# Patient Record
Sex: FEMALE | Race: WHITE | NOT HISPANIC OR LATINO | Employment: OTHER | URBAN - NONMETROPOLITAN AREA
[De-identification: names, ages, dates, MRNs, and addresses within clinical notes are randomized per-mention and may not be internally consistent; named-entity substitution may affect disease eponyms.]

---

## 2017-01-07 ENCOUNTER — TRANSCRIBE ORDERS (OUTPATIENT)
Dept: ADMINISTRATIVE | Facility: HOSPITAL | Age: 52
End: 2017-01-07

## 2017-01-07 DIAGNOSIS — R06.02 SHORTNESS OF BREATH: Primary | ICD-10-CM

## 2017-01-17 ENCOUNTER — HOSPITAL ENCOUNTER (OUTPATIENT)
Dept: CT IMAGING | Facility: HOSPITAL | Age: 52
Discharge: HOME | End: 2017-01-17
Admitting: NURSE PRACTITIONER

## 2017-01-17 DIAGNOSIS — N28.1 RENAL CYST, RIGHT: ICD-10-CM

## 2017-01-17 PROCEDURE — 74178 CT ABD&PLV WO CNTR FLWD CNTR: CPT

## 2017-01-17 PROCEDURE — 0 IOPAMIDOL 61 % SOLUTION: Performed by: NURSE PRACTITIONER

## 2017-01-17 PROCEDURE — 74178 CT ABD&PLV WO CNTR FLWD CNTR: CPT | Performed by: RADIOLOGY

## 2017-01-17 RX ADMIN — IOPAMIDOL 100 ML: 612 INJECTION, SOLUTION INTRAVENOUS at 10:45

## 2017-01-23 ENCOUNTER — OFFICE VISIT (OUTPATIENT)
Dept: SURGERY | Facility: CLINIC | Age: 52
End: 2017-01-23

## 2017-01-23 VITALS
SYSTOLIC BLOOD PRESSURE: 126 MMHG | HEART RATE: 88 BPM | DIASTOLIC BLOOD PRESSURE: 72 MMHG | BODY MASS INDEX: 32.47 KG/M2 | WEIGHT: 190.2 LBS | HEIGHT: 64 IN

## 2017-01-23 DIAGNOSIS — K82.9 GALLBLADDER DISEASE: Primary | ICD-10-CM

## 2017-01-23 DIAGNOSIS — R94.8 ABNORMAL BILIARY HIDA SCAN: ICD-10-CM

## 2017-01-23 PROCEDURE — 99213 OFFICE O/P EST LOW 20 MIN: CPT | Performed by: SURGERY

## 2017-01-23 RX ORDER — RANITIDINE 150 MG/1
150 TABLET ORAL 2 TIMES DAILY
COMMUNITY
End: 2019-12-05

## 2017-01-23 RX ORDER — PRAZOSIN HYDROCHLORIDE 2 MG/1
2 CAPSULE ORAL NIGHTLY
COMMUNITY
End: 2019-12-05

## 2017-01-23 RX ORDER — QUETIAPINE FUMARATE 100 MG/1
100 TABLET, FILM COATED ORAL NIGHTLY
COMMUNITY
End: 2019-12-05

## 2017-01-23 NOTE — PROGRESS NOTES
Nina Jiang is a 51 y.o. female here today to review test results.    History of Present Illness  Ms. Jiang was seen in the office today for follow-upaftera gallbladder ultrasound and a HIDA scan.  The patient was initially seen on 12/22/16 with an MRI of the thoracic spine report that suggested cholelithiasis.  Upon our review the radiologist's at [facility] felt that additional imaging to confirm was indicated.  The patient presents today having had an ultrasound which demonstrated stones and a HIDA scan which demonstrated a 12% ejection fraction.  The patient continues to have the same symptoms as a month ago.  She does not have pain per se but her main symptoms are nausea and vomiting.  No Known Allergies    Current Outpatient Prescriptions   Medication Sig Dispense Refill   • albuterol (PROVENTIL) (2.5 MG/3ML) 0.083% nebulizer solution Take 2.5 mg by nebulization every 4 (four) hours as needed for wheezing.     • ALPRAZolam (XANAX) 1 MG tablet Take 1 mg by mouth 2 (two) times a day as needed for anxiety.     • aspirin 81 MG chewable tablet Chew 81 mg daily.     • budesonide-formoterol (SYMBICORT) 160-4.5 MCG/ACT inhaler Inhale 2 puffs 2 (Two) Times a Day. 1 inhaler 6   • cetirizine (ZyrTEC) 10 MG tablet Take 10 mg by mouth daily.     • clonazePAM (KlonoPIN) 0.5 MG tablet Take 0.5 mg by mouth 2 (Two) Times a Day As Needed for seizures.     • flunisolide (NASALIDE) 25 MCG/ACT (0.025%) solution nasal spray Inhale 2 sprays Every 12 (Twelve) Hours. 1 bottle 5   • FLUoxetine (PROzac) 40 MG capsule      • furosemide (LASIX) 20 MG tablet Take 20 mg by mouth 2 (two) times a day.     • gabapentin (NEURONTIN) 800 MG tablet      • HYDROcodone-acetaminophen (NORCO)  MG per tablet Take 1 tablet by mouth every 6 (six) hours as needed for moderate pain (4-6).     • Lancets 30G misc      • levalbuterol (XOPENEX) 1.25 MG/3ML nebulizer solution Take 1 ampule by nebulization every 4 (four) hours as  "needed for wheezing.     • Loratadine (CLARITIN) 10 MG capsule Take  by mouth.     • metFORMIN (GLUCOPHAGE) 500 MG tablet Take 500 mg by mouth 2 (two) times a day with meals.     • metoprolol tartrate (LOPRESSOR) 25 MG tablet Take 25 mg by mouth 2 (two) times a day.     • montelukast (SINGULAIR) 10 MG tablet Take 10 mg by mouth every night.     • prazosin (MINIPRESS) 2 MG capsule Take 2 mg by mouth Every Night.     • promethazine (PHENERGAN) 25 MG tablet Take 25 mg by mouth every 6 (six) hours as needed for nausea or vomiting.     • QUEtiapine (SEROquel) 100 MG tablet Take 100 mg by mouth Every Night.     • raNITIdine (ZANTAC) 150 MG tablet Take 150 mg by mouth 2 (Two) Times a Day.     • Tiotropium Bromide Monohydrate (SPIRIVA RESPIMAT) 2.5 MCG/ACT aerosol solution Inhale 2 inhalers Daily. 1 inhaler 6   • vitamin D (ERGOCALCIFEROL) 27285 UNITS capsule capsule Take 50,000 Units by mouth 1 (one) time per week.       No current facility-administered medications for this visit.      Past Medical History   Diagnosis Date   • Asthma, extrinsic    • Cholelithiasis    • COPD (chronic obstructive pulmonary disease)    • Diabetes mellitus    • Hypertension    • Sleep apnea, obstructive      Past Surgical History   Procedure Laterality Date   • Hysterectomy     • Kidney stone surgery     • Cardiac catheterization     • Tubal abdominal ligation           The following portions of the patient's history were reviewed and updated as appropriate: allergies, current medications, past family history, past medical history, past social history, past surgical history and problem list.    Review of systems:  Unchanged from prior except HPI    Objective   Visit Vitals   • /72 (BP Location: Left arm, Patient Position: Sitting)   • Pulse 88   • Ht 64\" (162.6 cm)   • Wt 190 lb 3.2 oz (86.3 kg)   • BMI 32.65 kg/m2      Physical Exam  General: This is a WD WN [default value] in no acute distress  HEENT exam: WNL. Sclera are anicteric. " EOMI  Neck: supple, FROM, without thyromegaly, cervical or supraclavicular adenopathy  Lungs: Respiratory effort normal. Auscultation: Clear, without wheezes, rhonchi, rales  Heart: Regular rate and rhythm, without murmur, gallop, rub. No pedal edema  Abdomen:Bowel sounds are present.  The abdomen is soft, nontender, nondistended.  No rebound or guarding.  Musculoskeletal: muscle strength/tone is normal. Gait and station: normal. No digital cyanosis  Psyc: alert, oriented x 3. Mood and affect are appropriate  skin: Warm with good turgor. Without rash or lesion  extremities: Examination of the extremities revealed no cyanosis, clubbing or edema.  Results/Data  Gallbladder and HIDA scans were reviewed and I agree with the assessment    Procedures     Assessment/Plan     Symptomatic cholelithiasis and abnormal HIDA scan    Proceed with laparoscopic cholecystectomy with cholangiogram             Discussion/Summary  The risks of the procedure including risks of anesthesia and conversion to open were discussed with the patient    Future Appointments  Date Time Provider Department Center   1/26/2017 10:40 AM HESHAM Simmons None   2/20/2017 12:00 PM PULMONARY LAB ROOM  [facility]   2/20/2017 1:30 PM Tena Cee MD [facility] None

## 2017-01-23 NOTE — MR AVS SNAPSHOT
Liz Jiang   1/23/2017 2:00 PM   Office Visit    Dept Phone:  [phone]   Encounter #:  [id number]    Provider:  Carolin Marie MD   Department:  [facility] GENERAL SURGERY                Your Full Care Plan              Your Updated Medication List          This list is accurate as of: 1/23/17  3:09 PM.  Always use your most recent med list.                albuterol (2.5 MG/3ML) 0.083% nebulizer solution   Commonly known as:  PROVENTIL       ALPRAZolam 1 MG tablet   Commonly known as:  XANAX       aspirin 81 MG chewable tablet       budesonide-formoterol 160-4.5 MCG/ACT inhaler   Commonly known as:  SYMBICORT   Inhale 2 puffs 2 (Two) Times a Day.       cetirizine 10 MG tablet   Commonly known as:  zyrTEC       CLARITIN 10 MG capsule   Generic drug:  Loratadine       clonazePAM 0.5 MG tablet   Commonly known as:  KlonoPIN       flunisolide 25 MCG/ACT (0.025%) solution nasal spray   Commonly known as:  NASALIDE   Inhale 2 sprays Every 12 (Twelve) Hours.       FLUoxetine 40 MG capsule   Commonly known as:  PROzac       furosemide 20 MG tablet   Commonly known as:  LASIX       gabapentin 800 MG tablet   Commonly known as:  NEURONTIN       HYDROcodone-acetaminophen  MG per tablet   Commonly known as:  NORCO       Lancets 30G misc       levalbuterol 1.25 MG/3ML nebulizer solution   Commonly known as:  XOPENEX       metFORMIN 500 MG tablet   Commonly known as:  GLUCOPHAGE       metoprolol tartrate 25 MG tablet   Commonly known as:  LOPRESSOR       montelukast 10 MG tablet   Commonly known as:  SINGULAIR       prazosin 2 MG capsule   Commonly known as:  MINIPRESS       promethazine 25 MG tablet   Commonly known as:  PHENERGAN       QUEtiapine 100 MG tablet   Commonly known as:  SEROquel       raNITIdine 150 MG tablet   Commonly known as:  ZANTAC       Tiotropium Bromide Monohydrate 2.5 MCG/ACT aerosol solution   Commonly known as:  SPIRIVA RESPIMAT   Inhale 2  "inhalers Daily.       vitamin D 26117 UNITS capsule capsule   Commonly known as:  ERGOCALCIFEROL               Instructions     None    Patient Instructions History      Upcoming Appointments     Visit Type Date Time Department    FOLLOW UP 1/23/2017  2:00 PM [facility] SRGCAL SPEC [facility]    FOLLOW UP 1/26/2017 10:40 AM [facility] UROLOGY SARAH    FOLLOW UP 2/20/2017  1:30 PM [facility] PULM CRTCRE RICHMD    FULL PFT W BRONCHODILATORS 2/20/2017 12:00 PM [facility] PULMONARY LAB      MyChart Signup     Our records indicate that you have declined [facility] MyChart signup. If you would like to sign up for MyChart, please email [facility] [email or web address] or call [phone] to obtain an activation code.             Other Info from Your Visit           Your Appointments     Jan 26, 2017 10:40 AM EST   Follow Up with HESHAM Simmons   [facility] UROLOGY (--)    [address]  [facility] [address] [phone]           Arrive 15 minutes prior to appointment.            Feb 20, 2017 12:00 PM EST   Full PFT W Bronchodilators with PULMONARY LAB ROOM   [facility] PULMONARY LAB ([place])    [address] [facility] [address] [phone]           No inhalers or smoking 4 hours prior to procedure.            Feb 20, 2017  1:30 PM EST   Follow Up with Tena Cee MD   [facility] PULMONARY CRITICAL CARE AND SLEEP (--)    [address]  [facility] [address] [phone]           Arrive 15 minutes prior to appointment.              Allergies     No Known Allergies      Reason for Visit     Follow-up Test results      Vital Signs     Blood Pressure Pulse Height Weight Body Mass Index Smoking Status    126/72 (BP Location: Left arm, Patient Position: Sitting) 88 64\" (162.6 cm) 190 lb 3.2 oz (86.3 kg) 32.65 kg/m2 Former Smoker        "

## 2017-01-23 NOTE — LETTER
January 23, 2017     [facility]ESHAM Arteaga  [address] [facility] [address]    Patient: Liz Jiang   YOB: 1965   Date of Visit: 1/23/2017       Dear HESHAM Mercado:    Liz Jiang was in my office today. Below are the relevant portions of my assessment and plan of care.        Symptomatic cholelithiasis and abnormal HIDA scan    Proceed with laparoscopic cholecystectomy with cholangiogram            If you have questions, please do not hesitate to call me. I look forward to following Liz along with you.         Sincerely,        Carolin Marie MD        CC: No Recipients

## 2017-01-23 NOTE — H&P
Nina Jiang is a 51 y.o. female here today to review test results.    History of Present Illness  Ms. Jiang was seen in the office today for follow-upaftera gallbladder ultrasound and a HIDA scan.  The patient was initially seen on 12/22/16 with an MRI of the thoracic spine report that suggested cholelithiasis.  Upon our review the radiologist's at [facility] felt that additional imaging to confirm was indicated.  The patient presents today having had an ultrasound which demonstrated stones and a HIDA scan which demonstrated a 12% ejection fraction.  The patient continues to have the same symptoms as a month ago.  She does not have pain per se but her main symptoms are nausea and vomiting.  No Known Allergies    Current Outpatient Prescriptions   Medication Sig Dispense Refill   • albuterol (PROVENTIL) (2.5 MG/3ML) 0.083% nebulizer solution Take 2.5 mg by nebulization every 4 (four) hours as needed for wheezing.     • ALPRAZolam (XANAX) 1 MG tablet Take 1 mg by mouth 2 (two) times a day as needed for anxiety.     • aspirin 81 MG chewable tablet Chew 81 mg daily.     • budesonide-formoterol (SYMBICORT) 160-4.5 MCG/ACT inhaler Inhale 2 puffs 2 (Two) Times a Day. 1 inhaler 6   • cetirizine (ZyrTEC) 10 MG tablet Take 10 mg by mouth daily.     • clonazePAM (KlonoPIN) 0.5 MG tablet Take 0.5 mg by mouth 2 (Two) Times a Day As Needed for seizures.     • flunisolide (NASALIDE) 25 MCG/ACT (0.025%) solution nasal spray Inhale 2 sprays Every 12 (Twelve) Hours. 1 bottle 5   • FLUoxetine (PROzac) 40 MG capsule      • furosemide (LASIX) 20 MG tablet Take 20 mg by mouth 2 (two) times a day.     • gabapentin (NEURONTIN) 800 MG tablet      • HYDROcodone-acetaminophen (NORCO)  MG per tablet Take 1 tablet by mouth every 6 (six) hours as needed for moderate pain (4-6).     • Lancets 30G misc      • levalbuterol (XOPENEX) 1.25 MG/3ML nebulizer solution Take 1 ampule by nebulization every 4 (four) hours as  "needed for wheezing.     • Loratadine (CLARITIN) 10 MG capsule Take  by mouth.     • metFORMIN (GLUCOPHAGE) 500 MG tablet Take 500 mg by mouth 2 (two) times a day with meals.     • metoprolol tartrate (LOPRESSOR) 25 MG tablet Take 25 mg by mouth 2 (two) times a day.     • montelukast (SINGULAIR) 10 MG tablet Take 10 mg by mouth every night.     • prazosin (MINIPRESS) 2 MG capsule Take 2 mg by mouth Every Night.     • promethazine (PHENERGAN) 25 MG tablet Take 25 mg by mouth every 6 (six) hours as needed for nausea or vomiting.     • QUEtiapine (SEROquel) 100 MG tablet Take 100 mg by mouth Every Night.     • raNITIdine (ZANTAC) 150 MG tablet Take 150 mg by mouth 2 (Two) Times a Day.     • Tiotropium Bromide Monohydrate (SPIRIVA RESPIMAT) 2.5 MCG/ACT aerosol solution Inhale 2 inhalers Daily. 1 inhaler 6   • vitamin D (ERGOCALCIFEROL) 52900 UNITS capsule capsule Take 50,000 Units by mouth 1 (one) time per week.       No current facility-administered medications for this visit.      Past Medical History   Diagnosis Date   • Asthma, extrinsic    • Cholelithiasis    • COPD (chronic obstructive pulmonary disease)    • Diabetes mellitus    • Hypertension    • Sleep apnea, obstructive      Past Surgical History   Procedure Laterality Date   • Hysterectomy     • Kidney stone surgery     • Cardiac catheterization     • Tubal abdominal ligation           The following portions of the patient's history were reviewed and updated as appropriate: allergies, current medications, past family history, past medical history, past social history, past surgical history and problem list.    Review of systems:  Unchanged from prior except HPI    Objective   Visit Vitals   • /72 (BP Location: Left arm, Patient Position: Sitting)   • Pulse 88   • Ht 64\" (162.6 cm)   • Wt 190 lb 3.2 oz (86.3 kg)   • BMI 32.65 kg/m2      Physical Exam  General: This is a WD WN [default value] in no acute distress  HEENT exam: WNL. Sclera are anicteric. " EOMI  Neck: supple, FROM, without thyromegaly, cervical or supraclavicular adenopathy  Lungs: Respiratory effort normal. Auscultation: Clear, without wheezes, rhonchi, rales  Heart: Regular rate and rhythm, without murmur, gallop, rub. No pedal edema  Abdomen:Bowel sounds are present.  The abdomen is soft, nontender, nondistended.  No rebound or guarding.  Musculoskeletal: muscle strength/tone is normal. Gait and station: normal. No digital cyanosis  Psyc: alert, oriented x 3. Mood and affect are appropriate  skin: Warm with good turgor. Without rash or lesion  extremities: Examination of the extremities revealed no cyanosis, clubbing or edema.  Results/Data  Gallbladder and HIDA scans were reviewed and I agree with the assessment    Procedures     Assessment/Plan     Symptomatic cholelithiasis and abnormal HIDA scan    Proceed with laparoscopic cholecystectomy with cholangiogram             Discussion/Summary  The risks of the procedure including risks of anesthesia and conversion to open were discussed with the patient    Future Appointments  Date Time Provider Department Center   1/26/2017 10:40 AM HESHAM Simmons None   2/20/2017 12:00 PM PULMONARY LAB ROOM  [facility]   2/20/2017 1:30 PM Tena Cee MD [facility] None     This document has been electronically signed by Carolin MARVIN MD on January 23, 2017 6:48 PM

## 2017-01-24 ENCOUNTER — TELEPHONE (OUTPATIENT)
Dept: SURGERY | Facility: CLINIC | Age: 52
End: 2017-01-24

## 2017-01-27 ENCOUNTER — APPOINTMENT (OUTPATIENT)
Dept: PREADMISSION TESTING | Facility: HOSPITAL | Age: 52
End: 2017-01-27

## 2017-01-27 ENCOUNTER — TELEPHONE (OUTPATIENT)
Dept: SURGERY | Facility: CLINIC | Age: 52
End: 2017-01-27

## 2017-01-27 DIAGNOSIS — K82.9 GALLBLADDER DISEASE: ICD-10-CM

## 2017-01-27 LAB
ALBUMIN SERPL-MCNC: 4.3 G/DL (ref 3.5–5)
ALBUMIN/GLOB SERPL: 1.5 G/DL (ref 1.5–2.5)
ALP SERPL-CCNC: 85 U/L (ref 46–116)
ALT SERPL W P-5'-P-CCNC: 31 U/L (ref 10–36)
ANION GAP SERPL CALCULATED.3IONS-SCNC: 6.7 MMOL/L (ref 3.6–11.2)
AST SERPL-CCNC: 27 U/L (ref 10–30)
BASOPHILS # BLD AUTO: 0.01 10*3/MM3 (ref 0–0.3)
BASOPHILS NFR BLD AUTO: 0.1 % (ref 0–2)
BILIRUB SERPL-MCNC: 0.2 MG/DL (ref 0.2–1.8)
BUN BLD-MCNC: 14 MG/DL (ref 7–21)
BUN/CREAT SERPL: 25 (ref 7–25)
CALCIUM SPEC-SCNC: 9 MG/DL (ref 7.7–10)
CHLORIDE SERPL-SCNC: 105 MMOL/L (ref 99–112)
CO2 SERPL-SCNC: 32.3 MMOL/L (ref 24.3–31.9)
CREAT BLD-MCNC: 0.56 MG/DL (ref 0.43–1.29)
DEPRECATED RDW RBC AUTO: 47.5 FL (ref 37–54)
EOSINOPHIL # BLD AUTO: 0.06 10*3/MM3 (ref 0–0.7)
EOSINOPHIL NFR BLD AUTO: 0.7 % (ref 0–5)
ERYTHROCYTE [DISTWIDTH] IN BLOOD BY AUTOMATED COUNT: 14.5 % (ref 11.5–14.5)
GFR SERPL CREATININE-BSD FRML MDRD: 114 ML/MIN/1.73
GLOBULIN UR ELPH-MCNC: 2.9 GM/DL
GLUCOSE BLD-MCNC: 90 MG/DL (ref 70–110)
HCT VFR BLD AUTO: 46.2 % (ref 37–47)
HGB BLD-MCNC: 14.3 G/DL (ref 12–16)
IMM GRANULOCYTES # BLD: 0.02 10*3/MM3 (ref 0–0.03)
IMM GRANULOCYTES NFR BLD: 0.2 % (ref 0–0.5)
LYMPHOCYTES # BLD AUTO: 1.87 10*3/MM3 (ref 1–3)
LYMPHOCYTES NFR BLD AUTO: 20.8 % (ref 21–51)
MCH RBC QN AUTO: 28.8 PG (ref 27–33)
MCHC RBC AUTO-ENTMCNC: 31 G/DL (ref 33–37)
MCV RBC AUTO: 93 FL (ref 80–94)
MONOCYTES # BLD AUTO: 0.67 10*3/MM3 (ref 0.1–0.9)
MONOCYTES NFR BLD AUTO: 7.4 % (ref 0–10)
NEUTROPHILS # BLD AUTO: 6.37 10*3/MM3 (ref 1.4–6.5)
NEUTROPHILS NFR BLD AUTO: 70.8 % (ref 30–70)
OSMOLALITY SERPL CALC.SUM OF ELEC: 286.8 MOSM/KG (ref 273–305)
PLATELET # BLD AUTO: 186 10*3/MM3 (ref 130–400)
PMV BLD AUTO: 10.9 FL (ref 6–10)
POTASSIUM BLD-SCNC: 4.2 MMOL/L (ref 3.5–5.3)
PROT SERPL-MCNC: 7.2 G/DL (ref 6–8)
RBC # BLD AUTO: 4.97 10*6/MM3 (ref 4.2–5.4)
SODIUM BLD-SCNC: 144 MMOL/L (ref 135–153)
WBC NRBC COR # BLD: 9 10*3/MM3 (ref 4.5–12.5)

## 2017-01-27 PROCEDURE — 85025 COMPLETE CBC W/AUTO DIFF WBC: CPT | Performed by: SURGERY

## 2017-01-27 PROCEDURE — 80053 COMPREHEN METABOLIC PANEL: CPT | Performed by: SURGERY

## 2017-01-27 PROCEDURE — 36415 COLL VENOUS BLD VENIPUNCTURE: CPT

## 2017-01-27 RX ORDER — HYDROCODONE BITARTRATE AND ACETAMINOPHEN 7.5; 325 MG/1; MG/1
1 TABLET ORAL 2 TIMES DAILY
COMMUNITY
End: 2018-09-04 | Stop reason: ALTCHOICE

## 2017-01-27 NOTE — TELEPHONE ENCOUNTER
Spoke with patients daughter to confirm her surgery arrival time on 1/30/17 @ 8:45 and needs to be NPO

## 2017-01-27 NOTE — DISCHARGE INSTRUCTIONS
Arrival time 0730    1/30/17     TAKE the following medications the morning of surgery:  All heart or blood pressure medications    HOLD all diabetic medications the morning of surgery as order by physician.General Instructions:  • Do NOT eat or drink after midnight  which includes water, mints, or gum.  • You may brush your teeth.  • Do NOT smoke, chew tobacco, or drink alcohol within 24 hours prior to surgery.  • Bring medications in original bottles, any inhalers and if applicable your C-PAP/BI-PAP machine  • Bring any papers given to you in the doctor’s office  • Wear clean, comfortable clothes and socks  • Do NOT wear contact lenses or make-up or dark nail polish.  Bring a case for your glasses if applicable.  • Bring crutches or walker if applicable  • Leave all other valuables and jewelry at home  • If you were given a blood bank armband, continue to wear it until discharged.    Preventing a Surgical Site Infection:  • Shower on the morning of surgery using a fresh bar of anti-bacterial soap (such as Dial) and clean washcloth.  Dry with a clean towel and dress in clean clothing.  • For 2 to 3 days before surgery, avoid shaving with a razor near where you will have surgery because the razor can irritate skin and make it easier to develop an infection.  Ask your surgeon if you will be receiving antibiotics prior to surgery.  • Make sure you, your family, and all healthcare providers clean their hands with soap and water or an alcohol-based hand  before caring for you or your wound.  • If at all possible, quit smoking as many days before surgery as you can.    Day of Surgery:  Upon arrival, a pre-op nurse and anesthesiologist will review your health history, obtain vital signs, and answer questions you may have.  The only belongings needed at this time will be your home medications and if applicable you C-PAP/BI-PAP machine.  If you are staying overnight, your family can leave the rest of your  belongings in the car and bring them to your room later.  A pre-op nurse will start an IV and you may receive medication in preparation for surgery.  Due to patient privacy and limited space, only one member of your family will be able to accompany you in the pre-op area.  While you are in surgery your family should notify the waiting room  if they leave the waiting room area and provide a contact number.  Please be aware that surgery does come with discomfort.  We want to make every effort to control your discomfort so please discuss any uncontrolled symptoms with your nurse.  Your doctor will most likely have prescribed pain medications.  If you are going home after surgery you will receive individualized written care instructions before being discharged.  A responsible adult must drive you to and from the hospital on the day of surgery and stay with you for 24 hours.  If you are staying overnight following surgery, you will be transported to your hospital room following the recovery period.

## 2017-01-27 NOTE — MR AVS SNAPSHOT
Liz Jiang   1/27/2017 11:30 AM   Appointment    Provider:  PAT 3 COR   Department:  [facility] SARAH PREADMISSION TESTING PAT   Dept Phone:  [phone]                Your Full Care Plan           To Do List     2/3/2017 9:20 AM     Appointment with HESHAM Simmons at [facility] UROLOGY ([phone])   Arrive 15 minutes prior to appointment.   [address]  [place] [address]       2/20/2017 12:00 PM     Appointment with PULMONARY LAB ROOM at [facility] PULMONARY LAB ([phone])   No inhalers or smoking 4 hours prior to procedure.   [address]  [facility] [address]       2/20/2017 1:30 PM     Appointment with Tena Cee MD at [facility] PULMONARY CRITICAL CARE AND SLEEP ([phone])   Arrive 15 minutes prior to appointment.   [address]  [address]  [facility] [address]            Your Updated Medication List          This list is accurate as of: 1/27/17 11:48 AM.  Always use your most recent med list.                albuterol (2.5 MG/3ML) 0.083% nebulizer solution   Commonly known as:  PROVENTIL       aspirin 81 MG chewable tablet       budesonide-formoterol 160-4.5 MCG/ACT inhaler   Commonly known as:  SYMBICORT   Inhale 2 puffs 2 (Two) Times a Day.       cetirizine 10 MG tablet   Commonly known as:  zyrTEC       CLARITIN 10 MG capsule   Generic drug:  Loratadine       clonazePAM 0.5 MG tablet   Commonly known as:  KlonoPIN       flunisolide 25 MCG/ACT (0.025%) solution nasal spray   Commonly known as:  NASALIDE   Inhale 2 sprays Every 12 (Twelve) Hours.       FLUoxetine 40 MG capsule   Commonly known as:  PROzac       furosemide 20 MG tablet   Commonly known as:  LASIX       gabapentin 800 MG tablet   Commonly known as:  NEURONTIN       HYDROcodone-acetaminophen 7.5-325 MG per tablet   Commonly known as:  NORCO       Lancets 30G misc       levalbuterol 1.25 MG/3ML nebulizer  solution   Commonly known as:  XOPENEX       metFORMIN 500 MG tablet   Commonly known as:  GLUCOPHAGE       metoprolol tartrate 25 MG tablet   Commonly known as:  LOPRESSOR       montelukast 10 MG tablet   Commonly known as:  SINGULAIR       prazosin 2 MG capsule   Commonly known as:  MINIPRESS       promethazine 25 MG tablet   Commonly known as:  PHENERGAN       QUEtiapine 100 MG tablet   Commonly known as:  SEROquel       raNITIdine 150 MG tablet   Commonly known as:  ZANTAC       Tiotropium Bromide Monohydrate 2.5 MCG/ACT aerosol solution   Commonly known as:  SPIRIVA RESPIMAT   Inhale 2 inhalers Daily.       vitamin D 83749 UNITS capsule capsule   Commonly known as:  ERGOCALCIFEROL               [employer] Signup     Our records indicate that you have declined [employer] signup. If you would like to sign up for [employer], please email [email or web address] or call [phone] to obtain an activation code.             Other Info from Your Visit           Allergies     No Known Allergies      Vital Signs     Smoking Status                   Former Smoker             Discharge Instructions       Arrival time 0730    1/30/17     TAKE the following medications the morning of surgery:  All heart or blood pressure medications    HOLD all diabetic medications the morning of surgery as order by physician.General Instructions:  • Do NOT eat or drink after midnight  which includes water, mints, or gum.  • You may brush your teeth.  • Do NOT smoke, chew tobacco, or drink alcohol within 24 hours prior to surgery.  • Bring medications in original bottles, any inhalers and if applicable your C-PAP/BI-PAP machine  • Bring any papers given to you in the doctor’s office  • Wear clean, comfortable clothes and socks  • Do NOT wear contact lenses or make-up or dark nail polish.  Bring a case for your glasses if applicable.  • Bring crutches or walker if applicable  • Leave all other valuables and jewelry at home  • If  you were given a blood bank armband, continue to wear it until discharged.    Preventing a Surgical Site Infection:  • Shower on the morning of surgery using a fresh bar of anti-bacterial soap (such as Dial) and clean washcloth.  Dry with a clean towel and dress in clean clothing.  • For 2 to 3 days before surgery, avoid shaving with a razor near where you will have surgery because the razor can irritate skin and make it easier to develop an infection.  Ask your surgeon if you will be receiving antibiotics prior to surgery.  • Make sure you, your family, and all healthcare providers clean their hands with soap and water or an alcohol-based hand  before caring for you or your wound.  • If at all possible, quit smoking as many days before surgery as you can.    Day of Surgery:  Upon arrival, a pre-op nurse and anesthesiologist will review your health history, obtain vital signs, and answer questions you may have.  The only belongings needed at this time will be your home medications and if applicable you C-PAP/BI-PAP machine.  If you are staying overnight, your family can leave the rest of your belongings in the car and bring them to your room later.  A pre-op nurse will start an IV and you may receive medication in preparation for surgery.  Due to patient privacy and limited space, only one member of your family will be able to accompany you in the pre-op area.  While you are in surgery your family should notify the waiting room  if they leave the waiting room area and provide a contact number.  Please be aware that surgery does come with discomfort.  We want to make every effort to control your discomfort so please discuss any uncontrolled symptoms with your nurse.  Your doctor will most likely have prescribed pain medications.  If you are going home after surgery you will receive individualized written care instructions before being discharged.  A responsible adult must drive you to and from  the hospital on the day of surgery and stay with you for 24 hours.  If you are staying overnight following surgery, you will be transported to your hospital room following the recovery period.       SYMPTOMS OF A STROKE    Call 911 or have someone take you to the Emergency Department if you have any of the following:    · Sudden numbness or weakness of your face, arm or leg especially on one side of the body  · Sudden confusion, diffiiculty speaking or trouble understanding   · Changes in your vision or loss of sight in one eye  · Sudden severe headache with no known cause  · sudden dizziness, trouble walking, loss of balance or coordination    It is important to seek emergency care right away if you have further stroke symptoms. If you get emergency help quickly, the powerful clot-dissolving medicines can reduce the disabilities caused by a stroke.     For more information:    American Stroke Association  1-912-6-STROKE  www.strokeassociation.org           IF YOU SMOKE OR USE TOBACCO PLEASE READ THE FOLLOWING:    Why is smoking bad for me?  Smoking increases the risk of heart disease, lung disease, vascular disease, stroke, and cancer.     If you smoke, STOP!    If you would like more information on quitting smoking, please visit the [employer] website: [email or web address]   This link will provide additional resources including the QUIT line and the Beat the Pack support groups.     For more information:    American Cancer Society  ([phone] 788-8965    American Heart Association  5-158-596-9544

## 2017-01-30 ENCOUNTER — ANESTHESIA (OUTPATIENT)
Dept: PERIOP | Facility: HOSPITAL | Age: 52
End: 2017-01-30

## 2017-01-30 ENCOUNTER — ANESTHESIA EVENT (OUTPATIENT)
Dept: PERIOP | Facility: HOSPITAL | Age: 52
End: 2017-01-30

## 2017-01-30 ENCOUNTER — APPOINTMENT (OUTPATIENT)
Dept: GENERAL RADIOLOGY | Facility: HOSPITAL | Age: 52
End: 2017-01-30

## 2017-01-30 ENCOUNTER — HOSPITAL ENCOUNTER (OUTPATIENT)
Facility: HOSPITAL | Age: 52
Setting detail: HOSPITAL OUTPATIENT SURGERY
Discharge: HOME | End: 2017-01-30
Attending: SURGERY | Admitting: SURGERY

## 2017-01-30 VITALS
DIASTOLIC BLOOD PRESSURE: 72 MMHG | OXYGEN SATURATION: 93 % | WEIGHT: 186 LBS | RESPIRATION RATE: 18 BRPM | TEMPERATURE: 97.6 F | HEART RATE: 88 BPM | BODY MASS INDEX: 31.76 KG/M2 | HEIGHT: 64 IN | SYSTOLIC BLOOD PRESSURE: 118 MMHG

## 2017-01-30 DIAGNOSIS — K82.9 GALLBLADDER DISEASE: ICD-10-CM

## 2017-01-30 LAB — GLUCOSE BLDC GLUCOMTR-MCNC: 92 MG/DL (ref 70–130)

## 2017-01-30 PROCEDURE — C1726 CATH, BAL DIL, NON-VASCULAR: HCPCS | Performed by: SURGERY

## 2017-01-30 PROCEDURE — 25010000002 MIDAZOLAM PER 1 MG: Performed by: NURSE ANESTHETIST, CERTIFIED REGISTERED

## 2017-01-30 PROCEDURE — 25010000002 KETOROLAC TROMETHAMINE PER 15 MG: Performed by: NURSE ANESTHETIST, CERTIFIED REGISTERED

## 2017-01-30 PROCEDURE — 47563 LAPARO CHOLECYSTECTOMY/GRAPH: CPT | Performed by: SURGERY

## 2017-01-30 PROCEDURE — 25010000002 DEXAMETHASONE PER 1 MG: Performed by: NURSE ANESTHETIST, CERTIFIED REGISTERED

## 2017-01-30 PROCEDURE — 25010000002 NEOSTIGMINE 10 MG/10ML SOLUTION: Performed by: NURSE ANESTHETIST, CERTIFIED REGISTERED

## 2017-01-30 PROCEDURE — 76000 FLUOROSCOPY <1 HR PHYS/QHP: CPT

## 2017-01-30 PROCEDURE — 25010000002 FENTANYL CITRATE (PF) 100 MCG/2ML SOLUTION: Performed by: NURSE ANESTHETIST, CERTIFIED REGISTERED

## 2017-01-30 PROCEDURE — 25010000002 ONDANSETRON PER 1 MG: Performed by: NURSE ANESTHETIST, CERTIFIED REGISTERED

## 2017-01-30 PROCEDURE — 0 IOPAMIDOL 61 % SOLUTION: Performed by: SURGERY

## 2017-01-30 PROCEDURE — 82962 GLUCOSE BLOOD TEST: CPT

## 2017-01-30 PROCEDURE — 25010000002 PROPOFOL 10 MG/ML EMULSION: Performed by: NURSE ANESTHETIST, CERTIFIED REGISTERED

## 2017-01-30 PROCEDURE — 74300 X-RAY BILE DUCTS/PANCREAS: CPT | Performed by: RADIOLOGY

## 2017-01-30 PROCEDURE — 25010000003 CEFAZOLIN PER 500 MG: Performed by: SURGERY

## 2017-01-30 PROCEDURE — 25010000002 HYDROMORPHONE PER 4 MG: Performed by: NURSE ANESTHETIST, CERTIFIED REGISTERED

## 2017-01-30 RX ORDER — GLYCOPYRROLATE 0.2 MG/ML
INJECTION INTRAMUSCULAR; INTRAVENOUS AS NEEDED
Status: DISCONTINUED | OUTPATIENT
Start: 2017-01-30 | End: 2017-01-30 | Stop reason: SURG

## 2017-01-30 RX ORDER — SODIUM CHLORIDE 9 MG/ML
INJECTION, SOLUTION INTRAVENOUS AS NEEDED
Status: DISCONTINUED | OUTPATIENT
Start: 2017-01-30 | End: 2017-01-30 | Stop reason: HOSPADM

## 2017-01-30 RX ORDER — KETOROLAC TROMETHAMINE 30 MG/ML
INJECTION, SOLUTION INTRAMUSCULAR; INTRAVENOUS AS NEEDED
Status: DISCONTINUED | OUTPATIENT
Start: 2017-01-30 | End: 2017-01-30 | Stop reason: SURG

## 2017-01-30 RX ORDER — LIDOCAINE HYDROCHLORIDE 20 MG/ML
INJECTION, SOLUTION INFILTRATION; PERINEURAL AS NEEDED
Status: DISCONTINUED | OUTPATIENT
Start: 2017-01-30 | End: 2017-01-30 | Stop reason: SURG

## 2017-01-30 RX ORDER — SODIUM CHLORIDE 0.9 % (FLUSH) 0.9 %
1-10 SYRINGE (ML) INJECTION AS NEEDED
Status: DISCONTINUED | OUTPATIENT
Start: 2017-01-30 | End: 2017-01-30 | Stop reason: HOSPADM

## 2017-01-30 RX ORDER — NEOSTIGMINE METHYLSULFATE 1 MG/ML
INJECTION, SOLUTION INTRAVENOUS AS NEEDED
Status: DISCONTINUED | OUTPATIENT
Start: 2017-01-30 | End: 2017-01-30 | Stop reason: SURG

## 2017-01-30 RX ORDER — MIDAZOLAM HYDROCHLORIDE 1 MG/ML
INJECTION INTRAMUSCULAR; INTRAVENOUS AS NEEDED
Status: DISCONTINUED | OUTPATIENT
Start: 2017-01-30 | End: 2017-01-30 | Stop reason: SURG

## 2017-01-30 RX ORDER — PANTOPRAZOLE SODIUM 40 MG/10ML
40 INJECTION, POWDER, LYOPHILIZED, FOR SOLUTION INTRAVENOUS
Status: DISCONTINUED | OUTPATIENT
Start: 2017-01-30 | End: 2017-01-30 | Stop reason: HOSPADM

## 2017-01-30 RX ORDER — FENTANYL CITRATE 50 UG/ML
INJECTION, SOLUTION INTRAMUSCULAR; INTRAVENOUS AS NEEDED
Status: DISCONTINUED | OUTPATIENT
Start: 2017-01-30 | End: 2017-01-30 | Stop reason: SURG

## 2017-01-30 RX ORDER — IPRATROPIUM BROMIDE AND ALBUTEROL SULFATE 2.5; .5 MG/3ML; MG/3ML
3 SOLUTION RESPIRATORY (INHALATION) ONCE AS NEEDED
Status: DISCONTINUED | OUTPATIENT
Start: 2017-01-30 | End: 2017-01-30 | Stop reason: HOSPADM

## 2017-01-30 RX ORDER — ONDANSETRON 2 MG/ML
INJECTION INTRAMUSCULAR; INTRAVENOUS AS NEEDED
Status: DISCONTINUED | OUTPATIENT
Start: 2017-01-30 | End: 2017-01-30 | Stop reason: SURG

## 2017-01-30 RX ORDER — ONDANSETRON 2 MG/ML
4 INJECTION INTRAMUSCULAR; INTRAVENOUS EVERY 4 HOURS PRN
Status: DISCONTINUED | OUTPATIENT
Start: 2017-01-30 | End: 2017-01-30 | Stop reason: HOSPADM

## 2017-01-30 RX ORDER — FAMOTIDINE 10 MG/ML
INJECTION, SOLUTION INTRAVENOUS AS NEEDED
Status: DISCONTINUED | OUTPATIENT
Start: 2017-01-30 | End: 2017-01-30 | Stop reason: SURG

## 2017-01-30 RX ORDER — OXYCODONE HYDROCHLORIDE AND ACETAMINOPHEN 5; 325 MG/1; MG/1
1 TABLET ORAL EVERY 6 HOURS PRN
Qty: 28 TABLET | Refills: 0 | Status: SHIPPED | OUTPATIENT
Start: 2017-01-30 | End: 2019-11-19

## 2017-01-30 RX ORDER — DEXAMETHASONE SODIUM PHOSPHATE 4 MG/ML
INJECTION, SOLUTION INTRA-ARTICULAR; INTRALESIONAL; INTRAMUSCULAR; INTRAVENOUS; SOFT TISSUE AS NEEDED
Status: DISCONTINUED | OUTPATIENT
Start: 2017-01-30 | End: 2017-01-30 | Stop reason: SURG

## 2017-01-30 RX ORDER — ROCURONIUM BROMIDE 10 MG/ML
INJECTION, SOLUTION INTRAVENOUS AS NEEDED
Status: DISCONTINUED | OUTPATIENT
Start: 2017-01-30 | End: 2017-01-30 | Stop reason: SURG

## 2017-01-30 RX ORDER — PROPOFOL 10 MG/ML
VIAL (ML) INTRAVENOUS AS NEEDED
Status: DISCONTINUED | OUTPATIENT
Start: 2017-01-30 | End: 2017-01-30 | Stop reason: SURG

## 2017-01-30 RX ORDER — OXYCODONE HYDROCHLORIDE AND ACETAMINOPHEN 5; 325 MG/1; MG/1
1 TABLET ORAL EVERY 4 HOURS PRN
Status: DISCONTINUED | OUTPATIENT
Start: 2017-01-30 | End: 2017-01-30 | Stop reason: HOSPADM

## 2017-01-30 RX ORDER — MEPERIDINE HYDROCHLORIDE 25 MG/ML
12.5 INJECTION INTRAMUSCULAR; INTRAVENOUS; SUBCUTANEOUS
Status: DISCONTINUED | OUTPATIENT
Start: 2017-01-30 | End: 2017-01-30 | Stop reason: HOSPADM

## 2017-01-30 RX ORDER — SODIUM CHLORIDE, SODIUM LACTATE, POTASSIUM CHLORIDE, CALCIUM CHLORIDE 600; 310; 30; 20 MG/100ML; MG/100ML; MG/100ML; MG/100ML
125 INJECTION, SOLUTION INTRAVENOUS CONTINUOUS
Status: DISCONTINUED | OUTPATIENT
Start: 2017-01-30 | End: 2017-01-30 | Stop reason: HOSPADM

## 2017-01-30 RX ORDER — FENTANYL CITRATE 50 UG/ML
50 INJECTION, SOLUTION INTRAMUSCULAR; INTRAVENOUS
Status: DISCONTINUED | OUTPATIENT
Start: 2017-01-30 | End: 2017-01-30 | Stop reason: HOSPADM

## 2017-01-30 RX ORDER — ONDANSETRON 2 MG/ML
4 INJECTION INTRAMUSCULAR; INTRAVENOUS ONCE AS NEEDED
Status: DISCONTINUED | OUTPATIENT
Start: 2017-01-30 | End: 2017-01-30 | Stop reason: HOSPADM

## 2017-01-30 RX ORDER — MAGNESIUM HYDROXIDE 1200 MG/15ML
LIQUID ORAL AS NEEDED
Status: DISCONTINUED | OUTPATIENT
Start: 2017-01-30 | End: 2017-01-30 | Stop reason: HOSPADM

## 2017-01-30 RX ORDER — HYDROMORPHONE HCL 110MG/55ML
PATIENT CONTROLLED ANALGESIA SYRINGE INTRAVENOUS AS NEEDED
Status: DISCONTINUED | OUTPATIENT
Start: 2017-01-30 | End: 2017-01-30 | Stop reason: SURG

## 2017-01-30 RX ORDER — OXYCODONE HYDROCHLORIDE AND ACETAMINOPHEN 5; 325 MG/1; MG/1
1 TABLET ORAL ONCE AS NEEDED
Status: DISCONTINUED | OUTPATIENT
Start: 2017-01-30 | End: 2017-01-30 | Stop reason: HOSPADM

## 2017-01-30 RX ADMIN — PROPOFOL 200 MG: 10 INJECTION, EMULSION INTRAVENOUS at 09:46

## 2017-01-30 RX ADMIN — FENTANYL CITRATE 50 MCG: 50 INJECTION, SOLUTION INTRAMUSCULAR; INTRAVENOUS at 11:13

## 2017-01-30 RX ADMIN — ROCURONIUM BROMIDE 30 MG: 10 INJECTION INTRAVENOUS at 09:46

## 2017-01-30 RX ADMIN — FENTANYL CITRATE 50 MCG: 50 INJECTION, SOLUTION INTRAMUSCULAR; INTRAVENOUS at 11:18

## 2017-01-30 RX ADMIN — HYDROMORPHONE HYDROCHLORIDE 1 MG: 2 INJECTION, SOLUTION INTRAMUSCULAR; INTRAVENOUS; SUBCUTANEOUS at 10:35

## 2017-01-30 RX ADMIN — KETOROLAC TROMETHAMINE 30 MG: 30 INJECTION, SOLUTION INTRAMUSCULAR; INTRAVENOUS at 10:21

## 2017-01-30 RX ADMIN — MIDAZOLAM HYDROCHLORIDE 2 MG: 1 INJECTION, SOLUTION INTRAMUSCULAR; INTRAVENOUS at 09:41

## 2017-01-30 RX ADMIN — FENTANYL CITRATE 100 MCG: 50 INJECTION INTRAMUSCULAR; INTRAVENOUS at 09:46

## 2017-01-30 RX ADMIN — FAMOTIDINE 20 MG: 10 INJECTION, SOLUTION INTRAVENOUS at 10:04

## 2017-01-30 RX ADMIN — SODIUM CHLORIDE, POTASSIUM CHLORIDE, SODIUM LACTATE AND CALCIUM CHLORIDE 125 ML/HR: 600; 310; 30; 20 INJECTION, SOLUTION INTRAVENOUS at 09:15

## 2017-01-30 RX ADMIN — LIDOCAINE HYDROCHLORIDE 100 MG: 20 INJECTION, SOLUTION INFILTRATION; PERINEURAL at 09:46

## 2017-01-30 RX ADMIN — NEOSTIGMINE METHYLSULFATE 3 MG: 1 INJECTION, SOLUTION INTRAVENOUS at 10:30

## 2017-01-30 RX ADMIN — ONDANSETRON 4 MG: 2 INJECTION, SOLUTION INTRAMUSCULAR; INTRAVENOUS at 10:04

## 2017-01-30 RX ADMIN — CEFAZOLIN SODIUM 2 G: 2 SOLUTION INTRAVENOUS at 09:41

## 2017-01-30 RX ADMIN — DEXAMETHASONE SODIUM PHOSPHATE 8 MG: 4 INJECTION, SOLUTION INTRAMUSCULAR; INTRAVENOUS at 10:04

## 2017-01-30 RX ADMIN — GLYCOPYRROLATE 0.6 MG: 0.2 INJECTION INTRAMUSCULAR; INTRAVENOUS at 10:30

## 2017-01-30 NOTE — ANESTHESIA PROCEDURE NOTES
Airway  Urgency: elective    Date/Time: 1/30/2017 9:47 AM  Airway not difficult    General Information and Staff    Patient location during procedure: OR  CRNA: SARAH RASCON    Indications and Patient Condition  Indications for airway management: airway protection    Preoxygenated: yes  MILS maintained throughout  Mask difficulty assessment: 1 - vent by mask    Final Airway Details  Final airway type: endotracheal airway      Successful airway: ETT  Cuffed: yes   Successful intubation technique: direct laryngoscopy  Facilitating devices/methods: intubating stylet and cricoid pressure  Endotracheal tube insertion site: oral  Blade: Solorio  Blade size: #2  ETT size: 7.5 mm  Cormack-Lehane Classification: grade IIa - partial view of glottis  Placement verified by: chest auscultation and capnometry   Measured from: lips  ETT to lips (cm): 22  Number of attempts at approach: 1

## 2017-01-30 NOTE — ANESTHESIA POSTPROCEDURE EVALUATION
Patient: Liz Jiang    Procedure Summary     Date Anesthesia Start Anesthesia Stop Room / Location    01/30/17 0942 1041  [facility] OR 03 / [facility] OR       Procedure Diagnosis Surgeon Provider    CHOLECYSTECTOMY LAPAROSCOPIC INTRAOPERATIVE CHOLANGIOGRAM (N/A Abdomen) Gallbladder disease  (Gallbladder disease [K82.9]) MD Flash Guevara,           Anesthesia Type: general  Last vitals  BP      Temp      Pulse     Resp      SpO2        Post Anesthesia Care and Evaluation    Patient location during evaluation: PHASE II  Patient participation: complete - patient participated  Level of consciousness: awake and alert  Pain score: 1  Pain management: adequate  Airway patency: patent  Anesthetic complications: No anesthetic complications  PONV Status: controlled  Cardiovascular status: acceptable  Respiratory status: acceptable  Hydration status: acceptable

## 2017-01-30 NOTE — ANESTHESIA PREPROCEDURE EVALUATION
Anesthesia Evaluation     Patient summary reviewed and Nursing notes reviewed    No history of anesthetic complications   Airway   Mallampati: III  TM distance: >3 FB  Neck ROM: limited  possible difficult intubation  Dental - normal exam   (+) poor dentation    Pulmonary - normal exam   (+) hx of smoking, COPD, asthma, sleep apnea,   Cardiovascular - normal exam  Exercise tolerance: good (4-7 METS)  (+) hypertension,     NYHA Classification: II    Neuro/Psych  (+) psychiatric history,    GI/Hepatic/Renal/Endo    (+)  GERD, diabetes mellitus,     Musculoskeletal     Abdominal  - normal exam    Bowel sounds: normal.   Substance History - negative use     OB/GYN negative ob/gyn ROS         Other   (+) arthritis                          Anesthesia Plan    ASA 3     general     intravenous induction   Anesthetic plan and risks discussed with patient.    Plan discussed with CRNA.

## 2017-02-02 LAB
LAB AP CASE REPORT: NORMAL
Lab: NORMAL
PATH REPORT.FINAL DX SPEC: NORMAL

## 2017-02-13 ENCOUNTER — APPOINTMENT (OUTPATIENT)
Dept: GENERAL RADIOLOGY | Facility: HOSPITAL | Age: 52
End: 2017-02-13

## 2017-02-13 ENCOUNTER — HOSPITAL ENCOUNTER (EMERGENCY)
Facility: HOSPITAL | Age: 52
Discharge: HOME | End: 2017-02-13
Attending: EMERGENCY MEDICINE | Admitting: EMERGENCY MEDICINE

## 2017-02-13 VITALS
RESPIRATION RATE: 20 BRPM | DIASTOLIC BLOOD PRESSURE: 75 MMHG | TEMPERATURE: 98.5 F | SYSTOLIC BLOOD PRESSURE: 122 MMHG | OXYGEN SATURATION: 99 % | HEART RATE: 92 BPM | HEIGHT: 64 IN | WEIGHT: 168 LBS | BODY MASS INDEX: 28.68 KG/M2

## 2017-02-13 DIAGNOSIS — J44.1 COPD WITH ACUTE EXACERBATION (HCC): Primary | ICD-10-CM

## 2017-02-13 DIAGNOSIS — J20.8 ACUTE BACTERIAL BRONCHITIS: ICD-10-CM

## 2017-02-13 DIAGNOSIS — B96.89 ACUTE BACTERIAL BRONCHITIS: ICD-10-CM

## 2017-02-13 LAB
ALBUMIN SERPL-MCNC: 4.1 G/DL (ref 3.5–5)
ALBUMIN/GLOB SERPL: 1.3 G/DL (ref 1.5–2.5)
ALP SERPL-CCNC: 83 U/L (ref 46–116)
ALT SERPL W P-5'-P-CCNC: 23 U/L (ref 10–36)
ANION GAP SERPL CALCULATED.3IONS-SCNC: 4.6 MMOL/L (ref 3.6–11.2)
AST SERPL-CCNC: 22 U/L (ref 10–30)
BASOPHILS # BLD AUTO: 0.02 10*3/MM3 (ref 0–0.3)
BASOPHILS NFR BLD AUTO: 0.1 % (ref 0–2)
BILIRUB SERPL-MCNC: 0.6 MG/DL (ref 0.2–1.8)
BUN BLD-MCNC: 10 MG/DL (ref 7–21)
BUN/CREAT SERPL: 20.4 (ref 7–25)
CALCIUM SPEC-SCNC: 9.1 MG/DL (ref 7.7–10)
CHLORIDE SERPL-SCNC: 102 MMOL/L (ref 99–112)
CO2 SERPL-SCNC: 30.4 MMOL/L (ref 24.3–31.9)
CREAT BLD-MCNC: 0.49 MG/DL (ref 0.43–1.29)
DEPRECATED RDW RBC AUTO: 45.9 FL (ref 37–54)
EOSINOPHIL # BLD AUTO: 0.03 10*3/MM3 (ref 0–0.7)
EOSINOPHIL NFR BLD AUTO: 0.2 % (ref 0–5)
ERYTHROCYTE [DISTWIDTH] IN BLOOD BY AUTOMATED COUNT: 14.1 % (ref 11.5–14.5)
GFR SERPL CREATININE-BSD FRML MDRD: 133 ML/MIN/1.73
GLOBULIN UR ELPH-MCNC: 3.1 GM/DL
GLUCOSE BLD-MCNC: 101 MG/DL (ref 70–110)
HCT VFR BLD AUTO: 43.1 % (ref 37–47)
HGB BLD-MCNC: 13.6 G/DL (ref 12–16)
IMM GRANULOCYTES # BLD: 0.03 10*3/MM3 (ref 0–0.03)
IMM GRANULOCYTES NFR BLD: 0.2 % (ref 0–0.5)
LYMPHOCYTES # BLD AUTO: 1.51 10*3/MM3 (ref 1–3)
LYMPHOCYTES NFR BLD AUTO: 11 % (ref 21–51)
MCH RBC QN AUTO: 28.9 PG (ref 27–33)
MCHC RBC AUTO-ENTMCNC: 31.6 G/DL (ref 33–37)
MCV RBC AUTO: 91.7 FL (ref 80–94)
MONOCYTES # BLD AUTO: 0.76 10*3/MM3 (ref 0.1–0.9)
MONOCYTES NFR BLD AUTO: 5.5 % (ref 0–10)
NEUTROPHILS # BLD AUTO: 11.35 10*3/MM3 (ref 1.4–6.5)
NEUTROPHILS NFR BLD AUTO: 83 % (ref 30–70)
OSMOLALITY SERPL CALC.SUM OF ELEC: 273 MOSM/KG (ref 273–305)
PLATELET # BLD AUTO: 199 10*3/MM3 (ref 130–400)
PMV BLD AUTO: 10.7 FL (ref 6–10)
POTASSIUM BLD-SCNC: 3.8 MMOL/L (ref 3.5–5.3)
PROT SERPL-MCNC: 7.2 G/DL (ref 6–8)
RBC # BLD AUTO: 4.7 10*6/MM3 (ref 4.2–5.4)
SODIUM BLD-SCNC: 137 MMOL/L (ref 135–153)
TROPONIN I SERPL-MCNC: <0.006 NG/ML
WBC NRBC COR # BLD: 13.7 10*3/MM3 (ref 4.5–12.5)

## 2017-02-13 PROCEDURE — 96376 TX/PRO/DX INJ SAME DRUG ADON: CPT

## 2017-02-13 PROCEDURE — 85025 COMPLETE CBC W/AUTO DIFF WBC: CPT | Performed by: EMERGENCY MEDICINE

## 2017-02-13 PROCEDURE — 93010 ELECTROCARDIOGRAM REPORT: CPT | Performed by: INTERNAL MEDICINE

## 2017-02-13 PROCEDURE — 71010 XR CHEST 1 VW: CPT | Performed by: RADIOLOGY

## 2017-02-13 PROCEDURE — 36415 COLL VENOUS BLD VENIPUNCTURE: CPT

## 2017-02-13 PROCEDURE — 71010 HC CHEST PA OR AP: CPT

## 2017-02-13 PROCEDURE — 80053 COMPREHEN METABOLIC PANEL: CPT | Performed by: EMERGENCY MEDICINE

## 2017-02-13 PROCEDURE — 94640 AIRWAY INHALATION TREATMENT: CPT

## 2017-02-13 PROCEDURE — 93005 ELECTROCARDIOGRAM TRACING: CPT | Performed by: EMERGENCY MEDICINE

## 2017-02-13 PROCEDURE — 25010000002 METHYLPREDNISOLONE PER 125 MG: Performed by: EMERGENCY MEDICINE

## 2017-02-13 PROCEDURE — 96374 THER/PROPH/DIAG INJ IV PUSH: CPT

## 2017-02-13 PROCEDURE — 25010000002 METHYLPREDNISOLONE PER 40 MG: Performed by: EMERGENCY MEDICINE

## 2017-02-13 PROCEDURE — 94799 UNLISTED PULMONARY SVC/PX: CPT

## 2017-02-13 PROCEDURE — 99283 EMERGENCY DEPT VISIT LOW MDM: CPT

## 2017-02-13 PROCEDURE — 84484 ASSAY OF TROPONIN QUANT: CPT | Performed by: EMERGENCY MEDICINE

## 2017-02-13 RX ORDER — PREDNISONE 10 MG/1
10 TABLET ORAL DAILY
Qty: 8 TABLET | Refills: 0 | Status: SHIPPED | OUTPATIENT
Start: 2017-02-13 | End: 2018-07-02

## 2017-02-13 RX ORDER — METHYLPREDNISOLONE SODIUM SUCCINATE 40 MG/ML
80 INJECTION, POWDER, LYOPHILIZED, FOR SOLUTION INTRAMUSCULAR; INTRAVENOUS ONCE
Status: COMPLETED | OUTPATIENT
Start: 2017-02-13 | End: 2017-02-13

## 2017-02-13 RX ORDER — LEVOFLOXACIN 750 MG/1
750 TABLET ORAL ONCE
Status: COMPLETED | OUTPATIENT
Start: 2017-02-13 | End: 2017-02-13

## 2017-02-13 RX ORDER — IPRATROPIUM BROMIDE AND ALBUTEROL SULFATE 2.5; .5 MG/3ML; MG/3ML
3 SOLUTION RESPIRATORY (INHALATION) ONCE
Status: COMPLETED | OUTPATIENT
Start: 2017-02-13 | End: 2017-02-13

## 2017-02-13 RX ORDER — LEVOFLOXACIN 750 MG/1
750 TABLET ORAL DAILY
Qty: 8 TABLET | Refills: 0 | Status: SHIPPED | OUTPATIENT
Start: 2017-02-13 | End: 2019-12-05

## 2017-02-13 RX ORDER — METHYLPREDNISOLONE SODIUM SUCCINATE 125 MG/2ML
125 INJECTION, POWDER, LYOPHILIZED, FOR SOLUTION INTRAMUSCULAR; INTRAVENOUS ONCE
Status: COMPLETED | OUTPATIENT
Start: 2017-02-13 | End: 2017-02-13

## 2017-02-13 RX ADMIN — LEVOFLOXACIN 750 MG: 750 TABLET, FILM COATED ORAL at 19:29

## 2017-02-13 RX ADMIN — METHYLPREDNISOLONE SODIUM SUCCINATE 80 MG: 40 INJECTION, POWDER, FOR SOLUTION INTRAMUSCULAR; INTRAVENOUS at 19:29

## 2017-02-13 RX ADMIN — METHYLPREDNISOLONE SODIUM SUCCINATE 125 MG: 125 INJECTION, POWDER, FOR SOLUTION INTRAMUSCULAR; INTRAVENOUS at 17:58

## 2017-02-13 RX ADMIN — SODIUM CHLORIDE 1000 ML: 900 INJECTION, SOLUTION INTRAVENOUS at 17:57

## 2017-02-13 RX ADMIN — IPRATROPIUM BROMIDE AND ALBUTEROL SULFATE 3 ML: .5; 3 SOLUTION RESPIRATORY (INHALATION) at 17:54

## 2017-02-14 NOTE — ED PROVIDER NOTES
Subjective   Patient is a 51 y.o. female presenting with shortness of breath.   History provided by:  Patient  Shortness of Breath   Severity:  Moderate  Onset quality:  Gradual  Timing:  Constant  Progression:  Worsening  Chronicity:  Recurrent  Context: activity and smoke exposure    Relieved by:  Nothing  Worsened by:  Exertion, movement and smoke exposure  Ineffective treatments:  Inhaler and oxygen  Associated symptoms: cough, headaches, sputum production and wheezing    Associated symptoms: no abdominal pain, no chest pain and no fever        Review of Systems   Constitutional: Negative.  Negative for fever.   Respiratory: Positive for cough, sputum production, shortness of breath and wheezing.    Cardiovascular: Negative.  Negative for chest pain.   Gastrointestinal: Negative.  Negative for abdominal pain.   Endocrine: Negative.    Genitourinary: Negative.  Negative for dysuria.   Skin: Negative.    Neurological: Positive for headaches.   Psychiatric/Behavioral: Negative.    All other systems reviewed and are negative.      Past Medical History   Diagnosis Date   • Acid reflux disease    • Arthritis    • Asthma, extrinsic    • Cholelithiasis    • COPD (chronic obstructive pulmonary disease)    • Diabetes mellitus    • Hypertension    • Obstructive sleep apnea treated with BiPAP    • On home oxygen therapy      2L    • Sleep apnea, obstructive        No Known Allergies    Past Surgical History   Procedure Laterality Date   • Hysterectomy     • Kidney stone surgery     • Cardiac catheterization     • Tubal abdominal ligation     • Cholecystectomy with intraoperative cholangiogram N/A 1/30/2017     Procedure: CHOLECYSTECTOMY LAPAROSCOPIC INTRAOPERATIVE CHOLANGIOGRAM;  Surgeon: Carolin Marie MD;  Location: [facility];  Service:        Family History   Problem Relation Age of Onset   • Diabetes Mother    • Hypertension Mother    • Arrhythmia Mother    • Pneumonia Father    • Coronary artery disease Other    •  Arrhythmia Sister    • Heart attack Brother    • Other Brother      CABG       Social History     Social History   • Marital status:      Spouse name: N/A   • Number of children: N/A   • Years of education: N/A     Social History Main Topics   • Smoking status: Former Smoker     Packs/day: 1.50     Years: 30.00     Types: Electronic Cigarette   • Smokeless tobacco: Never Used   • Alcohol use No   • Drug use: No   • Sexual activity: Defer     Other Topics Concern   • None     Social History Narrative   • None           Objective   Physical Exam   Constitutional: She is oriented to person, place, and time. She appears well-developed and well-nourished. No distress.   HENT:   Head: Normocephalic and atraumatic.   Right Ear: External ear normal.   Left Ear: External ear normal.   Nose: Nose normal.   Eyes: Conjunctivae and EOM are normal. Pupils are equal, round, and reactive to light.   Neck: Normal range of motion. Neck supple. No JVD present. No tracheal deviation present.   Cardiovascular: Normal rate, regular rhythm and normal heart sounds.    No murmur heard.  Pulmonary/Chest: She is in respiratory distress. She has wheezes. She exhibits tenderness.   Abdominal: Soft. Bowel sounds are normal. There is no tenderness.   Musculoskeletal: Normal range of motion. She exhibits no edema or deformity.   Neurological: She is alert and oriented to person, place, and time. No cranial nerve deficit.   Skin: Skin is warm and dry. No rash noted. She is not diaphoretic. No erythema. No pallor.   Psychiatric: She has a normal mood and affect. Her behavior is normal. Thought content normal.   Nursing note and vitals reviewed.      Procedures         ED Course  ED Course                  MDM    Final diagnoses:   COPD with acute exacerbation   Acute bacterial bronchitis            Mark Odell MD  02/13/17 1925

## 2017-02-20 ENCOUNTER — OFFICE VISIT (OUTPATIENT)
Dept: PULMONOLOGY | Facility: CLINIC | Age: 52
End: 2017-02-20

## 2017-02-20 VITALS
HEIGHT: 64 IN | HEART RATE: 92 BPM | OXYGEN SATURATION: 92 % | DIASTOLIC BLOOD PRESSURE: 58 MMHG | WEIGHT: 168 LBS | SYSTOLIC BLOOD PRESSURE: 122 MMHG | BODY MASS INDEX: 28.68 KG/M2 | RESPIRATION RATE: 18 BRPM

## 2017-02-20 DIAGNOSIS — R06.02 SHORTNESS OF BREATH: Primary | ICD-10-CM

## 2017-02-20 DIAGNOSIS — J44.9 CHRONIC OBSTRUCTIVE PULMONARY DISEASE, UNSPECIFIED COPD TYPE (HCC): ICD-10-CM

## 2017-02-20 DIAGNOSIS — G47.33 OBSTRUCTIVE SLEEP APNEA: ICD-10-CM

## 2017-02-20 DIAGNOSIS — J30.89 OTHER ALLERGIC RHINITIS: ICD-10-CM

## 2017-02-20 DIAGNOSIS — B37.0 ORAL CANDIDIASIS: ICD-10-CM

## 2017-02-20 DIAGNOSIS — J44.1 ACUTE EXACERBATION OF CHRONIC OBSTRUCTIVE PULMONARY DISEASE (COPD) (HCC): ICD-10-CM

## 2017-02-20 PROCEDURE — 99214 OFFICE O/P EST MOD 30 MIN: CPT | Performed by: INTERNAL MEDICINE

## 2017-02-20 PROCEDURE — 96372 THER/PROPH/DIAG INJ SC/IM: CPT | Performed by: INTERNAL MEDICINE

## 2017-02-20 RX ORDER — ROFLUMILAST 500 UG/1
500 TABLET ORAL DAILY
Qty: 30 TABLET | Refills: 5 | Status: SHIPPED | OUTPATIENT
Start: 2017-02-20 | End: 2017-03-22

## 2017-02-20 RX ORDER — ALBUTEROL SULFATE 90 UG/1
2 AEROSOL, METERED RESPIRATORY (INHALATION) EVERY 6 HOURS PRN
Qty: 1 INHALER | Refills: 5 | Status: SHIPPED | OUTPATIENT
Start: 2017-02-20 | End: 2017-05-22 | Stop reason: SDUPTHER

## 2017-02-20 RX ORDER — METHYLPREDNISOLONE ACETATE 80 MG/ML
80 INJECTION, SUSPENSION INTRA-ARTICULAR; INTRALESIONAL; INTRAMUSCULAR; SOFT TISSUE ONCE
Status: COMPLETED | OUTPATIENT
Start: 2017-02-20 | End: 2017-02-20

## 2017-02-20 RX ORDER — FLUNISOLIDE 0.25 MG/ML
2 SOLUTION NASAL EVERY 12 HOURS
Qty: 1 BOTTLE | Refills: 5 | Status: SHIPPED | OUTPATIENT
Start: 2017-02-20 | End: 2017-05-22 | Stop reason: SDUPTHER

## 2017-02-20 RX ADMIN — METHYLPREDNISOLONE ACETATE 80 MG: 80 INJECTION, SUSPENSION INTRA-ARTICULAR; INTRALESIONAL; INTRAMUSCULAR; SOFT TISSUE at 14:43

## 2017-02-20 NOTE — PROGRESS NOTES
"Chief Complaint   Patient presents with   • Follow-up         Subjective   Liz Jiang is a 51 y.o. female.     History of Present Illness     The patient is here for follow-up of chronic obstructive pulmonary disease, obstructive sleep apnea, and allergic rhinitis.    She continues to use BiPAP nightly without any difficulty.    She reports an increased shortness of breath recently.  She had a cholecystectomy several weeks ago and wasn't out of the bed.  She states she became more short of breath and began wheezing.  She ended up going to the emergency room due to shortness of breath and wheezing but she was given steroids and nebulizer treatments and antibiotics.  She reports feeling some better but not completely back to normal.    She is still using a vapor cigarettes however she quit smoking about 2 years ago.    The following portions of the patient's history were reviewed and updated as appropriate: allergies, current medications, past family history, past medical history, past social history and past surgical history.    Review of Systems   HENT: Positive for sinus pressure, sneezing and sore throat. Negative for trouble swallowing and voice change.    Respiratory: Positive for cough, chest tightness, shortness of breath and wheezing.    Cardiovascular: Negative for leg swelling.       Objective   Visit Vitals   • /58   • Pulse 92   • Resp 18   • Ht 64\" (162.6 cm)   • Wt 168 lb (76.2 kg)   • SpO2 92%   • BMI 28.84 kg/m2     Physical Exam   Constitutional: She is oriented to person, place, and time. She appears well-developed.   HENT:   Head: Normocephalic and atraumatic.   White coating on tongue.   Eyes: EOM are normal. Pupils are equal, round, and reactive to light.   Neck: Normal range of motion. Neck supple.   Cardiovascular: Normal rate and regular rhythm.    Pulmonary/Chest: Effort normal. She has wheezes.   Musculoskeletal:   Gait normal.   Neurological: She is alert and oriented to person, " place, and time.   Skin: Skin is dry.   Psychiatric: She has a normal mood and affect. Her behavior is normal.   Vitals reviewed.          Assessment/Plan   Liz was seen today for follow-up.    Diagnoses and all orders for this visit:    Shortness of breath  -     Pulmonary Function Test; Future    Chronic obstructive pulmonary disease, unspecified COPD type  -     Pulmonary Function Test; Future    Obstructive sleep apnea    Other allergic rhinitis    Oral candidiasis    Acute exacerbation of chronic obstructive pulmonary disease (COPD)  -     methylPREDNISolone acetate (DEPO-medrol) injection 80 mg; Inject 1 mL into the shoulder, thigh, or buttocks 1 (One) Time.    Other orders  -     roflumilast (DALIRESP) 500 MCG tablet tablet; Take 1 tablet by mouth Daily for 30 days.  -     Fluticasone Furoate-Vilanterol (BREO ELLIPTA) 200-25 MCG/INH aerosol powder ; Inhale 1 puff Daily for 30 days. Rinse mouth with water after use.  -     Tiotropium Bromide Monohydrate (SPIRIVA RESPIMAT) 2.5 MCG/ACT aerosol solution; Inhale 2 inhalers Daily.  -     flunisolide (NASALIDE) 25 MCG/ACT (0.025%) solution nasal spray; Inhale 2 sprays Every 12 (Twelve) Hours.  -     loratadine-pseudoephedrine (CLARITIN-D 12-hour) 5-120 MG per 12 hr tablet; Take 1 tablet by mouth 2 (Two) Times a Day.  -     albuterol (PROVENTIL HFA;VENTOLIN HFA) 108 (90 BASE) MCG/ACT inhaler; Inhale 2 puffs Every 6 (Six) Hours As Needed for wheezing or shortness of air.  -     nystatin (MYCOSTATIN) 641681 UNIT/ML suspension; Take 5 mL by mouth 4 (Four) Times a Day.           Return in about 5 months (around 7/20/2017) for Recheck, PFTs, For Rosa.    DISCUSSION (if any):  Reviewed PFTs which show severe chronic obstructive pulmonary disease.  Since she has severe disease and has had a recent exacerbation I again have discussed her trying Daliresp.  She had some diarrhea previously when she was on Daliresp that she is not sure that diarrhea was due to to using  Oh.  She agrees to try the medication again.    I'll discontinue Symbicort at this time since the insurance is not covering medication.  I will start Breo.  Possible side effects were discussed with the patient.    Nystatin prescribed for oral candidiasis.    For the symptoms of acute exacerbation of chronic bronchitis, I have prescribed intramuscular steroids.    Patient will be prescribed antibiotics, if the patient develops any fever.    Side effects have been discussed in detail.    Patient was asked to report to the emergency room if symptoms do not improve.    Continue BiPAP with setting of 16/8 with 2 L/m.    Errors in dictation may reflect use of voice recognition software and not all errors in transcription may have been detected prior to signing    This document was electronically signed by Tena Cee MD February 20, 2017  2:34 PM

## 2017-03-27 ENCOUNTER — HOSPITAL ENCOUNTER (EMERGENCY)
Facility: HOSPITAL | Age: 52
Discharge: HOME | End: 2017-03-28
Attending: EMERGENCY MEDICINE | Admitting: EMERGENCY MEDICINE

## 2017-03-27 ENCOUNTER — APPOINTMENT (OUTPATIENT)
Dept: GENERAL RADIOLOGY | Facility: HOSPITAL | Age: 52
End: 2017-03-27

## 2017-03-27 DIAGNOSIS — J44.1 COPD WITH EXACERBATION (HCC): Primary | ICD-10-CM

## 2017-03-27 LAB
A-A DO2: 75.3 MMHG (ref 0–300)
ALBUMIN SERPL-MCNC: 4.5 G/DL (ref 3.5–5)
ALBUMIN/GLOB SERPL: 1.3 G/DL (ref 1.5–2.5)
ALP SERPL-CCNC: 114 U/L (ref 35–104)
ALT SERPL W P-5'-P-CCNC: 29 U/L (ref 10–36)
AMYLASE SERPL-CCNC: 49 U/L (ref 28–100)
ANION GAP SERPL CALCULATED.3IONS-SCNC: 3.9 MMOL/L (ref 3.6–11.2)
ARTERIAL PATENCY WRIST A: POSITIVE
AST SERPL-CCNC: 21 U/L (ref 10–30)
ATMOSPHERIC PRESS: 727 MMHG
BASE EXCESS BLDA CALC-SCNC: 5 MMOL/L
BASOPHILS # BLD AUTO: 0.03 10*3/MM3 (ref 0–0.3)
BASOPHILS NFR BLD AUTO: 0.3 % (ref 0–2)
BDY SITE: ABNORMAL
BILIRUB SERPL-MCNC: 0.3 MG/DL (ref 0.2–1.8)
BNP SERPL-MCNC: 12 PG/ML (ref 0–100)
BODY TEMPERATURE: 98.6 C
BUN BLD-MCNC: 16 MG/DL (ref 7–21)
BUN/CREAT SERPL: 29.1 (ref 7–25)
CALCIUM SPEC-SCNC: 9.6 MG/DL (ref 7.7–10)
CHLORIDE SERPL-SCNC: 101 MMOL/L (ref 99–112)
CO2 SERPL-SCNC: 34.1 MMOL/L (ref 24.3–31.9)
COHGB MFR BLD: 7.2 % (ref 0–5)
CREAT BLD-MCNC: 0.55 MG/DL (ref 0.43–1.29)
CRP SERPL-MCNC: 5.3 MG/DL (ref 0–0.99)
D-LACTATE SERPL-SCNC: 1.5 MMOL/L (ref 0.5–2)
DEPRECATED RDW RBC AUTO: 46.7 FL (ref 37–54)
EOSINOPHIL # BLD AUTO: 0.1 10*3/MM3 (ref 0–0.7)
EOSINOPHIL NFR BLD AUTO: 0.9 % (ref 0–5)
ERYTHROCYTE [DISTWIDTH] IN BLOOD BY AUTOMATED COUNT: 14.3 % (ref 11.5–14.5)
ERYTHROCYTE [SEDIMENTATION RATE] IN BLOOD: 48 MM/HR (ref 0–30)
GFR SERPL CREATININE-BSD FRML MDRD: 116 ML/MIN/1.73
GLOBULIN UR ELPH-MCNC: 3.5 GM/DL
GLUCOSE BLD-MCNC: 97 MG/DL (ref 70–110)
HCO3 BLDA-SCNC: 30.9 MMOL/L (ref 22–26)
HCT VFR BLD AUTO: 47.4 % (ref 37–47)
HCT VFR BLD CALC: 46 % (ref 37–47)
HGB BLD-MCNC: 15.2 G/DL (ref 12–16)
HGB BLDA-MCNC: 15.5 G/DL (ref 12–16)
HOROWITZ INDEX BLD+IHG-RTO: 28 %
IMM GRANULOCYTES # BLD: 0.05 10*3/MM3 (ref 0–0.03)
IMM GRANULOCYTES NFR BLD: 0.5 % (ref 0–0.5)
INR PPP: 0.9 (ref 0.8–1.1)
LIPASE SERPL-CCNC: 37 U/L (ref 13–60)
LYMPHOCYTES # BLD AUTO: 2.6 10*3/MM3 (ref 1–3)
LYMPHOCYTES NFR BLD AUTO: 24.6 % (ref 21–51)
MAGNESIUM SERPL-MCNC: 2.1 MG/DL (ref 1.7–2.6)
MCH RBC QN AUTO: 29.5 PG (ref 27–33)
MCHC RBC AUTO-ENTMCNC: 32.1 G/DL (ref 33–37)
MCV RBC AUTO: 92 FL (ref 80–94)
METHGB BLD QL: 0.1 % (ref 0–3)
MODALITY: ABNORMAL
MONOCYTES # BLD AUTO: 0.9 10*3/MM3 (ref 0.1–0.9)
MONOCYTES NFR BLD AUTO: 8.5 % (ref 0–10)
NEUTROPHILS # BLD AUTO: 6.89 10*3/MM3 (ref 1.4–6.5)
NEUTROPHILS NFR BLD AUTO: 65.2 % (ref 30–70)
OSMOLALITY SERPL CALC.SUM OF ELEC: 278.6 MOSM/KG (ref 273–305)
OXYHGB MFR BLDV: 83.8 % (ref 85–100)
PCO2 BLDA: 49.7 MM HG (ref 35–45)
PHOSPHATE SERPL-MCNC: 3 MG/DL (ref 2.7–4.5)
PLATELET # BLD AUTO: 283 10*3/MM3 (ref 130–400)
PMV BLD AUTO: 10.5 FL (ref 6–10)
PO2 BLDA: 56.5 MM HG (ref 80–100)
POTASSIUM BLD-SCNC: 3.3 MMOL/L (ref 3.5–5.3)
PROT SERPL-MCNC: 8 G/DL (ref 6–8)
PROTHROMBIN TIME: 9.9 SECONDS (ref 9.8–11.9)
RBC # BLD AUTO: 5.15 10*6/MM3 (ref 4.2–5.4)
SAO2 % BLDCOA: 90.4 % (ref 90–100)
SODIUM BLD-SCNC: 139 MMOL/L (ref 135–153)
TROPONIN I SERPL-MCNC: <0.006 NG/ML
WBC NRBC COR # BLD: 10.57 10*3/MM3 (ref 4.5–12.5)

## 2017-03-27 PROCEDURE — 93005 ELECTROCARDIOGRAM TRACING: CPT | Performed by: EMERGENCY MEDICINE

## 2017-03-27 PROCEDURE — 83735 ASSAY OF MAGNESIUM: CPT | Performed by: EMERGENCY MEDICINE

## 2017-03-27 PROCEDURE — 84100 ASSAY OF PHOSPHORUS: CPT | Performed by: EMERGENCY MEDICINE

## 2017-03-27 PROCEDURE — 36415 COLL VENOUS BLD VENIPUNCTURE: CPT

## 2017-03-27 PROCEDURE — 80053 COMPREHEN METABOLIC PANEL: CPT | Performed by: EMERGENCY MEDICINE

## 2017-03-27 PROCEDURE — 36600 WITHDRAWAL OF ARTERIAL BLOOD: CPT | Performed by: EMERGENCY MEDICINE

## 2017-03-27 PROCEDURE — 94799 UNLISTED PULMONARY SVC/PX: CPT

## 2017-03-27 PROCEDURE — 86140 C-REACTIVE PROTEIN: CPT | Performed by: EMERGENCY MEDICINE

## 2017-03-27 PROCEDURE — 83880 ASSAY OF NATRIURETIC PEPTIDE: CPT | Performed by: EMERGENCY MEDICINE

## 2017-03-27 PROCEDURE — 99284 EMERGENCY DEPT VISIT MOD MDM: CPT

## 2017-03-27 PROCEDURE — 71010 XR CHEST 1 VW: CPT | Performed by: RADIOLOGY

## 2017-03-27 PROCEDURE — 82150 ASSAY OF AMYLASE: CPT | Performed by: EMERGENCY MEDICINE

## 2017-03-27 PROCEDURE — 85610 PROTHROMBIN TIME: CPT | Performed by: EMERGENCY MEDICINE

## 2017-03-27 PROCEDURE — 83050 HGB METHEMOGLOBIN QUAN: CPT | Performed by: EMERGENCY MEDICINE

## 2017-03-27 PROCEDURE — 82805 BLOOD GASES W/O2 SATURATION: CPT | Performed by: EMERGENCY MEDICINE

## 2017-03-27 PROCEDURE — 94640 AIRWAY INHALATION TREATMENT: CPT

## 2017-03-27 PROCEDURE — 96374 THER/PROPH/DIAG INJ IV PUSH: CPT

## 2017-03-27 PROCEDURE — 84484 ASSAY OF TROPONIN QUANT: CPT | Performed by: EMERGENCY MEDICINE

## 2017-03-27 PROCEDURE — 82375 ASSAY CARBOXYHB QUANT: CPT | Performed by: EMERGENCY MEDICINE

## 2017-03-27 PROCEDURE — 87040 BLOOD CULTURE FOR BACTERIA: CPT | Performed by: EMERGENCY MEDICINE

## 2017-03-27 PROCEDURE — 25010000002 METHYLPREDNISOLONE PER 125 MG: Performed by: EMERGENCY MEDICINE

## 2017-03-27 PROCEDURE — 83605 ASSAY OF LACTIC ACID: CPT | Performed by: EMERGENCY MEDICINE

## 2017-03-27 PROCEDURE — 83690 ASSAY OF LIPASE: CPT | Performed by: EMERGENCY MEDICINE

## 2017-03-27 PROCEDURE — 93010 ELECTROCARDIOGRAM REPORT: CPT | Performed by: INTERNAL MEDICINE

## 2017-03-27 PROCEDURE — 71010 HC CHEST PA OR AP: CPT

## 2017-03-27 PROCEDURE — 85025 COMPLETE CBC W/AUTO DIFF WBC: CPT | Performed by: EMERGENCY MEDICINE

## 2017-03-27 PROCEDURE — 85652 RBC SED RATE AUTOMATED: CPT | Performed by: EMERGENCY MEDICINE

## 2017-03-27 RX ORDER — IPRATROPIUM BROMIDE AND ALBUTEROL SULFATE 2.5; .5 MG/3ML; MG/3ML
3 SOLUTION RESPIRATORY (INHALATION) ONCE
Status: COMPLETED | OUTPATIENT
Start: 2017-03-27 | End: 2017-03-27

## 2017-03-27 RX ORDER — METHYLPREDNISOLONE SODIUM SUCCINATE 125 MG/2ML
125 INJECTION, POWDER, LYOPHILIZED, FOR SOLUTION INTRAMUSCULAR; INTRAVENOUS ONCE
Status: COMPLETED | OUTPATIENT
Start: 2017-03-27 | End: 2017-03-27

## 2017-03-27 RX ADMIN — IPRATROPIUM BROMIDE AND ALBUTEROL SULFATE 3 ML: .5; 3 SOLUTION RESPIRATORY (INHALATION) at 22:54

## 2017-03-27 RX ADMIN — METHYLPREDNISOLONE SODIUM SUCCINATE 125 MG: 125 INJECTION, POWDER, FOR SOLUTION INTRAMUSCULAR; INTRAVENOUS at 22:53

## 2017-03-28 VITALS
HEIGHT: 64 IN | OXYGEN SATURATION: 94 % | SYSTOLIC BLOOD PRESSURE: 123 MMHG | DIASTOLIC BLOOD PRESSURE: 78 MMHG | BODY MASS INDEX: 30.39 KG/M2 | HEART RATE: 104 BPM | RESPIRATION RATE: 20 BRPM | TEMPERATURE: 98 F | WEIGHT: 178 LBS

## 2017-03-28 LAB — TROPONIN I SERPL-MCNC: <0.006 NG/ML

## 2017-03-28 PROCEDURE — 94799 UNLISTED PULMONARY SVC/PX: CPT

## 2017-03-28 PROCEDURE — 84484 ASSAY OF TROPONIN QUANT: CPT | Performed by: EMERGENCY MEDICINE

## 2017-03-28 RX ORDER — DOXYCYCLINE 100 MG/1
100 CAPSULE ORAL 2 TIMES DAILY
Qty: 20 CAPSULE | Refills: 0 | OUTPATIENT
Start: 2017-03-28 | End: 2018-04-10

## 2017-03-28 RX ORDER — HYDROCODONE BITARTRATE AND ACETAMINOPHEN 5; 325 MG/1; MG/1
1 TABLET ORAL ONCE
Status: COMPLETED | OUTPATIENT
Start: 2017-03-28 | End: 2017-03-28

## 2017-03-28 RX ORDER — PREDNISONE 20 MG/1
20 TABLET ORAL
Qty: 15 TABLET | Refills: 0 | Status: SHIPPED | OUTPATIENT
Start: 2017-03-28 | End: 2018-07-02

## 2017-03-28 RX ORDER — IPRATROPIUM BROMIDE AND ALBUTEROL SULFATE 2.5; .5 MG/3ML; MG/3ML
3 SOLUTION RESPIRATORY (INHALATION) ONCE
Status: COMPLETED | OUTPATIENT
Start: 2017-03-28 | End: 2017-03-28

## 2017-03-28 RX ORDER — DOXYCYCLINE 100 MG/1
100 CAPSULE ORAL ONCE
Status: COMPLETED | OUTPATIENT
Start: 2017-03-28 | End: 2017-03-28

## 2017-03-28 RX ADMIN — IPRATROPIUM BROMIDE AND ALBUTEROL SULFATE 3 ML: .5; 3 SOLUTION RESPIRATORY (INHALATION) at 01:40

## 2017-03-28 RX ADMIN — DOXYCYCLINE 100 MG: 100 CAPSULE ORAL at 02:00

## 2017-03-28 RX ADMIN — HYDROCODONE BITARTRATE AND ACETAMINOPHEN 1 TABLET: 5; 325 TABLET ORAL at 02:01

## 2017-03-28 RX ADMIN — IPRATROPIUM BROMIDE AND ALBUTEROL SULFATE 3 ML: .5; 3 SOLUTION RESPIRATORY (INHALATION) at 00:50

## 2017-03-28 NOTE — ED PROVIDER NOTES
Subjective   Patient is a 51 y.o. female presenting with shortness of breath.   Shortness of Breath   Severity:  Mild  Onset quality:  Gradual  Timing:  Constant  Progression:  Worsening  Chronicity:  Chronic  Context: activity    Relieved by:  Nothing  Worsened by:  Activity, coughing, deep breathing, exertion and movement  Associated symptoms: wheezing    Associated symptoms: no abdominal pain, no chest pain, no cough, no diaphoresis, no ear pain, no fever, no headaches, no neck pain, no rash, no sore throat and no vomiting    Wheezing:     Severity:  Moderate    Onset quality:  Gradual    Timing:  Constant    Progression:  Worsening    Chronicity:  Chronic      Review of Systems   Constitutional: Negative for activity change, appetite change, chills, diaphoresis, fatigue and fever.   HENT: Negative for congestion, ear pain and sore throat.    Eyes: Negative for redness.   Respiratory: Positive for shortness of breath and wheezing. Negative for cough and chest tightness.    Cardiovascular: Negative for chest pain, palpitations and leg swelling.   Gastrointestinal: Negative for abdominal pain, diarrhea, nausea and vomiting.   Genitourinary: Negative for dysuria and urgency.   Musculoskeletal: Negative for arthralgias, back pain, myalgias and neck pain.   Skin: Negative for pallor, rash and wound.   Neurological: Negative for dizziness, speech difficulty, weakness and headaches.   Psychiatric/Behavioral: Negative for agitation, behavioral problems, confusion and decreased concentration.       Past Medical History:   Diagnosis Date   • Acid reflux disease    • Arthritis    • Asthma, extrinsic    • Cholelithiasis    • COPD (chronic obstructive pulmonary disease)    • Diabetes mellitus    • Hypertension    • Obstructive sleep apnea treated with BiPAP    • On home oxygen therapy     2L    • Sleep apnea, obstructive        No Known Allergies    Past Surgical History:   Procedure Laterality Date   • CARDIAC  CATHETERIZATION     • CHOLECYSTECTOMY WITH INTRAOPERATIVE CHOLANGIOGRAM N/A 1/30/2017    Procedure: CHOLECYSTECTOMY LAPAROSCOPIC INTRAOPERATIVE CHOLANGIOGRAM;  Surgeon: Carolin Marie MD;  Location: [facility];  Service:    • HYSTERECTOMY     • KIDNEY STONE SURGERY     • TUBAL ABDOMINAL LIGATION         Family History   Problem Relation Age of Onset   • Diabetes Mother    • Hypertension Mother    • Arrhythmia Mother    • Pneumonia Father    • Coronary artery disease Other    • Arrhythmia Sister    • Heart attack Brother    • Other Brother      CABG       Social History     Social History   • Marital status:      Spouse name: N/A   • Number of children: N/A   • Years of education: N/A     Social History Main Topics   • Smoking status: Former Smoker     Packs/day: 1.50     Years: 30.00     Types: Electronic Cigarette   • Smokeless tobacco: Never Used   • Alcohol use No   • Drug use: No   • Sexual activity: Defer     Other Topics Concern   • None     Social History Narrative           Objective   Physical Exam   Constitutional: She is oriented to person, place, and time. She appears well-developed and well-nourished.  Non-toxic appearance. No distress.   HENT:   Head: Normocephalic and atraumatic.   Right Ear: External ear normal.   Left Ear: External ear normal.   Nose: Nose normal.   Mouth/Throat: Oropharynx is clear and moist and mucous membranes are normal. No oropharyngeal exudate. No tonsillar exudate.   Eyes: Conjunctivae, EOM and lids are normal. Pupils are equal, round, and reactive to light.   Neck: Normal range of motion and full passive range of motion without pain. Neck supple. No thyromegaly present.   Cardiovascular: Regular rhythm, S1 normal, S2 normal, normal heart sounds, intact distal pulses and normal pulses.  Tachycardia present.    Pulmonary/Chest: Effort normal. No tachypnea. No respiratory distress. She has decreased breath sounds. She has wheezes. She has no rales. She exhibits no  tenderness.   Abdominal: Soft. Normal appearance and bowel sounds are normal. She exhibits no distension. There is no tenderness. There is no rebound and no guarding.   Musculoskeletal: Normal range of motion. She exhibits no edema, tenderness or deformity.   Lymphadenopathy:     She has no cervical adenopathy.   Neurological: She is alert and oriented to person, place, and time. She has normal strength. No cranial nerve deficit or sensory deficit. GCS eye subscore is 4. GCS verbal subscore is 5. GCS motor subscore is 6.   Skin: Skin is warm, dry and intact. No rash noted. She is not diaphoretic. No erythema. No pallor.   Psychiatric: She has a normal mood and affect. Her speech is normal and behavior is normal. Judgment and thought content normal. Cognition and memory are normal.   Nursing note and vitals reviewed.      Procedures         ED Course  ED Course   Value Comment By Time   XR Chest 1 View No Acute Abnormalities. Ilan Araujo MD 03/28 0627   ECG 12 Lead Sinus Tachycardia.  No Acute Ischemia. Ilan Araujo MD 03/28 0627    Patient in the ED with Increased SOB.  Denies any Chest Pain.  Significant improvement with treatment in ED, and discharged home to follow up with his PCP.  Offered admission for further evaluation and treatment.  Patient declined at this time.  She refused to be admitted at this time. Ilan Araujo MD 03/28 3555                  MDM  Number of Diagnoses or Management Options  COPD with exacerbation: new and requires workup     Amount and/or Complexity of Data Reviewed  Clinical lab tests: ordered and reviewed  Tests in the radiology section of CPT®: ordered and reviewed  Tests in the medicine section of CPT®: ordered and reviewed  Decide to obtain previous medical records or to obtain history from someone other than the patient: yes  Independent visualization of images, tracings, or specimens: yes    Risk of Complications, Morbidity, and/or  Mortality  Presenting problems: moderate  Diagnostic procedures: moderate  Management options: moderate    Patient Progress  Patient progress: stable      Final diagnoses:   COPD with exacerbation            Ilan Araujo MD  03/28/17 0632

## 2017-03-28 NOTE — DISCHARGE INSTRUCTIONS

## 2017-04-02 LAB
BACTERIA SPEC AEROBE CULT: NORMAL
BACTERIA SPEC AEROBE CULT: NORMAL

## 2017-05-19 ENCOUNTER — TELEPHONE (OUTPATIENT)
Dept: PULMONOLOGY | Facility: CLINIC | Age: 52
End: 2017-05-19

## 2017-05-22 ENCOUNTER — OFFICE VISIT (OUTPATIENT)
Dept: PULMONOLOGY | Facility: CLINIC | Age: 52
End: 2017-05-22

## 2017-05-22 VITALS
WEIGHT: 190.8 LBS | BODY MASS INDEX: 32.58 KG/M2 | SYSTOLIC BLOOD PRESSURE: 124 MMHG | HEIGHT: 64 IN | RESPIRATION RATE: 20 BRPM | OXYGEN SATURATION: 92 % | DIASTOLIC BLOOD PRESSURE: 86 MMHG | HEART RATE: 119 BPM

## 2017-05-22 DIAGNOSIS — R06.02 SHORTNESS OF BREATH: Primary | ICD-10-CM

## 2017-05-22 DIAGNOSIS — J30.89 OTHER ALLERGIC RHINITIS: ICD-10-CM

## 2017-05-22 DIAGNOSIS — J44.9 CHRONIC OBSTRUCTIVE PULMONARY DISEASE, UNSPECIFIED COPD TYPE (HCC): ICD-10-CM

## 2017-05-22 DIAGNOSIS — G47.33 OBSTRUCTIVE SLEEP APNEA: ICD-10-CM

## 2017-05-22 PROCEDURE — 99214 OFFICE O/P EST MOD 30 MIN: CPT | Performed by: NURSE PRACTITIONER

## 2017-05-22 RX ORDER — MONTELUKAST SODIUM 10 MG/1
10 TABLET ORAL NIGHTLY
Qty: 30 TABLET | Refills: 3 | Status: SHIPPED | OUTPATIENT
Start: 2017-05-22 | End: 2017-09-18 | Stop reason: SDUPTHER

## 2017-05-22 RX ORDER — BUPROPION HYDROCHLORIDE 150 MG/1
TABLET ORAL
COMMUNITY
Start: 2017-04-19 | End: 2017-05-22 | Stop reason: SDUPTHER

## 2017-05-22 RX ORDER — CETIRIZINE HYDROCHLORIDE 10 MG/1
10 TABLET ORAL DAILY
Qty: 30 TABLET | Refills: 3 | Status: SHIPPED | OUTPATIENT
Start: 2017-05-22 | End: 2017-09-18 | Stop reason: ALTCHOICE

## 2017-05-22 RX ORDER — IPRATROPIUM/ALBUTEROL SULFATE 20-100 MCG
MIST INHALER (GRAM) INHALATION
COMMUNITY
Start: 2017-05-03 | End: 2017-09-18 | Stop reason: SDUPTHER

## 2017-05-22 RX ORDER — BUPROPION HYDROCHLORIDE 150 MG/1
150 TABLET ORAL EVERY MORNING
Qty: 30 TABLET | Refills: 2 | Status: SHIPPED | OUTPATIENT
Start: 2017-05-22 | End: 2017-09-18 | Stop reason: SDUPTHER

## 2017-05-22 RX ORDER — ALBUTEROL SULFATE 90 UG/1
2 AEROSOL, METERED RESPIRATORY (INHALATION) EVERY 6 HOURS PRN
Qty: 1 INHALER | Refills: 5 | Status: SHIPPED | OUTPATIENT
Start: 2017-05-22 | End: 2019-03-29 | Stop reason: SDUPTHER

## 2017-05-22 RX ORDER — FLUNISOLIDE 0.25 MG/ML
2 SOLUTION NASAL EVERY 12 HOURS
Qty: 1 BOTTLE | Refills: 5 | Status: SHIPPED | OUTPATIENT
Start: 2017-05-22 | End: 2017-09-18 | Stop reason: SDUPTHER

## 2017-06-25 ENCOUNTER — HOSPITAL ENCOUNTER (EMERGENCY)
Facility: HOSPITAL | Age: 52
Discharge: HOME | End: 2017-06-25
Admitting: EMERGENCY MEDICINE

## 2017-06-25 VITALS
DIASTOLIC BLOOD PRESSURE: 85 MMHG | HEIGHT: 64 IN | TEMPERATURE: 98 F | SYSTOLIC BLOOD PRESSURE: 135 MMHG | BODY MASS INDEX: 31.41 KG/M2 | HEART RATE: 97 BPM | OXYGEN SATURATION: 96 % | WEIGHT: 184 LBS | RESPIRATION RATE: 18 BRPM

## 2017-06-25 DIAGNOSIS — L03.311 CELLULITIS, ABDOMINAL WALL: Primary | ICD-10-CM

## 2017-06-25 PROCEDURE — 99283 EMERGENCY DEPT VISIT LOW MDM: CPT

## 2017-06-25 RX ORDER — DOXYCYCLINE 100 MG/1
100 CAPSULE ORAL 2 TIMES DAILY
Qty: 14 CAPSULE | Refills: 0 | Status: SHIPPED | OUTPATIENT
Start: 2017-06-25 | End: 2019-12-04

## 2017-06-25 RX ORDER — DOXYCYCLINE 100 MG/1
100 CAPSULE ORAL ONCE
Status: COMPLETED | OUTPATIENT
Start: 2017-06-25 | End: 2017-06-25

## 2017-06-25 RX ORDER — HYDROCODONE BITARTRATE AND ACETAMINOPHEN 5; 325 MG/1; MG/1
1 TABLET ORAL ONCE
Status: COMPLETED | OUTPATIENT
Start: 2017-06-25 | End: 2017-06-25

## 2017-06-25 RX ADMIN — DOXYCYCLINE 100 MG: 100 CAPSULE ORAL at 23:09

## 2017-06-25 RX ADMIN — HYDROCODONE BITARTRATE AND ACETAMINOPHEN 1 TABLET: 5; 325 TABLET ORAL at 23:09

## 2017-06-26 NOTE — ED PROVIDER NOTES
Subjective   Patient is a 51 y.o. female presenting with abscess.   History provided by:  Patient   used: No    Abscess   Location:  Torso  Torso abscess location:  Abd LUQ  Size:  Small to moderate  Abscess quality: induration, painful, redness and warmth    Red streaking: no    Duration:  2 days  Progression:  Worsening  Pain details:     Quality:  Hot    Severity:  Moderate    Progression:  Worsening  Chronicity:  New  Context: diabetes and insect bite/sting    Context comment:  Possible spider or tick bite. Patient brushed off insect  Relieved by:  Nothing  Worsened by:  Nothing  Ineffective treatments:  None tried  Associated symptoms: no anorexia, no fatigue, no fever, no headaches and no vomiting    Risk factors: family hx of MRSA    Risk factors: no hx of MRSA        Review of Systems   Constitutional: Negative.  Negative for fatigue and fever.   HENT: Negative.    Respiratory: Negative.    Cardiovascular: Negative.  Negative for chest pain.   Gastrointestinal: Negative.  Negative for abdominal pain, anorexia and vomiting.   Endocrine: Negative.    Genitourinary: Negative.  Negative for dysuria.   Skin:        Erythematous area    Neurological: Negative.  Negative for headaches.   Psychiatric/Behavioral: Negative.    All other systems reviewed and are negative.      Past Medical History:   Diagnosis Date   • Acid reflux disease    • Arthritis    • Asthma, extrinsic    • Cholelithiasis    • COPD (chronic obstructive pulmonary disease)    • Diabetes mellitus    • Hypertension    • Obstructive sleep apnea treated with BiPAP    • On home oxygen therapy     2L    • Sleep apnea, obstructive        No Known Allergies    Past Surgical History:   Procedure Laterality Date   • CARDIAC CATHETERIZATION     • CHOLECYSTECTOMY WITH INTRAOPERATIVE CHOLANGIOGRAM N/A 1/30/2017    Procedure: CHOLECYSTECTOMY LAPAROSCOPIC INTRAOPERATIVE CHOLANGIOGRAM;  Surgeon: Carolin Marie MD;  Location: [facility] OR;   Service:    • HYSTERECTOMY     • KIDNEY STONE SURGERY     • TUBAL ABDOMINAL LIGATION         Family History   Problem Relation Age of Onset   • Diabetes Mother    • Hypertension Mother    • Arrhythmia Mother    • Pneumonia Father    • Coronary artery disease Other    • Arrhythmia Sister    • Heart attack Brother    • Other Brother      CABG       Social History     Social History   • Marital status:      Spouse name: N/A   • Number of children: N/A   • Years of education: N/A     Social History Main Topics   • Smoking status: Former Smoker     Packs/day: 1.50     Years: 30.00     Types: Electronic Cigarette   • Smokeless tobacco: Never Used   • Alcohol use No   • Drug use: No   • Sexual activity: Defer     Other Topics Concern   • Not on file     Social History Narrative           Objective   Physical Exam   Constitutional: She is oriented to person, place, and time. She appears well-developed and well-nourished.   HENT:   Head: Normocephalic and atraumatic.   Nose: Nose normal.   Mouth/Throat: Oropharynx is clear and moist.   Eyes: EOM are normal. Pupils are equal, round, and reactive to light.   Neck: Normal range of motion. Neck supple.   Cardiovascular: Normal rate, regular rhythm, normal heart sounds and intact distal pulses.    Pulmonary/Chest: Effort normal and breath sounds normal.   Abdominal: Soft. Bowel sounds are normal.   Musculoskeletal: Normal range of motion.   Neurological: She is alert and oriented to person, place, and time.   Skin: Skin is warm and dry. There is erythema.   Tenderness and erythematous area to left upper quadrant abdomen. No fluctuant abscess. Induration noted. No streaking.    Psychiatric: She has a normal mood and affect. Her behavior is normal. Judgment and thought content normal.   Nursing note and vitals reviewed.      Procedures         ED Course  ED Course                  MDM  Number of Diagnoses or Management Options  Cellulitis, abdominal wall: new and does not  require workup  Risk of Complications, Morbidity, and/or Mortality  Presenting problems: moderate  Diagnostic procedures: moderate  Management options: moderate    Patient Progress  Patient progress: improved      Final diagnoses:   Cellulitis, abdominal wall            Carolinabi Kathy Sage, APRN  06/25/17 1816

## 2017-09-18 ENCOUNTER — OFFICE VISIT (OUTPATIENT)
Dept: PULMONOLOGY | Facility: CLINIC | Age: 52
End: 2017-09-18

## 2017-09-18 VITALS
DIASTOLIC BLOOD PRESSURE: 62 MMHG | WEIGHT: 198 LBS | HEART RATE: 81 BPM | RESPIRATION RATE: 16 BRPM | OXYGEN SATURATION: 82 % | SYSTOLIC BLOOD PRESSURE: 110 MMHG | BODY MASS INDEX: 33.8 KG/M2 | HEIGHT: 64 IN

## 2017-09-18 DIAGNOSIS — G47.33 OBSTRUCTIVE SLEEP APNEA: ICD-10-CM

## 2017-09-18 DIAGNOSIS — R06.02 SHORTNESS OF BREATH: ICD-10-CM

## 2017-09-18 DIAGNOSIS — R06.02 SHORTNESS OF BREATH: Primary | ICD-10-CM

## 2017-09-18 DIAGNOSIS — R09.02 HYPOXIA: ICD-10-CM

## 2017-09-18 DIAGNOSIS — J44.9 CHRONIC OBSTRUCTIVE PULMONARY DISEASE, UNSPECIFIED COPD TYPE (HCC): ICD-10-CM

## 2017-09-18 DIAGNOSIS — J30.89 OTHER ALLERGIC RHINITIS: ICD-10-CM

## 2017-09-18 PROCEDURE — 94726 PLETHYSMOGRAPHY LUNG VOLUMES: CPT | Performed by: INTERNAL MEDICINE

## 2017-09-18 PROCEDURE — 94060 EVALUATION OF WHEEZING: CPT | Performed by: INTERNAL MEDICINE

## 2017-09-18 PROCEDURE — 99214 OFFICE O/P EST MOD 30 MIN: CPT | Performed by: INTERNAL MEDICINE

## 2017-09-18 PROCEDURE — 94729 DIFFUSING CAPACITY: CPT | Performed by: INTERNAL MEDICINE

## 2017-09-18 RX ORDER — CETIRIZINE HYDROCHLORIDE 10 MG/1
10 TABLET ORAL DAILY
COMMUNITY
End: 2019-12-04

## 2017-09-18 RX ORDER — MONTELUKAST SODIUM 10 MG/1
10 TABLET ORAL NIGHTLY
Qty: 30 TABLET | Refills: 5 | Status: SHIPPED | OUTPATIENT
Start: 2017-09-18 | End: 2019-03-29 | Stop reason: SDUPTHER

## 2017-09-18 RX ORDER — BUPROPION HYDROCHLORIDE 150 MG/1
150 TABLET ORAL EVERY MORNING
Qty: 30 TABLET | Refills: 2 | Status: SHIPPED | OUTPATIENT
Start: 2017-09-18 | End: 2019-12-04

## 2017-09-18 RX ORDER — FLUNISOLIDE 0.25 MG/ML
2 SOLUTION NASAL EVERY 12 HOURS
Qty: 1 BOTTLE | Refills: 5 | Status: SHIPPED | OUTPATIENT
Start: 2017-09-18 | End: 2018-07-02 | Stop reason: SDUPTHER

## 2017-09-18 RX ORDER — IPRATROPIUM/ALBUTEROL SULFATE 20-100 MCG
1 MIST INHALER (GRAM) INHALATION EVERY 6 HOURS PRN
Qty: 4 G | Refills: 5 | Status: SHIPPED | OUTPATIENT
Start: 2017-09-18 | End: 2018-05-07 | Stop reason: SDUPTHER

## 2017-09-18 NOTE — PROGRESS NOTES
"Chief Complaint   Patient presents with   • Follow-up   • Shortness of Breath         Subjective   Liz Jiang is a 51 y.o. female.     History of Present Illness   Patient comes today for follow up of shortness of breath, COPD, chronic respiratory failure, obstructive sleep apnea, allergic rhinitis and hypoxia.     Patient has severe COPD.    Symptoms have been stable since the last clinic visit. No emergency room visits or hospitalizations related to exacerbation/worsening of respiratory symptoms.    Patient is using medications, as prescribed. Exercise tolerance has also remained stable.     She is using her BiPAP at the current setting of 16/8 with 2 L/m bled in to good effect.  She reports definite improvement in her symptoms, especially early in the morning.    The following portions of the patient's history were reviewed and updated as appropriate: allergies, current medications, past family history, past medical history, past social history and past surgical history.    Review of Systems   Constitutional: Positive for fatigue. Negative for chills and fever.   HENT: Positive for sinus pressure, sneezing and sore throat.    Respiratory: Positive for shortness of breath. Negative for cough, chest tightness and wheezing.        Objective   Visit Vitals   • /62   • Pulse 81   • Resp 16   • Ht 64\" (162.6 cm)   • Wt 198 lb (89.8 kg)   • SpO2 (!) 82%   • BMI 33.99 kg/m2   SpO2 @ 90% 2 lpm    Physical Exam   Constitutional: She is oriented to person, place, and time. She appears well-developed.   HENT:   Sinuses were non tender on palpation.  Nostril showed mild erythema.  Oropharynx was cobblestoned.   Eyes: EOM are normal. Pupils are equal, round, and reactive to light.   Neck: Normal range of motion. Neck supple.   Cardiovascular: Normal rate and regular rhythm.    Pulmonary/Chest: Effort normal. No respiratory distress. She has no wheezes. She has no rales.   Hyperresonance   Musculoskeletal:   Gait " normal.   Neurological: She is alert and oriented to person, place, and time.   Skin: Skin is dry.   Psychiatric: She has a normal mood and affect. Her behavior is normal.   Vitals reviewed.          Assessment/Plan   Liz was seen today for follow-up and shortness of breath.    Diagnoses and all orders for this visit:    Shortness of breath    Chronic obstructive pulmonary disease, unspecified COPD type  -     Oxygen Therapy    Obstructive sleep apnea    Other allergic rhinitis    Hypoxia  -     Oxygen Therapy    Other orders  -     Tiotropium Bromide Monohydrate (SPIRIVA RESPIMAT) 2.5 MCG/ACT aerosol solution; Inhale 2 inhalers Daily.  -     montelukast (SINGULAIR) 10 MG tablet; Take 1 tablet by mouth Every Night.  -     mometasone-formoterol (DULERA 200) 200-5 MCG/ACT inhaler; Inhale 2 puffs 2 (Two) Times a Day. Rinse mouth with water after use.  -     loratadine-pseudoephedrine (CLARITIN-D 24 HOUR)  MG per 24 hr tablet; Take 1 tablet by mouth Daily.  -     flunisolide (NASALIDE) 25 MCG/ACT (0.025%) solution nasal spray; Inhale 2 sprays Every 12 (Twelve) Hours.  -     COMBIVENT RESPIMAT  MCG/ACT inhaler; Inhale 1 puff Every 6 (Six) Hours As Needed for Wheezing or Shortness of Air.  -     buPROPion XL (WELLBUTRIN XL) 150 MG 24 hr tablet; Take 1 tablet by mouth Every Morning.           Return in about 6 months (around 3/18/2018) for Recheck.    DISCUSSION (if any):  I have discussed her PFT findings with her in great detail.    Patient was told to call this office if any exacerbation of underlying COPD occurs, as at that time we will consider adding Daliresp to prevent any further exacerbations.  The patient has tried Daliresp in the past but it caused side effects but due to the severity of underlying COPD, she will definitely be tried on it once again and this time I would consider half a tablet every day or every other day.    I would advise her to continue BiPAP at the current setting of 16/8,  with 2 L/m blood in.    No changes to her other medications have been made.    I have refilled prescription for Wellbutrin and requested her to discuss additional refills with her primary care provider.    The patient was advised to continue oxygen, since there has been a good response since the patient is using it as prescribed.  In order to aid mobility and due to the fact that she has having difficulty carrying tanks around, portable oxygen concentrator will be requested.      Dictated utilizing Dragon dictation.    This document was electronically signed by Tena Cee MD September 20, 2017  7:03 PM

## 2018-04-05 ENCOUNTER — TELEPHONE (OUTPATIENT)
Dept: PULMONOLOGY | Facility: CLINIC | Age: 53
End: 2018-04-05

## 2018-04-05 NOTE — TELEPHONE ENCOUNTER
Pt was called to remind her of her apt for 04/05/2018. Pt was rescheduled letter was mailed with apt date and time per Dr Cee. Pt was rescheduled for 07/02/2018@11am, we will check with Dr Cee to make sure this date will be ok.

## 2018-04-09 ENCOUNTER — LAB (OUTPATIENT)
Dept: LAB | Facility: HOSPITAL | Age: 53
End: 2018-04-09

## 2018-04-09 ENCOUNTER — TRANSCRIBE ORDERS (OUTPATIENT)
Dept: LAB | Facility: HOSPITAL | Age: 53
End: 2018-04-09

## 2018-04-09 ENCOUNTER — TRANSCRIBE ORDERS (OUTPATIENT)
Dept: GENERAL RADIOLOGY | Facility: HOSPITAL | Age: 53
End: 2018-04-09

## 2018-04-09 ENCOUNTER — HOSPITAL ENCOUNTER (OUTPATIENT)
Dept: GENERAL RADIOLOGY | Facility: HOSPITAL | Age: 53
Discharge: HOME | End: 2018-04-09
Admitting: NURSE PRACTITIONER

## 2018-04-09 DIAGNOSIS — E11.9 TYPE 2 DIABETES MELLITUS WITHOUT COMPLICATION, WITHOUT LONG-TERM CURRENT USE OF INSULIN (HCC): ICD-10-CM

## 2018-04-09 DIAGNOSIS — J40 BRONCHITIS: Primary | ICD-10-CM

## 2018-04-09 DIAGNOSIS — Z13.89 SCREENING FOR OBESITY: ICD-10-CM

## 2018-04-09 DIAGNOSIS — R11.2 NAUSEA AND VOMITING, INTRACTABILITY OF VOMITING NOT SPECIFIED, UNSPECIFIED VOMITING TYPE: ICD-10-CM

## 2018-04-09 DIAGNOSIS — E03.9 HYPOTHYROIDISM, UNSPECIFIED TYPE: ICD-10-CM

## 2018-04-09 DIAGNOSIS — R53.81 MALAISE: ICD-10-CM

## 2018-04-09 DIAGNOSIS — E11.9 TYPE 2 DIABETES MELLITUS WITHOUT COMPLICATION, WITHOUT LONG-TERM CURRENT USE OF INSULIN (HCC): Primary | ICD-10-CM

## 2018-04-09 DIAGNOSIS — E78.5 HYPERLIPIDEMIA, UNSPECIFIED HYPERLIPIDEMIA TYPE: ICD-10-CM

## 2018-04-09 DIAGNOSIS — J40 BRONCHITIS: ICD-10-CM

## 2018-04-09 LAB
ALBUMIN SERPL-MCNC: 4.1 G/DL (ref 3.5–5)
ALBUMIN/GLOB SERPL: 1.3 G/DL (ref 1.5–2.5)
ALP SERPL-CCNC: 81 U/L (ref 35–104)
ALT SERPL W P-5'-P-CCNC: 28 U/L (ref 10–36)
ANION GAP SERPL CALCULATED.3IONS-SCNC: 4.2 MMOL/L (ref 3.6–11.2)
AST SERPL-CCNC: 19 U/L (ref 10–30)
BILIRUB SERPL-MCNC: 0.2 MG/DL (ref 0.2–1.8)
BUN BLD-MCNC: 11 MG/DL (ref 7–21)
BUN/CREAT SERPL: 23.9 (ref 7–25)
CALCIUM SPEC-SCNC: 9.9 MG/DL (ref 7.7–10)
CHLORIDE SERPL-SCNC: 105 MMOL/L (ref 99–112)
CHOLEST SERPL-MCNC: 221 MG/DL (ref 0–200)
CO2 SERPL-SCNC: 31.8 MMOL/L (ref 24.3–31.9)
CREAT BLD-MCNC: 0.46 MG/DL (ref 0.43–1.29)
DEPRECATED RDW RBC AUTO: 47.3 FL (ref 37–54)
ERYTHROCYTE [DISTWIDTH] IN BLOOD BY AUTOMATED COUNT: 14.4 % (ref 11.5–14.5)
GFR SERPL CREATININE-BSD FRML MDRD: 142 ML/MIN/1.73
GLOBULIN UR ELPH-MCNC: 3.2 GM/DL
GLUCOSE BLD-MCNC: 89 MG/DL (ref 70–110)
HAV IGM SERPL QL IA: NORMAL
HBA1C MFR BLD: 6 % (ref 4.5–5.7)
HBV CORE IGM SERPL QL IA: NORMAL
HBV SURFACE AG SERPL QL IA: NORMAL
HCT VFR BLD AUTO: 48.9 % (ref 37–47)
HCV AB SER DONR QL: NORMAL
HDLC SERPL-MCNC: 39 MG/DL (ref 60–100)
HGB BLD-MCNC: 15.1 G/DL (ref 12–16)
LDLC SERPL CALC-MCNC: 143 MG/DL (ref 0–100)
LDLC/HDLC SERPL: 3.66 {RATIO}
MCH RBC QN AUTO: 28.5 PG (ref 27–33)
MCHC RBC AUTO-ENTMCNC: 30.9 G/DL (ref 33–37)
MCV RBC AUTO: 92.3 FL (ref 80–94)
OSMOLALITY SERPL CALC.SUM OF ELEC: 280.1 MOSM/KG (ref 273–305)
PLATELET # BLD AUTO: 228 10*3/MM3 (ref 130–400)
PMV BLD AUTO: 11.8 FL (ref 6–10)
POTASSIUM BLD-SCNC: 4.6 MMOL/L (ref 3.5–5.3)
PROT SERPL-MCNC: 7.3 G/DL (ref 6–8)
RBC # BLD AUTO: 5.3 10*6/MM3 (ref 4.2–5.4)
SODIUM BLD-SCNC: 141 MMOL/L (ref 135–153)
TRIGL SERPL-MCNC: 196 MG/DL (ref 0–150)
TSH SERPL DL<=0.05 MIU/L-ACNC: 3.22 MIU/ML (ref 0.55–4.78)
VLDLC SERPL-MCNC: 39.2 MG/DL
WBC NRBC COR # BLD: 9.07 10*3/MM3 (ref 4.5–12.5)

## 2018-04-09 PROCEDURE — 71046 X-RAY EXAM CHEST 2 VIEWS: CPT

## 2018-04-09 PROCEDURE — 36415 COLL VENOUS BLD VENIPUNCTURE: CPT

## 2018-04-09 PROCEDURE — 74019 RADEX ABDOMEN 2 VIEWS: CPT | Performed by: RADIOLOGY

## 2018-04-09 PROCEDURE — 74019 RADEX ABDOMEN 2 VIEWS: CPT

## 2018-04-09 PROCEDURE — 80061 LIPID PANEL: CPT

## 2018-04-09 PROCEDURE — 85027 COMPLETE CBC AUTOMATED: CPT

## 2018-04-09 PROCEDURE — 84443 ASSAY THYROID STIM HORMONE: CPT

## 2018-04-09 PROCEDURE — 80053 COMPREHEN METABOLIC PANEL: CPT

## 2018-04-09 PROCEDURE — 83036 HEMOGLOBIN GLYCOSYLATED A1C: CPT

## 2018-04-09 PROCEDURE — 71046 X-RAY EXAM CHEST 2 VIEWS: CPT | Performed by: RADIOLOGY

## 2018-04-09 PROCEDURE — 80074 ACUTE HEPATITIS PANEL: CPT

## 2018-05-08 RX ORDER — IPRATROPIUM/ALBUTEROL SULFATE 20-100 MCG
MIST INHALER (GRAM) INHALATION
Qty: 4 G | Refills: 3 | Status: SHIPPED | OUTPATIENT
Start: 2018-05-08 | End: 2018-07-02 | Stop reason: SDUPTHER

## 2018-07-02 ENCOUNTER — OFFICE VISIT (OUTPATIENT)
Dept: PULMONOLOGY | Facility: CLINIC | Age: 53
End: 2018-07-02

## 2018-07-02 VITALS
DIASTOLIC BLOOD PRESSURE: 78 MMHG | BODY MASS INDEX: 32.95 KG/M2 | SYSTOLIC BLOOD PRESSURE: 130 MMHG | HEART RATE: 111 BPM | OXYGEN SATURATION: 88 % | HEIGHT: 64 IN | RESPIRATION RATE: 18 BRPM | WEIGHT: 193 LBS

## 2018-07-02 DIAGNOSIS — F17.200 SMOKING: ICD-10-CM

## 2018-07-02 DIAGNOSIS — J44.9 CHRONIC OBSTRUCTIVE PULMONARY DISEASE, UNSPECIFIED COPD TYPE (HCC): ICD-10-CM

## 2018-07-02 DIAGNOSIS — J30.89 OTHER ALLERGIC RHINITIS: ICD-10-CM

## 2018-07-02 DIAGNOSIS — R06.02 SHORTNESS OF BREATH: Primary | ICD-10-CM

## 2018-07-02 DIAGNOSIS — R09.02 HYPOXIA: ICD-10-CM

## 2018-07-02 DIAGNOSIS — G47.33 OBSTRUCTIVE SLEEP APNEA: ICD-10-CM

## 2018-07-02 DIAGNOSIS — J44.1 ACUTE EXACERBATION OF CHRONIC OBSTRUCTIVE PULMONARY DISEASE (COPD) (HCC): ICD-10-CM

## 2018-07-02 PROCEDURE — 99214 OFFICE O/P EST MOD 30 MIN: CPT | Performed by: INTERNAL MEDICINE

## 2018-07-02 PROCEDURE — 96372 THER/PROPH/DIAG INJ SC/IM: CPT | Performed by: INTERNAL MEDICINE

## 2018-07-02 RX ORDER — BUDESONIDE AND FORMOTEROL FUMARATE DIHYDRATE 160; 4.5 UG/1; UG/1
2 AEROSOL RESPIRATORY (INHALATION)
Qty: 1 INHALER | Refills: 5 | Status: SHIPPED | OUTPATIENT
Start: 2018-07-02 | End: 2018-10-31 | Stop reason: SDUPTHER

## 2018-07-02 RX ORDER — METHYLPREDNISOLONE ACETATE 80 MG/ML
80 INJECTION, SUSPENSION INTRA-ARTICULAR; INTRALESIONAL; INTRAMUSCULAR; SOFT TISSUE ONCE
Status: COMPLETED | OUTPATIENT
Start: 2018-07-02 | End: 2018-07-02

## 2018-07-02 RX ORDER — FLUNISOLIDE 0.25 MG/ML
2 SOLUTION NASAL EVERY 12 HOURS
Qty: 1 BOTTLE | Refills: 5 | Status: SHIPPED | OUTPATIENT
Start: 2018-07-02 | End: 2018-10-31 | Stop reason: SDUPTHER

## 2018-07-02 RX ADMIN — METHYLPREDNISOLONE ACETATE 80 MG: 80 INJECTION, SUSPENSION INTRA-ARTICULAR; INTRALESIONAL; INTRAMUSCULAR; SOFT TISSUE at 13:18

## 2018-07-02 NOTE — PROGRESS NOTES
"Chief Complaint   Patient presents with   • Follow-up   • Sleeping Problem   • Shortness of Breath         Subjective   Liz Jiang is a 52 y.o. female.     History of Present Illness   Patient comes in today complaining of shortness of breath, cough and phlegm production which has been worse for the past few days. Patient is not complaining of subjective chills.     Patient is using nebulizer more than usual with some relief.  She actually ran out of her long-term medications a few days ago and has not been taking any of her maintenance medications.    She is currently smoking vapor cigarettes but has not smoked regular cigarettes in a long time.    She is using her BiPAP at a pressure of 16/8, with a full facemask, without any significant difficulty.  She reports that she is well rested in the morning and does not feel tired or fatigued.    She continues to have occasional runny nose and dribbling in the back of her throat.    The following portions of the patient's history were reviewed and updated as appropriate: allergies, current medications, past family history, past medical history, past social history and past surgical history.    Review of Systems   Constitutional: Positive for fatigue. Negative for chills and fever.   HENT: Positive for rhinorrhea and sinus pressure. Negative for sneezing.    Respiratory: Positive for cough, chest tightness, shortness of breath and wheezing.    Psychiatric/Behavioral: Positive for sleep disturbance.       Objective   Visit Vitals  /78   Pulse 111   Resp 18   Ht 162.6 cm (64.02\")   Wt 87.5 kg (193 lb)   SpO2 (!) 88% Comment: RESTING ON ROOM AIR   BMI 33.11 kg/m²   SpO2 98 % on 2 lpm     Physical Exam   Constitutional: She is oriented to person, place, and time. She appears well-developed.   HENT:   Sinuses were non tender on palpation.  Nostril showed mild erythema.   Eyes: EOM are normal. Pupils are equal, round, and reactive to light.   Neck: Normal range of " motion. Neck supple.   Cardiovascular: Normal rate and regular rhythm.    Pulmonary/Chest: She is in respiratory distress. She has wheezes. She has no rales.   Musculoskeletal:   Gait normal.   Neurological: She is alert and oriented to person, place, and time.   Skin: Skin is dry.   Psychiatric: She has a normal mood and affect. Her behavior is normal.   Vitals reviewed.      Assessment/Plan   Liz was seen today for follow-up, sleeping problem and shortness of breath.    Diagnoses and all orders for this visit:    Shortness of breath    Chronic obstructive pulmonary disease, unspecified COPD type    Obstructive sleep apnea    Other allergic rhinitis    Hypoxia    Smoking    Acute exacerbation of chronic obstructive pulmonary disease (COPD)  -     methylPREDNISolone acetate (DEPO-medrol) injection 80 mg; Inject 1 mL into the shoulder, thigh, or buttocks 1 (One) Time.    Other orders  -     budesonide-formoterol (SYMBICORT) 160-4.5 MCG/ACT inhaler; Inhale 2 puffs 2 (Two) Times a Day. Rinse mouth with water after use.  -     flunisolide (NASALIDE) 25 MCG/ACT (0.025%) solution nasal spray; Inhale 2 sprays Every 12 (Twelve) Hours.  -     tiotropium bromide monohydrate (SPIRIVA RESPIMAT) 2.5 MCG/ACT aerosol solution inhaler; Inhale 2 puffs Daily.  -     ipratropium-albuterol (COMBIVENT RESPIMAT)  MCG/ACT inhaler; Inhale 1 puff Every 6 (Six) Hours As Needed for Wheezing or Shortness of Air.           Return in about 4 months (around 11/2/2018) for Recheck, Overbook.    DISCUSSION (if any):  For the symptoms of acute exacerbation of COPD, I have prescribed intramuscular steroids.    Side effects have been discussed in detail    Patient was asked to report to the emergency room if symptoms do not improve    I have provided her with refills on Spiriva, Symbicort and Combivent.    She was advised to continue Nasalide.    Her last PFTs confirmed very severe COPD with FEV1 of 26%.  This was performed in September  2017.    The patient was advised to continue oxygen, since there has been a good response since the patient is using it as prescribed.    She was advised to continue BiPAP at the current setting.    Dictated utilizing Dragon dictation.    This document was electronically signed by Tena Cee MD July 2, 2018  12:04 PM

## 2018-09-04 ENCOUNTER — OFFICE VISIT (OUTPATIENT)
Dept: CARDIOLOGY | Facility: CLINIC | Age: 53
End: 2018-09-04

## 2018-09-04 VITALS
HEIGHT: 64 IN | HEART RATE: 118 BPM | BODY MASS INDEX: 32.27 KG/M2 | RESPIRATION RATE: 16 BRPM | DIASTOLIC BLOOD PRESSURE: 78 MMHG | SYSTOLIC BLOOD PRESSURE: 126 MMHG | OXYGEN SATURATION: 90 % | WEIGHT: 189 LBS

## 2018-09-04 DIAGNOSIS — Z72.0 TOBACCO ABUSE: ICD-10-CM

## 2018-09-04 DIAGNOSIS — R07.2 PRECORDIAL PAIN: Primary | ICD-10-CM

## 2018-09-04 DIAGNOSIS — R00.0 TACHYCARDIA: ICD-10-CM

## 2018-09-04 DIAGNOSIS — E78.5 DYSLIPIDEMIA: ICD-10-CM

## 2018-09-04 PROCEDURE — 99204 OFFICE O/P NEW MOD 45 MIN: CPT | Performed by: INTERNAL MEDICINE

## 2018-09-04 PROCEDURE — 93000 ELECTROCARDIOGRAM COMPLETE: CPT | Performed by: INTERNAL MEDICINE

## 2018-09-04 RX ORDER — SUMATRIPTAN 100 MG/1
100 TABLET, FILM COATED ORAL
COMMUNITY
End: 2019-12-05

## 2018-09-04 RX ORDER — HYDROCODONE BITARTRATE AND ACETAMINOPHEN 10; 325 MG/1; MG/1
1 TABLET ORAL EVERY 6 HOURS PRN
COMMUNITY
End: 2019-11-19

## 2018-09-04 RX ORDER — TOPIRAMATE 25 MG/1
25 TABLET ORAL 2 TIMES DAILY
COMMUNITY
End: 2019-12-05

## 2018-09-04 RX ORDER — ATORVASTATIN CALCIUM 80 MG/1
80 TABLET, FILM COATED ORAL DAILY
Qty: 30 TABLET | Refills: 11 | Status: SHIPPED | OUTPATIENT
Start: 2018-09-04 | End: 2018-10-31 | Stop reason: ALTCHOICE

## 2018-09-04 RX ORDER — TRAZODONE HYDROCHLORIDE 50 MG/1
50 TABLET ORAL NIGHTLY
COMMUNITY
End: 2019-12-16

## 2018-09-04 NOTE — PROGRESS NOTES
Mayte Davis APRN  Liz Jiang  : 1965  DATE:2018    Patient Active Problem List   Diagnosis   • Chest pain   • COPD (chronic obstructive pulmonary disease) (CMS/Formerly [facility])   • Tobacco abuse   • GERD (gastroesophageal reflux disease)   • Anxiety   • Arthritis   • Nausea and vomiting   • Generalized abdominal pain   • Right upper quadrant pain   • Gallbladder disease   • Abnormal biliary HIDA scan   • Dyslipidemia   • Tachycardia       Dear Mayte Davis APRN:    Subjective     Liz Jiang is a 52 y.o. female with the above medical problems who is being seen for consultation today at the request of Mayte Davis APRN.  To the patient she was recently admitted to the [facility] with a stroke.  She stated that since that time she has been having intermittent episodes of chest pain.  He describes the is midsternal, sharp, lasting 10-15 minutes and resolving spontaneously.  She states is associated with shortness of breath and diaphoresis.  She denies any exacerbating or ameliorating factors.  She stated that this for over 10 on the pain scale.  She states the pain occurs twice per day.  She says been present for approximately 2-3 weeks.  She also has a history of COPD and is currently wearing a nasal cannula.  She requires constant oxygen for respiratory failure.  She states she underwent a cardiac catheterization by Dr. Cobian which did not show significant coronary artery disease.  This was done 10 years ago.      Past Medical History:   Diagnosis Date   • Acid reflux disease    • Arthritis    • Asthma, extrinsic    • Cholelithiasis    • COPD (chronic obstructive pulmonary disease) (CMS/Formerly [facility])    • Diabetes mellitus (CMS/Formerly [facility])    • Hypertension    • Obstructive sleep apnea treated with BiPAP    • On home oxygen therapy     2L    • Sleep apnea, obstructive        Past Surgical History:   Procedure Laterality Date   • CARDIAC CATHETERIZATION     • CHOLECYSTECTOMY WITH INTRAOPERATIVE CHOLANGIOGRAM N/A  1/30/2017    Procedure: CHOLECYSTECTOMY LAPAROSCOPIC INTRAOPERATIVE CHOLANGIOGRAM;  Surgeon: Carolin Marie MD;  Location: [facility];  Service:    • HYSTERECTOMY     • KIDNEY STONE SURGERY     • TUBAL ABDOMINAL LIGATION         Family History   Problem Relation Age of Onset   • Diabetes Mother    • Hypertension Mother    • Arrhythmia Mother    • Pneumonia Father    • Coronary artery disease Other    • Arrhythmia Sister    • Heart attack Brother    • Other Brother         CABG       Social History     Social History   • Marital status:      Spouse name: N/A   • Number of children: N/A   • Years of education: N/A     Occupational History   • Not on file.     Social History Main Topics   • Smoking status: Former Smoker     Packs/day: 1.50     Years: 30.00     Types: Electronic Cigarette     Quit date: 2015   • Smokeless tobacco: Never Used   • Alcohol use No   • Drug use: No   • Sexual activity: Defer     Other Topics Concern   • Not on file     Social History Narrative   • No narrative on file         Current Outpatient Prescriptions:   •  albuterol (PROVENTIL HFA;VENTOLIN HFA) 108 (90 BASE) MCG/ACT inhaler, Inhale 2 puffs Every 6 (Six) Hours As Needed for Wheezing or Shortness of Air., Disp: 1 inhaler, Rfl: 5  •  albuterol (PROVENTIL) (2.5 MG/3ML) 0.083% nebulizer solution, Take 2.5 mg by nebulization every 4 (four) hours as needed for wheezing., Disp: , Rfl:   •  apixaban (ELIQUIS) 5 MG tablet tablet, Take 5 mg by mouth 2 (Two) Times a Day., Disp: , Rfl:   •  aspirin 81 MG chewable tablet, Chew 81 mg daily., Disp: , Rfl:   •  budesonide-formoterol (SYMBICORT) 160-4.5 MCG/ACT inhaler, Inhale 2 puffs 2 (Two) Times a Day. Rinse mouth with water after use., Disp: 1 inhaler, Rfl: 5  •  buPROPion XL (WELLBUTRIN XL) 150 MG 24 hr tablet, Take 1 tablet by mouth Every Morning., Disp: 30 tablet, Rfl: 2  •  diclofenac (VOLTAREN) 50 MG EC tablet, Take 1 tablet by mouth 2 (Two) Times a Day As Needed (FOR PAIN)., Disp:  15 tablet, Rfl: 0  •  flunisolide (NASALIDE) 25 MCG/ACT (0.025%) solution nasal spray, Inhale 2 sprays Every 12 (Twelve) Hours., Disp: 1 bottle, Rfl: 5  •  FLUoxetine (PROzac) 40 MG capsule, Take 20 mg by mouth 3 (Three) Times a Day., Disp: , Rfl:   •  furosemide (LASIX) 20 MG tablet, Take 20 mg by mouth 2 (Two) Times a Day. PRN, Disp: , Rfl:   •  gabapentin (NEURONTIN) 800 MG tablet, Take 800 mg by mouth 3 (Three) Times a Day., Disp: , Rfl:   •  HYDROcodone-acetaminophen (NORCO)  MG per tablet, Take 1 tablet by mouth Every 6 (Six) Hours As Needed for Moderate Pain ., Disp: , Rfl:   •  ipratropium-albuterol (COMBIVENT RESPIMAT)  MCG/ACT inhaler, Inhale 1 puff Every 6 (Six) Hours As Needed for Wheezing or Shortness of Air., Disp: 4 g, Rfl: 6  •  Lancets 30G misc, , Disp: , Rfl:   •  levalbuterol (XOPENEX) 1.25 MG/3ML nebulizer solution, Take 1 ampule by nebulization every 4 (four) hours as needed for wheezing., Disp: , Rfl:   •  loratadine-pseudoephedrine (CLARITIN-D 24 HOUR)  MG per 24 hr tablet, Take 1 tablet by mouth Daily., Disp: 30 tablet, Rfl: 5  •  metoprolol tartrate (LOPRESSOR) 25 MG tablet, Take 25 mg by mouth 2 (two) times a day., Disp: , Rfl:   •  montelukast (SINGULAIR) 10 MG tablet, Take 1 tablet by mouth Every Night., Disp: 30 tablet, Rfl: 5  •  oxyCODONE-acetaminophen (PERCOCET) 5-325 MG per tablet, Take 1 tablet by mouth Every 6 (Six) Hours As Needed for moderate pain., Disp: 28 tablet, Rfl: 0  •  promethazine (PHENERGAN) 25 MG tablet, Take 25 mg by mouth every 6 (six) hours as needed for nausea or vomiting., Disp: , Rfl:   •  QUEtiapine (SEROquel) 100 MG tablet, Take 100 mg by mouth Every Night., Disp: , Rfl:   •  raNITIdine (ZANTAC) 150 MG tablet, Take 150 mg by mouth 2 (Two) Times a Day., Disp: , Rfl:   •  SUMAtriptan (IMITREX) 100 MG tablet, Take 100 mg by mouth Every 2 (Two) Hours As Needed for Migraine., Disp: , Rfl:   •  tapentadol (NUCYNTA) 50 MG tablet, Take 100 mg by mouth  Every 12 (Twelve) Hours As Needed., Disp: , Rfl:   •  topiramate (TOPAMAX) 25 MG tablet, Take 25 mg by mouth 2 (Two) Times a Day., Disp: , Rfl:   •  traZODone (DESYREL) 50 MG tablet, Take 50 mg by mouth Every Night., Disp: , Rfl:   •  atorvastatin (LIPITOR) 80 MG tablet, Take 1 tablet by mouth Daily., Disp: 30 tablet, Rfl: 11  •  cetirizine (zyrTEC) 10 MG tablet, Take 10 mg by mouth Daily., Disp: , Rfl:   •  doxycycline (MONODOX) 100 MG capsule, Take 1 capsule by mouth 2 (Two) Times a Day., Disp: 14 capsule, Rfl: 0  •  levoFLOXacin (LEVAQUIN) 750 MG tablet, Take 1 tablet by mouth Daily., Disp: 8 tablet, Rfl: 0  •  metFORMIN (GLUCOPHAGE) 500 MG tablet, Take 500 mg by mouth 2 (two) times a day with meals., Disp: , Rfl:   •  nystatin (MYCOSTATIN) 214891 UNIT/ML suspension, Take 5 mL by mouth 4 (Four) Times a Day., Disp: 280 mL, Rfl: 2  •  prazosin (MINIPRESS) 2 MG capsule, Take 2 mg by mouth Every Night., Disp: , Rfl:   •  Umeclidinium Bromide 62.5 MCG/INH aerosol powder , Inhale 1 puff Daily., Disp: 30 each, Rfl: 8  •  vitamin D (ERGOCALCIFEROL) 50133 UNITS capsule capsule, Take 50,000 Units by mouth 1 (one) time per week., Disp: , Rfl:     The following portions of the patient's history were reviewed and updated as appropriate: allergies, current medications, past family history, past medical history, past social history, past surgical history and problem list.    Review of Systems   Constitution: Positive for malaise/fatigue. Negative for chills, diaphoresis and fever.   HENT: Positive for congestion and hoarse voice. Negative for ear pain and sore throat.         Sinus prob   Eyes: Positive for visual disturbance. Negative for blurred vision, pain and redness.   Cardiovascular: Positive for chest pain, orthopnea and paroxysmal nocturnal dyspnea. Negative for leg swelling, palpitations and syncope.   Respiratory: Positive for shortness of breath and wheezing. Negative for cough and hemoptysis.    Endocrine: Negative  "for cold intolerance and heat intolerance.   Hematologic/Lymphatic: Bruises/bleeds easily.   Skin: Negative for rash.   Musculoskeletal: Positive for arthritis, back pain, joint pain, joint swelling and stiffness. Negative for myalgias.   Gastrointestinal: Positive for abdominal pain, heartburn and nausea. Negative for constipation, diarrhea, hematemesis, hematochezia, melena and vomiting.   Genitourinary: Negative for dysuria and hematuria.   Neurological: Positive for headaches, numbness and paresthesias. Negative for dizziness and focal weakness.        Hx recent stroke   Psychiatric/Behavioral: Negative for depression. The patient is nervous/anxious.        Objective   Blood pressure 126/78, pulse 118, resp. rate 16, height 162.6 cm (64.02\"), weight 85.7 kg (189 lb), SpO2 90 %.    Physical Exam   Constitutional: She is oriented to person, place, and time. She appears well-developed and well-nourished.   Obese WF sitting on chair in mild respiratory distress.   HENT:   Mouth/Throat: Oropharynx is clear and moist.   Eyes: Pupils are equal, round, and reactive to light. EOM are normal.   Neck: Neck supple. No JVD present. No tracheal deviation present. No thyromegaly present.   Cardiovascular: Normal rate, regular rhythm, S1 normal and S2 normal.  Exam reveals no gallop and no friction rub.    No murmur heard.  Pulmonary/Chest: She is in respiratory distress. She has rales.   Distant breath sounds.   Abdominal: Soft. Bowel sounds are normal. She exhibits no mass. There is no tenderness.   Musculoskeletal: Normal range of motion. She exhibits no edema.   Lymphadenopathy:     She has no cervical adenopathy.   Neurological: She is alert and oriented to person, place, and time.   Skin: Skin is warm and dry. No rash noted.   Psychiatric: She has a normal mood and affect.         ECG 12 Lead  Date/Time: 9/4/2018 2:16 PM  Performed by: DEBBY TRIVEDI  Authorized by: DEBBY TRIVEDI "   Comparison: compared with previous ECG from 3/27/2017  Similar to previous ECG  Rhythm: sinus tachycardia  Rate: tachycardic  BPM: 117  Conduction: incomplete RBBB  T depression: V1 and V2  QRS axis: normal  Other: no other findings  Other findings comments: [facility]  Clinical impression: abnormal ECG            Assessment/Plan      1.  Chest pain: Patient with chest pain concerning for coronary artery disease.  The patient does have multiple risk factors for this condition.  At this point we will evaluate further with stress test and echocardiogram.  According to the patient she previously underwent Lexiscan stress test that she completed without significant complications.  She is unable to use a treadmill due to oxygen requirement and shortness of breath.  We'll therefore order a nuclear stresses.    2.  Tachycardia: Patient is currently tachycardia of unclear etiology.  She is already on metoprolol for rate control.  This is likely related to her respiratory failure.  EKG shows sinus tachycardia with nonspecific ST-T changes but no evidence of tachyarrhythmias.    3.  Tobacco abuse: Patient with a history of tobacco abuse states she is still smoking 3-4 years ago.  She continues to use chronic cigarettes.    4.  Dyslipidemia: Patient with a history of dyslipidemia with recent lipid panel showing elevated LDL.  At this point we'll start atorvastatin 80 mg by mouth daily and monitor for improvement.       Diagnosis Plan   1. Precordial pain  ECG 12 Lead    Stress Test With Myocardial Perfusion (1 Day)    Adult Transthoracic Echo Complete W/ Cont if Necessary Per Protocol   2. Tachycardia     3. Tobacco abuse     4. Dyslipidemia          Return in about 4 weeks (around 10/2/2018).    I appreciate the opportunity to participate in this patient's cardiovascular care.    Best Regards    Christ Page

## 2018-09-05 ENCOUNTER — HOSPITAL ENCOUNTER (EMERGENCY)
Facility: HOSPITAL | Age: 53
Discharge: LEFT BEFORE DISCHARGE | End: 2018-09-05
Attending: EMERGENCY MEDICINE | Admitting: EMERGENCY MEDICINE

## 2018-09-05 VITALS
HEART RATE: 120 BPM | WEIGHT: 178 LBS | HEIGHT: 64 IN | DIASTOLIC BLOOD PRESSURE: 81 MMHG | TEMPERATURE: 97.6 F | SYSTOLIC BLOOD PRESSURE: 128 MMHG | BODY MASS INDEX: 30.39 KG/M2 | OXYGEN SATURATION: 93 % | RESPIRATION RATE: 26 BRPM

## 2018-09-05 DIAGNOSIS — K59.00 CONSTIPATION, UNSPECIFIED CONSTIPATION TYPE: Primary | ICD-10-CM

## 2018-09-05 LAB
ALBUMIN SERPL-MCNC: 4.8 G/DL (ref 3.5–5)
ALBUMIN/GLOB SERPL: 1.5 G/DL (ref 1.5–2.5)
ALP SERPL-CCNC: 102 U/L (ref 35–104)
ALT SERPL W P-5'-P-CCNC: 39 U/L (ref 10–36)
ANION GAP SERPL CALCULATED.3IONS-SCNC: 6.9 MMOL/L (ref 3.6–11.2)
AST SERPL-CCNC: 24 U/L (ref 10–30)
BASOPHILS # BLD AUTO: 0.03 10*3/MM3 (ref 0–0.3)
BASOPHILS NFR BLD AUTO: 0.2 % (ref 0–2)
BILIRUB SERPL-MCNC: 0.4 MG/DL (ref 0.2–1.8)
BUN BLD-MCNC: 12 MG/DL (ref 7–21)
BUN/CREAT SERPL: 22.6 (ref 7–25)
CALCIUM SPEC-SCNC: 9.9 MG/DL (ref 7.7–10)
CHLORIDE SERPL-SCNC: 100 MMOL/L (ref 99–112)
CO2 SERPL-SCNC: 31.1 MMOL/L (ref 24.3–31.9)
CREAT BLD-MCNC: 0.53 MG/DL (ref 0.43–1.29)
D-LACTATE SERPL-SCNC: 1.4 MMOL/L (ref 0.5–2)
DEPRECATED RDW RBC AUTO: 48.1 FL (ref 37–54)
EOSINOPHIL # BLD AUTO: 0.09 10*3/MM3 (ref 0–0.7)
EOSINOPHIL NFR BLD AUTO: 0.6 % (ref 0–5)
ERYTHROCYTE [DISTWIDTH] IN BLOOD BY AUTOMATED COUNT: 14.8 % (ref 11.5–14.5)
GFR SERPL CREATININE-BSD FRML MDRD: 121 ML/MIN/1.73
GLOBULIN UR ELPH-MCNC: 3.2 GM/DL
GLUCOSE BLD-MCNC: 133 MG/DL (ref 70–110)
HCT VFR BLD AUTO: 52.6 % (ref 37–47)
HGB BLD-MCNC: 16.9 G/DL (ref 12–16)
HOLD SPECIMEN: NORMAL
HOLD SPECIMEN: NORMAL
IMM GRANULOCYTES # BLD: 0.03 10*3/MM3 (ref 0–0.03)
IMM GRANULOCYTES NFR BLD: 0.2 % (ref 0–0.5)
LYMPHOCYTES # BLD AUTO: 2.35 10*3/MM3 (ref 1–3)
LYMPHOCYTES NFR BLD AUTO: 15.4 % (ref 21–51)
MCH RBC QN AUTO: 29.2 PG (ref 27–33)
MCHC RBC AUTO-ENTMCNC: 32.1 G/DL (ref 33–37)
MCV RBC AUTO: 91 FL (ref 80–94)
MONOCYTES # BLD AUTO: 0.97 10*3/MM3 (ref 0.1–0.9)
MONOCYTES NFR BLD AUTO: 6.3 % (ref 0–10)
NEUTROPHILS # BLD AUTO: 11.82 10*3/MM3 (ref 1.4–6.5)
NEUTROPHILS NFR BLD AUTO: 77.3 % (ref 30–70)
OSMOLALITY SERPL CALC.SUM OF ELEC: 277.4 MOSM/KG (ref 273–305)
PLATELET # BLD AUTO: 263 10*3/MM3 (ref 130–400)
PMV BLD AUTO: 11 FL (ref 6–10)
POTASSIUM BLD-SCNC: 4.6 MMOL/L (ref 3.5–5.3)
PROT SERPL-MCNC: 8 G/DL (ref 6–8)
RBC # BLD AUTO: 5.78 10*6/MM3 (ref 4.2–5.4)
SODIUM BLD-SCNC: 138 MMOL/L (ref 135–153)
WBC NRBC COR # BLD: 15.29 10*3/MM3 (ref 4.5–12.5)
WHOLE BLOOD HOLD SPECIMEN: NORMAL
WHOLE BLOOD HOLD SPECIMEN: NORMAL

## 2018-09-05 PROCEDURE — 36415 COLL VENOUS BLD VENIPUNCTURE: CPT

## 2018-09-05 PROCEDURE — 85025 COMPLETE CBC W/AUTO DIFF WBC: CPT | Performed by: EMERGENCY MEDICINE

## 2018-09-05 PROCEDURE — 99282 EMERGENCY DEPT VISIT SF MDM: CPT

## 2018-09-05 PROCEDURE — 83605 ASSAY OF LACTIC ACID: CPT | Performed by: EMERGENCY MEDICINE

## 2018-09-05 PROCEDURE — 80053 COMPREHEN METABOLIC PANEL: CPT | Performed by: EMERGENCY MEDICINE

## 2018-09-05 PROCEDURE — 87040 BLOOD CULTURE FOR BACTERIA: CPT | Performed by: EMERGENCY MEDICINE

## 2018-09-05 RX ORDER — SODIUM CHLORIDE 0.9 % (FLUSH) 0.9 %
10 SYRINGE (ML) INJECTION AS NEEDED
Status: DISCONTINUED | OUTPATIENT
Start: 2018-09-05 | End: 2018-09-05 | Stop reason: HOSPADM

## 2018-09-05 NOTE — ED PROVIDER NOTES
Subjective     History provided by:  Patient   used: No    Abdominal Pain   Pain location:  Generalized  Pain quality: cramping    Pain radiates to:  Does not radiate  Pain severity:  Moderate  Onset quality:  Sudden  Duration:  2 days  Timing:  Intermittent  Progression:  Worsening  Chronicity:  New  Context: not alcohol use, not awakening from sleep, not eating and not medication withdrawal    Relieved by:  Nothing  Worsened by:  Nothing  Ineffective treatments:  None tried  Associated symptoms: constipation    Associated symptoms: no belching, no chest pain, no chills, no nausea and no vomiting    Risk factors: no alcohol abuse, has not had multiple surgeries and no NSAID use        Review of Systems   Constitutional: Negative for chills.   Cardiovascular: Negative for chest pain.   Gastrointestinal: Positive for abdominal distention, abdominal pain and constipation. Negative for nausea and vomiting.   All other systems reviewed and are negative.      Past Medical History:   Diagnosis Date   • Acid reflux disease    • Arthritis    • Asthma, extrinsic    • Cholelithiasis    • COPD (chronic obstructive pulmonary disease) (CMS/HCC)    • Diabetes mellitus (CMS/HCC)    • Hypertension    • Obstructive sleep apnea treated with BiPAP    • On home oxygen therapy     2L    • Sleep apnea, obstructive        No Known Allergies    Past Surgical History:   Procedure Laterality Date   • CARDIAC CATHETERIZATION     • CHOLECYSTECTOMY WITH INTRAOPERATIVE CHOLANGIOGRAM N/A 1/30/2017    Procedure: CHOLECYSTECTOMY LAPAROSCOPIC INTRAOPERATIVE CHOLANGIOGRAM;  Surgeon: Carolin Marie MD;  Location: [facility];  Service:    • HYSTERECTOMY     • KIDNEY STONE SURGERY     • TUBAL ABDOMINAL LIGATION         Family History   Problem Relation Age of Onset   • Diabetes Mother    • Hypertension Mother    • Arrhythmia Mother    • Pneumonia Father    • Coronary artery disease Other    • Arrhythmia Sister    • Heart attack  Brother    • Other Brother         CABG       Social History     Social History   • Marital status:      Social History Main Topics   • Smoking status: Former Smoker     Packs/day: 1.50     Years: 30.00     Types: Electronic Cigarette     Quit date: 2015   • Smokeless tobacco: Never Used   • Alcohol use No   • Drug use: No   • Sexual activity: Defer     Other Topics Concern   • Not on file           Objective   Physical Exam   Constitutional: She is oriented to person, place, and time. She appears well-developed and well-nourished.   HENT:   Head: Normocephalic and atraumatic.   Right Ear: External ear normal.   Left Ear: External ear normal.   Nose: Nose normal.   Mouth/Throat: Oropharynx is clear and moist.   Eyes: Pupils are equal, round, and reactive to light. Conjunctivae and EOM are normal.   Neck: Normal range of motion. Neck supple.   Cardiovascular: Normal rate, regular rhythm and normal heart sounds.    Pulmonary/Chest: Effort normal and breath sounds normal.   Abdominal: Soft. Normal appearance and bowel sounds are normal. There is generalized tenderness.   Musculoskeletal: Normal range of motion.   Neurological: She is alert and oriented to person, place, and time.   Skin: Skin is warm and dry.   Psychiatric: She has a normal mood and affect. Her behavior is normal. Judgment and thought content normal.   Nursing note and vitals reviewed.      Procedures           ED Course  ED Course as of Sep 05 2145   Wed Sep 05, 2018   1858 Patient refused to have CT scan.  Patient does have elevated white count and advised in order to treat her constipation L after left any bowel obstruction.  Patient refused to have this at the time she did leave against medical advise.  [BH]      ED Course User Index  [BH] Bear Webster PA-C                  [facility]      Final diagnoses:   Constipation, unspecified constipation type            Bear Webster PA-C  09/05/18 2145

## 2018-09-05 NOTE — ED NOTES
"Upon going to pt for assessment and IV placement, this nurse explained to pt that provider had ordered a CT scan of abdomen with contrast. Asked pt if she was in pain and she states \"hell yes I am in pain and if I was not I would not be here.\" Pt states \"I just want to be able to go to the bathroom\". Explained to the pt the reason for the CT and ruling out bowel obstruction and pt continues to state multiple times \"I just want to be able to go to the bathroom.\" Pt gathers her things and walked out of the ER. Margaret Martinez, RN  09/05/18 1298    "

## 2018-09-10 LAB — BACTERIA SPEC AEROBE CULT: NORMAL

## 2018-10-29 ENCOUNTER — TELEPHONE (OUTPATIENT)
Dept: CARDIAC SURGERY | Facility: CLINIC | Age: 53
End: 2018-10-29

## 2018-10-29 NOTE — TELEPHONE ENCOUNTER
Dr. Vazquez's office called and CX the referral they had sent us on this pt. They stated that they had found a provider that comes to [place] and it is easier on the pt for transportation.

## 2018-10-31 ENCOUNTER — OFFICE VISIT (OUTPATIENT)
Dept: PULMONOLOGY | Facility: CLINIC | Age: 53
End: 2018-10-31

## 2018-10-31 VITALS
SYSTOLIC BLOOD PRESSURE: 110 MMHG | HEIGHT: 64 IN | WEIGHT: 190 LBS | RESPIRATION RATE: 19 BRPM | HEART RATE: 109 BPM | BODY MASS INDEX: 32.44 KG/M2 | OXYGEN SATURATION: 88 % | DIASTOLIC BLOOD PRESSURE: 70 MMHG

## 2018-10-31 DIAGNOSIS — J30.89 OTHER ALLERGIC RHINITIS: ICD-10-CM

## 2018-10-31 DIAGNOSIS — J44.9 CHRONIC OBSTRUCTIVE PULMONARY DISEASE, UNSPECIFIED COPD TYPE (HCC): ICD-10-CM

## 2018-10-31 DIAGNOSIS — R06.02 SHORTNESS OF BREATH: Primary | ICD-10-CM

## 2018-10-31 DIAGNOSIS — R09.02 HYPOXIA: ICD-10-CM

## 2018-10-31 DIAGNOSIS — G47.33 OBSTRUCTIVE SLEEP APNEA: ICD-10-CM

## 2018-10-31 DIAGNOSIS — Z23 NEED FOR INFLUENZA VACCINATION: ICD-10-CM

## 2018-10-31 PROCEDURE — 90674 CCIIV4 VAC NO PRSV 0.5 ML IM: CPT | Performed by: INTERNAL MEDICINE

## 2018-10-31 PROCEDURE — G0008 ADMIN INFLUENZA VIRUS VAC: HCPCS | Performed by: INTERNAL MEDICINE

## 2018-10-31 PROCEDURE — 99214 OFFICE O/P EST MOD 30 MIN: CPT | Performed by: INTERNAL MEDICINE

## 2018-10-31 RX ORDER — BUDESONIDE AND FORMOTEROL FUMARATE DIHYDRATE 160; 4.5 UG/1; UG/1
2 AEROSOL RESPIRATORY (INHALATION)
Qty: 1 INHALER | Refills: 8 | Status: SHIPPED | OUTPATIENT
Start: 2018-10-31 | End: 2019-03-29 | Stop reason: SDUPTHER

## 2018-10-31 RX ORDER — FLUNISOLIDE 0.25 MG/ML
2 SOLUTION NASAL EVERY 12 HOURS
Qty: 1 BOTTLE | Refills: 8 | Status: SHIPPED | OUTPATIENT
Start: 2018-10-31 | End: 2019-03-29 | Stop reason: SDUPTHER

## 2018-10-31 RX ORDER — ATORVASTATIN CALCIUM 10 MG/1
TABLET, FILM COATED ORAL
Refills: 1 | COMMUNITY
Start: 2018-10-25 | End: 2019-12-04

## 2018-10-31 RX ORDER — ONDANSETRON 4 MG/1
TABLET, ORALLY DISINTEGRATING ORAL
Refills: 0 | COMMUNITY
Start: 2018-09-04 | End: 2019-12-16

## 2018-10-31 RX ORDER — DEXTROMETHORPHAN HYDROBROMIDE AND PROMETHAZINE HYDROCHLORIDE 15; 6.25 MG/5ML; MG/5ML
SYRUP ORAL
Refills: 0 | COMMUNITY
Start: 2018-09-10 | End: 2019-12-05

## 2019-03-29 ENCOUNTER — OFFICE VISIT (OUTPATIENT)
Dept: PULMONOLOGY | Facility: CLINIC | Age: 54
End: 2019-03-29

## 2019-03-29 VITALS
WEIGHT: 192 LBS | RESPIRATION RATE: 18 BRPM | OXYGEN SATURATION: 85 % | HEIGHT: 64 IN | HEART RATE: 105 BPM | BODY MASS INDEX: 32.78 KG/M2

## 2019-03-29 DIAGNOSIS — J96.12 CHRONIC RESPIRATORY FAILURE WITH HYPOXIA AND HYPERCAPNIA (HCC): ICD-10-CM

## 2019-03-29 DIAGNOSIS — Z87.891 PERSONAL HISTORY OF TOBACCO USE, PRESENTING HAZARDS TO HEALTH: ICD-10-CM

## 2019-03-29 DIAGNOSIS — F17.200 SMOKING: ICD-10-CM

## 2019-03-29 DIAGNOSIS — R09.02 HYPOXIA: ICD-10-CM

## 2019-03-29 DIAGNOSIS — G47.33 OBSTRUCTIVE SLEEP APNEA: ICD-10-CM

## 2019-03-29 DIAGNOSIS — J44.9 CHRONIC OBSTRUCTIVE PULMONARY DISEASE, UNSPECIFIED COPD TYPE (HCC): ICD-10-CM

## 2019-03-29 DIAGNOSIS — J96.11 CHRONIC RESPIRATORY FAILURE WITH HYPOXIA AND HYPERCAPNIA (HCC): ICD-10-CM

## 2019-03-29 DIAGNOSIS — R06.02 SHORTNESS OF BREATH: Primary | ICD-10-CM

## 2019-03-29 PROCEDURE — 99214 OFFICE O/P EST MOD 30 MIN: CPT | Performed by: INTERNAL MEDICINE

## 2019-03-29 RX ORDER — SULFAMETHOXAZOLE AND TRIMETHOPRIM 800; 160 MG/1; MG/1
TABLET ORAL
Refills: 0 | COMMUNITY
Start: 2019-01-07 | End: 2019-12-05

## 2019-03-29 RX ORDER — TIZANIDINE 4 MG/1
4 TABLET ORAL EVERY 6 HOURS PRN
Refills: 0 | COMMUNITY
Start: 2019-02-28

## 2019-03-29 RX ORDER — ALBUTEROL SULFATE 90 UG/1
2 AEROSOL, METERED RESPIRATORY (INHALATION) EVERY 6 HOURS PRN
Qty: 1 INHALER | Refills: 8 | Status: SHIPPED | OUTPATIENT
Start: 2019-03-29 | End: 2020-02-25 | Stop reason: SDUPTHER

## 2019-03-29 RX ORDER — ASPIRIN 81 MG
81 TABLET, DELAYED RELEASE (ENTERIC COATED) ORAL DAILY
Refills: 3 | COMMUNITY
Start: 2019-02-28

## 2019-03-29 RX ORDER — CLOPIDOGREL BISULFATE 75 MG/1
TABLET ORAL
Refills: 3 | COMMUNITY
Start: 2019-02-28

## 2019-03-29 RX ORDER — TAPENTADOL HYDROCHLORIDE 100 MG/1
150 TABLET, FILM COATED, EXTENDED RELEASE ORAL
COMMUNITY
Start: 2019-03-28 | End: 2019-12-04

## 2019-03-29 RX ORDER — MONTELUKAST SODIUM 10 MG/1
10 TABLET ORAL NIGHTLY
Qty: 30 TABLET | Refills: 8 | Status: SHIPPED | OUTPATIENT
Start: 2019-03-29 | End: 2019-12-05

## 2019-03-29 RX ORDER — BUDESONIDE AND FORMOTEROL FUMARATE DIHYDRATE 160; 4.5 UG/1; UG/1
2 AEROSOL RESPIRATORY (INHALATION)
Qty: 1 INHALER | Refills: 8 | Status: SHIPPED | OUTPATIENT
Start: 2019-03-29 | End: 2019-10-11 | Stop reason: SDUPTHER

## 2019-03-29 RX ORDER — FLUNISOLIDE 0.25 MG/ML
2 SOLUTION NASAL EVERY 12 HOURS
Qty: 1 BOTTLE | Refills: 8 | Status: SHIPPED | OUTPATIENT
Start: 2019-03-29 | End: 2019-10-11 | Stop reason: SDUPTHER

## 2019-03-29 RX ORDER — LEVALBUTEROL INHALATION SOLUTION 1.25 MG/3ML
1 SOLUTION RESPIRATORY (INHALATION) EVERY 4 HOURS PRN
Qty: 120 AMPULE | Refills: 8 | Status: SHIPPED | OUTPATIENT
Start: 2019-03-29 | End: 2019-10-11 | Stop reason: SDUPTHER

## 2019-03-29 RX ORDER — ROFLUMILAST 250 UG/1
1 TABLET ORAL DAILY
Qty: 30 TABLET | Refills: 8 | COMMUNITY
Start: 2019-03-29 | End: 2019-10-11 | Stop reason: SDUPTHER

## 2019-04-25 DIAGNOSIS — J44.9 CHRONIC OBSTRUCTIVE PULMONARY DISEASE, UNSPECIFIED COPD TYPE (HCC): ICD-10-CM

## 2019-04-25 DIAGNOSIS — J43.2 CENTRILOBULAR EMPHYSEMA (HCC): Primary | ICD-10-CM

## 2019-04-29 ENCOUNTER — HOSPITAL ENCOUNTER (OUTPATIENT)
Dept: GENERAL RADIOLOGY | Facility: HOSPITAL | Age: 54
Discharge: HOME | End: 2019-04-29
Admitting: NURSE PRACTITIONER

## 2019-04-29 ENCOUNTER — LAB (OUTPATIENT)
Dept: LAB | Facility: HOSPITAL | Age: 54
End: 2019-04-29

## 2019-04-29 ENCOUNTER — TRANSCRIBE ORDERS (OUTPATIENT)
Dept: LAB | Facility: HOSPITAL | Age: 54
End: 2019-04-29

## 2019-04-29 DIAGNOSIS — E78.5 HYPERLIPIDEMIA, UNSPECIFIED HYPERLIPIDEMIA TYPE: ICD-10-CM

## 2019-04-29 DIAGNOSIS — J44.9 CHRONIC OBSTRUCTIVE PULMONARY DISEASE, UNSPECIFIED COPD TYPE (HCC): ICD-10-CM

## 2019-04-29 DIAGNOSIS — R53.83 FATIGUE, UNSPECIFIED TYPE: ICD-10-CM

## 2019-04-29 DIAGNOSIS — I10 HYPERTENSION, UNSPECIFIED TYPE: Primary | ICD-10-CM

## 2019-04-29 DIAGNOSIS — J44.9 CHRONIC OBSTRUCTIVE PULMONARY DISEASE, UNSPECIFIED COPD TYPE (HCC): Primary | ICD-10-CM

## 2019-04-29 DIAGNOSIS — I25.10 HEART FAILURE, DIASTOLIC, DUE TO CAD, UNSPECIFIED FAILURE CHRONICITY (HCC): ICD-10-CM

## 2019-04-29 DIAGNOSIS — I50.30 HEART FAILURE, DIASTOLIC, DUE TO CAD, UNSPECIFIED FAILURE CHRONICITY (HCC): ICD-10-CM

## 2019-04-29 DIAGNOSIS — I10 HYPERTENSION, UNSPECIFIED TYPE: ICD-10-CM

## 2019-04-29 DIAGNOSIS — E11.8 TYPE 2 DIABETES MELLITUS WITH COMPLICATION, UNSPECIFIED WHETHER LONG TERM INSULIN USE: ICD-10-CM

## 2019-04-29 LAB
ALBUMIN SERPL-MCNC: 3.6 G/DL (ref 3.5–5.2)
ALBUMIN/GLOB SERPL: 1 G/DL
ALP SERPL-CCNC: 81 U/L (ref 39–117)
ALT SERPL W P-5'-P-CCNC: 20 U/L (ref 1–33)
ANION GAP SERPL CALCULATED.3IONS-SCNC: 13.9 MMOL/L
AST SERPL-CCNC: 17 U/L (ref 1–32)
BASOPHILS # BLD AUTO: 0.03 10*3/MM3 (ref 0–0.2)
BASOPHILS NFR BLD AUTO: 0.3 % (ref 0–1.5)
BILIRUB SERPL-MCNC: 0.2 MG/DL (ref 0.2–1.2)
BUN BLD-MCNC: 10 MG/DL (ref 6–20)
BUN/CREAT SERPL: 19.2 (ref 7–25)
CALCIUM SPEC-SCNC: 9.4 MG/DL (ref 8.6–10.5)
CHLORIDE SERPL-SCNC: 100 MMOL/L (ref 98–107)
CHOLEST SERPL-MCNC: 169 MG/DL (ref 0–200)
CO2 SERPL-SCNC: 28.1 MMOL/L (ref 22–29)
CREAT BLD-MCNC: 0.52 MG/DL (ref 0.57–1)
DEPRECATED RDW RBC AUTO: 52.9 FL (ref 37–54)
EOSINOPHIL # BLD AUTO: 0.07 10*3/MM3 (ref 0–0.4)
EOSINOPHIL NFR BLD AUTO: 0.8 % (ref 0.3–6.2)
ERYTHROCYTE [DISTWIDTH] IN BLOOD BY AUTOMATED COUNT: 15.4 % (ref 12.3–15.4)
GFR SERPL CREATININE-BSD FRML MDRD: 123 ML/MIN/1.73
GLOBULIN UR ELPH-MCNC: 3.6 GM/DL
GLUCOSE BLD-MCNC: 94 MG/DL (ref 65–99)
HBA1C MFR BLD: 5.9 % (ref 4.8–5.6)
HCT VFR BLD AUTO: 46.7 % (ref 34–46.6)
HDLC SERPL-MCNC: 33 MG/DL (ref 40–60)
HGB BLD-MCNC: 14.1 G/DL (ref 12–15.9)
IMM GRANULOCYTES # BLD AUTO: 0.05 10*3/MM3 (ref 0–0.05)
IMM GRANULOCYTES NFR BLD AUTO: 0.6 % (ref 0–0.5)
LDLC SERPL CALC-MCNC: 108 MG/DL (ref 0–100)
LDLC/HDLC SERPL: 3.27 {RATIO}
LYMPHOCYTES # BLD AUTO: 1.97 10*3/MM3 (ref 0.7–3.1)
LYMPHOCYTES NFR BLD AUTO: 22.5 % (ref 19.6–45.3)
MCH RBC QN AUTO: 28.1 PG (ref 26.6–33)
MCHC RBC AUTO-ENTMCNC: 30.2 G/DL (ref 31.5–35.7)
MCV RBC AUTO: 93.2 FL (ref 79–97)
MONOCYTES # BLD AUTO: 0.56 10*3/MM3 (ref 0.1–0.9)
MONOCYTES NFR BLD AUTO: 6.4 % (ref 5–12)
NEUTROPHILS # BLD AUTO: 6.06 10*3/MM3 (ref 1.7–7)
NEUTROPHILS NFR BLD AUTO: 69.4 % (ref 42.7–76)
NRBC BLD AUTO-RTO: 0 /100 WBC (ref 0–0.2)
PLATELET # BLD AUTO: 235 10*3/MM3 (ref 140–450)
PMV BLD AUTO: 11.6 FL (ref 6–12)
POTASSIUM BLD-SCNC: 4.3 MMOL/L (ref 3.5–5.2)
PROT SERPL-MCNC: 7.2 G/DL (ref 6–8.5)
RBC # BLD AUTO: 5.01 10*6/MM3 (ref 3.77–5.28)
SODIUM BLD-SCNC: 142 MMOL/L (ref 136–145)
TRIGL SERPL-MCNC: 140 MG/DL (ref 0–150)
TSH SERPL DL<=0.05 MIU/L-ACNC: 1.9 MIU/ML (ref 0.27–4.2)
VIT B12 BLD-MCNC: 308 PG/ML (ref 211–946)
VLDLC SERPL-MCNC: 28 MG/DL (ref 5–40)
WBC NRBC COR # BLD: 8.74 10*3/MM3 (ref 3.4–10.8)

## 2019-04-29 PROCEDURE — 82607 VITAMIN B-12: CPT

## 2019-04-29 PROCEDURE — 85025 COMPLETE CBC W/AUTO DIFF WBC: CPT

## 2019-04-29 PROCEDURE — 80061 LIPID PANEL: CPT

## 2019-04-29 PROCEDURE — 80053 COMPREHEN METABOLIC PANEL: CPT

## 2019-04-29 PROCEDURE — 83036 HEMOGLOBIN GLYCOSYLATED A1C: CPT

## 2019-04-29 PROCEDURE — 36415 COLL VENOUS BLD VENIPUNCTURE: CPT

## 2019-04-29 PROCEDURE — 71046 X-RAY EXAM CHEST 2 VIEWS: CPT | Performed by: RADIOLOGY

## 2019-04-29 PROCEDURE — 84443 ASSAY THYROID STIM HORMONE: CPT

## 2019-04-29 PROCEDURE — 71046 X-RAY EXAM CHEST 2 VIEWS: CPT

## 2019-10-11 ENCOUNTER — OFFICE VISIT (OUTPATIENT)
Dept: PULMONOLOGY | Facility: CLINIC | Age: 54
End: 2019-10-11

## 2019-10-11 ENCOUNTER — TELEPHONE (OUTPATIENT)
Dept: PULMONOLOGY | Facility: CLINIC | Age: 54
End: 2019-10-11

## 2019-10-11 VITALS
BODY MASS INDEX: 31.41 KG/M2 | WEIGHT: 184 LBS | OXYGEN SATURATION: 92 % | HEART RATE: 98 BPM | DIASTOLIC BLOOD PRESSURE: 74 MMHG | RESPIRATION RATE: 18 BRPM | HEIGHT: 64 IN | SYSTOLIC BLOOD PRESSURE: 132 MMHG

## 2019-10-11 DIAGNOSIS — J30.89 OTHER ALLERGIC RHINITIS: ICD-10-CM

## 2019-10-11 DIAGNOSIS — B37.0 ORAL CANDIDIASIS: ICD-10-CM

## 2019-10-11 DIAGNOSIS — J43.2 CENTRILOBULAR EMPHYSEMA (HCC): ICD-10-CM

## 2019-10-11 DIAGNOSIS — J96.11 CHRONIC RESPIRATORY FAILURE WITH HYPOXIA AND HYPERCAPNIA (HCC): ICD-10-CM

## 2019-10-11 DIAGNOSIS — J96.12 CHRONIC RESPIRATORY FAILURE WITH HYPOXIA AND HYPERCAPNIA (HCC): ICD-10-CM

## 2019-10-11 DIAGNOSIS — J44.1 ACUTE EXACERBATION OF CHRONIC OBSTRUCTIVE PULMONARY DISEASE (COPD) (HCC): Primary | ICD-10-CM

## 2019-10-11 DIAGNOSIS — Z87.891 PERSONAL HISTORY OF TOBACCO USE, PRESENTING HAZARDS TO HEALTH: ICD-10-CM

## 2019-10-11 DIAGNOSIS — R09.02 HYPOXIA: ICD-10-CM

## 2019-10-11 PROCEDURE — 96372 THER/PROPH/DIAG INJ SC/IM: CPT | Performed by: INTERNAL MEDICINE

## 2019-10-11 PROCEDURE — 99214 OFFICE O/P EST MOD 30 MIN: CPT | Performed by: INTERNAL MEDICINE

## 2019-10-11 PROCEDURE — 95012 NITRIC OXIDE EXP GAS DETER: CPT | Performed by: INTERNAL MEDICINE

## 2019-10-11 RX ORDER — ROFLUMILAST 250 UG/1
1 TABLET ORAL EVERY OTHER DAY
Qty: 30 TABLET | Refills: 8 | COMMUNITY
Start: 2019-10-11 | End: 2019-12-05

## 2019-10-11 RX ORDER — METHYLPREDNISOLONE ACETATE 80 MG/ML
80 INJECTION, SUSPENSION INTRA-ARTICULAR; INTRALESIONAL; INTRAMUSCULAR; SOFT TISSUE ONCE
Status: COMPLETED | OUTPATIENT
Start: 2019-10-11 | End: 2019-10-11

## 2019-10-11 RX ORDER — LEVALBUTEROL INHALATION SOLUTION 1.25 MG/3ML
1 SOLUTION RESPIRATORY (INHALATION) EVERY 4 HOURS PRN
Qty: 120 AMPULE | Refills: 8 | Status: SHIPPED | OUTPATIENT
Start: 2019-10-11 | End: 2019-12-04

## 2019-10-11 RX ORDER — ZOLPIDEM TARTRATE 5 MG/1
TABLET ORAL
Refills: 0 | COMMUNITY
Start: 2019-09-19 | End: 2019-12-16

## 2019-10-11 RX ORDER — FLUNISOLIDE 0.25 MG/ML
2 SOLUTION NASAL EVERY 12 HOURS
Qty: 1 BOTTLE | Refills: 8 | Status: SHIPPED | OUTPATIENT
Start: 2019-10-11 | End: 2019-12-05

## 2019-10-11 RX ORDER — BUDESONIDE AND FORMOTEROL FUMARATE DIHYDRATE 160; 4.5 UG/1; UG/1
2 AEROSOL RESPIRATORY (INHALATION)
Qty: 1 INHALER | Refills: 8 | Status: SHIPPED | OUTPATIENT
Start: 2019-10-11 | End: 2020-03-05

## 2019-10-11 RX ORDER — FAMOTIDINE 20 MG/1
TABLET, FILM COATED ORAL
Refills: 5 | COMMUNITY
Start: 2019-09-19 | End: 2019-12-05

## 2019-10-11 RX ADMIN — METHYLPREDNISOLONE ACETATE 80 MG: 80 INJECTION, SUSPENSION INTRA-ARTICULAR; INTRALESIONAL; INTRAMUSCULAR; SOFT TISSUE at 11:13

## 2019-10-11 NOTE — TELEPHONE ENCOUNTER
10/11/19 1:33 PM Lab orders were printed and mailed to the patient with a note on the bottom per Cee that stated Not to be done before the 25th.

## 2019-10-11 NOTE — PROGRESS NOTES
"Chief Complaint   Patient presents with   • Follow-up   • Shortness of Breath         Subjective   Liz Jiang is a 53 y.o. female.     History of Present Illness   Patient comes today for follow up of shortness of breath and COPD.     Patient says that her symptoms have been stable since the last clinic visit. she reports 3 exacerbations since last visit, latest was 10 days ago.     Patient is using medications, as prescribed. Exercise tolerance has also remained stable.     Continues to smoke vapor cigarettes.    Patient doesn't report any issues with the device. The patient describes no significant issues with her mask either. Patient says that the compliance with the use of the equipment is good.     Patient says that her symptoms of fatigue & daytime sleepiness have been helped greatly with the use of PAP, as prescribed.     She has tried Daliresp daily and even half dose causes significant diarrhea and she can't keep taking it for that reason.     Patient is also complaining of hoarseness as well as sore throat and change in taste. Patient also notes burning sensation on her tongue for the past few days.      The following portions of the patient's history were reviewed and updated as appropriate: allergies, current medications, past family history, past medical history, past social history and past surgical history.    Review of Systems   HENT: Positive for sore throat. Negative for sinus pressure and sneezing.    Respiratory: Positive for cough, shortness of breath and wheezing.        Objective   Visit Vitals  /74   Pulse 98   Resp 18   Ht 162.6 cm (64.02\")   Wt 83.5 kg (184 lb)   SpO2 92%   BMI 31.57 kg/m²   86% on RA at rest.   92% on 2LPM at rest.     Physical Exam   Constitutional: She is oriented to person, place, and time. She appears well-developed and well-nourished.   HENT:   Sinuses were non tender on palpation.  Nostril showed mild erythema.  Oropharynx was moist.   Eyes: EOM are " normal.   Neck: Normal range of motion. No JVD present. No thyromegaly present.   Cardiovascular: Normal rate.   Pulmonary/Chest: She is in respiratory distress. She has wheezes.   Musculoskeletal: Normal range of motion.   Gait was normal.   Neurological: She is alert and oriented to person, place, and time.   Skin: Skin is warm and dry.   Psychiatric: She has a normal mood and affect. Her behavior is normal.   Vitals reviewed.      Assessment/Plan   Liz was seen today for follow-up and shortness of breath.    Diagnoses and all orders for this visit:    Acute exacerbation of chronic obstructive pulmonary disease (COPD) (CMS/HCC)  -     methylPREDNISolone acetate (DEPO-medrol) injection 80 mg  -     Nitric Oxide    Chronic respiratory failure with hypoxia and hypercapnia (CMS/HCC)    Hypoxia    Centrilobular emphysema (CMS/HCC)  -     Pulmonary Function Test; Future  -     Nitric Oxide    Other allergic rhinitis  -     IgE; Future  -     Allergens, Zone 8; Future    Personal history of tobacco use, presenting hazards to health    Oral candidiasis    Other orders  -     budesonide-formoterol (SYMBICORT) 160-4.5 MCG/ACT inhaler; Inhale 2 puffs 2 (Two) Times a Day. Rinse mouth with water after use.  -     flunisolide (NASALIDE) 25 MCG/ACT (0.025%) solution nasal spray; Inhale 2 sprays Every 12 (Twelve) Hours.  -     nystatin (MYCOSTATIN) 187491 UNIT/ML suspension; Take 5 mL by mouth 4 (Four) Times a Day.  -     Umeclidinium Bromide 62.5 MCG/INH aerosol powder ; Inhale 1 puff Daily.  -     Roflumilast (DALIRESP) 250 MCG tablet; Take 1 tablet by mouth Every Other Day.  -     levalbuterol (XOPENEX) 1.25 MG/3ML nebulizer solution; Take 1 ampule by nebulization Every 4 (Four) Hours As Needed for Wheezing or Shortness of Air. She gets palpitations with Albuterol           Return in about 3 months (around 1/11/2020) for Recheck, PFT on day of F/Up, To be seen in [facility].    DISCUSSION (if any):  Laboratory data  was reviewed with her.   Lab Results   Component Value Date    AFPTM 115 02/11/2014     Lab Results   Component Value Date    PHENOTYPE MM 02/11/2014     Last PFTs showed very severe COPD. FEV1 was 26%.    FeNO level was 16 today.    The patient belongs to the risk group for which lung cancer screening has been recommended. We will try to make arrangements for the same and this will be indicated after she turns 54. This will need to be ordered on follow up.    For symptoms of oral candidiasis, she will be prescribed nystatin swish and swallow.    Patient was instructed to make sure to rinse mouth with water after using steroid-based inhaler.    For the symptoms of acute exacerbation of COPD, she was prescribed intramuscular steroids.    Patient may be prescribed augmentin, if the patient develops any fever or if she develops purulent sputum.    Side effects have been discussed in detail.    Patient was asked to report to the emergency room if symptoms do not improve.    All other medications will remain the same.    Will try Daliresp 250 mcg 1 tablet QOD since she has had diarrhea even with 1/2 of Daliresp 250 mcg dose daily.     Continue treatment with BiPAP at a pressure of 16/8, with a full-face mask.    Patient seems to be compliant with PAP device, based on the available data and her account of improved symptoms.     The patient was advised to continue oxygen 24/7,  since she has noticed improvement in some symptoms since using it as prescribed.    Patient was advised to continue her nasal spray, especially given improvement in symptoms overall.    The patient says that she is up-to-date with influenza and pneumonia vaccinations.       Dictated utilizing Dragon dictation.    This document was electronically signed by Tena Cee MD on 10/11/19 at 10:55 AM

## 2019-11-01 ENCOUNTER — TRANSCRIBE ORDERS (OUTPATIENT)
Dept: ADMINISTRATIVE | Facility: HOSPITAL | Age: 54
End: 2019-11-01

## 2019-11-01 DIAGNOSIS — D37.5 NEOPLASM OF UNCERTAIN BEHAVIOR OF RECTUM: Primary | ICD-10-CM

## 2019-11-06 ENCOUNTER — HOSPITAL ENCOUNTER (OUTPATIENT)
Dept: MRI IMAGING | Facility: HOSPITAL | Age: 54
Discharge: HOME | End: 2019-11-06
Admitting: INTERNAL MEDICINE

## 2019-11-06 DIAGNOSIS — D37.5 NEOPLASM OF UNCERTAIN BEHAVIOR OF RECTUM: ICD-10-CM

## 2019-11-06 PROCEDURE — 0 GADOBENATE DIMEGLUMINE 529 MG/ML SOLUTION

## 2019-11-06 PROCEDURE — A9577 INJ MULTIHANCE: HCPCS | Performed by: INTERNAL MEDICINE

## 2019-11-06 PROCEDURE — 74183 MRI ABD W/O CNTR FLWD CNTR: CPT | Performed by: RADIOLOGY

## 2019-11-06 PROCEDURE — A9577 INJ MULTIHANCE: HCPCS

## 2019-11-06 PROCEDURE — 74183 MRI ABD W/O CNTR FLWD CNTR: CPT

## 2019-11-06 PROCEDURE — 0 GADOBENATE DIMEGLUMINE 529 MG/ML SOLUTION: Performed by: INTERNAL MEDICINE

## 2019-11-06 RX ADMIN — GADOBENATE DIMEGLUMINE 16 ML: 529 INJECTION, SOLUTION INTRAVENOUS at 10:30

## 2019-11-19 ENCOUNTER — CONSULT (OUTPATIENT)
Dept: ONCOLOGY | Facility: CLINIC | Age: 54
End: 2019-11-19

## 2019-11-19 VITALS
SYSTOLIC BLOOD PRESSURE: 127 MMHG | DIASTOLIC BLOOD PRESSURE: 82 MMHG | HEART RATE: 112 BPM | WEIGHT: 183 LBS | OXYGEN SATURATION: 93 % | TEMPERATURE: 97.7 F | BODY MASS INDEX: 31.24 KG/M2 | RESPIRATION RATE: 16 BRPM | HEIGHT: 64 IN

## 2019-11-19 DIAGNOSIS — C21.0 ANAL CANCER (HCC): Primary | ICD-10-CM

## 2019-11-19 DIAGNOSIS — Z11.4 ENCOUNTER FOR SCREENING FOR HUMAN IMMUNODEFICIENCY VIRUS (HIV): ICD-10-CM

## 2019-11-19 DIAGNOSIS — R53.83 FATIGUE, UNSPECIFIED TYPE: ICD-10-CM

## 2019-11-19 LAB
ALBUMIN SERPL-MCNC: 4.18 G/DL (ref 3.5–5.2)
ALBUMIN/GLOB SERPL: 1.1 G/DL
ALP SERPL-CCNC: 84 U/L (ref 39–117)
ALT SERPL W P-5'-P-CCNC: 21 U/L (ref 1–33)
ANION GAP SERPL CALCULATED.3IONS-SCNC: 10.4 MMOL/L (ref 5–15)
AST SERPL-CCNC: 16 U/L (ref 1–32)
BASOPHILS # BLD AUTO: 0.04 10*3/MM3 (ref 0–0.2)
BASOPHILS NFR BLD AUTO: 0.3 % (ref 0–1.5)
BILIRUB SERPL-MCNC: 0.3 MG/DL (ref 0.2–1.2)
BUN BLD-MCNC: 9 MG/DL (ref 6–20)
BUN/CREAT SERPL: 17.6 (ref 7–25)
CALCIUM SPEC-SCNC: 9.6 MG/DL (ref 8.6–10.5)
CHLORIDE SERPL-SCNC: 99 MMOL/L (ref 98–107)
CO2 SERPL-SCNC: 28.6 MMOL/L (ref 22–29)
CREAT BLD-MCNC: 0.51 MG/DL (ref 0.57–1)
DEPRECATED RDW RBC AUTO: 50.6 FL (ref 37–54)
EOSINOPHIL # BLD AUTO: 0.02 10*3/MM3 (ref 0–0.4)
EOSINOPHIL NFR BLD AUTO: 0.1 % (ref 0.3–6.2)
ERYTHROCYTE [DISTWIDTH] IN BLOOD BY AUTOMATED COUNT: 14.7 % (ref 12.3–15.4)
FERRITIN SERPL-MCNC: 197.4 NG/ML (ref 13–150)
GFR SERPL CREATININE-BSD FRML MDRD: 126 ML/MIN/1.73
GLOBULIN UR ELPH-MCNC: 3.7 GM/DL
GLUCOSE BLD-MCNC: 106 MG/DL (ref 65–99)
HCT VFR BLD AUTO: 49.3 % (ref 34–46.6)
HGB BLD-MCNC: 15.5 G/DL (ref 12–15.9)
HIV1+2 AB SER QL: NORMAL
IMM GRANULOCYTES # BLD AUTO: 0.07 10*3/MM3 (ref 0–0.05)
IMM GRANULOCYTES NFR BLD AUTO: 0.5 % (ref 0–0.5)
IRON 24H UR-MRATE: 43 MCG/DL (ref 37–145)
IRON SATN MFR SERPL: 10 % (ref 20–50)
LYMPHOCYTES # BLD AUTO: 2.01 10*3/MM3 (ref 0.7–3.1)
LYMPHOCYTES NFR BLD AUTO: 14.8 % (ref 19.6–45.3)
MCH RBC QN AUTO: 29.2 PG (ref 26.6–33)
MCHC RBC AUTO-ENTMCNC: 31.4 G/DL (ref 31.5–35.7)
MCV RBC AUTO: 93 FL (ref 79–97)
MONOCYTES # BLD AUTO: 0.63 10*3/MM3 (ref 0.1–0.9)
MONOCYTES NFR BLD AUTO: 4.6 % (ref 5–12)
NEUTROPHILS # BLD AUTO: 10.78 10*3/MM3 (ref 1.7–7)
NEUTROPHILS NFR BLD AUTO: 79.7 % (ref 42.7–76)
NRBC BLD AUTO-RTO: 0 /100 WBC (ref 0–0.2)
PLATELET # BLD AUTO: 248 10*3/MM3 (ref 140–450)
PMV BLD AUTO: 11.1 FL (ref 6–12)
POTASSIUM BLD-SCNC: 3.9 MMOL/L (ref 3.5–5.2)
PROT SERPL-MCNC: 7.9 G/DL (ref 6–8.5)
RBC # BLD AUTO: 5.3 10*6/MM3 (ref 3.77–5.28)
SODIUM BLD-SCNC: 138 MMOL/L (ref 136–145)
TIBC SERPL-MCNC: 437 MCG/DL (ref 298–536)
TRANSFERRIN SERPL-MCNC: 293 MG/DL (ref 200–360)
WBC NRBC COR # BLD: 13.55 10*3/MM3 (ref 3.4–10.8)

## 2019-11-19 PROCEDURE — G0009 ADMIN PNEUMOCOCCAL VACCINE: HCPCS | Performed by: INTERNAL MEDICINE

## 2019-11-19 PROCEDURE — 84466 ASSAY OF TRANSFERRIN: CPT | Performed by: INTERNAL MEDICINE

## 2019-11-19 PROCEDURE — 85025 COMPLETE CBC W/AUTO DIFF WBC: CPT | Performed by: INTERNAL MEDICINE

## 2019-11-19 PROCEDURE — 80053 COMPREHEN METABOLIC PANEL: CPT | Performed by: INTERNAL MEDICINE

## 2019-11-19 PROCEDURE — 82746 ASSAY OF FOLIC ACID SERUM: CPT | Performed by: INTERNAL MEDICINE

## 2019-11-19 PROCEDURE — G0432 EIA HIV-1/HIV-2 SCREEN: HCPCS | Performed by: INTERNAL MEDICINE

## 2019-11-19 PROCEDURE — 90670 PCV13 VACCINE IM: CPT | Performed by: INTERNAL MEDICINE

## 2019-11-19 PROCEDURE — 99205 OFFICE O/P NEW HI 60 MIN: CPT | Performed by: INTERNAL MEDICINE

## 2019-11-19 PROCEDURE — 36415 COLL VENOUS BLD VENIPUNCTURE: CPT | Performed by: INTERNAL MEDICINE

## 2019-11-19 PROCEDURE — 83540 ASSAY OF IRON: CPT | Performed by: INTERNAL MEDICINE

## 2019-11-19 PROCEDURE — 82607 VITAMIN B-12: CPT | Performed by: INTERNAL MEDICINE

## 2019-11-19 PROCEDURE — 82728 ASSAY OF FERRITIN: CPT | Performed by: INTERNAL MEDICINE

## 2019-11-19 RX ORDER — AMOXICILLIN 250 MG
2 CAPSULE ORAL 2 TIMES DAILY
Qty: 120 TABLET | Refills: 5 | Status: SHIPPED | OUTPATIENT
Start: 2019-11-19 | End: 2020-02-10 | Stop reason: SDUPTHER

## 2019-11-19 RX ORDER — OXYCODONE HYDROCHLORIDE 5 MG/1
TABLET ORAL
Qty: 150 TABLET | Refills: 0 | Status: SHIPPED | OUTPATIENT
Start: 2019-11-19 | End: 2019-12-04 | Stop reason: SDUPTHER

## 2019-11-19 NOTE — PROGRESS NOTES
NAME: Liz Jiang    : 1965    DATE OF CONSULTATION:  2019    REASON FOR REFERRAL: Anal Cancer    REFERRING PHYSICIAN:  Flash Wu MD    CHIEF COMPLAINT:  Anal Cancer      HISTORY OF PRESENT ILLNESS:   Liz Jiang is a very pleasant 53 y.o. female who is being seen today at the request of Flash Wu MD for evaluation and treatment of anal cancer. She initially presented to her PCP for constipation, rectal pain and a palpable mass x 6-7 months. After failing conservative treatment, she was recommended by a friend to see Dr. Wu. She had colonoscopy on on 19 during which a large anal mass was visualized. 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 Two benign polyps were also removed. She was referred to our clinic for discussion of further treatment options.     She has several complaints today. She reports gradually worsening anal/rectal pain, and says she feels the urge to defecate constantly. For pain, she is using Norco 10 every 6 hours.She has had nausea for several months that she has been taking Phenergan for. She reports fatigue and a weight loss of 16-18 pounds (which she has subsequently regained), insomnia, a history of headaches that are much more mild after previous CVA, baseline shortness of breath that she wears bipap and uses home O2 for. She also reports that she has decreased ROM to RUE following past CVA. She denies obvious blood in stool, chest pain, abdominal pain, or any other complaint.     PAST MEDICAL HISTORY:  Past Medical History:   Diagnosis Date   • Acid reflux disease    • Arthritis    • Asthma, extrinsic    • Cholelithiasis    • Chronic headaches    • COPD (chronic obstructive pulmonary disease) (CMS/HCC)    • CVA (cerebral vascular accident) (CMS/HCC)     w/ right sided deficit   • Diabetes mellitus (CMS/HCC)     only has high blood sugar when on steriods   •  Hypertension    • Obstructive sleep apnea treated with BiPAP    • On home oxygen therapy     2L    • Sleep apnea, obstructive        PAST SURGICAL HISTORY:  Past Surgical History:   Procedure Laterality Date   • CARDIAC CATHETERIZATION     • CAROTID ENDARTERECTOMY Left 2018   • CHOLECYSTECTOMY WITH INTRAOPERATIVE CHOLANGIOGRAM N/A 2017    Procedure: CHOLECYSTECTOMY LAPAROSCOPIC INTRAOPERATIVE CHOLANGIOGRAM;  Surgeon: Carolin Marie MD;  Location: [facility];  Service:    • HYSTERECTOMY      for heavy bleeding/ complete   • KIDNEY STONE SURGERY     • TUBAL ABDOMINAL LIGATION          FAMILY HISTORY:  Family History   Problem Relation Age of Onset   • Diabetes Mother    • Hypertension Mother    • Arrhythmia Mother    • Pneumonia Father    • Coronary artery disease Other    • Arrhythmia Sister    • Heart attack Brother    • Lymphoma Brother         hodgkin's    • Other Brother         CABG       SOCIAL HISTORY:  Social History     Socioeconomic History   • Marital status:      Spouse name: Not on file   • Number of children: Not on file   • Years of education: Not on file   • Highest education level: Not on file   Tobacco Use   • Smoking status: Former Smoker     Packs/day: 1.50     Years: 30.00     Pack years: 45.00     Types: Electronic Cigarette     Last attempt to quit:      Years since quittin.8   • Smokeless tobacco: Never Used   Substance and Sexual Activity   • Alcohol use: No   • Drug use: No   • Sexual activity: Defer   Social History Narrative    Just retired     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          REVIEW OF SYSTEMS:   A comprehensive 14 point review of systems was performed.  Significant findings as mentioned above.  All other systems reviewed  and are negative.      MEDICATIONS:  The current medication list was reviewed in the EMR    Current Outpatient Medications:   •  albuterol sulfate  (90 Base) MCG/ACT inhaler, Inhale 2 puffs Every 6 (Six) Hours As Needed for Wheezing or Shortness of Air., Disp: 1 inhaler, Rfl: 8  •  apixaban (ELIQUIS) 5 MG tablet tablet, Take 5 mg by mouth 2 (Two) Times a Day., Disp: , Rfl:   •  aspirin 81 MG chewable tablet, Chew 81 mg daily., Disp: , Rfl:   •  ASPIRIN LOW DOSE 81 MG EC tablet, Take 81 mg by mouth Daily., Disp: , Rfl: 3  •  atorvastatin (LIPITOR) 10 MG tablet, TAKE ONE TABLET BY MOUTH EVERY NIGHT AT BEDTIME TO LOWER CHOLESTEROL, Disp: , Rfl: 1  •  budesonide-formoterol (SYMBICORT) 160-4.5 MCG/ACT inhaler, Inhale 2 puffs 2 (Two) Times a Day. Rinse mouth with water after use., Disp: 1 inhaler, Rfl: 8  •  buPROPion XL (WELLBUTRIN XL) 150 MG 24 hr tablet, Take 1 tablet by mouth Every Morning., Disp: 30 tablet, Rfl: 2  •  cetirizine (zyrTEC) 10 MG tablet, Take 10 mg by mouth Daily., Disp: , Rfl:   •  clopidogrel (PLAVIX) 75 MG tablet, TAKE ONE TABLET BY MOUTH EVERY DAY FOR BLOOD THINNER, Disp: , Rfl: 3  •  diclofenac (VOLTAREN) 50 MG EC tablet, Take 1 tablet by mouth 2 (Two) Times a Day As Needed (FOR PAIN)., Disp: 15 tablet, Rfl: 0  •  doxycycline (MONODOX) 100 MG capsule, Take 1 capsule by mouth 2 (Two) Times a Day., Disp: 14 capsule, Rfl: 0  •  famotidine (PEPCID) 20 MG tablet, , Disp: , Rfl: 5  •  flunisolide (NASALIDE) 25 MCG/ACT (0.025%) solution nasal spray, Inhale 2 sprays Every 12 (Twelve) Hours., Disp: 1 bottle, Rfl: 8  •  FLUoxetine (PROzac) 40 MG capsule, Take 20 mg by mouth 3 (Three) Times a Day., Disp: , Rfl:   •  furosemide (LASIX) 20 MG tablet, Take 20 mg by mouth 2 (Two) Times a Day. PRN, Disp: , Rfl:   •  gabapentin (NEURONTIN) 800 MG tablet, Take 800 mg by mouth 3 (Three) Times a Day., Disp: , Rfl:   •  HYDROcodone-acetaminophen (NORCO)  MG per tablet, Take 1 tablet by mouth Every 6 (Six)  Hours As Needed for Moderate Pain ., Disp: , Rfl:   •  Lancets 30G misc, , Disp: , Rfl:   •  levalbuterol (XOPENEX) 1.25 MG/3ML nebulizer solution, Take 1 ampule by nebulization Every 4 (Four) Hours As Needed for Wheezing or Shortness of Air. She gets palpitations with Albuterol, Disp: 120 ampule, Rfl: 8  •  levoFLOXacin (LEVAQUIN) 750 MG tablet, Take 1 tablet by mouth Daily., Disp: 8 tablet, Rfl: 0  •  loratadine-pseudoephedrine (CLARITIN-D 24 HOUR)  MG per 24 hr tablet, Take 1 tablet by mouth Daily., Disp: 30 tablet, Rfl: 5  •  metFORMIN (GLUCOPHAGE) 500 MG tablet, Take 500 mg by mouth 2 (two) times a day with meals., Disp: , Rfl:   •  metoprolol tartrate (LOPRESSOR) 25 MG tablet, Take 25 mg by mouth 2 (two) times a day., Disp: , Rfl:   •  montelukast (SINGULAIR) 10 MG tablet, Take 1 tablet by mouth Every Night., Disp: 30 tablet, Rfl: 8  •  mupirocin (BACTROBAN) 2 % ointment, APPLY TO AFFECTED AREA 2 3 TIMES A DAY AS DIRECTED, Disp: , Rfl: 0  •  NUCYNTA  MG 12 hr tablet, 150 mg., Disp: , Rfl:   •  nystatin (MYCOSTATIN) 456857 UNIT/ML suspension, Take 5 mL by mouth 4 (Four) Times a Day., Disp: 280 mL, Rfl: 2  •  ondansetron ODT (ZOFRAN-ODT) 4 MG disintegrating tablet, , Disp: , Rfl: 0  •  oxyCODONE-acetaminophen (PERCOCET) 5-325 MG per tablet, Take 1 tablet by mouth Every 6 (Six) Hours As Needed for moderate pain., Disp: 28 tablet, Rfl: 0  •  prazosin (MINIPRESS) 2 MG capsule, Take 2 mg by mouth Every Night., Disp: , Rfl:   •  promethazine (PHENERGAN) 25 MG tablet, Take 25 mg by mouth every 6 (six) hours as needed for nausea or vomiting., Disp: , Rfl:   •  promethazine-dextromethorphan (PROMETHAZINE-DM) 6.25-15 MG/5ML syrup, TAKE ONE TEASPOONSFUL BY MOUTH EVERY 4 TO 6 HOURS AS NEEDED FOR COUGH, Disp: , Rfl: 0  •  QUEtiapine (SEROquel) 100 MG tablet, Take 100 mg by mouth Every Night., Disp: , Rfl:   •  raNITIdine (ZANTAC) 150 MG tablet, Take 150 mg by mouth 2 (Two) Times a Day., Disp: , Rfl:   •   Roflumilast (DALIRESP) 250 MCG tablet, Take 1 tablet by mouth Every Other Day., Disp: 30 tablet, Rfl: 8  •  sulfamethoxazole-trimethoprim (BACTRIM DS,SEPTRA DS) 800-160 MG per tablet, TAKE ONE TABLET BY MOUTH TWO TIMES A DAY FOR 14 DAYS FOR INFECTION  STEP 1, Disp: , Rfl: 0  •  SUMAtriptan (IMITREX) 100 MG tablet, Take 100 mg by mouth Every 2 (Two) Hours As Needed for Migraine., Disp: , Rfl:   •  tapentadol (NUCYNTA) 50 MG tablet, Take 100 mg by mouth Every 12 (Twelve) Hours As Needed., Disp: , Rfl:   •  tiZANidine (ZANAFLEX) 4 MG tablet, , Disp: , Rfl: 0  •  topiramate (TOPAMAX) 25 MG tablet, Take 25 mg by mouth 2 (Two) Times a Day., Disp: , Rfl:   •  traZODone (DESYREL) 50 MG tablet, Take 50 mg by mouth Every Night., Disp: , Rfl:   •  Umeclidinium Bromide 62.5 MCG/INH aerosol powder , Inhale 1 puff Daily., Disp: 30 each, Rfl: 8  •  vitamin D (ERGOCALCIFEROL) 75918 UNITS capsule capsule, Take 50,000 Units by mouth 1 (one) time per week., Disp: , Rfl:   •  zolpidem (AMBIEN) 5 MG tablet, , Disp: , Rfl: 0    ALLERGIES:    Allergies   Allergen Reactions   • Daliresp [Roflumilast] Diarrhea     Significant diarrhea.        PHYSICAL EXAM:  Vitals:    11/19/19 1423   BP: 127/82   Pulse: 112   Resp: 16   Temp: 97.7 °F (36.5 °C)   SpO2: 93%     Pain Score    11/19/19 1423   PainSc:   4          General:  Awake, alert and oriented, appears uncomfortable, like she has trouble finding a comfortable position in her chair.  HEENT:  Pupils are equal, round and reactive to light and accommodation, Extra-ocular movements full, Oropharyx clear, mucous membranes moist  Neck:  No JVD, thyromegaly or lymphadenopathy  CV:  Regular rate and rhythm, no murmurs, rubs or gallops  Resp:  Lungs are clear to auscultation bilaterally  Abd:  Soft, non-tender, non-distended, bowel sounds present, no organomegaly or masses  Rectal:  Firm anal mass palpable anteriorly  Ext:  No clubbing, cyanosis or edema  Lymph:  No cervical, supraclavicular,  axillary, inguinal or femoral adenopathy  Neuro:  MS as above, CN II-XII intact, grossly non-focal exam      PATHOLOGY:  11-01-19          ENDOSCOPY:  C-scope (Jazmin) 11-01-19          IMAGING:  MRI Abdomen w/wo contrast 11-06-19   FINDINGS:   Today's study shows no definitive colonic mass.   Particularly I do not see any contrast-enhancing abnormalities on the presented images.  No adenopathy in the imaged area.  Sigmoid colon wall does appear to be slightly thickened but no adjacent fluid.  No walled off fluid collections.     IMPRESSION:  No contrast-enhancing abnormalities identified.       RECENT LABS:  Lab Results   Component Value Date    WBC 8.74 04/29/2019    HGB 14.1 04/29/2019    HCT 46.7 (H) 04/29/2019    MCV 93.2 04/29/2019    RDW 15.4 04/29/2019     04/29/2019    NEUTRORELPCT 69.4 04/29/2019    LYMPHORELPCT 22.5 04/29/2019    MONORELPCT 6.4 04/29/2019    EOSRELPCT 0.8 04/29/2019    BASORELPCT 0.3 04/29/2019    NEUTROABS 6.06 04/29/2019    LYMPHSABS 1.97 04/29/2019       Lab Results   Component Value Date     04/29/2019    K 4.3 04/29/2019    CO2 28.1 04/29/2019     04/29/2019    BUN 10 04/29/2019    CREATININE 0.52 (L) 04/29/2019    EGFRIFNONA 123 04/29/2019    GLUCOSE 94 04/29/2019    CALCIUM 9.4 04/29/2019    ALKPHOS 81 04/29/2019    AST 17 04/29/2019    ALT 20 04/29/2019    BILITOT 0.2 04/29/2019    ALBUMIN 3.60 04/29/2019    PROTEINTOT 7.2 04/29/2019    MG 2.1 03/27/2017    PHOS 3.0 03/27/2017       Lab Results   Component Value Date    [id number] 04/29/2019           ASSESSMENT & PLAN:  Liz Jiang is a very pleasant 53 y.o. female with newly diagnosed anal cancer.    1.  Anal cancer:  -  Staging is currently unclear.  As best I can tell likely T3N0.  - Will get bloodwork today including CBCD, CMP, HIV and nutritional studies.  -  Will get notes from recent Gyn visit at [facility].  -  She has an appt to meet Dr. Martinez on 11-26-19  -  MRI Pelvis was unremarkable.  -   Will get CT Chest, Abdomen and pelvis for staging purposes.  -  Will refer to Dr. Marie for PAC placement and to Dr. Campos for planning for concurrent chemoradiation.    2.  Pain:  She has chronic back pain managed by a pain management physician.  I suggested to her that I take over her pain management during her cancer treatment and that we transition back to her pain management physician after her cancer treatment is completed and she says her pain doctor said this is probably what would happen.  Will d/c Nucynta and Hydrocodone for the time being.  Suggested Oxycodone 5 mg 1-3 tabs q4h prn pain.  She will keep a log for me and I will add long acting narcotic if needed.    3. Constipation:  Recommended she take Senna/Colace ii BID with Mirlax as needed.    4.  Prophylaxis:  Has had Pneumovax and 2019 flu vaccine.  Will give Prevnar 13 vaccine today.    5.  ACO / SAÚL/Other  Quality measures  -  Liz Jiang received 2019 flu vaccine.  -  Liz Jiang reports a pain score of 4.  Given her pain assessment as noted, treatment options were discussed and the following options were decided upon as a follow-up plan to address the patient's pain: prescription for opiod analgesics.  -  Current outpatient and discharge medications have been reconciled for the patient.    Reviewed by: Deanne Jones MD    This note was scribed for Deanne Jones MD by Gricel Howell RN.    I, Deanne Jones MD, personally performed the services described in this documentation as scribed by the above named individual in my presence, and it is both accurate and complete.  11/19/2019        I spent 60 minutes with Liz Jiang today.  In the office today, more than 50% of this time was spent face-to-face with her  in counseling / coordination of care, reviewing her medical history and counseling on the current treatment plan.  All questions were answered to her satisfaction      Electronically Signed by: Deanne Jones  MD      CC:   MD Ryan Khan Edna, APRN Muhammed Iqbal, MD John Dvorak, MD

## 2019-11-19 NOTE — PATIENT INSTRUCTIONS
Date Time Pain Level  0-10  0 = No pain  10 = Worst pain imaginable Medication Taken  and Dose How many tablets?

## 2019-11-20 ENCOUNTER — DOCUMENTATION (OUTPATIENT)
Dept: ONCOLOGY | Facility: HOSPITAL | Age: 54
End: 2019-11-20

## 2019-11-20 LAB
FOLATE SERPL-MCNC: 9.32 NG/ML (ref 4.78–24.2)
VIT B12 BLD-MCNC: 473 PG/ML (ref 211–946)

## 2019-11-20 NOTE — PROGRESS NOTES
SS received in basket requesting:    Is there any way we can arrange for this patient to get a donut cushion? She has anal cancer and can't even sit down straight due to the direct pressure on her rectum    SS attempted to contact patient at [phone] without success.  SS left voicemail with return phone number.    Insurance will not pay for donut cushion.  SS suggested using extra pillow or using swimming pool float to sit on for comfort.  SS will follow.

## 2019-11-27 ENCOUNTER — HOSPITAL ENCOUNTER (OUTPATIENT)
Dept: CT IMAGING | Facility: HOSPITAL | Age: 54
Discharge: HOME | End: 2019-11-27
Admitting: INTERNAL MEDICINE

## 2019-11-27 ENCOUNTER — HOSPITAL ENCOUNTER (OUTPATIENT)
Dept: CT IMAGING | Facility: HOSPITAL | Age: 54
Discharge: HOME | End: 2019-11-27

## 2019-11-27 DIAGNOSIS — R53.83 FATIGUE, UNSPECIFIED TYPE: ICD-10-CM

## 2019-11-27 DIAGNOSIS — C21.0 ANAL CANCER (HCC): ICD-10-CM

## 2019-11-27 PROCEDURE — 74177 CT ABD & PELVIS W/CONTRAST: CPT | Performed by: RADIOLOGY

## 2019-11-27 PROCEDURE — 71260 CT THORAX DX C+: CPT | Performed by: RADIOLOGY

## 2019-11-27 PROCEDURE — 74177 CT ABD & PELVIS W/CONTRAST: CPT

## 2019-11-27 PROCEDURE — 71260 CT THORAX DX C+: CPT

## 2019-11-27 PROCEDURE — 0 IOVERSOL 68 % SOLUTION: Performed by: INTERNAL MEDICINE

## 2019-11-27 RX ADMIN — IOVERSOL 90 ML: 678 INJECTION INTRA-ARTERIAL; INTRAVENOUS at 14:12

## 2019-12-04 ENCOUNTER — OFFICE VISIT (OUTPATIENT)
Dept: ONCOLOGY | Facility: CLINIC | Age: 54
End: 2019-12-04

## 2019-12-04 ENCOUNTER — DOCUMENTATION (OUTPATIENT)
Dept: ONCOLOGY | Facility: HOSPITAL | Age: 54
End: 2019-12-04

## 2019-12-04 VITALS
TEMPERATURE: 97.1 F | HEART RATE: 100 BPM | WEIGHT: 183.4 LBS | OXYGEN SATURATION: 94 % | DIASTOLIC BLOOD PRESSURE: 84 MMHG | RESPIRATION RATE: 18 BRPM | BODY MASS INDEX: 31.48 KG/M2 | SYSTOLIC BLOOD PRESSURE: 127 MMHG

## 2019-12-04 DIAGNOSIS — F41.9 ANXIETY: ICD-10-CM

## 2019-12-04 DIAGNOSIS — C21.0 ANAL CANCER (HCC): Primary | ICD-10-CM

## 2019-12-04 DIAGNOSIS — G89.3 NEOPLASM RELATED PAIN: ICD-10-CM

## 2019-12-04 PROCEDURE — 99214 OFFICE O/P EST MOD 30 MIN: CPT | Performed by: INTERNAL MEDICINE

## 2019-12-04 RX ORDER — CLONAZEPAM 0.5 MG/1
TABLET ORAL
COMMUNITY
Start: 2019-12-03 | End: 2019-12-04 | Stop reason: SDUPTHER

## 2019-12-04 RX ORDER — LIDOCAINE AND PRILOCAINE 25; 25 MG/G; MG/G
CREAM TOPICAL
Refills: 5 | COMMUNITY
Start: 2019-11-01 | End: 2019-12-30 | Stop reason: SDUPTHER

## 2019-12-04 RX ORDER — HYDROCODONE BITARTRATE AND ACETAMINOPHEN 10; 325 MG/1; MG/1
1 TABLET ORAL EVERY 6 HOURS PRN
COMMUNITY
End: 2019-12-04

## 2019-12-04 RX ORDER — PREDNISONE 1 MG/1
5 TABLET ORAL DAILY
COMMUNITY

## 2019-12-04 RX ORDER — ESCITALOPRAM OXALATE 10 MG/1
10 TABLET ORAL DAILY
Qty: 30 TABLET | Refills: 11 | Status: SHIPPED | OUTPATIENT
Start: 2019-12-04 | End: 2020-01-24 | Stop reason: SDUPTHER

## 2019-12-04 RX ORDER — OXYCODONE HYDROCHLORIDE 5 MG/1
TABLET ORAL
Qty: 200 TABLET | Refills: 0 | Status: SHIPPED | OUTPATIENT
Start: 2019-12-04 | End: 2019-12-05

## 2019-12-04 RX ORDER — CLONAZEPAM 0.5 MG/1
0.5 TABLET ORAL NIGHTLY PRN
Qty: 30 TABLET | Refills: 0 | Status: SHIPPED | OUTPATIENT
Start: 2019-12-04 | End: 2019-12-05

## 2019-12-04 RX ORDER — MORPHINE SULFATE 15 MG/1
15 TABLET, FILM COATED, EXTENDED RELEASE ORAL EVERY 12 HOURS
Qty: 60 TABLET | Refills: 0 | Status: SHIPPED | OUTPATIENT
Start: 2019-12-04 | End: 2020-01-13 | Stop reason: SDUPTHER

## 2019-12-04 NOTE — PROGRESS NOTES
[facility].  SS will follow.

## 2019-12-04 NOTE — PROGRESS NOTES
NAME: Liz Jiang    : 1965    DATE:  2019    DIAGNOSIS: Anal Cancer    CHIEF COMPLAINT:  Follow up of Anal Cancer    HISTORY OF PRESENT ILLNESS:   Liz Jiang is a very pleasant 53 y.o. female who is being seen today at the request of No ref. provider found for evaluation and treatment of anal cancer. She initially presented to her PCP for constipation, rectal pain and a palpable mass x 6-7 months. After failing conservative treatment, she was recommended by a friend to see Dr. Wu. She had colonoscopy on on 19 during which a large anal mass was visualized. 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 Two benign polyps were also removed. She was referred to our clinic for discussion of further treatment options.     She has several complaints today. She reports gradually worsening anal/rectal pain, and says she feels the urge to defecate constantly. For pain, she is using Norco 10 every 6 hours.She has had nausea for several months that she has been taking Phenergan for. She reports fatigue and a weight loss of 16-18 pounds (which she has subsequently regained), insomnia, a history of headaches that are much more mild after previous CVA, baseline shortness of breath that she wears bipap and uses home O2 for. She also reports that she has decreased ROM to RUE following past CVA. She denies obvious blood in stool, chest pain, abdominal pain, or any other complaint.     INTERVAL HISTORY:  Ms. Jiang is here today for follow up of anal cancer. She reports her pain is better w/ the use of Oxycodone. She is taking 1-2 every 4-5 hours for a total of about 10 per day. She has been very anxious about taking new medicines but says she is feeling much better since starting this.  She is now able to sit. She saw Dr. Martinez who was comfortable with her plan for concurrent chemoradiation.  She sees Dr. Campos tomorrow for radiation  consult and Dr. Marie on  for PAC consult.  She complains today of anxiety and says the Wellbutrin she has been taking for many years is no longer working.      PAST MEDICAL HISTORY:  Past Medical History:   Diagnosis Date   • Acid reflux disease    • Arthritis    • Asthma, extrinsic    • Cholelithiasis    • Chronic headaches    • COPD (chronic obstructive pulmonary disease) (CMS/[facility])    • CVA (cerebral vascular accident) (CMS/[facility])     w/ right sided deficit   • Diabetes mellitus (CMS/[facility])     only has high blood sugar when on steriods   • Hypertension    • Obstructive sleep apnea treated with BiPAP    • On home oxygen therapy     2L    • Sleep apnea, obstructive        PAST SURGICAL HISTORY:  Past Surgical History:   Procedure Laterality Date   • CARDIAC CATHETERIZATION     • CAROTID ENDARTERECTOMY Left 2018   • CHOLECYSTECTOMY WITH INTRAOPERATIVE CHOLANGIOGRAM N/A 2017    Procedure: CHOLECYSTECTOMY LAPAROSCOPIC INTRAOPERATIVE CHOLANGIOGRAM;  Surgeon: Carolin Marie MD;  Location: [facility];  Service:    • HYSTERECTOMY      for heavy bleeding/ complete   • KIDNEY STONE SURGERY     • TUBAL ABDOMINAL LIGATION          FAMILY HISTORY:  Family History   Problem Relation Age of Onset   • Diabetes Mother    • Hypertension Mother    • Arrhythmia Mother    • Pneumonia Father    • Coronary artery disease Other    • Arrhythmia Sister    • Heart attack Brother    • Lymphoma Brother         hodgkin's    • Other Brother         CABG       SOCIAL HISTORY:  Social History     Socioeconomic History   • Marital status:      Spouse name: Not on file   • Number of children: Not on file   • Years of education: Not on file   • Highest education level: Not on file   Tobacco Use   • Smoking status: Former Smoker     Packs/day: 1.50     Years: 30.00     Pack years: 45.00     Types: Electronic Cigarette     Last attempt to quit:      Years since quittin.9   • Smokeless tobacco: Never Used   Substance and Sexual  Activity   • Alcohol use: No   • Drug use: No   • Sexual activity: Defer   Social History Narrative    Just retired 2015    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          REVIEW OF SYSTEMS:   A comprehensive 14 point review of systems was performed.  Significant findings as mentioned above.  All other systems reviewed and are negative.      MEDICATIONS:  The current medication list was reviewed in the EMR    Current Outpatient Medications:   •  albuterol sulfate  (90 Base) MCG/ACT inhaler, Inhale 2 puffs Every 6 (Six) Hours As Needed for Wheezing or Shortness of Air., Disp: 1 inhaler, Rfl: 8  •  apixaban (ELIQUIS) 5 MG tablet tablet, Take 5 mg by mouth 2 (Two) Times a Day., Disp: , Rfl:   •  aspirin 81 MG chewable tablet, Chew 81 mg daily., Disp: , Rfl:   •  ASPIRIN LOW DOSE 81 MG EC tablet, Take 81 mg by mouth Daily., Disp: , Rfl: 3  •  atorvastatin (LIPITOR) 10 MG tablet, TAKE ONE TABLET BY MOUTH EVERY NIGHT AT BEDTIME TO LOWER CHOLESTEROL, Disp: , Rfl: 1  •  budesonide-formoterol (SYMBICORT) 160-4.5 MCG/ACT inhaler, Inhale 2 puffs 2 (Two) Times a Day. Rinse mouth with water after use., Disp: 1 inhaler, Rfl: 8  •  buPROPion XL (WELLBUTRIN XL) 150 MG 24 hr tablet, Take 1 tablet by mouth Every Morning., Disp: 30 tablet, Rfl: 2  •  cetirizine (zyrTEC) 10 MG tablet, Take 10 mg by mouth Daily., Disp: , Rfl:   •  clonazePAM (KlonoPIN) 0.5 MG tablet, , Disp: , Rfl:   •  clopidogrel (PLAVIX) 75 MG tablet, TAKE ONE TABLET BY MOUTH EVERY DAY FOR BLOOD THINNER, Disp: , Rfl: 3  •  diclofenac (VOLTAREN) 50 MG EC tablet, Take 1 tablet by mouth 2 (Two) Times a Day As Needed (FOR PAIN)., Disp: 15 tablet, Rfl: 0  •  doxycycline (MONODOX) 100 MG capsule, Take 1 capsule by mouth 2 (Two) Times a  Day., Disp: 14 capsule, Rfl: 0  •  famotidine (PEPCID) 20 MG tablet, , Disp: , Rfl: 5  •  flunisolide (NASALIDE) 25 MCG/ACT (0.025%) solution nasal spray, Inhale 2 sprays Every 12 (Twelve) Hours., Disp: 1 bottle, Rfl: 8  •  FLUoxetine (PROzac) 40 MG capsule, Take 20 mg by mouth 3 (Three) Times a Day., Disp: , Rfl:   •  furosemide (LASIX) 20 MG tablet, Take 20 mg by mouth 2 (Two) Times a Day. PRN, Disp: , Rfl:   •  gabapentin (NEURONTIN) 800 MG tablet, Take 800 mg by mouth 3 (Three) Times a Day., Disp: , Rfl:   •  Lancets 30G misc, , Disp: , Rfl:   •  levalbuterol (XOPENEX) 1.25 MG/3ML nebulizer solution, Take 1 ampule by nebulization Every 4 (Four) Hours As Needed for Wheezing or Shortness of Air. She gets palpitations with Albuterol, Disp: 120 ampule, Rfl: 8  •  levoFLOXacin (LEVAQUIN) 750 MG tablet, Take 1 tablet by mouth Daily., Disp: 8 tablet, Rfl: 0  •  loratadine-pseudoephedrine (CLARITIN-D 24 HOUR)  MG per 24 hr tablet, Take 1 tablet by mouth Daily., Disp: 30 tablet, Rfl: 5  •  metFORMIN (GLUCOPHAGE) 500 MG tablet, Take 500 mg by mouth 2 (two) times a day with meals., Disp: , Rfl:   •  montelukast (SINGULAIR) 10 MG tablet, Take 1 tablet by mouth Every Night., Disp: 30 tablet, Rfl: 8  •  mupirocin (BACTROBAN) 2 % ointment, APPLY TO AFFECTED AREA 2 3 TIMES A DAY AS DIRECTED, Disp: , Rfl: 0  •  nystatin (MYCOSTATIN) 313647 UNIT/ML suspension, Take 5 mL by mouth 4 (Four) Times a Day., Disp: 280 mL, Rfl: 2  •  ondansetron ODT (ZOFRAN-ODT) 4 MG disintegrating tablet, , Disp: , Rfl: 0  •  oxyCODONE (ROXICODONE) 5 MG immediate release tablet, Take 1-2 tabs every 4-6 hours as needed for pain, Disp: 150 tablet, Rfl: 0  •  prazosin (MINIPRESS) 2 MG capsule, Take 2 mg by mouth Every Night., Disp: , Rfl:   •  promethazine (PHENERGAN) 25 MG tablet, Take 25 mg by mouth every 6 (six) hours as needed for nausea or vomiting., Disp: , Rfl:   •  promethazine-dextromethorphan (PROMETHAZINE-DM) 6.25-15 MG/5ML syrup, TAKE ONE  TEASPOONSFUL BY MOUTH EVERY 4 TO 6 HOURS AS NEEDED FOR COUGH, Disp: , Rfl: 0  •  QUEtiapine (SEROquel) 100 MG tablet, Take 100 mg by mouth Every Night., Disp: , Rfl:   •  raNITIdine (ZANTAC) 150 MG tablet, Take 150 mg by mouth 2 (Two) Times a Day., Disp: , Rfl:   •  Roflumilast (DALIRESP) 250 MCG tablet, Take 1 tablet by mouth Every Other Day., Disp: 30 tablet, Rfl: 8  •  sennosides-docusate (SENNA-S) 8.6-50 MG per tablet, Take 2 tablets by mouth 2 (Two) Times a Day., Disp: 120 tablet, Rfl: 5  •  sulfamethoxazole-trimethoprim (BACTRIM DS,SEPTRA DS) 800-160 MG per tablet, TAKE ONE TABLET BY MOUTH TWO TIMES A DAY FOR 14 DAYS FOR INFECTION  STEP 1, Disp: , Rfl: 0  •  SUMAtriptan (IMITREX) 100 MG tablet, Take 100 mg by mouth Every 2 (Two) Hours As Needed for Migraine., Disp: , Rfl:   •  tiZANidine (ZANAFLEX) 4 MG tablet, , Disp: , Rfl: 0  •  topiramate (TOPAMAX) 25 MG tablet, Take 25 mg by mouth 2 (Two) Times a Day., Disp: , Rfl:   •  traZODone (DESYREL) 50 MG tablet, Take 50 mg by mouth Every Night., Disp: , Rfl:   •  Umeclidinium Bromide 62.5 MCG/INH aerosol powder , Inhale 1 puff Daily., Disp: 30 each, Rfl: 8  •  vitamin D (ERGOCALCIFEROL) 07694 UNITS capsule capsule, Take 50,000 Units by mouth 1 (one) time per week., Disp: , Rfl:   •  zolpidem (AMBIEN) 5 MG tablet, , Disp: , Rfl: 0    ALLERGIES:    Allergies   Allergen Reactions   • Daliresp [Roflumilast] Diarrhea     Significant diarrhea.        PHYSICAL EXAM:  Vitals:    12/04/19 1622   BP: 127/84   Pulse: 100   Resp: 18   Temp: 97.1 °F (36.2 °C)   SpO2: 94%     Pain Score    12/04/19 1622   PainSc: 0-No pain          General:  Awake, alert and oriented, appears much more comfortable  HEENT:  Pupils are equal, round and reactive to light and accommodation, Extra-ocular movements full, Oropharyx clear, mucous membranes moist  Neck:  No JVD, thyromegaly or lymphadenopathy  CV:  Regular rate and rhythm, no murmurs, rubs or gallops  Resp:  Lungs are clear to  auscultation bilaterally  Abd:  Soft, non-tender, non-distended, bowel sounds present, no organomegaly or masses  Rectal: Deferred today.  Last exam as follows:   Firm anal mass palpable anteriorly  Ext:  No clubbing, cyanosis or edema  Lymph:  No cervical, supraclavicular, axillary, inguinal or femoral adenopathy  Neuro:  MS as above, CN II-XII intact, grossly non-focal exam      PATHOLOGY:  11-01-19          ENDOSCOPY:  C-scope (Jazmin) 11-01-19          IMAGING:  MRI Abdomen w/wo contrast 11-06-19   FINDINGS:   Today's study shows no definitive colonic mass.   Particularly I do not see any contrast-enhancing abnormalities on the presented images.  No adenopathy in the imaged area.  Sigmoid colon wall does appear to be slightly thickened but no adjacent fluid.  No walled off fluid collections.     IMPRESSION:  No contrast-enhancing abnormalities identified.     CTCAP 11-27-19  FINDINGS: Today's study shows evidence of COPD.     There are no parenchymal nodules or masses.     No pericardial or pleural effusions.     No mediastinal or hilar lymph node enlargement.     IMPRESSION:  No evidence of metastatic disease to the chest.    FINDINGS:   The lung bases are clear. There are no pleural effusions.     The liver is homogeneous. There is no evidence of focal hepatic mass     The spleen is homogeneous     There is no peripancreatic stranding or pancreatic head mass.     There is no adrenal enlargement.     There is a nonobstructing right intrarenal stone..      Otherwise I do not see any free fluid or walled off fluid collections.     There is diffuse colonic wall thickening.      IMPRESSION:  1. Diffuse colonic wall thickening.  2. Nonobstructing right intrarenal stone.  3. No evidence of metastatic disease to the abdomen or pelvis.      RECENT LABS:  Lab Results   Component Value Date    WBC 13.55 (H) 11/19/2019    HGB 15.5 11/19/2019    HCT 49.3 (H) 11/19/2019    MCV 93.0 11/19/2019    RDW 14.7 11/19/2019    PLT  248 11/19/2019    NEUTRORELPCT 79.7 (H) 11/19/2019    LYMPHORELPCT 14.8 (L) 11/19/2019    MONORELPCT 4.6 (L) 11/19/2019    EOSRELPCT 0.1 (L) 11/19/2019    BASORELPCT 0.3 11/19/2019    NEUTROABS 10.78 (H) 11/19/2019    LYMPHSABS 2.01 11/19/2019       Lab Results   Component Value Date     11/19/2019    K 3.9 11/19/2019    CO2 28.6 11/19/2019    CL 99 11/19/2019    BUN 9 11/19/2019    CREATININE 0.51 (L) 11/19/2019    EGFRIFNONA 126 11/19/2019    GLUCOSE 106 (H) 11/19/2019    CALCIUM 9.6 11/19/2019    ALKPHOS 84 11/19/2019    AST 16 11/19/2019    ALT 21 11/19/2019    BILITOT 0.3 11/19/2019    ALBUMIN 4.18 11/19/2019    PROTEINTOT 7.9 11/19/2019    MG 2.1 03/27/2017    PHOS 3.0 03/27/2017       Lab Results   Component Value Date    FERRITIN 197.40 (H) 11/19/2019    IRON 43 11/19/2019    TIBC 437 11/19/2019    LABIRON 10 (L) 11/19/2019    ASPEUISH83 473 11/19/2019    FOLATE 9.32 11/19/2019           ASSESSMENT & PLAN:  Liz Jiang is a very pleasant 53 y.o. female with newly diagnosed anal cancer.    1.  Anal cancer:  -  Clinically has Stage IIIB disease ([id number]) with invasion of the rectovaginal septum.  -  She reports recent gynecologic examination without cause for concern.  Will get [facility].  -  CT CAP and MRI Pelvis unremarkable.  No evidence of metastatic disease. No notable adenopathy.  -  Recommended concurrent definitive chemoradiation with Mitomycin/5-FU.  -  She saw Dr. Martinez who was in agreement with this plan.  -  She meets Dr. Campos tomorrow for radiation planning.  - She will see Dr. Marie on 12-9 for consultation for PAC.      2. Neoplasm related pain:  -  She has a h/o chronic back pain previously managed by pain management physician.  - I suggested to her that I take over her pain management during her cancer treatment and that we transition back to her pain management physician after her cancer treatment is completed and she says her pain doctor said this is probably what  would happen.    -  Will add Morphine ER 15 mg q12h and continue Oxyocodone 5 mg 1-3 tabs q 4h prn breakthrough pain. She will continue to keep a log.    3. Constipation:  Continue Senna/Colace ii BID with Mirlax as needed.    4.  Anxiety:  Poorly controlled.  Will wean off Wellbutrin and start Lexapro 10 mg daily.  Will give Klonopin 0.5 mg QHS to help with sleep.    5.  Prophylaxis:  Has had Pneumovax, Prevnar 13 and 2019 flu vaccine.       6.  ACO / SAÚL/Other  Quality measures  -  Liz Jiang received 2019 flu vaccine.  -  Liz Jiang reports a pain score of 0.  Given her pain assessment as noted, treatment options were discussed and the following options were decided upon as a follow-up plan to address the patient's pain: prescription for opiod analgesics.  -  Current outpatient and discharge medications have been reconciled for the patient.    Reviewed by: Deanne Jones MD     This note was scribed for Deanne Jones MD by Gricel Howell RN.    I, Deanne Jones MD, personally performed the services described in this documentation as scribed by the above named individual in my presence, and it is both accurate and complete.  12/04/2019         I spent 30 minutes with Liz Jiang today.  In the office today, more than 50% of this time was spent face-to-face with her  in counseling / coordination of care, reviewing her medical history and counseling on the current treatment plan.  All questions were answered to her satisfaction      Electronically Signed by: Deanne Jones MD      CC:   Mayte Davis APRN Muhammed Iqbal, MD John Dvorak, MD Marta Hayne, MD Michael Simons, MD Barbara Michna, MD

## 2019-12-05 ENCOUNTER — HOSPITAL ENCOUNTER (OUTPATIENT)
Dept: RADIATION ONCOLOGY | Facility: HOSPITAL | Age: 54
Discharge: HOME | End: 2019-12-05

## 2019-12-05 ENCOUNTER — HOSPITAL ENCOUNTER (OUTPATIENT)
Dept: RADIATION ONCOLOGY | Facility: HOSPITAL | Age: 54
Setting detail: RADIATION/ONCOLOGY SERIES
Discharge: HOME | End: 2019-12-05

## 2019-12-05 ENCOUNTER — OFFICE VISIT (OUTPATIENT)
Dept: RADIATION ONCOLOGY | Facility: HOSPITAL | Age: 54
End: 2019-12-05

## 2019-12-05 VITALS
WEIGHT: 182.6 LBS | HEART RATE: 111 BPM | DIASTOLIC BLOOD PRESSURE: 74 MMHG | TEMPERATURE: 97.4 F | BODY MASS INDEX: 31.18 KG/M2 | RESPIRATION RATE: 19 BRPM | HEIGHT: 64 IN | OXYGEN SATURATION: 91 % | SYSTOLIC BLOOD PRESSURE: 118 MMHG

## 2019-12-05 DIAGNOSIS — C21.0 ANAL CANCER (HCC): Primary | ICD-10-CM

## 2019-12-05 PROCEDURE — G0463 HOSPITAL OUTPT CLINIC VISIT: HCPCS

## 2019-12-05 PROCEDURE — 77334 RADIATION TREATMENT AID(S): CPT | Performed by: RADIOLOGY

## 2019-12-05 RX ORDER — TAPENTADOL HYDROCHLORIDE 150 MG/1
TABLET, FILM COATED, EXTENDED RELEASE ORAL
Refills: 0 | COMMUNITY
Start: 2019-11-14 | End: 2019-12-05

## 2019-12-06 ENCOUNTER — DOCUMENTATION (OUTPATIENT)
Dept: ONCOLOGY | Facility: HOSPITAL | Age: 54
End: 2019-12-06

## 2019-12-06 NOTE — PROGRESS NOTES
SS received call from Margarita Joshua, [facility] who request for SS to refax application for [facility].  SS faxed application to agency and placed copy to be scanned into patient's chart.  SS will follow.

## 2019-12-09 ENCOUNTER — OFFICE VISIT (OUTPATIENT)
Dept: SURGERY | Facility: CLINIC | Age: 54
End: 2019-12-09

## 2019-12-09 VITALS
HEIGHT: 64 IN | DIASTOLIC BLOOD PRESSURE: 96 MMHG | HEART RATE: 114 BPM | SYSTOLIC BLOOD PRESSURE: 138 MMHG | BODY MASS INDEX: 31.82 KG/M2 | WEIGHT: 186.4 LBS

## 2019-12-09 DIAGNOSIS — C21.0 ANAL CANCER (HCC): Primary | ICD-10-CM

## 2019-12-09 PROCEDURE — 99213 OFFICE O/P EST LOW 20 MIN: CPT | Performed by: SURGERY

## 2019-12-09 RX ORDER — CEFAZOLIN SODIUM 2 G/50ML
2 SOLUTION INTRAVENOUS ONCE
Status: CANCELLED | OUTPATIENT
Start: 2019-12-17 | End: 2019-12-09

## 2019-12-09 NOTE — H&P (VIEW-ONLY)
Nina Jiang is a 53 y.o. female is here today for follow-up for possible port placement    History of Present Illness  Ms. Jiang was seen in the office today to discuss port placement for chemotherapy for her new diagnosis of anal cancer.  The patient has been seen by both medical oncology and radiation oncology for concomitant treatment.  The patient states that she is on Plavix but has not taken it in 2 weeks.  Patient also states that she developed an upper respiratory infection last Thursday.  She was feeling better yesterday but was still sick this past Saturday  Allergies   Allergen Reactions   • Daliresp [Roflumilast] Diarrhea     Significant diarrhea.          Current Outpatient Medications   Medication Sig Dispense Refill   • albuterol sulfate  (90 Base) MCG/ACT inhaler Inhale 2 puffs Every 6 (Six) Hours As Needed for Wheezing or Shortness of Air. 1 inhaler 8   • ASPIRIN LOW DOSE 81 MG EC tablet Take 81 mg by mouth Daily.  3   • budesonide-formoterol (SYMBICORT) 160-4.5 MCG/ACT inhaler Inhale 2 puffs 2 (Two) Times a Day. Rinse mouth with water after use. 1 inhaler 8   • escitalopram (LEXAPRO) 10 MG tablet Take 1 tablet by mouth Daily. 30 tablet 11   • furosemide (LASIX) 20 MG tablet Take 20 mg by mouth 2 (Two) Times a Day. PRN     • gabapentin (NEURONTIN) 800 MG tablet Take 800 mg by mouth 3 (Three) Times a Day.     • lidocaine-prilocaine (EMLA) 2.5-2.5 % cream   5   • loratadine-pseudoephedrine (CLARITIN-D 24 HOUR)  MG per 24 hr tablet Take 1 tablet by mouth Daily. 30 tablet 5   • metFORMIN (GLUCOPHAGE) 500 MG tablet Take 500 mg by mouth 2 (two) times a day with meals.     • Morphine (MS CONTIN) 15 MG 12 hr tablet Take 1 tablet by mouth Every 12 (Twelve) Hours. 60 tablet 0   • mupirocin (BACTROBAN) 2 % ointment APPLY TO AFFECTED AREA 2 3 TIMES A DAY AS DIRECTED  0   • ondansetron ODT (ZOFRAN-ODT) 4 MG disintegrating tablet   0   • predniSONE (DELTASONE) 5 MG tablet Take 5  mg by mouth Daily. Take one tab by mouth ever other day alternating with 1/2 tab     • sennosides-docusate (SENNA-S) 8.6-50 MG per tablet Take 2 tablets by mouth 2 (Two) Times a Day. 120 tablet 5   • tiZANidine (ZANAFLEX) 4 MG tablet   0   • traZODone (DESYREL) 50 MG tablet Take 50 mg by mouth Every Night.     • Umeclidinium Bromide 62.5 MCG/INH aerosol powder  Inhale 1 puff Daily. 30 each 8   • vitamin D (ERGOCALCIFEROL) 16361 UNITS capsule capsule Take 50,000 Units by mouth 1 (one) time per week.     • zolpidem (AMBIEN) 5 MG tablet   0   • clopidogrel (PLAVIX) 75 MG tablet TAKE ONE TABLET BY MOUTH EVERY DAY FOR BLOOD THINNER  3     No current facility-administered medications for this visit.      Past Medical History:   Diagnosis Date   • Acid reflux disease    • Anal cancer (CMS/[facility]) 12/5/2019   • Arthritis    • Asthma, extrinsic    • Cholelithiasis    • Chronic headaches    • COPD (chronic obstructive pulmonary disease) (CMS/[facility])    • CVA (cerebral vascular accident) (CMS/[facility])     w/ right sided deficit   • Diabetes mellitus (CMS/[facility])     only has high blood sugar when on steriods   • Hypertension    • Obstructive sleep apnea treated with BiPAP    • On home oxygen therapy     2L    • Sleep apnea, obstructive      Past Surgical History:   Procedure Laterality Date   • CARDIAC CATHETERIZATION     • CAROTID ENDARTERECTOMY Left 2018   • CHOLECYSTECTOMY WITH INTRAOPERATIVE CHOLANGIOGRAM N/A 1/30/2017    Procedure: CHOLECYSTECTOMY LAPAROSCOPIC INTRAOPERATIVE CHOLANGIOGRAM;  Surgeon: Carolin Marie MD;  Location: [facility];  Service:    • HYSTERECTOMY      for heavy bleeding/ complete   • KIDNEY STONE SURGERY     • TUBAL ABDOMINAL LIGATION         The following portions of the patient's history were reviewed and updated as appropriate: allergies, current medications, past family history, past medical history, past social history, past surgical history and problem list.    Review of Systems  General:  "negative  Integumentary: lump  Eyes: negative  ENT: negative  Respiratory: shortness of breath and wheezing  Gastrointestinal: nausea/vomiting, diarrhea, constipation and change in stool  Cardiovascular: negative  Neurological: negative  Psychiatric: anxiety and insomnia  Hematologic/Lymphatic: negative  Genitourinary: negative  Musculoskeletal: negative  Endocrine: negative  Breasts: negative    Objective   Ht 162.6 cm (64\")   Wt 84.6 kg (186 lb 6.4 oz)   BMI 32.00 kg/m²     Physical Exam  General:  This is a WD WN female in no acute distress  HEENT exam:  WNL. Sclera are anicteric.  EOMI  Neck:  supple, FROM, without thyromegaly, cervical or supraclavicular adenopathy  Lungs:  Respiratory effort normal. Auscultation: Clear, without wheezes, rhonchi, rales  Heart:  Regular rate and rhythm, without murmur, gallop, rub.  No pedal edema  Abdomen: Nontender, nondistended  Musculoskeletal:  muscle strength/tone is normal.  Gait and station: normal. No digital cyanosis  Psyc:  alert, oriented x 3.  Mood and affect are appropriate  skin:  Warm with good turgor.  Without rash or lesion  extremities:  Examination of the extremities revealed no cyanosis, clubbing or edema.  Results/Data  Records from medical oncology were reviewed    Procedures     Assessment/Plan   Anal cancer, clinical T3N0    Proceed with port placement for chemotherapy       Discussion/Summary: The risks of the surgical procedure were discussed.  Options of alternative treatments including no treatment (if applicable) were discussed.  Patient voiced understanding of the above issues and wishes to proceed    Patient's Body mass index is 32 kg/m². BMI is above normal parameters. Recommendations include: educational material.         Future Appointments   Date Time Provider Department Center   12/9/2019  1:10 PM Carolin Marie MD [facility] None   12/27/2019 10:40 AM COR BR CARE M[facility] 2 BH COR MA BC COR   12/30/2019  2:00 PM SARAH NURSE LAB [facility] ON[facility] " COR COR   12/30/2019  2:30 PM Sallie Mckeon APRN [facility] COR COR   1/8/2020 10:30 AM [facility] PULM [facility] - PFT RM [facility] PCC [facility] None   1/8/2020 10:45 AM Tena Cee MD [facility] None   1/21/2020 11:00 AM SARAH NURSE LAB [facility] COR COR   1/21/2020 11:30 AM Deanne Jones MD [facility] COR COR         Please note that portions of this note were completed with a voice recognition program.  This document has been electronically signed by Carolin MARVIN MD on December 9, 2019 1:46 PM

## 2019-12-09 NOTE — PROGRESS NOTES
Nina Jiang is a 53 y.o. female is here today for follow-up for possible port placement    History of Present Illness  Ms. Jiang was seen in the office today to discuss port placement for chemotherapy for her new diagnosis of anal cancer.  The patient has been seen by both medical oncology and radiation oncology for concomitant treatment.  The patient states that she is on Plavix but has not taken it in 2 weeks.  Patient also states that she developed an upper respiratory infection last Thursday.  She was feeling better yesterday but was still sick this past Saturday  Allergies   Allergen Reactions   • Daliresp [Roflumilast] Diarrhea     Significant diarrhea.          Current Outpatient Medications   Medication Sig Dispense Refill   • albuterol sulfate  (90 Base) MCG/ACT inhaler Inhale 2 puffs Every 6 (Six) Hours As Needed for Wheezing or Shortness of Air. 1 inhaler 8   • ASPIRIN LOW DOSE 81 MG EC tablet Take 81 mg by mouth Daily.  3   • budesonide-formoterol (SYMBICORT) 160-4.5 MCG/ACT inhaler Inhale 2 puffs 2 (Two) Times a Day. Rinse mouth with water after use. 1 inhaler 8   • escitalopram (LEXAPRO) 10 MG tablet Take 1 tablet by mouth Daily. 30 tablet 11   • furosemide (LASIX) 20 MG tablet Take 20 mg by mouth 2 (Two) Times a Day. PRN     • gabapentin (NEURONTIN) 800 MG tablet Take 800 mg by mouth 3 (Three) Times a Day.     • lidocaine-prilocaine (EMLA) 2.5-2.5 % cream   5   • loratadine-pseudoephedrine (CLARITIN-D 24 HOUR)  MG per 24 hr tablet Take 1 tablet by mouth Daily. 30 tablet 5   • metFORMIN (GLUCOPHAGE) 500 MG tablet Take 500 mg by mouth 2 (two) times a day with meals.     • Morphine (MS CONTIN) 15 MG 12 hr tablet Take 1 tablet by mouth Every 12 (Twelve) Hours. 60 tablet 0   • mupirocin (BACTROBAN) 2 % ointment APPLY TO AFFECTED AREA 2 3 TIMES A DAY AS DIRECTED  0   • ondansetron ODT (ZOFRAN-ODT) 4 MG disintegrating tablet   0   • predniSONE (DELTASONE) 5 MG tablet Take 5  mg by mouth Daily. Take one tab by mouth ever other day alternating with 1/2 tab     • sennosides-docusate (SENNA-S) 8.6-50 MG per tablet Take 2 tablets by mouth 2 (Two) Times a Day. 120 tablet 5   • tiZANidine (ZANAFLEX) 4 MG tablet   0   • traZODone (DESYREL) 50 MG tablet Take 50 mg by mouth Every Night.     • Umeclidinium Bromide 62.5 MCG/INH aerosol powder  Inhale 1 puff Daily. 30 each 8   • vitamin D (ERGOCALCIFEROL) 96684 UNITS capsule capsule Take 50,000 Units by mouth 1 (one) time per week.     • zolpidem (AMBIEN) 5 MG tablet   0   • clopidogrel (PLAVIX) 75 MG tablet TAKE ONE TABLET BY MOUTH EVERY DAY FOR BLOOD THINNER  3     No current facility-administered medications for this visit.      Past Medical History:   Diagnosis Date   • Acid reflux disease    • Anal cancer (CMS/[facility]) 12/5/2019   • Arthritis    • Asthma, extrinsic    • Cholelithiasis    • Chronic headaches    • COPD (chronic obstructive pulmonary disease) (CMS/[facility])    • CVA (cerebral vascular accident) (CMS/[facility])     w/ right sided deficit   • Diabetes mellitus (CMS/[facility])     only has high blood sugar when on steriods   • Hypertension    • Obstructive sleep apnea treated with BiPAP    • On home oxygen therapy     2L    • Sleep apnea, obstructive      Past Surgical History:   Procedure Laterality Date   • CARDIAC CATHETERIZATION     • CAROTID ENDARTERECTOMY Left 2018   • CHOLECYSTECTOMY WITH INTRAOPERATIVE CHOLANGIOGRAM N/A 1/30/2017    Procedure: CHOLECYSTECTOMY LAPAROSCOPIC INTRAOPERATIVE CHOLANGIOGRAM;  Surgeon: Carolin Marie MD;  Location: [facility];  Service:    • HYSTERECTOMY      for heavy bleeding/ complete   • KIDNEY STONE SURGERY     • TUBAL ABDOMINAL LIGATION         The following portions of the patient's history were reviewed and updated as appropriate: allergies, current medications, past family history, past medical history, past social history, past surgical history and problem list.    Review of Systems  General:  "negative  Integumentary: lump  Eyes: negative  ENT: negative  Respiratory: shortness of breath and wheezing  Gastrointestinal: nausea/vomiting, diarrhea, constipation and change in stool  Cardiovascular: negative  Neurological: negative  Psychiatric: anxiety and insomnia  Hematologic/Lymphatic: negative  Genitourinary: negative  Musculoskeletal: negative  Endocrine: negative  Breasts: negative    Objective   Ht 162.6 cm (64\")   Wt 84.6 kg (186 lb 6.4 oz)   BMI 32.00 kg/m²    Physical Exam  General:  This is a WD WN female in no acute distress  HEENT exam:  WNL. Sclera are anicteric.  EOMI  Neck:  supple, FROM, without thyromegaly, cervical or supraclavicular adenopathy  Lungs:  Respiratory effort normal. Auscultation: Clear, without wheezes, rhonchi, rales  Heart:  Regular rate and rhythm, without murmur, gallop, rub.  No pedal edema  Abdomen: Nontender, nondistended  Musculoskeletal:  muscle strength/tone is normal.  Gait and station: normal. No digital cyanosis  Psyc:  alert, oriented x 3.  Mood and affect are appropriate  skin:  Warm with good turgor.  Without rash or lesion  extremities:  Examination of the extremities revealed no cyanosis, clubbing or edema.  Results/Data  Records from medical oncology were reviewed    Procedures     Assessment/Plan   Anal cancer, clinical T3N0    Proceed with port placement for chemotherapy       Discussion/Summary: The risks of the surgical procedure were discussed.  Options of alternative treatments including no treatment (if applicable) were discussed.  Patient voiced understanding of the above issues and wishes to proceed    Patient's Body mass index is 32 kg/m². BMI is above normal parameters. Recommendations include: educational material.         Future Appointments   Date Time Provider Department Center   12/9/2019  1:10 PM Carolin Marie MD [facility] None   12/27/2019 10:40 AM COR BR CARE [facility] 2 BH COR MA BC COR   12/30/2019  2:00 PM SARAH NURSE LAB [facility] ON[facility] " COR COR   12/30/2019  2:30 PM Sallie Mckeon APRN [facility] COR   1/8/2020 10:30 AM [facility] PULM [facility] - PFT RM [facility] None   1/8/2020 10:45 AM Tena Cee MD [facility] None   1/21/2020 11:00 AM SARAH NURSE LAB [facility] COR   1/21/2020 11:30 AM Deanne Jones MD [facility] COR         Please note that portions of this note were completed with a voice recognition program.

## 2019-12-09 NOTE — H&P
Nina Jiang is a 53 y.o. female is here today for follow-up for possible port placement    History of Present Illness  Ms. Jiang was seen in the office today to discuss port placement for chemotherapy for her new diagnosis of anal cancer.  The patient has been seen by both medical oncology and radiation oncology for concomitant treatment.  The patient states that she is on Plavix but has not taken it in 2 weeks.  Patient also states that she developed an upper respiratory infection last Thursday.  She was feeling better yesterday but was still sick this past Saturday  Allergies   Allergen Reactions   • Daliresp [Roflumilast] Diarrhea     Significant diarrhea.          Current Outpatient Medications   Medication Sig Dispense Refill   • albuterol sulfate  (90 Base) MCG/ACT inhaler Inhale 2 puffs Every 6 (Six) Hours As Needed for Wheezing or Shortness of Air. 1 inhaler 8   • ASPIRIN LOW DOSE 81 MG EC tablet Take 81 mg by mouth Daily.  3   • budesonide-formoterol (SYMBICORT) 160-4.5 MCG/ACT inhaler Inhale 2 puffs 2 (Two) Times a Day. Rinse mouth with water after use. 1 inhaler 8   • escitalopram (LEXAPRO) 10 MG tablet Take 1 tablet by mouth Daily. 30 tablet 11   • furosemide (LASIX) 20 MG tablet Take 20 mg by mouth 2 (Two) Times a Day. PRN     • gabapentin (NEURONTIN) 800 MG tablet Take 800 mg by mouth 3 (Three) Times a Day.     • lidocaine-prilocaine (EMLA) 2.5-2.5 % cream   5   • loratadine-pseudoephedrine (CLARITIN-D 24 HOUR)  MG per 24 hr tablet Take 1 tablet by mouth Daily. 30 tablet 5   • metFORMIN (GLUCOPHAGE) 500 MG tablet Take 500 mg by mouth 2 (two) times a day with meals.     • Morphine (MS CONTIN) 15 MG 12 hr tablet Take 1 tablet by mouth Every 12 (Twelve) Hours. 60 tablet 0   • mupirocin (BACTROBAN) 2 % ointment APPLY TO AFFECTED AREA 2 3 TIMES A DAY AS DIRECTED  0   • ondansetron ODT (ZOFRAN-ODT) 4 MG disintegrating tablet   0   • predniSONE (DELTASONE) 5 MG tablet Take 5  mg by mouth Daily. Take one tab by mouth ever other day alternating with 1/2 tab     • sennosides-docusate (SENNA-S) 8.6-50 MG per tablet Take 2 tablets by mouth 2 (Two) Times a Day. 120 tablet 5   • tiZANidine (ZANAFLEX) 4 MG tablet   0   • traZODone (DESYREL) 50 MG tablet Take 50 mg by mouth Every Night.     • Umeclidinium Bromide 62.5 MCG/INH aerosol powder  Inhale 1 puff Daily. 30 each 8   • vitamin D (ERGOCALCIFEROL) 40064 UNITS capsule capsule Take 50,000 Units by mouth 1 (one) time per week.     • zolpidem (AMBIEN) 5 MG tablet   0   • clopidogrel (PLAVIX) 75 MG tablet TAKE ONE TABLET BY MOUTH EVERY DAY FOR BLOOD THINNER  3     No current facility-administered medications for this visit.      Past Medical History:   Diagnosis Date   • Acid reflux disease    • Anal cancer (CMS/[facility]) 12/5/2019   • Arthritis    • Asthma, extrinsic    • Cholelithiasis    • Chronic headaches    • COPD (chronic obstructive pulmonary disease) (CMS/[facility])    • CVA (cerebral vascular accident) (CMS/[facility])     w/ right sided deficit   • Diabetes mellitus (CMS/[facility])     only has high blood sugar when on steriods   • Hypertension    • Obstructive sleep apnea treated with BiPAP    • On home oxygen therapy     2L    • Sleep apnea, obstructive      Past Surgical History:   Procedure Laterality Date   • CARDIAC CATHETERIZATION     • CAROTID ENDARTERECTOMY Left 2018   • CHOLECYSTECTOMY WITH INTRAOPERATIVE CHOLANGIOGRAM N/A 1/30/2017    Procedure: CHOLECYSTECTOMY LAPAROSCOPIC INTRAOPERATIVE CHOLANGIOGRAM;  Surgeon: Carolin Marie MD;  Location: [facility];  Service:    • HYSTERECTOMY      for heavy bleeding/ complete   • KIDNEY STONE SURGERY     • TUBAL ABDOMINAL LIGATION         The following portions of the patient's history were reviewed and updated as appropriate: allergies, current medications, past family history, past medical history, past social history, past surgical history and problem list.    Review of Systems  General:  "negative  Integumentary: lump  Eyes: negative  ENT: negative  Respiratory: shortness of breath and wheezing  Gastrointestinal: nausea/vomiting, diarrhea, constipation and change in stool  Cardiovascular: negative  Neurological: negative  Psychiatric: anxiety and insomnia  Hematologic/Lymphatic: negative  Genitourinary: negative  Musculoskeletal: negative  Endocrine: negative  Breasts: negative    Objective   Ht 162.6 cm (64\")   Wt 84.6 kg (186 lb 6.4 oz)   BMI 32.00 kg/m²     Physical Exam  General:  This is a WD WN female in no acute distress  HEENT exam:  WNL. Sclera are anicteric.  EOMI  Neck:  supple, FROM, without thyromegaly, cervical or supraclavicular adenopathy  Lungs:  Respiratory effort normal. Auscultation: Clear, without wheezes, rhonchi, rales  Heart:  Regular rate and rhythm, without murmur, gallop, rub.  No pedal edema  Abdomen: Nontender, nondistended  Musculoskeletal:  muscle strength/tone is normal.  Gait and station: normal. No digital cyanosis  Psyc:  alert, oriented x 3.  Mood and affect are appropriate  skin:  Warm with good turgor.  Without rash or lesion  extremities:  Examination of the extremities revealed no cyanosis, clubbing or edema.  Results/Data  Records from medical oncology were reviewed    Procedures     Assessment/Plan   Anal cancer, clinical T3N0    Proceed with port placement for chemotherapy       Discussion/Summary: The risks of the surgical procedure were discussed.  Options of alternative treatments including no treatment (if applicable) were discussed.  Patient voiced understanding of the above issues and wishes to proceed    Patient's Body mass index is 32 kg/m². BMI is above normal parameters. Recommendations include: educational material.         Future Appointments   Date Time Provider Department Center   12/9/2019  1:10 PM Carolin Marie MD [facility] None   12/27/2019 10:40 AM COR BR CARE M[facility]M 2 BH COR MA BC COR   12/30/2019  2:00 PM SARAH NURSE LAB [facility] " COR COR   12/30/2019  2:30 PM Sallie Mckeon APRN [facility] COR   1/8/2020 10:30 AM [facility] PULM [facility] - PFT RM [facility] None   1/8/2020 10:45 AM Tena Cee MD [facility] None   1/21/2020 11:00 AM SARAH NURSE LAB [facility] COR   1/21/2020 11:30 AM Deanne Jones MD [facility] COR         Please note that portions of this note were completed with a voice recognition program.  This document has been electronically signed by Carolin MARVIN MD on December 9, 2019 1:46 PM

## 2019-12-10 ENCOUNTER — TELEPHONE (OUTPATIENT)
Dept: SURGERY | Facility: CLINIC | Age: 54
End: 2019-12-10

## 2019-12-13 DIAGNOSIS — C21.0 ANAL CANCER (HCC): ICD-10-CM

## 2019-12-13 RX ORDER — MITOMYCIN 20 MG/40ML
10 INJECTION, POWDER, LYOPHILIZED, FOR SOLUTION INTRAVENOUS ONCE
Status: CANCELLED | OUTPATIENT
Start: 2019-12-30

## 2019-12-13 RX ORDER — SODIUM CHLORIDE 9 MG/ML
250 INJECTION, SOLUTION INTRAVENOUS ONCE
Status: CANCELLED | OUTPATIENT
Start: 2020-01-27

## 2019-12-13 RX ORDER — SODIUM CHLORIDE 9 MG/ML
250 INJECTION, SOLUTION INTRAVENOUS ONCE
Status: CANCELLED | OUTPATIENT
Start: 2019-12-30

## 2019-12-13 RX ORDER — MITOMYCIN 20 MG/40ML
10 INJECTION, POWDER, LYOPHILIZED, FOR SOLUTION INTRAVENOUS ONCE
Status: CANCELLED | OUTPATIENT
Start: 2020-01-27

## 2019-12-16 ENCOUNTER — APPOINTMENT (OUTPATIENT)
Dept: PREADMISSION TESTING | Facility: HOSPITAL | Age: 54
End: 2019-12-16

## 2019-12-16 DIAGNOSIS — C21.0 ANAL CANCER (HCC): ICD-10-CM

## 2019-12-16 LAB
ANION GAP SERPL CALCULATED.3IONS-SCNC: 13.5 MMOL/L (ref 5–15)
BUN BLD-MCNC: 12 MG/DL (ref 6–20)
BUN/CREAT SERPL: 22.2 (ref 7–25)
CALCIUM SPEC-SCNC: 9.5 MG/DL (ref 8.6–10.5)
CHLORIDE SERPL-SCNC: 97 MMOL/L (ref 98–107)
CO2 SERPL-SCNC: 28.5 MMOL/L (ref 22–29)
CREAT BLD-MCNC: 0.54 MG/DL (ref 0.57–1)
DEPRECATED RDW RBC AUTO: 49.1 FL (ref 37–54)
ERYTHROCYTE [DISTWIDTH] IN BLOOD BY AUTOMATED COUNT: 14.1 % (ref 12.3–15.4)
GFR SERPL CREATININE-BSD FRML MDRD: 118 ML/MIN/1.73
GLUCOSE BLD-MCNC: 109 MG/DL (ref 65–99)
HCT VFR BLD AUTO: 48.9 % (ref 34–46.6)
HGB BLD-MCNC: 15.3 G/DL (ref 12–15.9)
MCH RBC QN AUTO: 29.7 PG (ref 26.6–33)
MCHC RBC AUTO-ENTMCNC: 31.3 G/DL (ref 31.5–35.7)
MCV RBC AUTO: 95 FL (ref 79–97)
PLATELET # BLD AUTO: 245 10*3/MM3 (ref 140–450)
PMV BLD AUTO: 11.2 FL (ref 6–12)
POTASSIUM BLD-SCNC: 3.8 MMOL/L (ref 3.5–5.2)
RBC # BLD AUTO: 5.15 10*6/MM3 (ref 3.77–5.28)
SODIUM BLD-SCNC: 139 MMOL/L (ref 136–145)
WBC NRBC COR # BLD: 16.16 10*3/MM3 (ref 3.4–10.8)

## 2019-12-16 PROCEDURE — 36415 COLL VENOUS BLD VENIPUNCTURE: CPT

## 2019-12-16 PROCEDURE — 85027 COMPLETE CBC AUTOMATED: CPT | Performed by: SURGERY

## 2019-12-16 PROCEDURE — 80048 BASIC METABOLIC PNL TOTAL CA: CPT | Performed by: SURGERY

## 2019-12-16 RX ORDER — PROMETHAZINE HYDROCHLORIDE 25 MG/1
25 TABLET ORAL EVERY 6 HOURS PRN
COMMUNITY

## 2019-12-16 NOTE — DISCHARGE INSTRUCTIONS
TAKE the following medications the morning of surgery:    All heart or blood pressure medications    Please discontinue all blood thinners and anticoagulants (except aspirin) prior to surgery as per your surgeon and cardiologist instructions.  Aspirin may be continued up to the day prior to surgery.    HOLD all diabetic medications the morning of surgery as order by physician.    Please follow instructions on use of prep cloths provided by nurse. Return instruction sheet to pre-op nurse on day of surgery.    Arrival time for surgery on 12/17/2019 will be given to you by Dr. Marie's office.    General Instructions:  • Do NOT eat or drink after midnight 12/16/2019 which includes water, mints, or gum.  • You may brush your teeth. Dental appliances that are removable must be taken out day of surgery.  • Do NOT smoke, chew tobacco, or drink alcohol within 24 hours prior to surgery.  • Bring medications in original bottles, any inhalers and if applicable your C-PAP/BI-PAP machine  • Bring any papers given to you in the doctor’s office  • Wear clean, comfortable clothes and socks  • Do NOT wear contact lenses or make-up or dark nail polish.  Bring a case for your glasses if applicable.  • Bring crutches or walker if applicable  • Leave all other valuables and jewelry at home  • If you were given a blood bank armband, continue to wear it until discharged.    Preventing a Surgical Site Infection:  • Shower the night before surgery (unless instructed otherwise) using a fresh bar of anti-bacterial soap (such as Dial) and clean washcloth.  Dry with a clean towel and dress in clean clothing.  • For 2 to 3 days before surgery, avoid shaving with a razor near where you will have surgery because the razor can irritate skin and make it easier to develop an infection.  Ask your surgeon if you will be receiving antibiotics prior to surgery.  • Make sure you, your family, and all healthcare providers clean their hands with soap and  water or an alcohol-based hand  before caring for you or your wound.  • If at all possible, quit smoking as many days before surgery as you can.    Day of Surgery:  Upon arrival, a pre-op nurse and anesthesiologist will review your health history, obtain vital signs, and answer questions you may have.  The only belongings needed at this time will be your home medications and if applicable you C-PAP/BI-PAP machine.  If you are staying overnight, your family can leave the rest of your belongings in the car and bring them to your room later.  A pre-op nurse will start an IV and you may receive medication in preparation for surgery.  Due to patient privacy and limited space, only one member of your family will be able to accompany you in the pre-op area.  While you are in surgery your family should notify the waiting room  if they leave the waiting room area and provide a contact number.  Please be aware that surgery does come with discomfort.  We want to make every effort to control your discomfort so please discuss any uncontrolled symptoms with your nurse.  Your doctor will most likely have prescribed pain medications.  If you are going home after surgery you will receive individualized written care instructions before being discharged.  A responsible adult must drive you to and from the hospital on the day of surgery and stay with you for 24 hours.  If you are staying overnight following surgery, you will be transported to your hospital room following the recovery period.

## 2019-12-17 ENCOUNTER — APPOINTMENT (OUTPATIENT)
Dept: GENERAL RADIOLOGY | Facility: HOSPITAL | Age: 54
End: 2019-12-17

## 2019-12-17 ENCOUNTER — ANESTHESIA (OUTPATIENT)
Dept: PERIOP | Facility: HOSPITAL | Age: 54
End: 2019-12-17

## 2019-12-17 ENCOUNTER — HOSPITAL ENCOUNTER (OUTPATIENT)
Facility: HOSPITAL | Age: 54
Setting detail: HOSPITAL OUTPATIENT SURGERY
Discharge: HOME | End: 2019-12-17
Attending: SURGERY | Admitting: SURGERY

## 2019-12-17 ENCOUNTER — ANESTHESIA EVENT (OUTPATIENT)
Dept: PERIOP | Facility: HOSPITAL | Age: 54
End: 2019-12-17

## 2019-12-17 VITALS
OXYGEN SATURATION: 94 % | DIASTOLIC BLOOD PRESSURE: 74 MMHG | WEIGHT: 185.38 LBS | TEMPERATURE: 97.9 F | RESPIRATION RATE: 20 BRPM | SYSTOLIC BLOOD PRESSURE: 113 MMHG | HEART RATE: 94 BPM | HEIGHT: 64 IN | BODY MASS INDEX: 31.65 KG/M2

## 2019-12-17 DIAGNOSIS — C21.0 ANAL CANCER (HCC): ICD-10-CM

## 2019-12-17 LAB — GLUCOSE BLDC GLUCOMTR-MCNC: 119 MG/DL (ref 70–130)

## 2019-12-17 PROCEDURE — 25010000002 MIDAZOLAM PER 1 MG: Performed by: NURSE ANESTHETIST, CERTIFIED REGISTERED

## 2019-12-17 PROCEDURE — 25010000002 KETOROLAC TROMETHAMINE PER 15 MG: Performed by: NURSE ANESTHETIST, CERTIFIED REGISTERED

## 2019-12-17 PROCEDURE — 76000 FLUOROSCOPY <1 HR PHYS/QHP: CPT | Performed by: RADIOLOGY

## 2019-12-17 PROCEDURE — 77001 FLUOROGUIDE FOR VEIN DEVICE: CPT | Performed by: SURGERY

## 2019-12-17 PROCEDURE — 71045 X-RAY EXAM CHEST 1 VIEW: CPT | Performed by: RADIOLOGY

## 2019-12-17 PROCEDURE — 82962 GLUCOSE BLOOD TEST: CPT

## 2019-12-17 PROCEDURE — 76000 FLUOROSCOPY <1 HR PHYS/QHP: CPT

## 2019-12-17 PROCEDURE — C1788 PORT, INDWELLING, IMP: HCPCS | Performed by: SURGERY

## 2019-12-17 PROCEDURE — 71045 X-RAY EXAM CHEST 1 VIEW: CPT

## 2019-12-17 PROCEDURE — 25010000002 DEXAMETHASONE PER 1 MG: Performed by: NURSE ANESTHETIST, CERTIFIED REGISTERED

## 2019-12-17 PROCEDURE — 25010000002 PROPOFOL 10 MG/ML EMULSION: Performed by: NURSE ANESTHETIST, CERTIFIED REGISTERED

## 2019-12-17 PROCEDURE — 36561 INSERT TUNNELED CV CATH: CPT | Performed by: SURGERY

## 2019-12-17 PROCEDURE — 25010000002 ONDANSETRON PER 1 MG: Performed by: NURSE ANESTHETIST, CERTIFIED REGISTERED

## 2019-12-17 PROCEDURE — 25010000002 HEPARIN (PORCINE) PER 1000 UNITS: Performed by: SURGERY

## 2019-12-17 PROCEDURE — 94799 UNLISTED PULMONARY SVC/PX: CPT

## 2019-12-17 PROCEDURE — 25010000003 LIDOCAINE 1 % SOLUTION: Performed by: SURGERY

## 2019-12-17 PROCEDURE — 94640 AIRWAY INHALATION TREATMENT: CPT

## 2019-12-17 PROCEDURE — 25010000003 CEFAZOLIN SODIUM-DEXTROSE 2-3 GM-%(50ML) RECONSTITUTED SOLUTION: Performed by: SURGERY

## 2019-12-17 PROCEDURE — 25010000002 FENTANYL CITRATE (PF) 100 MCG/2ML SOLUTION: Performed by: NURSE ANESTHETIST, CERTIFIED REGISTERED

## 2019-12-17 PROCEDURE — 25010000002 HYDROMORPHONE 1 MG/ML SOLUTION: Performed by: ANESTHESIOLOGY

## 2019-12-17 DEVICE — VACCESS CT POWER-INJECTABLE IMPLANTABLE PORT (WITH SUTURE PLUGS) (8F)
Type: IMPLANTABLE DEVICE | Status: FUNCTIONAL
Brand: VACCESS

## 2019-12-17 RX ORDER — FENTANYL CITRATE 50 UG/ML
50 INJECTION, SOLUTION INTRAMUSCULAR; INTRAVENOUS
Status: DISCONTINUED | OUTPATIENT
Start: 2019-12-17 | End: 2019-12-17 | Stop reason: HOSPADM

## 2019-12-17 RX ORDER — MEPERIDINE HYDROCHLORIDE 25 MG/ML
12.5 INJECTION INTRAMUSCULAR; INTRAVENOUS; SUBCUTANEOUS
Status: DISCONTINUED | OUTPATIENT
Start: 2019-12-17 | End: 2019-12-17 | Stop reason: HOSPADM

## 2019-12-17 RX ORDER — HYDROCODONE BITARTRATE AND ACETAMINOPHEN 7.5; 325 MG/1; MG/1
1 TABLET ORAL 4 TIMES DAILY PRN
Qty: 12 TABLET | Refills: 0 | Status: SHIPPED | OUTPATIENT
Start: 2019-12-17 | End: 2020-01-21

## 2019-12-17 RX ORDER — FAMOTIDINE 10 MG/ML
INJECTION, SOLUTION INTRAVENOUS AS NEEDED
Status: DISCONTINUED | OUTPATIENT
Start: 2019-12-17 | End: 2019-12-17 | Stop reason: SURG

## 2019-12-17 RX ORDER — SODIUM CHLORIDE 9 MG/ML
INJECTION, SOLUTION INTRAVENOUS AS NEEDED
Status: DISCONTINUED | OUTPATIENT
Start: 2019-12-17 | End: 2019-12-17 | Stop reason: HOSPADM

## 2019-12-17 RX ORDER — SODIUM CHLORIDE 0.9 % (FLUSH) 0.9 %
10 SYRINGE (ML) INJECTION AS NEEDED
Status: DISCONTINUED | OUTPATIENT
Start: 2019-12-17 | End: 2019-12-17 | Stop reason: HOSPADM

## 2019-12-17 RX ORDER — DEXAMETHASONE SODIUM PHOSPHATE 4 MG/ML
INJECTION, SOLUTION INTRA-ARTICULAR; INTRALESIONAL; INTRAMUSCULAR; INTRAVENOUS; SOFT TISSUE AS NEEDED
Status: DISCONTINUED | OUTPATIENT
Start: 2019-12-17 | End: 2019-12-17 | Stop reason: SURG

## 2019-12-17 RX ORDER — MAGNESIUM HYDROXIDE 1200 MG/15ML
LIQUID ORAL AS NEEDED
Status: DISCONTINUED | OUTPATIENT
Start: 2019-12-17 | End: 2019-12-17 | Stop reason: HOSPADM

## 2019-12-17 RX ORDER — ONDANSETRON 2 MG/ML
4 INJECTION INTRAMUSCULAR; INTRAVENOUS AS NEEDED
Status: DISCONTINUED | OUTPATIENT
Start: 2019-12-17 | End: 2019-12-17 | Stop reason: HOSPADM

## 2019-12-17 RX ORDER — IPRATROPIUM BROMIDE AND ALBUTEROL SULFATE 2.5; .5 MG/3ML; MG/3ML
3 SOLUTION RESPIRATORY (INHALATION) ONCE AS NEEDED
Status: DISCONTINUED | OUTPATIENT
Start: 2019-12-17 | End: 2019-12-17 | Stop reason: HOSPADM

## 2019-12-17 RX ORDER — SODIUM CHLORIDE, SODIUM LACTATE, POTASSIUM CHLORIDE, CALCIUM CHLORIDE 600; 310; 30; 20 MG/100ML; MG/100ML; MG/100ML; MG/100ML
125 INJECTION, SOLUTION INTRAVENOUS CONTINUOUS
Status: DISCONTINUED | OUTPATIENT
Start: 2019-12-17 | End: 2019-12-17 | Stop reason: HOSPADM

## 2019-12-17 RX ORDER — KETOROLAC TROMETHAMINE 30 MG/ML
INJECTION, SOLUTION INTRAMUSCULAR; INTRAVENOUS AS NEEDED
Status: DISCONTINUED | OUTPATIENT
Start: 2019-12-17 | End: 2019-12-17 | Stop reason: SURG

## 2019-12-17 RX ORDER — FENTANYL CITRATE 50 UG/ML
INJECTION, SOLUTION INTRAMUSCULAR; INTRAVENOUS AS NEEDED
Status: DISCONTINUED | OUTPATIENT
Start: 2019-12-17 | End: 2019-12-17 | Stop reason: SURG

## 2019-12-17 RX ORDER — ONDANSETRON 2 MG/ML
INJECTION INTRAMUSCULAR; INTRAVENOUS AS NEEDED
Status: DISCONTINUED | OUTPATIENT
Start: 2019-12-17 | End: 2019-12-17 | Stop reason: SURG

## 2019-12-17 RX ORDER — BACTERIOSTATIC SODIUM CHLORIDE 0.9 %
VIAL (ML) INJECTION AS NEEDED
Status: DISCONTINUED | OUTPATIENT
Start: 2019-12-17 | End: 2019-12-17 | Stop reason: HOSPADM

## 2019-12-17 RX ORDER — LIDOCAINE HYDROCHLORIDE 10 MG/ML
INJECTION, SOLUTION INFILTRATION; PERINEURAL AS NEEDED
Status: DISCONTINUED | OUTPATIENT
Start: 2019-12-17 | End: 2019-12-17 | Stop reason: HOSPADM

## 2019-12-17 RX ORDER — HEPARIN SODIUM 5000 [USP'U]/ML
INJECTION, SOLUTION INTRAVENOUS; SUBCUTANEOUS AS NEEDED
Status: DISCONTINUED | OUTPATIENT
Start: 2019-12-17 | End: 2019-12-17 | Stop reason: HOSPADM

## 2019-12-17 RX ORDER — IPRATROPIUM BROMIDE AND ALBUTEROL SULFATE 2.5; .5 MG/3ML; MG/3ML
3 SOLUTION RESPIRATORY (INHALATION) ONCE
Status: COMPLETED | OUTPATIENT
Start: 2019-12-17 | End: 2019-12-17

## 2019-12-17 RX ORDER — OXYCODONE HYDROCHLORIDE AND ACETAMINOPHEN 5; 325 MG/1; MG/1
1 TABLET ORAL ONCE AS NEEDED
Status: DISCONTINUED | OUTPATIENT
Start: 2019-12-17 | End: 2019-12-17 | Stop reason: HOSPADM

## 2019-12-17 RX ORDER — SODIUM CHLORIDE 0.9 % (FLUSH) 0.9 %
10 SYRINGE (ML) INJECTION EVERY 12 HOURS SCHEDULED
Status: DISCONTINUED | OUTPATIENT
Start: 2019-12-17 | End: 2019-12-17 | Stop reason: HOSPADM

## 2019-12-17 RX ORDER — CEFAZOLIN SODIUM 2 G/50ML
2 SOLUTION INTRAVENOUS ONCE
Status: COMPLETED | OUTPATIENT
Start: 2019-12-17 | End: 2019-12-17

## 2019-12-17 RX ORDER — PROPOFOL 10 MG/ML
VIAL (ML) INTRAVENOUS AS NEEDED
Status: DISCONTINUED | OUTPATIENT
Start: 2019-12-17 | End: 2019-12-17 | Stop reason: SURG

## 2019-12-17 RX ORDER — MIDAZOLAM HYDROCHLORIDE 1 MG/ML
INJECTION INTRAMUSCULAR; INTRAVENOUS AS NEEDED
Status: DISCONTINUED | OUTPATIENT
Start: 2019-12-17 | End: 2019-12-17 | Stop reason: SURG

## 2019-12-17 RX ADMIN — MIDAZOLAM HYDROCHLORIDE 2 MG: 1 INJECTION, SOLUTION INTRAMUSCULAR; INTRAVENOUS at 09:00

## 2019-12-17 RX ADMIN — CEFAZOLIN SODIUM 2 G: 2 SOLUTION INTRAVENOUS at 09:00

## 2019-12-17 RX ADMIN — IPRATROPIUM BROMIDE AND ALBUTEROL SULFATE 3 ML: .5; 3 SOLUTION RESPIRATORY (INHALATION) at 08:20

## 2019-12-17 RX ADMIN — FENTANYL CITRATE 100 MCG: 50 INJECTION INTRAMUSCULAR; INTRAVENOUS at 09:00

## 2019-12-17 RX ADMIN — PROPOFOL 75 MG: 10 INJECTION, EMULSION INTRAVENOUS at 09:29

## 2019-12-17 RX ADMIN — PROPOFOL 80 MG: 10 INJECTION, EMULSION INTRAVENOUS at 09:05

## 2019-12-17 RX ADMIN — HYDROMORPHONE HYDROCHLORIDE 1 MG: 1 INJECTION, SOLUTION INTRAMUSCULAR; INTRAVENOUS; SUBCUTANEOUS at 08:39

## 2019-12-17 RX ADMIN — DEXAMETHASONE SODIUM PHOSPHATE 4 MG: 4 INJECTION, SOLUTION INTRAMUSCULAR; INTRAVENOUS at 09:08

## 2019-12-17 RX ADMIN — FAMOTIDINE 20 MG: 10 INJECTION INTRAVENOUS at 09:00

## 2019-12-17 RX ADMIN — SODIUM CHLORIDE, POTASSIUM CHLORIDE, SODIUM LACTATE AND CALCIUM CHLORIDE 125 ML/HR: 600; 310; 30; 20 INJECTION, SOLUTION INTRAVENOUS at 08:39

## 2019-12-17 RX ADMIN — PROPOFOL 120 MCG/KG/MIN: 10 INJECTION, EMULSION INTRAVENOUS at 09:05

## 2019-12-17 RX ADMIN — ONDANSETRON 4 MG: 2 INJECTION INTRAMUSCULAR; INTRAVENOUS at 09:00

## 2019-12-17 RX ADMIN — KETOROLAC TROMETHAMINE 30 MG: 30 INJECTION, SOLUTION INTRAMUSCULAR; INTRAVENOUS at 09:08

## 2019-12-17 NOTE — ANESTHESIA POSTPROCEDURE EVALUATION
Patient: Liz Jiang    Procedure Summary     Date:  12/17/19 Room / Location:  [facility] OR  /  [facility] OR    Anesthesia Start:  0900 Anesthesia Stop:  0940    Procedure:  INSERTION VENOUS ACCESS DEVICE (Left ) Diagnosis:       Anal cancer (CMS/[facility])      (Anal cancer (CMS/[facility]) [C21.0])    Surgeon:  Carolin Marie MD Provider:  Yung Vásquez MD    Anesthesia Type:  general ASA Status:  3          Anesthesia Type: general    Vitals  Vitals Value Taken Time   /85 12/17/2019 10:12 AM   Temp 98.2 °F (36.8 °C) 12/17/2019  9:42 AM   Pulse 99 12/17/2019 10:12 AM   Resp 19 12/17/2019 10:12 AM   SpO2 96 % 12/17/2019 10:12 AM           Post Anesthesia Care and Evaluation    Patient location during evaluation: PHASE II  Patient participation: complete - patient participated  Level of consciousness: awake and alert  Pain score: 1  Pain management: adequate  Airway patency: patent  Anesthetic complications: No anesthetic complications  PONV Status: controlled  Cardiovascular status: acceptable  Respiratory status: acceptable  Hydration status: acceptable

## 2019-12-17 NOTE — OP NOTE
Ydurcx-a-Vufs insertion    Surgeon:  Carolin Marie M.D., F.A.C.S.    Assistant;  none    Indications: This patient presents for placement of an Uffhcl-o-Yqmn for chemotherapy    Pre-operative Diagnosis: anal cancer    Post-operative Diagnosis:  Same    Anesthesia: general    Procedure Details   After obtaining informed consent and receiving preoperative antibiotic patient was taken to the operating room and placed in the supine position.  After administration of anesthesia the chest and neck were prepped and draped in sterile fashion.  The left subclavian vein was cannulated with use of the Seldinger technique.  The guidewire was passed and position was confirmed under fluoroscopy.  Following this, a transverse incision was made inferior to the site of guidewire entry and carried down to the pectoralis fashion.  A pocket large enough to admit the port without tension was created.  The guidewire was brought through the incision.    The port and catheter were attached and flushed with heparinized saline, 50 units per mL.  The catheter was cut to appropriate length.  Under fluoroscopic guidance, the dilator sheath was passed over the guidewire.  The guidewire and dilator were removed and the catheter was passed over the peel-away sheath which was then removed.  There was no resistance to irrigation or aspiration of blood.  Fluoroscopy confirmed the catheter to be in good position without kinking.  The port was then sutured in with 3-0 Prolene sutures.  After ensuring hemostasis, the skin was closed in a 2 layer closure of 3-0 Vicryl sutures to Kevin's fascia.  The skin was closed with a 4-0 Vicryl subcuticular stitch.  Dressing was placed.     Patient tolerated the procedure well, was taken to the recovery room in stable condition where chest x-ray pending.    Instrument, sponge, and needle counts were correct at closure and at the conclusion of the case.     Findings:  Flouroscopy demonstrated good position of the  port    Estimated Blood Loss: 10 mL or less    Blood administered:  none           Drains: None           Specimens: None        Grafts and Implants: yes         Complications: none           Condition: Stable

## 2019-12-17 NOTE — ANESTHESIA PREPROCEDURE EVALUATION
Anesthesia Evaluation     Patient summary reviewed and Nursing notes reviewed   no history of anesthetic complications:  NPO Solid Status: > 8 hours  NPO Liquid Status: > 8 hours           Airway   Mallampati: III  TM distance: >3 FB  Neck ROM: limited  possible difficult intubation  Dental - normal exam   (+) poor dentation    Pulmonary    (+) a smoker Former, COPD, asthma,home oxygen (on 2L/min at night), sleep apnea on CPAP, decreased breath sounds,   Cardiovascular - normal exam  Exercise tolerance: good (4-7 METS)    NYHA Classification: II  PT is on anticoagulation therapy    (+) dysrhythmias,   (-) hypertension, past MI, angina, CHF, cardiac stents      Neuro/Psych  (+) TIA, CVA, headaches, psychiatric history Anxiety and Depression,     GI/Hepatic/Renal/Endo    (+)  GERD,  renal disease stones,   (-) liver disease, diabetes, no thyroid disorder    Musculoskeletal     (+) back pain, chronic pain,   Abdominal  - normal exam    Bowel sounds: normal.   Substance History - negative use     OB/GYN negative ob/gyn ROS         Other   arthritis,    history of cancer                    Anesthesia Plan    ASA 3     general     intravenous induction     Anesthetic plan, all risks, benefits, and alternatives have been provided, discussed and informed consent has been obtained with: patient.  Use of blood products discussed with patient  Consented to blood products.

## 2019-12-18 DIAGNOSIS — G89.3 NEOPLASM RELATED PAIN: ICD-10-CM

## 2019-12-18 DIAGNOSIS — C21.0 ANAL CANCER (HCC): Primary | ICD-10-CM

## 2019-12-18 RX ORDER — OXYCODONE HYDROCHLORIDE 10 MG/1
TABLET ORAL
Qty: 150 TABLET | Refills: 0 | Status: SHIPPED | OUTPATIENT
Start: 2019-12-18 | End: 2020-01-15 | Stop reason: SDUPTHER

## 2019-12-23 PROCEDURE — 77300 RADIATION THERAPY DOSE PLAN: CPT | Performed by: RADIOLOGY

## 2019-12-23 PROCEDURE — 77338 DESIGN MLC DEVICE FOR IMRT: CPT | Performed by: RADIOLOGY

## 2019-12-23 PROCEDURE — 77301 RADIOTHERAPY DOSE PLAN IMRT: CPT | Performed by: RADIOLOGY

## 2019-12-27 ENCOUNTER — HOSPITAL ENCOUNTER (OUTPATIENT)
Dept: MAMMOGRAPHY | Facility: HOSPITAL | Age: 54
Discharge: HOME | End: 2019-12-27
Admitting: OBSTETRICS & GYNECOLOGY

## 2019-12-27 DIAGNOSIS — Z12.31 VISIT FOR SCREENING MAMMOGRAM: ICD-10-CM

## 2019-12-27 PROCEDURE — 77063 BREAST TOMOSYNTHESIS BI: CPT | Performed by: RADIOLOGY

## 2019-12-27 PROCEDURE — 77067 SCR MAMMO BI INCL CAD: CPT | Performed by: RADIOLOGY

## 2019-12-27 PROCEDURE — 77063 BREAST TOMOSYNTHESIS BI: CPT

## 2019-12-27 PROCEDURE — 77067 SCR MAMMO BI INCL CAD: CPT

## 2019-12-30 ENCOUNTER — HOSPITAL ENCOUNTER (OUTPATIENT)
Dept: RADIATION ONCOLOGY | Facility: HOSPITAL | Age: 54
Discharge: HOME | End: 2019-12-30

## 2019-12-30 ENCOUNTER — INFUSION (OUTPATIENT)
Dept: ONCOLOGY | Facility: HOSPITAL | Age: 54
End: 2019-12-30

## 2019-12-30 ENCOUNTER — LAB (OUTPATIENT)
Dept: ONCOLOGY | Facility: CLINIC | Age: 54
End: 2019-12-30

## 2019-12-30 VITALS
HEART RATE: 100 BPM | OXYGEN SATURATION: 92 % | BODY MASS INDEX: 32.34 KG/M2 | SYSTOLIC BLOOD PRESSURE: 106 MMHG | DIASTOLIC BLOOD PRESSURE: 68 MMHG | TEMPERATURE: 96.7 F | RESPIRATION RATE: 22 BRPM | WEIGHT: 188.4 LBS

## 2019-12-30 DIAGNOSIS — C21.0 ANAL CANCER (HCC): ICD-10-CM

## 2019-12-30 DIAGNOSIS — C21.0 ANAL CANCER (HCC): Primary | ICD-10-CM

## 2019-12-30 LAB
ALBUMIN SERPL-MCNC: 3.84 G/DL (ref 3.5–5.2)
ALBUMIN/GLOB SERPL: 1.2 G/DL
ALP SERPL-CCNC: 69 U/L (ref 39–117)
ALT SERPL W P-5'-P-CCNC: 16 U/L (ref 1–33)
ANION GAP SERPL CALCULATED.3IONS-SCNC: 12.7 MMOL/L (ref 5–15)
AST SERPL-CCNC: 14 U/L (ref 1–32)
BASOPHILS # BLD MANUAL: 0.12 10*3/MM3 (ref 0–0.2)
BASOPHILS NFR BLD AUTO: 1 % (ref 0–1.5)
BILIRUB SERPL-MCNC: 0.2 MG/DL (ref 0.2–1.2)
BUN BLD-MCNC: 11 MG/DL (ref 6–20)
BUN/CREAT SERPL: 21.6 (ref 7–25)
CALCIUM SPEC-SCNC: 8.6 MG/DL (ref 8.6–10.5)
CHLORIDE SERPL-SCNC: 98 MMOL/L (ref 98–107)
CO2 SERPL-SCNC: 29.3 MMOL/L (ref 22–29)
CREAT BLD-MCNC: 0.51 MG/DL (ref 0.57–1)
DEPRECATED RDW RBC AUTO: 49 FL (ref 37–54)
EOSINOPHIL # BLD MANUAL: 0.12 10*3/MM3 (ref 0–0.4)
EOSINOPHIL NFR BLD MANUAL: 1 % (ref 0.3–6.2)
ERYTHROCYTE [DISTWIDTH] IN BLOOD BY AUTOMATED COUNT: 14 % (ref 12.3–15.4)
GFR SERPL CREATININE-BSD FRML MDRD: 125 ML/MIN/1.73
GLOBULIN UR ELPH-MCNC: 3.2 GM/DL
GLUCOSE BLD-MCNC: 129 MG/DL (ref 65–99)
HCT VFR BLD AUTO: 45 % (ref 34–46.6)
HGB BLD-MCNC: 14 G/DL (ref 12–15.9)
LARGE PLATELETS: ABNORMAL
LYMPHOCYTES # BLD MANUAL: 4.06 10*3/MM3 (ref 0.7–3.1)
LYMPHOCYTES NFR BLD MANUAL: 33 % (ref 19.6–45.3)
LYMPHOCYTES NFR BLD MANUAL: 6 % (ref 5–12)
MACROCYTES BLD QL SMEAR: ABNORMAL
MCH RBC QN AUTO: 29.4 PG (ref 26.6–33)
MCHC RBC AUTO-ENTMCNC: 31.1 G/DL (ref 31.5–35.7)
MCV RBC AUTO: 94.5 FL (ref 79–97)
MONOCYTES # BLD AUTO: 0.74 10*3/MM3 (ref 0.1–0.9)
NEUTROPHILS # BLD AUTO: 7.26 10*3/MM3 (ref 1.7–7)
NEUTROPHILS NFR BLD MANUAL: 56 % (ref 42.7–76)
NEUTS BAND NFR BLD MANUAL: 3 % (ref 0–5)
PLATELET # BLD AUTO: 220 10*3/MM3 (ref 140–450)
PMV BLD AUTO: 10.8 FL (ref 6–12)
POTASSIUM BLD-SCNC: 3.4 MMOL/L (ref 3.5–5.2)
PROT SERPL-MCNC: 7 G/DL (ref 6–8.5)
RBC # BLD AUTO: 4.76 10*6/MM3 (ref 3.77–5.28)
SCAN SLIDE: NORMAL
SMALL PLATELETS BLD QL SMEAR: ADEQUATE
SODIUM BLD-SCNC: 140 MMOL/L (ref 136–145)
WBC NRBC COR # BLD: 12.31 10*3/MM3 (ref 3.4–10.8)

## 2019-12-30 PROCEDURE — 85025 COMPLETE CBC W/AUTO DIFF WBC: CPT | Performed by: INTERNAL MEDICINE

## 2019-12-30 PROCEDURE — 85007 BL SMEAR W/DIFF WBC COUNT: CPT | Performed by: INTERNAL MEDICINE

## 2019-12-30 PROCEDURE — 96375 TX/PRO/DX INJ NEW DRUG ADDON: CPT

## 2019-12-30 PROCEDURE — 77386: CPT | Performed by: RADIOLOGY

## 2019-12-30 PROCEDURE — 96416 CHEMO PROLONG INFUSE W/PUMP: CPT

## 2019-12-30 PROCEDURE — 25010000002 DEXAMETHASONE SODIUM PHOSPHATE 20 MG/5ML SOLUTION 5 ML VIAL: Performed by: INTERNAL MEDICINE

## 2019-12-30 PROCEDURE — 80053 COMPREHEN METABOLIC PANEL: CPT | Performed by: INTERNAL MEDICINE

## 2019-12-30 PROCEDURE — 96409 CHEMO IV PUSH SNGL DRUG: CPT

## 2019-12-30 PROCEDURE — 25010000002 FLUOROURACIL PER 500 MG: Performed by: INTERNAL MEDICINE

## 2019-12-30 PROCEDURE — 25010000002 MITOMYCIN PER 5 MG: Performed by: INTERNAL MEDICINE

## 2019-12-30 RX ORDER — PROCHLORPERAZINE MALEATE 10 MG
10 TABLET ORAL EVERY 6 HOURS PRN
Qty: 60 TABLET | Refills: 3 | Status: SHIPPED | OUTPATIENT
Start: 2019-12-30 | End: 2019-12-30 | Stop reason: SDUPTHER

## 2019-12-30 RX ORDER — LIDOCAINE AND PRILOCAINE 25; 25 MG/G; MG/G
CREAM TOPICAL
Qty: 30 G | Refills: 5 | Status: SHIPPED | OUTPATIENT
Start: 2019-12-30

## 2019-12-30 RX ORDER — ONDANSETRON HYDROCHLORIDE 8 MG/1
8 TABLET, FILM COATED ORAL EVERY 8 HOURS PRN
Qty: 30 TABLET | Refills: 5 | Status: SHIPPED | OUTPATIENT
Start: 2019-12-30 | End: 2019-12-30 | Stop reason: SDUPTHER

## 2019-12-30 RX ORDER — ONDANSETRON HYDROCHLORIDE 8 MG/1
8 TABLET, FILM COATED ORAL EVERY 8 HOURS PRN
Qty: 30 TABLET | Refills: 5 | Status: SHIPPED | OUTPATIENT
Start: 2019-12-30 | End: 2020-01-07 | Stop reason: ALTCHOICE

## 2019-12-30 RX ORDER — SODIUM CHLORIDE 9 MG/ML
250 INJECTION, SOLUTION INTRAVENOUS ONCE
Status: COMPLETED | OUTPATIENT
Start: 2019-12-30 | End: 2019-12-30

## 2019-12-30 RX ORDER — PROCHLORPERAZINE MALEATE 10 MG
10 TABLET ORAL EVERY 6 HOURS PRN
Qty: 60 TABLET | Refills: 3 | Status: SHIPPED | OUTPATIENT
Start: 2019-12-30

## 2019-12-30 RX ORDER — MITOMYCIN 20 MG/40ML
10 INJECTION, POWDER, LYOPHILIZED, FOR SOLUTION INTRAVENOUS ONCE
Status: COMPLETED | OUTPATIENT
Start: 2019-12-30 | End: 2019-12-30

## 2019-12-30 RX ADMIN — SODIUM CHLORIDE 250 ML: 9 INJECTION, SOLUTION INTRAVENOUS at 10:18

## 2019-12-30 RX ADMIN — DEXAMETHASONE SODIUM PHOSPHATE 12 MG: 4 INJECTION, SOLUTION INTRAMUSCULAR; INTRAVENOUS at 10:27

## 2019-12-30 RX ADMIN — MITOMYCIN 19 MG: 20 INJECTION, POWDER, LYOPHILIZED, FOR SOLUTION INTRAVENOUS at 10:56

## 2019-12-30 RX ADMIN — FLUOROURACIL 7600 MG: 50 INJECTION, SOLUTION INTRAVENOUS at 11:15

## 2019-12-31 ENCOUNTER — HOSPITAL ENCOUNTER (OUTPATIENT)
Dept: RADIATION ONCOLOGY | Facility: HOSPITAL | Age: 54
Discharge: HOME | End: 2019-12-31

## 2019-12-31 ENCOUNTER — TELEPHONE (OUTPATIENT)
Dept: ONCOLOGY | Facility: HOSPITAL | Age: 54
End: 2019-12-31

## 2019-12-31 VITALS — BODY MASS INDEX: 32.13 KG/M2 | WEIGHT: 187.2 LBS

## 2019-12-31 PROCEDURE — 77386: CPT | Performed by: RADIOLOGY

## 2019-12-31 NOTE — TELEPHONE ENCOUNTER
Telephoned pt to check on her after C1 Mitomycin and 5-FU treatment yesterday.  Pt denies any complaints.  Pt aware to call the office regarding any questions or concerns.

## 2020-01-02 ENCOUNTER — HOSPITAL ENCOUNTER (OUTPATIENT)
Dept: RADIATION ONCOLOGY | Facility: HOSPITAL | Age: 55
Discharge: HOME | End: 2020-01-02

## 2020-01-02 ENCOUNTER — HOSPITAL ENCOUNTER (OUTPATIENT)
Dept: RADIATION ONCOLOGY | Facility: HOSPITAL | Age: 55
Setting detail: RADIATION/ONCOLOGY SERIES
Discharge: HOME | End: 2020-01-02

## 2020-01-02 PROCEDURE — 77386: CPT | Performed by: RADIOLOGY

## 2020-01-02 PROCEDURE — 77336 RADIATION PHYSICS CONSULT: CPT | Performed by: RADIOLOGY

## 2020-01-03 ENCOUNTER — INFUSION (OUTPATIENT)
Dept: ONCOLOGY | Facility: HOSPITAL | Age: 55
End: 2020-01-03

## 2020-01-03 ENCOUNTER — HOSPITAL ENCOUNTER (OUTPATIENT)
Dept: RADIATION ONCOLOGY | Facility: HOSPITAL | Age: 55
Discharge: HOME | End: 2020-01-03

## 2020-01-03 VITALS
RESPIRATION RATE: 16 BRPM | WEIGHT: 183 LBS | DIASTOLIC BLOOD PRESSURE: 78 MMHG | HEART RATE: 105 BPM | OXYGEN SATURATION: 93 % | BODY MASS INDEX: 31.41 KG/M2 | SYSTOLIC BLOOD PRESSURE: 116 MMHG | TEMPERATURE: 97.5 F

## 2020-01-03 DIAGNOSIS — Z95.828 PORT-A-CATH IN PLACE: Primary | ICD-10-CM

## 2020-01-03 PROCEDURE — 77386: CPT | Performed by: RADIOLOGY

## 2020-01-03 RX ORDER — SODIUM CHLORIDE 0.9 % (FLUSH) 0.9 %
10 SYRINGE (ML) INJECTION AS NEEDED
Status: CANCELLED | OUTPATIENT
Start: 2020-01-16

## 2020-01-03 RX ORDER — SODIUM CHLORIDE 0.9 % (FLUSH) 0.9 %
10 SYRINGE (ML) INJECTION AS NEEDED
Status: DISCONTINUED | OUTPATIENT
Start: 2020-01-03 | End: 2020-01-03 | Stop reason: HOSPADM

## 2020-01-03 RX ADMIN — SODIUM CHLORIDE, PRESERVATIVE FREE 10 ML: 5 INJECTION INTRAVENOUS at 11:40

## 2020-01-03 RX ADMIN — SODIUM CHLORIDE, PRESERVATIVE FREE 500 UNITS: 5 INJECTION INTRAVENOUS at 11:40

## 2020-01-06 ENCOUNTER — LAB (OUTPATIENT)
Dept: LAB | Facility: HOSPITAL | Age: 55
End: 2020-01-06

## 2020-01-06 ENCOUNTER — HOSPITAL ENCOUNTER (OUTPATIENT)
Dept: RADIATION ONCOLOGY | Facility: HOSPITAL | Age: 55
Discharge: HOME | End: 2020-01-06

## 2020-01-06 DIAGNOSIS — J30.89 OTHER ALLERGIC RHINITIS: ICD-10-CM

## 2020-01-06 PROCEDURE — 86003 ALLG SPEC IGE CRUDE XTRC EA: CPT

## 2020-01-06 PROCEDURE — 36415 COLL VENOUS BLD VENIPUNCTURE: CPT

## 2020-01-06 PROCEDURE — 82785 ASSAY OF IGE: CPT

## 2020-01-06 PROCEDURE — 77386: CPT | Performed by: RADIOLOGY

## 2020-01-07 ENCOUNTER — LAB (OUTPATIENT)
Dept: ONCOLOGY | Facility: CLINIC | Age: 55
End: 2020-01-07

## 2020-01-07 ENCOUNTER — OFFICE VISIT (OUTPATIENT)
Dept: ONCOLOGY | Facility: CLINIC | Age: 55
End: 2020-01-07

## 2020-01-07 ENCOUNTER — HOSPITAL ENCOUNTER (OUTPATIENT)
Dept: RADIATION ONCOLOGY | Facility: HOSPITAL | Age: 55
Discharge: HOME | End: 2020-01-07

## 2020-01-07 VITALS
OXYGEN SATURATION: 92 % | SYSTOLIC BLOOD PRESSURE: 124 MMHG | TEMPERATURE: 98 F | RESPIRATION RATE: 16 BRPM | DIASTOLIC BLOOD PRESSURE: 73 MMHG | HEART RATE: 96 BPM

## 2020-01-07 VITALS — BODY MASS INDEX: 31.26 KG/M2 | WEIGHT: 182.1 LBS

## 2020-01-07 DIAGNOSIS — E87.6 HYPOKALEMIA: ICD-10-CM

## 2020-01-07 DIAGNOSIS — F41.9 ANXIETY: ICD-10-CM

## 2020-01-07 DIAGNOSIS — K59.00 CONSTIPATION, UNSPECIFIED CONSTIPATION TYPE: ICD-10-CM

## 2020-01-07 DIAGNOSIS — K12.30 MUCOSITIS: ICD-10-CM

## 2020-01-07 DIAGNOSIS — C21.0 ANAL CANCER (HCC): ICD-10-CM

## 2020-01-07 DIAGNOSIS — C21.0 ANAL CANCER (HCC): Primary | ICD-10-CM

## 2020-01-07 DIAGNOSIS — R11.0 NAUSEA: ICD-10-CM

## 2020-01-07 DIAGNOSIS — G89.3 NEOPLASM RELATED PAIN: ICD-10-CM

## 2020-01-07 LAB
ALBUMIN SERPL-MCNC: 3.52 G/DL (ref 3.5–5.2)
ALBUMIN/GLOB SERPL: 1.1 G/DL
ALP SERPL-CCNC: 60 U/L (ref 39–117)
ALT SERPL W P-5'-P-CCNC: 24 U/L (ref 1–33)
ANION GAP SERPL CALCULATED.3IONS-SCNC: 12.5 MMOL/L (ref 5–15)
AST SERPL-CCNC: 18 U/L (ref 1–32)
BASOPHILS # BLD AUTO: 0.02 10*3/MM3 (ref 0–0.2)
BASOPHILS NFR BLD AUTO: 0.3 % (ref 0–1.5)
BILIRUB SERPL-MCNC: 0.4 MG/DL (ref 0.2–1.2)
BUN BLD-MCNC: 12 MG/DL (ref 6–20)
BUN/CREAT SERPL: 21.4 (ref 7–25)
CALCIUM SPEC-SCNC: 8.9 MG/DL (ref 8.6–10.5)
CHLORIDE SERPL-SCNC: 95 MMOL/L (ref 98–107)
CO2 SERPL-SCNC: 31.5 MMOL/L (ref 22–29)
CREAT BLD-MCNC: 0.56 MG/DL (ref 0.57–1)
DEPRECATED RDW RBC AUTO: 44.9 FL (ref 37–54)
EOSINOPHIL # BLD AUTO: 0.07 10*3/MM3 (ref 0–0.4)
EOSINOPHIL NFR BLD AUTO: 1.1 % (ref 0.3–6.2)
ERYTHROCYTE [DISTWIDTH] IN BLOOD BY AUTOMATED COUNT: 13.2 % (ref 12.3–15.4)
GFR SERPL CREATININE-BSD FRML MDRD: 112 ML/MIN/1.73
GLOBULIN UR ELPH-MCNC: 3.3 GM/DL
GLUCOSE BLD-MCNC: 139 MG/DL (ref 65–99)
HCT VFR BLD AUTO: 43.7 % (ref 34–46.6)
HGB BLD-MCNC: 14 G/DL (ref 12–15.9)
IMM GRANULOCYTES # BLD AUTO: 0.09 10*3/MM3 (ref 0–0.05)
IMM GRANULOCYTES NFR BLD AUTO: 1.4 % (ref 0–0.5)
LYMPHOCYTES # BLD AUTO: 0.97 10*3/MM3 (ref 0.7–3.1)
LYMPHOCYTES NFR BLD AUTO: 14.7 % (ref 19.6–45.3)
MCH RBC QN AUTO: 29.9 PG (ref 26.6–33)
MCHC RBC AUTO-ENTMCNC: 32 G/DL (ref 31.5–35.7)
MCV RBC AUTO: 93.2 FL (ref 79–97)
MONOCYTES # BLD AUTO: 0.27 10*3/MM3 (ref 0.1–0.9)
MONOCYTES NFR BLD AUTO: 4.1 % (ref 5–12)
NEUTROPHILS # BLD AUTO: 5.18 10*3/MM3 (ref 1.7–7)
NEUTROPHILS NFR BLD AUTO: 78.4 % (ref 42.7–76)
NRBC BLD AUTO-RTO: 0 /100 WBC (ref 0–0.2)
PLATELET # BLD AUTO: 165 10*3/MM3 (ref 140–450)
PMV BLD AUTO: 10.6 FL (ref 6–12)
POTASSIUM BLD-SCNC: 3.1 MMOL/L (ref 3.5–5.2)
PROT SERPL-MCNC: 6.8 G/DL (ref 6–8.5)
RBC # BLD AUTO: 4.69 10*6/MM3 (ref 3.77–5.28)
SODIUM BLD-SCNC: 139 MMOL/L (ref 136–145)
WBC NRBC COR # BLD: 6.6 10*3/MM3 (ref 3.4–10.8)

## 2020-01-07 PROCEDURE — 77386: CPT | Performed by: RADIOLOGY

## 2020-01-07 PROCEDURE — 99214 OFFICE O/P EST MOD 30 MIN: CPT | Performed by: NURSE PRACTITIONER

## 2020-01-07 PROCEDURE — 85025 COMPLETE CBC W/AUTO DIFF WBC: CPT | Performed by: INTERNAL MEDICINE

## 2020-01-07 PROCEDURE — 80053 COMPREHEN METABOLIC PANEL: CPT | Performed by: INTERNAL MEDICINE

## 2020-01-07 RX ORDER — POLYETHYLENE GLYCOL 3350 17 G/17G
17 POWDER, FOR SOLUTION ORAL 2 TIMES DAILY
Qty: 850 G | Refills: 4 | Status: SHIPPED | OUTPATIENT
Start: 2020-01-07

## 2020-01-07 RX ORDER — GLY/CARB H.POLYMR A/POT HYDROX
5 SOLUTION, ORAL MUCOUS MEMBRANE
Qty: 240 ML | Refills: 2 | Status: SHIPPED | OUTPATIENT
Start: 2020-01-07

## 2020-01-07 RX ORDER — POTASSIUM CHLORIDE 20 MEQ/1
20 TABLET, EXTENDED RELEASE ORAL 2 TIMES DAILY
Qty: 10 TABLET | Refills: 0 | Status: SHIPPED | OUTPATIENT
Start: 2020-01-07 | End: 2020-01-13

## 2020-01-07 RX ORDER — POLYETHYLENE GLYCOL 3350 17 G/17G
17 POWDER, FOR SOLUTION ORAL DAILY
Qty: 238 G | Refills: 3 | Status: SHIPPED | OUTPATIENT
Start: 2020-01-07 | End: 2020-01-07 | Stop reason: SDUPTHER

## 2020-01-08 ENCOUNTER — OFFICE VISIT (OUTPATIENT)
Dept: PULMONOLOGY | Facility: CLINIC | Age: 55
End: 2020-01-08

## 2020-01-08 ENCOUNTER — DOCUMENTATION (OUTPATIENT)
Dept: NUTRITION | Facility: HOSPITAL | Age: 55
End: 2020-01-08

## 2020-01-08 ENCOUNTER — HOSPITAL ENCOUNTER (OUTPATIENT)
Dept: RADIATION ONCOLOGY | Facility: HOSPITAL | Age: 55
Discharge: HOME | End: 2020-01-08

## 2020-01-08 VITALS
DIASTOLIC BLOOD PRESSURE: 60 MMHG | RESPIRATION RATE: 18 BRPM | BODY MASS INDEX: 31.41 KG/M2 | HEART RATE: 92 BPM | WEIGHT: 183 LBS | OXYGEN SATURATION: 89 % | SYSTOLIC BLOOD PRESSURE: 120 MMHG

## 2020-01-08 DIAGNOSIS — B37.0 ORAL CANDIDIASIS: ICD-10-CM

## 2020-01-08 DIAGNOSIS — J43.2 CENTRILOBULAR EMPHYSEMA (HCC): ICD-10-CM

## 2020-01-08 DIAGNOSIS — J44.9 CHRONIC OBSTRUCTIVE PULMONARY DISEASE, UNSPECIFIED COPD TYPE (HCC): ICD-10-CM

## 2020-01-08 DIAGNOSIS — J96.12 CHRONIC RESPIRATORY FAILURE WITH HYPOXIA AND HYPERCAPNIA (HCC): ICD-10-CM

## 2020-01-08 DIAGNOSIS — R06.02 SHORTNESS OF BREATH: Primary | ICD-10-CM

## 2020-01-08 DIAGNOSIS — G47.33 OSA (OBSTRUCTIVE SLEEP APNEA): Primary | ICD-10-CM

## 2020-01-08 DIAGNOSIS — R09.02 HYPOXIA: ICD-10-CM

## 2020-01-08 DIAGNOSIS — J96.11 CHRONIC RESPIRATORY FAILURE WITH HYPOXIA AND HYPERCAPNIA (HCC): ICD-10-CM

## 2020-01-08 PROCEDURE — 99214 OFFICE O/P EST MOD 30 MIN: CPT | Performed by: INTERNAL MEDICINE

## 2020-01-08 PROCEDURE — 77386: CPT | Performed by: RADIOLOGY

## 2020-01-08 NOTE — PROGRESS NOTES
NAME: Liz Jiang    : 1965    DATE:  2020    DIAGNOSIS: Anal Cancer    TREATMENT:      CHIEF COMPLAINT:  Follow up of Anal Cancer/toxicity check     HISTORY OF PRESENT ILLNESS:   Liz Jiang is a very pleasant 54 y.o. female who is being seen today at the request of No ref. provider found for evaluation and treatment of anal cancer. She initially presented to her PCP for constipation, rectal pain and a palpable mass x 6-7 months. After failing conservative treatment, she was recommended by a friend to see Dr. Wu. She had colonoscopy on on 19 during which a large anal mass was visualized. 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 Two benign polyps were also removed. She was referred to our clinic for discussion of further treatment options.     She has several complaints today. She reports gradually worsening anal/rectal pain, and says she feels the urge to defecate constantly. For pain, she is using Norco 10 every 6 hours.She has had nausea for several months that she has been taking Phenergan for. She reports fatigue and a weight loss of 16-18 pounds (which she has subsequently regained), insomnia, a history of headaches that are much more mild after previous CVA, baseline shortness of breath that she wears bipap and uses home O2 for. She also reports that she has decreased ROM to RUE following past CVA. She denies obvious blood in stool, chest pain, abdominal pain, or any other complaint.     INTERVAL HISTORY:  Ms. Jiang is here today for follow up of anal cancer and toxicity check. Since her last visit, she was started on concurrent definitive chemoradiation with Mitomycin/5-FU. She is irritable today and since starting treatment, she has had difficulty with mouth soreness and oral ulcerations. She also complains of nausea (without vomiting) which has been poorly controlled with alternating zofran and compazine  scheduled. She continues to have rectal pain which is better controlled with adding MSContin 15 mg BID and continues to take oxycodone 10 mg prn (typically 2 tabs every 4 hours). She has been struggling with constipation despite taking senna colace ii BID. She has not tried taking Miralax. She reports she is still able to drink fluids but it has been difficult given nausea and mucositis. She is taking Lexapro and Klonopin HS for anxiety which has been helpful. She denies any other complaints today.     PAST MEDICAL HISTORY:  Past Medical History:   Diagnosis Date   • Acid reflux disease    • Anal cancer (CMS/[facility]) 12/5/2019   • Arthritis    • Asthma, extrinsic    • Cholelithiasis    • Chronic headaches    • COPD (chronic obstructive pulmonary disease) (CMS/[facility])    • CVA (cerebral vascular accident) (CMS/[facility])     w/ right sided deficit   • CVA (cerebral vascular accident) (CMS/[facility])    • Diabetes mellitus (CMS/[facility])     only has high blood sugar when on steriods   • Obstructive sleep apnea treated with BiPAP    • On home oxygen therapy     2L    • Sleep apnea, obstructive        PAST SURGICAL HISTORY:  Past Surgical History:   Procedure Laterality Date   • ABDOMINAL SURGERY     • CARDIAC CATHETERIZATION     • CAROTID ENDARTERECTOMY Left 2018   • CHOLECYSTECTOMY WITH INTRAOPERATIVE CHOLANGIOGRAM N/A 1/30/2017    Procedure: CHOLECYSTECTOMY LAPAROSCOPIC INTRAOPERATIVE CHOLANGIOGRAM;  Surgeon: Carolin Marie MD;  Location: [facility];  Service:    • COLONOSCOPY     • HYSTERECTOMY      for heavy bleeding/ complete   • KIDNEY STONE SURGERY     • TUBAL ABDOMINAL LIGATION     • VENOUS ACCESS DEVICE (PORT) INSERTION Left 12/17/2019    Procedure: INSERTION VENOUS ACCESS DEVICE;  Surgeon: Carolin Marie MD;  Location: [facility];  Service: General        FAMILY HISTORY:  Family History   Problem Relation Age of Onset   • Diabetes Mother    • Hypertension Mother    • Arrhythmia Mother    • Pneumonia Father    • Coronary artery  disease Other    • Arrhythmia Sister    • Heart attack Brother    • Lymphoma Brother         hodgkin's    • Other Brother         CABG   • Breast cancer Neg Hx        SOCIAL HISTORY:  Social History     Socioeconomic History   • Marital status:      Spouse name: Not on file   • Number of children: Not on file   • Years of education: Not on file   • Highest education level: Not on file   Tobacco Use   • Smoking status: Former Smoker     Packs/day: 1.50     Years: 30.00     Pack years: 45.00     Types: Electronic Cigarette     Last attempt to quit: 2015     Years since quittin.0   • Smokeless tobacco: Never Used   Substance and Sexual Activity   • Alcohol use: No   • Drug use: No   • Sexual activity: Defer   Social History Narrative    Just retired     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          REVIEW OF SYSTEMS:   A comprehensive 14 point review of systems was performed.  Significant findings as mentioned above.  All other systems reviewed and are negative.      MEDICATIONS:  The current medication list was reviewed in the EMR    Current Outpatient Medications:   •  albuterol sulfate  (90 Base) MCG/ACT inhaler, Inhale 2 puffs Every 6 (Six) Hours As Needed for Wheezing or Shortness of Air., Disp: 1 inhaler, Rfl: 8  •  ASPIRIN LOW DOSE 81 MG EC tablet, Take 81 mg by mouth Daily., Disp: , Rfl: 3  •  budesonide-formoterol (SYMBICORT) 160-4.5 MCG/ACT inhaler, Inhale 2 puffs 2 (Two) Times a Day. Rinse mouth with water after use., Disp: 1 inhaler, Rfl: 8  •  clopidogrel (PLAVIX) 75 MG tablet, TAKE ONE TABLET BY MOUTH EVERY DAY FOR BLOOD THINNER, Disp: , Rfl: 3  •  escitalopram (LEXAPRO) 10 MG tablet, Take 1 tablet by mouth Daily., Disp: 30 tablet, Rfl: 11  •  furosemide  (LASIX) 20 MG tablet, Take 20 mg by mouth 2 (Two) Times a Day. PRN, Disp: , Rfl:   •  gabapentin (NEURONTIN) 800 MG tablet, Take 800 mg by mouth 3 (Three) Times a Day., Disp: , Rfl:   •  HYDROcodone-acetaminophen (NORCO) 7.5-325 MG per tablet, Take 1 tablet by mouth 4 (Four) Times a Day As Needed for Moderate Pain., Disp: 12 tablet, Rfl: 0  •  lidocaine-prilocaine (EMLA) 2.5-2.5 % cream, Apply to port site 30 mins prior to chemotherapy appointment, Disp: 30 g, Rfl: 5  •  loratadine-pseudoephedrine (CLARITIN-D 24 HOUR)  MG per 24 hr tablet, Take 1 tablet by mouth Daily., Disp: 30 tablet, Rfl: 5  •  Morphine (MS CONTIN) 15 MG 12 hr tablet, Take 1 tablet by mouth Every 12 (Twelve) Hours., Disp: 60 tablet, Rfl: 0  •  oxyCODONE (ROXICODONE) 10 MG tablet, Take 1-2 tabs every 4-6 hours as needed for pain, Disp: 150 tablet, Rfl: 0  •  predniSONE (DELTASONE) 5 MG tablet, Take 5 mg by mouth Daily. Take one tab by mouth ever other day alternating with 1/2 tab, Disp: , Rfl:   •  prochlorperazine (COMPAZINE) 10 MG tablet, Take 1 tablet by mouth Every 6 (Six) Hours As Needed for Nausea or Vomiting., Disp: 60 tablet, Rfl: 3  •  promethazine (PHENERGAN) 25 MG tablet, Take 25 mg by mouth Every 6 (Six) Hours As Needed for Nausea or Vomiting., Disp: , Rfl:   •  sennosides-docusate (SENNA-S) 8.6-50 MG per tablet, Take 2 tablets by mouth 2 (Two) Times a Day., Disp: 120 tablet, Rfl: 5  •  tiZANidine (ZANAFLEX) 4 MG tablet, 4 mg Every 6 (Six) Hours As Needed., Disp: , Rfl: 0  •  Umeclidinium Bromide 62.5 MCG/INH aerosol powder , Inhale 1 puff Daily., Disp: 30 each, Rfl: 8  •  diphenhydrAMINE in aluminum-magnesium hydroxide-simethicone suspension-nystatin-Lidocaine Viscous HCl solution, Swish and swallow 5 ml 4 times a day as needed, Disp: 240 mL, Rfl: 5  •  granisetron (SANCUSO) 3.1 MG/24HR, Place 1 patch on the skin as directed by provider 1 (One) Time Per Week., Disp: 4 patch, Rfl: 2  •  Oral Wound Care Products (MUGARD) liquid,  Apply 5 mL to the mouth or throat 5 (Five) Times a Day As Needed (for oral pain)., Disp: 240 mL, Rfl: 2  •  Oral Wound Care Products (MUGARD) liquid, APPLY 5 MLS TO THE MOUTH OR THROAT 5 TIMES A DAY AS NEEDED FOR ORAL PAIN, Disp: 240 mL, Rfl: 2  •  polyethylene glycol (GAVILAX) powder, Take 17 g by mouth 2 (Two) Times a Day., Disp: 850 g, Rfl: 4  •  polyethylene glycol (MIRALAX) powder, MIX 17 GRAMS OF POWDER IN 8 OUNCES OF WATER OR JUICE AND TAKE BY MOUTH TWICE A DAY., Disp: 1020 g, Rfl: 4  •  potassium chloride (K-DUR,KLOR-CON) 20 MEQ CR tablet, Take 1 tablet by mouth 2 (Two) Times a Day for 5 days., Disp: 10 tablet, Rfl: 0    ALLERGIES:    No Known Allergies    PHYSICAL EXAM:  Vitals:    01/07/20 0912   BP: 124/73   Pulse: 96   Resp: 16   Temp: 98 °F (36.7 °C)   SpO2: 92%     Pain Score    01/07/20 0912   PainSc:   4     General:  Awake, alert and oriented, in no acute distress. Agitated at times.   HEENT:  Pupils are equal, round and reactive to light and accommodation, Extra-ocular movements full, Oropharyx +mucositis with ulcerations, mmm  Neck:  No JVD, thyromegaly or lymphadenopathy  CV:  Regular rate and rhythm, no murmurs, rubs or gallops  Resp:  Lungs are clear to auscultation bilaterally  Abd:  Soft, non-tender, non-distended, bowel sounds present, no organomegaly or masses  Rectal: Deferred today.  Last exam as follows:   Firm anal mass palpable anteriorly  Ext:  No clubbing, cyanosis or edema  Lymph:  No cervical, supraclavicular, axillary,adenopathy  Neuro:  grossly non-focal exam    PATHOLOGY:  11-01-19          ENDOSCOPY:  C-scope (Jazmin) 11-01-19          IMAGING:  MRI Abdomen w/wo contrast 11-06-19   FINDINGS:   Today's study shows no definitive colonic mass.   Particularly I do not see any contrast-enhancing abnormalities on the presented images.  No adenopathy in the imaged area.  Sigmoid colon wall does appear to be slightly thickened but no adjacent fluid.  No walled off fluid collections.      IMPRESSION:  No contrast-enhancing abnormalities identified.     CTCAP 11-27-19  FINDINGS: Today's study shows evidence of COPD.     There are no parenchymal nodules or masses.     No pericardial or pleural effusions.     No mediastinal or hilar lymph node enlargement.     IMPRESSION:  No evidence of metastatic disease to the chest.    FINDINGS:   The lung bases are clear. There are no pleural effusions.     The liver is homogeneous. There is no evidence of focal hepatic mass     The spleen is homogeneous     There is no peripancreatic stranding or pancreatic head mass.     There is no adrenal enlargement.     There is a nonobstructing right intrarenal stone..      Otherwise I do not see any free fluid or walled off fluid collections.     There is diffuse colonic wall thickening.      IMPRESSION:  1. Diffuse colonic wall thickening.  2. Nonobstructing right intrarenal stone.  3. No evidence of metastatic disease to the abdomen or pelvis.      RECENT LABS:  Lab Results   Component Value Date    WBC 6.60 01/07/2020    HGB 14.0 01/07/2020    HCT 43.7 01/07/2020    MCV 93.2 01/07/2020    RDW 13.2 01/07/2020     01/07/2020    NEUTRORELPCT 78.4 (H) 01/07/2020    LYMPHORELPCT 14.7 (L) 01/07/2020    MONORELPCT 4.1 (L) 01/07/2020    EOSRELPCT 1.1 01/07/2020    BASORELPCT 0.3 01/07/2020    NEUTROABS 5.18 01/07/2020    LYMPHSABS 0.97 01/07/2020       Lab Results   Component Value Date     01/07/2020    K 3.1 (L) 01/07/2020    CO2 31.5 (H) 01/07/2020    CL 95 (L) 01/07/2020    BUN 12 01/07/2020    CREATININE 0.56 (L) 01/07/2020    EGFRIFNONA 112 01/07/2020    GLUCOSE 139 (H) 01/07/2020    CALCIUM 8.9 01/07/2020    ALKPHOS 60 01/07/2020    AST 18 01/07/2020    ALT 24 01/07/2020    BILITOT 0.4 01/07/2020    ALBUMIN 3.52 01/07/2020    PROTEINTOT 6.8 01/07/2020    MG 2.1 03/27/2017    PHOS 3.0 03/27/2017       Lab Results   Component Value Date    FERRITIN 197.40 (H) 11/19/2019    IRON 43 11/19/2019    TIBC 437  11/19/2019    LABIRON 10 (L) 11/19/2019    CROJLDWR91 473 11/19/2019    FOLATE 9.32 11/19/2019       ASSESSMENT & PLAN:  Liz Jiang is a very pleasant 54 y.o. female with newly diagnosed anal cancer.    1.  Anal cancer:  -  Clinically has Stage IIIB disease ([id number]) with invasion of the rectovaginal septum.  -  She reports recent gynecologic examination without cause for concern.  Will get notes from [facility].  -  CT CAP and MRI Pelvis unremarkable.  No evidence of metastatic disease. No notable adenopathy.  -  Recommended concurrent definitive chemoradiation with Mitomycin/5-FU.  -  She saw Dr. Martinez who was in agreement with this plan.  -  She is seeing Dr. Campos for radiation.   -She underwent powerport placement and was started on treatment.    -With treatment, she has had some expected SE (see management below).   -Will continue to closely monitor as she continues with treatment and follow up as scheduled for another symptom/toxicity check.     2. Neoplasm related pain:  -  She has a h/o chronic back pain previously managed by pain management physician.  - We have decided to take over her pain management during her cancer treatment and will transition back to her pain management physician after her cancer treatment is completed.    -  Added Morphine ER 15 mg q12h and continued Oxyocodone 10 mg prn (typically takes 2 tabs every 4 hours ).  -She says her pain is controlled with current regimen. Will monitor.     3. Constipation: She has been taking Senna/Colace ii BID which has not been helpful. She has not tried taking Miralax as needed, will provide Rx for Miralax today.     4.  Anxiety: Improved. Continue Lexapro 10 mg daily and Klonopin 0.5 mg QHS to help with sleep.     5. Mucositis: Rx for magic mouth wash called to her pharmacy.     6. Hypokalemia: Rx for oral potassium replacement provided.     7. Nausea without vomiting: Poorly controlled with alternating and taking zofran and phenergan  scheduled ( compazine has never worked for her). Therefore, will discontinue zofran and start her on trial of sancuso patch. She can continue with phenergan prn. Will monitor.     8.  Prophylaxis:  Has had Pneumovax, Prevnar 13 and 2019 flu vaccine.       9.  ACO / SAÚL/Other  Quality measures  -  Liz Jiang received 2019 flu vaccine.  -  Liz Jiang reports a pain score of 4.  Given her pain assessment as noted, treatment options were discussed and the following options were decided upon as a follow-up plan to address the patient's pain: continuation of current treatment plan for pain and prescription for opiod analgesics.  -  Current outpatient and discharge medications have been reconciled for the patient.    Reviewed by: HESHAM Rizo    I spent 30 minutes with Liz Jiang today.  In the office today, more than 50% of this time was spent face-to-face with her  in counseling / coordination of care, reviewing her medical history and counseling on the current treatment plan.  All questions were answered to her satisfaction      Electronically Signed by: HESHAM Rizo      CC:   Mayte Davis APRN Muhammed Iqbal, MD John Dvorak, MD Marta Hayne, MD Michael Simons, MD Barbara Michna, MD

## 2020-01-08 NOTE — PROGRESS NOTES
Oncology Nutrition Screening    Patient Name:  Liz Jiang  YOB: 1965  MRN: [id number]  Date:  01/08/20  Physician:  Dr. Ramona Campos    Type of Cancer Treatment:   OP ANAL MitoMYcin / Fluorouracil CIV + XRT - chemotherapy in [place] / patient is traveling daily back and forth from [place] to [place] for radiation    Patient Active Problem List   Diagnosis   • Chest pain   • COPD (chronic obstructive pulmonary disease) (CMS/HCC)   • Tobacco abuse   • GERD (gastroesophageal reflux disease)   • Anxiety   • Arthritis   • Nausea and vomiting   • Generalized abdominal pain   • Right upper quadrant pain   • Gallbladder disease   • Abnormal biliary HIDA scan   • Dyslipidemia   • Tachycardia   • Anal cancer (CMS/HCC)   • Port-A-Cath in place     N0 M0 squamous cell carcinoma of anus Stage IIIB (invades rectovaginal septum    Current Outpatient Medications   Medication Sig Dispense Refill   • albuterol sulfate  (90 Base) MCG/ACT inhaler Inhale 2 puffs Every 6 (Six) Hours As Needed for Wheezing or Shortness of Air. 1 inhaler 8   • ASPIRIN LOW DOSE 81 MG EC tablet Take 81 mg by mouth Daily.  3   • budesonide-formoterol (SYMBICORT) 160-4.5 MCG/ACT inhaler Inhale 2 puffs 2 (Two) Times a Day. Rinse mouth with water after use. 1 inhaler 8   • clopidogrel (PLAVIX) 75 MG tablet TAKE ONE TABLET BY MOUTH EVERY DAY FOR BLOOD THINNER  3   • diphenhydrAMINE in aluminum-magnesium hydroxide-simethicone suspension-nystatin-Lidocaine Viscous HCl solution Swish and swallow 5 ml 4 times a day as needed 240 mL 5   • escitalopram (LEXAPRO) 10 MG tablet Take 1 tablet by mouth Daily. 30 tablet 11   • furosemide (LASIX) 20 MG tablet Take 20 mg by mouth 2 (Two) Times a Day. PRN     • gabapentin (NEURONTIN) 800 MG tablet Take 800 mg by mouth 3 (Three) Times a Day.     • granisetron (SANCUSO) 3.1 MG/24HR Place 1 patch on the skin as directed by provider 1 (One) Time Per Week. 4 patch 2   • HYDROcodone-acetaminophen  (NORCO) 7.5-325 MG per tablet Take 1 tablet by mouth 4 (Four) Times a Day As Needed for Moderate Pain. 12 tablet 0   • lidocaine-prilocaine (EMLA) 2.5-2.5 % cream Apply to port site 30 mins prior to chemotherapy appointment 30 g 5   • loratadine-pseudoephedrine (CLARITIN-D 24 HOUR)  MG per 24 hr tablet Take 1 tablet by mouth Daily. 30 tablet 5   • Morphine (MS CONTIN) 15 MG 12 hr tablet Take 1 tablet by mouth Every 12 (Twelve) Hours. 60 tablet 0   • Oral Wound Care Products (MUGARD) liquid Apply 5 mL to the mouth or throat 5 (Five) Times a Day As Needed (for oral pain). 240 mL 2   • Oral Wound Care Products (MUGARD) liquid APPLY 5 MLS TO THE MOUTH OR THROAT 5 TIMES A DAY AS NEEDED FOR ORAL PAIN 240 mL 2   • oxyCODONE (ROXICODONE) 10 MG tablet Take 1-2 tabs every 4-6 hours as needed for pain 150 tablet 0   • polyethylene glycol (GAVILAX) powder Take 17 g by mouth 2 (Two) Times a Day. 850 g 4   • polyethylene glycol (MIRALAX) powder MIX 17 GRAMS OF POWDER IN 8 OUNCES OF WATER OR JUICE AND TAKE BY MOUTH TWICE A DAY. 1020 g 4   • potassium chloride (K-DUR,KLOR-CON) 20 MEQ CR tablet Take 1 tablet by mouth 2 (Two) Times a Day for 5 days. 10 tablet 0   • predniSONE (DELTASONE) 5 MG tablet Take 5 mg by mouth Daily. Take one tab by mouth ever other day alternating with 1/2 tab     • prochlorperazine (COMPAZINE) 10 MG tablet Take 1 tablet by mouth Every 6 (Six) Hours As Needed for Nausea or Vomiting. 60 tablet 3   • promethazine (PHENERGAN) 25 MG tablet Take 25 mg by mouth Every 6 (Six) Hours As Needed for Nausea or Vomiting.     • sennosides-docusate (SENNA-S) 8.6-50 MG per tablet Take 2 tablets by mouth 2 (Two) Times a Day. 120 tablet 5   • tiZANidine (ZANAFLEX) 4 MG tablet 4 mg Every 6 (Six) Hours As Needed.  0   • Umeclidinium Bromide 62.5 MCG/INH aerosol powder  Inhale 1 puff Daily. 30 each 8     No current facility-administered medications for this visit.        Glycemic Risk:   With steroids    Weight:   Height:  64 inches Weight: 183 lbs.  Usual Body Weight: 180's  BMI: 31.4  Obese  Weight loss of 16-18 lbs / 9% weight loss  / has regained     Oral Food Intake:  Soft, moist foods but limited intake related to mouth sores    Hydration Status:   How many 8 ounce glass of water of fluid do you drink per day?  Inadequate related to mouth sores and difficulty drinking from a cup / unable to use a straw    Enteral Feeding:   NA    Nutrition Symptoms:   Fatigue  Constipation related to diagnosis and pain medications  Hyperglycemia related to steroids,   Mouth sores / ulcerations  Nausea poorly controlled with zofran and phenergan - changed to Sancuso with Phenergan as needed 1/8/20    Activity:   Able to do little activity and spend most of the day in bed or chair / on oxygen   reports that she quit smoking about 5 years ago. Her smoking use included electronic cigarette. She has a 45.00 pack-year smoking history. She has never used smokeless tobacco. She reports that she does not drink alcohol or use drugs.    Evaluation of Nutritional Risk:   Patient is identified to be a potential for nutritional risk related to diagnosis, chemoradiation and nutritional impact symptoms affecting oral intake and hydration status.    Patient seen during Status Checks 1/7/20; she was tearful related to the mouth pain and difficulty with eating / hydrating.  As evidenced by recall of nutritional intake, patient's oral intake is inadequate related to the mouth sores.  She states that she went to see the nurse practitioner 1/6/20 in [place] who ordered MMW for her / she states that she shared with the APRN that she had been on MMW and it was ineffective for management of the pain and discomfort that she was experiencing.  She states that she was given a prescription for an antibiotic a couple of weeks prior by her PCP that was effective in healing the mouth sores inside of her mouth, but a large open sore is very prominent on her lower lip that  interferes with eating and drinking. Patient was cautioned about putting her hands and fingers in her mouth, as she was constantly touching it during our consultation.  Discussed the option of MuGard with the patient and also Dr. Campos, who ordered the prescription through the patient's local pharmacy.    Provided patient with suggestions for foods and beverages that she may be able to consume with the presence of the mouth sores.  Patient also complains of constipation issues; tips for management were discussed.    Will continue to follow patient closely through treatment, providing nutritional support.        Electronically signed by:  Ama Thomas RD  12:14 PM

## 2020-01-08 NOTE — PROGRESS NOTES
Chief Complaint   Patient presents with   • Follow-up   • Shortness of Breath       Subjective   Liz Jiang is a 54 y.o. female.     History of Present Illness   Patient comes today for follow up of shortness of breath, chronic aspiratory failure, hypoxia and COPD.     Patient says that her symptoms have been stable since the last clinic visit. she reports 1 recent exacerbation.     Patient is using medications, as prescribed. Exercise tolerance has also remained stable.     The patient is using her BiPAP but is having some issues with regards to supplies.  She feels that when she uses the BiPAP on a regular basis, she does feel better in the morning.    She does feel less tired and fatigue in the morning although continues to have significant shortness of breath with minimal exertion.    Patient is also complaining of hoarseness as well as sore throat and change in taste. Patient also notes burning sensation on the tongue for the past few days.    She is using oxygen 24/7.      The following portions of the patient's history were reviewed and updated as appropriate: allergies, current medications, past family history, past medical history, past social history and past surgical history.    Review of Systems   Constitutional: Negative for chills and fever.   HENT: Positive for sore throat. Negative for rhinorrhea and sinus pressure.    Respiratory: Positive for cough, chest tightness, shortness of breath and wheezing.    Psychiatric/Behavioral: Positive for sleep disturbance.       Objective   Visit Vitals  /60   Pulse 92   Resp 18   Wt 83 kg (183 lb)   SpO2 (!) 89% Comment: Resting on 2 lpm.   BMI 31.41 kg/m²       Physical Exam   Constitutional: She is oriented to person, place, and time. She appears well-developed and well-nourished.   HENT:   Tongue showed erythema as well as white layer.  Oropharynx was erythematous with scattered white layer.  Buccal mucosa also showed erythema.   Eyes: EOM are  normal.   Neck: Normal range of motion. No JVD present. No thyromegaly present.   Cardiovascular: Normal rate.   Port-A-Cath in place.   Pulmonary/Chest: She is in respiratory distress (mild). She has wheezes (end expiratory).   Musculoskeletal: Normal range of motion.   Gait was normal.   Neurological: She is alert and oriented to person, place, and time.   Skin: Skin is warm and dry.   Psychiatric: She has a normal mood and affect. Her behavior is normal.   Vitals reviewed.        Assessment/Plan   Liz was seen today for follow-up and shortness of breath.    Diagnoses and all orders for this visit:    Shortness of breath    Chronic obstructive pulmonary disease, unspecified COPD type (CMS/HCC)    Chronic respiratory failure with hypoxia and hypercapnia (CMS/HCC)    Hypoxia    Centrilobular emphysema (CMS/HCC)    Oral candidiasis           Return in about 6 months (around 7/8/2020) for Recheck.    DISCUSSION (if any):  Last chest x-ray, from a few weeks ago, was reviewed personally.  No acute pulmonary disease is noted.    I once again reviewed the last PFTs which confirmed very severe COPD.  These PFTs were performed in September 2017.    The patient is on maximal therapy for underlying COPD.    She unfortunately has not been able to tolerate Daliresp.    She was advised to continue oxygen 24/7.    She will need to continue BiPAP at a current setting of 16/8.    She will need new supplies and order was sent to the [employer].    For symptoms of oral candidiasis, the patient will be prescribed nystatin swish and swallow.    Patient was instructed to make sure to rinse mouth with water after using steroid-based inhaler    Patient will also be given a prescription for Diflucan    It appears that the patient's candidiasis is secondary to the fact that she is on chemotherapy for squamous cell carcinoma of the abdomen.        Dictated utilizing Dragon dictation.    This document was electronically signed by Tena  CARYL Cee MD on 01/08/20 at 6:00 PM

## 2020-01-09 ENCOUNTER — HOSPITAL ENCOUNTER (OUTPATIENT)
Dept: RADIATION ONCOLOGY | Facility: HOSPITAL | Age: 55
Discharge: HOME | End: 2020-01-09

## 2020-01-09 LAB — TOTAL IGE SMQN RAST: 65 IU/ML (ref 6–495)

## 2020-01-09 PROCEDURE — 77386: CPT | Performed by: RADIOLOGY

## 2020-01-09 PROCEDURE — 77336 RADIATION PHYSICS CONSULT: CPT | Performed by: RADIOLOGY

## 2020-01-10 ENCOUNTER — HOSPITAL ENCOUNTER (OUTPATIENT)
Dept: RADIATION ONCOLOGY | Facility: HOSPITAL | Age: 55
Discharge: HOME | End: 2020-01-10

## 2020-01-10 PROCEDURE — 77386: CPT | Performed by: RADIOLOGY

## 2020-01-13 ENCOUNTER — HOSPITAL ENCOUNTER (OUTPATIENT)
Dept: RADIATION ONCOLOGY | Facility: HOSPITAL | Age: 55
Discharge: HOME | End: 2020-01-13

## 2020-01-13 DIAGNOSIS — G89.3 NEOPLASM RELATED PAIN: Primary | ICD-10-CM

## 2020-01-13 DIAGNOSIS — C21.0 ANAL CANCER (HCC): ICD-10-CM

## 2020-01-13 PROCEDURE — 77386: CPT | Performed by: RADIOLOGY

## 2020-01-13 RX ORDER — MORPHINE SULFATE 15 MG/1
15 TABLET, FILM COATED, EXTENDED RELEASE ORAL EVERY 12 HOURS
Qty: 60 TABLET | Refills: 0 | Status: SHIPPED | OUTPATIENT
Start: 2020-01-13 | End: 2020-03-23 | Stop reason: DRUGHIGH

## 2020-01-14 ENCOUNTER — HOSPITAL ENCOUNTER (OUTPATIENT)
Dept: RADIATION ONCOLOGY | Facility: HOSPITAL | Age: 55
Discharge: HOME | End: 2020-01-14

## 2020-01-14 VITALS — BODY MASS INDEX: 31.62 KG/M2 | WEIGHT: 184.2 LBS

## 2020-01-14 PROCEDURE — 77386: CPT | Performed by: RADIOLOGY

## 2020-01-15 ENCOUNTER — TELEPHONE (OUTPATIENT)
Dept: ONCOLOGY | Facility: CLINIC | Age: 55
End: 2020-01-15

## 2020-01-15 ENCOUNTER — HOSPITAL ENCOUNTER (OUTPATIENT)
Dept: RADIATION ONCOLOGY | Facility: HOSPITAL | Age: 55
Discharge: HOME | End: 2020-01-15

## 2020-01-15 DIAGNOSIS — G89.3 NEOPLASM RELATED PAIN: ICD-10-CM

## 2020-01-15 DIAGNOSIS — C21.0 ANAL CANCER (HCC): ICD-10-CM

## 2020-01-15 PROCEDURE — 77386: CPT | Performed by: RADIOLOGY

## 2020-01-15 RX ORDER — OXYCODONE HYDROCHLORIDE 10 MG/1
TABLET ORAL
Qty: 150 TABLET | Refills: 0 | Status: SHIPPED | OUTPATIENT
Start: 2020-01-15

## 2020-01-15 NOTE — TELEPHONE ENCOUNTER
I had the opportunity to review the med list and/or validate the medication prescribed   Medication: Morphine    Pt requests a refill and a call back : [phone]

## 2020-01-16 ENCOUNTER — DOCUMENTATION (OUTPATIENT)
Dept: NUTRITION | Facility: HOSPITAL | Age: 55
End: 2020-01-16

## 2020-01-16 ENCOUNTER — INFUSION (OUTPATIENT)
Dept: ONCOLOGY | Facility: HOSPITAL | Age: 55
End: 2020-01-16

## 2020-01-16 ENCOUNTER — TELEPHONE (OUTPATIENT)
Dept: ONCOLOGY | Facility: HOSPITAL | Age: 55
End: 2020-01-16

## 2020-01-16 ENCOUNTER — HOSPITAL ENCOUNTER (OUTPATIENT)
Dept: RADIATION ONCOLOGY | Facility: HOSPITAL | Age: 55
Discharge: HOME | End: 2020-01-16

## 2020-01-16 ENCOUNTER — TELEPHONE (OUTPATIENT)
Dept: ONCOLOGY | Facility: CLINIC | Age: 55
End: 2020-01-16

## 2020-01-16 VITALS
WEIGHT: 184 LBS | HEART RATE: 89 BPM | OXYGEN SATURATION: 93 % | SYSTOLIC BLOOD PRESSURE: 105 MMHG | BODY MASS INDEX: 31.58 KG/M2 | DIASTOLIC BLOOD PRESSURE: 71 MMHG | RESPIRATION RATE: 22 BRPM | TEMPERATURE: 98 F

## 2020-01-16 DIAGNOSIS — E86.0 DEHYDRATION: Primary | ICD-10-CM

## 2020-01-16 DIAGNOSIS — Z95.828 PORT-A-CATH IN PLACE: ICD-10-CM

## 2020-01-16 LAB
A ALTERNATA IGE QN: <0.1 KU/L
A FUMIGATUS IGE QN: <0.1 KU/L
AMER ROACH IGE QN: <0.1 KU/L
BAHIA GRASS IGE QN: <0.1 KU/L
BERMUDA GRASS IGE QN: <0.1 KU/L
BOXELDER IGE QN: <0.1 KU/L
C HERBARUM IGE QN: <0.1 KU/L
CAT DANDER IGG QN: <0.1 KU/L
CMN PIGWEED IGE QN: <0.1 KU/L
COMMON RAGWEED IGE QN: <0.1 KU/L
CONV CLASS DESCRIPTION: ABNORMAL
D FARINAE IGE QN: <0.1 KU/L
D PTERONYSS IGE QN: 0.11 KU/L
DOG DANDER IGE QN: <0.1 KU/L
ENGL PLANTAIN IGE QN: <0.1 KU/L
HAZELNUT POLN IGE QN: <0.1 KU/L
JOHNSON GRASS IGE QN: <0.1 KU/L
KENT BLUE GRASS IGE QN: <0.1 KU/L
M RACEMOSUS IGE QN: <0.1 KU/L
MT JUNIPER IGE QN: <0.1 KU/L
MUGWORT IGE QN: <0.1 KU/L
NETTLE IGE QN: <0.1 KU/L
P NOTATUM IGE QN: <0.1 KU/L
S BOTRYOSUM IGE QN: <0.1 KU/L
SHEEP SORREL IGE QN: <0.1 KU/L
SWEET GUM IGE QN: <0.1 KU/L
T011-IGE MAPLE LEAF SYCAMORE: <0.1 KU/L
WHITE ELM IGE QN: <0.1 KU/L
WHITE HICKORY IGE QN: <0.1 KU/L
WHITE MULBERRY IGE QN: <0.1 KU/L
WHITE OAK IGE QN: <0.1 KU/L

## 2020-01-16 PROCEDURE — 96360 HYDRATION IV INFUSION INIT: CPT

## 2020-01-16 PROCEDURE — 77386: CPT | Performed by: RADIOLOGY

## 2020-01-16 RX ORDER — SODIUM CHLORIDE 0.9 % (FLUSH) 0.9 %
10 SYRINGE (ML) INJECTION AS NEEDED
Status: DISCONTINUED | OUTPATIENT
Start: 2020-01-16 | End: 2020-01-16 | Stop reason: HOSPADM

## 2020-01-16 RX ORDER — SODIUM CHLORIDE 0.9 % (FLUSH) 0.9 %
10 SYRINGE (ML) INJECTION AS NEEDED
Status: CANCELLED | OUTPATIENT
Start: 2020-01-16

## 2020-01-16 RX ORDER — HEPARIN SODIUM (PORCINE) LOCK FLUSH IV SOLN 100 UNIT/ML 100 UNIT/ML
500 SOLUTION INTRAVENOUS AS NEEDED
Status: CANCELLED | OUTPATIENT
Start: 2020-01-16

## 2020-01-16 RX ADMIN — SODIUM CHLORIDE, PRESERVATIVE FREE 10 ML: 5 INJECTION INTRAVENOUS at 11:32

## 2020-01-16 RX ADMIN — Medication 500 UNITS: at 11:32

## 2020-01-16 RX ADMIN — SODIUM CHLORIDE 1000 ML: 9 INJECTION, SOLUTION INTRAVENOUS at 10:27

## 2020-01-16 NOTE — PROGRESS NOTES
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  Mouth sores are improved, allowing for increased oral intake.  MuGard ordered last week is no longer available from the pharmaceutical company; she has prescription for MMW prescribed per Dr. Snider last week and she states that this has been fairly effective in management.  Reviewed oral intake which indicates that it is improved from our consultation last week; advised to avoid spicy, highly seasoned and acidic foods.  Main hydration is soft drinks, which she says she will not give up, but is trying to drink more water.  Emphasized the importance of protein, identifying food choices high in protein content to combat the fatigue she is feeling, as well as emphasis on proper hydration.  Will follow.

## 2020-01-16 NOTE — TELEPHONE ENCOUNTER
Telephoned pt to check on her, pt had a chemotherapy appointment at 9:00 this am.  Pt stated she just got here at the hospital, she was just running late this am.

## 2020-01-17 ENCOUNTER — HOSPITAL ENCOUNTER (OUTPATIENT)
Dept: RADIATION ONCOLOGY | Facility: HOSPITAL | Age: 55
Discharge: HOME | End: 2020-01-17

## 2020-01-17 PROCEDURE — 77386: CPT | Performed by: RADIOLOGY

## 2020-01-17 PROCEDURE — 77336 RADIATION PHYSICS CONSULT: CPT | Performed by: RADIOLOGY

## 2020-01-20 ENCOUNTER — HOSPITAL ENCOUNTER (OUTPATIENT)
Dept: RADIATION ONCOLOGY | Facility: HOSPITAL | Age: 55
Discharge: HOME | End: 2020-01-20

## 2020-01-20 PROCEDURE — 77386: CPT | Performed by: RADIOLOGY

## 2020-01-21 ENCOUNTER — HOSPITAL ENCOUNTER (OUTPATIENT)
Dept: RADIATION ONCOLOGY | Facility: HOSPITAL | Age: 55
Discharge: HOME | End: 2020-01-21

## 2020-01-21 VITALS — WEIGHT: 184.4 LBS | BODY MASS INDEX: 31.65 KG/M2

## 2020-01-21 PROCEDURE — 77386: CPT | Performed by: RADIOLOGY

## 2020-01-22 ENCOUNTER — HOSPITAL ENCOUNTER (OUTPATIENT)
Dept: RADIATION ONCOLOGY | Facility: HOSPITAL | Age: 55
Discharge: HOME | End: 2020-01-22

## 2020-01-22 PROCEDURE — 77386: CPT | Performed by: RADIOLOGY

## 2020-01-23 ENCOUNTER — HOSPITAL ENCOUNTER (OUTPATIENT)
Dept: RADIATION ONCOLOGY | Facility: HOSPITAL | Age: 55
Discharge: HOME | End: 2020-01-23

## 2020-01-23 PROCEDURE — 77336 RADIATION PHYSICS CONSULT: CPT | Performed by: RADIOLOGY

## 2020-01-23 PROCEDURE — 77386: CPT | Performed by: RADIOLOGY

## 2020-01-24 ENCOUNTER — INFUSION (OUTPATIENT)
Dept: ONCOLOGY | Facility: HOSPITAL | Age: 55
End: 2020-01-24

## 2020-01-24 ENCOUNTER — HOSPITAL ENCOUNTER (OUTPATIENT)
Dept: RADIATION ONCOLOGY | Facility: HOSPITAL | Age: 55
Discharge: HOME | End: 2020-01-24

## 2020-01-24 ENCOUNTER — LAB (OUTPATIENT)
Dept: ONCOLOGY | Facility: CLINIC | Age: 55
End: 2020-01-24

## 2020-01-24 ENCOUNTER — OFFICE VISIT (OUTPATIENT)
Dept: ONCOLOGY | Facility: CLINIC | Age: 55
End: 2020-01-24

## 2020-01-24 VITALS
HEART RATE: 116 BPM | DIASTOLIC BLOOD PRESSURE: 76 MMHG | OXYGEN SATURATION: 98 % | WEIGHT: 180.2 LBS | TEMPERATURE: 98 F | RESPIRATION RATE: 18 BRPM | SYSTOLIC BLOOD PRESSURE: 126 MMHG | BODY MASS INDEX: 30.93 KG/M2

## 2020-01-24 VITALS
OXYGEN SATURATION: 98 % | SYSTOLIC BLOOD PRESSURE: 126 MMHG | BODY MASS INDEX: 30.93 KG/M2 | WEIGHT: 180.2 LBS | DIASTOLIC BLOOD PRESSURE: 76 MMHG | TEMPERATURE: 98 F | HEART RATE: 116 BPM | RESPIRATION RATE: 18 BRPM

## 2020-01-24 DIAGNOSIS — F41.9 ANXIETY: ICD-10-CM

## 2020-01-24 DIAGNOSIS — C21.0 ANAL CANCER (HCC): Primary | ICD-10-CM

## 2020-01-24 DIAGNOSIS — E86.0 DEHYDRATION: ICD-10-CM

## 2020-01-24 DIAGNOSIS — G89.3 NEOPLASM RELATED PAIN: ICD-10-CM

## 2020-01-24 DIAGNOSIS — R11.0 NAUSEA: ICD-10-CM

## 2020-01-24 DIAGNOSIS — E86.0 DEHYDRATION: Primary | ICD-10-CM

## 2020-01-24 DIAGNOSIS — Z95.828 PORT-A-CATH IN PLACE: ICD-10-CM

## 2020-01-24 DIAGNOSIS — K12.30 MUCOSITIS: ICD-10-CM

## 2020-01-24 LAB
ALBUMIN SERPL-MCNC: 3.55 G/DL (ref 3.5–5.2)
ALBUMIN/GLOB SERPL: 1.1 G/DL
ALP SERPL-CCNC: 68 U/L (ref 39–117)
ALT SERPL W P-5'-P-CCNC: 25 U/L (ref 1–33)
ANION GAP SERPL CALCULATED.3IONS-SCNC: 12.2 MMOL/L (ref 5–15)
AST SERPL-CCNC: 22 U/L (ref 1–32)
BASOPHILS # BLD AUTO: 0.03 10*3/MM3 (ref 0–0.2)
BASOPHILS NFR BLD AUTO: 0.4 % (ref 0–1.5)
BILIRUB SERPL-MCNC: 0.2 MG/DL (ref 0.2–1.2)
BUN BLD-MCNC: 10 MG/DL (ref 6–20)
BUN/CREAT SERPL: 20.4 (ref 7–25)
CALCIUM SPEC-SCNC: 8.7 MG/DL (ref 8.6–10.5)
CHLORIDE SERPL-SCNC: 99 MMOL/L (ref 98–107)
CO2 SERPL-SCNC: 27.8 MMOL/L (ref 22–29)
CREAT BLD-MCNC: 0.49 MG/DL (ref 0.57–1)
DEPRECATED RDW RBC AUTO: 55.1 FL (ref 37–54)
EOSINOPHIL # BLD AUTO: 0.04 10*3/MM3 (ref 0–0.4)
EOSINOPHIL NFR BLD AUTO: 0.6 % (ref 0.3–6.2)
ERYTHROCYTE [DISTWIDTH] IN BLOOD BY AUTOMATED COUNT: 15.9 % (ref 12.3–15.4)
GFR SERPL CREATININE-BSD FRML MDRD: 131 ML/MIN/1.73
GLOBULIN UR ELPH-MCNC: 3.3 GM/DL
GLUCOSE BLD-MCNC: 139 MG/DL (ref 65–99)
HCT VFR BLD AUTO: 44.9 % (ref 34–46.6)
HGB BLD-MCNC: 13.6 G/DL (ref 12–15.9)
IMM GRANULOCYTES # BLD AUTO: 0.09 10*3/MM3 (ref 0–0.05)
IMM GRANULOCYTES NFR BLD AUTO: 1.3 % (ref 0–0.5)
LYMPHOCYTES # BLD AUTO: 0.35 10*3/MM3 (ref 0.7–3.1)
LYMPHOCYTES NFR BLD AUTO: 5.2 % (ref 19.6–45.3)
MCH RBC QN AUTO: 30 PG (ref 26.6–33)
MCHC RBC AUTO-ENTMCNC: 30.3 G/DL (ref 31.5–35.7)
MCV RBC AUTO: 99.1 FL (ref 79–97)
MONOCYTES # BLD AUTO: 0.45 10*3/MM3 (ref 0.1–0.9)
MONOCYTES NFR BLD AUTO: 6.7 % (ref 5–12)
NEUTROPHILS # BLD AUTO: 5.75 10*3/MM3 (ref 1.7–7)
NEUTROPHILS NFR BLD AUTO: 85.8 % (ref 42.7–76)
NRBC BLD AUTO-RTO: 0 /100 WBC (ref 0–0.2)
PLATELET # BLD AUTO: 168 10*3/MM3 (ref 140–450)
PMV BLD AUTO: 10.4 FL (ref 6–12)
POTASSIUM BLD-SCNC: 3.5 MMOL/L (ref 3.5–5.2)
PROT SERPL-MCNC: 6.8 G/DL (ref 6–8.5)
RBC # BLD AUTO: 4.53 10*6/MM3 (ref 3.77–5.28)
SODIUM BLD-SCNC: 139 MMOL/L (ref 136–145)
WBC NRBC COR # BLD: 6.71 10*3/MM3 (ref 3.4–10.8)

## 2020-01-24 PROCEDURE — 80053 COMPREHEN METABOLIC PANEL: CPT | Performed by: INTERNAL MEDICINE

## 2020-01-24 PROCEDURE — 36415 COLL VENOUS BLD VENIPUNCTURE: CPT

## 2020-01-24 PROCEDURE — 96360 HYDRATION IV INFUSION INIT: CPT

## 2020-01-24 PROCEDURE — 85025 COMPLETE CBC W/AUTO DIFF WBC: CPT | Performed by: INTERNAL MEDICINE

## 2020-01-24 PROCEDURE — 77386: CPT | Performed by: RADIOLOGY

## 2020-01-24 PROCEDURE — 99214 OFFICE O/P EST MOD 30 MIN: CPT | Performed by: INTERNAL MEDICINE

## 2020-01-24 PROCEDURE — 25010000003 HEPARIN LOCK FLUCH PER 10 UNITS: Performed by: INTERNAL MEDICINE

## 2020-01-24 RX ORDER — MORPHINE SULFATE 60 MG/1
60 TABLET, FILM COATED, EXTENDED RELEASE ORAL EVERY 12 HOURS
Qty: 60 TABLET | Refills: 0 | Status: SHIPPED | OUTPATIENT
Start: 2020-01-24 | End: 2020-02-21 | Stop reason: SDUPTHER

## 2020-01-24 RX ORDER — SODIUM CHLORIDE 0.9 % (FLUSH) 0.9 %
10 SYRINGE (ML) INJECTION AS NEEDED
Status: CANCELLED | OUTPATIENT
Start: 2020-01-27

## 2020-01-24 RX ORDER — CLONAZEPAM 0.5 MG/1
TABLET ORAL
COMMUNITY
Start: 2019-12-31 | End: 2020-01-24 | Stop reason: SDUPTHER

## 2020-01-24 RX ORDER — HEPARIN SODIUM (PORCINE) LOCK FLUSH IV SOLN 100 UNIT/ML 100 UNIT/ML
500 SOLUTION INTRAVENOUS AS NEEDED
Status: CANCELLED | OUTPATIENT
Start: 2020-01-27

## 2020-01-24 RX ORDER — HEPARIN SODIUM (PORCINE) LOCK FLUSH IV SOLN 100 UNIT/ML 100 UNIT/ML
500 SOLUTION INTRAVENOUS AS NEEDED
Status: DISCONTINUED | OUTPATIENT
Start: 2020-01-24 | End: 2020-01-24 | Stop reason: HOSPADM

## 2020-01-24 RX ORDER — SODIUM CHLORIDE 0.9 % (FLUSH) 0.9 %
10 SYRINGE (ML) INJECTION AS NEEDED
Status: DISCONTINUED | OUTPATIENT
Start: 2020-01-24 | End: 2020-01-24 | Stop reason: HOSPADM

## 2020-01-24 RX ORDER — CLONAZEPAM 0.5 MG/1
0.5 TABLET ORAL 2 TIMES DAILY PRN
Qty: 60 TABLET | Refills: 0 | Status: SHIPPED | OUTPATIENT
Start: 2020-01-24 | End: 2020-03-23 | Stop reason: SDUPTHER

## 2020-01-24 RX ORDER — ESCITALOPRAM OXALATE 20 MG/1
20 TABLET ORAL DAILY
Qty: 30 TABLET | Refills: 11 | Status: SHIPPED | OUTPATIENT
Start: 2020-01-24 | End: 2020-12-11 | Stop reason: SDUPTHER

## 2020-01-24 RX ORDER — OXYCODONE HYDROCHLORIDE 20 MG/1
20 TABLET ORAL EVERY 4 HOURS PRN
Qty: 200 TABLET | Refills: 0 | Status: SHIPPED | OUTPATIENT
Start: 2020-01-24 | End: 2020-02-21 | Stop reason: SDUPTHER

## 2020-01-24 RX ADMIN — Medication 500 UNITS: at 11:55

## 2020-01-24 RX ADMIN — SODIUM CHLORIDE, PRESERVATIVE FREE 10 ML: 5 INJECTION INTRAVENOUS at 11:55

## 2020-01-24 RX ADMIN — SODIUM CHLORIDE 1000 ML: 9 INJECTION, SOLUTION INTRAVENOUS at 10:51

## 2020-01-24 NOTE — PROGRESS NOTES
NAME: Liz Jiang    : 1965    DATE:  2020    DIAGNOSIS: Anal Cancer    TREATMENT:      CHIEF COMPLAINT:  Follow up of Anal Cancer/toxicity check     HISTORY OF PRESENT ILLNESS:   Liz Jiang is a very pleasant 54 y.o. female who is being seen today at the request of No ref. provider found for evaluation and treatment of anal cancer. She initially presented to her PCP for constipation, rectal pain and a palpable mass x 6-7 months. After failing conservative treatment, she was recommended by a friend to see Dr. Wu. She had colonoscopy on on 19 during which a large anal mass was visualized. 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 Two benign polyps were also removed. She was referred to our clinic for discussion of further treatment options.     She has several complaints today. She reports gradually worsening anal/rectal pain, and says she feels the urge to defecate constantly. For pain, she is using Norco 10 every 6 hours.She has had nausea for several months that she has been taking Phenergan for. She reports fatigue and a weight loss of 16-18 pounds (which she has subsequently regained), insomnia, a history of headaches that are much more mild after previous CVA, baseline shortness of breath that she wears bipap and uses home O2 for. She also reports that she has decreased ROM to RUE following past CVA. She denies obvious blood in stool, chest pain, abdominal pain, or any other complaint.     INTERVAL HISTORY:  Ms. Jiang is here today for follow up of anal cancer.  She is feeling poorly today.  Seh has been nauseated and has had vomiting.  She doesn't feel like eating / drinking and has been dehydrated.  She says she has passed out on occasion and has orthostatic lightheadedness.  She has a lot of pain and has had to use a lot of prn oxycodone.  She says we were probably right that she needs a higher dose of  long acting medication.  She says her insurance won't pay for the number of Oxycodone she is needing.  She is tearful and anxious today.      PAST MEDICAL HISTORY:  Past Medical History:   Diagnosis Date   • Acid reflux disease    • Anal cancer (CMS/[facility]) 12/5/2019   • Arthritis    • Asthma, extrinsic    • Cholelithiasis    • Chronic headaches    • COPD (chronic obstructive pulmonary disease) (CMS/[facility])    • CVA (cerebral vascular accident) (CMS/[facility])     w/ right sided deficit   • CVA (cerebral vascular accident) (CMS/[facility])    • Diabetes mellitus (CMS/[facility])     only has high blood sugar when on steriods   • Obstructive sleep apnea treated with BiPAP    • On home oxygen therapy     2L    • Sleep apnea, obstructive        PAST SURGICAL HISTORY:  Past Surgical History:   Procedure Laterality Date   • ABDOMINAL SURGERY     • CARDIAC CATHETERIZATION     • CAROTID ENDARTERECTOMY Left 2018   • CHOLECYSTECTOMY WITH INTRAOPERATIVE CHOLANGIOGRAM N/A 1/30/2017    Procedure: CHOLECYSTECTOMY LAPAROSCOPIC INTRAOPERATIVE CHOLANGIOGRAM;  Surgeon: Carolin Marie MD;  Location: [facility];  Service:    • COLONOSCOPY     • HYSTERECTOMY      for heavy bleeding/ complete   • KIDNEY STONE SURGERY     • TUBAL ABDOMINAL LIGATION     • VENOUS ACCESS DEVICE (PORT) INSERTION Left 12/17/2019    Procedure: INSERTION VENOUS ACCESS DEVICE;  Surgeon: Carolin Marie MD;  Location: [facility];  Service: General        FAMILY HISTORY:  Family History   Problem Relation Age of Onset   • Diabetes Mother    • Hypertension Mother    • Arrhythmia Mother    • Pneumonia Father    • Coronary artery disease Other    • Arrhythmia Sister    • Heart attack Brother    • Lymphoma Brother         hodgkin's    • Other Brother         CABG   • Breast cancer Neg Hx        SOCIAL HISTORY:  Social History     Socioeconomic History   • Marital status:      Spouse name: Not on file   • Number of children: Not on file   • Years of education: Not on file   •  Highest education level: Not on file   Tobacco Use   • Smoking status: Former Smoker     Packs/day: 1.50     Years: 30.00     Pack years: 45.00     Types: Electronic Cigarette     Last attempt to quit: 2015     Years since quittin.0   • Smokeless tobacco: Never Used   Substance and Sexual Activity   • Alcohol use: No   • Drug use: No   • Sexual activity: Defer   Social History Narrative    Just retired 2015    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          REVIEW OF SYSTEMS:   A comprehensive 14 point review of systems was performed.  Significant findings as mentioned above.  All other systems reviewed and are negative.      MEDICATIONS:  The current medication list was reviewed in the EMR    Current Outpatient Medications:   •  albuterol sulfate  (90 Base) MCG/ACT inhaler, Inhale 2 puffs Every 6 (Six) Hours As Needed for Wheezing or Shortness of Air., Disp: 1 inhaler, Rfl: 8  •  ASPIRIN LOW DOSE 81 MG EC tablet, Take 81 mg by mouth Daily., Disp: , Rfl: 3  •  budesonide-formoterol (SYMBICORT) 160-4.5 MCG/ACT inhaler, Inhale 2 puffs 2 (Two) Times a Day. Rinse mouth with water after use., Disp: 1 inhaler, Rfl: 8  •  clopidogrel (PLAVIX) 75 MG tablet, TAKE ONE TABLET BY MOUTH EVERY DAY FOR BLOOD THINNER, Disp: , Rfl: 3  •  diphenhydrAMINE in aluminum-magnesium hydroxide-simethicone suspension-nystatin-Lidocaine Viscous HCl solution, Swish and swallow 5 ml 4 times a day as needed, Disp: 240 mL, Rfl: 5  •  escitalopram (LEXAPRO) 10 MG tablet, Take 1 tablet by mouth Daily., Disp: 30 tablet, Rfl: 11  •  furosemide (LASIX) 20 MG tablet, Take 20 mg by mouth 2 (Two) Times a Day. PRN, Disp: , Rfl:   •  gabapentin (NEURONTIN) 800 MG tablet, Take 800 mg by mouth 3 (Three) Times a Day., Disp:  , Rfl:   •  granisetron (SANCUSO) 3.1 MG/24HR, Place 1 patch on the skin as directed by provider 1 (One) Time Per Week., Disp: 4 patch, Rfl: 2  •  lidocaine-prilocaine (EMLA) 2.5-2.5 % cream, Apply to port site 30 mins prior to chemotherapy appointment, Disp: 30 g, Rfl: 5  •  loratadine-pseudoephedrine (CLARITIN-D 24 HOUR)  MG per 24 hr tablet, Take 1 tablet by mouth Daily., Disp: 30 tablet, Rfl: 5  •  Magic mouthwash Radonc, Swish and swallow 10 mL 4 (Four) Times a Day Before Meals & at Bedtime As Needed., Disp: 480 mL, Rfl: 3  •  Morphine (MS CONTIN) 15 MG 12 hr tablet, Take 1 tablet by mouth Every 12 (Twelve) Hours., Disp: 60 tablet, Rfl: 0  •  Oral Wound Care Products (MUGARD) liquid, Apply 5 mL to the mouth or throat 5 (Five) Times a Day As Needed (for oral pain)., Disp: 240 mL, Rfl: 2  •  Oral Wound Care Products (MUGARD) liquid, APPLY 5 MLS TO THE MOUTH OR THROAT 5 TIMES A DAY AS NEEDED FOR ORAL PAIN, Disp: 240 mL, Rfl: 2  •  oxyCODONE (ROXICODONE) 10 MG tablet, Take 1-2 tabs every 4-6 hours as needed for pain, Disp: 150 tablet, Rfl: 0  •  polyethylene glycol (GAVILAX) powder, Take 17 g by mouth 2 (Two) Times a Day., Disp: 850 g, Rfl: 4  •  polyethylene glycol (MIRALAX) powder, MIX 17 GRAMS OF POWDER IN 8 OUNCES OF WATER OR JUICE AND TAKE BY MOUTH TWICE A DAY., Disp: 1020 g, Rfl: 4  •  predniSONE (DELTASONE) 5 MG tablet, Take 5 mg by mouth Daily. Take one tab by mouth ever other day alternating with 1/2 tab, Disp: , Rfl:   •  prochlorperazine (COMPAZINE) 10 MG tablet, Take 1 tablet by mouth Every 6 (Six) Hours As Needed for Nausea or Vomiting., Disp: 60 tablet, Rfl: 3  •  promethazine (PHENERGAN) 25 MG tablet, Take 25 mg by mouth Every 6 (Six) Hours As Needed for Nausea or Vomiting., Disp: , Rfl:   •  sennosides-docusate (SENNA-S) 8.6-50 MG per tablet, Take 2 tablets by mouth 2 (Two) Times a Day., Disp: 120 tablet, Rfl: 5  •  silver sulfadiazine (SILVADENE, SSD) 1 % cream, Apply  topically to the  appropriate area as directed 2 (Two) Times a Day. Apply to affected area and keep covered., Disp: 400 g, Rfl: 5  •  tiZANidine (ZANAFLEX) 4 MG tablet, 4 mg Every 6 (Six) Hours As Needed., Disp: , Rfl: 0  •  Umeclidinium Bromide 62.5 MCG/INH aerosol powder , Inhale 1 puff Daily., Disp: 30 each, Rfl: 8    ALLERGIES:    No Known Allergies    PHYSICAL EXAM:  Vitals:    01/24/20 0902   BP: 126/76   Pulse: 116   Resp: 18   Temp: 98 °F (36.7 °C)   SpO2: 98%     Pain Score    01/24/20 0902   PainSc:   6   PainLoc: Rectum     General:  Awake, alert and oriented.  Tearful and frustrated, anxious.  HEENT:  Pupils are equal, round and reactive to light and accommodation, Extra-ocular movements full, Oropharyx +mucositis with ulcerations, mm dry  Neck:  No JVD, thyromegaly or lymphadenopathy  CV:  Regular tachycardia, no murmurs, rubs or gallops  Resp:  Lungs are clear to auscultation bilaterally  Abd:  Soft, non-tender, non-distended, bowel sounds present, no organomegaly or masses  Rectal: Deferred today.  Last exam as follows:   Firm anal mass palpable anteriorly  Ext:  No clubbing, cyanosis or edema  Lymph:  No cervical, supraclavicular, axillary,adenopathy  Neuro:  grossly non-focal exam    PATHOLOGY:  11-01-19          ENDOSCOPY:  C-scope (Jazmin) 11-01-19          IMAGING:  MRI Abdomen w/wo contrast 11-06-19   FINDINGS:   Today's study shows no definitive colonic mass.   Particularly I do not see any contrast-enhancing abnormalities on the presented images.  No adenopathy in the imaged area.  Sigmoid colon wall does appear to be slightly thickened but no adjacent fluid.  No walled off fluid collections.     IMPRESSION:  No contrast-enhancing abnormalities identified.     CTCAP 11-27-19  FINDINGS: Today's study shows evidence of COPD.     There are no parenchymal nodules or masses.     No pericardial or pleural effusions.     No mediastinal or hilar lymph node enlargement.     IMPRESSION:  No evidence of metastatic disease  to the chest.    FINDINGS:   The lung bases are clear. There are no pleural effusions.     The liver is homogeneous. There is no evidence of focal hepatic mass     The spleen is homogeneous     There is no peripancreatic stranding or pancreatic head mass.     There is no adrenal enlargement.     There is a nonobstructing right intrarenal stone..      Otherwise I do not see any free fluid or walled off fluid collections.     There is diffuse colonic wall thickening.      IMPRESSION:  1. Diffuse colonic wall thickening.  2. Nonobstructing right intrarenal stone.  3. No evidence of metastatic disease to the abdomen or pelvis.      RECENT LABS:  Lab Results   Component Value Date    WBC 6.71 01/24/2020    HGB 13.6 01/24/2020    HCT 44.9 01/24/2020    MCV 99.1 (H) 01/24/2020    RDW 15.9 (H) 01/24/2020     01/24/2020    NEUTRORELPCT 85.8 (H) 01/24/2020    LYMPHORELPCT 5.2 (L) 01/24/2020    MONORELPCT 6.7 01/24/2020    EOSRELPCT 0.6 01/24/2020    BASORELPCT 0.4 01/24/2020    NEUTROABS 5.75 01/24/2020    LYMPHSABS 0.35 (L) 01/24/2020       Lab Results   Component Value Date     01/24/2020    K 3.5 01/24/2020    CO2 27.8 01/24/2020    CL 99 01/24/2020    BUN 10 01/24/2020    CREATININE 0.49 (L) 01/24/2020    EGFRIFNONA 131 01/24/2020    GLUCOSE 139 (H) 01/24/2020    CALCIUM 8.7 01/24/2020    ALKPHOS 68 01/24/2020    AST 22 01/24/2020    ALT 25 01/24/2020    BILITOT 0.2 01/24/2020    ALBUMIN 3.55 01/24/2020    PROTEINTOT 6.8 01/24/2020    MG 2.1 03/27/2017    PHOS 3.0 03/27/2017       Lab Results   Component Value Date    FERRITIN 197.40 (H) 11/19/2019    IRON 43 11/19/2019    TIBC 437 11/19/2019    LABIRON 10 (L) 11/19/2019    MIWFWMRT12 473 11/19/2019    FOLATE 9.32 11/19/2019       ASSESSMENT & PLAN:  Liz Jiang is a very pleasant 54 y.o. female with newly diagnosed anal cancer.    1.  Anal cancer:  -  Clinically has Stage IIIB disease ([id number]) with invasion of the rectovaginal septum.  -  She reports  recent gynecologic examination without cause for concern.  Will get notes from [facility].  -  CT CAP and MRI Pelvis unremarkable.  No evidence of metastatic disease. No notable adenopathy.  -  Recommended concurrent definitive chemoradiation with Mitomycin/5-FU.  -  She saw Dr. Martinez who was in agreement with this plan.  -  She is seeing Dr. Campos for radiation.   -She underwent powerport placement and was started on treatment.    -  Will continue her current treatment but work in more supportive care to help support her through her treatment.  -  Will give 1L NS today and plan for 1L each Friday with more frequent IV hydration if needed.    2. Neoplasm related pain:  -  She has a h/o chronic back pain previously managed by pain management physician.  - We have decided to take over her pain management during her cancer treatment and will transition back to her pain management physician after her cancer treatment is completed.    -  Will increase morphine ER to 60 mg q12h with Oxycodone 20 mg 1/2 - 1 tab q4h prn breakthgouh pain.  -  Asked her to keep a log.    3. Constipation: Recommended Senna/Colace ii BID with Miralax as needed.     4.  Anxiety: Improved but still not really controlled. Increase Lexapro to 20 mg daily and continue Klonopin 0.5 mg QHS to help with sleep.     5. Mucositis: Rx for magic mouth wash called to her pharmacy.     6. Nausea without vomiting: Poorly controlled with alternating and taking zofran and phenergan scheduled ( compazine has never worked for her). Ordered trial of sancuso patch which she hasn't tried yet.. Will monitor.     7.  Prophylaxis:  Has had Pneumovax, Prevnar 13 and 2019 flu vaccine.       8.  ACO / SAÚL/Other  Quality measures  -  Liz Jiang received 2019 flu vaccine.  -  Liz Jiang reports a pain score of 6.  Given her pain assessment as noted, treatment options were discussed and the following options were decided upon as a follow-up plan to address the  patient's pain: continuation of current treatment plan for pain and prescription for opiod analgesics. Pain is uncontrolled on current regimen. Will increase MS Contin from 15 mg Q12h to MS Contin 60 mg Q12h and increase prn Oxycodone as above.  -  Current outpatient and discharge medications have been reconciled for the patient.    Reviewed by: Deanne Jones MD     10. Follow up: Will see her back in 1 week.      I spent 35 minutes with Liz Jiang today.  In the office today, more than 50% of this time was spent face-to-face with her  in counseling / coordination of care, reviewing her medical history and counseling on the current treatment plan.  All questions were answered to her satisfaction      Electronically Signed by: Deanne Jones MD       CC:   Mayte Davis APRN Muhammed Iqbal, MD John Dvorak, MD Marta Hayne, MD Michael Simons, MD Barbara Michna, MD

## 2020-01-24 NOTE — PATIENT INSTRUCTIONS
Date Time Pain Level  0-10  0 = No pain  10 = Worst pain imaginable Medication Taken  and Dose How many tablets?                                                                                                                                                                                                                                                                                               To Prevent Mucositis (mouth irritation / sores):    1. Rinse your mouth before and after meals and at bedtime with the following solution:           -  ½ teaspoon Salt and 2 Tablespoons of Baking Soda in 1 quart of warm water

## 2020-01-27 ENCOUNTER — INFUSION (OUTPATIENT)
Dept: ONCOLOGY | Facility: HOSPITAL | Age: 55
End: 2020-01-27

## 2020-01-27 ENCOUNTER — LAB (OUTPATIENT)
Dept: ONCOLOGY | Facility: CLINIC | Age: 55
End: 2020-01-27

## 2020-01-27 ENCOUNTER — HOSPITAL ENCOUNTER (OUTPATIENT)
Dept: RADIATION ONCOLOGY | Facility: HOSPITAL | Age: 55
Discharge: HOME | End: 2020-01-27

## 2020-01-27 VITALS
BODY MASS INDEX: 31.24 KG/M2 | DIASTOLIC BLOOD PRESSURE: 70 MMHG | SYSTOLIC BLOOD PRESSURE: 99 MMHG | HEART RATE: 111 BPM | OXYGEN SATURATION: 90 % | RESPIRATION RATE: 22 BRPM | WEIGHT: 182 LBS | TEMPERATURE: 97.1 F

## 2020-01-27 DIAGNOSIS — C21.0 ANAL CANCER (HCC): Primary | ICD-10-CM

## 2020-01-27 DIAGNOSIS — C21.0 ANAL CANCER (HCC): ICD-10-CM

## 2020-01-27 DIAGNOSIS — E86.0 DEHYDRATION: Primary | ICD-10-CM

## 2020-01-27 LAB
ALBUMIN SERPL-MCNC: 3.37 G/DL (ref 3.5–5.2)
ALBUMIN/GLOB SERPL: 1.1 G/DL
ALP SERPL-CCNC: 65 U/L (ref 39–117)
ALT SERPL W P-5'-P-CCNC: 22 U/L (ref 1–33)
ANION GAP SERPL CALCULATED.3IONS-SCNC: 10.5 MMOL/L (ref 5–15)
ANISOCYTOSIS BLD QL: NORMAL
AST SERPL-CCNC: 22 U/L (ref 1–32)
BASOPHILS # BLD AUTO: 0.02 10*3/MM3 (ref 0–0.2)
BASOPHILS NFR BLD AUTO: 0.3 % (ref 0–1.5)
BILIRUB SERPL-MCNC: 0.3 MG/DL (ref 0.2–1.2)
BUN BLD-MCNC: 9 MG/DL (ref 6–20)
BUN/CREAT SERPL: 18.8 (ref 7–25)
CALCIUM SPEC-SCNC: 8.9 MG/DL (ref 8.6–10.5)
CHLORIDE SERPL-SCNC: 97 MMOL/L (ref 98–107)
CO2 SERPL-SCNC: 29.5 MMOL/L (ref 22–29)
CREAT BLD-MCNC: 0.48 MG/DL (ref 0.57–1)
DEPRECATED RDW RBC AUTO: 56.2 FL (ref 37–54)
EOSINOPHIL # BLD AUTO: 0.08 10*3/MM3 (ref 0–0.4)
EOSINOPHIL NFR BLD AUTO: 1.2 % (ref 0.3–6.2)
ERYTHROCYTE [DISTWIDTH] IN BLOOD BY AUTOMATED COUNT: 16 % (ref 12.3–15.4)
GFR SERPL CREATININE-BSD FRML MDRD: 134 ML/MIN/1.73
GLOBULIN UR ELPH-MCNC: 3.1 GM/DL
GLUCOSE BLD-MCNC: 156 MG/DL (ref 65–99)
HCT VFR BLD AUTO: 43.4 % (ref 34–46.6)
HGB BLD-MCNC: 13.6 G/DL (ref 12–15.9)
HYPOCHROMIA BLD QL: NORMAL
IMM GRANULOCYTES # BLD AUTO: 0.05 10*3/MM3 (ref 0–0.05)
IMM GRANULOCYTES NFR BLD AUTO: 0.7 % (ref 0–0.5)
LYMPHOCYTES # BLD AUTO: 0.19 10*3/MM3 (ref 0.7–3.1)
LYMPHOCYTES NFR BLD AUTO: 2.8 % (ref 19.6–45.3)
MACROCYTES BLD QL SMEAR: NORMAL
MCH RBC QN AUTO: 30.8 PG (ref 26.6–33)
MCHC RBC AUTO-ENTMCNC: 31.3 G/DL (ref 31.5–35.7)
MCV RBC AUTO: 98.2 FL (ref 79–97)
MONOCYTES # BLD AUTO: 0.33 10*3/MM3 (ref 0.1–0.9)
MONOCYTES NFR BLD AUTO: 4.9 % (ref 5–12)
NEUTROPHILS # BLD AUTO: 6.04 10*3/MM3 (ref 1.7–7)
NEUTROPHILS NFR BLD AUTO: 90.1 % (ref 42.7–76)
NRBC BLD AUTO-RTO: 0 /100 WBC (ref 0–0.2)
PLATELET # BLD AUTO: 156 10*3/MM3 (ref 140–450)
PMV BLD AUTO: 10.5 FL (ref 6–12)
POTASSIUM BLD-SCNC: 3.6 MMOL/L (ref 3.5–5.2)
PROT SERPL-MCNC: 6.5 G/DL (ref 6–8.5)
RBC # BLD AUTO: 4.42 10*6/MM3 (ref 3.77–5.28)
SMALL PLATELETS BLD QL SMEAR: ADEQUATE
SODIUM BLD-SCNC: 137 MMOL/L (ref 136–145)
WBC NRBC COR # BLD: 6.71 10*3/MM3 (ref 3.4–10.8)

## 2020-01-27 PROCEDURE — 25010000002 FLUOROURACIL PER 500 MG: Performed by: INTERNAL MEDICINE

## 2020-01-27 PROCEDURE — 85007 BL SMEAR W/DIFF WBC COUNT: CPT | Performed by: INTERNAL MEDICINE

## 2020-01-27 PROCEDURE — 36415 COLL VENOUS BLD VENIPUNCTURE: CPT

## 2020-01-27 PROCEDURE — 77386: CPT | Performed by: RADIOLOGY

## 2020-01-27 PROCEDURE — 96416 CHEMO PROLONG INFUSE W/PUMP: CPT

## 2020-01-27 PROCEDURE — 85025 COMPLETE CBC W/AUTO DIFF WBC: CPT | Performed by: INTERNAL MEDICINE

## 2020-01-27 PROCEDURE — 96409 CHEMO IV PUSH SNGL DRUG: CPT

## 2020-01-27 PROCEDURE — 96375 TX/PRO/DX INJ NEW DRUG ADDON: CPT

## 2020-01-27 PROCEDURE — 25010000002 DEXAMETHASONE SODIUM PHOSPHATE 20 MG/5ML SOLUTION 5 ML VIAL: Performed by: INTERNAL MEDICINE

## 2020-01-27 PROCEDURE — 80053 COMPREHEN METABOLIC PANEL: CPT | Performed by: INTERNAL MEDICINE

## 2020-01-27 PROCEDURE — 25010000002 MITOMYCIN PER 5 MG: Performed by: INTERNAL MEDICINE

## 2020-01-27 RX ORDER — MITOMYCIN 20 MG/40ML
10 INJECTION, POWDER, LYOPHILIZED, FOR SOLUTION INTRAVENOUS ONCE
Status: COMPLETED | OUTPATIENT
Start: 2020-01-27 | End: 2020-01-27

## 2020-01-27 RX ADMIN — SODIUM CHLORIDE 1000 ML: 9 INJECTION, SOLUTION INTRAVENOUS at 09:30

## 2020-01-27 RX ADMIN — MITOMYCIN 19 MG: 20 INJECTION, POWDER, LYOPHILIZED, FOR SOLUTION INTRAVENOUS at 10:30

## 2020-01-27 RX ADMIN — DEXAMETHASONE SODIUM PHOSPHATE 12 MG: 4 INJECTION, SOLUTION INTRAMUSCULAR; INTRAVENOUS at 10:00

## 2020-01-27 RX ADMIN — FLUOROURACIL 7600 MG: 50 INJECTION, SOLUTION INTRAVENOUS at 10:35

## 2020-01-28 ENCOUNTER — HOSPITAL ENCOUNTER (OUTPATIENT)
Dept: RADIATION ONCOLOGY | Facility: HOSPITAL | Age: 55
Discharge: HOME | End: 2020-01-28

## 2020-01-28 VITALS — WEIGHT: 181.3 LBS | BODY MASS INDEX: 31.12 KG/M2

## 2020-01-28 PROCEDURE — 77386: CPT | Performed by: RADIOLOGY

## 2020-01-29 ENCOUNTER — HOSPITAL ENCOUNTER (OUTPATIENT)
Dept: RADIATION ONCOLOGY | Facility: HOSPITAL | Age: 55
Discharge: HOME | End: 2020-01-29

## 2020-01-29 PROCEDURE — 77386: CPT | Performed by: RADIOLOGY

## 2020-01-30 ENCOUNTER — HOSPITAL ENCOUNTER (OUTPATIENT)
Dept: RADIATION ONCOLOGY | Facility: HOSPITAL | Age: 55
Discharge: HOME | End: 2020-01-30

## 2020-01-30 PROCEDURE — 77386: CPT | Performed by: RADIOLOGY

## 2020-01-31 ENCOUNTER — INFUSION (OUTPATIENT)
Dept: ONCOLOGY | Facility: HOSPITAL | Age: 55
End: 2020-01-31

## 2020-01-31 ENCOUNTER — DOCUMENTATION (OUTPATIENT)
Dept: ONCOLOGY | Facility: HOSPITAL | Age: 55
End: 2020-01-31

## 2020-01-31 ENCOUNTER — OFFICE VISIT (OUTPATIENT)
Dept: ONCOLOGY | Facility: CLINIC | Age: 55
End: 2020-01-31

## 2020-01-31 ENCOUNTER — LAB (OUTPATIENT)
Dept: ONCOLOGY | Facility: CLINIC | Age: 55
End: 2020-01-31

## 2020-01-31 ENCOUNTER — HOSPITAL ENCOUNTER (OUTPATIENT)
Dept: RADIATION ONCOLOGY | Facility: HOSPITAL | Age: 55
Discharge: HOME | End: 2020-01-31

## 2020-01-31 VITALS
DIASTOLIC BLOOD PRESSURE: 63 MMHG | BODY MASS INDEX: 30.73 KG/M2 | SYSTOLIC BLOOD PRESSURE: 93 MMHG | RESPIRATION RATE: 16 BRPM | OXYGEN SATURATION: 90 % | HEART RATE: 121 BPM | WEIGHT: 179 LBS | TEMPERATURE: 98.2 F

## 2020-01-31 VITALS
SYSTOLIC BLOOD PRESSURE: 93 MMHG | HEART RATE: 121 BPM | BODY MASS INDEX: 30.73 KG/M2 | WEIGHT: 179 LBS | DIASTOLIC BLOOD PRESSURE: 63 MMHG | RESPIRATION RATE: 16 BRPM | OXYGEN SATURATION: 90 % | TEMPERATURE: 98.2 F

## 2020-01-31 DIAGNOSIS — R11.0 NAUSEA: ICD-10-CM

## 2020-01-31 DIAGNOSIS — C21.0 ANAL CANCER (HCC): Primary | ICD-10-CM

## 2020-01-31 DIAGNOSIS — Z95.828 PORT-A-CATH IN PLACE: ICD-10-CM

## 2020-01-31 DIAGNOSIS — G89.3 NEOPLASM RELATED PAIN: ICD-10-CM

## 2020-01-31 DIAGNOSIS — E86.0 DEHYDRATION: ICD-10-CM

## 2020-01-31 LAB
ALBUMIN SERPL-MCNC: 3.51 G/DL (ref 3.5–5.2)
ALBUMIN/GLOB SERPL: 1.1 G/DL
ALP SERPL-CCNC: 62 U/L (ref 39–117)
ALT SERPL W P-5'-P-CCNC: 25 U/L (ref 1–33)
ANION GAP SERPL CALCULATED.3IONS-SCNC: 12.1 MMOL/L (ref 5–15)
AST SERPL-CCNC: 22 U/L (ref 1–32)
BASOPHILS # BLD AUTO: 0.02 10*3/MM3 (ref 0–0.2)
BASOPHILS NFR BLD AUTO: 0.3 % (ref 0–1.5)
BILIRUB SERPL-MCNC: 0.4 MG/DL (ref 0.2–1.2)
BUN BLD-MCNC: 5 MG/DL (ref 6–20)
BUN/CREAT SERPL: 11.6 (ref 7–25)
CALCIUM SPEC-SCNC: 9 MG/DL (ref 8.6–10.5)
CHLORIDE SERPL-SCNC: 93 MMOL/L (ref 98–107)
CO2 SERPL-SCNC: 31.9 MMOL/L (ref 22–29)
CREAT BLD-MCNC: 0.43 MG/DL (ref 0.57–1)
DEPRECATED RDW RBC AUTO: 55.4 FL (ref 37–54)
EOSINOPHIL # BLD AUTO: 0.29 10*3/MM3 (ref 0–0.4)
EOSINOPHIL NFR BLD AUTO: 4.1 % (ref 0.3–6.2)
ERYTHROCYTE [DISTWIDTH] IN BLOOD BY AUTOMATED COUNT: 16 % (ref 12.3–15.4)
GFR SERPL CREATININE-BSD FRML MDRD: >150 ML/MIN/1.73
GLOBULIN UR ELPH-MCNC: 3.3 GM/DL
GLUCOSE BLD-MCNC: 137 MG/DL (ref 65–99)
HCT VFR BLD AUTO: 43.7 % (ref 34–46.6)
HGB BLD-MCNC: 13.6 G/DL (ref 12–15.9)
IMM GRANULOCYTES # BLD AUTO: 0.03 10*3/MM3 (ref 0–0.05)
IMM GRANULOCYTES NFR BLD AUTO: 0.4 % (ref 0–0.5)
LYMPHOCYTES # BLD AUTO: 0.16 10*3/MM3 (ref 0.7–3.1)
LYMPHOCYTES NFR BLD AUTO: 2.3 % (ref 19.6–45.3)
MCH RBC QN AUTO: 30.1 PG (ref 26.6–33)
MCHC RBC AUTO-ENTMCNC: 31.1 G/DL (ref 31.5–35.7)
MCV RBC AUTO: 96.7 FL (ref 79–97)
MONOCYTES # BLD AUTO: 0.13 10*3/MM3 (ref 0.1–0.9)
MONOCYTES NFR BLD AUTO: 1.8 % (ref 5–12)
NEUTROPHILS # BLD AUTO: 6.4 10*3/MM3 (ref 1.7–7)
NEUTROPHILS NFR BLD AUTO: 91.1 % (ref 42.7–76)
NRBC BLD AUTO-RTO: 0 /100 WBC (ref 0–0.2)
PLATELET # BLD AUTO: 140 10*3/MM3 (ref 140–450)
PMV BLD AUTO: 10.5 FL (ref 6–12)
POTASSIUM BLD-SCNC: 3.1 MMOL/L (ref 3.5–5.2)
PROT SERPL-MCNC: 6.8 G/DL (ref 6–8.5)
RBC # BLD AUTO: 4.52 10*6/MM3 (ref 3.77–5.28)
SODIUM BLD-SCNC: 137 MMOL/L (ref 136–145)
WBC NRBC COR # BLD: 7.03 10*3/MM3 (ref 3.4–10.8)

## 2020-01-31 PROCEDURE — 25010000003 HEPARIN LOCK FLUCH PER 10 UNITS: Performed by: INTERNAL MEDICINE

## 2020-01-31 PROCEDURE — 77336 RADIATION PHYSICS CONSULT: CPT | Performed by: RADIOLOGY

## 2020-01-31 PROCEDURE — 96361 HYDRATE IV INFUSION ADD-ON: CPT

## 2020-01-31 PROCEDURE — 85025 COMPLETE CBC W/AUTO DIFF WBC: CPT | Performed by: INTERNAL MEDICINE

## 2020-01-31 PROCEDURE — 96376 TX/PRO/DX INJ SAME DRUG ADON: CPT

## 2020-01-31 PROCEDURE — 96375 TX/PRO/DX INJ NEW DRUG ADDON: CPT

## 2020-01-31 PROCEDURE — 80053 COMPREHEN METABOLIC PANEL: CPT | Performed by: INTERNAL MEDICINE

## 2020-01-31 PROCEDURE — 99214 OFFICE O/P EST MOD 30 MIN: CPT | Performed by: INTERNAL MEDICINE

## 2020-01-31 PROCEDURE — 96365 THER/PROPH/DIAG IV INF INIT: CPT

## 2020-01-31 PROCEDURE — 25010000002 LORAZEPAM PER 2 MG: Performed by: INTERNAL MEDICINE

## 2020-01-31 PROCEDURE — 25010000002 DEXAMETHASONE SODIUM PHOSPHATE 20 MG/5ML SOLUTION 5 ML VIAL: Performed by: INTERNAL MEDICINE

## 2020-01-31 PROCEDURE — 77386: CPT | Performed by: RADIOLOGY

## 2020-01-31 RX ORDER — LORAZEPAM 2 MG/ML
0.5 INJECTION INTRAMUSCULAR ONCE
Status: COMPLETED | OUTPATIENT
Start: 2020-01-31 | End: 2020-01-31

## 2020-01-31 RX ORDER — SODIUM CHLORIDE 0.9 % (FLUSH) 0.9 %
10 SYRINGE (ML) INJECTION AS NEEDED
Status: DISCONTINUED | OUTPATIENT
Start: 2020-01-31 | End: 2020-01-31 | Stop reason: HOSPADM

## 2020-01-31 RX ORDER — HEPARIN SODIUM (PORCINE) LOCK FLUSH IV SOLN 100 UNIT/ML 100 UNIT/ML
500 SOLUTION INTRAVENOUS AS NEEDED
Status: CANCELLED | OUTPATIENT
Start: 2020-01-31

## 2020-01-31 RX ORDER — POTASSIUM CHLORIDE 750 MG/1
40 TABLET, EXTENDED RELEASE ORAL DAILY
Qty: 4 TABLET | Refills: 0 | Status: SHIPPED | OUTPATIENT
Start: 2020-02-01 | End: 2020-02-10 | Stop reason: SDUPTHER

## 2020-01-31 RX ORDER — POTASSIUM CHLORIDE 750 MG/1
40 TABLET, FILM COATED, EXTENDED RELEASE ORAL ONCE
Status: COMPLETED | OUTPATIENT
Start: 2020-01-31 | End: 2020-01-31

## 2020-01-31 RX ORDER — SODIUM CHLORIDE 0.9 % (FLUSH) 0.9 %
10 SYRINGE (ML) INJECTION AS NEEDED
Status: CANCELLED | OUTPATIENT
Start: 2020-01-31

## 2020-01-31 RX ORDER — HEPARIN SODIUM (PORCINE) LOCK FLUSH IV SOLN 100 UNIT/ML 100 UNIT/ML
500 SOLUTION INTRAVENOUS AS NEEDED
Status: DISCONTINUED | OUTPATIENT
Start: 2020-01-31 | End: 2020-01-31 | Stop reason: HOSPADM

## 2020-01-31 RX ADMIN — SODIUM CHLORIDE 1000 ML: 9 INJECTION, SOLUTION INTRAVENOUS at 11:26

## 2020-01-31 RX ADMIN — DEXAMETHASONE SODIUM PHOSPHATE 20 MG: 4 INJECTION, SOLUTION INTRAMUSCULAR; INTRAVENOUS at 12:21

## 2020-01-31 RX ADMIN — LORAZEPAM 0.5 MG: 2 INJECTION INTRAMUSCULAR; INTRAVENOUS at 12:09

## 2020-01-31 RX ADMIN — SODIUM CHLORIDE, PRESERVATIVE FREE 10 ML: 5 INJECTION INTRAVENOUS at 12:42

## 2020-01-31 RX ADMIN — LORAZEPAM 0.5 MG: 2 INJECTION INTRAMUSCULAR; INTRAVENOUS at 10:38

## 2020-01-31 RX ADMIN — SODIUM CHLORIDE, PRESERVATIVE FREE 500 UNITS: 5 INJECTION INTRAVENOUS at 12:43

## 2020-01-31 RX ADMIN — SODIUM CHLORIDE 1000 ML: 9 INJECTION, SOLUTION INTRAVENOUS at 10:25

## 2020-01-31 RX ADMIN — POTASSIUM CHLORIDE 40 MEQ: 750 TABLET, FILM COATED, EXTENDED RELEASE ORAL at 12:25

## 2020-01-31 NOTE — PROGRESS NOTES
"SS initiated social service assessment with patient this date.  SS spoke with patient, sister Bessy Mejia, and niece Ronald.  Pt lives at home alone and plans to return home alone.    Patient doesn't utilize home health.    Patient receives home oxygen per [facility].    Pt independent with transportation.  Family providing transportation this date for chemotherapy.    Patient states that she has four children.    The patient completed NCCN Distress Screening on 1/27/2020 and scored a 2 out of 10.      Advance Care Planning:  The patient and I discussed care planning \"Conversations that Matter.\" This service was offered, free of charge, for development of advance directives with a certified ACP facilitator. The patient does not have an up-to-date advance directive. The patient is not interested in an appointment with one of our facilitators to create or update their advanced directives. Patient doesn't have a power of .    SS provided patient with case of chocolate boost per dietary services.    SS completed the [employer] for patient and mailed to agency.  SS will follow as needed.  "

## 2020-01-31 NOTE — PROGRESS NOTES
NAME: Liz Jiang    : 1965    DATE:  2020    DIAGNOSIS: Anal Cancer    TREATMENT:      CHIEF COMPLAINT:  Follow up of Anal Cancer    HISTORY OF PRESENT ILLNESS:   Liz Jiang is a very pleasant 54 y.o. female who is being seen today at the request of No ref. provider found for evaluation and treatment of anal cancer. She initially presented to her PCP for constipation, rectal pain and a palpable mass x 6-7 months. After failing conservative treatment, she was recommended by a friend to see Dr. Wu. She had colonoscopy on on 19 during which a large anal mass was visualized. 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 Two benign polyps were also removed. She was referred to our clinic for discussion of further treatment options.     She has several complaints today. She reports gradually worsening anal/rectal pain, and says she feels the urge to defecate constantly. For pain, she is using Norco 10 every 6 hours.She has had nausea for several months that she has been taking Phenergan for. She reports fatigue and a weight loss of 16-18 pounds (which she has subsequently regained), insomnia, a history of headaches that are much more mild after previous CVA, baseline shortness of breath that she wears bipap and uses home O2 for. She also reports that she has decreased ROM to RUE following past CVA. She denies obvious blood in stool, chest pain, abdominal pain, or any other complaint.     INTERVAL HISTORY:  Ms. Jiang is here today for follow up of anal cancer.  She is feeling poorly today.  She says she has had difficulty with nausea.  She got Sancuso patches and applied one but it fell off and she had to go back to the pharmacy to get more patches and so just put it back on today.  She has been taking Phenergan with some relief but still a lot of nausea and has been eating / drinking very little at all.  She hasn't yet  taken higher dose Morphine because she is afraid to take it but continues to take Morphine ER 15 mg BID with Oxycodone 20 mg q6h (4x/day).  She says she knows she is really dehydrated.  She has started the 20 mg dose of Lexapro.  She is tentatively scheduled to complete radiation the Monday after next ((2-10).      PAST MEDICAL HISTORY:  Past Medical History:   Diagnosis Date   • Acid reflux disease    • Anal cancer (CMS/HCC) 12/5/2019   • Arthritis    • Asthma, extrinsic    • Cholelithiasis    • Chronic headaches    • COPD (chronic obstructive pulmonary disease) (CMS/HCC)    • CVA (cerebral vascular accident) (CMS/[facility])     w/ right sided deficit   • CVA (cerebral vascular accident) (CMS/[facility])    • Diabetes mellitus (CMS/[facility])     only has high blood sugar when on steriods   • Obstructive sleep apnea treated with BiPAP    • On home oxygen therapy     2L    • Sleep apnea, obstructive        PAST SURGICAL HISTORY:  Past Surgical History:   Procedure Laterality Date   • ABDOMINAL SURGERY     • CARDIAC CATHETERIZATION     • CAROTID ENDARTERECTOMY Left 2018   • CHOLECYSTECTOMY WITH INTRAOPERATIVE CHOLANGIOGRAM N/A 1/30/2017    Procedure: CHOLECYSTECTOMY LAPAROSCOPIC INTRAOPERATIVE CHOLANGIOGRAM;  Surgeon: Carolin Marie MD;  Location: [facility];  Service:    • COLONOSCOPY     • HYSTERECTOMY      for heavy bleeding/ complete   • KIDNEY STONE SURGERY     • TUBAL ABDOMINAL LIGATION     • VENOUS ACCESS DEVICE (PORT) INSERTION Left 12/17/2019    Procedure: INSERTION VENOUS ACCESS DEVICE;  Surgeon: Carolin Marie MD;  Location: [facility];  Service: General        FAMILY HISTORY:  Family History   Problem Relation Age of Onset   • Diabetes Mother    • Hypertension Mother    • Arrhythmia Mother    • Pneumonia Father    • Coronary artery disease Other    • Arrhythmia Sister    • Heart attack Brother    • Lymphoma Brother         hodgkin's    • Other Brother         CABG   • Breast cancer Neg Hx        SOCIAL HISTORY:  Social  History     Socioeconomic History   • Marital status:      Spouse name: Not on file   • Number of children: Not on file   • Years of education: Not on file   • Highest education level: Not on file   Tobacco Use   • Smoking status: Former Smoker     Packs/day: 1.50     Years: 30.00     Pack years: 45.00     Types: Electronic Cigarette     Last attempt to quit:      Years since quittin.0   • Smokeless tobacco: Never Used   Substance and Sexual Activity   • Alcohol use: No   • Drug use: No   • Sexual activity: Defer   Social History Narrative    Just retired     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          REVIEW OF SYSTEMS:   A comprehensive 14 point review of systems was performed.  Significant findings as mentioned above.  All other systems reviewed and are negative.      MEDICATIONS:  The current medication list was reviewed in the EMR    Current Outpatient Medications:   •  albuterol sulfate  (90 Base) MCG/ACT inhaler, Inhale 2 puffs Every 6 (Six) Hours As Needed for Wheezing or Shortness of Air., Disp: 1 inhaler, Rfl: 8  •  ASPIRIN LOW DOSE 81 MG EC tablet, Take 81 mg by mouth Daily., Disp: , Rfl: 3  •  budesonide-formoterol (SYMBICORT) 160-4.5 MCG/ACT inhaler, Inhale 2 puffs 2 (Two) Times a Day. Rinse mouth with water after use., Disp: 1 inhaler, Rfl: 8  •  clonazePAM (KlonoPIN) 0.5 MG tablet, Take 1 tablet by mouth 2 (Two) Times a Day As Needed for Seizures., Disp: 60 tablet, Rfl: 0  •  clopidogrel (PLAVIX) 75 MG tablet, TAKE ONE TABLET BY MOUTH EVERY DAY FOR BLOOD THINNER, Disp: , Rfl: 3  •  diphenhydrAMINE in aluminum-magnesium hydroxide-simethicone suspension-nystatin-Lidocaine Viscous HCl solution, Swish and swallow 5 ml 4 times a day as needed, Disp:  240 mL, Rfl: 5  •  escitalopram (LEXAPRO) 20 MG tablet, Take 1 tablet by mouth Daily., Disp: 30 tablet, Rfl: 11  •  furosemide (LASIX) 20 MG tablet, Take 20 mg by mouth 2 (Two) Times a Day. PRN, Disp: , Rfl:   •  gabapentin (NEURONTIN) 800 MG tablet, Take 800 mg by mouth 3 (Three) Times a Day., Disp: , Rfl:   •  granisetron (SANCUSO) 3.1 MG/24HR, Place 1 patch on the skin as directed by provider 1 (One) Time Per Week., Disp: 4 patch, Rfl: 2  •  lidocaine-prilocaine (EMLA) 2.5-2.5 % cream, Apply to port site 30 mins prior to chemotherapy appointment, Disp: 30 g, Rfl: 5  •  loratadine-pseudoephedrine (CLARITIN-D 24 HOUR)  MG per 24 hr tablet, Take 1 tablet by mouth Daily., Disp: 30 tablet, Rfl: 5  •  Magic mouthwash Radonc, Swish and swallow 10 mL 4 (Four) Times a Day Before Meals & at Bedtime As Needed., Disp: 480 mL, Rfl: 3  •  Morphine (MS CONTIN) 15 MG 12 hr tablet, Take 1 tablet by mouth Every 12 (Twelve) Hours., Disp: 60 tablet, Rfl: 0  •  Morphine (MS CONTIN) 60 MG 12 hr tablet, Take 1 tablet by mouth Every 12 (Twelve) Hours., Disp: 60 tablet, Rfl: 0  •  Oral Wound Care Products (MUGARD) liquid, Apply 5 mL to the mouth or throat 5 (Five) Times a Day As Needed (for oral pain)., Disp: 240 mL, Rfl: 2  •  Oral Wound Care Products (MUGARD) liquid, APPLY 5 MLS TO THE MOUTH OR THROAT 5 TIMES A DAY AS NEEDED FOR ORAL PAIN, Disp: 240 mL, Rfl: 2  •  oxyCODONE (ROXICODONE) 10 MG tablet, Take 1-2 tabs every 4-6 hours as needed for pain, Disp: 150 tablet, Rfl: 0  •  oxyCODONE (ROXICODONE) 20 MG tablet, Take 1 tablet by mouth Every 4 (Four) Hours As Needed for pain, Disp: 200 tablet, Rfl: 0  •  polyethylene glycol (GAVILAX) powder, Take 17 g by mouth 2 (Two) Times a Day., Disp: 850 g, Rfl: 4  •  polyethylene glycol (MIRALAX) powder, MIX 17 GRAMS OF POWDER IN 8 OUNCES OF WATER OR JUICE AND TAKE BY MOUTH TWICE A DAY., Disp: 1020 g, Rfl: 4  •  predniSONE (DELTASONE) 5 MG tablet, Take 5 mg by mouth Daily. Take one tab by  mouth ever other day alternating with 1/2 tab, Disp: , Rfl:   •  prochlorperazine (COMPAZINE) 10 MG tablet, Take 1 tablet by mouth Every 6 (Six) Hours As Needed for Nausea or Vomiting., Disp: 60 tablet, Rfl: 3  •  promethazine (PHENERGAN) 25 MG tablet, Take 25 mg by mouth Every 6 (Six) Hours As Needed for Nausea or Vomiting., Disp: , Rfl:   •  sennosides-docusate (SENNA-S) 8.6-50 MG per tablet, Take 2 tablets by mouth 2 (Two) Times a Day., Disp: 120 tablet, Rfl: 5  •  silver sulfadiazine (SILVADENE, SSD) 1 % cream, Apply  topically to the appropriate area as directed 2 (Two) Times a Day. Apply to affected area and keep covered., Disp: 400 g, Rfl: 5  •  tiZANidine (ZANAFLEX) 4 MG tablet, 4 mg Every 6 (Six) Hours As Needed., Disp: , Rfl: 0  •  Umeclidinium Bromide 62.5 MCG/INH aerosol powder , Inhale 1 puff Daily., Disp: 30 each, Rfl: 8  No current facility-administered medications for this visit.     Facility-Administered Medications Ordered in Other Visits:   •  heparin injection 500 Units, 500 Units, Intravenous, PRN, EDSON Mejia MD  •  sodium chloride 0.9 % bolus 1,000 mL, 1,000 mL, Intravenous, Once, Deanne Jones MD, Last Rate: 1,000 mL/hr at 01/31/20 1025, 1,000 mL at 01/31/20 1025  •  sodium chloride 0.9 % flush 10 mL, 10 mL, Intravenous, PRN, EDSON Mejia MD    ALLERGIES:    No Known Allergies    PHYSICAL EXAM:  Vitals:    01/31/20 0950   BP: 93/63   Pulse: (!) 121   Resp: 16   Temp: 98.2 °F (36.8 °C)   SpO2: 90%     Pain Score    01/31/20 0950   PainSc:   3     General:  Awake, alert and oriented.  Appears uncomfortable, somewhat agitated  HEENT:  Pupils are equal, round and reactive to light and accommodation, Extra-ocular movements full, mucous membranes are very dry  Neck:  No JVD, thyromegaly or lymphadenopathy  CV:  Regular tachycardia, no murmurs, rubs or gallops  Resp:  Lungs are clear to auscultation bilaterally  Abd:  Soft, non-tender, non-distended, bowel sounds present, no  organomegaly or masses  Rectal: Deferred today.  Last exam as follows:   Firm anal mass palpable anteriorly  Ext:  No clubbing, cyanosis or edema  Lymph:  No cervical, supraclavicular, axillary,adenopathy  Neuro:  grossly non-focal exam    PATHOLOGY:  11-01-19          ENDOSCOPY:  C-scope (Jazmin) 11-01-19          IMAGING:  MRI Abdomen w/wo contrast 11-06-19   FINDINGS:   Today's study shows no definitive colonic mass.   Particularly I do not see any contrast-enhancing abnormalities on the presented images.  No adenopathy in the imaged area.  Sigmoid colon wall does appear to be slightly thickened but no adjacent fluid.  No walled off fluid collections.     IMPRESSION:  No contrast-enhancing abnormalities identified.     CTCAP 11-27-19  FINDINGS: Today's study shows evidence of COPD.     There are no parenchymal nodules or masses.     No pericardial or pleural effusions.     No mediastinal or hilar lymph node enlargement.     IMPRESSION:  No evidence of metastatic disease to the chest.    FINDINGS:   The lung bases are clear. There are no pleural effusions.     The liver is homogeneous. There is no evidence of focal hepatic mass     The spleen is homogeneous     There is no peripancreatic stranding or pancreatic head mass.     There is no adrenal enlargement.     There is a nonobstructing right intrarenal stone..      Otherwise I do not see any free fluid or walled off fluid collections.     There is diffuse colonic wall thickening.      IMPRESSION:  1. Diffuse colonic wall thickening.  2. Nonobstructing right intrarenal stone.  3. No evidence of metastatic disease to the abdomen or pelvis.      RECENT LABS:  Lab Results   Component Value Date    WBC 7.03 01/31/2020    HGB 13.6 01/31/2020    HCT 43.7 01/31/2020    MCV 96.7 01/31/2020    RDW 16.0 (H) 01/31/2020     01/31/2020    NEUTRORELPCT 91.1 (H) 01/31/2020    LYMPHORELPCT 2.3 (L) 01/31/2020    MONORELPCT 1.8 (L) 01/31/2020    EOSRELPCT 4.1 01/31/2020     BASORELPCT 0.3 01/31/2020    NEUTROABS 6.40 01/31/2020    LYMPHSABS 0.16 (L) 01/31/2020       Lab Results   Component Value Date     01/31/2020    K 3.1 (L) 01/31/2020    CO2 31.9 (H) 01/31/2020    CL 93 (L) 01/31/2020    BUN 5 (L) 01/31/2020    CREATININE 0.43 (L) 01/31/2020    EGFRIFNONA >150 01/31/2020    GLUCOSE 137 (H) 01/31/2020    CALCIUM 9.0 01/31/2020    ALKPHOS 62 01/31/2020    AST 22 01/31/2020    ALT 25 01/31/2020    BILITOT 0.4 01/31/2020    ALBUMIN 3.51 01/31/2020    PROTEINTOT 6.8 01/31/2020    MG 2.1 03/27/2017    PHOS 3.0 03/27/2017       Lab Results   Component Value Date    FERRITIN 197.40 (H) 11/19/2019    IRON 43 11/19/2019    TIBC 437 11/19/2019    LABIRON 10 (L) 11/19/2019    LORMWUYT66 473 11/19/2019    FOLATE 9.32 11/19/2019       ASSESSMENT & PLAN:  Liz Jiang is a very pleasant 54 y.o. female with newly diagnosed anal cancer.    1.  Anal cancer:  -  Clinically has Stage IIIB disease ([id number]) with invasion of the rectovaginal septum.  -  She reports recent gynecologic examination without cause for concern.  Will get notes from [facility].  -  CT CAP and MRI Pelvis unremarkable.  No evidence of metastatic disease. No notable adenopathy.  -  Recommended concurrent definitive chemoradiation with Mitomycin/5-FU.  -  She saw Dr. Martinez who was in agreement with this plan.  -  She is seeing Dr. Campos for radiation and tentatively is planned to complete radiation on 2-10.  -  Will continue her current treatment.  Have encouraged her to take more supportive care.  Will give 2L NS today and she will return on Monday and Friday of next week for IV hydration.  She knows to call right away if she needs further fluids or support at other times next week.  -  I will see her back next week.    2. Neoplasm related pain:  -  She has a h/o chronic back pain previously managed by pain management physician.  - We took over her pain management during her cancer treatment and will transition back  to her pain management physician after her cancer treatment is completed.    -  Recommended she  increase morphine ER to 60 mg q12h with Oxycodone 20 mg 1/2 - 1 tab q4-6h prn breakthrough pain.  She has the medicine at home but hasn't started it.  She says she will do so.  -  Asked her to keep a log.    3. Constipation: Recommended Senna/Colace ii BID with Miralax as needed.     4.  Anxiety: Improved but still not really controlled. Increase Lexapro to 20 mg daily and continue Klonopin 0.5 mg QHS to help with sleep.     5. Mucositis: Continue magic mouth wash as needed     6. Nausea : Poorly controlled with alternating and taking zofran and phenergan scheduled ( compazine has never worked for her), so ordered trial of sancuso patch.  She just recently started this so it's hard to  efficacy. Given IVF and also Ativan 0.5 mg IV x2 as well as Dexamethasone 20 mg IV today.    7.  Prophylaxis:  Has had Pneumovax, Prevnar 13 and 2019 flu vaccine.       8.  ACO / SAÚL/Other  Quality measures  -  Liz Jiang received 2019 flu vaccine.  -  Liz Jiang reports a pain score of 3.  Given her pain assessment as noted, treatment options were discussed and the following options were decided upon as a follow-up plan to address the patient's pain: continuation of current treatment plan for pain and prescription for opiod analgesics.   -  Current outpatient and discharge medications have been reconciled for the patient.    Reviewed by: Deanne Jones MD       9. Follow up: Will see her back in 1 week.    This note was scribed for Deanne Jones MD by Gricel Howell RN.    I, Deanne Jones MD, personally performed the services described in this documentation as scribed by the above named individual in my presence, and it is both accurate and complete.  01/31/2020     I spent 35 minutes with Liz Jiang today.  In the office today, more than 50% of this time was spent face-to-face with her  in counseling /  coordination of care, reviewing her medical history and counseling on the current treatment plan.  All questions were answered to her satisfaction      Electronically Signed by: Deanne Jones MD       CC:   Mayte Davis APRN Muhammed Iqbal, MD John Dvorak, MD Marta Hayne, MD Michael Simons, MD Barbara Michna, MD

## 2020-02-03 ENCOUNTER — HOSPITAL ENCOUNTER (OUTPATIENT)
Dept: RADIATION ONCOLOGY | Facility: HOSPITAL | Age: 55
Setting detail: RADIATION/ONCOLOGY SERIES
Discharge: HOME | End: 2020-02-03

## 2020-02-03 ENCOUNTER — INFUSION (OUTPATIENT)
Dept: ONCOLOGY | Facility: HOSPITAL | Age: 55
End: 2020-02-03

## 2020-02-03 ENCOUNTER — HOSPITAL ENCOUNTER (OUTPATIENT)
Dept: RADIATION ONCOLOGY | Facility: HOSPITAL | Age: 55
Discharge: HOME | End: 2020-02-03

## 2020-02-03 VITALS
RESPIRATION RATE: 16 BRPM | DIASTOLIC BLOOD PRESSURE: 61 MMHG | WEIGHT: 181.6 LBS | BODY MASS INDEX: 31.17 KG/M2 | TEMPERATURE: 97.9 F | HEART RATE: 63 BPM | OXYGEN SATURATION: 91 % | SYSTOLIC BLOOD PRESSURE: 105 MMHG

## 2020-02-03 DIAGNOSIS — C21.0 ANAL CANCER (HCC): Primary | ICD-10-CM

## 2020-02-03 DIAGNOSIS — Z95.828 PORT-A-CATH IN PLACE: ICD-10-CM

## 2020-02-03 LAB
ALBUMIN SERPL-MCNC: 3.27 G/DL (ref 3.5–5.2)
ALBUMIN/GLOB SERPL: 1.2 G/DL
ALP SERPL-CCNC: 58 U/L (ref 39–117)
ALT SERPL W P-5'-P-CCNC: 23 U/L (ref 1–33)
ANION GAP SERPL CALCULATED.3IONS-SCNC: 7.2 MMOL/L (ref 5–15)
AST SERPL-CCNC: 17 U/L (ref 1–32)
BASOPHILS # BLD AUTO: 0.02 10*3/MM3 (ref 0–0.2)
BASOPHILS NFR BLD AUTO: 0.3 % (ref 0–1.5)
BILIRUB SERPL-MCNC: 0.3 MG/DL (ref 0.2–1.2)
BUN BLD-MCNC: 7 MG/DL (ref 6–20)
BUN/CREAT SERPL: 17.9 (ref 7–25)
CALCIUM SPEC-SCNC: 8.4 MG/DL (ref 8.6–10.5)
CHLORIDE SERPL-SCNC: 95 MMOL/L (ref 98–107)
CO2 SERPL-SCNC: 31.8 MMOL/L (ref 22–29)
CREAT BLD-MCNC: 0.39 MG/DL (ref 0.57–1)
DEPRECATED RDW RBC AUTO: 56 FL (ref 37–54)
EOSINOPHIL # BLD AUTO: 0.29 10*3/MM3 (ref 0–0.4)
EOSINOPHIL NFR BLD AUTO: 4.4 % (ref 0.3–6.2)
ERYTHROCYTE [DISTWIDTH] IN BLOOD BY AUTOMATED COUNT: 15.8 % (ref 12.3–15.4)
GFR SERPL CREATININE-BSD FRML MDRD: >150 ML/MIN/1.73
GLOBULIN UR ELPH-MCNC: 2.8 GM/DL
GLUCOSE BLD-MCNC: 110 MG/DL (ref 65–99)
HCT VFR BLD AUTO: 41.7 % (ref 34–46.6)
HGB BLD-MCNC: 13.1 G/DL (ref 12–15.9)
IMM GRANULOCYTES # BLD AUTO: 0.06 10*3/MM3 (ref 0–0.05)
IMM GRANULOCYTES NFR BLD AUTO: 0.9 % (ref 0–0.5)
LYMPHOCYTES # BLD AUTO: 0.17 10*3/MM3 (ref 0.7–3.1)
LYMPHOCYTES NFR BLD AUTO: 2.6 % (ref 19.6–45.3)
MCH RBC QN AUTO: 30.8 PG (ref 26.6–33)
MCHC RBC AUTO-ENTMCNC: 31.4 G/DL (ref 31.5–35.7)
MCV RBC AUTO: 97.9 FL (ref 79–97)
MONOCYTES # BLD AUTO: 0.12 10*3/MM3 (ref 0.1–0.9)
MONOCYTES NFR BLD AUTO: 1.8 % (ref 5–12)
NEUTROPHILS # BLD AUTO: 5.95 10*3/MM3 (ref 1.7–7)
NEUTROPHILS NFR BLD AUTO: 90 % (ref 42.7–76)
NRBC BLD AUTO-RTO: 0 /100 WBC (ref 0–0.2)
PLATELET # BLD AUTO: 121 10*3/MM3 (ref 140–450)
PMV BLD AUTO: 10.6 FL (ref 6–12)
POTASSIUM BLD-SCNC: 3.1 MMOL/L (ref 3.5–5.2)
PROT SERPL-MCNC: 6.1 G/DL (ref 6–8.5)
RBC # BLD AUTO: 4.26 10*6/MM3 (ref 3.77–5.28)
SODIUM BLD-SCNC: 134 MMOL/L (ref 136–145)
WBC NRBC COR # BLD: 6.61 10*3/MM3 (ref 3.4–10.8)

## 2020-02-03 PROCEDURE — 77300 RADIATION THERAPY DOSE PLAN: CPT | Performed by: RADIOLOGY

## 2020-02-03 PROCEDURE — 25010000002 LORAZEPAM PER 2 MG: Performed by: INTERNAL MEDICINE

## 2020-02-03 PROCEDURE — 77334 RADIATION TREATMENT AID(S): CPT | Performed by: RADIOLOGY

## 2020-02-03 PROCEDURE — 77338 DESIGN MLC DEVICE FOR IMRT: CPT | Performed by: RADIOLOGY

## 2020-02-03 PROCEDURE — 25010000003 HEPARIN LOCK FLUCH PER 10 UNITS: Performed by: INTERNAL MEDICINE

## 2020-02-03 PROCEDURE — 96361 HYDRATE IV INFUSION ADD-ON: CPT

## 2020-02-03 PROCEDURE — 77386: CPT | Performed by: RADIOLOGY

## 2020-02-03 PROCEDURE — 85025 COMPLETE CBC W/AUTO DIFF WBC: CPT

## 2020-02-03 PROCEDURE — 80053 COMPREHEN METABOLIC PANEL: CPT

## 2020-02-03 PROCEDURE — 96374 THER/PROPH/DIAG INJ IV PUSH: CPT

## 2020-02-03 RX ORDER — POTASSIUM CHLORIDE 750 MG/1
40 TABLET, EXTENDED RELEASE ORAL DAILY
Qty: 12 TABLET | Refills: 0 | Status: SHIPPED | OUTPATIENT
Start: 2020-02-03 | End: 2020-02-06

## 2020-02-03 RX ORDER — HEPARIN SODIUM (PORCINE) LOCK FLUSH IV SOLN 100 UNIT/ML 100 UNIT/ML
500 SOLUTION INTRAVENOUS AS NEEDED
Status: CANCELLED | OUTPATIENT
Start: 2020-02-05

## 2020-02-03 RX ORDER — SODIUM CHLORIDE 0.9 % (FLUSH) 0.9 %
10 SYRINGE (ML) INJECTION AS NEEDED
Status: DISCONTINUED | OUTPATIENT
Start: 2020-02-03 | End: 2020-02-03 | Stop reason: HOSPADM

## 2020-02-03 RX ORDER — HEPARIN SODIUM (PORCINE) LOCK FLUSH IV SOLN 100 UNIT/ML 100 UNIT/ML
500 SOLUTION INTRAVENOUS AS NEEDED
Status: DISCONTINUED | OUTPATIENT
Start: 2020-02-03 | End: 2020-02-03 | Stop reason: HOSPADM

## 2020-02-03 RX ORDER — POTASSIUM CHLORIDE 750 MG/1
40 TABLET, FILM COATED, EXTENDED RELEASE ORAL ONCE
Status: COMPLETED | OUTPATIENT
Start: 2020-02-03 | End: 2020-02-03

## 2020-02-03 RX ORDER — LORAZEPAM 2 MG/ML
0.5 INJECTION INTRAMUSCULAR ONCE
Status: COMPLETED | OUTPATIENT
Start: 2020-02-03 | End: 2020-02-03

## 2020-02-03 RX ORDER — SODIUM CHLORIDE 0.9 % (FLUSH) 0.9 %
10 SYRINGE (ML) INJECTION AS NEEDED
Status: CANCELLED | OUTPATIENT
Start: 2020-02-05

## 2020-02-03 RX ADMIN — SODIUM CHLORIDE, PRESERVATIVE FREE 10 ML: 5 INJECTION INTRAVENOUS at 12:25

## 2020-02-03 RX ADMIN — LORAZEPAM 0.5 MG: 2 INJECTION INTRAMUSCULAR; INTRAVENOUS at 11:34

## 2020-02-03 RX ADMIN — SODIUM CHLORIDE, PRESERVATIVE FREE 500 UNITS: 5 INJECTION INTRAVENOUS at 12:25

## 2020-02-03 RX ADMIN — SODIUM CHLORIDE 1000 ML: 9 INJECTION, SOLUTION INTRAVENOUS at 11:16

## 2020-02-03 RX ADMIN — POTASSIUM CHLORIDE 40 MEQ: 750 TABLET, FILM COATED, EXTENDED RELEASE ORAL at 12:33

## 2020-02-04 ENCOUNTER — HOSPITAL ENCOUNTER (OUTPATIENT)
Dept: RADIATION ONCOLOGY | Facility: HOSPITAL | Age: 55
Discharge: HOME | End: 2020-02-04

## 2020-02-04 VITALS — BODY MASS INDEX: 31.48 KG/M2 | WEIGHT: 183.4 LBS

## 2020-02-04 PROCEDURE — 77386: CPT | Performed by: RADIOLOGY

## 2020-02-05 ENCOUNTER — INFUSION (OUTPATIENT)
Dept: ONCOLOGY | Facility: HOSPITAL | Age: 55
End: 2020-02-05

## 2020-02-05 VITALS
TEMPERATURE: 98.8 F | SYSTOLIC BLOOD PRESSURE: 104 MMHG | OXYGEN SATURATION: 91 % | BODY MASS INDEX: 31.5 KG/M2 | DIASTOLIC BLOOD PRESSURE: 69 MMHG | RESPIRATION RATE: 22 BRPM | HEART RATE: 108 BPM | WEIGHT: 183.5 LBS

## 2020-02-05 DIAGNOSIS — E86.0 DEHYDRATION: Primary | ICD-10-CM

## 2020-02-05 DIAGNOSIS — Z95.828 PORT-A-CATH IN PLACE: ICD-10-CM

## 2020-02-05 PROCEDURE — 25010000003 HEPARIN LOCK FLUCH PER 10 UNITS: Performed by: INTERNAL MEDICINE

## 2020-02-05 PROCEDURE — 96360 HYDRATION IV INFUSION INIT: CPT

## 2020-02-05 RX ORDER — SODIUM CHLORIDE 0.9 % (FLUSH) 0.9 %
10 SYRINGE (ML) INJECTION AS NEEDED
Status: DISCONTINUED | OUTPATIENT
Start: 2020-02-05 | End: 2020-02-05 | Stop reason: HOSPADM

## 2020-02-05 RX ORDER — SODIUM CHLORIDE 0.9 % (FLUSH) 0.9 %
10 SYRINGE (ML) INJECTION AS NEEDED
Status: CANCELLED | OUTPATIENT
Start: 2020-02-06

## 2020-02-05 RX ORDER — HEPARIN SODIUM (PORCINE) LOCK FLUSH IV SOLN 100 UNIT/ML 100 UNIT/ML
500 SOLUTION INTRAVENOUS AS NEEDED
Status: CANCELLED | OUTPATIENT
Start: 2020-02-06

## 2020-02-05 RX ORDER — HEPARIN SODIUM (PORCINE) LOCK FLUSH IV SOLN 100 UNIT/ML 100 UNIT/ML
500 SOLUTION INTRAVENOUS AS NEEDED
Status: DISCONTINUED | OUTPATIENT
Start: 2020-02-05 | End: 2020-02-05 | Stop reason: HOSPADM

## 2020-02-05 RX ADMIN — Medication 500 UNITS: at 12:44

## 2020-02-05 RX ADMIN — SODIUM CHLORIDE 1000 ML: 9 INJECTION, SOLUTION INTRAVENOUS at 11:34

## 2020-02-05 RX ADMIN — SODIUM CHLORIDE, PRESERVATIVE FREE 10 ML: 5 INJECTION INTRAVENOUS at 12:44

## 2020-02-06 ENCOUNTER — OFFICE VISIT (OUTPATIENT)
Dept: ONCOLOGY | Facility: CLINIC | Age: 55
End: 2020-02-06

## 2020-02-06 ENCOUNTER — INFUSION (OUTPATIENT)
Dept: ONCOLOGY | Facility: HOSPITAL | Age: 55
End: 2020-02-06

## 2020-02-06 ENCOUNTER — LAB (OUTPATIENT)
Dept: ONCOLOGY | Facility: CLINIC | Age: 55
End: 2020-02-06

## 2020-02-06 VITALS
WEIGHT: 184.1 LBS | RESPIRATION RATE: 18 BRPM | BODY MASS INDEX: 31.6 KG/M2 | SYSTOLIC BLOOD PRESSURE: 92 MMHG | HEART RATE: 118 BPM | DIASTOLIC BLOOD PRESSURE: 61 MMHG | OXYGEN SATURATION: 90 % | TEMPERATURE: 98.2 F

## 2020-02-06 VITALS
SYSTOLIC BLOOD PRESSURE: 92 MMHG | OXYGEN SATURATION: 90 % | TEMPERATURE: 98.2 F | RESPIRATION RATE: 18 BRPM | DIASTOLIC BLOOD PRESSURE: 61 MMHG | WEIGHT: 184.1 LBS | BODY MASS INDEX: 31.6 KG/M2 | HEART RATE: 118 BPM

## 2020-02-06 DIAGNOSIS — E86.0 DEHYDRATION: Primary | ICD-10-CM

## 2020-02-06 DIAGNOSIS — R11.0 NAUSEA: ICD-10-CM

## 2020-02-06 DIAGNOSIS — C21.0 ANAL CANCER (HCC): Primary | ICD-10-CM

## 2020-02-06 DIAGNOSIS — F41.9 ANXIETY: Primary | ICD-10-CM

## 2020-02-06 DIAGNOSIS — G89.3 NEOPLASM RELATED PAIN: ICD-10-CM

## 2020-02-06 DIAGNOSIS — Z95.828 PORT-A-CATH IN PLACE: ICD-10-CM

## 2020-02-06 DIAGNOSIS — C21.0 ANAL CANCER (HCC): ICD-10-CM

## 2020-02-06 DIAGNOSIS — K12.30 MUCOSITIS: ICD-10-CM

## 2020-02-06 LAB
ALBUMIN SERPL-MCNC: 3.12 G/DL (ref 3.5–5.2)
ALBUMIN/GLOB SERPL: 1.1 G/DL
ALP SERPL-CCNC: 51 U/L (ref 39–117)
ALT SERPL W P-5'-P-CCNC: 21 U/L (ref 1–33)
ANION GAP SERPL CALCULATED.3IONS-SCNC: 8.4 MMOL/L (ref 5–15)
AST SERPL-CCNC: 19 U/L (ref 1–32)
BASOPHILS # BLD AUTO: 0 10*3/MM3 (ref 0–0.2)
BASOPHILS NFR BLD AUTO: 0 % (ref 0–1.5)
BILIRUB SERPL-MCNC: 0.3 MG/DL (ref 0.2–1.2)
BUN BLD-MCNC: 7 MG/DL (ref 6–20)
BUN/CREAT SERPL: 17.9 (ref 7–25)
CALCIUM SPEC-SCNC: 8.4 MG/DL (ref 8.6–10.5)
CHLORIDE SERPL-SCNC: 94 MMOL/L (ref 98–107)
CO2 SERPL-SCNC: 33.6 MMOL/L (ref 22–29)
CREAT BLD-MCNC: 0.39 MG/DL (ref 0.57–1)
DEPRECATED RDW RBC AUTO: 55.6 FL (ref 37–54)
EOSINOPHIL # BLD AUTO: 0.13 10*3/MM3 (ref 0–0.4)
EOSINOPHIL NFR BLD AUTO: 4.3 % (ref 0.3–6.2)
ERYTHROCYTE [DISTWIDTH] IN BLOOD BY AUTOMATED COUNT: 16.4 % (ref 12.3–15.4)
GFR SERPL CREATININE-BSD FRML MDRD: >150 ML/MIN/1.73
GLOBULIN UR ELPH-MCNC: 2.9 GM/DL
GLUCOSE BLD-MCNC: 144 MG/DL (ref 65–99)
HCT VFR BLD AUTO: 38.1 % (ref 34–46.6)
HGB BLD-MCNC: 11.7 G/DL (ref 12–15.9)
IMM GRANULOCYTES # BLD AUTO: 0.02 10*3/MM3 (ref 0–0.05)
IMM GRANULOCYTES NFR BLD AUTO: 0.7 % (ref 0–0.5)
LYMPHOCYTES # BLD AUTO: 0.1 10*3/MM3 (ref 0.7–3.1)
LYMPHOCYTES NFR BLD AUTO: 3.3 % (ref 19.6–45.3)
MCH RBC QN AUTO: 29.9 PG (ref 26.6–33)
MCHC RBC AUTO-ENTMCNC: 30.7 G/DL (ref 31.5–35.7)
MCV RBC AUTO: 97.4 FL (ref 79–97)
MONOCYTES # BLD AUTO: 0.17 10*3/MM3 (ref 0.1–0.9)
MONOCYTES NFR BLD AUTO: 5.6 % (ref 5–12)
NEUTROPHILS # BLD AUTO: 2.62 10*3/MM3 (ref 1.7–7)
NEUTROPHILS NFR BLD AUTO: 86.1 % (ref 42.7–76)
NRBC BLD AUTO-RTO: 0 /100 WBC (ref 0–0.2)
PLATELET # BLD AUTO: 80 10*3/MM3 (ref 140–450)
PMV BLD AUTO: 10.2 FL (ref 6–12)
POTASSIUM BLD-SCNC: 3.5 MMOL/L (ref 3.5–5.2)
PROT SERPL-MCNC: 6 G/DL (ref 6–8.5)
RBC # BLD AUTO: 3.91 10*6/MM3 (ref 3.77–5.28)
SODIUM BLD-SCNC: 136 MMOL/L (ref 136–145)
WBC NRBC COR # BLD: 3.04 10*3/MM3 (ref 3.4–10.8)

## 2020-02-06 PROCEDURE — 80053 COMPREHEN METABOLIC PANEL: CPT | Performed by: INTERNAL MEDICINE

## 2020-02-06 PROCEDURE — 99214 OFFICE O/P EST MOD 30 MIN: CPT | Performed by: INTERNAL MEDICINE

## 2020-02-06 PROCEDURE — 85025 COMPLETE CBC W/AUTO DIFF WBC: CPT | Performed by: INTERNAL MEDICINE

## 2020-02-06 PROCEDURE — 96360 HYDRATION IV INFUSION INIT: CPT

## 2020-02-06 PROCEDURE — 25010000003 HEPARIN LOCK FLUCH PER 10 UNITS: Performed by: INTERNAL MEDICINE

## 2020-02-06 RX ORDER — SODIUM CHLORIDE 0.9 % (FLUSH) 0.9 %
10 SYRINGE (ML) INJECTION AS NEEDED
Status: CANCELLED | OUTPATIENT
Start: 2020-02-07

## 2020-02-06 RX ORDER — LORAZEPAM 0.5 MG/1
0.5 TABLET ORAL EVERY 8 HOURS PRN
Qty: 60 TABLET | Refills: 0 | OUTPATIENT
Start: 2020-02-06 | End: 2020-03-03 | Stop reason: SDUPTHER

## 2020-02-06 RX ORDER — SODIUM CHLORIDE 0.9 % (FLUSH) 0.9 %
10 SYRINGE (ML) INJECTION AS NEEDED
Status: DISCONTINUED | OUTPATIENT
Start: 2020-02-06 | End: 2020-02-06 | Stop reason: HOSPADM

## 2020-02-06 RX ORDER — HEPARIN SODIUM (PORCINE) LOCK FLUSH IV SOLN 100 UNIT/ML 100 UNIT/ML
500 SOLUTION INTRAVENOUS AS NEEDED
Status: CANCELLED | OUTPATIENT
Start: 2020-02-07

## 2020-02-06 RX ORDER — HEPARIN SODIUM (PORCINE) LOCK FLUSH IV SOLN 100 UNIT/ML 100 UNIT/ML
500 SOLUTION INTRAVENOUS AS NEEDED
Status: DISCONTINUED | OUTPATIENT
Start: 2020-02-06 | End: 2020-02-06 | Stop reason: HOSPADM

## 2020-02-06 RX ADMIN — SODIUM CHLORIDE, PRESERVATIVE FREE 500 UNITS: 5 INJECTION INTRAVENOUS at 10:54

## 2020-02-06 RX ADMIN — SODIUM CHLORIDE, PRESERVATIVE FREE 10 ML: 5 INJECTION INTRAVENOUS at 10:54

## 2020-02-06 RX ADMIN — SODIUM CHLORIDE 1000 ML: 9 INJECTION, SOLUTION INTRAVENOUS at 09:49

## 2020-02-06 NOTE — PROGRESS NOTES
NAME: Liz Jiang    : 1965    DATE:  2020    DIAGNOSIS: Anal Cancer    TREATMENT:        CHIEF COMPLAINT:  Follow up of Anal Cancer    HISTORY OF PRESENT ILLNESS:   Liz Jiang is a very pleasant 54 y.o. female who is being seen today at the request of No ref. provider found for evaluation and treatment of anal cancer. She initially presented to her PCP for constipation, rectal pain and a palpable mass x 6-7 months. After failing conservative treatment, she was recommended by a friend to see Dr. Wu. She had colonoscopy on on 19 during which a large anal mass was visualized. 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 Two benign polyps were also removed. She was referred to our clinic for discussion of further treatment options.     She has several complaints today. She reports gradually worsening anal/rectal pain, and says she feels the urge to defecate constantly. For pain, she is using Norco 10 every 6 hours.She has had nausea for several months that she has been taking Phenergan for. She reports fatigue and a weight loss of 16-18 pounds (which she has subsequently regained), insomnia, a history of headaches that are much more mild after previous CVA, baseline shortness of breath that she wears bipap and uses home O2 for. She also reports that she has decreased ROM to RUE following past CVA. She denies obvious blood in stool, chest pain, abdominal pain, or any other complaint.     INTERVAL HISTORY:  Ms. Jiang is here today for follow up of anal cancer. She is continuing to receiving Mitomycin/5 FU w/ XRT. Radiation has been held this week due to redness/irritation and she has 4 treatments remaining. She reports she is nauseous this morning. She is using Sancuso patches. She continues to have anal pain and is using MS Contin 60 mg BID and Oxycodone 20 mg 1 tab every 6 hours. This has been helping to control her  pain. She continues to receive supplemental IVF periodically which have been helpful.        PAST MEDICAL HISTORY:  Past Medical History:   Diagnosis Date   • Acid reflux disease    • Anal cancer (CMS/HCC) 12/5/2019   • Arthritis    • Asthma, extrinsic    • Cholelithiasis    • Chronic headaches    • COPD (chronic obstructive pulmonary disease) (CMS/[facility])    • CVA (cerebral vascular accident) (CMS/[facility])     w/ right sided deficit   • CVA (cerebral vascular accident) (CMS/[facility])    • Diabetes mellitus (CMS/[facility])     only has high blood sugar when on steriods   • Obstructive sleep apnea treated with BiPAP    • On home oxygen therapy     2L    • Sleep apnea, obstructive        PAST SURGICAL HISTORY:  Past Surgical History:   Procedure Laterality Date   • ABDOMINAL SURGERY     • CARDIAC CATHETERIZATION     • CAROTID ENDARTERECTOMY Left 2018   • CHOLECYSTECTOMY WITH INTRAOPERATIVE CHOLANGIOGRAM N/A 1/30/2017    Procedure: CHOLECYSTECTOMY LAPAROSCOPIC INTRAOPERATIVE CHOLANGIOGRAM;  Surgeon: Carolin Marie MD;  Location: [facility];  Service:    • COLONOSCOPY     • HYSTERECTOMY      for heavy bleeding/ complete   • KIDNEY STONE SURGERY     • TUBAL ABDOMINAL LIGATION     • VENOUS ACCESS DEVICE (PORT) INSERTION Left 12/17/2019    Procedure: INSERTION VENOUS ACCESS DEVICE;  Surgeon: Carolin Marie MD;  Location: [facility];  Service: General        FAMILY HISTORY:  Family History   Problem Relation Age of Onset   • Diabetes Mother    • Hypertension Mother    • Arrhythmia Mother    • Pneumonia Father    • Coronary artery disease Other    • Arrhythmia Sister    • Heart attack Brother    • Lymphoma Brother         hodgkin's    • Other Brother         CABG   • Breast cancer Neg Hx        SOCIAL HISTORY:  Social History     Socioeconomic History   • Marital status:      Spouse name: Not on file   • Number of children: Not on file   • Years of education: Not on file   • Highest education level: Not on file   Tobacco Use   •  Smoking status: Former Smoker     Packs/day: 1.50     Years: 30.00     Pack years: 45.00     Types: Electronic Cigarette     Last attempt to quit: 2015     Years since quittin.1   • Smokeless tobacco: Never Used   Substance and Sexual Activity   • Alcohol use: No   • Drug use: No   • Sexual activity: Defer   Social History Narrative    Just retired     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          REVIEW OF SYSTEMS:   A comprehensive 14 point review of systems was performed.  Significant findings as mentioned above.  All other systems reviewed and are negative.      MEDICATIONS:  The current medication list was reviewed in the EMR    Current Outpatient Medications:   •  albuterol sulfate  (90 Base) MCG/ACT inhaler, Inhale 2 puffs Every 6 (Six) Hours As Needed for Wheezing or Shortness of Air., Disp: 1 inhaler, Rfl: 8  •  ASPIRIN LOW DOSE 81 MG EC tablet, Take 81 mg by mouth Daily., Disp: , Rfl: 3  •  budesonide-formoterol (SYMBICORT) 160-4.5 MCG/ACT inhaler, Inhale 2 puffs 2 (Two) Times a Day. Rinse mouth with water after use., Disp: 1 inhaler, Rfl: 8  •  clonazePAM (KlonoPIN) 0.5 MG tablet, Take 1 tablet by mouth 2 (Two) Times a Day As Needed for Seizures., Disp: 60 tablet, Rfl: 0  •  clopidogrel (PLAVIX) 75 MG tablet, TAKE ONE TABLET BY MOUTH EVERY DAY FOR BLOOD THINNER, Disp: , Rfl: 3  •  diphenhydrAMINE in aluminum-magnesium hydroxide-simethicone suspension-nystatin-Lidocaine Viscous HCl solution, Swish and swallow 5 ml 4 times a day as needed, Disp: 240 mL, Rfl: 5  •  escitalopram (LEXAPRO) 20 MG tablet, Take 1 tablet by mouth Daily., Disp: 30 tablet, Rfl: 11  •  furosemide (LASIX) 20 MG tablet, Take 20 mg by mouth 2 (Two) Times a Day. PRN, Disp: , Rfl:   •  gabapentin  (NEURONTIN) 800 MG tablet, Take 800 mg by mouth 3 (Three) Times a Day., Disp: , Rfl:   •  granisetron (SANCUSO) 3.1 MG/24HR, Place 1 patch on the skin as directed by provider 1 (One) Time Per Week., Disp: 4 patch, Rfl: 2  •  lidocaine-prilocaine (EMLA) 2.5-2.5 % cream, Apply to port site 30 mins prior to chemotherapy appointment, Disp: 30 g, Rfl: 5  •  loratadine-pseudoephedrine (CLARITIN-D 24 HOUR)  MG per 24 hr tablet, Take 1 tablet by mouth Daily., Disp: 30 tablet, Rfl: 5  •  Magic mouthwash Radonc, Swish and swallow 10 mL 4 (Four) Times a Day Before Meals & at Bedtime As Needed., Disp: 480 mL, Rfl: 3  •  Morphine (MS CONTIN) 15 MG 12 hr tablet, Take 1 tablet by mouth Every 12 (Twelve) Hours., Disp: 60 tablet, Rfl: 0  •  Morphine (MS CONTIN) 60 MG 12 hr tablet, Take 1 tablet by mouth Every 12 (Twelve) Hours., Disp: 60 tablet, Rfl: 0  •  Oral Wound Care Products (MUGARD) liquid, Apply 5 mL to the mouth or throat 5 (Five) Times a Day As Needed (for oral pain)., Disp: 240 mL, Rfl: 2  •  Oral Wound Care Products (MUGARD) liquid, APPLY 5 MLS TO THE MOUTH OR THROAT 5 TIMES A DAY AS NEEDED FOR ORAL PAIN, Disp: 240 mL, Rfl: 2  •  oxyCODONE (ROXICODONE) 10 MG tablet, Take 1-2 tabs every 4-6 hours as needed for pain, Disp: 150 tablet, Rfl: 0  •  oxyCODONE (ROXICODONE) 20 MG tablet, Take 1 tablet by mouth Every 4 (Four) Hours As Needed for pain, Disp: 200 tablet, Rfl: 0  •  polyethylene glycol (GAVILAX) powder, Take 17 g by mouth 2 (Two) Times a Day., Disp: 850 g, Rfl: 4  •  polyethylene glycol (MIRALAX) powder, MIX 17 GRAMS OF POWDER IN 8 OUNCES OF WATER OR JUICE AND TAKE BY MOUTH TWICE A DAY., Disp: 1020 g, Rfl: 4  •  potassium chloride (K-DUR,KLOR-CON) 10 MEQ CR tablet, Take 4 tablets by mouth Daily., Disp: 4 tablet, Rfl: 0  •  potassium chloride (K-DUR,KLOR-CON) 10 MEQ CR tablet, Take 4 tablets by mouth Daily for 3 days., Disp: 12 tablet, Rfl: 0  •  predniSONE (DELTASONE) 5 MG tablet, Take 5 mg by mouth Daily.  Take one tab by mouth ever other day alternating with 1/2 tab, Disp: , Rfl:   •  prochlorperazine (COMPAZINE) 10 MG tablet, Take 1 tablet by mouth Every 6 (Six) Hours As Needed for Nausea or Vomiting., Disp: 60 tablet, Rfl: 3  •  promethazine (PHENERGAN) 25 MG tablet, Take 25 mg by mouth Every 6 (Six) Hours As Needed for Nausea or Vomiting., Disp: , Rfl:   •  sennosides-docusate (SENNA-S) 8.6-50 MG per tablet, Take 2 tablets by mouth 2 (Two) Times a Day., Disp: 120 tablet, Rfl: 5  •  silver sulfadiazine (SILVADENE, SSD) 1 % cream, Apply  topically to the appropriate area as directed 2 (Two) Times a Day. Apply to affected area and keep covered., Disp: 400 g, Rfl: 5  •  tiZANidine (ZANAFLEX) 4 MG tablet, 4 mg Every 6 (Six) Hours As Needed., Disp: , Rfl: 0  •  Umeclidinium Bromide 62.5 MCG/INH aerosol powder , Inhale 1 puff Daily., Disp: 30 each, Rfl: 8  No current facility-administered medications for this visit.     Facility-Administered Medications Ordered in Other Visits:   •  heparin injection 500 Units, 500 Units, Intravenous, PRN, EDSON Mejia MD  •  sodium chloride 0.9 % bolus 1,000 mL, 1,000 mL, Intravenous, Once, Deanne Jones MD, Last Rate: 1,000 mL/hr at 02/06/20 0949, 1,000 mL at 02/06/20 0949  •  sodium chloride 0.9 % flush 10 mL, 10 mL, Intravenous, PRN, EDSON Mejia MD    ALLERGIES:    No Known Allergies    PHYSICAL EXAM:  Vitals:    02/06/20 0933   BP: 92/61   Pulse: 118   Resp: 18   Temp: 98.2 °F (36.8 °C)   SpO2: 90%     Pain Score    02/06/20 0933   PainSc:   9   PainLoc: Rectum     General:  Awake, alert and oriented.  Appears uncomfortable, but improved from her last visit  HEENT:  Pupils are equal, round and reactive to light and accommodation, Extra-ocular movements full, mucous membranes are moist after IV hydration.  Neck:  No JVD, thyromegaly or lymphadenopathy  CV:  Regular tachycardia, no murmurs, rubs or gallops  Resp:  Lungs are clear to auscultation bilaterally  Abd:   Soft, non-tender, non-distended, bowel sounds present, no organomegaly or masses  Rectal: Deferred today.  Last exam as follows:   Firm anal mass palpable anteriorly  Ext:  No clubbing, cyanosis or edema  Lymph:  No cervical, supraclavicular, axillary,adenopathy  Neuro:  grossly non-focal exam    PATHOLOGY:  11-01-19          ENDOSCOPY:  C-scope (Jazmin) 11-01-19          IMAGING:  MRI Abdomen w/wo contrast 11-06-19   FINDINGS:   Today's study shows no definitive colonic mass.   Particularly I do not see any contrast-enhancing abnormalities on the presented images.  No adenopathy in the imaged area.  Sigmoid colon wall does appear to be slightly thickened but no adjacent fluid.  No walled off fluid collections.     IMPRESSION:  No contrast-enhancing abnormalities identified.     CTCAP 11-27-19  FINDINGS: Today's study shows evidence of COPD.     There are no parenchymal nodules or masses.     No pericardial or pleural effusions.     No mediastinal or hilar lymph node enlargement.     IMPRESSION:  No evidence of metastatic disease to the chest.    FINDINGS:   The lung bases are clear. There are no pleural effusions.     The liver is homogeneous. There is no evidence of focal hepatic mass     The spleen is homogeneous     There is no peripancreatic stranding or pancreatic head mass.     There is no adrenal enlargement.     There is a nonobstructing right intrarenal stone..      Otherwise I do not see any free fluid or walled off fluid collections.     There is diffuse colonic wall thickening.      IMPRESSION:  1. Diffuse colonic wall thickening.  2. Nonobstructing right intrarenal stone.  3. No evidence of metastatic disease to the abdomen or pelvis.      RECENT LABS:  Lab Results   Component Value Date    WBC 3.04 (L) 02/06/2020    HGB 11.7 (L) 02/06/2020    HCT 38.1 02/06/2020    MCV 97.4 (H) 02/06/2020    RDW 16.4 (H) 02/06/2020    PLT 80 (L) 02/06/2020    NEUTRORELPCT 86.1 (H) 02/06/2020    LYMPHORELPCT 3.3 (L)  02/06/2020    MONORELPCT 5.6 02/06/2020    EOSRELPCT 4.3 02/06/2020    BASORELPCT 0.0 02/06/2020    NEUTROABS 2.62 02/06/2020    LYMPHSABS 0.10 (L) 02/06/2020       Lab Results   Component Value Date     (L) 02/03/2020    K 3.1 (L) 02/03/2020    CO2 31.8 (H) 02/03/2020    CL 95 (L) 02/03/2020    BUN 7 02/03/2020    CREATININE 0.39 (L) 02/03/2020    EGFRIFNONA >150 02/03/2020    GLUCOSE 110 (H) 02/03/2020    CALCIUM 8.4 (L) 02/03/2020    ALKPHOS 58 02/03/2020    AST 17 02/03/2020    ALT 23 02/03/2020    BILITOT 0.3 02/03/2020    ALBUMIN 3.27 (L) 02/03/2020    PROTEINTOT 6.1 02/03/2020    MG 2.1 03/27/2017    PHOS 3.0 03/27/2017       Lab Results   Component Value Date    FERRITIN 197.40 (H) 11/19/2019    IRON 43 11/19/2019    TIBC 437 11/19/2019    LABIRON 10 (L) 11/19/2019    RIUFUIHJ44 473 11/19/2019    FOLATE 9.32 11/19/2019       ASSESSMENT & PLAN:  Liz Jiang is a very pleasant 54 y.o. female with newly diagnosed anal cancer.    1.  Anal cancer:  -  Clinically has Stage IIIB disease ([id number]) with invasion of the rectovaginal septum.  -  She reports recent gynecologic examination without cause for concern.  Will get [facility].  -  CT CAP and MRI Pelvis unremarkable.  No evidence of metastatic disease. No notable adenopathy.  -  Recommended concurrent definitive chemoradiation with Mitomycin/5-FU.  -  She saw Dr. Martinez who was in agreement with this plan.  -  She is seeing Dr. Campos for radiation and tentatively is planned to complete radiation on 2-13 if treatments can be restarted (on break for toxicity).  -  Will continue with supportive care.  She is doing better with IVF.  Will plan for 1L 3x/week and as needed.    -  I will see her back next week.    2. Neoplasm related pain:  -  She has a h/o chronic back pain previously managed by pain management physician.  - We took over her pain management during her cancer treatment and will transition back to her pain management physician  after her cancer treatment is completed.    -  Recommended she  increase morphine ER to 60 mg q12h with Oxycodone 20 mg 1/2 - 1 tab q4-6h prn breakthrough pain.  This is working better.    -  Asked her to keep a log.    3. Constipation: Recommended Senna/Colace ii BID with Miralax as needed.     4.  Anxiety: Recently increased Lexapro to 20 mg daily.  Will d/c Klonopin because I am adding Ativan for nausea.        5. Mucositis: Resolved.  Continue magic mouth wash as needed     6. Nausea : Poorly controlled with alternating and taking zofran and phenergan scheduled ( compazine has never worked for her), so ordered trial of sancuso patch.  This has helped.  The IV Ativan I give her in the office has also helped.  Will give Ativan 0.5 mg 1-2 tabs q 8 h prn refractory nausea.      7.  Prophylaxis:  Has had Pneumovax, Prevnar 13 and 2019 flu vaccine.       8.  ACO / SAÚL/Other  Quality measures  -  Liz Jiang received 2019 flu vaccine.  -  Liz Jiang reports a pain score of 9.  Given her pain assessment as noted, treatment options were discussed and the following options were decided upon as a follow-up plan to address the patient's pain: continuation of current treatment plan for pain and prescription for opiod analgesics.   -  Current outpatient and discharge medications have been reconciled for the patient.    Reviewed by: Deanne Jones MD       9. Follow up: Will see her back in 1 week.    This note was scribed for Deanne Jones MD by Gricel Howell RN.    I, Deanne Jones MD, personally performed the services described in this documentation as scribed by the above named individual in my presence, and it is both accurate and complete.  02/06/2020     I spent 30 minutes with Liz Jiang today.  In the office today, more than 50% of this time was spent face-to-face with her  in counseling / coordination of care, reviewing her medical history and counseling on the current treatment plan.   All questions were answered to her satisfaction      Electronically Signed by: Deanne Jones MD       CC:   Mayte Davis APRN Muhammed Iqbal, MD John Dvorak, MD Marta Hayne, MD Michael Simons, MD Barbara Michna, MD

## 2020-02-07 ENCOUNTER — APPOINTMENT (OUTPATIENT)
Dept: ONCOLOGY | Facility: HOSPITAL | Age: 55
End: 2020-02-07

## 2020-02-07 ENCOUNTER — INFUSION (OUTPATIENT)
Dept: ONCOLOGY | Facility: HOSPITAL | Age: 55
End: 2020-02-07

## 2020-02-07 ENCOUNTER — TELEPHONE (OUTPATIENT)
Dept: RADIATION ONCOLOGY | Facility: HOSPITAL | Age: 55
End: 2020-02-07

## 2020-02-07 VITALS
RESPIRATION RATE: 18 BRPM | BODY MASS INDEX: 32.24 KG/M2 | WEIGHT: 187.8 LBS | HEART RATE: 125 BPM | OXYGEN SATURATION: 90 % | DIASTOLIC BLOOD PRESSURE: 62 MMHG | TEMPERATURE: 98.5 F | SYSTOLIC BLOOD PRESSURE: 94 MMHG

## 2020-02-07 DIAGNOSIS — Z95.828 PORT-A-CATH IN PLACE: ICD-10-CM

## 2020-02-07 DIAGNOSIS — E86.0 DEHYDRATION: Primary | ICD-10-CM

## 2020-02-07 PROCEDURE — 25010000003 HEPARIN LOCK FLUCH PER 10 UNITS: Performed by: INTERNAL MEDICINE

## 2020-02-07 PROCEDURE — 96360 HYDRATION IV INFUSION INIT: CPT

## 2020-02-07 PROCEDURE — 77336 RADIATION PHYSICS CONSULT: CPT | Performed by: RADIOLOGY

## 2020-02-07 RX ORDER — HEPARIN SODIUM (PORCINE) LOCK FLUSH IV SOLN 100 UNIT/ML 100 UNIT/ML
500 SOLUTION INTRAVENOUS AS NEEDED
Status: DISCONTINUED | OUTPATIENT
Start: 2020-02-07 | End: 2020-02-07 | Stop reason: HOSPADM

## 2020-02-07 RX ORDER — SODIUM CHLORIDE 0.9 % (FLUSH) 0.9 %
10 SYRINGE (ML) INJECTION AS NEEDED
Status: CANCELLED | OUTPATIENT
Start: 2020-02-10

## 2020-02-07 RX ORDER — SODIUM CHLORIDE 0.9 % (FLUSH) 0.9 %
10 SYRINGE (ML) INJECTION AS NEEDED
Status: DISCONTINUED | OUTPATIENT
Start: 2020-02-07 | End: 2020-02-07 | Stop reason: HOSPADM

## 2020-02-07 RX ORDER — HEPARIN SODIUM (PORCINE) LOCK FLUSH IV SOLN 100 UNIT/ML 100 UNIT/ML
500 SOLUTION INTRAVENOUS AS NEEDED
Status: CANCELLED | OUTPATIENT
Start: 2020-02-10

## 2020-02-07 RX ADMIN — SODIUM CHLORIDE, PRESERVATIVE FREE 10 ML: 5 INJECTION INTRAVENOUS at 12:23

## 2020-02-07 RX ADMIN — SODIUM CHLORIDE 1000 ML: 9 INJECTION, SOLUTION INTRAVENOUS at 11:18

## 2020-02-07 RX ADMIN — SODIUM CHLORIDE, PRESERVATIVE FREE 500 UNITS: 5 INJECTION INTRAVENOUS at 12:23

## 2020-02-07 NOTE — TELEPHONE ENCOUNTER
Patient called with complaints of severe pain in treatment area as well as with urination.  Dr. Campos at desk and patient placed on speaker phone for the both of us to hear her.  Patient educated on how to tend to current discomfort by using aquaphor, domeboro soaks, lidocaine and silvadene, urinating in sitz bath, etc.  Patient is doing all of the listed.  Pt stated she was using hot water for sitz bath and Dr. Campos educated patient that the feeling in her treatment area is decreased and room temperature water is best and should be checked on her wrist.  Additional pain medication was offered to her, but patient stated she did not need pain medication.  Patient was appreciative to our guidance and support.  Patient encouraged to call over the weekend if she needs anything and will call me on Monday to update me on her status.

## 2020-02-10 ENCOUNTER — INFUSION (OUTPATIENT)
Dept: ONCOLOGY | Facility: HOSPITAL | Age: 55
End: 2020-02-10

## 2020-02-10 ENCOUNTER — LAB (OUTPATIENT)
Dept: ONCOLOGY | Facility: CLINIC | Age: 55
End: 2020-02-10

## 2020-02-10 VITALS
DIASTOLIC BLOOD PRESSURE: 65 MMHG | OXYGEN SATURATION: 90 % | BODY MASS INDEX: 32.85 KG/M2 | TEMPERATURE: 97.6 F | WEIGHT: 191.4 LBS | HEART RATE: 121 BPM | RESPIRATION RATE: 18 BRPM | SYSTOLIC BLOOD PRESSURE: 101 MMHG

## 2020-02-10 DIAGNOSIS — C21.0 ANAL CANCER (HCC): Primary | ICD-10-CM

## 2020-02-10 DIAGNOSIS — Z95.828 PORT-A-CATH IN PLACE: ICD-10-CM

## 2020-02-10 LAB
ALBUMIN SERPL-MCNC: 2.99 G/DL (ref 3.5–5.2)
ALBUMIN/GLOB SERPL: 1 G/DL
ALP SERPL-CCNC: 57 U/L (ref 39–117)
ALT SERPL W P-5'-P-CCNC: 19 U/L (ref 1–33)
ANION GAP SERPL CALCULATED.3IONS-SCNC: 9.6 MMOL/L (ref 5–15)
ANISOCYTOSIS BLD QL: ABNORMAL
AST SERPL-CCNC: 17 U/L (ref 1–32)
BILIRUB SERPL-MCNC: 0.4 MG/DL (ref 0.2–1.2)
BUN BLD-MCNC: 6 MG/DL (ref 6–20)
BUN/CREAT SERPL: 14.6 (ref 7–25)
CALCIUM SPEC-SCNC: 8.7 MG/DL (ref 8.6–10.5)
CHLORIDE SERPL-SCNC: 93 MMOL/L (ref 98–107)
CO2 SERPL-SCNC: 35.4 MMOL/L (ref 22–29)
CREAT BLD-MCNC: 0.41 MG/DL (ref 0.57–1)
DEPRECATED RDW RBC AUTO: 62.9 FL (ref 37–54)
EOSINOPHIL # BLD MANUAL: 0.03 10*3/MM3 (ref 0–0.4)
EOSINOPHIL NFR BLD MANUAL: 3 % (ref 0.3–6.2)
ERYTHROCYTE [DISTWIDTH] IN BLOOD BY AUTOMATED COUNT: 18.1 % (ref 12.3–15.4)
GFR SERPL CREATININE-BSD FRML MDRD: >150 ML/MIN/1.73
GLOBULIN UR ELPH-MCNC: 3.1 GM/DL
GLUCOSE BLD-MCNC: 116 MG/DL (ref 65–99)
HCT VFR BLD AUTO: 36.5 % (ref 34–46.6)
HGB BLD-MCNC: 11.1 G/DL (ref 12–15.9)
HYPOCHROMIA BLD QL: ABNORMAL
LYMPHOCYTES # BLD MANUAL: 0.13 10*3/MM3 (ref 0.7–3.1)
LYMPHOCYTES NFR BLD MANUAL: 13 % (ref 19.6–45.3)
LYMPHOCYTES NFR BLD MANUAL: 15 % (ref 5–12)
MCH RBC QN AUTO: 30.4 PG (ref 26.6–33)
MCHC RBC AUTO-ENTMCNC: 30.4 G/DL (ref 31.5–35.7)
MCV RBC AUTO: 100 FL (ref 79–97)
MONOCYTES # BLD AUTO: 0.15 10*3/MM3 (ref 0.1–0.9)
NEUTROPHILS # BLD AUTO: 0.7 10*3/MM3 (ref 1.7–7)
NEUTROPHILS NFR BLD MANUAL: 64 % (ref 42.7–76)
NEUTS BAND NFR BLD MANUAL: 5 % (ref 0–5)
PLATELET # BLD AUTO: 61 10*3/MM3 (ref 140–450)
PMV BLD AUTO: 11.6 FL (ref 6–12)
POTASSIUM BLD-SCNC: 3.2 MMOL/L (ref 3.5–5.2)
PROT SERPL-MCNC: 6.1 G/DL (ref 6–8.5)
RBC # BLD AUTO: 3.65 10*6/MM3 (ref 3.77–5.28)
SCAN SLIDE: NORMAL
SMALL PLATELETS BLD QL SMEAR: ABNORMAL
SODIUM BLD-SCNC: 138 MMOL/L (ref 136–145)
WBC NRBC COR # BLD: 1.02 10*3/MM3 (ref 3.4–10.8)

## 2020-02-10 PROCEDURE — 80053 COMPREHEN METABOLIC PANEL: CPT | Performed by: INTERNAL MEDICINE

## 2020-02-10 PROCEDURE — 96365 THER/PROPH/DIAG IV INF INIT: CPT

## 2020-02-10 PROCEDURE — 85025 COMPLETE CBC W/AUTO DIFF WBC: CPT | Performed by: INTERNAL MEDICINE

## 2020-02-10 PROCEDURE — 85007 BL SMEAR W/DIFF WBC COUNT: CPT | Performed by: INTERNAL MEDICINE

## 2020-02-10 PROCEDURE — 96360 HYDRATION IV INFUSION INIT: CPT

## 2020-02-10 PROCEDURE — 25010000003 HEPARIN LOCK FLUCH PER 10 UNITS: Performed by: INTERNAL MEDICINE

## 2020-02-10 RX ORDER — PHENAZOPYRIDINE HYDROCHLORIDE 200 MG/1
200 TABLET, FILM COATED ORAL 3 TIMES DAILY PRN
Qty: 90 TABLET | Refills: 3 | Status: SHIPPED | OUTPATIENT
Start: 2020-02-10 | End: 2020-05-27

## 2020-02-10 RX ORDER — POTASSIUM CHLORIDE 750 MG/1
40 TABLET, EXTENDED RELEASE ORAL DAILY
Qty: 4 TABLET | Refills: 0 | Status: SHIPPED | OUTPATIENT
Start: 2020-02-10

## 2020-02-10 RX ORDER — SODIUM CHLORIDE 9 MG/ML
1000 INJECTION, SOLUTION INTRAVENOUS CONTINUOUS
Status: DISCONTINUED | OUTPATIENT
Start: 2020-02-10 | End: 2020-02-10 | Stop reason: HOSPADM

## 2020-02-10 RX ORDER — HEPARIN SODIUM (PORCINE) LOCK FLUSH IV SOLN 100 UNIT/ML 100 UNIT/ML
500 SOLUTION INTRAVENOUS AS NEEDED
Status: DISCONTINUED | OUTPATIENT
Start: 2020-02-10 | End: 2020-02-10 | Stop reason: HOSPADM

## 2020-02-10 RX ORDER — AMOXICILLIN 250 MG
2 CAPSULE ORAL 2 TIMES DAILY
Qty: 120 TABLET | Refills: 5 | Status: SHIPPED | OUTPATIENT
Start: 2020-02-10 | End: 2020-03-23 | Stop reason: SDUPTHER

## 2020-02-10 RX ORDER — HEPARIN SODIUM (PORCINE) LOCK FLUSH IV SOLN 100 UNIT/ML 100 UNIT/ML
500 SOLUTION INTRAVENOUS AS NEEDED
Status: CANCELLED | OUTPATIENT
Start: 2020-02-12

## 2020-02-10 RX ORDER — SODIUM CHLORIDE 0.9 % (FLUSH) 0.9 %
10 SYRINGE (ML) INJECTION AS NEEDED
Status: CANCELLED | OUTPATIENT
Start: 2020-02-12

## 2020-02-10 RX ADMIN — SODIUM CHLORIDE 1000 ML: 9 INJECTION, SOLUTION INTRAVENOUS at 11:00

## 2020-02-10 RX ADMIN — SODIUM CHLORIDE, PRESERVATIVE FREE 500 UNITS: 5 INJECTION INTRAVENOUS at 12:30

## 2020-02-12 ENCOUNTER — OFFICE VISIT (OUTPATIENT)
Dept: ONCOLOGY | Facility: CLINIC | Age: 55
End: 2020-02-12

## 2020-02-12 ENCOUNTER — INFUSION (OUTPATIENT)
Dept: ONCOLOGY | Facility: HOSPITAL | Age: 55
End: 2020-02-12

## 2020-02-12 VITALS
HEART RATE: 112 BPM | OXYGEN SATURATION: 89 % | WEIGHT: 188.4 LBS | BODY MASS INDEX: 32.34 KG/M2 | RESPIRATION RATE: 16 BRPM | TEMPERATURE: 98.4 F | SYSTOLIC BLOOD PRESSURE: 79 MMHG | DIASTOLIC BLOOD PRESSURE: 52 MMHG

## 2020-02-12 VITALS
HEART RATE: 98 BPM | RESPIRATION RATE: 16 BRPM | BODY MASS INDEX: 32.34 KG/M2 | DIASTOLIC BLOOD PRESSURE: 61 MMHG | TEMPERATURE: 98.4 F | WEIGHT: 188.4 LBS | OXYGEN SATURATION: 88 % | SYSTOLIC BLOOD PRESSURE: 92 MMHG

## 2020-02-12 DIAGNOSIS — G89.3 NEOPLASM RELATED PAIN: ICD-10-CM

## 2020-02-12 DIAGNOSIS — R11.0 NAUSEA: ICD-10-CM

## 2020-02-12 DIAGNOSIS — E87.6 HYPOKALEMIA: ICD-10-CM

## 2020-02-12 DIAGNOSIS — C21.0 ANAL CANCER (HCC): Primary | ICD-10-CM

## 2020-02-12 DIAGNOSIS — R19.7 DIARRHEA, UNSPECIFIED TYPE: ICD-10-CM

## 2020-02-12 DIAGNOSIS — E86.0 DEHYDRATION: ICD-10-CM

## 2020-02-12 DIAGNOSIS — Z95.828 PORT-A-CATH IN PLACE: Primary | ICD-10-CM

## 2020-02-12 LAB
ALBUMIN SERPL-MCNC: 2.67 G/DL (ref 3.5–5.2)
ALBUMIN/GLOB SERPL: 0.9 G/DL
ALP SERPL-CCNC: 55 U/L (ref 39–117)
ALT SERPL W P-5'-P-CCNC: 14 U/L (ref 1–33)
ANION GAP SERPL CALCULATED.3IONS-SCNC: 9.4 MMOL/L (ref 5–15)
AST SERPL-CCNC: 15 U/L (ref 1–32)
BASOPHILS # BLD AUTO: 0 10*3/MM3 (ref 0–0.2)
BASOPHILS NFR BLD AUTO: 0 % (ref 0–1.5)
BILIRUB SERPL-MCNC: 0.3 MG/DL (ref 0.2–1.2)
BUN BLD-MCNC: 5 MG/DL (ref 6–20)
BUN/CREAT SERPL: 10 (ref 7–25)
CALCIUM SPEC-SCNC: 8.5 MG/DL (ref 8.6–10.5)
CHLORIDE SERPL-SCNC: 96 MMOL/L (ref 98–107)
CO2 SERPL-SCNC: 33.6 MMOL/L (ref 22–29)
CREAT BLD-MCNC: 0.5 MG/DL (ref 0.57–1)
DEPRECATED RDW RBC AUTO: 66.5 FL (ref 37–54)
EOSINOPHIL # BLD AUTO: 0.02 10*3/MM3 (ref 0–0.4)
EOSINOPHIL NFR BLD AUTO: 1.7 % (ref 0.3–6.2)
ERYTHROCYTE [DISTWIDTH] IN BLOOD BY AUTOMATED COUNT: 18.7 % (ref 12.3–15.4)
GFR SERPL CREATININE-BSD FRML MDRD: 128 ML/MIN/1.73
GLOBULIN UR ELPH-MCNC: 2.9 GM/DL
GLUCOSE BLD-MCNC: 136 MG/DL (ref 65–99)
HCT VFR BLD AUTO: 37 % (ref 34–46.6)
HEMOCCULT STL QL: NEGATIVE
HGB BLD-MCNC: 11.2 G/DL (ref 12–15.9)
IMM GRANULOCYTES # BLD AUTO: 0.01 10*3/MM3 (ref 0–0.05)
IMM GRANULOCYTES NFR BLD AUTO: 0.8 % (ref 0–0.5)
LYMPHOCYTES # BLD AUTO: 0.16 10*3/MM3 (ref 0.7–3.1)
LYMPHOCYTES NFR BLD AUTO: 13.4 % (ref 19.6–45.3)
MAGNESIUM SERPL-MCNC: 1.7 MG/DL (ref 1.6–2.6)
MCH RBC QN AUTO: 30.8 PG (ref 26.6–33)
MCHC RBC AUTO-ENTMCNC: 30.3 G/DL (ref 31.5–35.7)
MCV RBC AUTO: 101.6 FL (ref 79–97)
MONOCYTES # BLD AUTO: 0.22 10*3/MM3 (ref 0.1–0.9)
MONOCYTES NFR BLD AUTO: 18.5 % (ref 5–12)
NEUTROPHILS # BLD AUTO: 0.78 10*3/MM3 (ref 1.7–7)
NEUTROPHILS NFR BLD AUTO: 65.6 % (ref 42.7–76)
NRBC BLD AUTO-RTO: 0 /100 WBC (ref 0–0.2)
PLATELET # BLD AUTO: 69 10*3/MM3 (ref 140–450)
PMV BLD AUTO: 11.2 FL (ref 6–12)
POTASSIUM BLD-SCNC: 3.1 MMOL/L (ref 3.5–5.2)
PROT SERPL-MCNC: 5.6 G/DL (ref 6–8.5)
RBC # BLD AUTO: 3.64 10*6/MM3 (ref 3.77–5.28)
SODIUM BLD-SCNC: 139 MMOL/L (ref 136–145)
WBC NRBC COR # BLD: 1.19 10*3/MM3 (ref 3.4–10.8)

## 2020-02-12 PROCEDURE — 25810000003 SODIUM CHLORIDE 0.9 % WITH KCL 20 MEQ 20-0.9 MEQ/L-% SOLUTION: Performed by: INTERNAL MEDICINE

## 2020-02-12 PROCEDURE — 87045 FECES CULTURE AEROBIC BACT: CPT | Performed by: INTERNAL MEDICINE

## 2020-02-12 PROCEDURE — 83735 ASSAY OF MAGNESIUM: CPT | Performed by: INTERNAL MEDICINE

## 2020-02-12 PROCEDURE — 82272 OCCULT BLD FECES 1-3 TESTS: CPT | Performed by: INTERNAL MEDICINE

## 2020-02-12 PROCEDURE — 87046 STOOL CULTR AEROBIC BACT EA: CPT | Performed by: INTERNAL MEDICINE

## 2020-02-12 PROCEDURE — 96361 HYDRATE IV INFUSION ADD-ON: CPT

## 2020-02-12 PROCEDURE — 80053 COMPREHEN METABOLIC PANEL: CPT | Performed by: INTERNAL MEDICINE

## 2020-02-12 PROCEDURE — 96366 THER/PROPH/DIAG IV INF ADDON: CPT

## 2020-02-12 PROCEDURE — 99214 OFFICE O/P EST MOD 30 MIN: CPT | Performed by: INTERNAL MEDICINE

## 2020-02-12 PROCEDURE — 87324 CLOSTRIDIUM AG IA: CPT | Performed by: INTERNAL MEDICINE

## 2020-02-12 PROCEDURE — 85025 COMPLETE CBC W/AUTO DIFF WBC: CPT | Performed by: INTERNAL MEDICINE

## 2020-02-12 PROCEDURE — 87427 SHIGA-LIKE TOXIN AG IA: CPT | Performed by: INTERNAL MEDICINE

## 2020-02-12 PROCEDURE — 87329 GIARDIA AG IA: CPT | Performed by: INTERNAL MEDICINE

## 2020-02-12 PROCEDURE — 87328 CRYPTOSPORIDIUM AG IA: CPT | Performed by: INTERNAL MEDICINE

## 2020-02-12 PROCEDURE — 25010000003 HEPARIN LOCK FLUCH PER 10 UNITS: Performed by: INTERNAL MEDICINE

## 2020-02-12 PROCEDURE — 96365 THER/PROPH/DIAG IV INF INIT: CPT

## 2020-02-12 RX ORDER — SODIUM CHLORIDE 0.9 % (FLUSH) 0.9 %
10 SYRINGE (ML) INJECTION AS NEEDED
Status: DISCONTINUED | OUTPATIENT
Start: 2020-02-12 | End: 2020-02-12 | Stop reason: HOSPADM

## 2020-02-12 RX ORDER — SODIUM CHLORIDE AND POTASSIUM CHLORIDE 150; 900 MG/100ML; MG/100ML
500 INJECTION, SOLUTION INTRAVENOUS ONCE
Status: COMPLETED | OUTPATIENT
Start: 2020-02-12 | End: 2020-02-12

## 2020-02-12 RX ORDER — SODIUM CHLORIDE 0.9 % (FLUSH) 0.9 %
10 SYRINGE (ML) INJECTION AS NEEDED
Status: CANCELLED | OUTPATIENT
Start: 2020-02-13

## 2020-02-12 RX ORDER — HEPARIN SODIUM (PORCINE) LOCK FLUSH IV SOLN 100 UNIT/ML 100 UNIT/ML
500 SOLUTION INTRAVENOUS AS NEEDED
Status: CANCELLED | OUTPATIENT
Start: 2020-02-13

## 2020-02-12 RX ORDER — POTASSIUM CHLORIDE 750 MG/1
40 TABLET, FILM COATED, EXTENDED RELEASE ORAL ONCE
Status: COMPLETED | OUTPATIENT
Start: 2020-02-12 | End: 2020-02-12

## 2020-02-12 RX ORDER — HEPARIN SODIUM (PORCINE) LOCK FLUSH IV SOLN 100 UNIT/ML 100 UNIT/ML
500 SOLUTION INTRAVENOUS AS NEEDED
Status: DISCONTINUED | OUTPATIENT
Start: 2020-02-12 | End: 2020-02-12 | Stop reason: HOSPADM

## 2020-02-12 RX ADMIN — Medication 500 UNITS: at 15:20

## 2020-02-12 RX ADMIN — SODIUM CHLORIDE, PRESERVATIVE FREE 10 ML: 5 INJECTION INTRAVENOUS at 15:19

## 2020-02-12 RX ADMIN — POTASSIUM CHLORIDE 40 MEQ: 750 TABLET, FILM COATED, EXTENDED RELEASE ORAL at 12:38

## 2020-02-12 RX ADMIN — SODIUM CHLORIDE 1000 ML: 9 INJECTION, SOLUTION INTRAVENOUS at 11:04

## 2020-02-12 RX ADMIN — SODIUM CHLORIDE AND POTASSIUM CHLORIDE 500 ML/HR: .9; .15 SOLUTION INTRAVENOUS at 12:35

## 2020-02-12 NOTE — PROGRESS NOTES
NAME: Liz Jiang    : 1965    DATE:  2020    DIAGNOSIS: Anal Cancer    TREATMENT:        CHIEF COMPLAINT:  Follow up of Anal Cancer    HISTORY OF PRESENT ILLNESS:   Liz Jiang is a very pleasant 54 y.o. female who is being seen today at the request of No ref. provider found for evaluation and treatment of anal cancer. She initially presented to her PCP for constipation, rectal pain and a palpable mass x 6-7 months. After failing conservative treatment, she was recommended by a friend to see Dr. Wu. She had colonoscopy on on 19 during which a large anal mass was visualized. 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 Two benign polyps were also removed. She was referred to our clinic for discussion of further treatment options.     She has several complaints today. She reports gradually worsening anal/rectal pain, and says she feels the urge to defecate constantly. For pain, she is using Norco 10 every 6 hours.She has had nausea for several months that she has been taking Phenergan for. She reports fatigue and a weight loss of 16-18 pounds (which she has subsequently regained), insomnia, a history of headaches that are much more mild after previous CVA, baseline shortness of breath that she wears bipap and uses home O2 for. She also reports that she has decreased ROM to RUE following past CVA. She denies obvious blood in stool, chest pain, abdominal pain, or any other complaint.     INTERVAL HISTORY:  Ms. Jiang is here today for follow up of anal cancer. She reports she started having diarrhea this morning and has gone 4 times already today. She says the volume of stool has been scant each time.  She denies nausea. She spoke to the radiation clinic and they are still holding her treatment until mid next week. She reports she is having an easier time urinating w/ Pyridium. For pain, she is taking MS contin 60 mg BID  and oxycodone 20 mg 1 tab every 4 hours for a total of 5-6 tabs/day. This is controlling the pain currently. She reports restlessness and her sleep is fair.     PAST MEDICAL HISTORY:  Past Medical History:   Diagnosis Date   • Acid reflux disease    • Anal cancer (CMS/[facility]) 12/5/2019   • Arthritis    • Asthma, extrinsic    • Cholelithiasis    • Chronic headaches    • COPD (chronic obstructive pulmonary disease) (CMS/[facility])    • CVA (cerebral vascular accident) (CMS/[facility])     w/ right sided deficit   • CVA (cerebral vascular accident) (CMS/[facility])    • Diabetes mellitus (CMS/[facility])     only has high blood sugar when on steriods   • Obstructive sleep apnea treated with BiPAP    • On home oxygen therapy     2L    • Sleep apnea, obstructive        PAST SURGICAL HISTORY:  Past Surgical History:   Procedure Laterality Date   • ABDOMINAL SURGERY     • CARDIAC CATHETERIZATION     • CAROTID ENDARTERECTOMY Left 2018   • CHOLECYSTECTOMY WITH INTRAOPERATIVE CHOLANGIOGRAM N/A 1/30/2017    Procedure: CHOLECYSTECTOMY LAPAROSCOPIC INTRAOPERATIVE CHOLANGIOGRAM;  Surgeon: Carolin Marie MD;  Location: [facility];  Service:    • COLONOSCOPY     • HYSTERECTOMY      for heavy bleeding/ complete   • KIDNEY STONE SURGERY     • TUBAL ABDOMINAL LIGATION     • VENOUS ACCESS DEVICE (PORT) INSERTION Left 12/17/2019    Procedure: INSERTION VENOUS ACCESS DEVICE;  Surgeon: Carolin Marie MD;  Location: [facility];  Service: General        FAMILY HISTORY:  Family History   Problem Relation Age of Onset   • Diabetes Mother    • Hypertension Mother    • Arrhythmia Mother    • Pneumonia Father    • Coronary artery disease Other    • Arrhythmia Sister    • Heart attack Brother    • Lymphoma Brother         hodgkin's    • Other Brother         CABG   • Breast cancer Neg Hx        SOCIAL HISTORY:  Social History     Socioeconomic History   • Marital status:      Spouse name: Not on file   • Number of children: Not on file   • Years of education: Not  on file   • Highest education level: Not on file   Tobacco Use   • Smoking status: Former Smoker     Packs/day: 1.50     Years: 30.00     Pack years: 45.00     Types: Electronic Cigarette     Last attempt to quit: 2015     Years since quittin.1   • Smokeless tobacco: Never Used   Substance and Sexual Activity   • Alcohol use: No   • Drug use: No   • Sexual activity: Defer   Social History Narrative    Just retired 2015    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          REVIEW OF SYSTEMS:   A comprehensive 14 point review of systems was performed.  Significant findings as mentioned above.  All other systems reviewed and are negative.      MEDICATIONS:  The current medication list was reviewed in the EMR    Current Outpatient Medications:   •  albuterol sulfate  (90 Base) MCG/ACT inhaler, Inhale 2 puffs Every 6 (Six) Hours As Needed for Wheezing or Shortness of Air., Disp: 1 inhaler, Rfl: 8  •  ASPIRIN LOW DOSE 81 MG EC tablet, Take 81 mg by mouth Daily., Disp: , Rfl: 3  •  budesonide-formoterol (SYMBICORT) 160-4.5 MCG/ACT inhaler, Inhale 2 puffs 2 (Two) Times a Day. Rinse mouth with water after use., Disp: 1 inhaler, Rfl: 8  •  clonazePAM (KlonoPIN) 0.5 MG tablet, Take 1 tablet by mouth 2 (Two) Times a Day As Needed for Seizures., Disp: 60 tablet, Rfl: 0  •  clopidogrel (PLAVIX) 75 MG tablet, TAKE ONE TABLET BY MOUTH EVERY DAY FOR BLOOD THINNER, Disp: , Rfl: 3  •  diphenhydrAMINE in aluminum-magnesium hydroxide-simethicone suspension-nystatin-Lidocaine Viscous HCl solution, Swish and swallow 5 ml 4 times a day as needed, Disp: 240 mL, Rfl: 5  •  escitalopram (LEXAPRO) 20 MG tablet, Take 1 tablet by mouth Daily., Disp: 30 tablet, Rfl: 11  •  furosemide (LASIX) 20 MG tablet, Take 20  mg by mouth 2 (Two) Times a Day. PRN, Disp: , Rfl:   •  gabapentin (NEURONTIN) 800 MG tablet, Take 800 mg by mouth 3 (Three) Times a Day., Disp: , Rfl:   •  granisetron (SANCUSO) 3.1 MG/24HR, Place 1 patch on the skin as directed by provider 1 (One) Time Per Week., Disp: 4 patch, Rfl: 2  •  lidocaine-prilocaine (EMLA) 2.5-2.5 % cream, Apply to port site 30 mins prior to chemotherapy appointment, Disp: 30 g, Rfl: 5  •  loratadine-pseudoephedrine (CLARITIN-D 24 HOUR)  MG per 24 hr tablet, Take 1 tablet by mouth Daily., Disp: 30 tablet, Rfl: 5  •  LORazepam (ATIVAN) 0.5 MG tablet, Take 1 tablet by mouth Every 8 (Eight) Hours As Needed for Anxiety., Disp: 60 tablet, Rfl: 0  •  LORazepam (ATIVAN) 0.5 MG tablet, Take 1 tablet by mouth Every 8 (Eight) Hours As Needed for anxiety, Disp: 60 tablet, Rfl: 0  •  Magic mouthwash Radonc, Swish and swallow 10 mL 4 (Four) Times a Day Before Meals & at Bedtime As Needed., Disp: 480 mL, Rfl: 3  •  Morphine (MS CONTIN) 15 MG 12 hr tablet, Take 1 tablet by mouth Every 12 (Twelve) Hours., Disp: 60 tablet, Rfl: 0  •  Morphine (MS CONTIN) 60 MG 12 hr tablet, Take 1 tablet by mouth Every 12 (Twelve) Hours., Disp: 60 tablet, Rfl: 0  •  Oral Wound Care Products (MUGARD) liquid, Apply 5 mL to the mouth or throat 5 (Five) Times a Day As Needed (for oral pain)., Disp: 240 mL, Rfl: 2  •  Oral Wound Care Products (MUGARD) liquid, APPLY 5 MLS TO THE MOUTH OR THROAT 5 TIMES A DAY AS NEEDED FOR ORAL PAIN, Disp: 240 mL, Rfl: 2  •  oxyCODONE (ROXICODONE) 10 MG tablet, Take 1-2 tabs every 4-6 hours as needed for pain, Disp: 150 tablet, Rfl: 0  •  oxyCODONE (ROXICODONE) 20 MG tablet, Take 1 tablet by mouth Every 4 (Four) Hours As Needed for pain, Disp: 200 tablet, Rfl: 0  •  phenazopyridine (PYRIDIUM) 200 MG tablet, Take 1 tablet by mouth 3 (Three) Times a Day As Needed for Bladder Spasms., Disp: 90 tablet, Rfl: 3  •  polyethylene glycol (GAVILAX) powder, Take 17 g by mouth 2 (Two) Times a Day.,  Disp: 850 g, Rfl: 4  •  polyethylene glycol (MIRALAX) powder, MIX 17 GRAMS OF POWDER IN 8 OUNCES OF WATER OR JUICE AND TAKE BY MOUTH TWICE A DAY., Disp: 1020 g, Rfl: 4  •  potassium chloride (K-DUR,KLOR-CON) 10 MEQ CR tablet, Take 4 tablets by mouth Daily., Disp: 4 tablet, Rfl: 0  •  predniSONE (DELTASONE) 5 MG tablet, Take 5 mg by mouth Daily. Take one tab by mouth ever other day alternating with 1/2 tab, Disp: , Rfl:   •  prochlorperazine (COMPAZINE) 10 MG tablet, Take 1 tablet by mouth Every 6 (Six) Hours As Needed for Nausea or Vomiting., Disp: 60 tablet, Rfl: 3  •  promethazine (PHENERGAN) 25 MG tablet, Take 25 mg by mouth Every 6 (Six) Hours As Needed for Nausea or Vomiting., Disp: , Rfl:   •  sennosides-docusate (SENNA-S) 8.6-50 MG per tablet, Take 2 tablets by mouth 2 (Two) Times a Day., Disp: 120 tablet, Rfl: 5  •  silver sulfadiazine (SILVADENE, SSD) 1 % cream, Apply  topically to the appropriate area as directed 2 (Two) Times a Day. Apply to affected area and keep covered., Disp: 400 g, Rfl: 5  •  tiZANidine (ZANAFLEX) 4 MG tablet, 4 mg Every 6 (Six) Hours As Needed., Disp: , Rfl: 0  •  Umeclidinium Bromide 62.5 MCG/INH aerosol powder , Inhale 1 puff Daily., Disp: 30 each, Rfl: 8    ALLERGIES:    No Known Allergies    PHYSICAL EXAM:  Vitals:    02/12/20 1056   BP: (!) 79/52   Pulse: 112   Resp: 16   Temp: 98.4 °F (36.9 °C)   SpO2: (!) 89%     Pain Score    02/12/20 1056   PainSc:   7     General:  Awake, alert and oriented.  Appears uncomfortable, but improved from her last visit  HEENT:  Pupils are equal, round and reactive to light and accommodation, Extra-ocular movements full, mucous membranes are tachy  Neck:  No JVD, thyromegaly or lymphadenopathy  CV:  Regular tachycardia, no murmurs, rubs or gallops  Resp:  Lungs are clear to auscultation bilaterally  Abd:  Soft, non-tender, non-distended, bowel sounds present, no organomegaly or masses  Rectal: Deferred today.  Last exam as follows:   Firm anal  mass palpable anteriorly  Ext:  No clubbing, cyanosis or edema  Lymph:  No cervical, supraclavicular, axillary,adenopathy  Neuro:  grossly non-focal exam    PATHOLOGY:  11-01-19          ENDOSCOPY:  C-scope (Jazmin) 11-01-19          IMAGING:  MRI Abdomen w/wo contrast 11-06-19   FINDINGS:   Today's study shows no definitive colonic mass.   Particularly I do not see any contrast-enhancing abnormalities on the presented images.  No adenopathy in the imaged area.  Sigmoid colon wall does appear to be slightly thickened but no adjacent fluid.  No walled off fluid collections.     IMPRESSION:  No contrast-enhancing abnormalities identified.     CTCAP 11-27-19  FINDINGS: Today's study shows evidence of COPD.     There are no parenchymal nodules or masses.     No pericardial or pleural effusions.     No mediastinal or hilar lymph node enlargement.     IMPRESSION:  No evidence of metastatic disease to the chest.    FINDINGS:   The lung bases are clear. There are no pleural effusions.     The liver is homogeneous. There is no evidence of focal hepatic mass     The spleen is homogeneous     There is no peripancreatic stranding or pancreatic head mass.     There is no adrenal enlargement.     There is a nonobstructing right intrarenal stone..      Otherwise I do not see any free fluid or walled off fluid collections.     There is diffuse colonic wall thickening.      IMPRESSION:  1. Diffuse colonic wall thickening.  2. Nonobstructing right intrarenal stone.  3. No evidence of metastatic disease to the abdomen or pelvis.      RECENT LABS:  Lab Results   Component Value Date    WBC 1.02 (C) 02/10/2020    HGB 11.1 (L) 02/10/2020    HCT 36.5 02/10/2020    .0 (H) 02/10/2020    RDW 18.1 (H) 02/10/2020    PLT 61 (L) 02/10/2020    NEUTRORELPCT 86.1 (H) 02/06/2020    LYMPHORELPCT 3.3 (L) 02/06/2020    MONORELPCT 5.6 02/06/2020    EOSRELPCT 4.3 02/06/2020    BASORELPCT 0.0 02/06/2020    NEUTROABS 0.70 (L) 02/10/2020    LYMPHSABS  0.10 (L) 02/06/2020       Lab Results   Component Value Date     02/10/2020    K 3.2 (L) 02/10/2020    CO2 35.4 (H) 02/10/2020    CL 93 (L) 02/10/2020    BUN 6 02/10/2020    CREATININE 0.41 (L) 02/10/2020    EGFRIFNONA >150 02/10/2020    GLUCOSE 116 (H) 02/10/2020    CALCIUM 8.7 02/10/2020    ALKPHOS 57 02/10/2020    AST 17 02/10/2020    ALT 19 02/10/2020    BILITOT 0.4 02/10/2020    ALBUMIN 2.99 (L) 02/10/2020    PROTEINTOT 6.1 02/10/2020    MG 2.1 03/27/2017    PHOS 3.0 03/27/2017       Lab Results   Component Value Date    FERRITIN 197.40 (H) 11/19/2019    IRON 43 11/19/2019    TIBC 437 11/19/2019    LABIRON 10 (L) 11/19/2019    KSYVGBBX91 473 11/19/2019    FOLATE 9.32 11/19/2019       ASSESSMENT & PLAN:  Liz Jiang is a very pleasant 54 y.o. female with newly diagnosed anal cancer.    1.  Anal cancer:  -  Clinically has Stage IIIB disease ([id number]) with invasion of the rectovaginal septum.  -  She reports recent gynecologic examination without cause for concern.  Will get notes from [facility].  -  CT CAP and MRI Pelvis unremarkable.  No evidence of metastatic disease. No notable adenopathy.  -  Recommended concurrent definitive chemoradiation with Mitomycin/5-FU.  -  She saw Dr. Martinez who was in agreement with this plan.  -  She is seeing Dr. Campos for radiation and tentatively is planned to complete radiation on 2-20 if treatments can be restarted (on break for toxicity).  -  Will continue with supportive care.  She is doing better with IVF.  Will plan for 1L 3x/week and as needed.    -  Since she is dehydrated, tachycardic, hypotensive and with diarrhea today, will give 2-3 liters.  Will also replace electrolytes.  -  I will see her back next week.    2. Neoplasm related pain:  -  She has a h/o chronic back pain previously managed by pain management physician.  - We took over her pain management during her cancer treatment and will transition back to her pain management physician after her  cancer treatment is completed.    -  Continue Morphine ER to 60 mg q12h with Oxycodone 20 mg 1/2 - 1 tab q4-6h prn breakthrough pain.  This is working better.    .    3. Diarrhea: Will give IV hydration and electrolyte replacement.  Recommended Immodium, but she said volume was low and she was worried this would cause constipation.  Will check stool studies and monitor carefully.    4.  Anxiety: Recently increased Lexapro to 20 mg daily.  Continue  Ativan for nausea, anxiety.        5. Mucositis: Resolved.  Continue magic mouth wash as needed     6. Nausea : Poorly controlled with alternating and taking zofran and phenergan scheduled ( compazine has never worked for her), so ordered trial of sancuso patch.  This has helped.  Will refill today.  Continue Ativan 0.5 mg 1-2 tabs q 8 h prn refractory nausea.      7.  Prophylaxis:  Has had Pneumovax, Prevnar 13 and 2019 flu vaccine.       8.  ACO / SAÚL/Other  Quality measures  -  Liz Jiang received 2019 flu vaccine.  -  Liz Jiang reports a pain score of 7 .  Given her pain assessment as noted, treatment options were discussed and the following options were decided upon as a follow-up plan to address the patient's pain: continuation of current treatment plan for pain and prescription for opiod analgesics.   -  Current outpatient and discharge medications have been reconciled for the patient.    Reviewed by: Deanne Jones MD       9. Follow up: Return for IV fluids / electrolytes Friday.  Will see her back in 1 week.    This note was scribed for Deanne Jones MD by [facility] Howell RN.    I, Deanne Jones MD, personally performed the services described in this documentation as scribed by the above named individual in my presence, and it is both accurate and complete.  02/12/2020       I spent 25 minutes with Liz Jiang today.  In the office today, more than 50% of this time was spent face-to-face with her  in counseling / coordination of care,  reviewing her medical history and counseling on the current treatment plan.  All questions were answered to her satisfaction      Electronically Signed by: Deanne Jones MD       CC:   Mayte Davis APRN Muhammed Iqbal, MD John Dvorak, MD Marta Hayne, MD Michael Simons, MD Barbara Michna, MD

## 2020-02-13 ENCOUNTER — DOCUMENTATION (OUTPATIENT)
Dept: ONCOLOGY | Facility: HOSPITAL | Age: 55
End: 2020-02-13

## 2020-02-13 ENCOUNTER — INFUSION (OUTPATIENT)
Dept: ONCOLOGY | Facility: HOSPITAL | Age: 55
End: 2020-02-13

## 2020-02-13 ENCOUNTER — LAB (OUTPATIENT)
Dept: ONCOLOGY | Facility: CLINIC | Age: 55
End: 2020-02-13

## 2020-02-13 VITALS
OXYGEN SATURATION: 90 % | DIASTOLIC BLOOD PRESSURE: 69 MMHG | SYSTOLIC BLOOD PRESSURE: 103 MMHG | HEART RATE: 111 BPM | BODY MASS INDEX: 33.13 KG/M2 | WEIGHT: 193 LBS | TEMPERATURE: 97.7 F

## 2020-02-13 DIAGNOSIS — Z95.828 PORT-A-CATH IN PLACE: ICD-10-CM

## 2020-02-13 DIAGNOSIS — E87.6 HYPOKALEMIA: ICD-10-CM

## 2020-02-13 DIAGNOSIS — E86.0 DEHYDRATION: ICD-10-CM

## 2020-02-13 DIAGNOSIS — E86.0 DEHYDRATION: Primary | ICD-10-CM

## 2020-02-13 LAB
ANION GAP SERPL CALCULATED.3IONS-SCNC: 6.9 MMOL/L (ref 5–15)
BUN BLD-MCNC: 5 MG/DL (ref 6–20)
BUN/CREAT SERPL: 10.9 (ref 7–25)
C DIFF TOX A+B STL QL IA: NEGATIVE
CALCIUM SPEC-SCNC: 8.6 MG/DL (ref 8.6–10.5)
CHLORIDE SERPL-SCNC: 100 MMOL/L (ref 98–107)
CO2 SERPL-SCNC: 33.1 MMOL/L (ref 22–29)
CREAT BLD-MCNC: 0.46 MG/DL (ref 0.57–1)
CRYPTOSP AG STL QL IA: NEGATIVE
G LAMBLIA AG STL QL IA: NEGATIVE
GFR SERPL CREATININE-BSD FRML MDRD: 141 ML/MIN/1.73
GLUCOSE BLD-MCNC: 107 MG/DL (ref 65–99)
MAGNESIUM SERPL-MCNC: 1.8 MG/DL (ref 1.6–2.6)
POTASSIUM BLD-SCNC: 3.8 MMOL/L (ref 3.5–5.2)
SODIUM BLD-SCNC: 140 MMOL/L (ref 136–145)

## 2020-02-13 PROCEDURE — 25010000003 HEPARIN LOCK FLUCH PER 10 UNITS: Performed by: INTERNAL MEDICINE

## 2020-02-13 PROCEDURE — 96360 HYDRATION IV INFUSION INIT: CPT

## 2020-02-13 PROCEDURE — 80048 BASIC METABOLIC PNL TOTAL CA: CPT | Performed by: INTERNAL MEDICINE

## 2020-02-13 PROCEDURE — 83735 ASSAY OF MAGNESIUM: CPT | Performed by: INTERNAL MEDICINE

## 2020-02-13 RX ORDER — HEPARIN SODIUM (PORCINE) LOCK FLUSH IV SOLN 100 UNIT/ML 100 UNIT/ML
500 SOLUTION INTRAVENOUS AS NEEDED
Status: CANCELLED | OUTPATIENT
Start: 2020-02-17

## 2020-02-13 RX ORDER — AMOXICILLIN AND CLAVULANATE POTASSIUM 875; 125 MG/1; MG/1
1 TABLET, FILM COATED ORAL 2 TIMES DAILY
Qty: 20 TABLET | Refills: 0 | Status: SHIPPED | OUTPATIENT
Start: 2020-02-13 | End: 2020-02-24

## 2020-02-13 RX ORDER — SODIUM CHLORIDE 0.9 % (FLUSH) 0.9 %
10 SYRINGE (ML) INJECTION AS NEEDED
Status: CANCELLED | OUTPATIENT
Start: 2020-02-17

## 2020-02-13 RX ORDER — HEPARIN SODIUM (PORCINE) LOCK FLUSH IV SOLN 100 UNIT/ML 100 UNIT/ML
500 SOLUTION INTRAVENOUS AS NEEDED
Status: DISCONTINUED | OUTPATIENT
Start: 2020-02-13 | End: 2020-02-13 | Stop reason: HOSPADM

## 2020-02-13 RX ORDER — ALBUTEROL SULFATE 90 UG/1
2 AEROSOL, METERED RESPIRATORY (INHALATION) ONCE
Status: DISCONTINUED | OUTPATIENT
Start: 2020-02-13 | End: 2020-02-13

## 2020-02-13 RX ORDER — SODIUM CHLORIDE 0.9 % (FLUSH) 0.9 %
10 SYRINGE (ML) INJECTION AS NEEDED
Status: DISCONTINUED | OUTPATIENT
Start: 2020-02-13 | End: 2020-02-13 | Stop reason: HOSPADM

## 2020-02-13 RX ADMIN — Medication 500 UNITS: at 13:58

## 2020-02-13 RX ADMIN — SODIUM CHLORIDE 1000 ML: 9 INJECTION, SOLUTION INTRAVENOUS at 12:57

## 2020-02-13 RX ADMIN — SODIUM CHLORIDE, PRESERVATIVE FREE 10 ML: 5 INJECTION INTRAVENOUS at 13:57

## 2020-02-13 NOTE — PROGRESS NOTES
SS spoke with patient this date.  Patient request boost.  SS provided patient with strawberry boost per dietary services.  SS will follow.

## 2020-02-14 ENCOUNTER — APPOINTMENT (OUTPATIENT)
Dept: ONCOLOGY | Facility: HOSPITAL | Age: 55
End: 2020-02-14

## 2020-02-16 LAB
CAMPYLOBACTER STL CULT: NORMAL
E COLI SXT STL QL IA: NEGATIVE
Lab: NORMAL
Lab: NORMAL
SALM + SHIG STL CULT: NORMAL

## 2020-02-17 ENCOUNTER — DOCUMENTATION (OUTPATIENT)
Dept: ONCOLOGY | Facility: HOSPITAL | Age: 55
End: 2020-02-17

## 2020-02-17 ENCOUNTER — TELEPHONE (OUTPATIENT)
Dept: SOCIAL WORK | Facility: HOSPITAL | Age: 55
End: 2020-02-17

## 2020-02-17 ENCOUNTER — OFFICE VISIT (OUTPATIENT)
Dept: ONCOLOGY | Facility: CLINIC | Age: 55
End: 2020-02-17

## 2020-02-17 ENCOUNTER — INFUSION (OUTPATIENT)
Dept: ONCOLOGY | Facility: HOSPITAL | Age: 55
End: 2020-02-17

## 2020-02-17 VITALS
WEIGHT: 187.9 LBS | BODY MASS INDEX: 32.25 KG/M2 | RESPIRATION RATE: 18 BRPM | SYSTOLIC BLOOD PRESSURE: 95 MMHG | HEART RATE: 108 BPM | DIASTOLIC BLOOD PRESSURE: 62 MMHG | TEMPERATURE: 98.1 F | OXYGEN SATURATION: 90 %

## 2020-02-17 VITALS
WEIGHT: 187.9 LBS | SYSTOLIC BLOOD PRESSURE: 95 MMHG | DIASTOLIC BLOOD PRESSURE: 62 MMHG | OXYGEN SATURATION: 90 % | BODY MASS INDEX: 32.25 KG/M2 | HEART RATE: 108 BPM | RESPIRATION RATE: 18 BRPM | TEMPERATURE: 98.1 F

## 2020-02-17 DIAGNOSIS — F41.9 ANXIETY: ICD-10-CM

## 2020-02-17 DIAGNOSIS — C21.0 ANAL CANCER (HCC): Primary | ICD-10-CM

## 2020-02-17 DIAGNOSIS — G89.3 NEOPLASM RELATED PAIN: ICD-10-CM

## 2020-02-17 DIAGNOSIS — Z95.828 PORT-A-CATH IN PLACE: ICD-10-CM

## 2020-02-17 DIAGNOSIS — E86.0 DEHYDRATION: ICD-10-CM

## 2020-02-17 DIAGNOSIS — R11.0 NAUSEA: ICD-10-CM

## 2020-02-17 LAB
ALBUMIN SERPL-MCNC: 2.93 G/DL (ref 3.5–5.2)
ALBUMIN/GLOB SERPL: 0.8 G/DL
ALP SERPL-CCNC: 65 U/L (ref 39–117)
ALT SERPL W P-5'-P-CCNC: 15 U/L (ref 1–33)
ANION GAP SERPL CALCULATED.3IONS-SCNC: 7.7 MMOL/L (ref 5–15)
AST SERPL-CCNC: 18 U/L (ref 1–32)
BASOPHILS # BLD AUTO: 0.01 10*3/MM3 (ref 0–0.2)
BASOPHILS NFR BLD AUTO: 0.2 % (ref 0–1.5)
BILIRUB SERPL-MCNC: 0.3 MG/DL (ref 0.2–1.2)
BUN BLD-MCNC: 6 MG/DL (ref 6–20)
BUN/CREAT SERPL: 10.7 (ref 7–25)
CALCIUM SPEC-SCNC: 9 MG/DL (ref 8.6–10.5)
CHLORIDE SERPL-SCNC: 96 MMOL/L (ref 98–107)
CO2 SERPL-SCNC: 32.3 MMOL/L (ref 22–29)
CREAT BLD-MCNC: 0.56 MG/DL (ref 0.57–1)
DEPRECATED RDW RBC AUTO: 69.9 FL (ref 37–54)
EOSINOPHIL # BLD AUTO: 0.01 10*3/MM3 (ref 0–0.4)
EOSINOPHIL NFR BLD AUTO: 0.2 % (ref 0.3–6.2)
ERYTHROCYTE [DISTWIDTH] IN BLOOD BY AUTOMATED COUNT: 19.2 % (ref 12.3–15.4)
FLUAV AG NPH QL: NEGATIVE
FLUBV AG NPH QL IA: NEGATIVE
GFR SERPL CREATININE-BSD FRML MDRD: 112 ML/MIN/1.73
GLOBULIN UR ELPH-MCNC: 3.5 GM/DL
GLUCOSE BLD-MCNC: 116 MG/DL (ref 65–99)
HCT VFR BLD AUTO: 38.3 % (ref 34–46.6)
HGB BLD-MCNC: 11.5 G/DL (ref 12–15.9)
IMM GRANULOCYTES # BLD AUTO: 0.07 10*3/MM3 (ref 0–0.05)
IMM GRANULOCYTES NFR BLD AUTO: 1.7 % (ref 0–0.5)
LYMPHOCYTES # BLD AUTO: 0.4 10*3/MM3 (ref 0.7–3.1)
LYMPHOCYTES NFR BLD AUTO: 9.7 % (ref 19.6–45.3)
MCH RBC QN AUTO: 30.5 PG (ref 26.6–33)
MCHC RBC AUTO-ENTMCNC: 30 G/DL (ref 31.5–35.7)
MCV RBC AUTO: 101.6 FL (ref 79–97)
MONOCYTES # BLD AUTO: 0.36 10*3/MM3 (ref 0.1–0.9)
MONOCYTES NFR BLD AUTO: 8.7 % (ref 5–12)
NEUTROPHILS # BLD AUTO: 3.27 10*3/MM3 (ref 1.7–7)
NEUTROPHILS NFR BLD AUTO: 79.5 % (ref 42.7–76)
NRBC BLD AUTO-RTO: 0 /100 WBC (ref 0–0.2)
PLATELET # BLD AUTO: 125 10*3/MM3 (ref 140–450)
PMV BLD AUTO: 11.2 FL (ref 6–12)
POTASSIUM BLD-SCNC: 4.2 MMOL/L (ref 3.5–5.2)
PROT SERPL-MCNC: 6.4 G/DL (ref 6–8.5)
RBC # BLD AUTO: 3.77 10*6/MM3 (ref 3.77–5.28)
SODIUM BLD-SCNC: 136 MMOL/L (ref 136–145)
WBC NRBC COR # BLD: 4.12 10*3/MM3 (ref 3.4–10.8)

## 2020-02-17 PROCEDURE — 36415 COLL VENOUS BLD VENIPUNCTURE: CPT

## 2020-02-17 PROCEDURE — 85025 COMPLETE CBC W/AUTO DIFF WBC: CPT

## 2020-02-17 PROCEDURE — 96361 HYDRATE IV INFUSION ADD-ON: CPT

## 2020-02-17 PROCEDURE — 25010000002 LORAZEPAM PER 2 MG: Performed by: INTERNAL MEDICINE

## 2020-02-17 PROCEDURE — 99214 OFFICE O/P EST MOD 30 MIN: CPT | Performed by: INTERNAL MEDICINE

## 2020-02-17 PROCEDURE — 25010000003 HEPARIN LOCK FLUCH PER 10 UNITS: Performed by: INTERNAL MEDICINE

## 2020-02-17 PROCEDURE — 80053 COMPREHEN METABOLIC PANEL: CPT

## 2020-02-17 PROCEDURE — 87804 INFLUENZA ASSAY W/OPTIC: CPT | Performed by: INTERNAL MEDICINE

## 2020-02-17 PROCEDURE — 96374 THER/PROPH/DIAG INJ IV PUSH: CPT

## 2020-02-17 RX ORDER — LORAZEPAM 2 MG/ML
1 INJECTION INTRAMUSCULAR ONCE
Status: COMPLETED | OUTPATIENT
Start: 2020-02-17 | End: 2020-02-17

## 2020-02-17 RX ORDER — SODIUM CHLORIDE 0.9 % (FLUSH) 0.9 %
10 SYRINGE (ML) INJECTION AS NEEDED
Status: DISCONTINUED | OUTPATIENT
Start: 2020-02-17 | End: 2020-02-17 | Stop reason: HOSPADM

## 2020-02-17 RX ORDER — SODIUM CHLORIDE 0.9 % (FLUSH) 0.9 %
10 SYRINGE (ML) INJECTION AS NEEDED
Status: CANCELLED | OUTPATIENT
Start: 2020-02-21

## 2020-02-17 RX ORDER — HEPARIN SODIUM (PORCINE) LOCK FLUSH IV SOLN 100 UNIT/ML 100 UNIT/ML
500 SOLUTION INTRAVENOUS AS NEEDED
Status: DISCONTINUED | OUTPATIENT
Start: 2020-02-17 | End: 2020-02-17 | Stop reason: HOSPADM

## 2020-02-17 RX ORDER — HEPARIN SODIUM (PORCINE) LOCK FLUSH IV SOLN 100 UNIT/ML 100 UNIT/ML
500 SOLUTION INTRAVENOUS AS NEEDED
Status: CANCELLED | OUTPATIENT
Start: 2020-02-21

## 2020-02-17 RX ADMIN — LORAZEPAM 1 MG: 2 INJECTION INTRAMUSCULAR; INTRAVENOUS at 11:27

## 2020-02-17 RX ADMIN — SODIUM CHLORIDE 1000 ML: 9 INJECTION, SOLUTION INTRAVENOUS at 11:26

## 2020-02-17 RX ADMIN — SODIUM CHLORIDE, PRESERVATIVE FREE 10 ML: 5 INJECTION INTRAVENOUS at 12:58

## 2020-02-17 RX ADMIN — Medication 500 UNITS: at 12:59

## 2020-02-17 NOTE — TELEPHONE ENCOUNTER
SW completed and faxed in a [facility] referral for pt requesting dates 2/24-2/28.  SW available for ongoing support and resource needs.

## 2020-02-17 NOTE — PROGRESS NOTES
"NAME: Liz Jiang    : 1965    DATE:  2020    DIAGNOSIS: Anal Cancer    TREATMENT:        CHIEF COMPLAINT:  Follow up of Anal Cancer    HISTORY OF PRESENT ILLNESS:   Liz Jiang is a very pleasant 54 y.o. female who is being seen today at the request of No ref. provider found for evaluation and treatment of anal cancer. She initially presented to her PCP for constipation, rectal pain and a palpable mass x 6-7 months. After failing conservative treatment, she was recommended by a friend to see Dr. Wu. She had colonoscopy on on 19 during which a large anal mass was visualized. 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 Two benign polyps were also removed. She was referred to our clinic for discussion of further treatment options.     She has several complaints today. She reports gradually worsening anal/rectal pain, and says she feels the urge to defecate constantly. For pain, she is using Norco 10 every 6 hours.She has had nausea for several months that she has been taking Phenergan for. She reports fatigue and a weight loss of 16-18 pounds (which she has subsequently regained), insomnia, a history of headaches that are much more mild after previous CVA, baseline shortness of breath that she wears bipap and uses home O2 for. She also reports that she has decreased ROM to RUE following past CVA. She denies obvious blood in stool, chest pain, abdominal pain, or any other complaint.     INTERVAL HISTORY:  Ms. Jiang is here today with her sister for follow up of anal cancer. She reports she \"just feels bad all over\". She reports feeling fatigued and had expected her energy to have returned by now. She is still on hold from radiation and is supposed to call Wednesday to schedule when to restart. She reports pain is sl better as she gets time away from treatment. She continues to use MS Contin 60 mg BID and Oxycodone 20 " "mg 1 tab every 5 hours, which is an improvement from every 4 hours. She reports bowels are moving normally, having \"slowed down\". Her appetite is fair, but she is nauseous today. She reports a large amount of green sinus drainage, headache, body aches and chills. She denies high fevers.       PAST MEDICAL HISTORY:  Past Medical History:   Diagnosis Date   • Acid reflux disease    • Anal cancer (CMS/[facility]) 12/5/2019   • Arthritis    • Asthma, extrinsic    • Cholelithiasis    • Chronic headaches    • COPD (chronic obstructive pulmonary disease) (CMS/[facility])    • CVA (cerebral vascular accident) (CMS/[facility])     w/ right sided deficit   • CVA (cerebral vascular accident) (CMS/[facility])    • Diabetes mellitus (CMS/[facility])     only has high blood sugar when on steriods   • Obstructive sleep apnea treated with BiPAP    • On home oxygen therapy     2L    • Sleep apnea, obstructive        PAST SURGICAL HISTORY:  Past Surgical History:   Procedure Laterality Date   • ABDOMINAL SURGERY     • CARDIAC CATHETERIZATION     • CAROTID ENDARTERECTOMY Left 2018   • CHOLECYSTECTOMY WITH INTRAOPERATIVE CHOLANGIOGRAM N/A 1/30/2017    Procedure: CHOLECYSTECTOMY LAPAROSCOPIC INTRAOPERATIVE CHOLANGIOGRAM;  Surgeon: Carolin Marie MD;  Location: [facility];  Service:    • COLONOSCOPY     • HYSTERECTOMY      for heavy bleeding/ complete   • KIDNEY STONE SURGERY     • TUBAL ABDOMINAL LIGATION     • VENOUS ACCESS DEVICE (PORT) INSERTION Left 12/17/2019    Procedure: INSERTION VENOUS ACCESS DEVICE;  Surgeon: Carolin Marie MD;  Location: [facility];  Service: General        FAMILY HISTORY:  Family History   Problem Relation Age of Onset   • Diabetes Mother    • Hypertension Mother    • Arrhythmia Mother    • Pneumonia Father    • Coronary artery disease Other    • Arrhythmia Sister    • Heart attack Brother    • Lymphoma Brother         hodgkin's    • Other Brother         CABG   • Breast cancer Neg Hx        SOCIAL HISTORY:  Social History "     Socioeconomic History   • Marital status:      Spouse name: Not on file   • Number of children: Not on file   • Years of education: Not on file   • Highest education level: Not on file   Tobacco Use   • Smoking status: Former Smoker     Packs/day: 1.50     Years: 30.00     Pack years: 45.00     Types: Electronic Cigarette     Last attempt to quit:      Years since quittin.1   • Smokeless tobacco: Never Used   Substance and Sexual Activity   • Alcohol use: No   • Drug use: No   • Sexual activity: Defer   Social History Narrative    Just retired     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          REVIEW OF SYSTEMS:   A comprehensive 14 point review of systems was performed.  Significant findings as mentioned above.  All other systems reviewed and are negative.      MEDICATIONS:  The current medication list was reviewed in the EMR    Current Outpatient Medications:   •  albuterol sulfate  (90 Base) MCG/ACT inhaler, Inhale 2 puffs Every 6 (Six) Hours As Needed for Wheezing or Shortness of Air., Disp: 1 inhaler, Rfl: 8  •  amoxicillin-clavulanate (AUGMENTIN) 875-125 MG per tablet, Take 1 tablet by mouth 2 (Two) Times a Day for 10 days., Disp: 20 tablet, Rfl: 0  •  ASPIRIN LOW DOSE 81 MG EC tablet, Take 81 mg by mouth Daily., Disp: , Rfl: 3  •  budesonide-formoterol (SYMBICORT) 160-4.5 MCG/ACT inhaler, Inhale 2 puffs 2 (Two) Times a Day. Rinse mouth with water after use., Disp: 1 inhaler, Rfl: 8  •  clonazePAM (KlonoPIN) 0.5 MG tablet, Take 1 tablet by mouth 2 (Two) Times a Day As Needed for Seizures., Disp: 60 tablet, Rfl: 0  •  clopidogrel (PLAVIX) 75 MG tablet, TAKE ONE TABLET BY MOUTH EVERY DAY FOR BLOOD THINNER, Disp: , Rfl: 3  •  diphenhydrAMINE in  aluminum-magnesium hydroxide-simethicone suspension-nystatin-Lidocaine Viscous HCl solution, Swish and swallow 5 ml 4 times a day as needed, Disp: 240 mL, Rfl: 5  •  escitalopram (LEXAPRO) 20 MG tablet, Take 1 tablet by mouth Daily., Disp: 30 tablet, Rfl: 11  •  furosemide (LASIX) 20 MG tablet, Take 20 mg by mouth 2 (Two) Times a Day. PRN, Disp: , Rfl:   •  gabapentin (NEURONTIN) 800 MG tablet, Take 800 mg by mouth 3 (Three) Times a Day., Disp: , Rfl:   •  granisetron (SANCUSO) 3.1 MG/24HR, Place 1 patch on the skin as directed by provider 1 (One) Time Per Week., Disp: 4 patch, Rfl: 2  •  lidocaine-prilocaine (EMLA) 2.5-2.5 % cream, Apply to port site 30 mins prior to chemotherapy appointment, Disp: 30 g, Rfl: 5  •  loratadine-pseudoephedrine (CLARITIN-D 24 HOUR)  MG per 24 hr tablet, Take 1 tablet by mouth Daily., Disp: 30 tablet, Rfl: 5  •  LORazepam (ATIVAN) 0.5 MG tablet, Take 1 tablet by mouth Every 8 (Eight) Hours As Needed for Anxiety., Disp: 60 tablet, Rfl: 0  •  LORazepam (ATIVAN) 0.5 MG tablet, Take 1 tablet by mouth Every 8 (Eight) Hours As Needed for anxiety, Disp: 60 tablet, Rfl: 0  •  Magic mouthwash Radonc, Swish and swallow 10 mL 4 (Four) Times a Day Before Meals & at Bedtime As Needed., Disp: 480 mL, Rfl: 3  •  Morphine (MS CONTIN) 15 MG 12 hr tablet, Take 1 tablet by mouth Every 12 (Twelve) Hours., Disp: 60 tablet, Rfl: 0  •  Morphine (MS CONTIN) 60 MG 12 hr tablet, Take 1 tablet by mouth Every 12 (Twelve) Hours., Disp: 60 tablet, Rfl: 0  •  Oral Wound Care Products (MUGARD) liquid, Apply 5 mL to the mouth or throat 5 (Five) Times a Day As Needed (for oral pain)., Disp: 240 mL, Rfl: 2  •  Oral Wound Care Products (MUGARD) liquid, APPLY 5 MLS TO THE MOUTH OR THROAT 5 TIMES A DAY AS NEEDED FOR ORAL PAIN, Disp: 240 mL, Rfl: 2  •  oxyCODONE (ROXICODONE) 10 MG tablet, Take 1-2 tabs every 4-6 hours as needed for pain, Disp: 150 tablet, Rfl: 0  •  oxyCODONE (ROXICODONE) 20 MG tablet, Take 1 tablet  by mouth Every 4 (Four) Hours As Needed for pain, Disp: 200 tablet, Rfl: 0  •  phenazopyridine (PYRIDIUM) 200 MG tablet, Take 1 tablet by mouth 3 (Three) Times a Day As Needed for Bladder Spasms., Disp: 90 tablet, Rfl: 3  •  polyethylene glycol (GAVILAX) powder, Take 17 g by mouth 2 (Two) Times a Day., Disp: 850 g, Rfl: 4  •  polyethylene glycol (MIRALAX) powder, MIX 17 GRAMS OF POWDER IN 8 OUNCES OF WATER OR JUICE AND TAKE BY MOUTH TWICE A DAY., Disp: 1020 g, Rfl: 4  •  potassium chloride (K-DUR,KLOR-CON) 10 MEQ CR tablet, Take 4 tablets by mouth Daily., Disp: 4 tablet, Rfl: 0  •  predniSONE (DELTASONE) 5 MG tablet, Take 5 mg by mouth Daily. Take one tab by mouth ever other day alternating with 1/2 tab, Disp: , Rfl:   •  prochlorperazine (COMPAZINE) 10 MG tablet, Take 1 tablet by mouth Every 6 (Six) Hours As Needed for Nausea or Vomiting., Disp: 60 tablet, Rfl: 3  •  promethazine (PHENERGAN) 25 MG tablet, Take 25 mg by mouth Every 6 (Six) Hours As Needed for Nausea or Vomiting., Disp: , Rfl:   •  sennosides-docusate (SENNA-S) 8.6-50 MG per tablet, Take 2 tablets by mouth 2 (Two) Times a Day., Disp: 120 tablet, Rfl: 5  •  silver sulfadiazine (SILVADENE, SSD) 1 % cream, Apply  topically to the appropriate area as directed 2 (Two) Times a Day. Apply to affected area and keep covered., Disp: 400 g, Rfl: 5  •  tiZANidine (ZANAFLEX) 4 MG tablet, 4 mg Every 6 (Six) Hours As Needed., Disp: , Rfl: 0  •  Umeclidinium Bromide 62.5 MCG/INH aerosol powder , Inhale 1 puff Daily., Disp: 30 each, Rfl: 8    ALLERGIES:    No Known Allergies    PHYSICAL EXAM:  Vitals:    02/17/20 1027   BP: 95/62   Pulse: 108   Resp: 18   Temp: 98.1 °F (36.7 °C)   SpO2: 90%     Pain Score    02/17/20 1027   PainSc:   4   PainLoc: Rectum     General:  Awake, alert and oriented.  Appears fatigued but overall more comfortable / less shaky  HEENT:  Pupils are equal, round and reactive to light and accommodation, Extra-ocular movements full, mucous  membranes are tachy  Neck:  No JVD, thyromegaly or lymphadenopathy  CV:  Regular tachycardia, no murmurs, rubs or gallops  Resp:  Lungs with scattered wheezes bilaterally.  Abd:  Soft, non-tender, non-distended, bowel sounds present, no organomegaly or masses  Rectal: Deferred today.  Last exam as follows:   Firm anal mass palpable anteriorly  Ext:  No clubbing, cyanosis or edema  Lymph:  No cervical, supraclavicular, axillary,adenopathy  Neuro:  grossly non-focal exam    PATHOLOGY:  11-01-19          ENDOSCOPY:  C-scope (Jazmin) 11-01-19          IMAGING:  MRI Abdomen w/wo contrast 11-06-19   FINDINGS:   Today's study shows no definitive colonic mass.   Particularly I do not see any contrast-enhancing abnormalities on the presented images.  No adenopathy in the imaged area.  Sigmoid colon wall does appear to be slightly thickened but no adjacent fluid.  No walled off fluid collections.     IMPRESSION:  No contrast-enhancing abnormalities identified.     CTCAP 11-27-19  FINDINGS: Today's study shows evidence of COPD.     There are no parenchymal nodules or masses.     No pericardial or pleural effusions.     No mediastinal or hilar lymph node enlargement.     IMPRESSION:  No evidence of metastatic disease to the chest.    FINDINGS:   The lung bases are clear. There are no pleural effusions.     The liver is homogeneous. There is no evidence of focal hepatic mass     The spleen is homogeneous     There is no peripancreatic stranding or pancreatic head mass.     There is no adrenal enlargement.     There is a nonobstructing right intrarenal stone..      Otherwise I do not see any free fluid or walled off fluid collections.     There is diffuse colonic wall thickening.      IMPRESSION:  1. Diffuse colonic wall thickening.  2. Nonobstructing right intrarenal stone.  3. No evidence of metastatic disease to the abdomen or pelvis.      RECENT LABS:  Lab Results   Component Value Date    WBC 1.19 (C) 02/12/2020    HGB 11.2  (L) 02/12/2020    HCT 37.0 02/12/2020    .6 (H) 02/12/2020    RDW 18.7 (H) 02/12/2020    PLT 69 (L) 02/12/2020    NEUTRORELPCT 65.6 02/12/2020    LYMPHORELPCT 13.4 (L) 02/12/2020    MONORELPCT 18.5 (H) 02/12/2020    EOSRELPCT 1.7 02/12/2020    BASORELPCT 0.0 02/12/2020    NEUTROABS 0.78 (L) 02/12/2020    LYMPHSABS 0.16 (L) 02/12/2020       Lab Results   Component Value Date     02/13/2020    K 3.8 02/13/2020    CO2 33.1 (H) 02/13/2020     02/13/2020    BUN 5 (L) 02/13/2020    CREATININE 0.46 (L) 02/13/2020    EGFRIFNONA 141 02/13/2020    GLUCOSE 107 (H) 02/13/2020    CALCIUM 8.6 02/13/2020    ALKPHOS 55 02/12/2020    AST 15 02/12/2020    ALT 14 02/12/2020    BILITOT 0.3 02/12/2020    ALBUMIN 2.67 (L) 02/12/2020    PROTEINTOT 5.6 (L) 02/12/2020    MG 1.8 02/13/2020    PHOS 3.0 03/27/2017       Lab Results   Component Value Date    FERRITIN 197.40 (H) 11/19/2019    IRON 43 11/19/2019    TIBC 437 11/19/2019    LABIRON 10 (L) 11/19/2019    SYVAMVCI08 473 11/19/2019    FOLATE 9.32 11/19/2019       ASSESSMENT & PLAN:  Liz Jiang is a very pleasant 54 y.o. female with newly diagnosed anal cancer.    1.  Anal cancer:  -  Clinically has Stage IIIB disease ([id number]) with invasion of the rectovaginal septum.  -  She reports recent gynecologic examination without cause for concern.  Will get notes from [employer] [facility].  -  CT CAP and MRI Pelvis unremarkable.  No evidence of metastatic disease. No notable adenopathy.  -  Recommended concurrent definitive chemoradiation with Mitomycin/5-FU.  -  She saw Dr. Martinez who was in agreement with this plan.  -  She is seeing Dr. Campos for radiation and tentatively is planned to complete radiation on 2-20 if treatments can be restarted (on break for toxicity).  -  Will continue with supportive care.  She is doing better with IVF.  Will plan for 1L 3x/week and as needed.    -  Since she is dehydrated, tachycardic, hypotensive and with diarrhea today, will give 2-3  liters.  Will also replace electrolytes.  - she is recovering slightly as she has had a break from treatment. She still has 4 treatments remaining and plans to restart next week.   - Will give 1 liter of IVF today as she is still struggling with maintaining hydration and BP is still low.     2. Neoplasm related pain:  -  She has a h/o chronic back pain previously managed by pain management physician.  - We took over her pain management during her cancer treatment and will transition back to her pain management physician after her cancer treatment is completed.    - she has been able to take Oxycodone 20 mg every 5 hours instead of every 4 and she continues to take MS Contin 60 mg Q12H.    3. Diarrhea: Resolved.  Will continue to monitor.    4.  Anxiety: Recently increased Lexapro to 20 mg daily.  Continue  Ativan for nausea, anxiety.        5. Mucositis: Resolved.  Continue magic mouth wash as needed     6. Nausea : Poorly controlled with alternating and taking zofran and phenergan scheduled ( compazine has never worked for her), so ordered trial of sancuso patch.  We refilled this last week but she forgot to pick it up so will do so today.  Continue Ativan 0.5 mg 1-2 tabs q 8 h prn refractory nausea.      7.  Prophylaxis:  Has had Pneumovax, Prevnar 13 and 2019 flu vaccine.       8.  ACO / SAÚL/Other  Quality measures  -  Liz Jiang received 2019 flu vaccine.  -  Liz Jiang reports a pain score of 7 .  Given her pain assessment as noted, treatment options were discussed and the following options were decided upon as a follow-up plan to address the patient's pain: continuation of current treatment plan for pain and prescription for opiod analgesics.   -  Current outpatient and discharge medications have been reconciled for the patient.    Reviewed by: Deanne Jones MD       9. Follow up: Continue supportive care / IVF as needed.  Will see her back in 1 week.    This note was scribed for Deanne Jones,  MD by Gricel Howell RN.    I, Deanne Jones MD, personally performed the services described in this documentation as scribed by the above named individual in my presence, and it is both accurate and complete.  02/17/2020       I spent 25 minutes with Liz Jiang today.  In the office today, more than 50% of this time was spent face-to-face with her  in counseling / coordination of care, reviewing her medical history and counseling on the current treatment plan.  All questions were answered to her satisfaction      Electronically Signed by: Deanne Jones MD       CC:   Mayte Davis APRN Muhammed Iqbal, MD John Dvorak, MD Marta Hayne, MD Michael Simons, MD Barbara Michna, MD

## 2020-02-17 NOTE — PROGRESS NOTES
SS spoke with patient this date.  Patient provided with chocolate boost this date per dietary services.  SS will follow.

## 2020-02-18 DIAGNOSIS — E86.0 DEHYDRATION: Primary | ICD-10-CM

## 2020-02-18 DIAGNOSIS — C21.0 ANAL CANCER (HCC): ICD-10-CM

## 2020-02-18 RX ORDER — SODIUM CHLORIDE 9 MG/ML
1000 INJECTION, SOLUTION INTRAVENOUS ONCE
Status: CANCELLED | OUTPATIENT
Start: 2020-02-24

## 2020-02-21 ENCOUNTER — APPOINTMENT (OUTPATIENT)
Dept: ONCOLOGY | Facility: HOSPITAL | Age: 55
End: 2020-02-21

## 2020-02-21 ENCOUNTER — TELEPHONE (OUTPATIENT)
Dept: ONCOLOGY | Facility: HOSPITAL | Age: 55
End: 2020-02-21

## 2020-02-21 DIAGNOSIS — C21.0 ANAL CANCER (HCC): Primary | ICD-10-CM

## 2020-02-21 RX ORDER — MORPHINE SULFATE 60 MG/1
60 TABLET, FILM COATED, EXTENDED RELEASE ORAL EVERY 12 HOURS
Qty: 60 TABLET | Refills: 0 | Status: SHIPPED | OUTPATIENT
Start: 2020-02-21 | End: 2020-03-23 | Stop reason: SDUPTHER

## 2020-02-21 RX ORDER — OXYCODONE HYDROCHLORIDE 20 MG/1
20 TABLET ORAL EVERY 4 HOURS PRN
Qty: 200 TABLET | Refills: 0 | Status: SHIPPED | OUTPATIENT
Start: 2020-02-21 | End: 2020-03-23 | Stop reason: SDUPTHER

## 2020-02-21 NOTE — TELEPHONE ENCOUNTER
Spoke with patient regarding missed appointment today, patient reports she thought tomorrow was Friday and was feeling well.  Patient reports she is eating, drinking, and denies any n/v/d at this time.  Patient requested refills on pain medications to be picked up on Monday.  Prescriptions ready at  check in.

## 2020-02-24 ENCOUNTER — HOSPITAL ENCOUNTER (OUTPATIENT)
Dept: ONCOLOGY | Facility: HOSPITAL | Age: 55
Setting detail: INFUSION SERIES
Discharge: HOME | End: 2020-02-24

## 2020-02-24 ENCOUNTER — HOSPITAL ENCOUNTER (OUTPATIENT)
Dept: RADIATION ONCOLOGY | Facility: HOSPITAL | Age: 55
Discharge: HOME | End: 2020-02-24

## 2020-02-24 VITALS
WEIGHT: 181 LBS | DIASTOLIC BLOOD PRESSURE: 67 MMHG | RESPIRATION RATE: 20 BRPM | SYSTOLIC BLOOD PRESSURE: 101 MMHG | TEMPERATURE: 97.6 F | HEART RATE: 113 BPM | BODY MASS INDEX: 30.9 KG/M2 | HEIGHT: 64 IN

## 2020-02-24 DIAGNOSIS — C21.0 ANAL CANCER (HCC): Primary | ICD-10-CM

## 2020-02-24 DIAGNOSIS — Z95.828 PORT-A-CATH IN PLACE: ICD-10-CM

## 2020-02-24 DIAGNOSIS — E86.0 DEHYDRATION: ICD-10-CM

## 2020-02-24 LAB
ALBUMIN SERPL-MCNC: 3.4 G/DL (ref 3.5–5.2)
ALBUMIN/GLOB SERPL: 1 G/DL
ALP SERPL-CCNC: 69 U/L (ref 39–117)
ALT SERPL W P-5'-P-CCNC: 15 U/L (ref 1–33)
ANION GAP SERPL CALCULATED.3IONS-SCNC: 8 MMOL/L (ref 5–15)
AST SERPL-CCNC: 16 U/L (ref 1–32)
BILIRUB SERPL-MCNC: 0.3 MG/DL (ref 0.2–1.2)
BUN BLD-MCNC: 6 MG/DL (ref 6–20)
BUN/CREAT SERPL: 16.2 (ref 7–25)
CALCIUM SPEC-SCNC: 8.6 MG/DL (ref 8.6–10.5)
CHLORIDE SERPL-SCNC: 97 MMOL/L (ref 98–107)
CO2 SERPL-SCNC: 32 MMOL/L (ref 22–29)
CREAT BLD-MCNC: 0.37 MG/DL (ref 0.57–1)
ERYTHROCYTE [DISTWIDTH] IN BLOOD BY AUTOMATED COUNT: 22.2 % (ref 12.3–15.4)
GFR SERPL CREATININE-BSD FRML MDRD: >150 ML/MIN/1.73
GLOBULIN UR ELPH-MCNC: 3.4 GM/DL
GLUCOSE BLD-MCNC: 123 MG/DL (ref 65–99)
HCT VFR BLD AUTO: 39.3 % (ref 34–46.6)
HGB BLD-MCNC: 12.4 G/DL (ref 12–15.9)
LYMPHOCYTES # BLD AUTO: 0.8 10*3/MM3 (ref 0.7–3.1)
LYMPHOCYTES NFR BLD AUTO: 14.9 % (ref 19.6–45.3)
MAGNESIUM SERPL-MCNC: 2.4 MG/DL (ref 1.6–2.6)
MCH RBC QN AUTO: 30.5 PG (ref 26.6–33)
MCHC RBC AUTO-ENTMCNC: 31.6 G/DL (ref 31.5–35.7)
MCV RBC AUTO: 96.6 FL (ref 79–97)
MONOCYTES # BLD AUTO: 0.3 10*3/MM3 (ref 0.1–0.9)
MONOCYTES NFR BLD AUTO: 5.6 % (ref 5–12)
NEUTROPHILS # BLD AUTO: 4.3 10*3/MM3 (ref 1.7–7)
NEUTROPHILS NFR BLD AUTO: 79.5 % (ref 42.7–76)
PLATELET # BLD AUTO: 187 10*3/MM3 (ref 140–450)
PMV BLD AUTO: 8.1 FL (ref 6–12)
POTASSIUM BLD-SCNC: 4 MMOL/L (ref 3.5–5.2)
PROT SERPL-MCNC: 6.8 G/DL (ref 6–8.5)
RBC # BLD AUTO: 4.07 10*6/MM3 (ref 3.77–5.28)
SODIUM BLD-SCNC: 137 MMOL/L (ref 136–145)
WBC NRBC COR # BLD: 5.4 10*3/MM3 (ref 3.4–10.8)

## 2020-02-24 PROCEDURE — 77386: CPT | Performed by: RADIOLOGY

## 2020-02-24 PROCEDURE — 83735 ASSAY OF MAGNESIUM: CPT | Performed by: INTERNAL MEDICINE

## 2020-02-24 PROCEDURE — 96360 HYDRATION IV INFUSION INIT: CPT

## 2020-02-24 PROCEDURE — 25010000003 HEPARIN LOCK FLUCH PER 10 UNITS: Performed by: INTERNAL MEDICINE

## 2020-02-24 PROCEDURE — 80053 COMPREHEN METABOLIC PANEL: CPT | Performed by: INTERNAL MEDICINE

## 2020-02-24 PROCEDURE — 85025 COMPLETE CBC W/AUTO DIFF WBC: CPT | Performed by: INTERNAL MEDICINE

## 2020-02-24 RX ORDER — SODIUM CHLORIDE 0.9 % (FLUSH) 0.9 %
10 SYRINGE (ML) INJECTION AS NEEDED
Status: DISCONTINUED | OUTPATIENT
Start: 2020-02-24 | End: 2020-02-25 | Stop reason: HOSPADM

## 2020-02-24 RX ORDER — HEPARIN SODIUM (PORCINE) LOCK FLUSH IV SOLN 100 UNIT/ML 100 UNIT/ML
500 SOLUTION INTRAVENOUS AS NEEDED
Status: DISCONTINUED | OUTPATIENT
Start: 2020-02-24 | End: 2020-02-25 | Stop reason: HOSPADM

## 2020-02-24 RX ORDER — SODIUM CHLORIDE 9 MG/ML
1000 INJECTION, SOLUTION INTRAVENOUS ONCE
Status: COMPLETED | OUTPATIENT
Start: 2020-02-24 | End: 2020-02-24

## 2020-02-24 RX ORDER — SODIUM CHLORIDE 9 MG/ML
1000 INJECTION, SOLUTION INTRAVENOUS ONCE
Status: CANCELLED | OUTPATIENT
Start: 2020-02-27

## 2020-02-24 RX ORDER — SODIUM CHLORIDE 0.9 % (FLUSH) 0.9 %
10 SYRINGE (ML) INJECTION AS NEEDED
Status: CANCELLED | OUTPATIENT
Start: 2020-02-24

## 2020-02-24 RX ORDER — HEPARIN SODIUM (PORCINE) LOCK FLUSH IV SOLN 100 UNIT/ML 100 UNIT/ML
500 SOLUTION INTRAVENOUS AS NEEDED
Status: CANCELLED | OUTPATIENT
Start: 2020-02-24

## 2020-02-24 RX ADMIN — HEPARIN 500 UNITS: 100 SYRINGE at 16:27

## 2020-02-24 RX ADMIN — SODIUM CHLORIDE 1000 ML: 9 INJECTION, SOLUTION INTRAVENOUS at 15:27

## 2020-02-25 ENCOUNTER — HOSPITAL ENCOUNTER (OUTPATIENT)
Dept: RADIATION ONCOLOGY | Facility: HOSPITAL | Age: 55
Discharge: HOME | End: 2020-02-25

## 2020-02-25 DIAGNOSIS — J44.9 CHRONIC OBSTRUCTIVE PULMONARY DISEASE, UNSPECIFIED COPD TYPE (HCC): Primary | ICD-10-CM

## 2020-02-25 PROCEDURE — 77386: CPT | Performed by: RADIOLOGY

## 2020-02-25 RX ORDER — ALBUTEROL SULFATE 90 UG/1
2 AEROSOL, METERED RESPIRATORY (INHALATION) EVERY 6 HOURS PRN
Qty: 18 G | Refills: 0 | Status: SHIPPED | OUTPATIENT
Start: 2020-02-25

## 2020-02-26 ENCOUNTER — HOSPITAL ENCOUNTER (OUTPATIENT)
Dept: RADIATION ONCOLOGY | Facility: HOSPITAL | Age: 55
Discharge: HOME | End: 2020-02-26

## 2020-02-26 PROCEDURE — 77386: CPT | Performed by: RADIOLOGY

## 2020-02-27 ENCOUNTER — HOSPITAL ENCOUNTER (OUTPATIENT)
Dept: ONCOLOGY | Facility: HOSPITAL | Age: 55
Setting detail: INFUSION SERIES
Discharge: HOME | End: 2020-02-27

## 2020-02-27 ENCOUNTER — HOSPITAL ENCOUNTER (OUTPATIENT)
Dept: RADIATION ONCOLOGY | Facility: HOSPITAL | Age: 55
Discharge: HOME | End: 2020-02-27

## 2020-02-27 VITALS
SYSTOLIC BLOOD PRESSURE: 90 MMHG | HEIGHT: 64 IN | WEIGHT: 181 LBS | RESPIRATION RATE: 16 BRPM | HEART RATE: 97 BPM | BODY MASS INDEX: 30.9 KG/M2 | TEMPERATURE: 98.5 F | DIASTOLIC BLOOD PRESSURE: 51 MMHG

## 2020-02-27 DIAGNOSIS — C21.0 ANAL CANCER (HCC): ICD-10-CM

## 2020-02-27 DIAGNOSIS — Z95.828 PORT-A-CATH IN PLACE: Primary | ICD-10-CM

## 2020-02-27 LAB
ERYTHROCYTE [DISTWIDTH] IN BLOOD BY AUTOMATED COUNT: 22.5 % (ref 12.3–15.4)
HCT VFR BLD AUTO: 36.5 % (ref 34–46.6)
HGB BLD-MCNC: 11.3 G/DL (ref 12–15.9)
LYMPHOCYTES # BLD AUTO: 0.7 10*3/MM3 (ref 0.7–3.1)
LYMPHOCYTES NFR BLD AUTO: 15 % (ref 19.6–45.3)
MCH RBC QN AUTO: 30.2 PG (ref 26.6–33)
MCHC RBC AUTO-ENTMCNC: 30.9 G/DL (ref 31.5–35.7)
MCV RBC AUTO: 97.6 FL (ref 79–97)
MONOCYTES # BLD AUTO: 0.2 10*3/MM3 (ref 0.1–0.9)
MONOCYTES NFR BLD AUTO: 3.8 % (ref 5–12)
NEUTROPHILS # BLD AUTO: 3.7 10*3/MM3 (ref 1.7–7)
NEUTROPHILS NFR BLD AUTO: 81.2 % (ref 42.7–76)
PLATELET # BLD AUTO: 143 10*3/MM3 (ref 140–450)
PMV BLD AUTO: 7.8 FL (ref 6–12)
RBC # BLD AUTO: 3.73 10*6/MM3 (ref 3.77–5.28)
WBC NRBC COR # BLD: 4.5 10*3/MM3 (ref 3.4–10.8)

## 2020-02-27 PROCEDURE — 96360 HYDRATION IV INFUSION INIT: CPT

## 2020-02-27 PROCEDURE — 25010000003 HEPARIN LOCK FLUCH PER 10 UNITS: Performed by: INTERNAL MEDICINE

## 2020-02-27 PROCEDURE — 77386: CPT | Performed by: RADIOLOGY

## 2020-02-27 PROCEDURE — 85025 COMPLETE CBC W/AUTO DIFF WBC: CPT | Performed by: RADIOLOGY

## 2020-02-27 RX ORDER — HEPARIN SODIUM (PORCINE) LOCK FLUSH IV SOLN 100 UNIT/ML 100 UNIT/ML
500 SOLUTION INTRAVENOUS AS NEEDED
Status: DISCONTINUED | OUTPATIENT
Start: 2020-02-27 | End: 2020-02-28 | Stop reason: HOSPADM

## 2020-02-27 RX ORDER — HEPARIN SODIUM (PORCINE) LOCK FLUSH IV SOLN 100 UNIT/ML 100 UNIT/ML
500 SOLUTION INTRAVENOUS AS NEEDED
Status: CANCELLED | OUTPATIENT
Start: 2020-02-27

## 2020-02-27 RX ORDER — SODIUM CHLORIDE 9 MG/ML
1000 INJECTION, SOLUTION INTRAVENOUS ONCE
OUTPATIENT
Start: 2020-03-05

## 2020-02-27 RX ORDER — SODIUM CHLORIDE 9 MG/ML
1000 INJECTION, SOLUTION INTRAVENOUS ONCE
Status: COMPLETED | OUTPATIENT
Start: 2020-02-27 | End: 2020-02-27

## 2020-02-27 RX ORDER — SODIUM CHLORIDE 0.9 % (FLUSH) 0.9 %
10 SYRINGE (ML) INJECTION AS NEEDED
Status: CANCELLED | OUTPATIENT
Start: 2020-02-27

## 2020-02-27 RX ADMIN — SODIUM CHLORIDE 1000 ML: 9 INJECTION, SOLUTION INTRAVENOUS at 14:53

## 2020-02-27 RX ADMIN — HEPARIN 500 UNITS: 100 SYRINGE at 15:57

## 2020-02-28 NOTE — RADIATION COMPLETION NOTES
COMPLETION NOTE    PATIENT:   Liz Jiang  :    1965  COMPLETION DATE:   20       DIAGNOSIS:   cT4 N0 M0 squamous cell carcinoma of anus Stage IIIB (invades rectovaginal septum)        Subjective      BRIEF HISTORY:  Liz Jiang  is a very pleasant 53 y.o. female  who presented to her primary care physician with constipation, rectal pain and a palpable mass.  Colonoscopy on 2019 revealed a large anal mass biopsy positive for invasive poorly differentiated squamous cell carcinoma.  Per Dr. Muir's colonoscopy report the patient had a very firm nodular submucosal lesion occupying the anterior rectum, the anal sphincter and extending to apparently the rectovaginal septum.    CT of the chest was negative for metastatic disease.  She had had increasing anorectal pain and felt the urge to defecate constantly.  She was taking Phenergan for nausea and Norco for pain and had had a 16 to 18 pound weight loss. She has had a CVA in the past and has decreased range of motion of the right upper extremity, wears BiPAP and uses  Oxygen at home.  Dr. Jones gave her concurrent chemotherapy of mitomycin and fluorouracil. She received radiotherapy in our department as follows:    TREATMENT COURSE:   -2020 the whole pelvis received 30.6 Gy in 17 fractions using 6 MV photons   the upper border was reduced and 5.4 Gray in 3 fractions were delivered using 6 MV photons  -2/3/2020 the fields were further reduced off of the inguinal nodes and given 9 Gray in 5 fractions with 6 MV photons  -20 (includes treatment break) the tumor was boosted with an additional 9 Chakraborty in 5 fractions with 6 MV photons.    TOLERANCE:   Ms. Jiang had difficulty tolerating treatment.  She had diarrhea for which she took Imodium.  She had brisk erythema of the pelvis as well as moist desquamation in the treatment area.  We encouraged sitz baths with [place] soaks and prescribed silvadene/lidocaine.  She  frequently received IV fluids and had a treatment break during the final boost.  She declined additional  pain medication.      DISPOSITION:  At the completion of therapy an appointment was made for her to return on 3/26/2020 at 2:30 PM.  She knows to call if she has any problems sooner.        Ramona Campos MD    Dictated using dragon dictation

## 2020-03-03 ENCOUNTER — OFFICE VISIT (OUTPATIENT)
Dept: ONCOLOGY | Facility: CLINIC | Age: 55
End: 2020-03-03

## 2020-03-03 ENCOUNTER — INFUSION (OUTPATIENT)
Dept: ONCOLOGY | Facility: HOSPITAL | Age: 55
End: 2020-03-03

## 2020-03-03 VITALS
HEART RATE: 122 BPM | SYSTOLIC BLOOD PRESSURE: 102 MMHG | BODY MASS INDEX: 32.68 KG/M2 | DIASTOLIC BLOOD PRESSURE: 71 MMHG | WEIGHT: 190.4 LBS | TEMPERATURE: 98.1 F | OXYGEN SATURATION: 90 % | RESPIRATION RATE: 18 BRPM

## 2020-03-03 DIAGNOSIS — E86.0 DEHYDRATION: ICD-10-CM

## 2020-03-03 DIAGNOSIS — E86.0 DEHYDRATION: Primary | ICD-10-CM

## 2020-03-03 DIAGNOSIS — R19.7 DIARRHEA, UNSPECIFIED TYPE: ICD-10-CM

## 2020-03-03 DIAGNOSIS — G47.00 INSOMNIA, UNSPECIFIED TYPE: ICD-10-CM

## 2020-03-03 DIAGNOSIS — C21.0 ANAL CANCER (HCC): Primary | ICD-10-CM

## 2020-03-03 DIAGNOSIS — Z95.828 PORT-A-CATH IN PLACE: ICD-10-CM

## 2020-03-03 DIAGNOSIS — R11.0 NAUSEA: ICD-10-CM

## 2020-03-03 DIAGNOSIS — F41.9 ANXIETY: ICD-10-CM

## 2020-03-03 LAB
ALBUMIN SERPL-MCNC: 3.05 G/DL (ref 3.5–5.2)
ALBUMIN/GLOB SERPL: 0.9 G/DL
ALP SERPL-CCNC: 62 U/L (ref 39–117)
ALT SERPL W P-5'-P-CCNC: 10 U/L (ref 1–33)
ANION GAP SERPL CALCULATED.3IONS-SCNC: 9.1 MMOL/L (ref 5–15)
ANISOCYTOSIS BLD QL: NORMAL
AST SERPL-CCNC: 14 U/L (ref 1–32)
BASOPHILS # BLD AUTO: 0.02 10*3/MM3 (ref 0–0.2)
BASOPHILS NFR BLD AUTO: 0.4 % (ref 0–1.5)
BILIRUB SERPL-MCNC: 0.3 MG/DL (ref 0.2–1.2)
BUN BLD-MCNC: 6 MG/DL (ref 6–20)
BUN/CREAT SERPL: 17.1 (ref 7–25)
CALCIUM SPEC-SCNC: 8.9 MG/DL (ref 8.6–10.5)
CHLORIDE SERPL-SCNC: 98 MMOL/L (ref 98–107)
CO2 SERPL-SCNC: 30.9 MMOL/L (ref 22–29)
CREAT BLD-MCNC: 0.35 MG/DL (ref 0.57–1)
DEPRECATED RDW RBC AUTO: 73.5 FL (ref 37–54)
EOSINOPHIL # BLD AUTO: 0.03 10*3/MM3 (ref 0–0.4)
EOSINOPHIL NFR BLD AUTO: 0.6 % (ref 0.3–6.2)
ERYTHROCYTE [DISTWIDTH] IN BLOOD BY AUTOMATED COUNT: 19.5 % (ref 12.3–15.4)
GFR SERPL CREATININE-BSD FRML MDRD: >150 ML/MIN/1.73
GLOBULIN UR ELPH-MCNC: 3.4 GM/DL
GLUCOSE BLD-MCNC: 154 MG/DL (ref 65–99)
HCT VFR BLD AUTO: 40.4 % (ref 34–46.6)
HGB BLD-MCNC: 12.1 G/DL (ref 12–15.9)
HYPOCHROMIA BLD QL: NORMAL
IMM GRANULOCYTES # BLD AUTO: 0.05 10*3/MM3 (ref 0–0.05)
IMM GRANULOCYTES NFR BLD AUTO: 1 % (ref 0–0.5)
LYMPHOCYTES # BLD AUTO: 0.47 10*3/MM3 (ref 0.7–3.1)
LYMPHOCYTES NFR BLD AUTO: 9.9 % (ref 19.6–45.3)
MACROCYTES BLD QL SMEAR: NORMAL
MAGNESIUM SERPL-MCNC: 2 MG/DL (ref 1.6–2.6)
MCH RBC QN AUTO: 30.6 PG (ref 26.6–33)
MCHC RBC AUTO-ENTMCNC: 30 G/DL (ref 31.5–35.7)
MCV RBC AUTO: 102 FL (ref 79–97)
MONOCYTES # BLD AUTO: 0.46 10*3/MM3 (ref 0.1–0.9)
MONOCYTES NFR BLD AUTO: 9.6 % (ref 5–12)
NEUTROPHILS # BLD AUTO: 3.74 10*3/MM3 (ref 1.7–7)
NEUTROPHILS NFR BLD AUTO: 78.5 % (ref 42.7–76)
NRBC BLD AUTO-RTO: 0 /100 WBC (ref 0–0.2)
PLAT MORPH BLD: NORMAL
PLATELET # BLD AUTO: 143 10*3/MM3 (ref 140–450)
PMV BLD AUTO: 10.9 FL (ref 6–12)
POTASSIUM BLD-SCNC: 3.1 MMOL/L (ref 3.5–5.2)
PROT SERPL-MCNC: 6.4 G/DL (ref 6–8.5)
RBC # BLD AUTO: 3.96 10*6/MM3 (ref 3.77–5.28)
SODIUM BLD-SCNC: 138 MMOL/L (ref 136–145)
WBC NRBC COR # BLD: 4.77 10*3/MM3 (ref 3.4–10.8)

## 2020-03-03 PROCEDURE — 85025 COMPLETE CBC W/AUTO DIFF WBC: CPT | Performed by: INTERNAL MEDICINE

## 2020-03-03 PROCEDURE — 85007 BL SMEAR W/DIFF WBC COUNT: CPT | Performed by: INTERNAL MEDICINE

## 2020-03-03 PROCEDURE — 96375 TX/PRO/DX INJ NEW DRUG ADDON: CPT

## 2020-03-03 PROCEDURE — 25010000002 LORAZEPAM PER 2 MG: Performed by: INTERNAL MEDICINE

## 2020-03-03 PROCEDURE — 83735 ASSAY OF MAGNESIUM: CPT | Performed by: INTERNAL MEDICINE

## 2020-03-03 PROCEDURE — 80053 COMPREHEN METABOLIC PANEL: CPT | Performed by: INTERNAL MEDICINE

## 2020-03-03 PROCEDURE — 99214 OFFICE O/P EST MOD 30 MIN: CPT | Performed by: INTERNAL MEDICINE

## 2020-03-03 PROCEDURE — 25010000003 HEPARIN LOCK FLUCH PER 10 UNITS: Performed by: INTERNAL MEDICINE

## 2020-03-03 PROCEDURE — 96374 THER/PROPH/DIAG INJ IV PUSH: CPT

## 2020-03-03 PROCEDURE — 96361 HYDRATE IV INFUSION ADD-ON: CPT

## 2020-03-03 PROCEDURE — 25010000002 PROCHLORPERAZINE 10 MG/2ML SOLUTION: Performed by: INTERNAL MEDICINE

## 2020-03-03 RX ORDER — LORAZEPAM 0.5 MG/1
0.5 TABLET ORAL EVERY 8 HOURS PRN
Qty: 60 TABLET | Refills: 0 | OUTPATIENT
Start: 2020-03-03 | End: 2020-05-27

## 2020-03-03 RX ORDER — SODIUM CHLORIDE 0.9 % (FLUSH) 0.9 %
10 SYRINGE (ML) INJECTION AS NEEDED
Status: DISCONTINUED | OUTPATIENT
Start: 2020-03-03 | End: 2020-03-03 | Stop reason: HOSPADM

## 2020-03-03 RX ORDER — HEPARIN SODIUM (PORCINE) LOCK FLUSH IV SOLN 100 UNIT/ML 100 UNIT/ML
500 SOLUTION INTRAVENOUS AS NEEDED
Status: DISCONTINUED | OUTPATIENT
Start: 2020-03-03 | End: 2020-03-03 | Stop reason: HOSPADM

## 2020-03-03 RX ORDER — SODIUM CHLORIDE 0.9 % (FLUSH) 0.9 %
10 SYRINGE (ML) INJECTION AS NEEDED
Status: CANCELLED | OUTPATIENT
Start: 2020-03-05

## 2020-03-03 RX ORDER — LORAZEPAM 2 MG/ML
1 INJECTION INTRAMUSCULAR ONCE
Status: COMPLETED | OUTPATIENT
Start: 2020-03-03 | End: 2020-03-03

## 2020-03-03 RX ORDER — HEPARIN SODIUM (PORCINE) LOCK FLUSH IV SOLN 100 UNIT/ML 100 UNIT/ML
500 SOLUTION INTRAVENOUS AS NEEDED
Status: CANCELLED | OUTPATIENT
Start: 2020-03-05

## 2020-03-03 RX ORDER — PROCHLORPERAZINE EDISYLATE 5 MG/ML
10 INJECTION INTRAMUSCULAR; INTRAVENOUS ONCE
Status: COMPLETED | OUTPATIENT
Start: 2020-03-03 | End: 2020-03-03

## 2020-03-03 RX ADMIN — PROCHLORPERAZINE EDISYLATE 10 MG: 5 INJECTION INTRAMUSCULAR; INTRAVENOUS at 11:29

## 2020-03-03 RX ADMIN — SODIUM CHLORIDE, PRESERVATIVE FREE 10 ML: 5 INJECTION INTRAVENOUS at 12:26

## 2020-03-03 RX ADMIN — SODIUM CHLORIDE 1000 ML: 9 INJECTION, SOLUTION INTRAVENOUS at 11:24

## 2020-03-03 RX ADMIN — LORAZEPAM 1 MG: 2 INJECTION INTRAMUSCULAR; INTRAVENOUS at 11:25

## 2020-03-03 RX ADMIN — SODIUM CHLORIDE, PRESERVATIVE FREE 500 UNITS: 5 INJECTION INTRAVENOUS at 12:26

## 2020-03-03 NOTE — PROGRESS NOTES
NAME: Liz Jiang    : 1965    DATE:  3/3/2020    DIAGNOSIS: Anal Cancer    TREATMENT:  1.        2. XRT w/ Ramona Campos MD    TREATMENT COURSE:   -2020 the whole pelvis received 30.6 Gy in 17 fractions using 6 MV photons   the upper border was reduced and 5.4 Gray in 3 fractions were delivered using 6 MV photons  -2/3/2020 the fields were further reduced off of the inguinal nodes and given 9 Gray in 5 fractions with 6 MV photons  -20 (includes treatment break) the tumor was boosted with an additional 9 Chakraborty in 5 fractions with 6 MV photons.      CHIEF COMPLAINT:  Follow up of Anal Cancer    HISTORY OF PRESENT ILLNESS:   Liz Jiang is a very pleasant 54 y.o. female who is being seen today at the request of No ref. provider found for evaluation and treatment of anal cancer. She initially presented to her PCP for constipation, rectal pain and a palpable mass x 6-7 months. After failing conservative treatment, she was recommended by a friend to see Dr. Wu. She had colonoscopy on on 19 during which a large anal mass was visualized. 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 Two benign polyps were also removed. She was referred to our clinic for discussion of further treatment options.     She has several complaints today. She reports gradually worsening anal/rectal pain, and says she feels the urge to defecate constantly. For pain, she is using Norco 10 every 6 hours.She has had nausea for several months that she has been taking Phenergan for. She reports fatigue and a weight loss of 16-18 pounds (which she has subsequently regained), insomnia, a history of headaches that are much more mild after previous CVA, baseline shortness of breath that she wears bipap and uses home O2 for. She also reports that she has decreased ROM to RUE following past CVA. She denies obvious blood in stool, chest pain,  abdominal pain, or any other complaint.     INTERVAL HISTORY:  Ms. Jiang is here today with her sister for follow up of anal cancer. She has completed radiation w/ Dr. Campos. She is happy to be finished but she continues to feel poorly. She reports insomnia, only sleeping 2-4 hours in a 24 hour period. She is taking Lexapro 20 mg and Ativan 0.5 mg q8h PRN. She denies pain being the cause. She states this has been a problem in the past and feel stress was to blame and the insomnia spontaneously resolved. She has Ambien at home and asks if she can take this. For pain she continues to take MS Contin 60 mg BID and Oxycodone 20 mg 1 tab every 4-5 hours. She reports nausea. She is using Sancuso patch and Compazine, but doesn't feel these are helping. She has tried Phenergan but this causes a dry mouth and she self-discontinued.       PAST MEDICAL HISTORY:  Past Medical History:   Diagnosis Date   • Acid reflux disease    • Anal cancer (CMS/HCC) 12/5/2019   • Arthritis    • Asthma, extrinsic    • Cholelithiasis    • Chronic headaches    • COPD (chronic obstructive pulmonary disease) (CMS/HCC)    • CVA (cerebral vascular accident) (CMS/HCC)     w/ right sided deficit   • CVA (cerebral vascular accident) (CMS/HCC)    • Diabetes mellitus (CMS/HCC)     only has high blood sugar when on steriods   • Obstructive sleep apnea treated with BiPAP    • On home oxygen therapy     2L    • Sleep apnea, obstructive        PAST SURGICAL HISTORY:  Past Surgical History:   Procedure Laterality Date   • ABDOMINAL SURGERY     • CARDIAC CATHETERIZATION     • CAROTID ENDARTERECTOMY Left 2018   • CHOLECYSTECTOMY WITH INTRAOPERATIVE CHOLANGIOGRAM N/A 1/30/2017    Procedure: CHOLECYSTECTOMY LAPAROSCOPIC INTRAOPERATIVE CHOLANGIOGRAM;  Surgeon: Carolin Marie MD;  Location: [facility];  Service:    • COLONOSCOPY     • HYSTERECTOMY      for heavy bleeding/ complete   • KIDNEY STONE SURGERY     • TUBAL ABDOMINAL LIGATION     • VENOUS ACCESS  DEVICE (PORT) INSERTION Left 2019    Procedure: INSERTION VENOUS ACCESS DEVICE;  Surgeon: Carolin Marie MD;  Location: [facility] OR;  Service: General        FAMILY HISTORY:  Family History   Problem Relation Age of Onset   • Diabetes Mother    • Hypertension Mother    • Arrhythmia Mother    • Pneumonia Father    • Coronary artery disease Other    • Arrhythmia Sister    • Heart attack Brother    • Lymphoma Brother         hodgkin's    • Other Brother         CABG   • Breast cancer Neg Hx        SOCIAL HISTORY:  Social History     Socioeconomic History   • Marital status:      Spouse name: Not on file   • Number of children: Not on file   • Years of education: Not on file   • Highest education level: Not on file   Tobacco Use   • Smoking status: Former Smoker     Packs/day: 1.50     Years: 30.00     Pack years: 45.00     Types: Electronic Cigarette     Last attempt to quit: 2015     Years since quittin.1   • Smokeless tobacco: Never Used   Substance and Sexual Activity   • Alcohol use: No   • Drug use: No   • Sexual activity: Defer   Social History Narrative    Just retired     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          REVIEW OF SYSTEMS:   A comprehensive 14 point review of systems was performed.  Significant findings as mentioned above.  All other systems reviewed and are negative.      MEDICATIONS:  The current medication list was reviewed in the EMR    Current Outpatient Medications:   •  albuterol sulfate  (90 Base) MCG/ACT inhaler, Inhale 2 puffs Every 6 (Six) Hours As Needed for Wheezing or Shortness of Air., Disp: 18 g, Rfl: 0  •  ASPIRIN LOW DOSE 81 MG EC tablet, Take 81 mg by mouth Daily., Disp: , Rfl: 3  •  budesonide-formoterol (SYMBICORT)  160-4.5 MCG/ACT inhaler, Inhale 2 puffs 2 (Two) Times a Day. Rinse mouth with water after use., Disp: 1 inhaler, Rfl: 8  •  clonazePAM (KlonoPIN) 0.5 MG tablet, Take 1 tablet by mouth 2 (Two) Times a Day As Needed for Seizures., Disp: 60 tablet, Rfl: 0  •  clopidogrel (PLAVIX) 75 MG tablet, TAKE ONE TABLET BY MOUTH EVERY DAY FOR BLOOD THINNER, Disp: , Rfl: 3  •  diphenhydrAMINE in aluminum-magnesium hydroxide-simethicone suspension-nystatin-Lidocaine Viscous HCl solution, Swish and swallow 5 ml 4 times a day as needed, Disp: 240 mL, Rfl: 5  •  escitalopram (LEXAPRO) 20 MG tablet, Take 1 tablet by mouth Daily., Disp: 30 tablet, Rfl: 11  •  gabapentin (NEURONTIN) 800 MG tablet, Take 800 mg by mouth 3 (Three) Times a Day., Disp: , Rfl:   •  granisetron (SANCUSO) 3.1 MG/24HR, Place 1 patch on the skin as directed by provider 1 (One) Time Per Week., Disp: 4 patch, Rfl: 2  •  lidocaine-prilocaine (EMLA) 2.5-2.5 % cream, Apply to port site 30 mins prior to chemotherapy appointment, Disp: 30 g, Rfl: 5  •  loratadine-pseudoephedrine (CLARITIN-D 24 HOUR)  MG per 24 hr tablet, Take 1 tablet by mouth Daily., Disp: 30 tablet, Rfl: 5  •  LORazepam (ATIVAN) 0.5 MG tablet, Take 1 tablet by mouth Every 8 (Eight) Hours As Needed for Anxiety., Disp: 60 tablet, Rfl: 0  •  LORazepam (ATIVAN) 0.5 MG tablet, Take 1 tablet by mouth Every 8 (Eight) Hours As Needed for anxiety, Disp: 60 tablet, Rfl: 0  •  Magic mouthwash Radonc, Swish and swallow 10 mL 4 (Four) Times a Day Before Meals & at Bedtime As Needed., Disp: 480 mL, Rfl: 3  •  Morphine (MS CONTIN) 15 MG 12 hr tablet, Take 1 tablet by mouth Every 12 (Twelve) Hours., Disp: 60 tablet, Rfl: 0  •  Morphine (MS CONTIN) 60 MG 12 hr tablet, Take 1 tablet by mouth Every 12 (Twelve) Hours., Disp: 60 tablet, Rfl: 0  •  Oral Wound Care Products (MUGARD) liquid, Apply 5 mL to the mouth or throat 5 (Five) Times a Day As Needed (for oral pain)., Disp: 240 mL, Rfl: 2  •  Oral Wound Care  Products (MUGARD) liquid, APPLY 5 MLS TO THE MOUTH OR THROAT 5 TIMES A DAY AS NEEDED FOR ORAL PAIN, Disp: 240 mL, Rfl: 2  •  oxyCODONE (ROXICODONE) 10 MG tablet, Take 1-2 tabs every 4-6 hours as needed for pain, Disp: 150 tablet, Rfl: 0  •  oxyCODONE (ROXICODONE) 20 MG tablet, Take 1 tablet by mouth Every 4 (Four) Hours As Needed for pain, Disp: 200 tablet, Rfl: 0  •  phenazopyridine (PYRIDIUM) 200 MG tablet, Take 1 tablet by mouth 3 (Three) Times a Day As Needed for Bladder Spasms., Disp: 90 tablet, Rfl: 3  •  polyethylene glycol (GAVILAX) powder, Take 17 g by mouth 2 (Two) Times a Day., Disp: 850 g, Rfl: 4  •  polyethylene glycol (MIRALAX) powder, MIX 17 GRAMS OF POWDER IN 8 OUNCES OF WATER OR JUICE AND TAKE BY MOUTH TWICE A DAY., Disp: 1020 g, Rfl: 4  •  potassium chloride (K-DUR,KLOR-CON) 10 MEQ CR tablet, Take 4 tablets by mouth Daily., Disp: 4 tablet, Rfl: 0  •  predniSONE (DELTASONE) 5 MG tablet, Take 5 mg by mouth Daily. Take one tab by mouth ever other day alternating with 1/2 tab, Disp: , Rfl:   •  prochlorperazine (COMPAZINE) 10 MG tablet, Take 1 tablet by mouth Every 6 (Six) Hours As Needed for Nausea or Vomiting., Disp: 60 tablet, Rfl: 3  •  promethazine (PHENERGAN) 25 MG tablet, Take 25 mg by mouth Every 6 (Six) Hours As Needed for Nausea or Vomiting., Disp: , Rfl:   •  sennosides-docusate (SENNA-S) 8.6-50 MG per tablet, Take 2 tablets by mouth 2 (Two) Times a Day., Disp: 120 tablet, Rfl: 5  •  silver sulfadiazine (SILVADENE, SSD) 1 % cream, Apply  topically to the appropriate area as directed 2 (Two) Times a Day. Apply to affected area and keep covered., Disp: 400 g, Rfl: 5  •  tiZANidine (ZANAFLEX) 4 MG tablet, 4 mg Every 6 (Six) Hours As Needed., Disp: , Rfl: 0  •  Umeclidinium Bromide 62.5 MCG/INH aerosol powder , Inhale 1 puff Daily., Disp: 30 each, Rfl: 8    ALLERGIES:    No Known Allergies    PHYSICAL EXAM:  Vitals:    03/03/20 0926   BP: 102/71   Pulse: (!) 122   Resp: 18   Temp: 98.1 °F (36.7  °C)   SpO2: 90%     Pain Score    03/03/20 0926   PainSc:   3   PainLoc: Rectum     General:  Awake, alert and oriented.  Appears fatigued and is tearful at times  HEENT:  Pupils are equal, round and reactive to light and accommodation, Extra-ocular movements full, mucous membranes are tachy  Neck:  No JVD, thyromegaly or lymphadenopathy  CV:  Regular tachycardia, no murmurs, rubs or gallops  Resp:  Lungs are clear to auscultation bilaterally.  Abd:  Soft, non-tender, non-distended, bowel sounds present, no organomegaly or masses  Rectal: Deferred today.  Last exam as follows:   Firm anal mass palpable anteriorly  Ext:  No clubbing, cyanosis or edema  Lymph:  No cervical, supraclavicular, axillary,adenopathy  Neuro:  grossly non-focal exam    PATHOLOGY:  11-01-19          ENDOSCOPY:  C-scope (Jazmin) 11-01-19          IMAGING:  MRI Abdomen w/wo contrast 11-06-19   FINDINGS:   Today's study shows no definitive colonic mass.   Particularly I do not see any contrast-enhancing abnormalities on the presented images.  No adenopathy in the imaged area.  Sigmoid colon wall does appear to be slightly thickened but no adjacent fluid.  No walled off fluid collections.     IMPRESSION:  No contrast-enhancing abnormalities identified.     CTCAP 11-27-19  FINDINGS: Today's study shows evidence of COPD.     There are no parenchymal nodules or masses.     No pericardial or pleural effusions.     No mediastinal or hilar lymph node enlargement.     IMPRESSION:  No evidence of metastatic disease to the chest.    FINDINGS:   The lung bases are clear. There are no pleural effusions.     The liver is homogeneous. There is no evidence of focal hepatic mass     The spleen is homogeneous     There is no peripancreatic stranding or pancreatic head mass.     There is no adrenal enlargement.     There is a nonobstructing right intrarenal stone..      Otherwise I do not see any free fluid or walled off fluid collections.     There is diffuse  colonic wall thickening.      IMPRESSION:  1. Diffuse colonic wall thickening.  2. Nonobstructing right intrarenal stone.  3. No evidence of metastatic disease to the abdomen or pelvis.      RECENT LABS:  Lab Results   Component Value Date    WBC 4.77 03/03/2020    HGB 12.1 03/03/2020    HCT 40.4 03/03/2020    .0 (H) 03/03/2020    RDW 19.5 (H) 03/03/2020     03/03/2020    NEUTRORELPCT 78.5 (H) 03/03/2020    LYMPHORELPCT 9.9 (L) 03/03/2020    MONORELPCT 9.6 03/03/2020    EOSRELPCT 0.6 03/03/2020    BASORELPCT 0.4 03/03/2020    NEUTROABS 3.74 03/03/2020    LYMPHSABS 0.47 (L) 03/03/2020       Lab Results   Component Value Date     03/03/2020    K 3.1 (L) 03/03/2020    CO2 30.9 (H) 03/03/2020    CL 98 03/03/2020    BUN 6 03/03/2020    CREATININE 0.35 (L) 03/03/2020    EGFRIFNONA >150 03/03/2020    GLUCOSE 154 (H) 03/03/2020    CALCIUM 8.9 03/03/2020    ALKPHOS 62 03/03/2020    AST 14 03/03/2020    ALT 10 03/03/2020    BILITOT 0.3 03/03/2020    ALBUMIN 3.05 (L) 03/03/2020    PROTEINTOT 6.4 03/03/2020    MG 2.0 03/03/2020    PHOS 3.0 03/27/2017       Lab Results   Component Value Date    FERRITIN 197.40 (H) 11/19/2019    IRON 43 11/19/2019    TIBC 437 11/19/2019    LABIRON 10 (L) 11/19/2019    KSEEDCIB22 473 11/19/2019    FOLATE 9.32 11/19/2019       ASSESSMENT & PLAN:  Liz Jiang is a very pleasant 54 y.o. female with newly diagnosed anal cancer.    1.  Anal cancer:  -  Clinically has Stage IIIB disease ([id number]) with invasion of the rectovaginal septum.  -  She reports recent gynecologic examination without cause for concern.  Will get notes from [place].  -  CT CAP and MRI Pelvis unremarkable.  No evidence of metastatic disease. No notable adenopathy.  -  Recommended concurrent definitive chemoradiation with Mitomycin/5-FU.  -  She saw Dr. Martinez who was in agreement with this plan.  -  She saw Dr. Campos for radiation and finally completed treatment on 2-27-20.    -  Will continue with  supportive care to help her recover.  .  - Will give 1 liter of IVF today as she is still struggling with maintaining hydration/.     2. Neoplasm related pain:  -  She has a h/o chronic back pain previously managed by pain management physician.  - We took over her pain management during her cancer treatment and will transition back to her pain management physician after her cancer treatment is completed.    - she has been able to take Oxycodone 20 mg every 5 hours instead of every 4 and she continues to take MS Contin 60 mg Q12H.    3. Diarrhea: Resolved.  Will continue to monitor.    4.  Anxiety: This has been a chronic problem for her and she continues to struggle.  Currently taking Lexapro 20 mg daily along with Ativan for nausea, anxiety.  She also has Ambien which she previously took for insomnia and I told her it would be fine to restart this.  I suspect she would benefit greatly from psychiatric care and she agrees.  Will place referral.  We discussed sleep hygiene today in addition to medication therapy.    5. Mucositis: Resolved.  Continue magic mouth wash as needed     6. Nausea : Continue Sancuso patch.  REstart Compazine 10 mg q 4-6 h prn.  Continue Ativan 0.5 mg 1-2 tabs q 8 h prn refractory nausea.      7.  Prophylaxis:  Has had Pneumovax, Prevnar 13 and 2019 flu vaccine.       8.  ACO / SAÚL/Other  Quality measures  -  Liz Jiang received 2019 flu vaccine.  -  Liz Jiang reports a pain score of 7 .  Given her pain assessment as noted, treatment options were discussed and the following options were decided upon as a follow-up plan to address the patient's pain: continuation of current treatment plan for pain and prescription for opiod analgesics.   -  Current outpatient and discharge medications have been reconciled for the patient.    Reviewed by: Deanne Jones MD       9. Follow up: Continue supportive care / IVF as needed.  Will see her back in 1 week.       I spent 25 minutes with Liz  Apolinar today.  In the office today, more than 50% of this time was spent face-to-face with her  in counseling / coordination of care, reviewing her medical history and counseling on the current treatment plan.  All questions were answered to her satisfaction      Electronically Signed by: Deanne Jones MD       CC:   Mayte Davis APRN Muhammed Iqbal, MD John Dvorak, MD Marta Hayne, MD Michael Simons, MD Barbara Michna, MD

## 2020-03-05 ENCOUNTER — INFUSION (OUTPATIENT)
Dept: ONCOLOGY | Facility: HOSPITAL | Age: 55
End: 2020-03-05

## 2020-03-05 VITALS
RESPIRATION RATE: 18 BRPM | OXYGEN SATURATION: 90 % | DIASTOLIC BLOOD PRESSURE: 63 MMHG | HEART RATE: 109 BPM | BODY MASS INDEX: 32.48 KG/M2 | TEMPERATURE: 97.5 F | WEIGHT: 189.2 LBS | SYSTOLIC BLOOD PRESSURE: 96 MMHG

## 2020-03-05 DIAGNOSIS — E86.0 DEHYDRATION: Primary | ICD-10-CM

## 2020-03-05 DIAGNOSIS — Z95.828 PORT-A-CATH IN PLACE: ICD-10-CM

## 2020-03-05 PROCEDURE — 25010000003 HEPARIN LOCK FLUCH PER 10 UNITS: Performed by: INTERNAL MEDICINE

## 2020-03-05 PROCEDURE — 96360 HYDRATION IV INFUSION INIT: CPT

## 2020-03-05 RX ORDER — SODIUM CHLORIDE 0.9 % (FLUSH) 0.9 %
10 SYRINGE (ML) INJECTION AS NEEDED
Status: CANCELLED | OUTPATIENT
Start: 2020-03-05

## 2020-03-05 RX ORDER — BUDESONIDE AND FORMOTEROL FUMARATE DIHYDRATE 160; 4.5 UG/1; UG/1
AEROSOL RESPIRATORY (INHALATION)
Qty: 10.2 G | Refills: 0 | Status: SHIPPED | OUTPATIENT
Start: 2020-03-05 | End: 2020-07-20 | Stop reason: SDUPTHER

## 2020-03-05 RX ORDER — HEPARIN SODIUM (PORCINE) LOCK FLUSH IV SOLN 100 UNIT/ML 100 UNIT/ML
500 SOLUTION INTRAVENOUS AS NEEDED
Status: CANCELLED | OUTPATIENT
Start: 2020-03-05

## 2020-03-05 RX ORDER — HEPARIN SODIUM (PORCINE) LOCK FLUSH IV SOLN 100 UNIT/ML 100 UNIT/ML
500 SOLUTION INTRAVENOUS AS NEEDED
Status: DISCONTINUED | OUTPATIENT
Start: 2020-03-05 | End: 2020-03-05 | Stop reason: HOSPADM

## 2020-03-05 RX ADMIN — SODIUM CHLORIDE 1000 ML: 9 INJECTION, SOLUTION INTRAVENOUS at 12:20

## 2020-03-05 RX ADMIN — SODIUM CHLORIDE, PRESERVATIVE FREE 500 UNITS: 5 INJECTION INTRAVENOUS at 13:25

## 2020-03-11 DIAGNOSIS — E86.0 DEHYDRATION: Primary | ICD-10-CM

## 2020-03-11 DIAGNOSIS — C21.0 ANAL CANCER (HCC): ICD-10-CM

## 2020-03-12 ENCOUNTER — APPOINTMENT (OUTPATIENT)
Dept: GENERAL RADIOLOGY | Facility: HOSPITAL | Age: 55
End: 2020-03-12

## 2020-03-12 ENCOUNTER — LAB (OUTPATIENT)
Dept: ONCOLOGY | Facility: CLINIC | Age: 55
End: 2020-03-12

## 2020-03-12 ENCOUNTER — OFFICE VISIT (OUTPATIENT)
Dept: ONCOLOGY | Facility: CLINIC | Age: 55
End: 2020-03-12

## 2020-03-12 ENCOUNTER — HOSPITAL ENCOUNTER (EMERGENCY)
Facility: HOSPITAL | Age: 55
Discharge: HOME | End: 2020-03-12
Attending: FAMILY MEDICINE | Admitting: FAMILY MEDICINE

## 2020-03-12 ENCOUNTER — APPOINTMENT (OUTPATIENT)
Dept: CT IMAGING | Facility: HOSPITAL | Age: 55
End: 2020-03-12

## 2020-03-12 VITALS
WEIGHT: 180 LBS | DIASTOLIC BLOOD PRESSURE: 82 MMHG | SYSTOLIC BLOOD PRESSURE: 112 MMHG | HEART RATE: 94 BPM | BODY MASS INDEX: 30.73 KG/M2 | HEIGHT: 64 IN | OXYGEN SATURATION: 94 % | TEMPERATURE: 98.1 F

## 2020-03-12 DIAGNOSIS — K59.03 DRUG INDUCED CONSTIPATION: Primary | ICD-10-CM

## 2020-03-12 DIAGNOSIS — E86.0 DEHYDRATION: ICD-10-CM

## 2020-03-12 DIAGNOSIS — C21.0 ANAL CANCER (HCC): ICD-10-CM

## 2020-03-12 DIAGNOSIS — C21.0 ANAL CANCER (HCC): Primary | ICD-10-CM

## 2020-03-12 DIAGNOSIS — G89.3 NEOPLASM RELATED PAIN: ICD-10-CM

## 2020-03-12 LAB
ALBUMIN SERPL-MCNC: 3.46 G/DL (ref 3.5–5.2)
ALBUMIN SERPL-MCNC: 3.69 G/DL (ref 3.5–5.2)
ALBUMIN/GLOB SERPL: 1 G/DL
ALBUMIN/GLOB SERPL: 1.1 G/DL
ALP SERPL-CCNC: 73 U/L (ref 39–117)
ALP SERPL-CCNC: 76 U/L (ref 39–117)
ALT SERPL W P-5'-P-CCNC: 12 U/L (ref 1–33)
ALT SERPL W P-5'-P-CCNC: 12 U/L (ref 1–33)
ANION GAP SERPL CALCULATED.3IONS-SCNC: 10.5 MMOL/L (ref 5–15)
ANION GAP SERPL CALCULATED.3IONS-SCNC: 10.9 MMOL/L (ref 5–15)
ANISOCYTOSIS BLD QL: NORMAL
AST SERPL-CCNC: 16 U/L (ref 1–32)
AST SERPL-CCNC: 16 U/L (ref 1–32)
BASOPHILS # BLD AUTO: 0.03 10*3/MM3 (ref 0–0.2)
BASOPHILS # BLD AUTO: 0.04 10*3/MM3 (ref 0–0.2)
BASOPHILS NFR BLD AUTO: 0.3 % (ref 0–1.5)
BASOPHILS NFR BLD AUTO: 0.4 % (ref 0–1.5)
BILIRUB SERPL-MCNC: 0.4 MG/DL (ref 0.2–1.2)
BILIRUB SERPL-MCNC: 0.5 MG/DL (ref 0.2–1.2)
BUN BLD-MCNC: 10 MG/DL (ref 6–20)
BUN BLD-MCNC: 7 MG/DL (ref 6–20)
BUN/CREAT SERPL: 19.4 (ref 7–25)
BUN/CREAT SERPL: 24.4 (ref 7–25)
CALCIUM SPEC-SCNC: 8.8 MG/DL (ref 8.6–10.5)
CALCIUM SPEC-SCNC: 8.9 MG/DL (ref 8.6–10.5)
CHLORIDE SERPL-SCNC: 97 MMOL/L (ref 98–107)
CHLORIDE SERPL-SCNC: 97 MMOL/L (ref 98–107)
CO2 SERPL-SCNC: 28.1 MMOL/L (ref 22–29)
CO2 SERPL-SCNC: 30.5 MMOL/L (ref 22–29)
CREAT BLD-MCNC: 0.36 MG/DL (ref 0.57–1)
CREAT BLD-MCNC: 0.41 MG/DL (ref 0.57–1)
CRP SERPL-MCNC: 1.95 MG/DL (ref 0–0.5)
D-LACTATE SERPL-SCNC: 0.8 MMOL/L (ref 0.5–2)
DEPRECATED RDW RBC AUTO: 69.4 FL (ref 37–54)
DEPRECATED RDW RBC AUTO: 70.1 FL (ref 37–54)
EOSINOPHIL # BLD AUTO: 0.14 10*3/MM3 (ref 0–0.4)
EOSINOPHIL # BLD AUTO: 0.14 10*3/MM3 (ref 0–0.4)
EOSINOPHIL NFR BLD AUTO: 1.4 % (ref 0.3–6.2)
EOSINOPHIL NFR BLD AUTO: 1.4 % (ref 0.3–6.2)
ERYTHROCYTE [DISTWIDTH] IN BLOOD BY AUTOMATED COUNT: 18.8 % (ref 12.3–15.4)
ERYTHROCYTE [DISTWIDTH] IN BLOOD BY AUTOMATED COUNT: 19.2 % (ref 12.3–15.4)
GFR SERPL CREATININE-BSD FRML MDRD: >150 ML/MIN/1.73
GFR SERPL CREATININE-BSD FRML MDRD: >150 ML/MIN/1.73
GLOBULIN UR ELPH-MCNC: 3.2 GM/DL
GLOBULIN UR ELPH-MCNC: 3.3 GM/DL
GLUCOSE BLD-MCNC: 106 MG/DL (ref 65–99)
GLUCOSE BLD-MCNC: 120 MG/DL (ref 65–99)
HCT VFR BLD AUTO: 48.3 % (ref 34–46.6)
HCT VFR BLD AUTO: 49 % (ref 34–46.6)
HGB BLD-MCNC: 14.8 G/DL (ref 12–15.9)
HGB BLD-MCNC: 15 G/DL (ref 12–15.9)
HYPOCHROMIA BLD QL: NORMAL
IMM GRANULOCYTES # BLD AUTO: 0.07 10*3/MM3 (ref 0–0.05)
IMM GRANULOCYTES # BLD AUTO: 0.07 10*3/MM3 (ref 0–0.05)
IMM GRANULOCYTES NFR BLD AUTO: 0.7 % (ref 0–0.5)
IMM GRANULOCYTES NFR BLD AUTO: 0.7 % (ref 0–0.5)
LYMPHOCYTES # BLD AUTO: 0.71 10*3/MM3 (ref 0.7–3.1)
LYMPHOCYTES # BLD AUTO: 0.91 10*3/MM3 (ref 0.7–3.1)
LYMPHOCYTES NFR BLD AUTO: 7.2 % (ref 19.6–45.3)
LYMPHOCYTES NFR BLD AUTO: 9 % (ref 19.6–45.3)
MACROCYTES BLD QL SMEAR: NORMAL
MAGNESIUM SERPL-MCNC: 1.9 MG/DL (ref 1.6–2.6)
MCH RBC QN AUTO: 30.7 PG (ref 26.6–33)
MCH RBC QN AUTO: 30.8 PG (ref 26.6–33)
MCHC RBC AUTO-ENTMCNC: 30.6 G/DL (ref 31.5–35.7)
MCHC RBC AUTO-ENTMCNC: 30.6 G/DL (ref 31.5–35.7)
MCV RBC AUTO: 100.4 FL (ref 79–97)
MCV RBC AUTO: 100.6 FL (ref 79–97)
MONOCYTES # BLD AUTO: 0.68 10*3/MM3 (ref 0.1–0.9)
MONOCYTES # BLD AUTO: 0.69 10*3/MM3 (ref 0.1–0.9)
MONOCYTES NFR BLD AUTO: 6.7 % (ref 5–12)
MONOCYTES NFR BLD AUTO: 7 % (ref 5–12)
NEUTROPHILS # BLD AUTO: 8.25 10*3/MM3 (ref 1.7–7)
NEUTROPHILS # BLD AUTO: 8.25 10*3/MM3 (ref 1.7–7)
NEUTROPHILS NFR BLD AUTO: 81.9 % (ref 42.7–76)
NEUTROPHILS NFR BLD AUTO: 83.3 % (ref 42.7–76)
NRBC BLD AUTO-RTO: 0 /100 WBC (ref 0–0.2)
NRBC BLD AUTO-RTO: 0 /100 WBC (ref 0–0.2)
PLAT MORPH BLD: NORMAL
PLATELET # BLD AUTO: 162 10*3/MM3 (ref 140–450)
PLATELET # BLD AUTO: 164 10*3/MM3 (ref 140–450)
PMV BLD AUTO: 10.6 FL (ref 6–12)
PMV BLD AUTO: 10.9 FL (ref 6–12)
POTASSIUM BLD-SCNC: 3.3 MMOL/L (ref 3.5–5.2)
POTASSIUM BLD-SCNC: 3.5 MMOL/L (ref 3.5–5.2)
PROT SERPL-MCNC: 6.8 G/DL (ref 6–8.5)
PROT SERPL-MCNC: 6.9 G/DL (ref 6–8.5)
RBC # BLD AUTO: 4.8 10*6/MM3 (ref 3.77–5.28)
RBC # BLD AUTO: 4.88 10*6/MM3 (ref 3.77–5.28)
SODIUM BLD-SCNC: 136 MMOL/L (ref 136–145)
SODIUM BLD-SCNC: 138 MMOL/L (ref 136–145)
WBC NRBC COR # BLD: 10.08 10*3/MM3 (ref 3.4–10.8)
WBC NRBC COR # BLD: 9.9 10*3/MM3 (ref 3.4–10.8)

## 2020-03-12 PROCEDURE — 25010000002 PROMETHAZINE PER 50 MG: Performed by: FAMILY MEDICINE

## 2020-03-12 PROCEDURE — 99214 OFFICE O/P EST MOD 30 MIN: CPT | Performed by: INTERNAL MEDICINE

## 2020-03-12 PROCEDURE — 25010000002 ONDANSETRON PER 1 MG: Performed by: FAMILY MEDICINE

## 2020-03-12 PROCEDURE — 83605 ASSAY OF LACTIC ACID: CPT | Performed by: FAMILY MEDICINE

## 2020-03-12 PROCEDURE — 25010000002 METHYLNALTREXONE 12 MG/0.6ML SOLUTION: Performed by: FAMILY MEDICINE

## 2020-03-12 PROCEDURE — 86140 C-REACTIVE PROTEIN: CPT | Performed by: FAMILY MEDICINE

## 2020-03-12 PROCEDURE — 80053 COMPREHEN METABOLIC PANEL: CPT | Performed by: FAMILY MEDICINE

## 2020-03-12 PROCEDURE — 74022 RADEX COMPL AQT ABD SERIES: CPT

## 2020-03-12 PROCEDURE — 96376 TX/PRO/DX INJ SAME DRUG ADON: CPT

## 2020-03-12 PROCEDURE — 25010000002 HYDROMORPHONE PER 4 MG: Performed by: FAMILY MEDICINE

## 2020-03-12 PROCEDURE — 74022 RADEX COMPL AQT ABD SERIES: CPT | Performed by: RADIOLOGY

## 2020-03-12 PROCEDURE — 74176 CT ABD & PELVIS W/O CONTRAST: CPT

## 2020-03-12 PROCEDURE — 96374 THER/PROPH/DIAG INJ IV PUSH: CPT

## 2020-03-12 PROCEDURE — 74176 CT ABD & PELVIS W/O CONTRAST: CPT | Performed by: RADIOLOGY

## 2020-03-12 PROCEDURE — 83735 ASSAY OF MAGNESIUM: CPT | Performed by: INTERNAL MEDICINE

## 2020-03-12 PROCEDURE — 25010000003 HEPARIN LOCK FLUCH PER 10 UNITS: Performed by: FAMILY MEDICINE

## 2020-03-12 PROCEDURE — 85025 COMPLETE CBC W/AUTO DIFF WBC: CPT | Performed by: FAMILY MEDICINE

## 2020-03-12 PROCEDURE — 87040 BLOOD CULTURE FOR BACTERIA: CPT | Performed by: FAMILY MEDICINE

## 2020-03-12 PROCEDURE — 99285 EMERGENCY DEPT VISIT HI MDM: CPT

## 2020-03-12 PROCEDURE — 93005 ELECTROCARDIOGRAM TRACING: CPT | Performed by: FAMILY MEDICINE

## 2020-03-12 PROCEDURE — 85007 BL SMEAR W/DIFF WBC COUNT: CPT | Performed by: FAMILY MEDICINE

## 2020-03-12 PROCEDURE — 93010 ELECTROCARDIOGRAM REPORT: CPT | Performed by: INTERNAL MEDICINE

## 2020-03-12 PROCEDURE — 80053 COMPREHEN METABOLIC PANEL: CPT | Performed by: INTERNAL MEDICINE

## 2020-03-12 PROCEDURE — 85025 COMPLETE CBC W/AUTO DIFF WBC: CPT | Performed by: INTERNAL MEDICINE

## 2020-03-12 PROCEDURE — 96375 TX/PRO/DX INJ NEW DRUG ADDON: CPT

## 2020-03-12 RX ORDER — HYDROMORPHONE HYDROCHLORIDE 1 MG/ML
0.5 INJECTION, SOLUTION INTRAMUSCULAR; INTRAVENOUS; SUBCUTANEOUS ONCE
Status: COMPLETED | OUTPATIENT
Start: 2020-03-12 | End: 2020-03-12

## 2020-03-12 RX ORDER — ONDANSETRON 2 MG/ML
4 INJECTION INTRAMUSCULAR; INTRAVENOUS ONCE
Status: COMPLETED | OUTPATIENT
Start: 2020-03-12 | End: 2020-03-12

## 2020-03-12 RX ORDER — HEPARIN SODIUM (PORCINE) LOCK FLUSH IV SOLN 100 UNIT/ML 100 UNIT/ML
300 SOLUTION INTRAVENOUS ONCE
Status: COMPLETED | OUTPATIENT
Start: 2020-03-12 | End: 2020-03-12

## 2020-03-12 RX ADMIN — HYDROMORPHONE HYDROCHLORIDE 0.5 MG: 1 INJECTION, SOLUTION INTRAMUSCULAR; INTRAVENOUS; SUBCUTANEOUS at 16:29

## 2020-03-12 RX ADMIN — ONDANSETRON 4 MG: 2 INJECTION INTRAMUSCULAR; INTRAVENOUS at 16:29

## 2020-03-12 RX ADMIN — Medication 300 UNITS: at 21:33

## 2020-03-12 RX ADMIN — HYDROMORPHONE HYDROCHLORIDE 0.5 MG: 1 INJECTION, SOLUTION INTRAMUSCULAR; INTRAVENOUS; SUBCUTANEOUS at 21:15

## 2020-03-12 RX ADMIN — PROMETHAZINE HYDROCHLORIDE 12.5 MG: 25 INJECTION INTRAMUSCULAR; INTRAVENOUS at 21:17

## 2020-03-12 RX ADMIN — SODIUM CHLORIDE 1000 ML: 9 INJECTION, SOLUTION INTRAVENOUS at 16:29

## 2020-03-12 RX ADMIN — METHYLNALTREXONE BROMIDE 12 MG: 12 INJECTION, SOLUTION SUBCUTANEOUS at 18:40

## 2020-03-12 RX ADMIN — HYDROMORPHONE HYDROCHLORIDE 0.5 MG: 1 INJECTION, SOLUTION INTRAMUSCULAR; INTRAVENOUS; SUBCUTANEOUS at 18:40

## 2020-03-12 NOTE — PROGRESS NOTES
NAME: Liz Jiang    : 1965    DATE:  3/12/2020    DIAGNOSIS: Anal Cancer    TREATMENT:  1.        2. XRT w/ Ramona Campos MD    TREATMENT COURSE:   -2020 the whole pelvis received 30.6 Gy in 17 fractions using 6 MV photons   the upper border was reduced and 5.4 Gray in 3 fractions were delivered using 6 MV photons  -2/3/2020 the fields were further reduced off of the inguinal nodes and given 9 Gray in 5 fractions with 6 MV photons  -20 (includes treatment break) the tumor was boosted with an additional 9 Chakraborty in 5 fractions with 6 MV photons.      CHIEF COMPLAINT:  Follow up of Anal Cancer    HISTORY OF PRESENT ILLNESS:   Liz Jiang is a very pleasant 54 y.o. female who is being seen today at the request of No ref. provider found for evaluation and treatment of anal cancer. She initially presented to her PCP for constipation, rectal pain and a palpable mass x 6-7 months. After failing conservative treatment, she was recommended by a friend to see Dr. Wu. She had colonoscopy on on 19 during which a large anal mass was visualized. 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 Two benign polyps were also removed. She was referred to our clinic for discussion of further treatment options.     She has several complaints today. She reports gradually worsening anal/rectal pain, and says she feels the urge to defecate constantly. For pain, she is using Norco 10 every 6 hours.She has had nausea for several months that she has been taking Phenergan for. She reports fatigue and a weight loss of 16-18 pounds (which she has subsequently regained), insomnia, a history of headaches that are much more mild after previous CVA, baseline shortness of breath that she wears bipap and uses home O2 for. She also reports that she has decreased ROM to RUE following past CVA. She denies obvious blood in stool, chest  "pain, abdominal pain, or any other complaint.     INTERVAL HISTORY:  Ms. Jiang is here today with her sister for follow up of anal cancer.  She is extremely uncomfortable today.  She says she had been feeling better but then became constipated.  She has tried taking Senna / Colace ii BID, Mrialax and has finished a box and a half of exlax over the last 2 days without effect. She hasn't had a bowel movement in 6 days and says she has been obstipated for 3 days.  She has started to feel nauseated but isn't vomiting.  She tried to use an enema at home but says \"the water just squirted back out again.\" She says she feels like \"something ruptured\" in her RLQ but then said maybe it was gas pain.      PAST MEDICAL HISTORY:  Past Medical History:   Diagnosis Date   • Acid reflux disease    • Anal cancer (CMS/HCC) 12/5/2019   • Arthritis    • Asthma, extrinsic    • Cholelithiasis    • Chronic headaches    • COPD (chronic obstructive pulmonary disease) (CMS/HCC)    • CVA (cerebral vascular accident) (CMS/HCC)     w/ right sided deficit   • CVA (cerebral vascular accident) (CMS/HCC)    • Diabetes mellitus (CMS/HCC)     only has high blood sugar when on steriods   • Obstructive sleep apnea treated with BiPAP    • On home oxygen therapy     2L    • Sleep apnea, obstructive        PAST SURGICAL HISTORY:  Past Surgical History:   Procedure Laterality Date   • ABDOMINAL SURGERY     • CARDIAC CATHETERIZATION     • CAROTID ENDARTERECTOMY Left 2018   • CHOLECYSTECTOMY WITH INTRAOPERATIVE CHOLANGIOGRAM N/A 1/30/2017    Procedure: CHOLECYSTECTOMY LAPAROSCOPIC INTRAOPERATIVE CHOLANGIOGRAM;  Surgeon: Carolin Marie MD;  Location: [facility] OR;  Service:    • COLONOSCOPY     • HYSTERECTOMY      for heavy bleeding/ complete   • KIDNEY STONE SURGERY     • TUBAL ABDOMINAL LIGATION     • VENOUS ACCESS DEVICE (PORT) INSERTION Left 12/17/2019    Procedure: INSERTION VENOUS ACCESS DEVICE;  Surgeon: Carolin aMrie MD;  Location: [facility] OR;  " Service: General        FAMILY HISTORY:  Family History   Problem Relation Age of Onset   • Diabetes Mother    • Hypertension Mother    • Arrhythmia Mother    • Pneumonia Father    • Coronary artery disease Other    • Arrhythmia Sister    • Heart attack Brother    • Lymphoma Brother         hodgkin's    • Other Brother         CABG   • Breast cancer Neg Hx        SOCIAL HISTORY:  Social History     Socioeconomic History   • Marital status:      Spouse name: Not on file   • Number of children: Not on file   • Years of education: Not on file   • Highest education level: Not on file   Tobacco Use   • Smoking status: Former Smoker     Packs/day: 1.50     Years: 30.00     Pack years: 45.00     Types: Electronic Cigarette     Last attempt to quit:      Years since quittin.1   • Smokeless tobacco: Never Used   Substance and Sexual Activity   • Alcohol use: No   • Drug use: No   • Sexual activity: Defer   Social History Narrative    Just retired     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          REVIEW OF SYSTEMS:   A comprehensive 14 point review of systems was performed.  Significant findings as mentioned above.  All other systems reviewed and are negative.      MEDICATIONS:  The current medication list was reviewed in the EMR    Current Outpatient Medications:   •  albuterol sulfate  (90 Base) MCG/ACT inhaler, Inhale 2 puffs Every 6 (Six) Hours As Needed for Wheezing or Shortness of Air., Disp: 18 g, Rfl: 0  •  ASPIRIN LOW DOSE 81 MG EC tablet, Take 81 mg by mouth Daily., Disp: , Rfl: 3  •  clonazePAM (KlonoPIN) 0.5 MG tablet, Take 1 tablet by mouth 2 (Two) Times a Day As Needed for Seizures., Disp: 60 tablet, Rfl: 0  •  clopidogrel (PLAVIX) 75 MG tablet, TAKE ONE  TABLET BY MOUTH EVERY DAY FOR BLOOD THINNER, Disp: , Rfl: 3  •  diphenhydrAMINE in aluminum-magnesium hydroxide-simethicone suspension-nystatin-Lidocaine Viscous HCl solution, Swish and swallow 5 ml 4 times a day as needed, Disp: 240 mL, Rfl: 5  •  escitalopram (LEXAPRO) 20 MG tablet, Take 1 tablet by mouth Daily., Disp: 30 tablet, Rfl: 11  •  gabapentin (NEURONTIN) 800 MG tablet, Take 800 mg by mouth 3 (Three) Times a Day., Disp: , Rfl:   •  granisetron (SANCUSO) 3.1 MG/24HR, Place 1 patch on the skin as directed by provider 1 (One) Time Per Week., Disp: 4 patch, Rfl: 2  •  lidocaine-prilocaine (EMLA) 2.5-2.5 % cream, Apply to port site 30 mins prior to chemotherapy appointment, Disp: 30 g, Rfl: 5  •  loratadine-pseudoephedrine (CLARITIN-D 24 HOUR)  MG per 24 hr tablet, Take 1 tablet by mouth Daily., Disp: 30 tablet, Rfl: 5  •  LORazepam (ATIVAN) 0.5 MG tablet, Take 1 tablet by mouth Every 8 (Eight) Hours As Needed for Anxiety., Disp: 60 tablet, Rfl: 0  •  LORazepam (ATIVAN) 0.5 MG tablet, Take 1 tablet by mouth Every 8 (Eight) Hours As Needed for anxiety., Disp: 60 tablet, Rfl: 0  •  Magic mouthwash Radonc, Swish and swallow 10 mL 4 (Four) Times a Day Before Meals & at Bedtime As Needed., Disp: 480 mL, Rfl: 3  •  Morphine (MS CONTIN) 15 MG 12 hr tablet, Take 1 tablet by mouth Every 12 (Twelve) Hours., Disp: 60 tablet, Rfl: 0  •  Morphine (MS CONTIN) 60 MG 12 hr tablet, Take 1 tablet by mouth Every 12 (Twelve) Hours., Disp: 60 tablet, Rfl: 0  •  Oral Wound Care Products (MUGARD) liquid, Apply 5 mL to the mouth or throat 5 (Five) Times a Day As Needed (for oral pain)., Disp: 240 mL, Rfl: 2  •  Oral Wound Care Products (MUGARD) liquid, APPLY 5 MLS TO THE MOUTH OR THROAT 5 TIMES A DAY AS NEEDED FOR ORAL PAIN, Disp: 240 mL, Rfl: 2  •  oxyCODONE (ROXICODONE) 10 MG tablet, Take 1-2 tabs every 4-6 hours as needed for pain, Disp: 150 tablet, Rfl: 0  •  oxyCODONE (ROXICODONE) 20 MG tablet, Take 1 tablet by mouth Every  4 (Four) Hours As Needed for pain, Disp: 200 tablet, Rfl: 0  •  phenazopyridine (PYRIDIUM) 200 MG tablet, Take 1 tablet by mouth 3 (Three) Times a Day As Needed for Bladder Spasms., Disp: 90 tablet, Rfl: 3  •  polyethylene glycol (GAVILAX) powder, Take 17 g by mouth 2 (Two) Times a Day., Disp: 850 g, Rfl: 4  •  polyethylene glycol (MIRALAX) powder, MIX 17 GRAMS OF POWDER IN 8 OUNCES OF WATER OR JUICE AND TAKE BY MOUTH TWICE A DAY., Disp: 1020 g, Rfl: 4  •  potassium chloride (K-DUR,KLOR-CON) 10 MEQ CR tablet, Take 4 tablets by mouth Daily., Disp: 4 tablet, Rfl: 0  •  predniSONE (DELTASONE) 5 MG tablet, Take 5 mg by mouth Daily. Take one tab by mouth ever other day alternating with 1/2 tab, Disp: , Rfl:   •  prochlorperazine (COMPAZINE) 10 MG tablet, Take 1 tablet by mouth Every 6 (Six) Hours As Needed for Nausea or Vomiting., Disp: 60 tablet, Rfl: 3  •  promethazine (PHENERGAN) 25 MG tablet, Take 25 mg by mouth Every 6 (Six) Hours As Needed for Nausea or Vomiting., Disp: , Rfl:   •  sennosides-docusate (SENNA-S) 8.6-50 MG per tablet, Take 2 tablets by mouth 2 (Two) Times a Day., Disp: 120 tablet, Rfl: 5  •  silver sulfadiazine (SILVADENE, SSD) 1 % cream, Apply  topically to the appropriate area as directed 2 (Two) Times a Day. Apply to affected area and keep covered., Disp: 400 g, Rfl: 5  •  SYMBICORT 160-4.5 MCG/ACT inhaler, USE 2 PUFFS BY MOUTH TWO TIMES A DAY RINSE MOUTH WITH WATER AFTER USE FOR BREATHING, Disp: 10.2 g, Rfl: 0  •  tiZANidine (ZANAFLEX) 4 MG tablet, 4 mg Every 6 (Six) Hours As Needed., Disp: , Rfl: 0  •  Umeclidinium Bromide 62.5 MCG/INH aerosol powder , Inhale 1 puff Daily., Disp: 30 each, Rfl: 8    ALLERGIES:    No Known Allergies    PHYSICAL EXAM:  Vitals:    03/12/20 1147   BP: 112/82   Pulse: 94   Temp: 98.1 °F (36.7 °C)   SpO2: 94%     Pain Score    03/12/20 1147   PainSc: 10-Worst pain ever     General:  Awake, alert and oriented.  Appears uncomfortable, fidgity  HEENT:  Pupils are equal,  round and reactive to light and accommodation, Extra-ocular movements full, mucous membranes are clear   Neck:  No JVD, thyromegaly or lymphadenopathy  CV:  Regular tachycardia, no murmurs, rubs or gallops  Resp:  Lungs are clear to auscultation bilaterally.  Abd:  Soft, non-tender, somewhat -distended, bowel sounds present, no organomegaly or masses  Ext:  No clubbing, cyanosis or edema  Lymph:  No cervical, supraclavicular, axillary,adenopathy  Neuro:  grossly non-focal exam    PATHOLOGY:  11-01-19          ENDOSCOPY:  C-scope (Jazmin) 11-01-19          IMAGING:  MRI Abdomen w/wo contrast 11-06-19   FINDINGS:   Today's study shows no definitive colonic mass.   Particularly I do not see any contrast-enhancing abnormalities on the presented images.  No adenopathy in the imaged area.  Sigmoid colon wall does appear to be slightly thickened but no adjacent fluid.  No walled off fluid collections.     IMPRESSION:  No contrast-enhancing abnormalities identified.     CTCAP 11-27-19  FINDINGS: Today's study shows evidence of COPD.     There are no parenchymal nodules or masses.     No pericardial or pleural effusions.     No mediastinal or hilar lymph node enlargement.     IMPRESSION:  No evidence of metastatic disease to the chest.    FINDINGS:   The lung bases are clear. There are no pleural effusions.     The liver is homogeneous. There is no evidence of focal hepatic mass     The spleen is homogeneous     There is no peripancreatic stranding or pancreatic head mass.     There is no adrenal enlargement.     There is a nonobstructing right intrarenal stone..      Otherwise I do not see any free fluid or walled off fluid collections.     There is diffuse colonic wall thickening.      IMPRESSION:  1. Diffuse colonic wall thickening.  2. Nonobstructing right intrarenal stone.  3. No evidence of metastatic disease to the abdomen or pelvis.      RECENT LABS:  Lab Results   Component Value Date    WBC 10.08 03/12/2020    HGB  14.8 03/12/2020    HCT 48.3 (H) 03/12/2020    .6 (H) 03/12/2020    RDW 18.8 (H) 03/12/2020     03/12/2020    NEUTRORELPCT 81.9 (H) 03/12/2020    LYMPHORELPCT 9.0 (L) 03/12/2020    MONORELPCT 6.7 03/12/2020    EOSRELPCT 1.4 03/12/2020    BASORELPCT 0.3 03/12/2020    NEUTROABS 8.25 (H) 03/12/2020    LYMPHSABS 0.91 03/12/2020       Lab Results   Component Value Date     03/12/2020    K 3.5 03/12/2020    CO2 28.1 03/12/2020    CL 97 (L) 03/12/2020    BUN 7 03/12/2020    CREATININE 0.36 (L) 03/12/2020    EGFRIFNONA >150 03/12/2020    GLUCOSE 120 (H) 03/12/2020    CALCIUM 8.8 03/12/2020    ALKPHOS 73 03/12/2020    AST 16 03/12/2020    ALT 12 03/12/2020    BILITOT 0.5 03/12/2020    ALBUMIN 3.46 (L) 03/12/2020    PROTEINTOT 6.8 03/12/2020    MG 1.9 03/12/2020    PHOS 3.0 03/27/2017       Lab Results   Component Value Date    FERRITIN 197.40 (H) 11/19/2019    IRON 43 11/19/2019    TIBC 437 11/19/2019    LABIRON 10 (L) 11/19/2019    KCGPMORG59 473 11/19/2019    FOLATE 9.32 11/19/2019       ASSESSMENT & PLAN:  Liz Jiang is a very pleasant 54 y.o. female with newly diagnosed anal cancer.    1.  Anal cancer:  -  Clinically has Stage IIIB disease ([id number]) with invasion of the rectovaginal septum.  -  At the time of diagnosis, she had  gynecologic examination without cause for concern.  Will get notes from [facility].  -  CT CAP and MRI Pelvis unremarkable.  No evidence of metastatic disease. No notable adenopathy.  -  Recommended concurrent definitive chemoradiation with Mitomycin/5-FU.  -  She saw Dr. Martinez who was in agreement with this plan.  -  She saw Dr. Hayne for radiation and finally completed treatment on 2-27-20.    -  Will continue with supportive care to help her recover.  .  -  She sees Dr. Martinez soon for post-rx endoscopic exam.  Will ordelag    2. Neoplasm related pain:  -  She has a h/o chronic back pain previously managed by pain management physician.  - We took over her pain  management during her cancer treatment and will transition back to her pain management physician after her cancer treatment is completed.    - she has been able to take Oxycodone 20 mg every 5 hours instead of every 4 and she continues to take MS Contin 60 mg Q12H.  As she recovers, will work to wean down from this pain medication.    3. Severe constipation / obstipation and nausea:  -  No BM x 6 days, not passing gas x 3 days.    -  Will refer to the ER for further evaluation.    4.  Anxiety: This has been a chronic problem for her and she continues to struggle.  Currently taking Lexapro 20 mg daily along with Ativan for nausea, anxiety.  She also has Ambien which she previously took for insomnia and I told her it would be fine to restart this.  I suspect she would benefit greatly from psychiatric care and she agreed, so referral was placed at her last visit.    5. Nausea : Continue Sancuso patch.  REstart Compazine 10 mg q 4-6 h prn.  Continue Ativan 0.5 mg 1-2 tabs q 8 h prn refractory nausea.  Nausea has generally improved with stopping treatment.     .  Prophylaxis:  Has had Pneumovax, Prevnar 13 and 2019 flu vaccine.       8.  ACO / SAÚL/Other  Quality measures  -  Liz Jiang received 2019 flu vaccine.  -  Liz Jiang reports a pain score of 7 .  Given her pain assessment as noted, treatment options were discussed and the following options were decided upon as a follow-up plan to address the patient's pain: continuation of current treatment plan for pain and prescription for opiod analgesics.   -  Current outpatient and discharge medications have been reconciled for the patient.    Reviewed by: Deanne Jones MD       9. Follow up: Continue supportive care / IVF as needed.  Will have her return for continued f/u / recovery form cancer therapy.      This note was scribed for Deanne Jones MD by Gricel Howell RN.    I, Deanne Jones MD, personally performed the services described in this  documentation as scribed by the above named individual in my presence, and it is both accurate and complete.  03/12/2020        I spent 25 minutes with Liz Jiang today.  In the office today, more than 50% of this time was spent face-to-face with her  in counseling / coordination of care, reviewing her medical history and counseling on the current treatment plan.  All questions were answered to her satisfaction      Electronically Signed by: Deanne Jones MD       CC:   Mayte Davis APRN Muhammed Iqbal, MD John Dvorak, MD Marta Hayne, MD Michael Simons, MD Barbara Michna, MD

## 2020-03-13 VITALS
TEMPERATURE: 98 F | HEIGHT: 64 IN | WEIGHT: 180 LBS | BODY MASS INDEX: 30.73 KG/M2 | RESPIRATION RATE: 18 BRPM | OXYGEN SATURATION: 100 % | HEART RATE: 92 BPM | DIASTOLIC BLOOD PRESSURE: 70 MMHG | SYSTOLIC BLOOD PRESSURE: 122 MMHG

## 2020-03-13 NOTE — ED PROVIDER NOTES
Subjective   53 yo female with a history of anal cancer who completed chemo and radiation 2 weeks ago  Has been on chronic pain medications during this time- has not had a bowel movement in 6 days. Pt reports that she went to the clinic and they advised she come here to get some help.      History provided by:  Patient  Constipation   Severity:  Severe  Time since last bowel movement:  6 days  Timing:  Constant  Progression:  Unchanged  Chronicity:  New  Context: narcotics    Stool description:  None produced  Unusual stool frequency:  Every other day  Relieved by:  Nothing  Worsened by:  Nothing  Ineffective treatments:  Enemas, laxatives and stool softeners  Associated symptoms: no abdominal pain, no dysuria, no fever and no flatus    Risk factors: recent illness        Review of Systems   Constitutional: Negative.  Negative for fever.   HENT: Negative.    Respiratory: Negative.    Cardiovascular: Negative.  Negative for chest pain.   Gastrointestinal: Positive for constipation. Negative for abdominal pain and flatus.   Endocrine: Negative.    Genitourinary: Negative.  Negative for dysuria.   Skin: Negative.    Neurological: Negative.    Psychiatric/Behavioral: Negative.    All other systems reviewed and are negative.      Past Medical History:   Diagnosis Date   • Acid reflux disease    • Anal cancer (CMS/HCC) 12/5/2019   • Arthritis    • Asthma, extrinsic    • Cholelithiasis    • Chronic headaches    • COPD (chronic obstructive pulmonary disease) (CMS/HCC)    • CVA (cerebral vascular accident) (CMS/HCC)     w/ right sided deficit   • CVA (cerebral vascular accident) (CMS/HCC)    • Diabetes mellitus (CMS/HCC)     only has high blood sugar when on steriods   • Obstructive sleep apnea treated with BiPAP    • On home oxygen therapy     2L    • Sleep apnea, obstructive        No Known Allergies    Past Surgical History:   Procedure Laterality Date   • ABDOMINAL SURGERY     • CARDIAC CATHETERIZATION     • CAROTID  ENDARTERECTOMY Left 2018   • CHOLECYSTECTOMY WITH INTRAOPERATIVE CHOLANGIOGRAM N/A 2017    Procedure: CHOLECYSTECTOMY LAPAROSCOPIC INTRAOPERATIVE CHOLANGIOGRAM;  Surgeon: Carolin Marie MD;  [facility];  Service:    • COLONOSCOPY     • HYSTERECTOMY      for heavy bleeding/ complete   • KIDNEY STONE SURGERY     • TUBAL ABDOMINAL LIGATION     • VENOUS ACCESS DEVICE (PORT) INSERTION Left 2019    Procedure: INSERTION VENOUS ACCESS DEVICE;  Surgeon: Carolin Marie MD;  [facility];  Service: General       Family History   Problem Relation Age of Onset   • Diabetes Mother    • Hypertension Mother    • Arrhythmia Mother    • Pneumonia Father    • Coronary artery disease Other    • Arrhythmia Sister    • Heart attack Brother    • Lymphoma Brother         hodgkin's    • Other Brother         CABG   • Breast cancer Neg Hx        Social History     Socioeconomic History   • Marital status:      Spouse name: Not on file   • Number of children: Not on file   • Years of education: Not on file   • Highest education level: Not on file   Tobacco Use   • Smoking status: Former Smoker     Packs/day: 1.50     Years: 30.00     Pack years: 45.00     Types: Electronic Cigarette     Last attempt to quit:      Years since quittin.2   • Smokeless tobacco: Never Used   Substance and Sexual Activity   • Alcohol use: No   • Drug use: No   • Sexual activity: Defer   Social History Narrative    Just retired     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            Objective   Physical Exam   Constitutional: She is oriented to person, place, and time. She appears well-developed and well-nourished.   HENT:   Head: Normocephalic and atraumatic.   Right Ear: External ear  normal.   Left Ear: External ear normal.   Nose: Nose normal.   Mouth/Throat: Oropharynx is clear and moist.   Eyes: Pupils are equal, round, and reactive to light. EOM are normal.   Neck: Neck supple.   Cardiovascular: Normal rate and regular rhythm.   Pulmonary/Chest: Effort normal.   Abdominal: Soft. Bowel sounds are decreased.   Musculoskeletal: Normal range of motion.   Neurological: She is alert and oriented to person, place, and time.   Skin: Skin is warm. Capillary refill takes less than 2 seconds.   Psychiatric: She has a normal mood and affect. Her behavior is normal. Judgment and thought content normal.   Nursing note and vitals reviewed.      Procedures           ED Course                                           MDM  Number of Diagnoses or Management Options  Drug induced constipation: new and requires workup     Amount and/or Complexity of Data Reviewed  Clinical lab tests: ordered and reviewed  Tests in the radiology section of CPT®: ordered and reviewed  Tests in the medicine section of CPT®: reviewed and ordered  Independent visualization of images, tracings, or specimens: yes    Risk of Complications, Morbidity, and/or Mortality  Presenting problems: high  Diagnostic procedures: moderate  Management options: moderate        Final diagnoses:   Drug induced constipation            Rosalie Estrada DO  03/13/20 0101

## 2020-03-17 LAB
BACTERIA SPEC AEROBE CULT: NORMAL
BACTERIA SPEC AEROBE CULT: NORMAL

## 2020-03-23 ENCOUNTER — TELEPHONE (OUTPATIENT)
Dept: ONCOLOGY | Facility: CLINIC | Age: 55
End: 2020-03-23

## 2020-03-23 DIAGNOSIS — C21.0 ANAL CANCER (HCC): ICD-10-CM

## 2020-03-23 RX ORDER — CLONAZEPAM 0.5 MG/1
0.5 TABLET ORAL 2 TIMES DAILY PRN
Qty: 60 TABLET | Refills: 0 | Status: SHIPPED | OUTPATIENT
Start: 2020-03-23 | End: 2020-04-24 | Stop reason: SDUPTHER

## 2020-03-23 RX ORDER — AMOXICILLIN 250 MG
2 CAPSULE ORAL 2 TIMES DAILY
Qty: 120 TABLET | Refills: 5 | Status: SHIPPED | OUTPATIENT
Start: 2020-03-23

## 2020-03-23 RX ORDER — MORPHINE SULFATE 60 MG/1
60 TABLET, FILM COATED, EXTENDED RELEASE ORAL EVERY 12 HOURS
Qty: 60 TABLET | Refills: 0 | Status: SHIPPED | OUTPATIENT
Start: 2020-03-23 | End: 2020-04-24 | Stop reason: SDUPTHER

## 2020-03-23 RX ORDER — OXYCODONE HYDROCHLORIDE 20 MG/1
20 TABLET ORAL EVERY 4 HOURS PRN
Qty: 200 TABLET | Refills: 0 | Status: SHIPPED | OUTPATIENT
Start: 2020-03-23 | End: 2020-04-24 | Stop reason: SDUPTHER

## 2020-03-23 NOTE — TELEPHONE ENCOUNTER
Patient called back to say that she does not need the Lexapro after all.  She still has refills on that one.

## 2020-03-23 NOTE — TELEPHONE ENCOUNTER
Lexapro 10 mg 1 tab daily    Morphine ER 60 mg     Oxycontin 20 mg     Clonazepam 5 mg 1 tab BID    Sensa -can she get this over the counter?     Patient will be out of these meds after today    Please send to [facility]    Please call patient when done so she can let her sister know (sister is picking up.) [phone]      Also, does the patient need labwork scheduled?  She hasn't had it done since last time she was here.

## 2020-03-24 DIAGNOSIS — C21.0 ANAL CANCER (HCC): ICD-10-CM

## 2020-03-25 NOTE — PROGRESS NOTES
TELEMEDICINE FOLLOW-UP NOTE    Completion Date: 2/27/2020    Problem List Items Addressed This Visit        Digestive    Anal cancer (CMS/HCC) - Primary          Time Devoted to Visit: 30 min including time to review her previous imaging and records, with 75% devoted to direct discussion.    Reason for Visit:  I personally contacted Liz Jiang  today in an effort to screen for coronavirus symptoms and also to perform her scheduled follow-up visit remotely in light of the coronavirus pandemic.  With respect to her specific risks for coronavirus, her screen is as follows:    COVID-19 RISK SCREEN     1. Has the patient been exposed to a known COVID-19 (+) person or a person under investigation (PUI) for COVID-19 infection?  -- No     2. Has the patient traveled to or are they from a Level III endemic country? --  No    3. Has the patient traveled to or are they from a local community endemic area?   --  No    4. Does the patient have baseline higher exposure risk such as working in healthcare field or currently residing in healthcare facility?  --  No     She also denied any new respiratory symptoms or fever within the past 14 days.    I counseled her regarding safe practices for minimizing risk for infection including hand-washing, self-isolation, limiting contacts, and we also discussed what to do should she develop symptoms including fever, chills, new cough, shortness of breath, or new body aches.    SUBJECTIVE:  Ms. Jiang is a 54 y.o. 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  CT chest negative for metastatic disease.  She underwent definitive concurrent chemoradiation with mitomycin and fluorouracil.  She received radiotherapy as follows:    12/30-1/22/2020 the whole pelvis received 30.6 Gy in 17 fractions   1/23-1/27/2020 the upper border was reduced and received 5.4 Gy in 3 fractions   1/28-2/3/2020 the fields were further  reduced off of the inguinal nodes received 9 Gy in 5 fractions   2/4-2/27/2020 (includes treatment break) the tumor was boosted with an additional 9 Gy in 5 fractions     She tolerated treatment with some difficulty, developing expected toxicities of grade 1 fatigue, grade 2 radiation dermatitis, and bowel issues.  She initially experienced quite persistent diarrhea, but now endorses severe constipation which required an ER visit and manual disimpaction on 3/12/2020.  Hospital scans were negative for bowel obstruction but did reveal a significant amount of fecal matter within the colon and rectum.  She is currently taking 2 tabs of senna twice daily, Linzess, and MiraLAX daily.  Since disimpaction, she notes BMs every other day, describing stool as small, pellet-like, accompanied by mostly watery stool.  She is passing gas.  Unfortunately since the COVID19 pandemic began, she has only been to the grocery store once and has been eating mostly packaged foods.  She drinks 1 bottle of water daily.  She is no longer able to palpate a rectal mass and endorses significant improvement of rectal pain.  Adding to her problem of constipation, she still requires scheduled MS Contin BID and oxycodone every 4-6 hours prn breakthrough pain in the setting of chronic LBP previously managed per pain specialist.  She continues on Sancuso patch, Compazine, and Ativan prn for nausea, though improved since treatment completion.  Acute posttreatment fatigue is improving, albeit slowly.  She is maintaining a stable weight.  She is voiding well and is without urinary complaints.  She states that her skin is nearly completely healed, and she has not noticed any peeling x2 weeks.  She continues to apply Aquaphor and do Domeboro soaks twice weekly.  She is quite anxious overall, and was recently referred to Dr. Avila Mendoza for psychiatric consult.  She believes she is improving each day though is most nervous about becoming impacted and  having to deal with bowel issues long-term.    KPS: 80%    EXAM FINDINGS AND/OR PROBLEMS:  Review of Systems   Constitutional: Positive for fatigue.   Gastrointestinal: Positive for constipation, nausea, and rectal pain (improved).   Musculoskeletal: Positive for back pain (chronic).   Skin: Positive for hyperpigmentation of treatment field (denies peeling, erythema).  Psychiatric: Positive for anxiety  All other systems reviewed and are negative.    Physical Exam  Unable to perform today as visit was conducted via telephone.  We attempted to utilize video chat, but were unable to establish a connection.        IMAGING:  CT abdomen/pelvis 3/12/2020  IMPRESSION:  1. Nonobstructing intrarenal stones  2. Large volume stool in the colon and rectum. Rectal distention.  3. Tiny left adrenal nodule  This report was finalized on 3/12/2020 6:06 PM by Dr. Dagoberto Hennessy MD.    XR Chest 3/12/2020  IMPRESSION:  1. Nothing specific seen on today's exam to account for the patient's  symptoms.  2. The lungs are clear.  3. No evidence of bowel obstruction.  4. No free air.  This report was finalized on 3/12/2020 4:56 PM by Dr. Dagoberto Hennessy MD.        ASSESSMENT/PLAN: Liz Jiang is a 54 y.o. female with history of a large anal mass, biopsy positive for invasive poorly differentiated squamous cell carcinoma.  She underwent definitive chemoradiotherapy for her locally advanced disease.  She completed EBRT approximately 1 month ago.  Expected radiation-related toxicities are improving.  We discussed bowel regimen at length, including increased PO fluids, incorporation of fibrous foods, activity, medications, and prn fleets enema as tolerated.  We also discussed use of vaginal dilator.  I gave dilator + instructions along with several handouts on constipation education to the  to mail to Ms. Jiang.  She will continue to remain under the oncologic care of Dr. Jones, with upcoming follow-up 4/15/2020.  In the meantime,  she is scheduled to see her colorectal surgeon, Dr. Martinez, for endoscopic exam to evaluate response to treatment.  Today we discussed survivorship plans, including follow-up intervals, surveillance imaging, and expectations for response to treatment.    RECOMMENDATIONS: Given that today's follow-up was conducted via telemedicine, I will schedule Ms. Jiang to return to our clinic in approximately 2 months for follow-up and examination.  She knows to call in the interim for any questions or concerns.  Return in about 2 months (around 5/26/2020) for Office Visit.    Sharmaine Tabor, APRN

## 2020-03-26 ENCOUNTER — OFFICE VISIT (OUTPATIENT)
Dept: RADIATION ONCOLOGY | Facility: HOSPITAL | Age: 55
End: 2020-03-26

## 2020-03-26 ENCOUNTER — HOSPITAL ENCOUNTER (OUTPATIENT)
Dept: RADIATION ONCOLOGY | Facility: HOSPITAL | Age: 55
Setting detail: RADIATION/ONCOLOGY SERIES
Discharge: HOME | End: 2020-03-26

## 2020-03-26 DIAGNOSIS — C21.0 ANAL CANCER (HCC): Primary | ICD-10-CM

## 2020-04-24 DIAGNOSIS — C21.0 ANAL CANCER (HCC): ICD-10-CM

## 2020-04-24 RX ORDER — CLONAZEPAM 0.5 MG/1
0.5 TABLET ORAL 2 TIMES DAILY PRN
Qty: 60 TABLET | Refills: 0 | Status: SHIPPED | OUTPATIENT
Start: 2020-04-24

## 2020-04-24 RX ORDER — OXYCODONE HYDROCHLORIDE 20 MG/1
20 TABLET ORAL EVERY 6 HOURS PRN
Qty: 100 TABLET | Refills: 0 | Status: SHIPPED | OUTPATIENT
Start: 2020-04-24

## 2020-04-24 RX ORDER — MORPHINE SULFATE 60 MG/1
60 TABLET, FILM COATED, EXTENDED RELEASE ORAL EVERY 12 HOURS
Qty: 60 TABLET | Refills: 0 | Status: SHIPPED | OUTPATIENT
Start: 2020-04-24

## 2020-04-24 NOTE — TELEPHONE ENCOUNTER
Patient made aware Dr. Jones will refill prescriptions this time however patient will need to follow up with pain management clinic.  Patient expresses understanding and agrees with plan of care.

## 2020-04-29 ENCOUNTER — INFUSION (OUTPATIENT)
Dept: ONCOLOGY | Facility: HOSPITAL | Age: 55
End: 2020-04-29

## 2020-04-29 ENCOUNTER — OFFICE VISIT (OUTPATIENT)
Dept: ONCOLOGY | Facility: CLINIC | Age: 55
End: 2020-04-29

## 2020-04-29 VITALS
HEART RATE: 108 BPM | DIASTOLIC BLOOD PRESSURE: 73 MMHG | BODY MASS INDEX: 27.88 KG/M2 | OXYGEN SATURATION: 90 % | RESPIRATION RATE: 18 BRPM | WEIGHT: 162.5 LBS | SYSTOLIC BLOOD PRESSURE: 102 MMHG | TEMPERATURE: 98.3 F

## 2020-04-29 VITALS
BODY MASS INDEX: 27.88 KG/M2 | SYSTOLIC BLOOD PRESSURE: 102 MMHG | RESPIRATION RATE: 18 BRPM | OXYGEN SATURATION: 90 % | DIASTOLIC BLOOD PRESSURE: 73 MMHG | WEIGHT: 162.5 LBS | HEART RATE: 108 BPM | TEMPERATURE: 98.3 F

## 2020-04-29 DIAGNOSIS — Z95.828 PORT-A-CATH IN PLACE: Primary | ICD-10-CM

## 2020-04-29 DIAGNOSIS — R53.83 FATIGUE, UNSPECIFIED TYPE: ICD-10-CM

## 2020-04-29 DIAGNOSIS — F41.9 ANXIETY: ICD-10-CM

## 2020-04-29 DIAGNOSIS — C21.0 ANAL CANCER (HCC): Primary | ICD-10-CM

## 2020-04-29 DIAGNOSIS — G89.3 NEOPLASM RELATED PAIN: ICD-10-CM

## 2020-04-29 LAB
ALBUMIN SERPL-MCNC: 3.71 G/DL (ref 3.5–5.2)
ALBUMIN/GLOB SERPL: 1.1 G/DL
ALP SERPL-CCNC: 70 U/L (ref 39–117)
ALT SERPL W P-5'-P-CCNC: 16 U/L (ref 1–33)
ANION GAP SERPL CALCULATED.3IONS-SCNC: 10.4 MMOL/L (ref 5–15)
AST SERPL-CCNC: 22 U/L (ref 1–32)
BASOPHILS # BLD AUTO: 0.02 10*3/MM3 (ref 0–0.2)
BASOPHILS NFR BLD AUTO: 0.3 % (ref 0–1.5)
BILIRUB SERPL-MCNC: 0.2 MG/DL (ref 0.2–1.2)
BUN BLD-MCNC: 9 MG/DL (ref 6–20)
BUN/CREAT SERPL: 19.6 (ref 7–25)
CALCIUM SPEC-SCNC: 9.1 MG/DL (ref 8.6–10.5)
CHLORIDE SERPL-SCNC: 102 MMOL/L (ref 98–107)
CO2 SERPL-SCNC: 27.6 MMOL/L (ref 22–29)
CREAT BLD-MCNC: 0.46 MG/DL (ref 0.57–1)
DEPRECATED RDW RBC AUTO: 56.4 FL (ref 37–54)
EOSINOPHIL # BLD AUTO: 0.05 10*3/MM3 (ref 0–0.4)
EOSINOPHIL NFR BLD AUTO: 0.7 % (ref 0.3–6.2)
ERYTHROCYTE [DISTWIDTH] IN BLOOD BY AUTOMATED COUNT: 16 % (ref 12.3–15.4)
GFR SERPL CREATININE-BSD FRML MDRD: 141 ML/MIN/1.73
GLOBULIN UR ELPH-MCNC: 3.3 GM/DL
GLUCOSE BLD-MCNC: 125 MG/DL (ref 65–99)
HCT VFR BLD AUTO: 48 % (ref 34–46.6)
HGB BLD-MCNC: 14.6 G/DL (ref 12–15.9)
IMM GRANULOCYTES # BLD AUTO: 0.04 10*3/MM3 (ref 0–0.05)
IMM GRANULOCYTES NFR BLD AUTO: 0.6 % (ref 0–0.5)
LYMPHOCYTES # BLD AUTO: 0.78 10*3/MM3 (ref 0.7–3.1)
LYMPHOCYTES NFR BLD AUTO: 11.3 % (ref 19.6–45.3)
MCH RBC QN AUTO: 29.1 PG (ref 26.6–33)
MCHC RBC AUTO-ENTMCNC: 30.4 G/DL (ref 31.5–35.7)
MCV RBC AUTO: 95.6 FL (ref 79–97)
MONOCYTES # BLD AUTO: 0.52 10*3/MM3 (ref 0.1–0.9)
MONOCYTES NFR BLD AUTO: 7.5 % (ref 5–12)
NEUTROPHILS # BLD AUTO: 5.5 10*3/MM3 (ref 1.7–7)
NEUTROPHILS NFR BLD AUTO: 79.6 % (ref 42.7–76)
NRBC BLD AUTO-RTO: 0 /100 WBC (ref 0–0.2)
PLATELET # BLD AUTO: 153 10*3/MM3 (ref 140–450)
PMV BLD AUTO: 10.9 FL (ref 6–12)
POTASSIUM BLD-SCNC: 4.1 MMOL/L (ref 3.5–5.2)
PROT SERPL-MCNC: 7 G/DL (ref 6–8.5)
RBC # BLD AUTO: 5.02 10*6/MM3 (ref 3.77–5.28)
SODIUM BLD-SCNC: 140 MMOL/L (ref 136–145)
TSH SERPL DL<=0.05 MIU/L-ACNC: 5.09 UIU/ML (ref 0.27–4.2)
WBC NRBC COR # BLD: 6.91 10*3/MM3 (ref 3.4–10.8)

## 2020-04-29 PROCEDURE — 99214 OFFICE O/P EST MOD 30 MIN: CPT | Performed by: INTERNAL MEDICINE

## 2020-04-29 PROCEDURE — 85025 COMPLETE CBC W/AUTO DIFF WBC: CPT | Performed by: INTERNAL MEDICINE

## 2020-04-29 PROCEDURE — 96523 IRRIG DRUG DELIVERY DEVICE: CPT

## 2020-04-29 PROCEDURE — 80053 COMPREHEN METABOLIC PANEL: CPT | Performed by: INTERNAL MEDICINE

## 2020-04-29 PROCEDURE — 25010000003 HEPARIN LOCK FLUSH PER 10 UNITS: Performed by: INTERNAL MEDICINE

## 2020-04-29 PROCEDURE — 84443 ASSAY THYROID STIM HORMONE: CPT | Performed by: INTERNAL MEDICINE

## 2020-04-29 RX ORDER — SODIUM CHLORIDE 0.9 % (FLUSH) 0.9 %
10 SYRINGE (ML) INJECTION AS NEEDED
Status: CANCELLED | OUTPATIENT
Start: 2020-06-10

## 2020-04-29 RX ORDER — SODIUM CHLORIDE 0.9 % (FLUSH) 0.9 %
10 SYRINGE (ML) INJECTION AS NEEDED
Status: DISCONTINUED | OUTPATIENT
Start: 2020-04-29 | End: 2020-04-29 | Stop reason: HOSPADM

## 2020-04-29 RX ORDER — HEPARIN SODIUM (PORCINE) LOCK FLUSH IV SOLN 100 UNIT/ML 100 UNIT/ML
500 SOLUTION INTRAVENOUS AS NEEDED
Status: CANCELLED | OUTPATIENT
Start: 2020-06-10

## 2020-04-29 RX ORDER — HEPARIN SODIUM (PORCINE) LOCK FLUSH IV SOLN 100 UNIT/ML 100 UNIT/ML
500 SOLUTION INTRAVENOUS AS NEEDED
Status: DISCONTINUED | OUTPATIENT
Start: 2020-04-29 | End: 2020-04-29 | Stop reason: HOSPADM

## 2020-04-29 RX ADMIN — SODIUM CHLORIDE, PRESERVATIVE FREE 10 ML: 5 INJECTION INTRAVENOUS at 13:36

## 2020-04-29 RX ADMIN — Medication 500 UNITS: at 13:37

## 2020-04-29 NOTE — PROGRESS NOTES
NAME: Liz Jiang    : 1965    DATE:  2020    DIAGNOSIS: Anal Cancer    TREATMENT:  1.        2. XRT w/ Ramona Campos MD    TREATMENT COURSE:   -2020 the whole pelvis received 30.6 Gy in 17 fractions using 6 MV photons   the upper border was reduced and 5.4 Gray in 3 fractions were delivered using 6 MV photons  -2/3/2020 the fields were further reduced off of the inguinal nodes and given 9 Gray in 5 fractions with 6 MV photons  -20 (includes treatment break) the tumor was boosted with an additional 9 Chakraborty in 5 fractions with 6 MV photons.      CHIEF COMPLAINT:  Follow up of Anal Cancer    HISTORY OF PRESENT ILLNESS:   Liz Jiang is a very pleasant 54 y.o. female who is being seen today at the request of No ref. provider found for evaluation and treatment of anal cancer. She initially presented to her PCP for constipation, rectal pain and a palpable mass x 6-7 months. After failing conservative treatment, she was recommended by a friend to see Dr. Wu. She had colonoscopy on on 19 during which a large anal mass was visualized. 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 Two benign polyps were also removed. She was referred to our clinic for discussion of further treatment options.     She has several complaints today. She reports gradually worsening anal/rectal pain, and says she feels the urge to defecate constantly. For pain, she is using Norco 10 every 6 hours.She has had nausea for several months that she has been taking Phenergan for. She reports fatigue and a weight loss of 16-18 pounds (which she has subsequently regained), insomnia, a history of headaches that are much more mild after previous CVA, baseline shortness of breath that she wears bipap and uses home O2 for. She also reports that she has decreased ROM to RUE following past CVA. She denies obvious blood in stool, chest  pain, abdominal pain, or any other complaint.     INTERVAL HISTORY:  Ms. Jiang is here today for follow up of anal cancer. She has seen Dr. Martinez back who noted there was a residual mass present, but it was much improved. He is planning for colonoscopy with biopsy of the mass as well as evaluation of prior site of polyp which was removed in piecemeal fashion by Dr. Wu with plan for future short interval colonoscopy.  This is planned for early July.  Her bowels are now moving well with  Miralax and chicken/beef bullion cubes BID. She reports nausea r/t sinus drainage but this is much better. She continues taking Lexapro and is tolerating it well. She has an upcoming appt with Dr. Mendoza on 5-19.  She contacted Dr. Paty Brian's office about returning there for treatment of her chronic non-neoplasm related pain and they have requested notes from our office.      PAST MEDICAL HISTORY:  Past Medical History:   Diagnosis Date   • Acid reflux disease    • Anal cancer (CMS/HCC) 12/5/2019   • Arthritis    • Asthma, extrinsic    • Cholelithiasis    • Chronic headaches    • COPD (chronic obstructive pulmonary disease) (CMS/HCC)    • CVA (cerebral vascular accident) (CMS/HCC)     w/ right sided deficit   • CVA (cerebral vascular accident) (CMS/HCC)    • Diabetes mellitus (CMS/HCC)     only has high blood sugar when on steriods   • History of radiation therapy 02/27/2020    Whole pelvis/anal mass   • Obstructive sleep apnea treated with BiPAP    • On home oxygen therapy     2L    • Sleep apnea, obstructive        PAST SURGICAL HISTORY:  Past Surgical History:   Procedure Laterality Date   • ABDOMINAL SURGERY     • CARDIAC CATHETERIZATION     • CAROTID ENDARTERECTOMY Left 2018   • CHOLECYSTECTOMY WITH INTRAOPERATIVE CHOLANGIOGRAM N/A 1/30/2017    Procedure: CHOLECYSTECTOMY LAPAROSCOPIC INTRAOPERATIVE CHOLANGIOGRAM;  Surgeon: Carolin Marie MD;  Location: [facility];  Service:    • COLONOSCOPY     • HYSTERECTOMY       for heavy bleeding/ complete   • KIDNEY STONE SURGERY     • TUBAL ABDOMINAL LIGATION     • VENOUS ACCESS DEVICE (PORT) INSERTION Left 2019    Procedure: INSERTION VENOUS ACCESS DEVICE;  Surgeon: Carolin Marie MD;  Location: [facility];  Service: General        FAMILY HISTORY:  Family History   Problem Relation Age of Onset   • Diabetes Mother    • Hypertension Mother    • Arrhythmia Mother    • Pneumonia Father    • Coronary artery disease Other    • Arrhythmia Sister    • Heart attack Brother    • Lymphoma Brother         hodgkin's    • Other Brother         CABG   • Breast cancer Neg Hx        SOCIAL HISTORY:  Social History     Socioeconomic History   • Marital status:      Spouse name: Not on file   • Number of children: Not on file   • Years of education: Not on file   • Highest education level: Not on file   Tobacco Use   • Smoking status: Former Smoker     Packs/day: 1.50     Years: 30.00     Pack years: 45.00     Types: Electronic Cigarette     Last attempt to quit: 2015     Years since quittin.3   • Smokeless tobacco: Never Used   Substance and Sexual Activity   • Alcohol use: No   • Drug use: No   • Sexual activity: Defer   Social History Narrative    Just retired     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          REVIEW OF SYSTEMS:   A comprehensive 14 point review of systems was performed.  Significant findings as mentioned above.  All other systems reviewed and are negative.      MEDICATIONS:  The current medication list was reviewed in the EMR    Current Outpatient Medications:   •  albuterol sulfate  (90 Base) MCG/ACT inhaler, Inhale 2 puffs Every 6 (Six) Hours As Needed for Wheezing or Shortness of Air., Disp: 18 g, Rfl: 0  •  ASPIRIN LOW  DOSE 81 MG EC tablet, Take 81 mg by mouth Daily., Disp: , Rfl: 3  •  clonazePAM (KlonoPIN) 0.5 MG tablet, Take 1 tablet by mouth 2 (Two) Times a Day As Needed for Anxiety., Disp: 60 tablet, Rfl: 0  •  clopidogrel (PLAVIX) 75 MG tablet, TAKE ONE TABLET BY MOUTH EVERY DAY FOR BLOOD THINNER, Disp: , Rfl: 3  •  diphenhydrAMINE in aluminum-magnesium hydroxide-simethicone suspension-nystatin-Lidocaine Viscous HCl solution, Swish and swallow 5 ml 4 times a day as needed, Disp: 240 mL, Rfl: 5  •  escitalopram (LEXAPRO) 20 MG tablet, Take 1 tablet by mouth Daily., Disp: 30 tablet, Rfl: 11  •  gabapentin (NEURONTIN) 800 MG tablet, Take 800 mg by mouth 3 (Three) Times a Day., Disp: , Rfl:   •  granisetron (SANCUSO) 3.1 MG/24HR, Place 1 patch on the skin as directed by provider 1 (One) Time Per Week., Disp: 4 patch, Rfl: 2  •  lidocaine-prilocaine (EMLA) 2.5-2.5 % cream, Apply to port site 30 mins prior to chemotherapy appointment, Disp: 30 g, Rfl: 5  •  linaclotide (LINZESS) 145 MCG capsule capsule, Take 1 capsule by mouth Every Morning Before Breakfast., Disp: 30 capsule, Rfl: 3  •  loratadine-pseudoephedrine (CLARITIN-D 24 HOUR)  MG per 24 hr tablet, Take 1 tablet by mouth Daily., Disp: 30 tablet, Rfl: 5  •  LORazepam (ATIVAN) 0.5 MG tablet, Take 1 tablet by mouth Every 8 (Eight) Hours As Needed for Anxiety., Disp: 60 tablet, Rfl: 0  •  LORazepam (ATIVAN) 0.5 MG tablet, Take 1 tablet by mouth Every 8 (Eight) Hours As Needed for anxiety., Disp: 60 tablet, Rfl: 0  •  Magic mouthwash Radonc, Swish and swallow 10 mL 4 (Four) Times a Day Before Meals & at Bedtime As Needed., Disp: 480 mL, Rfl: 3  •  Morphine (MS CONTIN) 60 MG 12 hr tablet, Take 1 tablet by mouth Every 12 (Twelve) Hours., Disp: 60 tablet, Rfl: 0  •  Oral Wound Care Products (MUGARD) liquid, Apply 5 mL to the mouth or throat 5 (Five) Times a Day As Needed (for oral pain)., Disp: 240 mL, Rfl: 2  •  Oral Wound Care Products (MUGARD) liquid, APPLY 5 MLS TO THE  MOUTH OR THROAT 5 TIMES A DAY AS NEEDED FOR ORAL PAIN, Disp: 240 mL, Rfl: 2  •  oxyCODONE (ROXICODONE) 10 MG tablet, Take 1-2 tabs every 4-6 hours as needed for pain, Disp: 150 tablet, Rfl: 0  •  oxyCODONE (ROXICODONE) 20 MG tablet, Take 1 tablet by mouth Every 6 (Six) Hours As Needed for Severe Pain ., Disp: 100 tablet, Rfl: 0  •  phenazopyridine (PYRIDIUM) 200 MG tablet, Take 1 tablet by mouth 3 (Three) Times a Day As Needed for Bladder Spasms., Disp: 90 tablet, Rfl: 3  •  polyethylene glycol (GAVILAX) powder, Take 17 g by mouth 2 (Two) Times a Day., Disp: 850 g, Rfl: 4  •  polyethylene glycol (MIRALAX) powder, MIX 17 GRAMS OF POWDER IN 8 OUNCES OF WATER OR JUICE AND TAKE BY MOUTH TWICE A DAY., Disp: 1020 g, Rfl: 4  •  potassium chloride (K-DUR,KLOR-CON) 10 MEQ CR tablet, Take 4 tablets by mouth Daily., Disp: 4 tablet, Rfl: 0  •  predniSONE (DELTASONE) 5 MG tablet, Take 5 mg by mouth Daily. Take one tab by mouth ever other day alternating with 1/2 tab, Disp: , Rfl:   •  prochlorperazine (COMPAZINE) 10 MG tablet, Take 1 tablet by mouth Every 6 (Six) Hours As Needed for Nausea or Vomiting., Disp: 60 tablet, Rfl: 3  •  promethazine (PHENERGAN) 25 MG tablet, Take 25 mg by mouth Every 6 (Six) Hours As Needed for Nausea or Vomiting., Disp: , Rfl:   •  sennosides-docusate (Senna-S) 8.6-50 MG per tablet, Take 2 tablets by mouth 2 (Two) Times a Day., Disp: 120 tablet, Rfl: 5  •  silver sulfadiazine (SILVADENE, SSD) 1 % cream, Apply  topically to the appropriate area as directed 2 (Two) Times a Day. Apply to affected area and keep covered., Disp: 400 g, Rfl: 5  •  SYMBICORT 160-4.5 MCG/ACT inhaler, USE 2 PUFFS BY MOUTH TWO TIMES A DAY RINSE MOUTH WITH WATER AFTER USE FOR BREATHING, Disp: 10.2 g, Rfl: 0  •  tiZANidine (ZANAFLEX) 4 MG tablet, 4 mg Every 6 (Six) Hours As Needed., Disp: , Rfl: 0  •  Umeclidinium Bromide 62.5 MCG/INH aerosol powder , Inhale 1 puff Daily., Disp: 30 each, Rfl: 8    ALLERGIES:    No Known  Allergies    PHYSICAL EXAM:  Vitals:    04/29/20 1240   BP: 102/73   Pulse: 108   Resp: 18   Temp: 98.3 °F (36.8 °C)   SpO2: 90%     Pain Score    04/29/20 1240   PainSc:   2     General:  Awake, alert and oriented.  Appears well today.    HEENT:  Pupils are equal, round and reactive to light and accommodation, Extra-ocular movements full, mucous membranes are clear   Neck:  No JVD, thyromegaly or lymphadenopathy  CV:  Regular tachycardia just over 100 bpm, no murmurs, rubs or gallops  Resp:  Lungs are generally clear to auscultation bilaterally with a few scattered wheezes.  Abd:  Soft, non-tender, non distended, bowel sounds present, no organomegaly or masses  Ext:  No clubbing, cyanosis or edema  Lymph:  No cervical, supraclavicular, axillary,adenopathy  Neuro:  grossly non-focal exam    PATHOLOGY:  11-01-19          ENDOSCOPY:  C-scope (Jazmin) 11-01-19          IMAGING:  MRI Abdomen w/wo contrast 11-06-19   FINDINGS:   Today's study shows no definitive colonic mass.   Particularly I do not see any contrast-enhancing abnormalities on the presented images.  No adenopathy in the imaged area.  Sigmoid colon wall does appear to be slightly thickened but no adjacent fluid.  No walled off fluid collections.     IMPRESSION:  No contrast-enhancing abnormalities identified.     CTCAP 11-27-19  FINDINGS: Today's study shows evidence of COPD.     There are no parenchymal nodules or masses.     No pericardial or pleural effusions.     No mediastinal or hilar lymph node enlargement.     IMPRESSION:  No evidence of metastatic disease to the chest.    FINDINGS:   The lung bases are clear. There are no pleural effusions.     The liver is homogeneous. There is no evidence of focal hepatic mass     The spleen is homogeneous     There is no peripancreatic stranding or pancreatic head mass.     There is no adrenal enlargement.     There is a nonobstructing right intrarenal stone..      Otherwise I do not see any free fluid or  walled off fluid collections.     There is diffuse colonic wall thickening.      IMPRESSION:  1. Diffuse colonic wall thickening.  2. Nonobstructing right intrarenal stone.  3. No evidence of metastatic disease to the abdomen or pelvis.      CTAP 03-12-20  FINDINGS:   The lung bases are clear. There are no pleural effusions.     The liver is homogeneous. There is no evidence of focal hepatic mass     The spleen is homogeneous     There is no peripancreatic stranding or pancreatic head mass.     Tiny left adrenal nodule is present.     There are small nonobstructing stones in both kidneys..      Otherwise I do not see any free fluid or walled off fluid collections.     Large volume stool in the colon and rectum. The rectum is distended.     There is no evidence of mesenteric or retroperitoneal adenopathy           IMPRESSION:  :   1. Nonobstructing intrarenal stones  2. Large volume stool in the colon and rectum. Rectal distention.  3. Tiny left adrenal nodule      RECENT LABS:  Lab Results   Component Value Date    WBC 9.90 03/12/2020    HGB 15.0 03/12/2020    HCT 49.0 (H) 03/12/2020    .4 (H) 03/12/2020    RDW 19.2 (H) 03/12/2020     03/12/2020    NEUTRORELPCT 83.3 (H) 03/12/2020    LYMPHORELPCT 7.2 (L) 03/12/2020    MONORELPCT 7.0 03/12/2020    EOSRELPCT 1.4 03/12/2020    BASORELPCT 0.4 03/12/2020    NEUTROABS 8.25 (H) 03/12/2020    LYMPHSABS 0.71 03/12/2020       Lab Results   Component Value Date     03/12/2020    K 3.3 (L) 03/12/2020    CO2 30.5 (H) 03/12/2020    CL 97 (L) 03/12/2020    BUN 10 03/12/2020    CREATININE 0.41 (L) 03/12/2020    EGFRIFNONA >150 03/12/2020    GLUCOSE 106 (H) 03/12/2020    CALCIUM 8.9 03/12/2020    ALKPHOS 76 03/12/2020    AST 16 03/12/2020    ALT 12 03/12/2020    BILITOT 0.4 03/12/2020    ALBUMIN 3.69 03/12/2020    PROTEINTOT 6.9 03/12/2020    MG 1.9 03/12/2020    PHOS 3.0 03/27/2017       Lab Results   Component Value Date    FERRITIN 197.40 (H) 11/19/2019     IRON 43 11/19/2019    TIBC 437 11/19/2019    LABIRON 10 (L) 11/19/2019    VLGDFAXV92 473 11/19/2019    FOLATE 9.32 11/19/2019       ASSESSMENT & PLAN:  Liz Jiang is a very pleasant 54 y.o. female with newly diagnosed anal cancer.    1.  Anal cancer:  -  Clinically had Stage IIIB disease ([id number]) with invasion of the rectovaginal septum.  -  At the time of diagnosis, she had  gynecologic examination without cause for concern.    -  Pre-treatment CT CAP and MRI Pelviswere  unremarkable.  No evidence of metastatic disease. No notable adenopathy.  -  Recommended concurrent definitive chemoradiation with Mitomycin/5-FU.  -  She saw Dr. Martinez who was in agreement with this plan.  -  She saw Dr. Campos for radiation and completed treatment on 2-27-20.    -  Dr. Martinez plans for colonoscopy in early July.  Will plan to repeat CT CAP in 3 months also with particular attention to adrenal nodule noted on recent CT imaging.    2. Pain:  -  She has a h/o chronic back pain previously managed by pain management physician, Dr. Paty Brian.  - We took over her pain management during her cancer treatment with plan to transition back to her pain management physician after her cancer treatment is completed.  Since her treatment is now complete, will return her to Dr. Brian.  She was taking MS Contin 60 mg q12h with Oxycodone 20 mg  q 3-4 hrs.  She has cut back Oxycodone to 20 mg q 6h prn.      3. Severe constipation / obstipation and nausea:  - Resolved.  Using Linzess and Bouillon cubes and says her bowels are moving well.  She has c-scope planned for early July.    4.  Anxiety: This has been a chronic problem for her and she continues to struggle, but is doing better now that she is more physically well.  Currently taking Lexapro 20 mg daily along with Ativan BID prn anxiety.  She has been referred to psychiatry and has an appt there on 5-19-20 at 9:30 AM.    5. Nausea : Resolved.    6.  Prophylaxis:  Has had Pneumovax,  Prevnar 13 and 2019 flu vaccine.       8.  ACO / SAÚL/Other  Quality measures  -  Liz Jiang received 2019 flu vaccine.  -  Liz Jiang reports a pain score of 7 .  Given her pain assessment as noted, treatment options were discussed and the following options were decided upon as a follow-up plan to address the patient's pain: Referral back to Dr. Brian for non-neoplasm-related pain..   -  Current outpatient and discharge medications have been reconciled for the patient.    Reviewed by: Deanne Jones MD       9. Follow up:   -  PAC flush and labwork today.  PAC flush in 6 weeks.  -  RTC to see me in 3 months with CT CAP as well as labwork prior.  C-scope planned with Dr. Martinez in early July.      This note was scribed for Deanne Jones MD by Gricel Howell RN.    I, Deanne Jones MD, personally performed the services described in this documentation as scribed by the above named individual in my presence, and it is both accurate and complete.  04/29/2020        I spent 30 minutes with Liz Jiang today.  In the office today, more than 50% of this time was spent face-to-face with her  in counseling / coordination of care, reviewing her medical history and counseling on the current treatment plan.  All questions were answered to her satisfaction      Electronically Signed by: Deanne Jones MD       CC:   Mayte Davis APRN Muhammed Iqbal, MD John Dvorak, MD Marta Hayne, MD Michael Simons, MD Barbara Michna, MD Jennifer Green, MD, [place], [place]

## 2020-04-30 DIAGNOSIS — C21.0 ANAL CANCER (HCC): Primary | ICD-10-CM

## 2020-04-30 RX ORDER — LEVOTHYROXINE SODIUM 0.03 MG/1
25 TABLET ORAL DAILY
Qty: 30 TABLET | Refills: 5 | Status: SHIPPED | OUTPATIENT
Start: 2020-04-30 | End: 2020-12-11 | Stop reason: SDUPTHER

## 2020-05-27 ENCOUNTER — OFFICE VISIT (OUTPATIENT)
Dept: RADIATION ONCOLOGY | Facility: HOSPITAL | Age: 55
End: 2020-05-27

## 2020-05-27 ENCOUNTER — HOSPITAL ENCOUNTER (OUTPATIENT)
Dept: RADIATION ONCOLOGY | Facility: HOSPITAL | Age: 55
Setting detail: RADIATION/ONCOLOGY SERIES
Discharge: HOME | End: 2020-05-27

## 2020-05-27 VITALS
WEIGHT: 168.5 LBS | OXYGEN SATURATION: 90 % | TEMPERATURE: 98 F | RESPIRATION RATE: 20 BRPM | BODY MASS INDEX: 28.9 KG/M2 | SYSTOLIC BLOOD PRESSURE: 134 MMHG | HEART RATE: 110 BPM | DIASTOLIC BLOOD PRESSURE: 67 MMHG

## 2020-05-27 DIAGNOSIS — C21.0 ANAL CANCER (HCC): Primary | ICD-10-CM

## 2020-05-27 PROCEDURE — G0463 HOSPITAL OUTPT CLINIC VISIT: HCPCS

## 2020-05-27 RX ORDER — BUPROPION HYDROCHLORIDE 150 MG/1
TABLET ORAL
COMMUNITY
Start: 2020-05-21

## 2020-05-27 RX ORDER — FUROSEMIDE 20 MG/1
TABLET ORAL
COMMUNITY
Start: 2020-05-21

## 2020-05-27 RX ORDER — MONTELUKAST SODIUM 10 MG/1
TABLET ORAL
COMMUNITY
Start: 2020-05-21

## 2020-05-27 RX ORDER — ONDANSETRON HYDROCHLORIDE 8 MG/1
TABLET, FILM COATED ORAL
COMMUNITY
Start: 2020-05-21

## 2020-05-27 NOTE — PROGRESS NOTES
FOLLOW UP NOTE    PATIENT:                                                      Liz Jiang  MEDICAL RECORD #:                        [id number]  :                                                          1965  COMPLETION DATE:    2020  DIAGNOSIS:     Anal canal cancer      BRIEF HISTORY:    Liz Jiang  is a very pleasant 53 y.o. female  who presented to her primary care physician with constipation, rectal pain and a palpable mass.  Colonoscopy on 2019 revealed a large anal mass biopsy positive for invasive poorly differentiated squamous cell carcinoma.  Per Dr. Muir's colonoscopy report the patient had a very firm nodular submucosal lesion occupying the anterior rectum, the anal sphincter and extending to apparently the rectovaginal septum.    CT of the chest was negative for metastatic disease.  She had had increasing anorectal pain and felt the urge to defecate constantly.  She was taking Phenergan for nausea and Norco for pain and had had a 16 to 18 pound weight loss. She has had a CVA in the past and has decreased range of motion of the right upper extremity, wears BiPAP and uses  Oxygen at home.  Dr. Jones gave her concurrent chemotherapy of mitomycin and fluorouracil. She received radiotherapy to the whole pelvis of 30.6 Gy, the upper border was reduced and 5.4 Gray in 3 fractions were delivered, the fields were further reduced off of the inguinal nodes and given 9 Gray in 5 fractions.  The tumor was boosted with an additional 9 Gray in 5 fractions.    She is doing well.  She said the skin has healed well.  She has an appoint with her pain management doctor tomorrow and she is scheduled to see Dr. Martinez for colonoscopy and biopsy of residual mass.    MEDICATIONS: Medication reconciliation for the patient was reviewed and confirmed in the electronic medical record.    Review of Systems   Constitutional: Positive for fatigue.   Respiratory: Positive for chest tightness, cough and  shortness of breath.         Pt on continuous oxygen   Gastrointestinal: Positive for constipation.   All other systems reviewed and are negative.      KPS 80%    Physical Exam   Constitutional: She appears well-developed and well-nourished.   Cardiovascular: Regular rhythm.   She is tachycardic.   Pulmonary/Chest: No stridor. No respiratory distress. She has no wheezes. She has rales.   She has oxygen per nasal cannula and shortness of breath that is stable   Skin:   The skin of the treatment area is well-healed.  There is no fibrosis or telangiectasia.   Nursing note and vitals reviewed.      VITAL SIGNS:   Vitals:    05/27/20 1502   BP: 134/67   Pulse: 110   Resp: 20   Temp: 98 °F (36.7 °C)   TempSrc: Temporal   SpO2: 90%  Comment: @ 2 liters o pulse O2   Weight: 76.4 kg (168 lb 8 oz)   PainSc: 0-No pain       The following portions of the patient's history were reviewed and updated as appropriate: allergies, current medications, past family history, past medical history, past social history, past surgical history and problem list.         There are no diagnoses linked to this encounter.     IMPRESSION: Ms. Jiang has recovered well from acute side effects of chemoradiotherapy for anal canal cancer  RECOMMENDATIONS: Ms. Jiang will see Dr. Martinez for colonoscopy and biopsy.  We will see her back in our department as needed.  It has been a pleasure to participate in her care.  She knows to call at anytime she has any questions or concerns    Return for PRN.    Ramona Campos MD    Errors in dictation may reflect use of voice recognition software and not all errors in transcription may have been detected prior to signing.

## 2020-06-02 ENCOUNTER — APPOINTMENT (OUTPATIENT)
Dept: GENERAL RADIOLOGY | Facility: HOSPITAL | Age: 55
End: 2020-06-02

## 2020-06-02 ENCOUNTER — HOSPITAL ENCOUNTER (EMERGENCY)
Facility: HOSPITAL | Age: 55
Discharge: HOME | End: 2020-06-02
Attending: EMERGENCY MEDICINE | Admitting: EMERGENCY MEDICINE

## 2020-06-02 VITALS
SYSTOLIC BLOOD PRESSURE: 122 MMHG | HEART RATE: 98 BPM | HEIGHT: 64 IN | DIASTOLIC BLOOD PRESSURE: 76 MMHG | TEMPERATURE: 97.9 F | RESPIRATION RATE: 18 BRPM | OXYGEN SATURATION: 93 % | BODY MASS INDEX: 28 KG/M2 | WEIGHT: 164 LBS

## 2020-06-02 DIAGNOSIS — K59.03 DRUG-INDUCED CONSTIPATION: Primary | ICD-10-CM

## 2020-06-02 PROCEDURE — 25010000002 HYDROMORPHONE PER 4 MG: Performed by: EMERGENCY MEDICINE

## 2020-06-02 PROCEDURE — 96372 THER/PROPH/DIAG INJ SC/IM: CPT

## 2020-06-02 PROCEDURE — 99283 EMERGENCY DEPT VISIT LOW MDM: CPT

## 2020-06-02 PROCEDURE — 25010000002 METHYLNALTREXONE 12 MG/0.6ML SOLUTION: Performed by: PHYSICIAN ASSISTANT

## 2020-06-02 PROCEDURE — 25010000002 ONDANSETRON PER 1 MG: Performed by: PHYSICIAN ASSISTANT

## 2020-06-02 PROCEDURE — 96375 TX/PRO/DX INJ NEW DRUG ADDON: CPT

## 2020-06-02 PROCEDURE — 96374 THER/PROPH/DIAG INJ IV PUSH: CPT

## 2020-06-02 PROCEDURE — 74018 RADEX ABDOMEN 1 VIEW: CPT

## 2020-06-02 RX ORDER — HYDROMORPHONE HYDROCHLORIDE 1 MG/ML
0.5 INJECTION, SOLUTION INTRAMUSCULAR; INTRAVENOUS; SUBCUTANEOUS ONCE
Status: COMPLETED | OUTPATIENT
Start: 2020-06-02 | End: 2020-06-02

## 2020-06-02 RX ORDER — SODIUM CHLORIDE 0.9 % (FLUSH) 0.9 %
10 SYRINGE (ML) INJECTION AS NEEDED
Status: DISCONTINUED | OUTPATIENT
Start: 2020-06-02 | End: 2020-06-03 | Stop reason: HOSPADM

## 2020-06-02 RX ORDER — ONDANSETRON 2 MG/ML
4 INJECTION INTRAMUSCULAR; INTRAVENOUS ONCE
Status: COMPLETED | OUTPATIENT
Start: 2020-06-02 | End: 2020-06-02

## 2020-06-02 RX ADMIN — ONDANSETRON 4 MG: 2 INJECTION INTRAMUSCULAR; INTRAVENOUS at 21:44

## 2020-06-02 RX ADMIN — HYDROMORPHONE HYDROCHLORIDE 0.5 MG: 1 INJECTION, SOLUTION INTRAMUSCULAR; INTRAVENOUS; SUBCUTANEOUS at 21:45

## 2020-06-02 RX ADMIN — METHYLNALTREXONE BROMIDE 12 MG: 12 INJECTION, SOLUTION SUBCUTANEOUS at 22:20

## 2020-06-02 RX ADMIN — SODIUM CHLORIDE 1000 ML: 9 INJECTION, SOLUTION INTRAVENOUS at 21:48

## 2020-06-03 NOTE — ED PROVIDER NOTES
Subjective     History provided by:  Patient   used: No    Constipation   Severity:  Moderate  Time since last bowel movement:  4 days  Timing:  Constant  Progression:  Worsening  Context: dehydration and narcotics    Context comment:  Sedentary lifestyle  Stool description:  None produced  Relieved by:  Nothing  Worsened by:  Nothing  Ineffective treatments:  Enemas, fiber, laxatives, Miralax and stool softeners  Associated symptoms: nausea    Associated symptoms: no abdominal pain, no back pain, no dysuria, no fever and no vomiting    Risk factors: no obesity        Review of Systems   Constitutional: Negative.  Negative for fever.   HENT: Negative.    Respiratory: Negative.    Cardiovascular: Negative.  Negative for chest pain.   Gastrointestinal: Positive for constipation and nausea. Negative for abdominal pain and vomiting.   Endocrine: Negative.    Genitourinary: Negative.  Negative for dysuria.   Musculoskeletal: Negative for back pain.   Skin: Negative.    Neurological: Negative.    Psychiatric/Behavioral: Negative.    All other systems reviewed and are negative.      Past Medical History:   Diagnosis Date   • Acid reflux disease    • Anal cancer (CMS/HCC) 12/5/2019   • Arthritis    • Asthma, extrinsic    • Cholelithiasis    • Chronic headaches    • COPD (chronic obstructive pulmonary disease) (CMS/[facility])    • CVA (cerebral vascular accident) (CMS/HCC)     w/ right sided deficit   • CVA (cerebral vascular accident) (CMS/[facility])    • Diabetes mellitus (CMS/[facility])     only has high blood sugar when on steriods   • History of radiation therapy 02/27/2020    Whole pelvis/anal mass   • Obstructive sleep apnea treated with BiPAP    • On home oxygen therapy     2L    • Sleep apnea, obstructive        No Known Allergies    Past Surgical History:   Procedure Laterality Date   • ABDOMINAL SURGERY     • CARDIAC CATHETERIZATION     • CAROTID ENDARTERECTOMY Left 2018   • CHOLECYSTECTOMY WITH INTRAOPERATIVE  CHOLANGIOGRAM N/A 2017    Procedure: CHOLECYSTECTOMY LAPAROSCOPIC INTRAOPERATIVE CHOLANGIOGRAM;  Surgeon: Carolin Marie MD;  Location: [facility] OR;  Service:    • COLONOSCOPY     • HYSTERECTOMY      for heavy bleeding/ complete   • KIDNEY STONE SURGERY     • TUBAL ABDOMINAL LIGATION     • VENOUS ACCESS DEVICE (PORT) INSERTION Left 2019    Procedure: INSERTION VENOUS ACCESS DEVICE;  Surgeon: Carolin Marie MD;  Location: [facility] OR;  Service: General       Family History   Problem Relation Age of Onset   • Diabetes Mother    • Hypertension Mother    • Arrhythmia Mother    • Pneumonia Father    • Coronary artery disease Other    • Arrhythmia Sister    • Heart attack Brother    • Lymphoma Brother         hodgkin's    • Other Brother         CABG   • Breast cancer Neg Hx        Social History     Socioeconomic History   • Marital status:      Spouse name: Not on file   • Number of children: Not on file   • Years of education: Not on file   • Highest education level: Not on file   Tobacco Use   • Smoking status: Former Smoker     Packs/day: 1.50     Years: 30.00     Pack years: 45.00     Types: Electronic Cigarette     Last attempt to quit:      Years since quittin.4   • Smokeless tobacco: Never Used   Substance and Sexual Activity   • Alcohol use: No   • Drug use: No   • Sexual activity: Defer   Social History Narrative    Just retired     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            Objective   Physical Exam   Constitutional: She is oriented to person, place, and time. She appears well-developed and well-nourished. No distress.   HENT:   Head: Normocephalic and atraumatic.   Right Ear: External ear normal.   Left Ear: External ear normal.   Nose: Nose  normal.   Eyes: Pupils are equal, round, and reactive to light. Conjunctivae and EOM are normal.   Neck: Normal range of motion. Neck supple. No JVD present. No tracheal deviation present.   Cardiovascular: Normal rate, regular rhythm and normal heart sounds.   No murmur heard.  Pulmonary/Chest: Effort normal and breath sounds normal. No respiratory distress. She has no wheezes.   Abdominal: Soft. Bowel sounds are normal. There is tenderness.   Musculoskeletal: Normal range of motion. She exhibits no edema or deformity.   Neurological: She is alert and oriented to person, place, and time. No cranial nerve deficit.   Skin: Skin is warm and dry. No rash noted. She is not diaphoretic. No erythema. No pallor.   Psychiatric: She has a normal mood and affect. Her behavior is normal. Thought content normal.   Nursing note and vitals reviewed.      Procedures           ED Course  ED Course as of Jun 02 2320   Tue Jun 02, 2020 2204 IMPRESSION:   Moderate stool burden. Otherwise, negative KUB.    Signer Name: Herve Sorto MD  Signed: 6/2/2020 9:40 PM  Workstation Name: [email or web address]   [facility]   XR Abdomen KUB [TK]   2317 Pt had large BM, she feels relieved and requesting to be discharged.    [TK]      ED Course User Index  [TK] Casper Gutierrez PA-C                                           MDM  Number of Diagnoses or Management Options  Drug-induced constipation: new and requires workup     Amount and/or Complexity of Data Reviewed  Tests in the radiology section of CPT®: reviewed and ordered    Risk of Complications, Morbidity, and/or Mortality  Presenting problems: moderate  Diagnostic procedures: moderate  Management options: moderate    Patient Progress  Patient progress: stable      Final diagnoses:   Drug-induced constipation            aCsper Gutierrez PA-C  06/02/20 9164

## 2020-06-09 ENCOUNTER — TELEPHONE (OUTPATIENT)
Dept: ONCOLOGY | Facility: HOSPITAL | Age: 55
End: 2020-06-09

## 2020-06-10 ENCOUNTER — APPOINTMENT (OUTPATIENT)
Dept: ONCOLOGY | Facility: HOSPITAL | Age: 55
End: 2020-06-10

## 2020-06-17 ENCOUNTER — TELEPHONE (OUTPATIENT)
Dept: ONCOLOGY | Facility: HOSPITAL | Age: 55
End: 2020-06-17

## 2020-06-17 NOTE — TELEPHONE ENCOUNTER
Called pt to preform COVID-19 screening. Pt states she is unable to make it to her appointment for port flush tomorrow 06/18/20 due to another conflicting appointment. Port flush re-scheduled for Monday 07/22/20 at 12:00. Pt aware of new appointment date & time. Pt verbalizes understanding without any further questions or concerns at this time.

## 2020-06-18 ENCOUNTER — APPOINTMENT (OUTPATIENT)
Dept: ONCOLOGY | Facility: HOSPITAL | Age: 55
End: 2020-06-18

## 2020-06-19 ENCOUNTER — TELEPHONE (OUTPATIENT)
Dept: ONCOLOGY | Facility: HOSPITAL | Age: 55
End: 2020-06-19

## 2020-06-22 ENCOUNTER — INFUSION (OUTPATIENT)
Dept: ONCOLOGY | Facility: HOSPITAL | Age: 55
End: 2020-06-22

## 2020-06-22 VITALS
WEIGHT: 163.8 LBS | TEMPERATURE: 97.2 F | SYSTOLIC BLOOD PRESSURE: 123 MMHG | RESPIRATION RATE: 18 BRPM | BODY MASS INDEX: 28.12 KG/M2 | DIASTOLIC BLOOD PRESSURE: 77 MMHG | HEART RATE: 120 BPM | OXYGEN SATURATION: 92 %

## 2020-06-22 DIAGNOSIS — Z95.828 PORT-A-CATH IN PLACE: Primary | ICD-10-CM

## 2020-06-22 PROCEDURE — 96523 IRRIG DRUG DELIVERY DEVICE: CPT

## 2020-06-22 PROCEDURE — 25010000003 HEPARIN LOCK FLUSH PER 10 UNITS: Performed by: INTERNAL MEDICINE

## 2020-06-22 RX ORDER — HEPARIN SODIUM (PORCINE) LOCK FLUSH IV SOLN 100 UNIT/ML 100 UNIT/ML
500 SOLUTION INTRAVENOUS AS NEEDED
OUTPATIENT
Start: 2020-06-22

## 2020-06-22 RX ORDER — SODIUM CHLORIDE 0.9 % (FLUSH) 0.9 %
10 SYRINGE (ML) INJECTION AS NEEDED
Status: DISCONTINUED | OUTPATIENT
Start: 2020-06-22 | End: 2020-06-22 | Stop reason: HOSPADM

## 2020-06-22 RX ORDER — SODIUM CHLORIDE 0.9 % (FLUSH) 0.9 %
10 SYRINGE (ML) INJECTION AS NEEDED
OUTPATIENT
Start: 2020-06-22

## 2020-06-22 RX ORDER — HEPARIN SODIUM (PORCINE) LOCK FLUSH IV SOLN 100 UNIT/ML 100 UNIT/ML
500 SOLUTION INTRAVENOUS AS NEEDED
Status: DISCONTINUED | OUTPATIENT
Start: 2020-06-22 | End: 2020-06-22 | Stop reason: HOSPADM

## 2020-06-22 RX ADMIN — SODIUM CHLORIDE, PRESERVATIVE FREE 10 ML: 5 INJECTION INTRAVENOUS at 12:45

## 2020-06-22 RX ADMIN — SODIUM CHLORIDE, PRESERVATIVE FREE 500 UNITS: 5 INJECTION INTRAVENOUS at 12:45

## 2020-07-17 ENCOUNTER — APPOINTMENT (OUTPATIENT)
Dept: CT IMAGING | Facility: HOSPITAL | Age: 55
End: 2020-07-17

## 2020-07-20 ENCOUNTER — OFFICE VISIT (OUTPATIENT)
Dept: PULMONOLOGY | Facility: CLINIC | Age: 55
End: 2020-07-20

## 2020-07-20 VITALS
WEIGHT: 168 LBS | DIASTOLIC BLOOD PRESSURE: 70 MMHG | HEART RATE: 115 BPM | HEIGHT: 64 IN | BODY MASS INDEX: 28.68 KG/M2 | OXYGEN SATURATION: 90 % | SYSTOLIC BLOOD PRESSURE: 110 MMHG

## 2020-07-20 DIAGNOSIS — J43.2 CENTRILOBULAR EMPHYSEMA (HCC): ICD-10-CM

## 2020-07-20 DIAGNOSIS — J30.89 OTHER ALLERGIC RHINITIS: ICD-10-CM

## 2020-07-20 DIAGNOSIS — R09.02 HYPOXIA: ICD-10-CM

## 2020-07-20 DIAGNOSIS — Z78.9 ELECTRONIC CIGARETTE USE: ICD-10-CM

## 2020-07-20 DIAGNOSIS — R06.02 SHORTNESS OF BREATH: Primary | ICD-10-CM

## 2020-07-20 DIAGNOSIS — Z87.891 PERSONAL HISTORY OF TOBACCO USE, PRESENTING HAZARDS TO HEALTH: ICD-10-CM

## 2020-07-20 DIAGNOSIS — J44.9 CHRONIC OBSTRUCTIVE PULMONARY DISEASE, UNSPECIFIED COPD TYPE (HCC): ICD-10-CM

## 2020-07-20 PROCEDURE — 99214 OFFICE O/P EST MOD 30 MIN: CPT | Performed by: INTERNAL MEDICINE

## 2020-07-20 RX ORDER — BUDESONIDE AND FORMOTEROL FUMARATE DIHYDRATE 160; 4.5 UG/1; UG/1
2 AEROSOL RESPIRATORY (INHALATION)
Qty: 10.2 G | Refills: 8 | Status: SHIPPED | OUTPATIENT
Start: 2020-07-20

## 2020-07-20 RX ORDER — FLUNISOLIDE 0.25 MG/ML
1 SOLUTION NASAL EVERY 12 HOURS
Qty: 1 BOTTLE | Refills: 8 | Status: SHIPPED | OUTPATIENT
Start: 2020-07-20

## 2020-07-20 RX ORDER — IPRATROPIUM BROMIDE AND ALBUTEROL SULFATE 2.5; .5 MG/3ML; MG/3ML
3 SOLUTION RESPIRATORY (INHALATION) 4 TIMES DAILY PRN
Qty: 360 ML | Refills: 7 | Status: SHIPPED | OUTPATIENT
Start: 2020-07-20

## 2020-07-20 RX ORDER — HYDROCODONE BITARTRATE AND ACETAMINOPHEN 10; 325 MG/1; MG/1
TABLET ORAL
COMMUNITY
Start: 2020-06-18

## 2020-07-20 RX ORDER — FLUNISOLIDE 0.25 MG/ML
SOLUTION NASAL
COMMUNITY
Start: 2020-06-18 | End: 2020-07-20 | Stop reason: SDUPTHER

## 2020-07-20 NOTE — PROGRESS NOTES
"Chief Complaint   Patient presents with   • Follow-up   • Shortness of Breath       Subjective   Liz Jiang is a 54 y.o. female.     History of Present Illness   Patient comes today for follow up of shortness of breath, chronic aspiratory failure, hypoxia and COPD.      Patient says that her symptoms have been stable since the last clinic visit. she reports 1 recent exacerbation.      Patient is using medications, as prescribed. Exercise tolerance has also remained stable.      The patient is using her BiPAP and is not having any issues with it.     Patient is also complaining of hoarseness as well as sore throat and change in taste. Patient also notes burning sensation on the tongue for the past few days.     The patient was advised to continue oxygen 24/7, since she has noticed improvement in some symptoms since using it as prescribed.      The following portions of the patient's history were reviewed and updated as appropriate: allergies, current medications, past family history, past medical history, past social history and past surgical history.    Review of Systems   Constitutional: Negative for activity change, appetite change and fatigue.   Respiratory: Positive for cough, shortness of breath and wheezing.    Cardiovascular: Negative for chest pain, palpitations and leg swelling.       Objective   Visit Vitals  /70 (BP Location: Left arm, Patient Position: Sitting, Cuff Size: Large Adult)   Pulse 115   Ht 162.6 cm (64\")   Wt 76.2 kg (168 lb)   SpO2 90%   BMI 28.84 kg/m²     Physical Exam   Constitutional: She is oriented to person, place, and time. She appears well-developed and well-nourished.   HENT:   Head: Normocephalic and atraumatic.   Eyes: EOM are normal.   Neck: Normal range of motion. No JVD present. No thyromegaly present.   Cardiovascular: Normal rate.   Port-A-Cath in place.   Pulmonary/Chest: No respiratory distress. She has wheezes (end expiratory).   Musculoskeletal: Normal range " of motion.   Gait was normal.   Neurological: She is alert and oriented to person, place, and time.   Skin: Skin is warm and dry.   Psychiatric: She has a normal mood and affect. Her behavior is normal.   Vitals reviewed.      Assessment/Plan   Liz was seen today for follow-up and shortness of breath.    Diagnoses and all orders for this visit:    Shortness of breath    Chronic obstructive pulmonary disease, unspecified COPD type (CMS/HCC)    Hypoxia    Centrilobular emphysema (CMS/HCC)    Personal history of tobacco use, presenting hazards to health    Other allergic rhinitis    Electronic cigarette use    Other orders  -     budesonide-formoterol (Symbicort) 160-4.5 MCG/ACT inhaler; Inhale 2 puffs 2 (Two) Times a Day. Rinse mouth with water after use.  -     flunisolide (NASALIDE) 25 MCG/ACT (0.025%) solution nasal spray; Inhale 1 spray Every 12 (Twelve) Hours.  -     Umeclidinium Bromide (INCRUSE ELLIPTA) 62.5 MCG/INH aerosol powder ; Inhale 1 puff Daily.  -     ipratropium-albuterol (DUO-NEB) 0.5-2.5 mg/3 ml nebulizer; Take 3 mL by nebulization 4 (Four) Times a Day As Needed for Wheezing or Shortness of Air.           Return in about 7 months (around 2/20/2021) for Recheck, Overbook.    DISCUSSION (if any):  Laboratory data was reviewed with her.   Lab Results   Component Value Date    AFPTM 115 02/11/2014     Lab Results   Component Value Date    PHENOTYPE MM 02/11/2014       Last PFTs showed very severe COPD.  These were performed in Sep 2017.    We have reviewed her pulmonary medications in great detail.    Any needed adjustments to her pulmonary medications, either for clinical or insurance coverage reasons, have been made and are reflected in the orders.    Compliance with medications stressed.     Side effects of prescribed medications discussed with the patient    The patient was advised to continue oxygen 24/7, since she has noticed improvement in some symptoms since using it as prescribed.    Patient  was advised to continue her nasal spray, especially given improvement in symptoms overall.    Continue treatment with BiPAP at a pressure of 16/8, with a full-face mask.    Patient seems to be compliant with PAP device, based on the available data and her account of improved symptoms.     The patient was once again reminded to continue using the PAP device regularly, every night for atleast 4 hours.      Dictated utilizing Dragon dictation.    This document was electronically signed by Tena Cee MD on 07/20/20 at 14:47

## 2020-08-25 ENCOUNTER — APPOINTMENT (OUTPATIENT)
Dept: CT IMAGING | Facility: HOSPITAL | Age: 55
End: 2020-08-25

## 2020-09-02 ENCOUNTER — HOSPITAL ENCOUNTER (OUTPATIENT)
Dept: CT IMAGING | Facility: HOSPITAL | Age: 55
Discharge: HOME | End: 2020-09-02

## 2020-09-02 ENCOUNTER — HOSPITAL ENCOUNTER (OUTPATIENT)
Dept: CT IMAGING | Facility: HOSPITAL | Age: 55
Discharge: HOME | End: 2020-09-02
Admitting: INTERNAL MEDICINE

## 2020-09-02 DIAGNOSIS — C21.0 ANAL CANCER (HCC): ICD-10-CM

## 2020-09-02 LAB — CREAT BLDA-MCNC: 0.5 MG/DL (ref 0.6–1.3)

## 2020-09-02 PROCEDURE — 71260 CT THORAX DX C+: CPT | Performed by: RADIOLOGY

## 2020-09-02 PROCEDURE — 0 IOVERSOL 68 % SOLUTION: Performed by: INTERNAL MEDICINE

## 2020-09-02 PROCEDURE — 74177 CT ABD & PELVIS W/CONTRAST: CPT | Performed by: RADIOLOGY

## 2020-09-02 PROCEDURE — 71260 CT THORAX DX C+: CPT

## 2020-09-02 PROCEDURE — 82565 ASSAY OF CREATININE: CPT

## 2020-09-02 PROCEDURE — 74177 CT ABD & PELVIS W/CONTRAST: CPT

## 2020-09-02 RX ADMIN — IOVERSOL 90 ML: 678 INJECTION INTRA-ARTERIAL; INTRAVENOUS at 13:42

## 2020-09-11 NOTE — PROGRESS NOTES
NAME: Liz Jiang    : 1965    DATE:  2020    DIAGNOSIS: Anal Cancer    TREATMENT:  1.        2. XRT w/ Ramona Campos MD    TREATMENT COURSE:   -2020 the whole pelvis received 30.6 Gy in 17 fractions using 6 MV photons   the upper border was reduced and 5.4 Gray in 3 fractions were delivered using 6 MV photons  -2/3/2020 the fields were further reduced off of the inguinal nodes and given 9 Gray in 5 fractions with 6 MV photons  -20 (includes treatment break) the tumor was boosted with an additional 9 Chakraborty in 5 fractions with 6 MV photons.      CHIEF COMPLAINT:  Follow up of Anal Cancer    HISTORY OF PRESENT ILLNESS:   Liz Jiang is a very pleasant 54 y.o. female who is being seen today at the request of No ref. provider found for evaluation and treatment of anal cancer. She initially presented to her PCP for constipation, rectal pain and a palpable mass x 6-7 months. After failing conservative treatment, she was recommended by a friend to see Dr. Wu. She had colonoscopy on on 19 during which a large anal mass was visualized. 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 Two benign polyps were also removed. She was referred to our clinic for discussion of further treatment options.     She has several complaints today. She reports gradually worsening anal/rectal pain, and says she feels the urge to defecate constantly. For pain, she is using Norco 10 every 6 hours.She has had nausea for several months that she has been taking Phenergan for. She reports fatigue and a weight loss of 16-18 pounds (which she has subsequently regained), insomnia, a history of headaches that are much more mild after previous CVA, baseline shortness of breath that she wears bipap and uses home O2 for. She also reports that she has decreased ROM to RUE following past CVA. She denies obvious blood in stool, chest  "pain, abdominal pain, or any other complaint.     INTERVAL HISTORY:  Ms. Jiang is here today for follow up of anal cancer.  Since she was here last, she says she saw Dr. Martinez who she says performed colonoscopy on 8-17-20.  I don't have report but it seems she had some polyps removed and she was told to have f/u scope in no more than 3 years.  She says she has a skin cancer on the back of her L leg and asks for referral to dermatologist. She complains of feeling \"run down\" but is otherwise well.  She takes a stool softener but says with this her bowels move well.  She denies rectal bleeding.      PAST MEDICAL HISTORY:  Past Medical History:   Diagnosis Date   • Acid reflux disease    • Anal cancer (CMS/HCC) 12/5/2019   • Arthritis    • Asthma, extrinsic    • Cholelithiasis    • Chronic headaches    • COPD (chronic obstructive pulmonary disease) (CMS/HCC)    • CVA (cerebral vascular accident) (CMS/HCC)     w/ right sided deficit   • CVA (cerebral vascular accident) (CMS/HCC)    • Diabetes mellitus (CMS/HCC)     only has high blood sugar when on steriods   • History of radiation therapy 02/27/2020    Whole pelvis/anal mass   • Obstructive sleep apnea treated with BiPAP    • On home oxygen therapy     2L    • Sleep apnea, obstructive        PAST SURGICAL HISTORY:  Past Surgical History:   Procedure Laterality Date   • ABDOMINAL SURGERY     • CARDIAC CATHETERIZATION     • CAROTID ENDARTERECTOMY Left 2018   • CHOLECYSTECTOMY WITH INTRAOPERATIVE CHOLANGIOGRAM N/A 1/30/2017    Procedure: CHOLECYSTECTOMY LAPAROSCOPIC INTRAOPERATIVE CHOLANGIOGRAM;  Surgeon: Carolin Marie MD;  Location: [facility];  Service:    • COLONOSCOPY     • HYSTERECTOMY      for heavy bleeding/ complete   • KIDNEY STONE SURGERY     • TUBAL ABDOMINAL LIGATION     • VENOUS ACCESS DEVICE (PORT) INSERTION Left 12/17/2019    Procedure: INSERTION VENOUS ACCESS DEVICE;  Surgeon: Carolin Marie MD;  Location: [facility] OR;  Service: General    "     FAMILY HISTORY:  Family History   Problem Relation Age of Onset   • Diabetes Mother    • Hypertension Mother    • Arrhythmia Mother    • Pneumonia Father    • Coronary artery disease Other    • Arrhythmia Sister    • Heart attack Brother    • Lymphoma Brother         hodgkin's    • Other Brother         CABG   • Breast cancer Neg Hx        SOCIAL HISTORY:  Social History     Socioeconomic History   • Marital status:      Spouse name: Not on file   • Number of children: Not on file   • Years of education: Not on file   • Highest education level: Not on file   Tobacco Use   • Smoking status: Former Smoker     Packs/day: 1.50     Years: 30.00     Pack years: 45.00     Types: Electronic Cigarette     Last attempt to quit:      Years since quittin.6   • Smokeless tobacco: Never Used   Substance and Sexual Activity   • Alcohol use: No   • Drug use: No   • Sexual activity: Defer   Social History Narrative    Just retired     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          REVIEW OF SYSTEMS:   A comprehensive 14 point review of systems was performed.  Significant findings as mentioned above.  All other systems reviewed and are negative.      MEDICATIONS:  The current medication list was reviewed in the EMR    Current Outpatient Medications:   •  albuterol sulfate  (90 Base) MCG/ACT inhaler, Inhale 2 puffs Every 6 (Six) Hours As Needed for Wheezing or Shortness of Air., Disp: 18 g, Rfl: 0  •  ASPIRIN LOW DOSE 81 MG EC tablet, Take 81 mg by mouth Daily., Disp: , Rfl: 3  •  budesonide-formoterol (Symbicort) 160-4.5 MCG/ACT inhaler, Inhale 2 puffs 2 (Two) Times a Day. Rinse mouth with water after use., Disp: 10.2 g, Rfl: 8  •  buPROPion XL (WELLBUTRIN XL) 150 MG 24 hr  tablet, , Disp: , Rfl:   •  clonazePAM (KlonoPIN) 0.5 MG tablet, Take 1 tablet by mouth 2 (Two) Times a Day As Needed for Anxiety., Disp: 60 tablet, Rfl: 0  •  clopidogrel (PLAVIX) 75 MG tablet, TAKE ONE TABLET BY MOUTH EVERY DAY FOR BLOOD THINNER, Disp: , Rfl: 3  •  escitalopram (LEXAPRO) 20 MG tablet, Take 1 tablet by mouth Daily., Disp: 30 tablet, Rfl: 11  •  flunisolide (NASALIDE) 25 MCG/ACT (0.025%) solution nasal spray, Inhale 1 spray Every 12 (Twelve) Hours., Disp: 1 bottle, Rfl: 8  •  furosemide (LASIX) 20 MG tablet, , Disp: , Rfl:   •  gabapentin (NEURONTIN) 800 MG tablet, Take 800 mg by mouth 3 (Three) Times a Day., Disp: , Rfl:   •  granisetron (SANCUSO) 3.1 MG/24HR, Place 1 patch on the skin as directed by provider 1 (One) Time Per Week., Disp: 4 patch, Rfl: 2  •  HYDROcodone-acetaminophen (NORCO)  MG per tablet, , Disp: , Rfl:   •  ipratropium-albuterol (DUO-NEB) 0.5-2.5 mg/3 ml nebulizer, Take 3 mL by nebulization 4 (Four) Times a Day As Needed for Wheezing or Shortness of Air., Disp: 360 mL, Rfl: 7  •  levothyroxine (SYNTHROID, LEVOTHROID) 25 MCG tablet, Take 1 tablet by mouth Daily., Disp: 30 tablet, Rfl: 5  •  lidocaine-prilocaine (EMLA) 2.5-2.5 % cream, Apply to port site 30 mins prior to chemotherapy appointment, Disp: 30 g, Rfl: 5  •  linaclotide (LINZESS) 145 MCG capsule capsule, Take 1 capsule by mouth Every Morning Before Breakfast., Disp: 30 capsule, Rfl: 3  •  loratadine-pseudoephedrine (CLARITIN-D 24 HOUR)  MG per 24 hr tablet, Take 1 tablet by mouth Daily., Disp: 30 tablet, Rfl: 5  •  LORazepam (ATIVAN) 0.5 MG tablet, Take 1 tablet by mouth Every 8 (Eight) Hours As Needed for anxiety., Disp: 60 tablet, Rfl: 0  •  metoprolol tartrate (LOPRESSOR) 25 MG tablet, , Disp: , Rfl:   •  montelukast (SINGULAIR) 10 MG tablet, , Disp: , Rfl:   •  Morphine (MS CONTIN) 60 MG 12 hr tablet, Take 1 tablet by mouth Every 12 (Twelve) Hours., Disp: 60 tablet, Rfl: 0  •  ondansetron (ZOFRAN) 8 MG  tablet, , Disp: , Rfl:   •  Oral Wound Care Products (MUGARD) liquid, Apply 5 mL to the mouth or throat 5 (Five) Times a Day As Needed (for oral pain)., Disp: 240 mL, Rfl: 2  •  oxyCODONE (ROXICODONE) 10 MG tablet, Take 1-2 tabs every 4-6 hours as needed for pain, Disp: 150 tablet, Rfl: 0  •  oxyCODONE (ROXICODONE) 20 MG tablet, Take 1 tablet by mouth Every 6 (Six) Hours As Needed for Severe Pain ., Disp: 100 tablet, Rfl: 0  •  polyethylene glycol (GAVILAX) powder, Take 17 g by mouth 2 (Two) Times a Day., Disp: 850 g, Rfl: 4  •  potassium chloride (K-DUR,KLOR-CON) 10 MEQ CR tablet, Take 4 tablets by mouth Daily., Disp: 4 tablet, Rfl: 0  •  predniSONE (DELTASONE) 5 MG tablet, Take 5 mg by mouth Daily. Take one tab by mouth ever other day alternating with 1/2 tab, Disp: , Rfl:   •  prochlorperazine (COMPAZINE) 10 MG tablet, Take 1 tablet by mouth Every 6 (Six) Hours As Needed for Nausea or Vomiting., Disp: 60 tablet, Rfl: 3  •  promethazine (PHENERGAN) 25 MG tablet, Take 25 mg by mouth Every 6 (Six) Hours As Needed for Nausea or Vomiting., Disp: , Rfl:   •  sennosides-docusate (Senna-S) 8.6-50 MG per tablet, Take 2 tablets by mouth 2 (Two) Times a Day., Disp: 120 tablet, Rfl: 5  •  tiZANidine (ZANAFLEX) 4 MG tablet, 4 mg Every 6 (Six) Hours As Needed., Disp: , Rfl: 0  •  Umeclidinium Bromide (INCRUSE ELLIPTA) 62.5 MCG/INH aerosol powder , Inhale 1 puff Daily., Disp: 30 each, Rfl: 8    ALLERGIES:    Allergies   Allergen Reactions   • Roflumilast Diarrhea     Significant diarrhea.        PHYSICAL EXAM:  Vitals:    09/14/20 1519   BP: 102/64   Pulse: 113   Resp: 18   Temp: 97.1 °F (36.2 °C)   SpO2: 90%     Pain Score    09/14/20 1519   PainSc: 0-No pain     General:  Awake, alert and oriented.  Appears well today.    HEENT:  Pupils are equal, round and reactive to light and accommodation, Extra-ocular movements full, mucous membranes are clear   Neck:  No JVD, thyromegaly or lymphadenopathy  CV:  Regular tachycardia , no  murmurs, rubs or gallops  Resp:  Lungs are generally clear to auscultation bilaterally without wheezes  Abd:  Soft, non-tender, sl distended, bowel sounds present, no organomegaly or masses  Ext:  No clubbing, cyanosis or edema.  There is a raised brown-colored papule on the L lower leg posteriorly over the calf.  It is about  Lymph:  No cervical, supraclavicular, axillary,adenopathy  Neuro:  grossly non-focal exam    PATHOLOGY:  11-01-19          ENDOSCOPY:  C-scope (Jazmin) 11-01-19          IMAGING:  MRI Abdomen w/wo contrast 11-06-19   FINDINGS:   Today's study shows no definitive colonic mass.   Particularly I do not see any contrast-enhancing abnormalities on the presented images.  No adenopathy in the imaged area.  Sigmoid colon wall does appear to be slightly thickened but no adjacent fluid.  No walled off fluid collections.     IMPRESSION:  No contrast-enhancing abnormalities identified.     CTCAP 11-27-19  FINDINGS: Today's study shows evidence of COPD.     There are no parenchymal nodules or masses.     No pericardial or pleural effusions.     No mediastinal or hilar lymph node enlargement.     IMPRESSION:  No evidence of metastatic disease to the chest.    FINDINGS:   The lung bases are clear. There are no pleural effusions.     The liver is homogeneous. There is no evidence of focal hepatic mass     The spleen is homogeneous     There is no peripancreatic stranding or pancreatic head mass.     There is no adrenal enlargement.     There is a nonobstructing right intrarenal stone..      Otherwise I do not see any free fluid or walled off fluid collections.     There is diffuse colonic wall thickening.      IMPRESSION:  1. Diffuse colonic wall thickening.  2. Nonobstructing right intrarenal stone.  3. No evidence of metastatic disease to the abdomen or pelvis.      CTAP 03-12-20  FINDINGS:   The lung bases are clear. There are no pleural effusions.     The liver is homogeneous. There is no evidence of  focal hepatic mass     The spleen is homogeneous     There is no peripancreatic stranding or pancreatic head mass.     Tiny left adrenal nodule is present.     There are small nonobstructing stones in both kidneys..      Otherwise I do not see any free fluid or walled off fluid collections.     Large volume stool in the colon and rectum. The rectum is distended.     There is no evidence of mesenteric or retroperitoneal adenopathy           IMPRESSION:  :   1. Nonobstructing intrarenal stones  2. Large volume stool in the colon and rectum. Rectal distention.  3. Tiny left adrenal nodule      CTCAP 09-02-20  FINDINGS:     Lungs:   There are no parenchymal nodules or masses.  Parenchymal blebs compatible with emphysema.  Heart: Unremarkable.  Pericardium: No effusion.  Mediastinum: No masses. No enlarged lymph nodes.  No fluid collections.  Pleura: No pleural effusion. No pleural mass or abnormal calcification.  No pneumothorax.  Major airways: Clear. No intrinsic mass.  Vasculature: No evidence of aneurysm. No evidence of pulmonary embolism.  Visualized upper abdomen:The upper abdomen is unremarkable as  visualized.  Other: None.  Bones: No acute bony abnormality.     IMPRESSION:  1. No parenchymal nodules or masses.  2. No evidence of metastatic disease to the chest.  3. COPD.    FINDINGS:     Lower thorax:   Clear. No effusions.     Abdomen:     Liver:   Homogeneous. No focal hepatic mass or ductal dilatation.     Gallbladder:   Surgically absent.     Pancreas:   Unremarkable. No mass or ductal dilatation.     Spleen:   Homogeneous. No splenomegaly.     Adrenals:   No mass.     Kidneys/ureters:   Nonobstructing left intrarenal renal stones.     GI tract:   Rectal wall appears slightly thickened.     Peritoneum:   No free air. No free fluid or loculated fluid collections.     Mesentery:   Unremarkable.     Lymph nodes:   No lymphadenopathy.     Vasculature:   No evidence of aneurysm.     Abdominal wall:   No focal  hernia or mass.        Other: None.     Pelvis:     Bladder:   No focal mass or significant wall thickening     Reproductive:   Uterus is absent.     Appendix:   Nondistended. No surrounding inflammation.     Bones:   No acute bony abnormality.     IMPRESSION:  1. Mild thickening of the sigmoid colon and rectal wall.  2. No focal mass, adenopathy, free fluid, or walled off fluid  collection.  3. Nonobstructing stones in both kidneys.    RECENT LABS:  Lab Results   Component Value Date    WBC 7.23 09/14/2020    HGB 15.5 09/14/2020    HCT 48.5 (H) 09/14/2020    MCV 91.5 09/14/2020    RDW 15.4 09/14/2020     09/14/2020    NEUTRORELPCT 79.5 (H) 09/14/2020    LYMPHORELPCT 11.9 (L) 09/14/2020    MONORELPCT 6.9 09/14/2020    EOSRELPCT 0.7 09/14/2020    BASORELPCT 0.4 09/14/2020    NEUTROABS 5.75 09/14/2020    LYMPHSABS 0.86 09/14/2020       Lab Results   Component Value Date     09/14/2020    K 3.8 09/14/2020    CO2 32.3 (H) 09/14/2020    CL 99 09/14/2020    BUN 15 09/14/2020    CREATININE 0.59 09/14/2020    EGFRIFNONA 106 09/14/2020    GLUCOSE 118 (H) 09/14/2020    CALCIUM 9.3 09/14/2020    ALKPHOS 80 09/14/2020    AST 18 09/14/2020    ALT 21 09/14/2020    BILITOT 0.2 09/14/2020    ALBUMIN 3.85 09/14/2020    PROTEINTOT 7.2 09/14/2020    MG 1.9 03/12/2020    PHOS 3.0 03/27/2017       Lab Results   Component Value Date    FERRITIN 197.40 (H) 11/19/2019    IRON 43 11/19/2019    TIBC 437 11/19/2019    LABIRON 10 (L) 11/19/2019    CXVHJGOQ32 473 11/19/2019    FOLATE 9.32 11/19/2019       ASSESSMENT & PLAN:  Liz Jiang is a very pleasant 54 y.o. female with newly diagnosed anal cancer.    1.  Anal cancer:  -  Clinically had Stage IIIB disease ([id number]) with invasion of the rectovaginal septum.  -  At the time of diagnosis, she had  gynecologic examination without cause for concern.    -  Pre-treatment CT CAP and MRI Pelvis were  unremarkable.  No evidence of metastatic disease. No notable adenopathy.  -   Recommended concurrent definitive chemoradiation with Mitomycin/5-FU.  -  She saw Dr. Martinez who was in agreement with this plan.  -  She saw Dr. Campos for radiation and completed treatment on 2-27-20.    -  Dr. Martinez performed colonoscopy 8-17-20.  It sounds like she had polyps.  .  Will plan to repeat CT CAP in 3 months also with particular attention to adrenal nodule noted on recent CT imaging.    2. Pain:  -  She has a h/o chronic back pain previously managed by pain management physician, Dr. Paty Brian.  - We took over her pain management during her cancer treatment and then transitioned her back to Dr. Brian.     3. H/o constipation  - Resolved.  Continues Linzess    4.  Anxiety: This has been a chronic problem for her.  Continues on  Lexapro 20 mg daily along with Ativan BID prn anxiety.  She has been referred to psychiatry \    5.  Prophylaxis:  Has had Pneumovax, Prevnar 13 and 2019 flu vaccine.       6.  Skin lesion L calf region: Will refer to dermatology for excision.    7.  ACO / SAÚL/Other  Quality measures  -  Liz Jiang did not receive 2020 flu vaccine.  Recommended she get this soon.  -  Liz Jiang reports a pain score of 7 .  Given her pain assessment as noted, treatment options were discussed and the following options were decided upon as a follow-up plan to address the patient's pain: continued f/u with pain managment physician..   -  Current outpatient and discharge medications have been reconciled for the patient.    Reviewed by: Deanne Jones MD       8. Follow up:   -  PAC flush and labwork today.  PAC flush in 6 weeks.  -  RTC to see me in 4 months with CT CAP as well as labwork prior.    C-scope performed by Dr. Martinez Aug 16th. Will get records.      This note was scribed for Deanne Jones MD by Gricel Howell RN.    I, Deanne Jones MD, personally performed the services described in this documentation as scribed by the above named individual in my presence,  and it is both accurate and complete.  09/14/2020       I spent 25 minutes with Liz Jiang today.  In the office today, more than 50% of this time was spent face-to-face with her  in counseling / coordination of care, reviewing her medical history and counseling on the current treatment plan.  All questions were answered to her satisfaction      Electronically Signed by: Deanne Jones MD       CC:   Mayte Davis APRN Muhammed Iqbal, MD John Dvorak, MD Marta Hayne, MD Michael Simons, MD Barbara Michna, MD Jennifer Green, MD, [place], [place]

## 2020-09-14 ENCOUNTER — OFFICE VISIT (OUTPATIENT)
Dept: ONCOLOGY | Facility: CLINIC | Age: 55
End: 2020-09-14

## 2020-09-14 ENCOUNTER — LAB (OUTPATIENT)
Dept: ONCOLOGY | Facility: CLINIC | Age: 55
End: 2020-09-14

## 2020-09-14 VITALS
HEART RATE: 113 BPM | DIASTOLIC BLOOD PRESSURE: 64 MMHG | SYSTOLIC BLOOD PRESSURE: 102 MMHG | BODY MASS INDEX: 29.08 KG/M2 | RESPIRATION RATE: 18 BRPM | OXYGEN SATURATION: 90 % | TEMPERATURE: 97.1 F | WEIGHT: 169.4 LBS

## 2020-09-14 DIAGNOSIS — Z95.828 PORT-A-CATH IN PLACE: ICD-10-CM

## 2020-09-14 DIAGNOSIS — L98.9 SKIN LESION: ICD-10-CM

## 2020-09-14 DIAGNOSIS — C21.0 ANAL CANCER (HCC): Primary | ICD-10-CM

## 2020-09-14 LAB
ALBUMIN SERPL-MCNC: 3.85 G/DL (ref 3.5–5.2)
ALBUMIN/GLOB SERPL: 1.1 G/DL
ALP SERPL-CCNC: 80 U/L (ref 39–117)
ALT SERPL W P-5'-P-CCNC: 21 U/L (ref 1–33)
ANION GAP SERPL CALCULATED.3IONS-SCNC: 7.7 MMOL/L (ref 5–15)
AST SERPL-CCNC: 18 U/L (ref 1–32)
BASOPHILS # BLD AUTO: 0.03 10*3/MM3 (ref 0–0.2)
BASOPHILS NFR BLD AUTO: 0.4 % (ref 0–1.5)
BILIRUB SERPL-MCNC: 0.2 MG/DL (ref 0–1.2)
BUN SERPL-MCNC: 15 MG/DL (ref 6–20)
BUN/CREAT SERPL: 25.4 (ref 7–25)
CALCIUM SPEC-SCNC: 9.3 MG/DL (ref 8.6–10.5)
CHLORIDE SERPL-SCNC: 99 MMOL/L (ref 98–107)
CO2 SERPL-SCNC: 32.3 MMOL/L (ref 22–29)
CREAT SERPL-MCNC: 0.59 MG/DL (ref 0.57–1)
DEPRECATED RDW RBC AUTO: 51.6 FL (ref 37–54)
EOSINOPHIL # BLD AUTO: 0.05 10*3/MM3 (ref 0–0.4)
EOSINOPHIL NFR BLD AUTO: 0.7 % (ref 0.3–6.2)
ERYTHROCYTE [DISTWIDTH] IN BLOOD BY AUTOMATED COUNT: 15.4 % (ref 12.3–15.4)
GFR SERPL CREATININE-BSD FRML MDRD: 106 ML/MIN/1.73
GLOBULIN UR ELPH-MCNC: 3.4 GM/DL
GLUCOSE SERPL-MCNC: 118 MG/DL (ref 65–99)
HCT VFR BLD AUTO: 48.5 % (ref 34–46.6)
HGB BLD-MCNC: 15.5 G/DL (ref 12–15.9)
IMM GRANULOCYTES # BLD AUTO: 0.04 10*3/MM3 (ref 0–0.05)
IMM GRANULOCYTES NFR BLD AUTO: 0.6 % (ref 0–0.5)
LYMPHOCYTES # BLD AUTO: 0.86 10*3/MM3 (ref 0.7–3.1)
LYMPHOCYTES NFR BLD AUTO: 11.9 % (ref 19.6–45.3)
MCH RBC QN AUTO: 29.2 PG (ref 26.6–33)
MCHC RBC AUTO-ENTMCNC: 32 G/DL (ref 31.5–35.7)
MCV RBC AUTO: 91.5 FL (ref 79–97)
MONOCYTES # BLD AUTO: 0.5 10*3/MM3 (ref 0.1–0.9)
MONOCYTES NFR BLD AUTO: 6.9 % (ref 5–12)
NEUTROPHILS NFR BLD AUTO: 5.75 10*3/MM3 (ref 1.7–7)
NEUTROPHILS NFR BLD AUTO: 79.5 % (ref 42.7–76)
NRBC BLD AUTO-RTO: 0 /100 WBC (ref 0–0.2)
PLATELET # BLD AUTO: 144 10*3/MM3 (ref 140–450)
PMV BLD AUTO: 10.1 FL (ref 6–12)
POTASSIUM SERPL-SCNC: 3.8 MMOL/L (ref 3.5–5.2)
PROT SERPL-MCNC: 7.2 G/DL (ref 6–8.5)
RBC # BLD AUTO: 5.3 10*6/MM3 (ref 3.77–5.28)
SODIUM SERPL-SCNC: 139 MMOL/L (ref 136–145)
WBC # BLD AUTO: 7.23 10*3/MM3 (ref 3.4–10.8)

## 2020-09-14 PROCEDURE — 99214 OFFICE O/P EST MOD 30 MIN: CPT | Performed by: INTERNAL MEDICINE

## 2020-09-14 PROCEDURE — 80053 COMPREHEN METABOLIC PANEL: CPT | Performed by: INTERNAL MEDICINE

## 2020-09-14 PROCEDURE — 85025 COMPLETE CBC W/AUTO DIFF WBC: CPT | Performed by: INTERNAL MEDICINE

## 2020-12-11 RX ORDER — LEVOTHYROXINE SODIUM 0.03 MG/1
25 TABLET ORAL DAILY
Qty: 30 TABLET | Refills: 2 | Status: SHIPPED | OUTPATIENT
Start: 2020-12-11 | End: 2021-04-05

## 2020-12-11 RX ORDER — ESCITALOPRAM OXALATE 20 MG/1
20 TABLET ORAL DAILY
Qty: 30 TABLET | Refills: 1 | Status: SHIPPED | OUTPATIENT
Start: 2020-12-11

## 2020-12-11 NOTE — TELEPHONE ENCOUNTER
----- Message from Paty Murphy sent at 12/11/2020 12:06 PM EST -----  Regarding: REFILL  [facility] sent refill Escitalopram 10mg tablet.

## 2020-12-11 NOTE — TELEPHONE ENCOUNTER
----- Message from Paty Murphy sent at 12/11/2020 10:37 AM EST -----  Regarding: REFILL  [facility] sent refill on levothyroxine 25mcg tabs.

## 2021-01-07 RX ORDER — FLUNISOLIDE 0.25 MG/ML
SOLUTION NASAL
Qty: 25 ML | Refills: 8 | OUTPATIENT
Start: 2021-01-07

## 2021-04-05 RX ORDER — LEVOTHYROXINE SODIUM 0.03 MG/1
TABLET ORAL
Qty: 30 TABLET | Refills: 0 | Status: SHIPPED | OUTPATIENT
Start: 2021-04-05

## 2021-04-05 RX ORDER — BUDESONIDE AND FORMOTEROL FUMARATE DIHYDRATE 160; 4.5 UG/1; UG/1
AEROSOL RESPIRATORY (INHALATION)
Qty: 10.2 G | Refills: 8 | OUTPATIENT
Start: 2021-04-05

## 2021-07-29 ENCOUNTER — TRANSCRIBE ORDERS (OUTPATIENT)
Dept: ADMINISTRATIVE | Facility: HOSPITAL | Age: 56
End: 2021-07-29

## 2021-07-29 DIAGNOSIS — R06.02 SHORTNESS OF BREATH: ICD-10-CM

## 2021-07-29 DIAGNOSIS — R05.3 CHRONIC COUGH: ICD-10-CM

## 2021-07-29 DIAGNOSIS — I50.9 HEART FAILURE, UNSPECIFIED HF CHRONICITY, UNSPECIFIED HEART FAILURE TYPE (HCC): Primary | ICD-10-CM

## 2021-07-30 ENCOUNTER — APPOINTMENT (OUTPATIENT)
Dept: CARDIOLOGY | Facility: HOSPITAL | Age: 56
End: 2021-07-30

## 2021-08-10 ENCOUNTER — APPOINTMENT (OUTPATIENT)
Dept: CARDIOLOGY | Facility: HOSPITAL | Age: 56
End: 2021-08-10

## 2021-08-16 ENCOUNTER — APPOINTMENT (OUTPATIENT)
Dept: CARDIOLOGY | Facility: HOSPITAL | Age: 56
End: 2021-08-16

## 2021-08-24 ENCOUNTER — HOSPITAL ENCOUNTER (OUTPATIENT)
Dept: CARDIOLOGY | Facility: HOSPITAL | Age: 56
Discharge: HOME | End: 2021-08-24
Admitting: NURSE PRACTITIONER

## 2021-08-24 DIAGNOSIS — R06.02 SHORTNESS OF BREATH: ICD-10-CM

## 2021-08-24 DIAGNOSIS — I50.9 HEART FAILURE, UNSPECIFIED HF CHRONICITY, UNSPECIFIED HEART FAILURE TYPE (HCC): ICD-10-CM

## 2021-08-24 DIAGNOSIS — R05.3 CHRONIC COUGH: ICD-10-CM

## 2021-08-24 LAB
BH CV ECHO MEAS - % IVS THICK: 15.1 %
BH CV ECHO MEAS - % LVPW THICK: 56.5 %
BH CV ECHO MEAS - ACS: 1.8 CM
BH CV ECHO MEAS - AO MAX PG: 11.8 MMHG
BH CV ECHO MEAS - AO MEAN PG: 5 MMHG
BH CV ECHO MEAS - AO ROOT AREA (BSA CORRECTED): 1.6
BH CV ECHO MEAS - AO ROOT AREA: 6.6 CM^2
BH CV ECHO MEAS - AO ROOT DIAM: 2.9 CM
BH CV ECHO MEAS - AO V2 MAX: 172 CM/SEC
BH CV ECHO MEAS - AO V2 MEAN: 103 CM/SEC
BH CV ECHO MEAS - AO V2 VTI: 26.7 CM
BH CV ECHO MEAS - BSA(HAYCOCK): 1.9 M^2
BH CV ECHO MEAS - BSA: 1.8 M^2
BH CV ECHO MEAS - BZI_BMI: 29 KILOGRAMS/M^2
BH CV ECHO MEAS - BZI_METRIC_HEIGHT: 162.6 CM
BH CV ECHO MEAS - BZI_METRIC_WEIGHT: 76.7 KG
BH CV ECHO MEAS - EDV(CUBED): 68.7 ML
BH CV ECHO MEAS - EDV(MOD-SP4): 26.1 ML
BH CV ECHO MEAS - EDV(TEICH): 74 ML
BH CV ECHO MEAS - EF(CUBED): 73.5 %
BH CV ECHO MEAS - EF(MOD-SP4): 60.2 %
BH CV ECHO MEAS - EF(TEICH): 65.8 %
BH CV ECHO MEAS - ESV(CUBED): 18.2 ML
BH CV ECHO MEAS - ESV(MOD-SP4): 10.4 ML
BH CV ECHO MEAS - ESV(TEICH): 25.3 ML
BH CV ECHO MEAS - FS: 35.8 %
BH CV ECHO MEAS - IVS/LVPW: 1.1
BH CV ECHO MEAS - IVSD: 0.93 CM
BH CV ECHO MEAS - IVSS: 1.1 CM
BH CV ECHO MEAS - LV DIASTOLIC VOL/BSA (35-75): 14.3 ML/M^2
BH CV ECHO MEAS - LV MASS(C)D: 111.6 GRAMS
BH CV ECHO MEAS - LV MASS(C)DI: 61.3 GRAMS/M^2
BH CV ECHO MEAS - LV MASS(C)S: 90.7 GRAMS
BH CV ECHO MEAS - LV MASS(C)SI: 49.8 GRAMS/M^2
BH CV ECHO MEAS - LV SYSTOLIC VOL/BSA (12-30): 5.7 ML/M^2
BH CV ECHO MEAS - LVIDD: 4.1 CM
BH CV ECHO MEAS - LVIDS: 2.6 CM
BH CV ECHO MEAS - LVLD AP4: 6.3 CM
BH CV ECHO MEAS - LVLS AP4: 5.7 CM
BH CV ECHO MEAS - LVPWD: 0.84 CM
BH CV ECHO MEAS - LVPWS: 1.3 CM
BH CV ECHO MEAS - MV A MAX VEL: 98.5 CM/SEC
BH CV ECHO MEAS - MV E MAX VEL: 80.4 CM/SEC
BH CV ECHO MEAS - MV E/A: 0.82
BH CV ECHO MEAS - SI(AO): 96.9 ML/M^2
BH CV ECHO MEAS - SI(CUBED): 27.7 ML/M^2
BH CV ECHO MEAS - SI(MOD-SP4): 8.6 ML/M^2
BH CV ECHO MEAS - SI(TEICH): 26.7 ML/M^2
BH CV ECHO MEAS - SV(AO): 176.4 ML
BH CV ECHO MEAS - SV(CUBED): 50.5 ML
BH CV ECHO MEAS - SV(MOD-SP4): 15.7 ML
BH CV ECHO MEAS - SV(TEICH): 48.7 ML
MAXIMAL PREDICTED HEART RATE: 164 BPM
STRESS TARGET HR: 139 BPM

## 2021-08-24 PROCEDURE — 93306 TTE W/DOPPLER COMPLETE: CPT

## 2021-08-24 PROCEDURE — 93306 TTE W/DOPPLER COMPLETE: CPT | Performed by: SPECIALIST

## 2021-09-07 ENCOUNTER — LAB (OUTPATIENT)
Dept: LAB | Facility: HOSPITAL | Age: 56
End: 2021-09-07

## 2021-09-07 ENCOUNTER — TRANSCRIBE ORDERS (OUTPATIENT)
Dept: ADMINISTRATIVE | Facility: HOSPITAL | Age: 56
End: 2021-09-07

## 2021-09-07 DIAGNOSIS — Z11.52 ENCOUNTER FOR SCREENING FOR COVID-19: Primary | ICD-10-CM

## 2021-09-07 DIAGNOSIS — Z11.52 ENCOUNTER FOR SCREENING FOR COVID-19: ICD-10-CM

## 2021-09-07 PROCEDURE — U0005 INFEC AGEN DETEC AMPLI PROBE: HCPCS | Performed by: FAMILY MEDICINE

## 2021-09-07 PROCEDURE — U0004 COV-19 TEST NON-CDC HGH THRU: HCPCS | Performed by: FAMILY MEDICINE

## 2021-09-07 PROCEDURE — C9803 HOPD COVID-19 SPEC COLLECT: HCPCS

## 2021-09-08 LAB — SARS-COV-2 RNA NOSE QL NAA+PROBE: NOT DETECTED

## 2022-01-30 ENCOUNTER — HOSPITAL ENCOUNTER (INPATIENT)
Dept: HOSPITAL 79 - ER1 | Age: 57
LOS: 3 days | Discharge: HOME | DRG: 917 | End: 2022-02-02
Attending: INTERNAL MEDICINE | Admitting: INTERNAL MEDICINE
Payer: MEDICARE

## 2022-01-30 VITALS — WEIGHT: 159 LBS | HEIGHT: 64 IN | BODY MASS INDEX: 27.14 KG/M2

## 2022-01-30 DIAGNOSIS — S39.012A: ICD-10-CM

## 2022-01-30 DIAGNOSIS — T40.602A: Primary | ICD-10-CM

## 2022-01-30 DIAGNOSIS — Z20.822: ICD-10-CM

## 2022-01-30 DIAGNOSIS — G89.4: ICD-10-CM

## 2022-01-30 DIAGNOSIS — Z71.6: ICD-10-CM

## 2022-01-30 DIAGNOSIS — Z99.81: ICD-10-CM

## 2022-01-30 DIAGNOSIS — Z91.010: ICD-10-CM

## 2022-01-30 DIAGNOSIS — F17.210: ICD-10-CM

## 2022-01-30 DIAGNOSIS — T48.202A: ICD-10-CM

## 2022-01-30 DIAGNOSIS — Z90.710: ICD-10-CM

## 2022-01-30 DIAGNOSIS — J96.22: ICD-10-CM

## 2022-01-30 DIAGNOSIS — E78.5: ICD-10-CM

## 2022-01-30 DIAGNOSIS — J44.1: ICD-10-CM

## 2022-01-30 DIAGNOSIS — Z86.73: ICD-10-CM

## 2022-01-30 DIAGNOSIS — Z90.49: ICD-10-CM

## 2022-01-30 DIAGNOSIS — I10: ICD-10-CM

## 2022-01-30 DIAGNOSIS — J96.21: ICD-10-CM

## 2022-01-30 LAB
BUN/CREATININE RATIO: 35 (ref 0–10)
HGB BLD-MCNC: 15.9 GM/DL (ref 12.3–15.3)
RED BLOOD COUNT: 5.06 M/UL (ref 4–5.1)
WHITE BLOOD COUNT: 12.6 K/UL (ref 4.5–11)

## 2022-01-30 PROCEDURE — 5A09457 ASSISTANCE WITH RESPIRATORY VENTILATION, 24-96 CONSECUTIVE HOURS, CONTINUOUS POSITIVE AIRWAY PRESSURE: ICD-10-PCS | Performed by: INTERNAL MEDICINE

## 2022-01-31 LAB
BUN/CREATININE RATIO: 46 (ref 0–10)
HGB BLD-MCNC: 15.4 GM/DL (ref 12.3–15.3)
RED BLOOD COUNT: 4.96 M/UL (ref 4–5.1)
WHITE BLOOD COUNT: 12 K/UL (ref 4.5–11)

## 2022-02-02 LAB — BUN/CREATININE RATIO: 49 (ref 0–10)

## 2022-02-23 ENCOUNTER — APPOINTMENT (OUTPATIENT)
Dept: GENERAL RADIOLOGY | Facility: HOSPITAL | Age: 57
End: 2022-02-23

## 2022-02-23 ENCOUNTER — APPOINTMENT (OUTPATIENT)
Dept: CT IMAGING | Facility: HOSPITAL | Age: 57
End: 2022-02-23

## 2022-02-23 ENCOUNTER — HOSPITAL ENCOUNTER (EMERGENCY)
Facility: HOSPITAL | Age: 57
Discharge: HOME | End: 2022-02-23
Attending: EMERGENCY MEDICINE | Admitting: EMERGENCY MEDICINE

## 2022-02-23 VITALS
DIASTOLIC BLOOD PRESSURE: 87 MMHG | HEART RATE: 101 BPM | SYSTOLIC BLOOD PRESSURE: 185 MMHG | RESPIRATION RATE: 26 BRPM | OXYGEN SATURATION: 95 % | BODY MASS INDEX: 29.02 KG/M2 | TEMPERATURE: 97.8 F | HEIGHT: 64 IN | WEIGHT: 170 LBS

## 2022-02-23 DIAGNOSIS — J44.1 COPD EXACERBATION: Primary | ICD-10-CM

## 2022-02-23 LAB
A-A DO2: 61.8 MMHG (ref 0–300)
ALBUMIN SERPL-MCNC: 3.66 G/DL (ref 3.5–5.2)
ALBUMIN/GLOB SERPL: 1.3 G/DL
ALP SERPL-CCNC: 81 U/L (ref 39–117)
ALT SERPL W P-5'-P-CCNC: 31 U/L (ref 1–33)
AMPHET+METHAMPHET UR QL: NEGATIVE
AMPHETAMINES UR QL: NEGATIVE
ANION GAP SERPL CALCULATED.3IONS-SCNC: 8.1 MMOL/L (ref 5–15)
ARTERIAL PATENCY WRIST A: ABNORMAL
AST SERPL-CCNC: 18 U/L (ref 1–32)
ATMOSPHERIC PRESS: 735 MMHG
BARBITURATES UR QL SCN: NEGATIVE
BASE EXCESS BLDA CALC-SCNC: 11.7 MMOL/L (ref 0–2)
BASOPHILS # BLD AUTO: 0.03 10*3/MM3 (ref 0–0.2)
BASOPHILS NFR BLD AUTO: 0.4 % (ref 0–1.5)
BDY SITE: ABNORMAL
BENZODIAZ UR QL SCN: POSITIVE
BILIRUB SERPL-MCNC: 0.4 MG/DL (ref 0–1.2)
BILIRUB UR QL STRIP: NEGATIVE
BODY TEMPERATURE: 0 C
BUN SERPL-MCNC: 13 MG/DL (ref 6–20)
BUN/CREAT SERPL: 35.1 (ref 7–25)
BUPRENORPHINE SERPL-MCNC: NEGATIVE NG/ML
CALCIUM SPEC-SCNC: 8.8 MG/DL (ref 8.6–10.5)
CANNABINOIDS SERPL QL: NEGATIVE
CHLORIDE SERPL-SCNC: 94 MMOL/L (ref 98–107)
CLARITY UR: ABNORMAL
CO2 BLDA-SCNC: 43.9 MMOL/L (ref 22–33)
CO2 SERPL-SCNC: 36.9 MMOL/L (ref 22–29)
COCAINE UR QL: NEGATIVE
COHGB MFR BLD: 8.7 % (ref 0–5)
COLOR UR: YELLOW
CREAT SERPL-MCNC: 0.37 MG/DL (ref 0.57–1)
CRP SERPL-MCNC: 0.82 MG/DL (ref 0–0.5)
D DIMER PPP FEU-MCNC: 0.61 MCGFEU/ML (ref 0–0.5)
D-LACTATE SERPL-SCNC: 1.6 MMOL/L (ref 0.5–2)
DEPRECATED RDW RBC AUTO: 57.1 FL (ref 37–54)
EOSINOPHIL # BLD AUTO: 0.04 10*3/MM3 (ref 0–0.4)
EOSINOPHIL NFR BLD AUTO: 0.6 % (ref 0.3–6.2)
ERYTHROCYTE [DISTWIDTH] IN BLOOD BY AUTOMATED COUNT: 15.9 % (ref 12.3–15.4)
FLUAV RNA RESP QL NAA+PROBE: NOT DETECTED
FLUBV RNA RESP QL NAA+PROBE: NOT DETECTED
GAS FLOW AIRWAY: 3 LPM
GFR SERPL CREATININE-BSD FRML MDRD: >150 ML/MIN/1.73
GLOBULIN UR ELPH-MCNC: 2.7 GM/DL
GLUCOSE SERPL-MCNC: 147 MG/DL (ref 65–99)
GLUCOSE UR STRIP-MCNC: NEGATIVE MG/DL
HCO3 BLDA-SCNC: 41.4 MMOL/L (ref 20–26)
HCT VFR BLD AUTO: 47.5 % (ref 34–46.6)
HCT VFR BLD CALC: 44.6 % (ref 38–51)
HGB BLD-MCNC: 14.4 G/DL (ref 12–15.9)
HGB BLDA-MCNC: 14.6 G/DL (ref 13.5–17.5)
HGB UR QL STRIP.AUTO: NEGATIVE
HOLD SPECIMEN: NORMAL
HOLD SPECIMEN: NORMAL
IMM GRANULOCYTES # BLD AUTO: 0.04 10*3/MM3 (ref 0–0.05)
IMM GRANULOCYTES NFR BLD AUTO: 0.6 % (ref 0–0.5)
INHALED O2 CONCENTRATION: 32 %
KETONES UR QL STRIP: NEGATIVE
LEUKOCYTE ESTERASE UR QL STRIP.AUTO: NEGATIVE
LYMPHOCYTES # BLD AUTO: 0.66 10*3/MM3 (ref 0.7–3.1)
LYMPHOCYTES NFR BLD AUTO: 9.6 % (ref 19.6–45.3)
Lab: ABNORMAL
Lab: ABNORMAL
MCH RBC QN AUTO: 29.8 PG (ref 26.6–33)
MCHC RBC AUTO-ENTMCNC: 30.3 G/DL (ref 31.5–35.7)
MCV RBC AUTO: 98.1 FL (ref 79–97)
METHADONE UR QL SCN: NEGATIVE
METHGB BLD QL: 0 % (ref 0–3)
MODALITY: ABNORMAL
MONOCYTES # BLD AUTO: 0.52 10*3/MM3 (ref 0.1–0.9)
MONOCYTES NFR BLD AUTO: 7.6 % (ref 5–12)
NEUTROPHILS NFR BLD AUTO: 5.56 10*3/MM3 (ref 1.7–7)
NEUTROPHILS NFR BLD AUTO: 81.2 % (ref 42.7–76)
NITRITE UR QL STRIP: NEGATIVE
NOTE: ABNORMAL
NOTIFIED BY: ABNORMAL
NOTIFIED WHO: ABNORMAL
NRBC BLD AUTO-RTO: 0 /100 WBC (ref 0–0.2)
NT-PROBNP SERPL-MCNC: 253.9 PG/ML (ref 0–900)
OPIATES UR QL: NEGATIVE
OXYCODONE UR QL SCN: POSITIVE
OXYHGB MFR BLDV: 86 % (ref 94–99)
PCO2 BLDA: 78.7 MM HG (ref 35–45)
PCO2 TEMP ADJ BLD: ABNORMAL MM[HG]
PCP UR QL SCN: NEGATIVE
PH BLDA: 7.33 PH UNITS (ref 7.35–7.45)
PH UR STRIP.AUTO: 7.5 [PH] (ref 5–8)
PH, TEMP CORRECTED: ABNORMAL
PLATELET # BLD AUTO: 167 10*3/MM3 (ref 140–450)
PMV BLD AUTO: 10.7 FL (ref 6–12)
PO2 BLDA: 71 MM HG (ref 83–108)
PO2 TEMP ADJ BLD: ABNORMAL MM[HG]
POTASSIUM SERPL-SCNC: 3.1 MMOL/L (ref 3.5–5.2)
PROPOXYPH UR QL: NEGATIVE
PROT SERPL-MCNC: 6.4 G/DL (ref 6–8.5)
PROT UR QL STRIP: ABNORMAL
RBC # BLD AUTO: 4.84 10*6/MM3 (ref 3.77–5.28)
SAO2 % BLDCOA: 94.1 % (ref 94–99)
SARS-COV-2 RNA RESP QL NAA+PROBE: NOT DETECTED
SODIUM SERPL-SCNC: 139 MMOL/L (ref 136–145)
SP GR UR STRIP: 1.02 (ref 1–1.03)
TRICYCLICS UR QL SCN: NEGATIVE
TROPONIN T SERPL-MCNC: 0.03 NG/ML (ref 0–0.03)
UROBILINOGEN UR QL STRIP: ABNORMAL
VENTILATOR MODE: ABNORMAL
WBC NRBC COR # BLD: 6.85 10*3/MM3 (ref 3.4–10.8)
WHOLE BLOOD HOLD SPECIMEN: NORMAL
WHOLE BLOOD HOLD SPECIMEN: NORMAL

## 2022-02-23 PROCEDURE — 83880 ASSAY OF NATRIURETIC PEPTIDE: CPT | Performed by: PHYSICIAN ASSISTANT

## 2022-02-23 PROCEDURE — 83050 HGB METHEMOGLOBIN QUAN: CPT

## 2022-02-23 PROCEDURE — 86140 C-REACTIVE PROTEIN: CPT | Performed by: PHYSICIAN ASSISTANT

## 2022-02-23 PROCEDURE — 71046 X-RAY EXAM CHEST 2 VIEWS: CPT | Performed by: RADIOLOGY

## 2022-02-23 PROCEDURE — 85379 FIBRIN DEGRADATION QUANT: CPT | Performed by: PHYSICIAN ASSISTANT

## 2022-02-23 PROCEDURE — 93005 ELECTROCARDIOGRAM TRACING: CPT | Performed by: EMERGENCY MEDICINE

## 2022-02-23 PROCEDURE — 94640 AIRWAY INHALATION TREATMENT: CPT

## 2022-02-23 PROCEDURE — 93005 ELECTROCARDIOGRAM TRACING: CPT | Performed by: PHYSICIAN ASSISTANT

## 2022-02-23 PROCEDURE — 80053 COMPREHEN METABOLIC PANEL: CPT | Performed by: PHYSICIAN ASSISTANT

## 2022-02-23 PROCEDURE — 36415 COLL VENOUS BLD VENIPUNCTURE: CPT

## 2022-02-23 PROCEDURE — 85025 COMPLETE CBC W/AUTO DIFF WBC: CPT | Performed by: PHYSICIAN ASSISTANT

## 2022-02-23 PROCEDURE — 87040 BLOOD CULTURE FOR BACTERIA: CPT | Performed by: PHYSICIAN ASSISTANT

## 2022-02-23 PROCEDURE — 94799 UNLISTED PULMONARY SVC/PX: CPT

## 2022-02-23 PROCEDURE — 99284 EMERGENCY DEPT VISIT MOD MDM: CPT

## 2022-02-23 PROCEDURE — 0 IOPAMIDOL PER 1 ML: Performed by: EMERGENCY MEDICINE

## 2022-02-23 PROCEDURE — 82805 BLOOD GASES W/O2 SATURATION: CPT

## 2022-02-23 PROCEDURE — 82375 ASSAY CARBOXYHB QUANT: CPT

## 2022-02-23 PROCEDURE — 81003 URINALYSIS AUTO W/O SCOPE: CPT | Performed by: PHYSICIAN ASSISTANT

## 2022-02-23 PROCEDURE — 87636 SARSCOV2 & INF A&B AMP PRB: CPT | Performed by: PHYSICIAN ASSISTANT

## 2022-02-23 PROCEDURE — 25010000002 METHYLPREDNISOLONE PER 125 MG: Performed by: PHYSICIAN ASSISTANT

## 2022-02-23 PROCEDURE — 71275 CT ANGIOGRAPHY CHEST: CPT

## 2022-02-23 PROCEDURE — 36600 WITHDRAWAL OF ARTERIAL BLOOD: CPT

## 2022-02-23 PROCEDURE — 93010 ELECTROCARDIOGRAM REPORT: CPT | Performed by: INTERNAL MEDICINE

## 2022-02-23 PROCEDURE — 80306 DRUG TEST PRSMV INSTRMNT: CPT | Performed by: PHYSICIAN ASSISTANT

## 2022-02-23 PROCEDURE — 96374 THER/PROPH/DIAG INJ IV PUSH: CPT

## 2022-02-23 PROCEDURE — 71046 X-RAY EXAM CHEST 2 VIEWS: CPT

## 2022-02-23 PROCEDURE — 84484 ASSAY OF TROPONIN QUANT: CPT | Performed by: PHYSICIAN ASSISTANT

## 2022-02-23 PROCEDURE — 83605 ASSAY OF LACTIC ACID: CPT | Performed by: PHYSICIAN ASSISTANT

## 2022-02-23 RX ORDER — METHYLPREDNISOLONE SODIUM SUCCINATE 125 MG/2ML
125 INJECTION, POWDER, LYOPHILIZED, FOR SOLUTION INTRAMUSCULAR; INTRAVENOUS ONCE
Status: COMPLETED | OUTPATIENT
Start: 2022-02-23 | End: 2022-02-23

## 2022-02-23 RX ORDER — IPRATROPIUM BROMIDE AND ALBUTEROL SULFATE 2.5; .5 MG/3ML; MG/3ML
3 SOLUTION RESPIRATORY (INHALATION) ONCE
Status: COMPLETED | OUTPATIENT
Start: 2022-02-23 | End: 2022-02-23

## 2022-02-23 RX ORDER — OXYCODONE AND ACETAMINOPHEN 10; 325 MG/1; MG/1
1 TABLET ORAL ONCE
Status: COMPLETED | OUTPATIENT
Start: 2022-02-23 | End: 2022-02-23

## 2022-02-23 RX ADMIN — IOPAMIDOL 83 ML: 755 INJECTION, SOLUTION INTRAVENOUS at 21:02

## 2022-02-23 RX ADMIN — METHYLPREDNISOLONE SODIUM SUCCINATE 125 MG: 125 INJECTION, POWDER, FOR SOLUTION INTRAMUSCULAR; INTRAVENOUS at 20:02

## 2022-02-23 RX ADMIN — IPRATROPIUM BROMIDE AND ALBUTEROL SULFATE 3 ML: .5; 3 SOLUTION RESPIRATORY (INHALATION) at 20:26

## 2022-02-23 RX ADMIN — OXYCODONE HYDROCHLORIDE AND ACETAMINOPHEN 1 TABLET: 10; 325 TABLET ORAL at 22:37

## 2022-02-24 LAB
QT INTERVAL: 334 MS
QTC INTERVAL: 464 MS

## 2022-02-25 ENCOUNTER — TRANSCRIBE ORDERS (OUTPATIENT)
Dept: ADMINISTRATIVE | Facility: HOSPITAL | Age: 57
End: 2022-02-25

## 2022-02-25 DIAGNOSIS — J41.1 MUCOPURULENT CHRONIC BRONCHITIS: ICD-10-CM

## 2022-02-25 DIAGNOSIS — I73.9 PAD (PERIPHERAL ARTERY DISEASE): ICD-10-CM

## 2022-02-25 DIAGNOSIS — I65.23 BILATERAL CAROTID ARTERY STENOSIS: ICD-10-CM

## 2022-02-25 DIAGNOSIS — C20 SQUAMOUS CELL CARCINOMA OF RECTUM: Primary | ICD-10-CM

## 2022-02-28 LAB
BACTERIA SPEC AEROBE CULT: NORMAL
BACTERIA SPEC AEROBE CULT: NORMAL

## 2022-03-04 ENCOUNTER — HOSPITAL ENCOUNTER (EMERGENCY)
Dept: HOSPITAL 79 - ER1 | Age: 57
LOS: 1 days | Discharge: HOME | End: 2022-03-05
Payer: MEDICARE

## 2022-03-04 DIAGNOSIS — R10.9: ICD-10-CM

## 2022-03-04 DIAGNOSIS — Z86.73: ICD-10-CM

## 2022-03-04 DIAGNOSIS — Z85.828: ICD-10-CM

## 2022-03-04 DIAGNOSIS — M54.6: ICD-10-CM

## 2022-03-04 DIAGNOSIS — J44.9: ICD-10-CM

## 2022-03-04 DIAGNOSIS — R07.9: Primary | ICD-10-CM

## 2022-03-05 LAB
BUN/CREATININE RATIO: 30 (ref 0–10)
HGB BLD-MCNC: 15.1 GM/DL (ref 12.3–15.3)
RED BLOOD COUNT: 5.08 M/UL (ref 4–5.1)
WHITE BLOOD COUNT: 8 K/UL (ref 4.5–11)

## 2022-03-07 ENCOUNTER — HOSPITAL ENCOUNTER (OUTPATIENT)
Dept: HOSPITAL 79 - LAB | Age: 57
End: 2022-03-07
Attending: INTERNAL MEDICINE
Payer: MEDICARE

## 2022-03-07 DIAGNOSIS — R09.02: Primary | ICD-10-CM

## 2022-03-07 LAB — BUN/CREATININE RATIO: 30 (ref 0–10)

## 2022-06-16 ENCOUNTER — HOSPITAL ENCOUNTER (INPATIENT)
Dept: HOSPITAL 79 - ER1 | Age: 57
LOS: 8 days | Discharge: HOME HEALTH | DRG: 189 | End: 2022-06-24
Attending: INTERNAL MEDICINE | Admitting: HOSPITALIST
Payer: MEDICARE

## 2022-06-16 VITALS — BODY MASS INDEX: 26.34 KG/M2 | HEIGHT: 64 IN | WEIGHT: 154.25 LBS

## 2022-06-16 DIAGNOSIS — I69.351: ICD-10-CM

## 2022-06-16 DIAGNOSIS — Z98.890: ICD-10-CM

## 2022-06-16 DIAGNOSIS — J96.22: Primary | ICD-10-CM

## 2022-06-16 DIAGNOSIS — E78.5: ICD-10-CM

## 2022-06-16 DIAGNOSIS — D72.829: ICD-10-CM

## 2022-06-16 DIAGNOSIS — Z20.822: ICD-10-CM

## 2022-06-16 DIAGNOSIS — F11.20: ICD-10-CM

## 2022-06-16 DIAGNOSIS — Z91.14: ICD-10-CM

## 2022-06-16 DIAGNOSIS — Z90.710: ICD-10-CM

## 2022-06-16 DIAGNOSIS — I10: ICD-10-CM

## 2022-06-16 DIAGNOSIS — F17.210: ICD-10-CM

## 2022-06-16 DIAGNOSIS — I45.10: ICD-10-CM

## 2022-06-16 DIAGNOSIS — M54.9: ICD-10-CM

## 2022-06-16 DIAGNOSIS — E66.2: ICD-10-CM

## 2022-06-16 DIAGNOSIS — G89.4: ICD-10-CM

## 2022-06-16 DIAGNOSIS — J44.9: ICD-10-CM

## 2022-06-16 DIAGNOSIS — F41.9: ICD-10-CM

## 2022-06-16 DIAGNOSIS — J96.21: ICD-10-CM

## 2022-06-16 DIAGNOSIS — Z90.49: ICD-10-CM

## 2022-06-16 LAB
BUN/CREATININE RATIO: 36 (ref 0–10)
HGB BLD-MCNC: 15.2 GM/DL (ref 12.3–15.3)
RED BLOOD COUNT: 5.11 M/UL (ref 4–5.1)
WHITE BLOOD COUNT: 12 K/UL (ref 4.5–11)

## 2022-06-16 PROCEDURE — C1751 CATH, INF, PER/CENT/MIDLINE: HCPCS

## 2022-06-16 PROCEDURE — 5A09357 ASSISTANCE WITH RESPIRATORY VENTILATION, LESS THAN 24 CONSECUTIVE HOURS, CONTINUOUS POSITIVE AIRWAY PRESSURE: ICD-10-PCS | Performed by: INTERNAL MEDICINE

## 2022-06-16 PROCEDURE — 5A09457 ASSISTANCE WITH RESPIRATORY VENTILATION, 24-96 CONSECUTIVE HOURS, CONTINUOUS POSITIVE AIRWAY PRESSURE: ICD-10-PCS | Performed by: INTERNAL MEDICINE

## 2022-06-17 LAB
BUN/CREATININE RATIO: 49 (ref 0–10)
HGB BLD-MCNC: 14.5 GM/DL (ref 12.3–15.3)
RED BLOOD COUNT: 4.86 M/UL (ref 4–5.1)
WHITE BLOOD COUNT: 8.1 K/UL (ref 4.5–11)

## 2022-06-19 LAB
BUN/CREATININE RATIO: 62 (ref 0–10)
HGB BLD-MCNC: 13.4 GM/DL (ref 12.3–15.3)
RED BLOOD COUNT: 4.57 M/UL (ref 4–5.1)
WHITE BLOOD COUNT: 9.2 K/UL (ref 4.5–11)

## 2022-06-19 PROCEDURE — 5A09357 ASSISTANCE WITH RESPIRATORY VENTILATION, LESS THAN 24 CONSECUTIVE HOURS, CONTINUOUS POSITIVE AIRWAY PRESSURE: ICD-10-PCS | Performed by: INTERNAL MEDICINE

## 2022-06-19 PROCEDURE — 5A0935A ASSISTANCE WITH RESPIRATORY VENTILATION, LESS THAN 24 CONSECUTIVE HOURS, HIGH NASAL FLOW/VELOCITY: ICD-10-PCS | Performed by: INTERNAL MEDICINE

## 2022-06-20 LAB
BUN/CREATININE RATIO: 70 (ref 0–10)
HGB BLD-MCNC: 13.3 GM/DL (ref 12.3–15.3)
RED BLOOD COUNT: 4.52 M/UL (ref 4–5.1)
WHITE BLOOD COUNT: 9.6 K/UL (ref 4.5–11)

## 2022-06-20 PROCEDURE — 5A0935A ASSISTANCE WITH RESPIRATORY VENTILATION, LESS THAN 24 CONSECUTIVE HOURS, HIGH NASAL FLOW/VELOCITY: ICD-10-PCS | Performed by: INTERNAL MEDICINE

## 2022-06-21 LAB
BUN/CREATININE RATIO: 67 (ref 0–10)
HGB BLD-MCNC: 13.9 GM/DL (ref 12.3–15.3)
RED BLOOD COUNT: 4.71 M/UL (ref 4–5.1)
WHITE BLOOD COUNT: 10.4 K/UL (ref 4.5–11)

## 2022-06-21 PROCEDURE — 5A09357 ASSISTANCE WITH RESPIRATORY VENTILATION, LESS THAN 24 CONSECUTIVE HOURS, CONTINUOUS POSITIVE AIRWAY PRESSURE: ICD-10-PCS | Performed by: INTERNAL MEDICINE

## 2022-06-21 PROCEDURE — 5A0935A ASSISTANCE WITH RESPIRATORY VENTILATION, LESS THAN 24 CONSECUTIVE HOURS, HIGH NASAL FLOW/VELOCITY: ICD-10-PCS | Performed by: INTERNAL MEDICINE

## 2022-06-22 LAB
BUN/CREATININE RATIO: 78 (ref 0–10)
HGB BLD-MCNC: 13.2 GM/DL (ref 12.3–15.3)
RED BLOOD COUNT: 4.43 M/UL (ref 4–5.1)
WHITE BLOOD COUNT: 11.3 K/UL (ref 4.5–11)

## 2022-06-22 PROCEDURE — 5A0935A ASSISTANCE WITH RESPIRATORY VENTILATION, LESS THAN 24 CONSECUTIVE HOURS, HIGH NASAL FLOW/VELOCITY: ICD-10-PCS | Performed by: INTERNAL MEDICINE

## 2022-06-22 PROCEDURE — 5A09357 ASSISTANCE WITH RESPIRATORY VENTILATION, LESS THAN 24 CONSECUTIVE HOURS, CONTINUOUS POSITIVE AIRWAY PRESSURE: ICD-10-PCS | Performed by: INTERNAL MEDICINE

## 2022-06-23 LAB
BUN/CREATININE RATIO: 66 (ref 0–10)
HGB BLD-MCNC: 13.6 GM/DL (ref 12.3–15.3)
RED BLOOD COUNT: 4.61 M/UL (ref 4–5.1)
WHITE BLOOD COUNT: 13.7 K/UL (ref 4.5–11)

## 2022-06-23 PROCEDURE — 5A0935A ASSISTANCE WITH RESPIRATORY VENTILATION, LESS THAN 24 CONSECUTIVE HOURS, HIGH NASAL FLOW/VELOCITY: ICD-10-PCS | Performed by: INTERNAL MEDICINE

## 2022-06-23 PROCEDURE — 5A09357 ASSISTANCE WITH RESPIRATORY VENTILATION, LESS THAN 24 CONSECUTIVE HOURS, CONTINUOUS POSITIVE AIRWAY PRESSURE: ICD-10-PCS | Performed by: INTERNAL MEDICINE

## 2022-06-24 LAB
BUN/CREATININE RATIO: 60 (ref 0–10)
HGB BLD-MCNC: 13.9 GM/DL (ref 12.3–15.3)
HGB BLD-MCNC: 14.4 GM/DL (ref 12.3–15.3)
RED BLOOD COUNT: 4.66 M/UL (ref 4–5.1)
RED BLOOD COUNT: 4.83 M/UL (ref 4–5.1)
WHITE BLOOD COUNT: 15.2 K/UL (ref 4.5–11)
WHITE BLOOD COUNT: 16.7 K/UL (ref 4.5–11)

## 2023-09-14 ENCOUNTER — HOSPITAL ENCOUNTER (INPATIENT)
Facility: HOSPITAL | Age: 58
DRG: 948 | End: 2023-09-14
Attending: INTERNAL MEDICINE | Admitting: INTERNAL MEDICINE
Payer: MEDICARE

## 2023-09-14 ENCOUNTER — PREP FOR SURGERY (OUTPATIENT)
Dept: OTHER | Facility: HOSPITAL | Age: 58
End: 2023-09-14
Payer: MEDICARE

## 2023-09-14 DIAGNOSIS — F41.9 ANXIETY: Primary | ICD-10-CM

## 2023-09-14 DIAGNOSIS — J44.9 CHRONIC OBSTRUCTIVE PULMONARY DISEASE, UNSPECIFIED COPD TYPE: ICD-10-CM

## 2023-09-14 DIAGNOSIS — R53.81 DEBILITY: ICD-10-CM

## 2023-09-14 DIAGNOSIS — C21.0 ANAL CANCER: ICD-10-CM

## 2023-09-14 PROCEDURE — 63710000001 METHYLPREDNISOLONE PER 4 MG: Performed by: INTERNAL MEDICINE

## 2023-09-14 PROCEDURE — 94799 UNLISTED PULMONARY SVC/PX: CPT

## 2023-09-14 PROCEDURE — 94664 DEMO&/EVAL PT USE INHALER: CPT

## 2023-09-14 PROCEDURE — 94640 AIRWAY INHALATION TREATMENT: CPT

## 2023-09-14 RX ORDER — METHYLPREDNISOLONE 16 MG/1
4 TABLET ORAL
Status: DISPENSED | OUTPATIENT
Start: 2023-09-16 | End: 2023-09-17

## 2023-09-14 RX ORDER — METHYLPREDNISOLONE 16 MG/1
4 TABLET ORAL
Status: COMPLETED | OUTPATIENT
Start: 2023-09-17 | End: 2023-09-17

## 2023-09-14 RX ORDER — BUDESONIDE AND FORMOTEROL FUMARATE DIHYDRATE 160; 4.5 UG/1; UG/1
2 AEROSOL RESPIRATORY (INHALATION)
Status: CANCELLED | OUTPATIENT
Start: 2023-09-14

## 2023-09-14 RX ORDER — METHYLPREDNISOLONE 4 MG/1
8 TABLET ORAL
Status: CANCELLED | OUTPATIENT
Start: 2023-09-15 | End: 2023-09-16

## 2023-09-14 RX ORDER — ROFLUMILAST 500 UG/1
500 TABLET ORAL DAILY
Status: ON HOLD | COMMUNITY

## 2023-09-14 RX ORDER — BUDESONIDE AND FORMOTEROL FUMARATE DIHYDRATE 160; 4.5 UG/1; UG/1
2 AEROSOL RESPIRATORY (INHALATION)
Status: ACTIVE | OUTPATIENT
Start: 2023-09-14

## 2023-09-14 RX ORDER — POLYETHYLENE GLYCOL 3350 17 G/17G
17 POWDER, FOR SOLUTION ORAL DAILY PRN
Status: CANCELLED | OUTPATIENT
Start: 2023-09-14

## 2023-09-14 RX ORDER — FLUTICASONE PROPIONATE 50 MCG
2 SPRAY, SUSPENSION (ML) NASAL 2 TIMES DAILY
Status: CANCELLED | OUTPATIENT
Start: 2023-09-14

## 2023-09-14 RX ORDER — ALBUTEROL SULFATE 2.5 MG/3ML
2.5 SOLUTION RESPIRATORY (INHALATION) EVERY 6 HOURS PRN
Status: CANCELLED | OUTPATIENT
Start: 2023-09-14

## 2023-09-14 RX ORDER — ENOXAPARIN SODIUM 100 MG/ML
40 INJECTION SUBCUTANEOUS DAILY
Status: DISPENSED | OUTPATIENT
Start: 2023-09-15

## 2023-09-14 RX ORDER — HYDROCODONE BITARTRATE AND ACETAMINOPHEN 7.5; 325 MG/1; MG/1
1 TABLET ORAL EVERY 6 HOURS PRN
Status: DISPENSED | OUTPATIENT
Start: 2023-09-14 | End: 2023-09-24

## 2023-09-14 RX ORDER — ALBUTEROL SULFATE 1.25 MG/3ML
1.25 SOLUTION RESPIRATORY (INHALATION) EVERY 6 HOURS PRN
Status: CANCELLED | OUTPATIENT
Start: 2023-09-14

## 2023-09-14 RX ORDER — FUROSEMIDE 20 MG/1
20 TABLET ORAL DAILY
Status: CANCELLED | OUTPATIENT
Start: 2023-09-15

## 2023-09-14 RX ORDER — ACETAMINOPHEN 325 MG/1
650 TABLET ORAL EVERY 4 HOURS PRN
Status: CANCELLED | OUTPATIENT
Start: 2023-09-14

## 2023-09-14 RX ORDER — POTASSIUM CHLORIDE 20 MEQ/1
20 TABLET, EXTENDED RELEASE ORAL DAILY
Status: DISCONTINUED | OUTPATIENT
Start: 2023-09-15 | End: 2023-09-15

## 2023-09-14 RX ORDER — POTASSIUM CHLORIDE 20 MEQ/1
20 TABLET, EXTENDED RELEASE ORAL DAILY
Status: CANCELLED | OUTPATIENT
Start: 2023-09-15

## 2023-09-14 RX ORDER — METHYLPREDNISOLONE 16 MG/1
4 TABLET ORAL
Status: DISPENSED | OUTPATIENT
Start: 2023-09-19 | End: 2023-09-20

## 2023-09-14 RX ORDER — METHYLPREDNISOLONE 16 MG/1
8 TABLET ORAL
Status: COMPLETED | OUTPATIENT
Start: 2023-09-14 | End: 2023-09-14

## 2023-09-14 RX ORDER — METHYLPREDNISOLONE 4 MG/1
4 TABLET ORAL
Status: CANCELLED | OUTPATIENT
Start: 2023-09-15 | End: 2023-09-16

## 2023-09-14 RX ORDER — ENOXAPARIN SODIUM 100 MG/ML
40 INJECTION SUBCUTANEOUS DAILY
Status: CANCELLED | OUTPATIENT
Start: 2023-09-15

## 2023-09-14 RX ORDER — ROFLUMILAST 500 UG/1
500 TABLET ORAL DAILY
Status: CANCELLED | OUTPATIENT
Start: 2023-09-15

## 2023-09-14 RX ORDER — METHYLPREDNISOLONE 4 MG/1
4 TABLET ORAL
Status: CANCELLED | OUTPATIENT
Start: 2023-09-18 | End: 2023-09-19

## 2023-09-14 RX ORDER — METHYLPREDNISOLONE 16 MG/1
4 TABLET ORAL
Status: COMPLETED | OUTPATIENT
Start: 2023-09-14 | End: 2023-09-14

## 2023-09-14 RX ORDER — HYDROCODONE BITARTRATE AND ACETAMINOPHEN 7.5; 325 MG/1; MG/1
1 TABLET ORAL EVERY 6 HOURS PRN
Status: CANCELLED | OUTPATIENT
Start: 2023-09-14 | End: 2023-09-24

## 2023-09-14 RX ORDER — METHYLPREDNISOLONE 4 MG/1
4 TABLET ORAL
Status: CANCELLED | OUTPATIENT
Start: 2023-09-16 | End: 2023-09-17

## 2023-09-14 RX ORDER — METHYLPREDNISOLONE 4 MG/1
4 TABLET ORAL
Status: CANCELLED | OUTPATIENT
Start: 2023-09-19 | End: 2023-09-20

## 2023-09-14 RX ORDER — ASPIRIN 81 MG/1
81 TABLET ORAL DAILY
Status: CANCELLED | OUTPATIENT
Start: 2023-09-15

## 2023-09-14 RX ORDER — METHYLPREDNISOLONE 16 MG/1
8 TABLET ORAL
Status: COMPLETED | OUTPATIENT
Start: 2023-09-15 | End: 2023-09-15

## 2023-09-14 RX ORDER — METHYLPREDNISOLONE 16 MG/1
4 TABLET ORAL
Status: COMPLETED | OUTPATIENT
Start: 2023-09-18 | End: 2023-09-18

## 2023-09-14 RX ORDER — POLYETHYLENE GLYCOL 3350 17 G/17G
17 POWDER, FOR SOLUTION ORAL DAILY PRN
Status: ON HOLD | COMMUNITY

## 2023-09-14 RX ORDER — METHYLPREDNISOLONE 4 MG/1
8 TABLET ORAL
Status: CANCELLED | OUTPATIENT
Start: 2023-09-14 | End: 2023-09-15

## 2023-09-14 RX ORDER — POLYETHYLENE GLYCOL 3350 17 G/17G
17 POWDER, FOR SOLUTION ORAL DAILY
Status: DISCONTINUED | OUTPATIENT
Start: 2023-09-14 | End: 2023-09-17

## 2023-09-14 RX ORDER — ALBUTEROL SULFATE 2.5 MG/3ML
2.5 SOLUTION RESPIRATORY (INHALATION) EVERY 4 HOURS PRN
Status: ON HOLD | COMMUNITY

## 2023-09-14 RX ORDER — POLYETHYLENE GLYCOL 3350 17 G/17G
17 POWDER, FOR SOLUTION ORAL DAILY
Status: CANCELLED | OUTPATIENT
Start: 2023-09-14

## 2023-09-14 RX ORDER — METHYLPREDNISOLONE 16 MG/1
8 TABLET ORAL
Status: COMPLETED | OUTPATIENT
Start: 2023-09-15 | End: 2023-09-14

## 2023-09-14 RX ORDER — CLOPIDOGREL BISULFATE 75 MG/1
75 TABLET ORAL DAILY
Status: DISPENSED | OUTPATIENT
Start: 2023-09-15

## 2023-09-14 RX ORDER — ALPRAZOLAM 1 MG/1
0.5 TABLET ORAL 2 TIMES DAILY PRN
Status: ON HOLD | COMMUNITY

## 2023-09-14 RX ORDER — ALBUTEROL SULFATE 1.25 MG/3ML
1.25 SOLUTION RESPIRATORY (INHALATION) EVERY 6 HOURS PRN
Status: ACTIVE | OUTPATIENT
Start: 2023-09-14

## 2023-09-14 RX ORDER — CLOPIDOGREL BISULFATE 75 MG/1
75 TABLET ORAL DAILY
Status: CANCELLED | OUTPATIENT
Start: 2023-09-15

## 2023-09-14 RX ORDER — ACETAMINOPHEN 325 MG/1
650 TABLET ORAL EVERY 4 HOURS PRN
Status: DISPENSED | OUTPATIENT
Start: 2023-09-14

## 2023-09-14 RX ORDER — ALPRAZOLAM 0.5 MG/1
0.5 TABLET ORAL DAILY PRN
Status: DISPENSED | OUTPATIENT
Start: 2023-09-14 | End: 2023-09-21

## 2023-09-14 RX ORDER — ALBUTEROL SULFATE 2.5 MG/3ML
2.5 SOLUTION RESPIRATORY (INHALATION) EVERY 4 HOURS PRN
Status: CANCELLED | OUTPATIENT
Start: 2023-09-14

## 2023-09-14 RX ORDER — METHYLPREDNISOLONE 4 MG/1
4 TABLET ORAL
Status: CANCELLED | OUTPATIENT
Start: 2023-09-17 | End: 2023-09-18

## 2023-09-14 RX ORDER — METHYLPREDNISOLONE 4 MG/1
4 TABLET ORAL
Status: CANCELLED | OUTPATIENT
Start: 2023-09-14 | End: 2023-09-15

## 2023-09-14 RX ORDER — HYDROCODONE BITARTRATE AND ACETAMINOPHEN 10; 325 MG/1; MG/1
1 TABLET ORAL EVERY 6 HOURS PRN
Status: CANCELLED | OUTPATIENT
Start: 2023-09-14

## 2023-09-14 RX ORDER — POTASSIUM CHLORIDE 750 MG/1
10 CAPSULE, EXTENDED RELEASE ORAL DAILY
Status: ON HOLD | COMMUNITY

## 2023-09-14 RX ORDER — POTASSIUM CHLORIDE 20 MEQ/1
10 TABLET, EXTENDED RELEASE ORAL DAILY
Status: CANCELLED | OUTPATIENT
Start: 2023-09-15

## 2023-09-14 RX ORDER — ROFLUMILAST 500 UG/1
250 TABLET ORAL DAILY
Status: CANCELLED | OUTPATIENT
Start: 2023-09-15

## 2023-09-14 RX ORDER — FUROSEMIDE 20 MG/1
20 TABLET ORAL DAILY
Status: DISPENSED | OUTPATIENT
Start: 2023-09-15

## 2023-09-14 RX ORDER — METHYLPREDNISOLONE 16 MG/1
4 TABLET ORAL
Status: DISPENSED | OUTPATIENT
Start: 2023-09-15 | End: 2023-09-16

## 2023-09-14 RX ORDER — FLUTICASONE PROPIONATE 50 MCG
2 SPRAY, SUSPENSION (ML) NASAL 2 TIMES DAILY
Status: ON HOLD | COMMUNITY

## 2023-09-14 RX ORDER — ALPRAZOLAM 0.5 MG/1
0.5 TABLET ORAL 2 TIMES DAILY PRN
Status: CANCELLED | OUTPATIENT
Start: 2023-09-14

## 2023-09-14 RX ORDER — ASPIRIN 81 MG/1
81 TABLET ORAL DAILY
Status: DISPENSED | OUTPATIENT
Start: 2023-09-15

## 2023-09-14 RX ORDER — ALPRAZOLAM 0.5 MG/1
0.5 TABLET ORAL DAILY PRN
Status: CANCELLED | OUTPATIENT
Start: 2023-09-14 | End: 2023-09-21

## 2023-09-14 RX ORDER — ROFLUMILAST 500 UG/1
250 TABLET ORAL DAILY
Status: DISCONTINUED | OUTPATIENT
Start: 2023-09-15 | End: 2023-09-18

## 2023-09-14 RX ORDER — IPRATROPIUM BROMIDE AND ALBUTEROL SULFATE 2.5; .5 MG/3ML; MG/3ML
3 SOLUTION RESPIRATORY (INHALATION) 4 TIMES DAILY PRN
Status: CANCELLED | OUTPATIENT
Start: 2023-09-14

## 2023-09-14 RX ORDER — METHYLPREDNISOLONE 16 MG/1
4 TABLET ORAL
Status: COMPLETED | OUTPATIENT
Start: 2023-09-15 | End: 2023-09-15

## 2023-09-14 RX ADMIN — METHYLPREDNISOLONE 4 MG: 16 TABLET ORAL at 20:20

## 2023-09-14 RX ADMIN — METHYLPREDNISOLONE 8 MG: 16 TABLET ORAL at 20:22

## 2023-09-14 RX ADMIN — METHYLPREDNISOLONE 8 MG: 16 TABLET ORAL at 20:24

## 2023-09-14 RX ADMIN — METOPROLOL TARTRATE 25 MG: 25 TABLET, FILM COATED ORAL at 20:24

## 2023-09-14 RX ADMIN — BUDESONIDE AND FORMOTEROL FUMARATE DIHYDRATE 2 PUFF: 160; 4.5 AEROSOL RESPIRATORY (INHALATION) at 20:37

## 2023-09-14 NOTE — PROGRESS NOTES
Rehabilitation Nursing  Inpatient Rehabilitation Plan of Care Note    Plan of Care  Pain    Pain Management (Active)  Current Status (9/14/2023 6:29:00 PM): Potential for increased pain  Weekly Goal: Pain adequately managed  Discharge Goal: Pain adequately managed    Body Function Structure    Skin Integrity (Active)  Current Status (9/14/2023 6:29:00 PM): Stage 2 pressure injuries  Weekly Goal: Healing of wound  Discharge Goal: Healing of wound    Safety    Potential for Injury (Active)  Current Status (9/14/2023 6:29:00 PM): Potential for falls  Weekly Goal: No falls  Discharge Goal: No falls    Signed by: Brian Mejia RN

## 2023-09-15 LAB
ALBUMIN SERPL-MCNC: 3.8 G/DL (ref 3.5–5.2)
ALBUMIN/GLOB SERPL: 1.5 G/DL
ALP SERPL-CCNC: 48 U/L (ref 39–117)
ALT SERPL W P-5'-P-CCNC: 24 U/L (ref 1–33)
ANION GAP SERPL CALCULATED.3IONS-SCNC: 7.3 MMOL/L (ref 5–15)
AST SERPL-CCNC: 15 U/L (ref 1–32)
BASOPHILS # BLD AUTO: 0.06 10*3/MM3 (ref 0–0.2)
BASOPHILS NFR BLD AUTO: 0.3 % (ref 0–1.5)
BILIRUB SERPL-MCNC: 0.4 MG/DL (ref 0–1.2)
BUN SERPL-MCNC: 30 MG/DL (ref 6–20)
BUN/CREAT SERPL: 73.2 (ref 7–25)
CALCIUM SPEC-SCNC: 9.3 MG/DL (ref 8.6–10.5)
CHLORIDE SERPL-SCNC: 94 MMOL/L (ref 98–107)
CO2 SERPL-SCNC: 37.7 MMOL/L (ref 22–29)
CREAT SERPL-MCNC: 0.41 MG/DL (ref 0.57–1)
DEPRECATED RDW RBC AUTO: 50.7 FL (ref 37–54)
EGFRCR SERPLBLD CKD-EPI 2021: 114.2 ML/MIN/1.73
EOSINOPHIL # BLD AUTO: 0 10*3/MM3 (ref 0–0.4)
EOSINOPHIL NFR BLD AUTO: 0 % (ref 0.3–6.2)
ERYTHROCYTE [DISTWIDTH] IN BLOOD BY AUTOMATED COUNT: 14.6 % (ref 12.3–15.4)
GLOBULIN UR ELPH-MCNC: 2.6 GM/DL
GLUCOSE BLDC GLUCOMTR-MCNC: 285 MG/DL (ref 70–130)
GLUCOSE SERPL-MCNC: 202 MG/DL (ref 65–99)
HCT VFR BLD AUTO: 49.8 % (ref 34–46.6)
HGB BLD-MCNC: 14.7 G/DL (ref 12–15.9)
IMM GRANULOCYTES # BLD AUTO: 0.46 10*3/MM3 (ref 0–0.05)
IMM GRANULOCYTES NFR BLD AUTO: 2.2 % (ref 0–0.5)
LYMPHOCYTES # BLD AUTO: 0.9 10*3/MM3 (ref 0.7–3.1)
LYMPHOCYTES NFR BLD AUTO: 4.2 % (ref 19.6–45.3)
MCH RBC QN AUTO: 28.1 PG (ref 26.6–33)
MCHC RBC AUTO-ENTMCNC: 29.5 G/DL (ref 31.5–35.7)
MCV RBC AUTO: 95.2 FL (ref 79–97)
MONOCYTES # BLD AUTO: 0.95 10*3/MM3 (ref 0.1–0.9)
MONOCYTES NFR BLD AUTO: 4.5 % (ref 5–12)
NEUTROPHILS NFR BLD AUTO: 18.93 10*3/MM3 (ref 1.7–7)
NEUTROPHILS NFR BLD AUTO: 88.8 % (ref 42.7–76)
NRBC BLD AUTO-RTO: 0 /100 WBC (ref 0–0.2)
PLATELET # BLD AUTO: 205 10*3/MM3 (ref 140–450)
PMV BLD AUTO: 10.8 FL (ref 6–12)
POTASSIUM SERPL-SCNC: 5.3 MMOL/L (ref 3.5–5.2)
PROT SERPL-MCNC: 6.4 G/DL (ref 6–8.5)
RBC # BLD AUTO: 5.23 10*6/MM3 (ref 3.77–5.28)
SODIUM SERPL-SCNC: 139 MMOL/L (ref 136–145)
WBC NRBC COR # BLD: 21.3 10*3/MM3 (ref 3.4–10.8)

## 2023-09-15 PROCEDURE — 63710000001 METHYLPREDNISOLONE PER 4 MG: Performed by: INTERNAL MEDICINE

## 2023-09-15 PROCEDURE — 85025 COMPLETE CBC W/AUTO DIFF WBC: CPT | Performed by: INTERNAL MEDICINE

## 2023-09-15 PROCEDURE — 97535 SELF CARE MNGMENT TRAINING: CPT

## 2023-09-15 PROCEDURE — 94760 N-INVAS EAR/PLS OXIMETRY 1: CPT

## 2023-09-15 PROCEDURE — 80053 COMPREHEN METABOLIC PANEL: CPT | Performed by: INTERNAL MEDICINE

## 2023-09-15 PROCEDURE — 97167 OT EVAL HIGH COMPLEX 60 MIN: CPT

## 2023-09-15 PROCEDURE — 97530 THERAPEUTIC ACTIVITIES: CPT

## 2023-09-15 PROCEDURE — 97116 GAIT TRAINING THERAPY: CPT

## 2023-09-15 PROCEDURE — 25010000002 ENOXAPARIN PER 10 MG: Performed by: INTERNAL MEDICINE

## 2023-09-15 PROCEDURE — 82948 REAGENT STRIP/BLOOD GLUCOSE: CPT

## 2023-09-15 PROCEDURE — 97110 THERAPEUTIC EXERCISES: CPT

## 2023-09-15 PROCEDURE — 94799 UNLISTED PULMONARY SVC/PX: CPT

## 2023-09-15 PROCEDURE — 97161 PT EVAL LOW COMPLEX 20 MIN: CPT

## 2023-09-15 RX ORDER — CASTOR OIL AND BALSAM, PERU 788; 87 MG/G; MG/G
1 OINTMENT TOPICAL EVERY 12 HOURS SCHEDULED
Status: DISPENSED | OUTPATIENT
Start: 2023-09-15

## 2023-09-15 RX ADMIN — HYDROCODONE BITARTRATE AND ACETAMINOPHEN 1 TABLET: 7.5; 325 TABLET ORAL at 21:31

## 2023-09-15 RX ADMIN — HYDROCODONE BITARTRATE AND ACETAMINOPHEN 1 TABLET: 7.5; 325 TABLET ORAL at 04:30

## 2023-09-15 RX ADMIN — CASTOR OIL AND BALSAM, PERU 1 APPLICATION: 788; 87 OINTMENT TOPICAL at 20:09

## 2023-09-15 RX ADMIN — NYSTATIN 500000 UNITS: 100000 SUSPENSION ORAL at 11:32

## 2023-09-15 RX ADMIN — ENOXAPARIN SODIUM 40 MG: 40 INJECTION SUBCUTANEOUS at 08:38

## 2023-09-15 RX ADMIN — METHYLPREDNISOLONE 8 MG: 16 TABLET ORAL at 06:16

## 2023-09-15 RX ADMIN — METHYLPREDNISOLONE 4 MG: 16 TABLET ORAL at 17:11

## 2023-09-15 RX ADMIN — FUROSEMIDE 20 MG: 20 TABLET ORAL at 08:37

## 2023-09-15 RX ADMIN — ALPRAZOLAM 0.5 MG: 0.5 TABLET ORAL at 08:37

## 2023-09-15 RX ADMIN — ASPIRIN 81 MG: 81 TABLET, COATED ORAL at 08:37

## 2023-09-15 RX ADMIN — HYDROCODONE BITARTRATE AND ACETAMINOPHEN 1 TABLET: 7.5; 325 TABLET ORAL at 16:07

## 2023-09-15 RX ADMIN — METHYLPREDNISOLONE 4 MG: 16 TABLET ORAL at 12:14

## 2023-09-15 RX ADMIN — METHYLPREDNISOLONE 4 MG: 16 TABLET ORAL at 06:17

## 2023-09-15 RX ADMIN — ROFLUMILAST 250 MCG: 500 TABLET ORAL at 08:38

## 2023-09-15 RX ADMIN — METOPROLOL TARTRATE 25 MG: 25 TABLET, FILM COATED ORAL at 08:37

## 2023-09-15 RX ADMIN — ACETAMINOPHEN 650 MG: 325 TABLET ORAL at 20:07

## 2023-09-15 RX ADMIN — HYDROCODONE BITARTRATE AND ACETAMINOPHEN 1 TABLET: 7.5; 325 TABLET ORAL at 10:14

## 2023-09-15 RX ADMIN — NYSTATIN 500000 UNITS: 100000 SUSPENSION ORAL at 17:11

## 2023-09-15 RX ADMIN — CLOPIDOGREL BISULFATE 75 MG: 75 TABLET, FILM COATED ORAL at 08:37

## 2023-09-15 RX ADMIN — NYSTATIN 500000 UNITS: 100000 SUSPENSION ORAL at 20:10

## 2023-09-15 RX ADMIN — METOPROLOL TARTRATE 25 MG: 25 TABLET, FILM COATED ORAL at 20:07

## 2023-09-15 NOTE — PROGRESS NOTES
Rehabilitation Nursing  Inpatient Rehabilitation Plan of Care Note    Plan of Care  Copy from POC    Pain    Pain Management (Active)  Current Status (9/14/2023 6:29:00 PM): Potential for increased pain  Weekly Goal: Pain adequately managed  Discharge Goal: Pain adequately managed    Body Function Structure    Skin Integrity (Active)  Current Status (9/14/2023 6:29:00 PM): Stage 2 pressure injuries  Weekly Goal: Healing of wound  Discharge Goal: Healing of wound    Safety    Potential for Injury (Active)  Current Status (9/14/2023 6:29:00 PM): Potential for falls  Weekly Goal: No falls  Discharge Goal: No falls    Signed by: Shanice Quispe Nurse

## 2023-09-15 NOTE — H&P
Psychiatric HOSPITALIST HISTORY AND PHYSICAL    Patient Identification:  Name:  Liz Jiang  Age:  57 y.o.  Sex:  female  :  1965  MRN:  [id number]   Visit Number:  [id number]  Room number:  107/2S  Primary Care Physician:  Paty Fischer APRN     Subjective     2023   Chief complaint: Follow-up on debility from hospitalization    Assisted by: Brian      History of presenting illness:  57 y.o. female  This pt is seen today for post admission physician evaluation to the inpatient rehabilitation facility.  As above, patient is a 57-year-old female with history of very severe COPD, last PFTs I saw several years ago or below 30 FEV1 percentage.  She presented with increasing arm and leg edema and increasing shortness of breath, she was treated for pneumonia and CHF.  She states breathing wise she does feel better and she thinks her legs have less swelling.  He currently denies any chest pain.  He does live alone, she was thought by the providers at the outlying hospital to be in need of acute inpatient rehab.  Patient continued to progress medically.  Physical therapy and Occupational therapy evaluations were completed with recommended acute inpatient rehabilitation referral for continued functional mobility intervention and reeducation.  Acute rehab referral ordered for continued medical monitoring and management post prolonged hospitalization, continued respiratory status monitoring, lab monitoring, pain mgt needs, bowel/bladder care with new medication education, skin monitoring and breakdown prevention along with ongoing medical comorbidities that require ongoing care.    The preadmission mini-FIM score as assessed by the referring facility as follows: Eating 6, grooming 4, bathing 3, upper body dressing 4, lower body dressing 3, toileting 3, transfers 5, toilet transfer 5, locomotion 5, bladder management 4, bowel management 4, comprehension 7, expression 7, social and action 7,  problem-solving 7, memory 7    ---------------------------------------------------------------------------------------------------------------------   Review of Systems:  General: She feels weak but otherwise no fever or chills  HEENT: She says her tongue is burning and she thinks is a yeast infection no recent visual changes or hearing loss  Heart: No chest pain or palpitations  Lungs: She feels like her breathing is doing okay she does get some shortness of breath with mobilization, she wears oxygen at 3 and half liters at home.  For the most part can still do her ADLs prior to going in the hospital.  Abdomen: She does have a history of cancer she thinks in the rectal area, she states she does stay constipated  : No dysuria or other urinary symptoms  Musculoskeletal: No known joint effusions  Neuro: No paresthesias  Skin: She does not think she has any skin rashes  ---------------------------------------------------------------------------------------------------------------------   Past Medical History:   Diagnosis Date    Acid reflux disease     Anal cancer 12/5/2019    Arthritis     Asthma, extrinsic     Cholelithiasis     Chronic headaches     COPD (chronic obstructive pulmonary disease)     CVA (cerebral vascular accident)     w/ right sided deficit    CVA (cerebral vascular accident)     Diabetes mellitus     only has high blood sugar when on steriods    History of radiation therapy 02/27/2020    Whole pelvis/anal mass    Obstructive sleep apnea treated with BiPAP     On home oxygen therapy     2L     Sleep apnea, obstructive      Past Surgical History:   Procedure Laterality Date    ABDOMINAL SURGERY      CARDIAC CATHETERIZATION      CAROTID ENDARTERECTOMY Left 2018    CHOLECYSTECTOMY WITH INTRAOPERATIVE CHOLANGIOGRAM N/A 1/30/2017    Procedure: CHOLECYSTECTOMY LAPAROSCOPIC INTRAOPERATIVE CHOLANGIOGRAM;  Surgeon: Carolin Marie MD;  Location: [facility];  Service:     COLONOSCOPY      HYSTERECTOMY       for heavy bleeding/ complete    KIDNEY STONE SURGERY      TUBAL ABDOMINAL LIGATION      VENOUS ACCESS DEVICE (PORT) INSERTION Left 2019    Procedure: INSERTION VENOUS ACCESS DEVICE;  Surgeon: Carolin Marie MD;  Location: [facility];  Service: General     Family History   Problem Relation Age of Onset    Diabetes Mother     Hypertension Mother     Arrhythmia Mother     Pneumonia Father     Coronary artery disease Other     Arrhythmia Sister     Heart attack Brother     Lymphoma Brother         hodgkin's     Other Brother         CABG    Breast cancer Neg Hx      Social History     Socioeconomic History    Marital status:    Tobacco Use    Smoking status: Former     Packs/day: 1.50     Years: 30.00     Pack years: 45.00     Types: Electronic Cigarette, Cigarettes     Quit date:      Years since quittin.7    Smokeless tobacco: Never   Vaping Use    Vaping Use: Unknown   Substance and Sexual Activity    Alcohol use: No    Drug use: Defer    Sexual activity: Defer     Patient states she quit smoking about 2 years ago, she lives alone but does have family that checks on her.  ---------------------------------------------------------------------------------------------------------------------   Allergies:  Morphine and Roflumilast  ---------------------------------------------------------------------------------------------------------------------   Medications from discharge summary reviewed    Prior to Admission Medications       Prescriptions Last Dose Informant Patient Reported? Taking?    albuterol (PROVENTIL) (2.5 MG/3ML) 0.083% nebulizer solution Past Month Self, Child Yes Yes    Take 2.5 mg by nebulization Every 4 (Four) Hours As Needed for Wheezing or Shortness of Air.    albuterol sulfate  (90 Base) MCG/ACT inhaler Past Month Self, Child No Yes    Inhale 2 puffs Every 6 (Six) Hours As Needed for Wheezing or Shortness of Air.    ALPRAZolam (XANAX) 1 MG tablet Past Month Self, Child  Yes Yes    Take 0.5 tablets by mouth 2 (Two) Times a Day As Needed for Anxiety or Sleep.    ASPIRIN LOW DOSE 81 MG EC tablet Past Month Self, Child Yes Yes    Take 1 tablet by mouth Daily.    clopidogrel (PLAVIX) 75 MG tablet Past Month Self, Child Yes Yes    Take 1 tablet by mouth Daily.    fluticasone (FLONASE) 50 MCG/ACT nasal spray Past Month Self, Child Yes Yes    2 sprays into the nostril(s) as directed by provider 2 (Two) Times a Day.    Fluticasone-Umeclidin-Vilant 200-62.5-25 MCG/ACT aerosol powder  Past Month Child, Self Yes Yes    Inhale 1 puff Daily.    furosemide (LASIX) 20 MG tablet Past Month Self, Child Yes Yes    Take 1 tablet by mouth Daily.    HYDROcodone-acetaminophen (NORCO)  MG per tablet Past Month Self, Child Yes Yes    Take 1 tablet by mouth Every 6 (Six) Hours As Needed for Mild Pain or Moderate Pain.    ipratropium-albuterol (COMBIVENT RESPIMAT)  MCG/ACT inhaler Past Month Self, Child Yes Yes    Inhale 1 puff 4 (Four) Times a Day As Needed for Wheezing or Shortness of Air.    metoprolol tartrate (LOPRESSOR) 25 MG tablet Past Month Self, Child Yes Yes    Take 1 tablet by mouth 2 (Two) Times a Day.    polyethylene glycol (MIRALAX) 17 g packet Past Month Self, Child Yes Yes    Take 17 g by mouth Daily As Needed (constipation).    potassium chloride (MICRO-K) 10 MEQ CR capsule Past Month Self, Child Yes Yes    Take 1 capsule by mouth Daily.    roflumilast (DALIRESP) 500 MCG tablet tablet Past Month Self, Child Yes Yes    Take 1 tablet by mouth Daily.          Objective     Vital Signs:  Temp:  [97.8 °F (36.6 °C)-98.7 °F (37.1 °C)] 97.8 °F (36.6 °C)  Heart Rate:  [] 83  Resp:  [20] 20  BP: (120-153)/(76-92) 120/85    No data found.  SpO2:  [91 %-96 %] 96 %  on  Flow (L/min):  [4] 4;   Device (Oxygen Therapy): nasal cannula  Body mass index is 38.45 kg/m².    Wt Readings from Last 3 Encounters:   09/14/23 102 kg (224 lb)   02/23/22 77.1 kg (170 lb)   09/14/20 76.8 kg (169 lb  6.4 oz)      ---------------------------------------------------------------------------------------------------------------------     Physical exam:  Constitutional: She can speak in full sentence without difficulty, no distress, severely obese by BMI of 39  HEENT: Hearing is intact, pupils equal face symmetric she does have evidence of thrush on her tongue  Neck: Supple without JVD  Cardiovascular: Regular rate and rhythm without murmur rub or gallop  Pulmonary/Chest: Bilateral breath sounds, diminished at the bases clear otherwise no rhonchi rales or wheezing  Abdominal: Rounded soft benign nondistended nontender.   Musculoskeletal:  strength overall is symmetric, probably I would rate this 4/5.  Neurological: Nonfocal alert and oriented  Skin: Warm and dry  Peripheral vascular: No clubbing or cyanosis, trace edema only of her lower extremities  Genitourinary: No Herrera catheter present  ---------------------------------------------------------------------------------------------------------------------  EKG: Last EKG in our records 2022 shows a right bundle with sinus tachycardia.      Echocardiogram done at the [facility] 9/8/2023 showed a normal EF of 65%.  She had grade 2 diastolic dysfunction.  No significant valvular abnormalities were seen and she had an RVSP of 28  Last echocardiogram:  Results for orders placed during the hospital encounter of 08/24/21    Adult Transthoracic Echo Complete W/ Cont if Necessary Per Protocol    Interpretation Summary  · Left ventricular ejection fraction appears to be 66 - 70%. Left ventricular systolic function is normal.  · Left ventricular diastolic function is consistent with (grade I) impaired relaxation.    --------------------------------------------------------------------------------------------------------------------  Labs:  Results from last 7 days   Lab Units 09/15/23  0458   WBC 10*3/mm3 21.30*   HEMOGLOBIN g/dL 14.7   HEMATOCRIT % 49.8*   MCV fL  95.2   MCHC g/dL 29.5*   PLATELETS 10*3/mm3 205         Results from last 7 days   Lab Units 09/15/23  0458   SODIUM mmol/L 139   POTASSIUM mmol/L 5.3*   CHLORIDE mmol/L 94*   CO2 mmol/L 37.7*   BUN mg/dL 30*   CREATININE mg/dL 0.41*   CALCIUM mg/dL 9.3   GLUCOSE mg/dL 202*   ALBUMIN g/dL 3.8   BILIRUBIN mg/dL 0.4   ALK PHOS U/L 48   AST (SGOT) U/L 15   ALT (SGPT) U/L 24   Estimated Creatinine Clearance: 173.8 mL/min (A) (by C-G formula based on SCr of 0.41 mg/dL (L)).  No results found for: AMMONIA          No results found for: HGBA1C, POCGLU  Lab Results   Component Value Date    TSH 5.090 (H) 04/29/2020    FREET4 1.10 02/11/2014     No results found for: PREGTESTUR, PREGSERUM, HCG, HCGQUANT  Pain Management Panel          Latest Ref Rng & Units 2/23/2022   Pain Management Panel   Amphetamine, Urine Qual Negative Negative    Barbiturates Screen, Urine Negative Negative    Benzodiazepine Screen, Urine Negative Positive    Buprenorphine, Screen, Urine Negative Negative    Cocaine Screen, Urine Negative Negative    Methadone Screen , Urine Negative Negative    Methamphetamine, Ur Negative Negative      Brief Urine Lab Results       None          No results found for: BLOODCX  No results found for: URINECX  No results found for: WOUNDCX  No results found for: STOOLCX    Last Urine Toxicity          Latest Ref Rng & Units 2/23/2022   LAST URINE TOXICITY RESULTS   Amphetamine, Urine Qual Negative Negative    Barbiturates Screen, Urine Negative Negative    Benzodiazepine Screen, Urine Negative Positive    Buprenorphine, Screen, Urine Negative Negative    Cocaine Screen, Urine Negative Negative    Methadone Screen , Urine Negative Negative    Methamphetamine, Ur Negative Negative        I have personally looked at the labs and they are summarized above.  ----------------------------------------------------------------------------------------------------------------------  Detailed radiology reports for the last 24  hours:    Imaging Results (Last 24 Hours)       ** No results found for the last 24 hours. **          Final impressions for the last 30 days of radiology reports:    X-ray chest PA and lateral    Result Date: 9/13/2023  Mild right basilar atelectasis. Images reviewed, interpreted, and dictated by Dr. Abiel Siu. Transcribed by Yaz Quick PA-C.    XR chest AP portable    Result Date: 9/9/2023  There has been no significant interval change  .  Continued follow up recommended . Images reviewed, interpreted, and dictated by Dr. DIANE Womack. Transcribed by Cristhian Neff PA-C.    US abdomen limited    Result Date: 9/7/2023  Fatty liver.. Images reviewed, interpreted, and dictated by Sharlene Norwood MD    XR chest AP portable    Result Date: 9/7/2023  Low lung volumes with worsening right basilar opacity which may represent atelectasis or pneumonia. Images reviewed, interpreted, and dictated by Sharlene Norwood MD   I have personally looked at the radiology images and read the final radiology report.    Assessment & Plan    Debility status post hospitalization for COPD exacerbation and diastolic congestive heart failure.  Patient lives alone and would like to return there.  Patient being admitted to acute inpatient rehab facility for intensive occupational physical therapy.  Patient undergo physical therapy 1 to 2 hours a day 5 to 6 days a week for therapeutic exercise, range of motion, endurance, gait training, safety, stairs, strengthening.  Patient undergo occupational therapy 1 to 2 hours a day 5 to 6 days a week for dressing, ADLs, feeding, home skills, safety, toileting techniques.  Anticipated functional improvement for safe discharge will be for the patient to be able to safely perform activities of daily living using adaptive equipment for improved functional mobility.  Be independent and safe with bowel and bladder function.  Be able to safely navigate home and community territory safely without  injury or falls.  Anticipated disposition as above he has home, anticipated DME bedside commode, wheelchair, walker dissipated length of stay 14 days    COPD, patient completed antibiotic course at the [facility], they recommended a Medrol Dosepak which I have ordered.  Does not appear to be having an exacerbation at this time and last chest x-ray was overall clear.  She is already quit smoking.  Continue inhalants.  Monitor.  Currently on 4 L which is close to her baseline of 3.5.    Hypertension, currently on Lopressor and Lasix, blood pressure borderline, follow and adjust as needed.    Grade 2 diastolic dysfunction, overall appears compensated, continue Lasix therapy.    Mild hyperkalemia, I have discontinued her scheduled potassium, recheck in a.m.    Leukocytosis, overall benign differential, I feel like this is secondary to her steroid therapy, follow intermittently.  Last CBC I see at the [facility] was 9/8 which was normal at that time.    DVT prophylaxis, subcu Lovenox    Thrush, nystatin swish and swallow    Anal cancer, and review of the records she has missed multiple appointments with oncology the last appointment I see that she kept was September 14, 2020.  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  Patient appears to have completed radiation and it was recommended she had chemo although not sure she actually got that.    Constipation, stool softeners    Chronic anxiety, continue as needed Xanax    2 CVA, on DAPT,    Transaminitis, resolved    History of diabetes, glucose was 202 but she is on steroids, monitor glucometers and check A1c, add as needed insulin if needed      VTE Prophylaxis:   Mechanical Order History:       None          Pharmalogical Order History:        Ordered     Dose Route Frequency Stop    09/14/23 1829  Enoxaparin Sodium (LOVENOX) syringe 40 mg         40 mg SC Daily --                    Falls Risk Assessment  this patient is generalized weakness and shortness of breath, high risk of falls.       Fransisco Barrett MD  [facility]  09/15/23  07:54 EDT

## 2023-09-15 NOTE — PLAN OF CARE
Goal Outcome Evaluation:  Plan of Care Reviewed With: patient        Progress: improving       Problem: Fall Injury Risk  Goal: Absence of Fall and Fall-Related Injury  Outcome: Ongoing, Progressing  Intervention: Identify and Manage Contributors  Recent Flowsheet Documentation  Taken 9/15/2023 0800 by Brian Mejia RN  Medication Review/Management: medications reviewed  Intervention: Promote Injury-Free Environment  Recent Flowsheet Documentation  Taken 9/15/2023 0800 by Brian Mejia RN  Safety Promotion/Fall Prevention: safety round/check completed     Problem: Rehabilitation (IRF) Plan of Care  Goal: Plan of Care Review  Outcome: Ongoing, Progressing  Flowsheets (Taken 9/15/2023 0936)  Progress: improving  Plan of Care Reviewed With: patient  Goal: Patient-Specific Goal (Individualized)  Outcome: Ongoing, Progressing  Goal: Absence of New-Onset Illness or Injury  Outcome: Ongoing, Progressing  Intervention: Prevent Fall and Fall Injury  Recent Flowsheet Documentation  Taken 9/15/2023 0800 by Brian Mejia RN  Safety Promotion/Fall Prevention: safety round/check completed  Intervention: Prevent Infection  Recent Flowsheet Documentation  Taken 9/15/2023 0800 by Brian Mejia RN  Infection Prevention:   hand hygiene promoted   rest/sleep promoted  Intervention: Prevent VTE (Venous Thromboembolism)  Recent Flowsheet Documentation  Taken 9/15/2023 0800 by Brian Mejia RN  VTE Prevention/Management: (Lovenox) other (see comments)  Goal: Optimal Comfort and Wellbeing  Outcome: Ongoing, Progressing  Goal: Home and Community Transition Plan Established  Outcome: Ongoing, Progressing     Problem: Skin Injury Risk Increased  Goal: Skin Health and Integrity  Outcome: Ongoing, Progressing  Intervention: Promote and Optimize Oral Intake  Recent Flowsheet Documentation  Taken 9/15/2023 0800 by Brian Mejia RN  Oral Nutrition Promotion: physical activity promoted  Intervention: Optimize Skin Protection  Recent Flowsheet  Documentation  Taken 9/15/2023 0800 by Brian Mejia RN  Pressure Reduction Techniques:   frequent weight shift encouraged   heels elevated off bed   weight shift assistance provided   positioned off wounds  Pressure Reduction Devices:   pressure-redistributing mattress utilized   heel offloading device utilized   positioning supports utilized  Skin Protection:   adhesive use limited   incontinence pads utilized     Problem: Pain Chronic (Persistent) (Comorbidity Management)  Goal: Acceptable Pain Control and Functional Ability  Outcome: Ongoing, Progressing  Intervention: Manage Persistent Pain  Recent Flowsheet Documentation  Taken 9/15/2023 0800 by Brian Mejia RN  Sleep/Rest Enhancement: regular sleep/rest pattern promoted  Medication Review/Management: medications reviewed  Intervention: Develop Pain Management Plan  Recent Flowsheet Documentation  Taken 9/15/2023 0800 by Brina Mejia RN  Pain Management Interventions: see MAR  Intervention: Optimize Psychosocial Wellbeing  Recent Flowsheet Documentation  Taken 9/15/2023 0800 by Brian Mejia RN  Supportive Measures: active listening utilized  Diversional Activities: television

## 2023-09-15 NOTE — NURSING NOTE
Wound consult for denuded area to the bilateral buttocks. Areas present with multiple blood filled bullas bilateral gluteals. There jennifer wound skin is pink, moist and non blanchable. New order for Venelex BID and PRN. Pressure injury prevention protocols in place.

## 2023-09-15 NOTE — PROGRESS NOTES
Patient Assessment Instrument  Quality Indicators - Admission FY 2023    Section A. Ethnicity/ Race/Language  Ethnicity:  Race:  Preferred Language:    Section A. Transportation      Section B. Hearing and Vision        Section B. Health Literacy        Section C. Cognitive Patterns      Section C. Signs and Symptoms of Delirium (from CAM)      Section D. Mood      Section D. Social Isolation      Section DI0892. Prior Functioning      Section [id number]. Prior Device Use      Section [id number]. Self Care Performance     Eating: Thompson sets up or cleans up; patient completes activity. Thompson assists  only prior to or following the activity.   Oral Hygiene: Thompson sets up or cleans up; patient completes activity. Thompson  assists only prior to or following the activity.   Toileting Hygiene: Thompson does less than half the effort. Thompson lifts, holds  or supports trunk or limbs but provides less than half the effort.   Shower/Bathe Self: Thompson does less than half the effort. Thompson lifts, holds  or supports trunk or limbs but provides less than half the effort.   Upper Body Dressing: Thompson sets up or cleans up; patient completes activity.  Thompson assists only prior to or following the activity.   Lower Body Dressing: Thompson does less than half the effort. Thompson lifts, holds  or supports trunk or limbs but provides less than half the effort.   Putting On/Taking Off Footwear: Thompson does less than half the effort. Thompson  lifts, holds or supports trunk or limbs but provides less than half the effort.    Section [id number]. Self Care Discharge Goals     Eating: Patient completed the activities by themself with no assistance from a  helper.   Oral Hygiene: Thompson sets up or cleans up; patient completes activity. Thompson  assists only prior to or following the activity.   Toileting Hygiene: Thompson provides verbal cues or touching/steadying assistance  as patient completes activity.   Shower/Bathe Self: Thompson provides verbal cues or  touching/steadying assistance  as patient completes activity.   Upper Body Dressing: Montgomery sets up or cleans up; patient completes activity.  Montgomery assists only prior to or following the activity.   Lower Body Dressing: Montgomery provides verbal cues or touching/steadying  assistance as patient completes activity.   Putting On/Taking Off Footwear: Montgomery provides verbal cues or  touching/steadying assistance as patient completes activity.    Section [id number]. Mobility Performance      Section [id number]. Mobility Discharge Goals      Section H. Bladder and Bowel      Section I. Active Diagnosis      Section J. Health Conditions      Section J. Health Conditions (Pain)      Section K. Swallowing/Nutritional Status    Nutritional Approaches on Admission:    Section M. Skin Conditions      Section N. Medication        Section O. Special Treatments, Procedures, and Programs    Signed by: Shaina Ballard OT

## 2023-09-15 NOTE — SIGNIFICANT NOTE
09/15/23 1216   Living Environment   People in Home child(isidro), adult;grandchild(isidro)   Name(s) of People in Home Daughter Paloma Jiang, milka Jiang and son Demetrius Peterson comes and goes.   Current Living Arrangements home   Potentially Unsafe Housing Conditions none   Primary Care Provided by self   Provides Primary Care For no one, unable/limited ability to care for self   Family Caregiver if Needed child(isidro), adult   Family Caregiver Names Daughter Paloma Jiang and son Tae Jiang   Quality of Family Relationships supportive   Able to Return to Prior Arrangements yes   Living Arrangement Comments SS spoke to pt and son Pradeep Peterson.  Pt is  and lives at home with daughter Paloma Jiang and grandnathanael Jiang.  Son Demetrius Peterson comes and goes.  Pt has four children:  Paloma Jiang, Demetrius Peterson, Tae Jiang (lives on the same property as pt), and Pradeep Peterson (Manju).  Pt receives Social Security disability income, Medicare A,B,D and [place] Medicaid.  PCP is HESHAM Ellis.  Pt uses [facility] in [place], [place] and [place].  Pt does not have POA or living will.  Pt says she was able to do her ADL's but was starting to need help with showering.  Pt used rollator some for mobility.  Pt is on O2 at 4L [place] continuous.   Resource/Environmental Concerns   Resource/Environmental Concerns none   Transition Planning   Patient/Family Anticipates Transition to home with family   Patient/Family Anticipated Services at Transition   (To be recommended closer to discharge.)   Transportation Anticipated family or friend will provide   Discharge Needs Assessment (IRF)   Current Outpatient/Agency/Support Group   (Pt did not receive home health or outpatient therapy prior to admission.)   Concerns to be Addressed adjustment to diagnosis/illness   Equipment Currently Used at Home nebulizer;shower chair;commode;other (see comments);pulse ox;bp  cuff;oxygen;rollator  ([facility] provided hospital bed, home oxygen at 4L [place] and Trilogy machine.  [facility] provided rollator.)   Current Discharge Risk chronically ill   Discharge Coordination/Progress Pt plans to return to her home with daughter Paloma and son Will assisting as needed.  Son Pradeep is supportive too (he is a PTA and works full-time).  Team conference will be held on 9-19-23.  SS will follow and assist with discharge planning.

## 2023-09-15 NOTE — NURSING NOTE
Refused waffle cushion to relieve areas to coccyx/inner buttocks that were present on admission . See skin assessment

## 2023-09-15 NOTE — PROGRESS NOTES
Occupational Therapy: Individual: 100 minutes.    Physical Therapy:    Speech Language Pathology:    Signed by: Shaina Ballard OT

## 2023-09-15 NOTE — CONSULTS
"Adult Nutrition  Assessment    Patient Name:  Liz Jiang  YOB: 1965  MRN: [id number]  Admit Date:  9/14/2023    Assessment Date:  9/15/2023    Comments:  Modified diet to include Consistent Carb, low sodium and low potassium.  Added a Wagner BID to promote wound healing.  Patient states that she will sneak salt, and we discussed the rationale of a low sodium diet as well as CCD and low potassium.       Reason for Assessment       Row Name 09/15/23 1119          Reason for Assessment    Reason For Assessment per organizational policy     Diagnosis other (see comments)  debility     Identified At Risk by Screening Criteria MST SCORE 2+  (patient has actually gained weight)                      Labs/Tests/Procedures/Meds       Row Name 09/15/23 1120          Labs/Procedures/Meds    Lab Results Reviewed reviewed     Lab Results Comments glu-202; BUN-30; K-5.3        Medications    Pertinent Medications Comments lasix, prednison                    Physical Findings       Row Name 09/15/23 1121          Physical Findings    Overall Physical Appearance Patient presents with class 2 obesity, has recently been dx with CHF, has leg edema.  Patient has PI to left and right gluteal. PMH of colon cancer, COPD. Patient does have constipation at time.                    Estimated/Assessed Needs - Anthropometrics       Row Name 09/15/23 1123          Anthropometrics    Age for Calculations 57     Height for Calculation 1.626 m (5' 4\")     Weight for Calculation 102 kg (224 lb 13.9 oz)     Additional Documentation Ideal Body Weight (IBW) (Group)        Ideal Body Weight (IBW)    Ideal Body Weight 54.7kg        Estimated/Assessed Needs    Additional Documentation Fluid Requirements (Group);Idabel-St. Jeor Equation (Group);Protein Requirements (Group)        Idabel-St. Jeor Equation    RMR (Idabel-St. Jeor Equation) 1585     Idabel-St. Jeor Activity Factors 1.2     Activity Factors (Idabel-St. Jeor) 1902     "    Protein Requirements    Weight Used For Protein Calculations 54.7 kg (120 lb 9.5 oz)     Est Protein Requirement Amount (gms/kg) 1.3 gm protein     Estimated Protein Requirements (gms/day) 71.11        Fluid Requirements    Fluid Requirements (mL/day) 1902  1 ml/kcal     RDA Method (mL) 1902                    Nutrition Prescription Ordered       Row Name 09/15/23 1126          Nutrition Prescription PO    Current PO Diet Regular     Fluid Consistency Thin                    Evaluation of Received Nutrient/Fluid Intake       Row Name 09/15/23 1126          Fluid Intake Evaluation    Total Fluid Target (mL) 1902     Oral Fluid (mL) 120     Enteral Fluid (mL) 0     Parenteral Fluid (mL) 0     Other Fluid (mL) 0     Total Fluid Intake (mL) 120     % Total Fluid Intake 6.31        PO Evaluation    Number of Meals 1     % PO Intake 75                       Electronically signed by:  Paloma Blanc RD  09/15/23 11:34 EDT

## 2023-09-15 NOTE — PROGRESS NOTES
Patient Assessment Instrument  Quality Indicators - Admission FY 2023    Section A. Ethnicity/ Race/Language  Ethnicity:  Race:  Preferred Language:    Section A. Transportation      Section B. Hearing and Vision        Section B. Health Literacy        Section C. Cognitive Patterns      Section C. Signs and Symptoms of Delirium (from CAM)      Section D. Mood      Section D. Social Isolation      Section [id number]. Prior Functioning      Section SB6865. Prior Device Use      Section JH2362. Self Care Performance      Section FT6823. Self Care Discharge Goals      Section LT2929. Mobility Performance     Roll Left and Right: [place] provides verbal cues and/or touching/steadying  and/or contact guard assistance as patient completes activity. Assistance may be  provided throughout the activity or intermittently.   Sit to Lying: [place] provides verbal cues and/or touching/steadying and/or  contact guard assistance as patient completes activity. Assistance may be  provided throughout the activity or intermittently.   Lying to Sitting on Side of Bed: [place] provides verbal cues and/or  touching/steadying and/or contact guard assistance as patient completes  activity. Assistance may be provided throughout the activity or intermittently.   Sit to Stand: [place] provides verbal cues and/or touching/steadying and/or  contact guard assistance as patient completes activity. Assistance may be  provided throughout the activity or intermittently.   Chair/Bed to Chair Transfer: [place] provides verbal cues and/or  touching/steadying and/or contact guard assistance as patient completes  activity. Assistance may be provided throughout the activity or intermittently.   Toilet Transfer [place] provides verbal cues and/or touching/steadying and/or  contact guard assistance as patient completes activity. Assistance may be  provided throughout the activity or intermittently.   Car Transfer: [place] provides verbal cues and/or touching/steadying  and/or  contact guard assistance as patient completes activity. Assistance may be  provided throughout the activity or intermittently.   Walk 10 Feet:   [place] provides verbal cues and/or touching/steadying and/or  contact guard assistance as patient completes activity. Assistance may be  provided throughout the activity or intermittently.  Walk 50 Feet with 2 Turns:   [place] provides verbal cues and/or  touching/steadying and/or contact guard assistance as patient completes  activity. Assistance may be provided throughout the activity or intermittently.  Walk 150 Feet:   Not attempted due to medical or safety concerns.  Walking 10 Feet on Uneven Surfaces:   Not attempted due to medical or safety  concerns.  1 Step Over Curb or Up/Down Stair:   Not attempted due to medical or safety  concerns.  Picking up an Object:   Not attempted due to medical or safety concerns.  Uses Wheelchair/Scooter: No    Section [id number]. Mobility Discharge Goals     Sit to Stand: Patient completed the activities by themself with no assistance  from a helper.   Chair/Bed to Chair Transfer: Patient completed the activities by themself with  no assistance from a helper.   Walk Discharge Goals:   Walk 150 Feet: Patient completed the activities by themself with no assistance  from a helper.    Section H. Bladder and Bowel      Section I. Active Diagnosis      Section J. Health Conditions      Section J. Health Conditions (Pain)      Section K. Swallowing/Nutritional Status    Nutritional Approaches on Admission:    Section M. Skin Conditions      Section N. Medication        Section O. Special Treatments, Procedures, and Programs    Signed by: Darlyn Gandhi, Physical Therapist

## 2023-09-15 NOTE — PROGRESS NOTES
Patient Assessment Instrument  Quality Indicators - Admission FY 2023    Section A. Ethnicity/ Race/Language  Ethnicity:  Race:  Preferred Language:    Section A. Transportation  Issues Due to Lack of Transportation:   No    Section B. Hearing and Vision        Section B. Health Literacy        Section C. Cognitive Patterns      Section C. Signs and Symptoms of Delirium (from CAM)      Section D. Mood      Section D. Social Isolation      Section [id number]. Prior Functioning      Section [id number]. Prior Device Use  Walker    Section DE5193. Self Care Performance      Section [id number]. Self Care Discharge Goals      Section PX1770. Mobility Performance      Section KR2907. Mobility Discharge Goals      Section H. Bladder and Bowel      Section I. Active Diagnosis      Section J. Health Conditions      Section J. Health Conditions (Pain)      Section K. Swallowing/Nutritional Status    Nutritional Approaches on Admission:    Section M. Skin Conditions      Section N. Medication        Section O. Special Treatments, Procedures, and Programs    Signed by: JARROD Granados

## 2023-09-15 NOTE — PROGRESS NOTES
Inpatient Rehabilitation Functional Measures Assessment and Plan of Care    Plan of Care      Functional Measures  FENG Eating:  FENG Grooming:  FENG Bathing:  FENG Upper Body Dressing:  FENG Lower Body Dressing:  FENG Toileting:    FENG Bladder Management  Level of Assistance:  Frequency/Number of Accidents this Shift:    FENG Bowel Management  Level of Assistance:  Frequency/Number of Accidents this Shift:    FENG Bed/Chair/Wheelchair Transfer:  Bed/chair/wheelchair Transfer Score = 4.  Patient performs 75% or more of effort and minimal assistance (little/incidental  help/lifting of one limb/steadying) for transferring to and from the  bed/chair/wheelchair, requiring: Contact guard. Patient requires the following  assistive device(s): Walker. Seating system of wheelchair.  FEGN Toilet Transfer:  FENG Tub/Shower Transfer:    Previously Documented Mode of Locomotion at Discharge:  FENG Expected Mode of Locomotion at Discharge: The expected mode of most  frequently used locomotion, at discharge, is expected to be walking.  FENG Walk/Wheelchair:  WHEELCHAIR OBSERVATION   Wheelchair did not occur.    WALK OBSERVATION   Walk Distance Scale = 2.  Distance walked is 50 -149 feet. Walk Score = 2.  Patient performs 75% or more of effort and requires minimal assistance.  Incidental assistance, contact guard or steadying was provided. Patient walked a  distance of 60 feet. Patient requires the following assistive device(s): Rolling  walker.  FENG Stairs:  Stairs did not occur.    FENG Comprehension:  FENG Expression:  FENG Social Interaction:  FENG Problem Solving:  FENG Memory:    Therapy Mode Minutes  Occupational Therapy:  Physical Therapy: Individual: 90 minutes.  Speech Language Pathology:    Discharge Functional Goals:  Bed, Chair, Wheelchair Transfers: 6  Walk: 6    Signed by: Darlyn Gandhi Physical Therapist     Female

## 2023-09-15 NOTE — PLAN OF CARE
Goal Outcome Evaluation:  Plan of Care Reviewed With: patient        Progress: improving       Problem: Fall Injury Risk  Goal: Absence of Fall and Fall-Related Injury  9/15/2023 1926 by Brian Mejia RN  Outcome: Ongoing, Progressing  9/15/2023 0936 by Brian Mejia RN  Outcome: Ongoing, Progressing  Intervention: Identify and Manage Contributors  Recent Flowsheet Documentation  Taken 9/15/2023 1923 by Brian Mejia RN  Medication Review/Management: medications reviewed  Taken 9/15/2023 0800 by Brian Mejia RN  Medication Review/Management: medications reviewed  Intervention: Promote Injury-Free Environment  Recent Flowsheet Documentation  Taken 9/15/2023 1923 by Brian Mejia RN  Safety Promotion/Fall Prevention: safety round/check completed  Taken 9/15/2023 1802 by Brian Mejia RN  Safety Promotion/Fall Prevention: safety round/check completed  Taken 9/15/2023 1600 by Brian Mejia RN  Safety Promotion/Fall Prevention: safety round/check completed  Taken 9/15/2023 1402 by Brian Mejia RN  Safety Promotion/Fall Prevention: safety round/check completed  Taken 9/15/2023 1200 by Brian Mejia RN  Safety Promotion/Fall Prevention: safety round/check completed  Taken 9/15/2023 1001 by Brian Mejia RN  Safety Promotion/Fall Prevention: safety round/check completed  Taken 9/15/2023 0800 by Brian Mejia RN  Safety Promotion/Fall Prevention: safety round/check completed     Problem: Rehabilitation (IRF) Plan of Care  Goal: Plan of Care Review  9/15/2023 1926 by Brian Mejia RN  Outcome: Ongoing, Progressing  Flowsheets (Taken 9/15/2023 1926)  Progress: improving  Plan of Care Reviewed With: patient  9/15/2023 0936 by Brian Mejia RN  Outcome: Ongoing, Progressing  Flowsheets (Taken 9/15/2023 0936)  Progress: improving  Plan of Care Reviewed With: patient  Goal: Patient-Specific Goal (Individualized)  9/15/2023 1926 by Brian Mejia RN  Outcome: Ongoing, Progressing  9/15/2023 0936 by Brian Mejia  RN  Outcome: Ongoing, Progressing  Goal: Absence of New-Onset Illness or Injury  9/15/2023 1926 by Brian Mejia RN  Outcome: Ongoing, Progressing  9/15/2023 0936 by Brian Mejia RN  Outcome: Ongoing, Progressing  Intervention: Prevent Fall and Fall Injury  Recent Flowsheet Documentation  Taken 9/15/2023 1923 by Brian Mejia RN  Safety Promotion/Fall Prevention: safety round/check completed  Taken 9/15/2023 1802 by Brian Mejia RN  Safety Promotion/Fall Prevention: safety round/check completed  Taken 9/15/2023 1600 by Brian Mejia RN  Safety Promotion/Fall Prevention: safety round/check completed  Taken 9/15/2023 1402 by Brian Mejia RN  Safety Promotion/Fall Prevention: safety round/check completed  Taken 9/15/2023 1200 by Brian Mejia RN  Safety Promotion/Fall Prevention: safety round/check completed  Taken 9/15/2023 1001 by Brian Mejia RN  Safety Promotion/Fall Prevention: safety round/check completed  Taken 9/15/2023 0800 by Brian Mejia RN  Safety Promotion/Fall Prevention: safety round/check completed  Intervention: Prevent Infection  Recent Flowsheet Documentation  Taken 9/15/2023 1923 by Brian Mejia RN  Infection Prevention:   hand hygiene promoted   rest/sleep promoted  Taken 9/15/2023 0800 by Brian Mejia RN  Infection Prevention:   hand hygiene promoted   rest/sleep promoted  Intervention: Prevent VTE (Venous Thromboembolism)  Recent Flowsheet Documentation  Taken 9/15/2023 1923 by Brian Mejia RN  VTE Prevention/Management: (Lovenox) other (see comments)  Taken 9/15/2023 0800 by Brian Mejia RN  VTE Prevention/Management: (Lovenox) other (see comments)  Goal: Optimal Comfort and Wellbeing  9/15/2023 1926 by Brian Mejia RN  Outcome: Ongoing, Progressing  9/15/2023 0936 by Brian Mejia RN  Outcome: Ongoing, Progressing  Goal: Home and Community Transition Plan Established  9/15/2023 1926 by Brian Mejia RN  Outcome: Ongoing, Progressing  9/15/2023 0936 by Brian Mejia  RN  Outcome: Ongoing, Progressing     Problem: Skin Injury Risk Increased  Goal: Skin Health and Integrity  9/15/2023 1926 by Brian Mejia RN  Outcome: Ongoing, Progressing  9/15/2023 0936 by Brian Mejia RN  Outcome: Ongoing, Progressing  Intervention: Promote and Optimize Oral Intake  Recent Flowsheet Documentation  Taken 9/15/2023 1923 by Brian Mejia RN  Oral Nutrition Promotion: physical activity promoted  Taken 9/15/2023 0800 by Brian Mejia RN  Oral Nutrition Promotion: physical activity promoted  Intervention: Optimize Skin Protection  Recent Flowsheet Documentation  Taken 9/15/2023 1923 by Brian Mejia RN  Pressure Reduction Techniques:   frequent weight shift encouraged   heels elevated off bed   weight shift assistance provided   positioned off wounds  Pressure Reduction Devices:   pressure-redistributing mattress utilized   heel offloading device utilized   positioning supports utilized  Skin Protection:   adhesive use limited   incontinence pads utilized  Taken 9/15/2023 0800 by Brian Mejia RN  Pressure Reduction Techniques:   frequent weight shift encouraged   heels elevated off bed   weight shift assistance provided   positioned off wounds  Pressure Reduction Devices:   pressure-redistributing mattress utilized   heel offloading device utilized   positioning supports utilized  Skin Protection:   adhesive use limited   incontinence pads utilized     Problem: Pain Chronic (Persistent) (Comorbidity Management)  Goal: Acceptable Pain Control and Functional Ability  9/15/2023 1926 by Brian Mejia RN  Outcome: Ongoing, Progressing  9/15/2023 0936 by Brian Mejia RN  Outcome: Ongoing, Progressing  Intervention: Manage Persistent Pain  Recent Flowsheet Documentation  Taken 9/15/2023 1923 by Brian Mejia RN  Sleep/Rest Enhancement: regular sleep/rest pattern promoted  Medication Review/Management: medications reviewed  Taken 9/15/2023 0800 by Brian Mejia RN  Sleep/Rest Enhancement:  regular sleep/rest pattern promoted  Medication Review/Management: medications reviewed  Intervention: Develop Pain Management Plan  Recent Flowsheet Documentation  Taken 9/15/2023 1923 by Brian Mejia, RN  Pain Management Interventions: see MAR  Taken 9/15/2023 0800 by Brian Mejia RN  Pain Management Interventions: see MAR  Intervention: Optimize Psychosocial Wellbeing  Recent Flowsheet Documentation  Taken 9/15/2023 1923 by Brian Mejia, RN  Diversional Activities: television  Taken 9/15/2023 0800 by Brian Mejia RN  Supportive Measures: active listening utilized  Diversional Activities: television

## 2023-09-15 NOTE — THERAPY EVALUATION
Inpatient Rehabilitation - Physical Therapy Initial Evaluation        [place]     Patient Name: Liz Jiang  : 1965  MRN: [id number]    Today's Date: 9/15/2023                    Admit Date: 2023      Visit Dx:   No diagnosis found.    Patient Active Problem List   Diagnosis    Chest pain    COPD (chronic obstructive pulmonary disease)    Tobacco abuse    GERD (gastroesophageal reflux disease)    Anxiety    Arthritis    Nausea and vomiting    Generalized abdominal pain    Right upper quadrant pain    Gallbladder disease    Abnormal biliary HIDA scan    Dyslipidemia    Tachycardia    Anal cancer    Port-A-Cath in place    Debility       Past Medical History:   Diagnosis Date    Acid reflux disease     Anal cancer 2019    Arthritis     Asthma, extrinsic     Cholelithiasis     Chronic headaches     COPD (chronic obstructive pulmonary disease)     CVA (cerebral vascular accident)     w/ right sided deficit    CVA (cerebral vascular accident)     Diabetes mellitus     only has high blood sugar when on steriods    History of radiation therapy 2020    Whole pelvis/anal mass    Obstructive sleep apnea treated with BiPAP     On home oxygen therapy     2L     Sleep apnea, obstructive        Past Surgical History:   Procedure Laterality Date    ABDOMINAL SURGERY      CARDIAC CATHETERIZATION      CAROTID ENDARTERECTOMY Left 2018    CHOLECYSTECTOMY WITH INTRAOPERATIVE CHOLANGIOGRAM N/A 2017    Procedure: CHOLECYSTECTOMY LAPAROSCOPIC INTRAOPERATIVE CHOLANGIOGRAM;  Surgeon: Carolin Marie MD;  Location: [facility];  Service:     COLONOSCOPY      HYSTERECTOMY      for heavy bleeding/ complete    KIDNEY STONE SURGERY      TUBAL ABDOMINAL LIGATION      VENOUS ACCESS DEVICE (PORT) INSERTION Left 2019    Procedure: INSERTION VENOUS ACCESS DEVICE;  Surgeon: Carolin Marie MD;  Location: [facility];  Service: General       PT ASSESSMENT (last 12 hours)       IRF PT Evaluation and Treatment        Row Name 09/15/23 0932          PT Time and Intention    Document Type initial evaluation;daily treatment  -LB     Mode of Treatment individual therapy;physical therapy  -LB       Row Name 09/15/23 0932          General Information    Existing Precautions/Restrictions fall;oxygen therapy device and L/min  -LB       [facility] Name 09/15/23 0932          Pain Scale: FACES Pre/Post-Treatment    Pain: FACES Scale, Pretreatment 0-->no hurt  -LB     Posttreatment Pain Rating 0-->no hurt  -LB       [facility] Name 09/15/23 0932          Cognition/Psychosocial    Affect/Mental Status (Cognition) WFL  -LB     Follows Commands (Cognition) WFL  -LB     Personal Safety Interventions nonskid shoes/slippers when out of bed;supervised activity  gait belt was discussed, pt feels her breathing is restricted with a belt so it was defered  -LB       [facility] Name 09/15/23 0932          Range of Motion (ROM)    Range of Motion bilateral lower extremities;ROM is WFL  -LB       Row Name 09/15/23 09          Strength (Manual Muscle Testing)    Strength (Manual Muscle Testing) --  BLE strength is at least 3/5 but she has low endurance  -LB       [facility] Name 09/15/23 0932          Sit-Stand Transfer    Sit-Stand [place] (Transfers) contact guard;verbal cues;nonverbal cues (demo/gesture)  -LB     Assistive Device (Sit-Stand Transfers) walker, front-wheeled  -LB       Row Name 09/15/23 0932          Stand-Sit Transfer    Stand-Sit [place] (Transfers) contact guard;verbal cues;nonverbal cues (demo/gesture)  -LB     Assistive Device (Stand-Sit Transfers) walker, front-wheeled  -LB       [facility] Name 09/15/23 0932          Gait/Stairs (Locomotion)    [place] Level (Gait) contact guard;verbal cues;nonverbal cues (demo/gesture)  -LB     Assistive Device (Gait) walker, front-wheeled  -LB     Distance in Feet (Gait) 60' 40'  -LB     Deviations/Abnormal Patterns (Gait) gait speed decreased;stride length decreased  -LB     Bilateral Gait Deviations  forward flexed posture  -LB       Row Name 09/15/23 0932          Safety Issues, Functional Mobility    Impairments Affecting Function (Mobility) balance;endurance/activity tolerance;shortness of breath;strength  -LB       Row Name 09/15/23 0932          Balance    Static Sitting Balance independent  -LB     Dynamic Sitting Balance --  sitting bag toss with BUE,  with reacher 1x. this activity is for endurance and strengthening.  -LB     Static Standing Balance --  SBA with RW  -LB       Row Name 09/15/23 0932          Motor Skills    Therapeutic Exercise --  sitting ex with rest breaks  -LB     Additional Documentation --  slow pace and rest breaks prn with all activity due to dyspnea  -LB       Row Name 09/15/23 0932          Positioning and Restraints    Pre-Treatment Position --  sitting EOB  -LB     In Bed sitting EOB;call light within reach;encouraged to call for assist  -LB     In Wheelchair --  -LB       Row Name 09/15/23 0932          Vital Signs    Pre SpO2 (%) 84  on 4L, she had just returned from bathroom, she asked for O2 to be turned up to 6L  -LB     O2 Delivery Pre Treatment supplemental O2  -LB     Intra SpO2 (%) 92  after amb on 6L O2 per nc  -LB     O2 Delivery Intra Treatment supplemental O2  -LB       Row Name 09/15/23 0932          Therapy Assessment/Plan (PT)    Patient's Goals For Discharge return home  -[facility] Name 09/15/23 0932          Therapy Assessment/Plan (PT)    PT Diagnosis (PT) Debility-resp failure, CHF  -LB     Rehab Potential/Prognosis (PT) adequate, monitor progress closely  -LB     Frequency of Treatment (PT) 5 times per week  -LB     Estimated Duration of Therapy (PT) 1 week;2 weeks  -LB     Problem List (PT) balance;mobility;strength  low tolerance to activity  -LB     Activity Limitations Related to Problem List (PT) unable to ambulate safely;unable to transfer safely  -LB       Row Name 09/15/23 0932          Therapy Plan Review/Discharge Plan (PT)    Therapy  Plan Review (PT) evaluation/treatment results reviewed;care plan/treatment goals reviewed;risks/benefits reviewed;participants voiced agreement with care plan  -LB     Anticipated Equipment Needs at Discharge (PT Eval) --  tbd  -LB     Anticipated Discharge Disposition (PT) home with assist;home with home health  -LB       Row Name 09/15/23 0932          IRF PT Goals    Bed Mobility Goal Selection (PT-IRF) bed mobility, PT goal 1  -LB     Transfer Goal Selection (PT-IRF) transfers, PT goal 1  -LB     Gait (Walking Locomotion) Goal Selection (PT-IRF) gait, PT goal 1  -LB       Row Name 09/15/23 0932          Bed Mobility Goal 1 (PT-IRF)    Activity/Assistive Device (Bed Mobility Goal 1, PT-IRF) sit to supine/supine to sit  -LB     [place] Level (Bed Mobility Goal 1, PT-IRF) modified independence  -LB     Time Frame (Bed Mobility Goal 1, PT-IRF) by discharge  -LB       Row Name 09/15/23 0932          Transfer Goal 1 (PT-IRF)    Activity/Assistive Device (Transfer Goal 1, PT-IRF) sit-to-stand/stand-to-sit;bed-to-chair/chair-to-bed  -LB     [place] Level (Transfer Goal 1, PT-IRF) modified independence  -LB     Time Frame (Transfer Goal 1, PT-IRF) by discharge  -LB       Row Name 09/15/23 0932          Gait/Walking Locomotion Goal 1 (PT-IRF)    Activity/Assistive Device (Gait/Walking Locomotion Goal 1, PT-IRF) walker, rolling  -LB     Gait/Walking Locomotion Distance Goal 1 (PT-IRF) 300'  -LB     [place] Level (Gait/Walking Locomotion Goal 1, PT-IRF) modified independence  -LB     Time Frame (Gait/Walking Locomotion Goal 1, PT-IRF) by discharge  -LB               User Key  (r) = Recorded By, (t) = Taken By, (c) = Cosigned By      Initials Name Provider Type    LB Darlyn Gandhi, PT Physical Therapist                  Wound 09/14/23 1840 Left gluteal Pressure Injury (Active)   Wound Image   09/14/23 1841   Pressure Injury Stage 2 09/14/23 1906   Dressing Appearance open to air 09/15/23 0800   Closure  Open to air 09/15/23 0800   Base purple;dry;non-blanchable;maroon/purple 09/15/23 0800   Drainage Amount none 09/15/23 0800   Dressing Care open to air 09/15/23 0800       Wound 09/14/23 1840 Right gluteal Pressure Injury (Active)   Wound Image    09/14/23 1842   Pressure Injury Stage 2 09/14/23 1907   Dressing Appearance open to air 09/15/23 0800   Closure Open to air 09/15/23 0800   Base maroon/purple;dry;non-blanchable 09/15/23 0800   Drainage Amount none 09/15/23 0800   Dressing Care open to air 09/15/23 0800     Physical Therapy Education       Title: PT OT SLP Therapies (Done)       Topic: Physical Therapy (Done)       Point: Mobility training (Done)       Learning Progress Summary             Patient Acceptance, E, VU,NR by LB at 9/15/2023 0956                         Point: Home exercise program (Done)       Learning Progress Summary             Patient Acceptance, E, VU,NR by LB at 9/15/2023 0956                         Point: Body mechanics (Done)       Learning Progress Summary             Patient Acceptance, E, VU,NR by LB at 9/15/2023 0956                         Point: Precautions (Done)       Learning Progress Summary             Patient Acceptance, E, VU,NR by LB at 9/15/2023 0956                                         User Key       Initials Effective Dates Name Provider Type Discipline     06/16/21 -  Darlyn Gandhi, PT Physical Therapist PT                    PT Recommendation and Plan    Planned Therapy Interventions (PT): balance training, bed mobility training, gait training, patient/family education, strengthening, transfer training  Frequency of Treatment (PT): 5 times per week  Anticipated Equipment Needs at Discharge (PT Eval):  (tbd)                  Time Calculation:      PT Charges       Row Name 09/15/23 0956             Time Calculation    Start Time 0830  -LB      Stop Time 1000  -LB      Time Calculation (min) 90 min  -LB      PT Received On 09/15/23  -LB      PT Goal  Re-Cert Due Date 09/22/23  -MALAIKA                User Key  (r) = Recorded By, (t) = Taken By, (c) = Cosigned By      Initials Name Provider Type    Darlyn Ritter, PT Physical Therapist                    Therapy Charges for Today       Code Description Service Date Service Provider Modifiers Qty    77323235126 HC PT EVAL LOW COMPLEXITY 1 9/15/2023 Darlyn Gandhi, PT GP 1    34213670880 HC GAIT TRAINING EA 15 MIN 9/15/2023 Darlyn Gandhi, PT GP 1    03330785039  PT THER PROC EA 15 MIN 9/15/2023 Darlyn Gandhi, PT GP 3    46485006668 HC PT THERAPEUTIC ACT EA 15 MIN 9/15/2023 Darlyn Gandhi, PT GP 1                     Darlyn Gandhi, PT  9/15/2023

## 2023-09-15 NOTE — THERAPY EVALUATION
Inpatient Rehabilitation - Occupational Therapy Initial Evaluation and Treatment Note    PROSPER Altman     Patient Name: Liz Jiang  : 1965  MRN: [id number]    Today's Date: 9/15/2023                 Admit Date: 2023       No diagnosis found.    Patient Active Problem List   Diagnosis    Chest pain    COPD (chronic obstructive pulmonary disease)    Tobacco abuse    GERD (gastroesophageal reflux disease)    Anxiety    Arthritis    Nausea and vomiting    Generalized abdominal pain    Right upper quadrant pain    Gallbladder disease    Abnormal biliary HIDA scan    Dyslipidemia    Tachycardia    Anal cancer    Port-A-Cath in place    Debility       Past Medical History:   Diagnosis Date    Acid reflux disease     Anal cancer 2019    Arthritis     Asthma, extrinsic     Cholelithiasis     Chronic headaches     COPD (chronic obstructive pulmonary disease)     CVA (cerebral vascular accident)     w/ right sided deficit    CVA (cerebral vascular accident)     Diabetes mellitus     only has high blood sugar when on steriods    History of radiation therapy 2020    Whole pelvis/anal mass    Obstructive sleep apnea treated with BiPAP     On home oxygen therapy     2L     Sleep apnea, obstructive        Past Surgical History:   Procedure Laterality Date    ABDOMINAL SURGERY      CARDIAC CATHETERIZATION      CAROTID ENDARTERECTOMY Left 2018    CHOLECYSTECTOMY WITH INTRAOPERATIVE CHOLANGIOGRAM N/A 2017    Procedure: CHOLECYSTECTOMY LAPAROSCOPIC INTRAOPERATIVE CHOLANGIOGRAM;  Surgeon: Carolin Marie MD;  Location: [facility];  Service:     COLONOSCOPY      HYSTERECTOMY      for heavy bleeding/ complete    KIDNEY STONE SURGERY      TUBAL ABDOMINAL LIGATION      VENOUS ACCESS DEVICE (PORT) INSERTION Left 2019    Procedure: INSERTION VENOUS ACCESS DEVICE;  Surgeon: Carolin Marie MD;  Location: [facility];  Service: General             IRF OT ASSESSMENT FLOWSHEET (last 12 hours)       IRF OT  Evaluation and Treatment       Row Name 09/15/23 1056          OT Time and Intention    Document Type initial evaluation;daily treatment  -TM     Mode of Treatment occupational therapy  -TM     Symptoms Noted During/After Treatment other (see comments)  pt reported chronic back pain for ~2 years with RN aware/addressing  -TM       Row Name 09/15/23 1056          General Information    Patient Profile Reviewed yes  -TM     General Observations of Patient Pt agreeable for therapy. Pt's son Pradeep present during am OT eval session.  -TM     Existing Precautions/Restrictions fall;oxygen therapy device and L/min  -TM       Row Name 09/15/23 1056          Previous Level of Function/Home Environm    BADLs, Premorbid Functional Level independent  -TM       Row Name 09/15/23 1056          Living Environment    Current Living Arrangements home  -TM     Home Accessibility stairs to enter home  -TM     People in Home other (see comments)  pt reported her daughter lives in home with her  -       Row Name 09/15/23 1056          Home Main Entrance    Number of Stairs, Main Entrance four;other (see comments)  hand rail on right side  -       Row Name 09/15/23 1056          Home Use of Assistive/Adaptive Equipment    Equipment Currently Used at Home oxygen;wheelchair;commode;shower chair;rollator;hospital bed  -TM       Row Name 09/15/23 1056          Cognition/Psychosocial    Orientation Status (Cognition) oriented x 3  -TM     Follows Commands (Cognition) follows one-step commands;verbal cues/prompting required  -       Row Name 09/15/23 1056          Range of Motion Comprehensive    Comment, General Range of Motion LUE WFL; RUE shoulder ~90 degrees, elbow/wrist/hand WFL; Pt's son reported RUE decreased shoulder ROM since previous CVA ~ 7 years ago  -       Row Name 09/15/23 1056          Strength Comprehensive (MMT)    Comment, General Manual Muscle Testing (MMT) Assessment LUE 3/5; RUE shoulder 2+/5, elbow/wrist/hand 3/5   -TM       Row Name 09/15/23 1056          Hearing Assessment    Hearing Status WFL  -TM       Row Name 09/15/23 1056          Vision Assessment/Intervention    Visual Impairment/Limitations other (see comments)  reading glasses per pt  -TM       Row Name 09/15/23 1056          Sensory Assessment (Somatosensory)    Sensory Assessment (Somatosensory) UE sensation intact  -[facility] Name 09/15/23 1056          Bathing    Comment (Bathing) Geovanny/modA  -       Row Name 09/15/23 1056          Upper Body Dressing    [place] Level (Upper Body Dressing) set up assistance  -TM     Position (Upper Body Dressing) edge of bed sitting  -TM       Row Name 09/15/23 1056          Lower Body Dressing    Position (Lower Body Dressing) edge of bed sitting  -TM     Comment (Lower Body Dressing) [place]/modA  -TM       Row Name 09/15/23 1056          Grooming    [place] Level (Grooming) set up  -TM     Position (Grooming) supported sitting  -TM       Row Name 09/15/23 1056          Toileting    [place] Level (Toileting) minimum assist (75% patient effort);moderate assist (50% patient effort);verbal cues  -TM     Assistive Device Use (Toileting) grab bar/safety frame  -TM     Position (Toileting) supported sitting  -TM       Row Name 09/15/23 1056          Self-Feeding    [place] Level (Self-Feeding) set up  -TM     Position (Self-Feeding) supported sitting  -TM       Row Name 09/15/23 1056          Transfer Assessment/Treatment    Transfers bed-chair transfer;chair-bed transfer;toilet transfer  -       Row Name 09/15/23 1056          Bed-Chair Transfer    Bed-Chair [place] (Transfers) contact guard;verbal cues  -TM     Assistive Device (Bed-Chair Transfers) wheelchair  -TM       Row Name 09/15/23 1056          Chair-Bed Transfer    Chair-Bed [place] (Transfers) contact guard;verbal cues  -TM     Assistive Device (Chair-Bed Transfers) wheelchair  -TM       Row Name 09/15/23 1056          Toilet Transfer     [place] Level (Toilet Transfer) contact guard;verbal cues  -TM     Assistive Device (Toilet Transfer) walker, front-wheeled;grab bars/safety frame;wheelchair  -TM       Row Name 09/15/23 1056          Motor Skills    Motor Skills coordination;functional endurance;motor control/coordination interventions;other (see comments)  BUE FMC decreased  -TM     Motor Control/Coordination Interventions therapeutic exercise/ROM;fine motor manipulation/dexterity activities;gross motor coordination activities;other (see comments)  BUE ther ex/act, GMC/FMC  -TM       Row Name 09/15/23 1056          Therapy Assessment/Plan (OT)    Patient's Goals For Discharge return home  -       Row Name 09/15/23 1056          Therapy Assessment/Plan (OT)    OT Diagnosis Debility (acute on chronic hypoxic and hypercapnic respiratory failure with COPD exacerbation/acute on chronic diastolic CHF)  -TM     Rehab Potential/Prognosis (OT) adequate, monitor progress closely  -TM     Frequency of Treatment (OT) 5 times per week  -TM     Estimated Duration of Therapy (OT) 2 weeks  -TM     Problem List (OT) other (see comments)  decreased ADLs, strength, fxal mobility, coordination  -TM     Planned Therapy Interventions (OT) activity tolerance training;adaptive equipment training;BADL retraining;IADL retraining;occupation/activity based interventions;patient/caregiver education/training;ROM/therapeutic exercise;strengthening exercise;transfer/mobility retraining  -       Row Name 09/15/23 1056          IRF OT Goals    LB Dressing Goal Selection (OT-IRF) LB dressing, OT goal 1;LB dressing, OT goal 2  -     Toileting Goal Selection (OT-IRF) toileting, OT goal 1;toileting, OT goal 2  -       Row Name 09/15/23 1056          LB Dressing Goal 1 (OT-IRF)    [place] (LB Dressing Goal 1, OT-IRF) minimum assist (75% or more patient effort)  -TM     Time Frame (LB Dressing Goal 1, OT-IRF) short-term goal (STG)  -       Row Name 09/15/23 1056           LB Dressing Goal 2 (OT-IRF)    [place] (LB Dressing Goal 2, OT-IRF) contact guard required  -TM     Time Frame (LB Dressing Goal 2, OT-IRF) long-term goal (LTG);by discharge  -TM       Row Name 09/15/23 1056          Toileting Goal 1 (OT-IRF)    [place] Level (Toileting Goal 1, OT-IRF) contact guard required  -TM     Time Frame (Toileting Goal 1, OT-IRF) short-term goal (STG)  -TM       Row Name 09/15/23 1056          Toileting Goal 2 (OT-IRF)    [place] Level (Toileting Goal 2, OT-IRF) standby assist;supervision required  -TM     Time Frame (Toileting Goal 2, OT-IRF) long-term goal (LTG);by discharge  -TM               User Key  (r) = Recorded By, (t) = Taken By, (c) = Cosigned By      Initials Name Effective Dates     Shaina Ballard OT 06/16/21 -                      Occupational Therapy Education       Title: PT OT SLP Therapies (Done)       Topic: Occupational Therapy (Done)       Point: ADL training (Done)       Description:   Instruct learner(s) on proper safety adaptation and remediation techniques during self care or transfers.   Instruct in proper use of assistive devices.                  Learning Progress Summary             Patient Acceptance, E,D, VU,NR by  at 9/15/2023 1308                         Point: Precautions (Done)       Description:   Instruct learner(s) on prescribed precautions during self-care and functional transfers.                  Learning Progress Summary             Patient Acceptance, E,D, VU,NR by  at 9/15/2023 1308                                         User Key       Initials Effective Dates Name Provider Type Discipline     06/16/21 -  Shaina Ballard, OT Occupational Therapist OT                        OT Recommendation and Plan    Planned Therapy Interventions (OT): activity tolerance training, adaptive equipment training, BADL retraining, IADL retraining, occupation/activity based interventions, patient/caregiver  education/training, ROM/therapeutic exercise, strengthening exercise, transfer/mobility retraining                    Time Calculation:      Time Calculation- OT       Row Name 09/15/23 1309 09/15/23 1308          Time Calculation- OT    OT Start Time 1245  -TM 1045  -TM     OT Stop Time 1330  -TM 1140  -TM     OT Time Calculation (min) 45 min  -TM 55 min  -TM     Total Timed Code Minutes- OT 45 minute(s)  -TM 55 minute(s)  -TM     OT Non-Billable Time (min) -- 60 min  -TM               User Key  (r) = Recorded By, (t) = Taken By, (c) = Cosigned By      Initials Name Provider Type    TM Shaina Ballard OT Occupational Therapist                  Therapy Charges for Today       Code Description Service Date Service Provider Modifiers Qty    30981392379 HC OT EVAL HIGH COMPLEXITY 3 9/15/2023 Shaina Ballard, OT GO 1    73318998280 HC OT THERAPEUTIC ACT EA 15 MIN 9/15/2023 Shaina Ballard OT GO 2    37304092334 HC OT SELF CARE/MGMT/TRAIN EA 15 MIN 9/15/2023 Shaina Ballard OT GO 2                     Shaina Ballard OT  9/15/2023

## 2023-09-15 NOTE — PLAN OF CARE
Goal Outcome Evaluation:              Outcome Evaluation: Encouraged call light use . Respiratory set bipap machine . Fall precautions in place. Will monitor

## 2023-09-15 NOTE — PROGRESS NOTES
Case Management  Inpatient Rehabilitation Plan of Care and Discharge Plan Note    Rehabilitation Diagnosis:  debility  Date of Onset:  9-7-23    Medical Summary:  debility, acute on chronic hypoxic and hypercapnic respiratory  failiure with COPD exacerbation/acute on chronic diastolic CHF    Plan of Care      Expected Intensity:  Average of 3 hours of therapy 5 days/week.  Interdisciplinary Team:  Interdisciplinary Team: Medical Supervision and 24 Hour Rehabilitation Nursing.,  Physical Therapy:, Occupational Therapy:, Social Work, Therapeutic Recreation.,  Respiratory Therapy.  Physical Therapy Intensity/Duration: PT 1.5 hours per day/5 days per week  Occupational Therapy Intensity/Duration: OT 1.5 hours per day/5 days per week  Estimated Length of Stay/Anticipated Discharge Date: 14 days  Anticipated Discharge Destination:  Anticipated discharge destination from inpatient rehabilitation is community  discharge with assistance. Pt plans to return home with daughter and son at  discharge.      Based on the patient's medical and functional status, their prognosis and  expected level of functional improvement is:  good    Signed by: JARROD Granados

## 2023-09-15 NOTE — PROGRESS NOTES
Inpatient Rehabilitation Functional Measures Assessment and Plan of Care    Plan of Care   Self Care    Dressing (Lower) (Active)  Current Status (9/15/2023 10:45:00 AM): modA/Geovanny  Weekly Goal: Geovanny  Discharge Goal: CGA    Functional Measures  FENG Eating:  FENG Grooming:  FENG Bathing:  FENG Upper Body Dressing:  FENG Lower Body Dressing:  FENG Toileting:    FENG Bladder Management  Level of Assistance:  Frequency/Number of Accidents this Shift:    FENG Bowel Management  Level of Assistance:  Frequency/Number of Accidents this Shift:    FENG Bed/Chair/Wheelchair Transfer:  FENG Toilet Transfer:  FENG Tub/Shower Transfer:    Previously Documented Mode of Locomotion at Discharge:  [facility] Expected Mode of Locomotion at Discharge:  FENG Walk/Wheelchair:  FENG Stairs:    FENG Comprehension:  FENG Expression:  FENG Social Interaction:  [facility] Problem Solving:  FENG Memory:    Therapy Mode Minutes  Occupational Therapy:  Physical Therapy:  Speech Language Pathology:    Discharge Functional Goals:    Signed by: Shaina Ballard OT

## 2023-09-16 LAB
ANION GAP SERPL CALCULATED.3IONS-SCNC: 10.7 MMOL/L (ref 5–15)
BUN SERPL-MCNC: 25 MG/DL (ref 6–20)
BUN/CREAT SERPL: 55.6 (ref 7–25)
CALCIUM SPEC-SCNC: 9.1 MG/DL (ref 8.6–10.5)
CHLORIDE SERPL-SCNC: 94 MMOL/L (ref 98–107)
CO2 SERPL-SCNC: 31.3 MMOL/L (ref 22–29)
CREAT SERPL-MCNC: 0.45 MG/DL (ref 0.57–1)
EGFRCR SERPLBLD CKD-EPI 2021: 111.7 ML/MIN/1.73
GLUCOSE BLDC GLUCOMTR-MCNC: 131 MG/DL (ref 70–130)
GLUCOSE BLDC GLUCOMTR-MCNC: 168 MG/DL (ref 70–130)
GLUCOSE BLDC GLUCOMTR-MCNC: 182 MG/DL (ref 70–130)
GLUCOSE BLDC GLUCOMTR-MCNC: 262 MG/DL (ref 70–130)
GLUCOSE SERPL-MCNC: 198 MG/DL (ref 65–99)
HBA1C MFR BLD: 6.8 % (ref 4.8–5.6)
POTASSIUM SERPL-SCNC: 5 MMOL/L (ref 3.5–5.2)
SODIUM SERPL-SCNC: 136 MMOL/L (ref 136–145)

## 2023-09-16 PROCEDURE — 80048 BASIC METABOLIC PNL TOTAL CA: CPT | Performed by: INTERNAL MEDICINE

## 2023-09-16 PROCEDURE — 94799 UNLISTED PULMONARY SVC/PX: CPT

## 2023-09-16 PROCEDURE — 63710000001 INSULIN LISPRO (HUMAN) PER 5 UNITS: Performed by: INTERNAL MEDICINE

## 2023-09-16 PROCEDURE — 97535 SELF CARE MNGMENT TRAINING: CPT

## 2023-09-16 PROCEDURE — 97110 THERAPEUTIC EXERCISES: CPT

## 2023-09-16 PROCEDURE — 97530 THERAPEUTIC ACTIVITIES: CPT

## 2023-09-16 PROCEDURE — 63710000001 METHYLPREDNISOLONE PER 4 MG: Performed by: INTERNAL MEDICINE

## 2023-09-16 PROCEDURE — 83036 HEMOGLOBIN GLYCOSYLATED A1C: CPT | Performed by: INTERNAL MEDICINE

## 2023-09-16 PROCEDURE — 94664 DEMO&/EVAL PT USE INHALER: CPT

## 2023-09-16 PROCEDURE — 94760 N-INVAS EAR/PLS OXIMETRY 1: CPT

## 2023-09-16 PROCEDURE — 82948 REAGENT STRIP/BLOOD GLUCOSE: CPT

## 2023-09-16 PROCEDURE — 25010000002 ENOXAPARIN PER 10 MG: Performed by: INTERNAL MEDICINE

## 2023-09-16 RX ORDER — GLUCAGON 1 MG/ML
1 KIT INJECTION
Status: ACTIVE | OUTPATIENT
Start: 2023-09-16

## 2023-09-16 RX ORDER — DEXTROSE MONOHYDRATE 25 G/50ML
25 INJECTION, SOLUTION INTRAVENOUS
Status: ACTIVE | OUTPATIENT
Start: 2023-09-16

## 2023-09-16 RX ORDER — NICOTINE POLACRILEX 4 MG
15 LOZENGE BUCCAL
Status: ACTIVE | OUTPATIENT
Start: 2023-09-16

## 2023-09-16 RX ORDER — INSULIN LISPRO 100 [IU]/ML
2-7 INJECTION, SOLUTION INTRAVENOUS; SUBCUTANEOUS
Status: DISPENSED | OUTPATIENT
Start: 2023-09-16

## 2023-09-16 RX ADMIN — HYDROCODONE BITARTRATE AND ACETAMINOPHEN 1 TABLET: 7.5; 325 TABLET ORAL at 11:33

## 2023-09-16 RX ADMIN — METOPROLOL TARTRATE 25 MG: 25 TABLET, FILM COATED ORAL at 08:51

## 2023-09-16 RX ADMIN — ALPRAZOLAM 0.5 MG: 0.5 TABLET ORAL at 21:25

## 2023-09-16 RX ADMIN — METOPROLOL TARTRATE 25 MG: 25 TABLET, FILM COATED ORAL at 21:25

## 2023-09-16 RX ADMIN — NYSTATIN 500000 UNITS: 100000 SUSPENSION ORAL at 21:25

## 2023-09-16 RX ADMIN — HYDROCODONE BITARTRATE AND ACETAMINOPHEN 1 TABLET: 7.5; 325 TABLET ORAL at 05:54

## 2023-09-16 RX ADMIN — NYSTATIN 500000 UNITS: 100000 SUSPENSION ORAL at 18:03

## 2023-09-16 RX ADMIN — ENOXAPARIN SODIUM 40 MG: 40 INJECTION SUBCUTANEOUS at 08:52

## 2023-09-16 RX ADMIN — CASTOR OIL AND BALSAM, PERU 1 APPLICATION: 788; 87 OINTMENT TOPICAL at 21:25

## 2023-09-16 RX ADMIN — FUROSEMIDE 20 MG: 20 TABLET ORAL at 08:52

## 2023-09-16 RX ADMIN — HYDROCODONE BITARTRATE AND ACETAMINOPHEN 1 TABLET: 7.5; 325 TABLET ORAL at 17:18

## 2023-09-16 RX ADMIN — METHYLPREDNISOLONE 4 MG: 16 TABLET ORAL at 08:48

## 2023-09-16 RX ADMIN — BUDESONIDE AND FORMOTEROL FUMARATE DIHYDRATE 2 PUFF: 160; 4.5 AEROSOL RESPIRATORY (INHALATION) at 19:40

## 2023-09-16 RX ADMIN — ACETAMINOPHEN 650 MG: 325 TABLET ORAL at 21:25

## 2023-09-16 RX ADMIN — INSULIN LISPRO 2 UNITS: 100 INJECTION, SOLUTION INTRAVENOUS; SUBCUTANEOUS at 18:03

## 2023-09-16 RX ADMIN — METHYLPREDNISOLONE 4 MG: 16 TABLET ORAL at 18:03

## 2023-09-16 RX ADMIN — ASPIRIN 81 MG: 81 TABLET, COATED ORAL at 08:52

## 2023-09-16 RX ADMIN — ROFLUMILAST 250 MCG: 500 TABLET ORAL at 08:50

## 2023-09-16 RX ADMIN — NYSTATIN 500000 UNITS: 100000 SUSPENSION ORAL at 08:59

## 2023-09-16 RX ADMIN — ACETAMINOPHEN 650 MG: 325 TABLET ORAL at 07:45

## 2023-09-16 RX ADMIN — CLOPIDOGREL BISULFATE 75 MG: 75 TABLET, FILM COATED ORAL at 08:51

## 2023-09-16 RX ADMIN — METHYLPREDNISOLONE 4 MG: 16 TABLET ORAL at 21:25

## 2023-09-16 RX ADMIN — HYDROCODONE BITARTRATE AND ACETAMINOPHEN 1 TABLET: 7.5; 325 TABLET ORAL at 22:57

## 2023-09-16 NOTE — PROGRESS NOTES
Occupational Therapy: Individual: 90 minutes.    Physical Therapy:    Speech Language Pathology:    Signed by: Gaby Carty OT

## 2023-09-16 NOTE — PROGRESS NOTES
Assisted By: Amber AGUILAR    CC: Follow-up on debility    Interview History/HPI: Patient states that her breathing is doing okay.  She is working with physical therapist.  No new events overnight.  She does have oxygen placed 4 L, normally she wears this at 3.5 L.  Patient does have a walker at home but she says she does not use it that much.          Current Hospital Meds:  aspirin, 81 mg, Oral, Daily  budesonide-formoterol, 2 puff, Inhalation, BID - RT  castor oil-balsam peru, 1 application , Topical, Q12H  clopidogrel, 75 mg, Oral, Daily  enoxaparin, 40 mg, Subcutaneous, Daily  furosemide, 20 mg, Oral, Daily  methylPREDNISolone, 4 mg, Oral, 4x Daily Taper  [START ON 9/17/2023] methylPREDNISolone, 4 mg, Oral, TID Around Food  [START ON 9/18/2023] methylPREDNISolone, 4 mg, Oral, Before Breakfast  [START ON 9/18/2023] methylPREDNISolone, 4 mg, Oral, Tonight  [START ON 9/19/2023] methylPREDNISolone, 4 mg, Oral, Before Breakfast  metoprolol tartrate, 25 mg, Oral, Q12H  nystatin, 5 mL, Swish & Swallow, 4x Daily  polyethylene glycol, 17 g, Oral, Daily  roflumilast, 250 mcg, Oral, Daily  tiotropium bromide monohydrate, 2 puff, Inhalation, Daily - RT         Vitals:    09/16/23 0733   BP: 124/80   Pulse: 92   Resp: 18   Temp: 98.3 °F (36.8 °C)   SpO2: 96%         Intake/Output Summary (Last 24 hours) at 9/16/2023 0932  Last data filed at 9/16/2023 0900  Gross per 24 hour   Intake 1560 ml   Output --   Net 1560 ml       EXAM: She has some tachypnea with normal conversation which is probably baseline for her, her lungs however remain clear in the apices, diminished bases, heart regular rate and rhythm without murmur.  Trace edema, hear, strength is symmetric and overall about 4/5.  Mood appears good skin warm and dry trace was 95% on her 4 L      Diet: Regular/House Diet, Diabetic Diets, Renal Diets; Consistent Carbohydrate; Low Sodium (2-3g), Low Potassium; Texture: Regular Texture (IDDSI 7); Fluid Consistency: Thin (IDDSI  0)        LABS:     Lab Results (last 48 hours)       Procedure Component Value Units Date/Time    POC Glucose Once [[id number]]  (Abnormal) Collected: 09/16/23 0553    Specimen: Blood Updated: 09/16/23 0559     Glucose 182 mg/dL     POC Glucose Once [[id number]]  (Abnormal) Collected: 09/15/23 1952    Specimen: Blood Updated: 09/15/23 1958     Glucose 285 mg/dL     Comprehensive Metabolic Panel [[id number]]  (Abnormal) Collected: 09/15/23 0458    Specimen: Blood Updated: 09/15/23 0535     Glucose 202 mg/dL      BUN 30 mg/dL      Creatinine 0.41 mg/dL      Sodium 139 mmol/L      Potassium 5.3 mmol/L      Comment: Slight hemolysis detected by analyzer. Results may be affected.        Chloride 94 mmol/L      CO2 37.7 mmol/L      Calcium 9.3 mg/dL      Total Protein 6.4 g/dL      Albumin 3.8 g/dL      ALT (SGPT) 24 U/L      AST (SGOT) 15 U/L      Alkaline Phosphatase 48 U/L      Total Bilirubin 0.4 mg/dL      Globulin 2.6 gm/dL      A/G Ratio 1.5 g/dL      BUN/Creatinine Ratio 73.2     Anion Gap 7.3 mmol/L      eGFR 114.2 mL/min/1.73     Narrative:      GFR Normal >60  Chronic Kidney Disease <60  Kidney Failure <15      CBC & Differential [[id number]]  (Abnormal) Collected: 09/15/23 0458    Specimen: Blood Updated: 09/15/23 0509    Narrative:      The following orders were created for panel order CBC & Differential.  Procedure                               Abnormality         Status                     ---------                               -----------         ------                     CBC Auto Differential[[id number]]        Abnormal            Final result                 Please view results for these tests on the individual orders.    CBC Auto Differential [[id number]]  (Abnormal) Collected: 09/15/23 0458    Specimen: Blood Updated: 09/15/23 0509     WBC 21.30 10*3/mm3      RBC 5.23 10*6/mm3      Hemoglobin 14.7 g/dL      Hematocrit 49.8 %      MCV 95.2 fL      MCH 28.1 pg      MCHC 29.5 g/dL      RDW 14.6 %      RDW-SD  50.7 fl      MPV 10.8 fL      Platelets 205 10*3/mm3      Neutrophil % 88.8 %      Lymphocyte % 4.2 %      Monocyte % 4.5 %      Eosinophil % 0.0 %      Basophil % 0.3 %      Immature Grans % 2.2 %      Neutrophils, Absolute 18.93 10*3/mm3      Lymphocytes, Absolute 0.90 10*3/mm3      Monocytes, Absolute 0.95 10*3/mm3      Eosinophils, Absolute 0.00 10*3/mm3      Basophils, Absolute 0.06 10*3/mm3      Immature Grans, Absolute 0.46 10*3/mm3      nRBC 0.0 /100 WBC                  Radiology:    Imaging Results (Last 72 Hours)       ** No results found for the last 72 hours. **            Results for orders placed during the hospital encounter of 08/24/21    Adult Transthoracic Echo Complete W/ Cont if Necessary Per Protocol    Interpretation Summary  · Left ventricular ejection fraction appears to be 66 - 70%. Left ventricular systolic function is normal.  · Left ventricular diastolic function is consistent with (grade I) impaired relaxation.      Assessment/Plan:   Debility status post hospitalization for respiratory failure.  Patient is undergoing occupational and physical therapy.  With OT, patient is set up for upper body dressing, min to mod with bathing, min to mod lower body dressing, set up for grooming, min to mod for toileting hygiene and set up for self-feeding.  With PT, patient is contact-guard sit to stand stand to sit, patient walked 60 feet and 40 feet contact-guard with a front wheel walker.  Continue current therapy plan.    Diastolic heart failure, appears compensated at this time.  Patient is on oral Lasix.    Hyperkalemia, repeat labs are pending, bowels are moving, I stopped her supplemental potassium yesterday.    Leukocytosis I feel more related to her steroid taper that she is on as recommended by [facility].  ,  Hyperglycemia worsened by steroids, hemoglobin A1c pending, significant number of glucose above 200, I have added correction insulin.    DVT prophylaxis, Lovenox    COPD, very  severe by PFTs even in the past, she quit smoking 2 years ago.  Her daughter lives with her but is in and out of the house, I discussed with her if anyone is ever talked to transplant with her and she said no.  Continue inhalants and Daliresp.    Severe obesity by BMI greater than 35 adversely affecting her overall health and rehabilitation.    Fransisco Barrett MD

## 2023-09-16 NOTE — THERAPY TREATMENT NOTE
Inpatient Rehabilitation - Physical Therapy Treatment Note        [facility]     Patient Name: Liz Jiang  : 1965  MRN: [id number]    Today's Date: 2023                    Admit Date: 2023      Visit Dx:   No diagnosis found.    Patient Active Problem List   Diagnosis    Chest pain    COPD (chronic obstructive pulmonary disease)    Tobacco abuse    GERD (gastroesophageal reflux disease)    Anxiety    Arthritis    Nausea and vomiting    Generalized abdominal pain    Right upper quadrant pain    Gallbladder disease    Abnormal biliary HIDA scan    Dyslipidemia    Tachycardia    Anal cancer    Port-A-Cath in place    Debility       Past Medical History:   Diagnosis Date    Acid reflux disease     Anal cancer 2019    Arthritis     Asthma, extrinsic     Cholelithiasis     Chronic headaches     COPD (chronic obstructive pulmonary disease)     CVA (cerebral vascular accident)     w/ right sided deficit    CVA (cerebral vascular accident)     Diabetes mellitus     only has high blood sugar when on steriods    History of radiation therapy 2020    Whole pelvis/anal mass    Obstructive sleep apnea treated with BiPAP     On home oxygen therapy     2L     Sleep apnea, obstructive        Past Surgical History:   Procedure Laterality Date    ABDOMINAL SURGERY      CARDIAC CATHETERIZATION      CAROTID ENDARTERECTOMY Left 2018    CHOLECYSTECTOMY WITH INTRAOPERATIVE CHOLANGIOGRAM N/A 2017    Procedure: CHOLECYSTECTOMY LAPAROSCOPIC INTRAOPERATIVE CHOLANGIOGRAM;  Surgeon: Carolin Marie MD;  Location: [facility];  Service:     COLONOSCOPY      HYSTERECTOMY      for heavy bleeding/ complete    KIDNEY STONE SURGERY      TUBAL ABDOMINAL LIGATION      VENOUS ACCESS DEVICE (PORT) INSERTION Left 2019    Procedure: INSERTION VENOUS ACCESS DEVICE;  Surgeon: Carolin Marie MD;  Location: [facility];  Service: General       PT ASSESSMENT (last 12 hours)       IRF PT Evaluation and Treatment        Row Name 09/16/23 0745          PT Time and Intention    Mode of Treatment physical therapy  -     Patient/Family/Caregiver Comments/Observations Pt seen for 2 AM sessions this date. Pt was discussing medication side effect causing her to use bathroom and being fatigued from the exertion/frequency.  -       Row Name 09/16/23 0745          General Information    General Observations of Patient Pt seen in hospital room this date in AM, later in recliner, cooperative with therapy  -       Row Name 09/16/23 0745          Bed Mobility    Comment, (Bed Mobility) Pt may have been sitting EOB in AM session, not formal bed mobility performed in treatment this date  -       Row Name 09/16/23 0745          Transfer Assessment/Treatment    Transfers bed-chair transfer;sit-stand transfer;stand-sit transfer;toilet transfer;other (see comments)  chair to chair  -     Comment, (Transfers) Pt is capable of standing w/o walker as well  -       Row Name 09/16/23 0745          Bed-Chair Transfer    Bed-Chair [place] (Transfers) standby assist;contact guard  -       Row Name 09/16/23 0745          Sit-Stand Transfer    Sit-Stand [place] (Transfers) contact guard;standby assist  -     Assistive Device (Sit-Stand Transfers) wheelchair;walker, front-wheeled  -       Row Name 09/16/23 0745          Stand-Sit Transfer    Stand-Sit [place] (Transfers) set up;contact guard;standby assist  -     Assistive Device (Stand-Sit Transfers) wheelchair;walker, front-wheeled  -       Row Name 09/16/23 0745          Toilet Transfer    Type (Toilet Transfer) stand-sit;sit-stand  -     [place] Level (Toilet Transfer) modified independence;supervision;contact guard;set up  -     Assistive Device (Toilet Transfer) commode  -       Row Name 09/16/23 0745          Gait/Stairs (Locomotion)    Gait/Stairs Locomotion gait/ambulation assistive device  -     [place] Level (Gait) contact guard  -     Assistive  Device (Gait) walker, front-wheeled  -     Patient was able to Ambulate yes  -     Distance in Feet (Gait) 2x5, 2x20, 1x35  -     Comment, (Gait/Stairs) Pt encouraged to keep AD closer to her to avoid UE strain, rest breaks taken for pain/fatigue  -       Row Name 09/16/23 0745          Balance    Comment, Balance Pt worked on somewhat supported upright sitting in W/C while throwing bean bags placed BL and discouragement of twisting to avoid provoking back pain.  -       Row Name 09/16/23 0745          Motor Skills    Therapeutic Exercise hip;knee;ankle  please see flow sheet for detailed LE TE, mostly sitting TE however 2x10 standing marches performed, ankle pumps 2x20 to promote circulatory benefit with pt walking less today than prior session.  -       Row Name 09/16/23 0745          Positioning and Restraints    Pre-Treatment Position sitting in chair/recliner  -     Post Treatment Position chair  -     In Chair sitting;reclined;call light within reach  -       Row Name 09/16/23 0780          Daily Progress Summary (PT)    Daily Progress Summary (PT) Pt feeling more fatigued/weak this date, rest breaks given particularly in 2nd session d/t pain/fatigue/shortness of breath. Education provided to pt during session(s). Pt may benefit from continued therapeutic interventions.  -               User Key  (r) = Recorded By, (t) = Taken By, (c) = Cosigned By      Initials Name Provider Type    Amber Hampton, PT Physical Therapist                  Wound 09/14/23 1840 Left gluteal Pressure Injury (Active)   Dressing Appearance open to air 09/15/23 1923   Closure Open to air 09/15/23 1923   Base purple;dry;non-blanchable;maroon/purple 09/15/23 1923   Drainage Amount none 09/15/23 1923   Care, Wound barrier applied 09/15/23 1923   Dressing Care open to air 09/15/23 1923       Wound 09/14/23 1840 Right gluteal Pressure Injury (Active)   Dressing Appearance open to air 09/15/23 1923   Closure Open to  air 09/15/23 1923   Base maroon/purple;dry;non-blanchable 09/15/23 1923   Drainage Amount none 09/15/23 1923   Care, Wound barrier applied 09/15/23 1923   Dressing Care open to air 09/15/23 1923     Physical Therapy Education       Title: PT OT SLP Therapies (Done)       Topic: Physical Therapy (Done)       Point: Mobility training (Done)       Learning Progress Summary             Patient Acceptance, E, VU,NR by LB at 9/15/2023 0956                         Point: Home exercise program (Done)       Learning Progress Summary             Patient Acceptance, E, VU,NR by LB at 9/15/2023 0956                         Point: Body mechanics (Done)       Learning Progress Summary             Patient Acceptance, E, VU,NR by LB at 9/15/2023 0956                         Point: Precautions (Done)       Learning Progress Summary             Patient Acceptance, E, VU,NR by  at 9/15/2023 0956                                         User Key       Initials Effective Dates Name Provider Type Discipline     06/16/21 -  Darlyn Gandhi, PT Physical Therapist PT                    PT Recommendation and Plan          Daily Progress Summary (PT)  Daily Progress Summary (PT): Pt feeling more fatigued/weak this date, rest breaks given particularly in 2nd session d/t pain/fatigue/shortness of breath. Education provided to pt during session(s). Pt may benefit from continued therapeutic interventions.               Time Calculation:      PT Charges       Row Name 09/16/23 1246 09/16/23 1245          Time Calculation    Start Time 1105  - 0745  -     Stop Time 1150  AM session 2  - 0830  AM session 1  -     Time Calculation (min) 45 min  - 45 min  -     PT Received On -- 09/16/23  -               User Key  (r) = Recorded By, (t) = Taken By, (c) = Cosigned By      Initials Name Provider Type    Amber Hampton, JEFF Physical Therapist                    Therapy Charges for Today       Code Description Service Date  Service Provider Modifiers Qty    66232739192 HC PT THER PROC EA 15 MIN 9/16/2023 Amber Murphy, PT GP 1    79680707562 HC PT THER PROC EA 15 MIN 9/16/2023 Amber Murphy, PT GP 1    79662294720 HC PT THERAPEUTIC ACT EA 15 MIN 9/16/2023 Amber Murphy, PT GP 1    15874394992 HC PT THER PROC EA 15 MIN 9/16/2023 Amber Murphy, PT GP 1    10727210189 HC PT THERAPEUTIC ACT EA 15 MIN 9/16/2023 Amber Murphy, PT GP 1    22450074035 HC PT THERAPEUTIC ACT EA 15 MIN 9/16/2023 Amber Murphy, PT GP 1                     Amber Murphy, PT  9/16/2023

## 2023-09-16 NOTE — PLAN OF CARE
Goal Outcome Evaluation:  Plan of Care Reviewed With: patient        Progress: improving            Problem: Fall Injury Risk  Goal: Absence of Fall and Fall-Related Injury  Outcome: Ongoing, Progressing     Problem: Rehabilitation (IRF) Plan of Care  Goal: Plan of Care Review  Outcome: Ongoing, Progressing  Flowsheets (Taken 9/16/2023 1203)  Progress: improving  Plan of Care Reviewed With: patient  Goal: Patient-Specific Goal (Individualized)  Outcome: Ongoing, Progressing  Goal: Absence of New-Onset Illness or Injury  Outcome: Ongoing, Progressing  Goal: Optimal Comfort and Wellbeing  Outcome: Ongoing, Progressing  Goal: Home and Community Transition Plan Established  Outcome: Ongoing, Progressing     Problem: Skin Injury Risk Increased  Goal: Skin Health and Integrity  Outcome: Ongoing, Progressing     Problem: Pain Chronic (Persistent) (Comorbidity Management)  Goal: Acceptable Pain Control and Functional Ability  Outcome: Ongoing, Progressing

## 2023-09-16 NOTE — THERAPY TREATMENT NOTE
Inpatient Rehabilitation - Occupational Therapy Treatment Note     [facility]     Patient Name: Liz Jiang  : 1965  MRN: [id number]    Today's Date: 2023                 Admit Date: 2023       No diagnosis found.    Patient Active Problem List   Diagnosis    Chest pain    COPD (chronic obstructive pulmonary disease)    Tobacco abuse    GERD (gastroesophageal reflux disease)    Anxiety    Arthritis    Nausea and vomiting    Generalized abdominal pain    Right upper quadrant pain    Gallbladder disease    Abnormal biliary HIDA scan    Dyslipidemia    Tachycardia    Anal cancer    Port-A-Cath in place    Debility       Past Medical History:   Diagnosis Date    Acid reflux disease     Anal cancer 2019    Arthritis     Asthma, extrinsic     Cholelithiasis     Chronic headaches     COPD (chronic obstructive pulmonary disease)     CVA (cerebral vascular accident)     w/ right sided deficit    CVA (cerebral vascular accident)     Diabetes mellitus     only has high blood sugar when on steriods    History of radiation therapy 2020    Whole pelvis/anal mass    Obstructive sleep apnea treated with BiPAP     On home oxygen therapy     2L     Sleep apnea, obstructive        Past Surgical History:   Procedure Laterality Date    ABDOMINAL SURGERY      CARDIAC CATHETERIZATION      CAROTID ENDARTERECTOMY Left 2018    CHOLECYSTECTOMY WITH INTRAOPERATIVE CHOLANGIOGRAM N/A 2017    Procedure: CHOLECYSTECTOMY LAPAROSCOPIC INTRAOPERATIVE CHOLANGIOGRAM;  Surgeon: Carolin Marie MD;  Location: [facility];  Service:     COLONOSCOPY      HYSTERECTOMY      for heavy bleeding/ complete    KIDNEY STONE SURGERY      TUBAL ABDOMINAL LIGATION      VENOUS ACCESS DEVICE (PORT) INSERTION Left 2019    Procedure: INSERTION VENOUS ACCESS DEVICE;  Surgeon: Carolin Marie MD;  Location: [facility];  Service: General             IRF OT ASSESSMENT FLOWSHEET (last 12 hours)       IRF OT Evaluation and Treatment        [facility] Name 09/16/23 1148          OT Time and Intention    Document Type daily treatment  -HB     Mode of Treatment individual therapy;occupational therapy  -HB     Total Minutes, Occupational Therapy 90  -HB     Patient Effort adequate  -HB     Symptoms Noted During/After Treatment none  -HB       [facility] Name 09/16/23 1148          General Information    Patient Profile Reviewed yes  -HB     Patient/Family/Caregiver Comments/Observations Patient and RN okd OT this date.  -HB     General Observations of Patient Patient tolerated OT well with no adverse reactions.  -HB     Existing Precautions/Restrictions fall;oxygen therapy device and L/min  4 liters O2 via NC this date  -HB       Row Name 09/16/23 1148          Pain Assessment    Posttreatment Pain Rating --  pt c/o back pain with no rating stated; pt states she will ask RN for meds after tx  -HB       [facility] Name 09/16/23 1148          Cognition/Psychosocial    Affect/Mental Status (Cognition) WFL  -     Orientation Status (Cognition) oriented x 3  -HB     Follows Commands (Cognition) follows one-step commands;verbal cues/prompting required  -[facility] Name 09/16/23 1148          Grooming    Felt Level (Grooming) grooming skills;set up  -     Position (Grooming) supported sitting  -[facility] Name 09/16/23 1148          Toileting    [place] Level (Toileting) minimum assist (75% patient effort);toileting skills  -     Assistive Device Use (Toileting) --  BS  -     Position (Toileting) supported sitting;supported standing  -[facility] Name 09/16/23 1148          Chair-Bed Transfer    Chair-Bed [place] (Transfers) contact guard;nonverbal cues (demo/gesture);verbal cues  -     Assistive Device (Chair-Bed Transfers) wheelchair  -[facility] Name 09/16/23 1148          Sit-Stand Transfer    Sit-Stand [place] (Transfers) contact guard;verbal cues;nonverbal cues (demo/gesture)  -     Assistive Device (Sit-Stand Transfers)  wheelchair  -       Row Name 09/16/23 1148          Stand-Sit Transfer    Stand-Sit [place] (Transfers) contact guard;verbal cues;nonverbal cues (demo/gesture)  -     Assistive Device (Stand-Sit Transfers) wheelchair  -       Row Name 09/16/23 1148          Toilet Transfer    Type (Toilet Transfer) stand pivot/stand step  -     [place] Level (Toilet Transfer) minimum assist (75% patient effort);contact guard;nonverbal cues (demo/gesture);verbal cues  -     Assistive Device (Toilet Transfer) wheelchair;commode, bedside without drop arms  -       Row Name 09/16/23 1148          Motor Skills    Motor Skills coordination;functional endurance;motor control/coordination interventions  -     Motor Control/Coordination Interventions fine motor manipulation/dexterity activities;therapeutic exercise/ROM;gross motor coordination activities  -     Therapeutic Exercise shoulder;elbow/forearm;hand;wrist  -     Additional Documentation --  BUE TA to increase ADL status/ endurance; rest between tasks  -[facility] Name 09/16/23 1148          Positioning and Restraints    Pre-Treatment Position sitting in chair/recliner  -     Post Treatment Position chair  -HB     In Bed call light within reach;encouraged to call for assist;notified ns  -               User Key  (r) = Recorded By, (t) = Taken By, (c) = Cosigned By      Initials Name Effective Dates     Gaby Carty, OT 05/25/21 -                      Occupational Therapy Education       Title: PT OT SLP Therapies (Done)       Topic: Occupational Therapy (Done)       Point: ADL training (Done)       Description:   Instruct learner(s) on proper safety adaptation and remediation techniques during self care or transfers.   Instruct in proper use of assistive devices.                  Learning Progress Summary             Patient Acceptance, E, VU,NR by  at 9/16/2023 1155    Acceptance, E,D, VU,NR by  at 9/15/2023 1308                          Point: Precautions (Done)       Description:   Instruct learner(s) on prescribed precautions during self-care and functional transfers.                  Learning Progress Summary             Patient Acceptance, E, VU,NR by  at 9/16/2023 1155    Acceptance, E,D, VU,NR by  at 9/15/2023 1308                                         User Key       Initials Effective Dates Name Provider Type Discipline     06/16/21 -  Shaina Ballard OT Occupational Therapist OT     05/25/21 -  Gaby Carty OT Occupational Therapist OT                        OT Recommendation and Plan                         Time Calculation:      Time Calculation- OT       Row Name 09/16/23 1155             Time Calculation- OT    OT Start Time 0830  -HB      OT Stop Time 1000  -HB      OT Time Calculation (min) 90 min  -      Total Timed Code Minutes- OT 90 minute(s)  -                User Key  (r) = Recorded By, (t) = Taken By, (c) = Cosigned By      Initials Name Provider Type     Gaby Carty OT Occupational Therapist                  Therapy Charges for Today       Code Description Service Date Service Provider Modifiers Qty    90167917449 HC OT SELF CARE/MGMT/TRAIN EA 15 MIN 9/16/2023 Gaby Carty OT GO 2    85954818156 HC OT THER PROC EA 15 MIN 9/16/2023 Gaby Carty OT GO 2    06009095064 HC OT THERAPEUTIC ACT EA 15 MIN 9/16/2023 Gaby Carty OT GO 2                     Gaby Carty OT  9/16/2023

## 2023-09-16 NOTE — PROGRESS NOTES
Rehabilitation Nursing  Inpatient Rehabilitation Plan of Care Note    Plan of Care  Copy from Dez    Pain Management (Active)  Current Status (9/14/2023 6:29:00 PM): Potential for increased pain  Weekly Goal: Pain adequately managed  Discharge Goal: Pain adequately managed    Body Function Structure    Skin Integrity (Active)  Current Status (9/14/2023 6:29:00 PM): Stage 2 pressure injuries  Weekly Goal: Healing of wound  Discharge Goal: Healing of wound    Safety    Potential for Injury (Active)  Current Status (9/14/2023 6:29:00 PM): Potential for falls  Weekly Goal: No falls  Discharge Goal: No falls    Signed by: Lionel Hutchins RN

## 2023-09-17 LAB
027 TOXIN: NORMAL
C DIFF TOX GENS STL QL NAA+PROBE: NEGATIVE
GLUCOSE BLDC GLUCOMTR-MCNC: 139 MG/DL (ref 70–130)
GLUCOSE BLDC GLUCOMTR-MCNC: 155 MG/DL (ref 70–130)
GLUCOSE BLDC GLUCOMTR-MCNC: 157 MG/DL (ref 70–130)
GLUCOSE BLDC GLUCOMTR-MCNC: 258 MG/DL (ref 70–130)

## 2023-09-17 PROCEDURE — 94760 N-INVAS EAR/PLS OXIMETRY 1: CPT

## 2023-09-17 PROCEDURE — 63710000001 INSULIN LISPRO (HUMAN) PER 5 UNITS: Performed by: INTERNAL MEDICINE

## 2023-09-17 PROCEDURE — 25010000002 ENOXAPARIN PER 10 MG: Performed by: INTERNAL MEDICINE

## 2023-09-17 PROCEDURE — 82948 REAGENT STRIP/BLOOD GLUCOSE: CPT

## 2023-09-17 PROCEDURE — 94664 DEMO&/EVAL PT USE INHALER: CPT

## 2023-09-17 PROCEDURE — 94799 UNLISTED PULMONARY SVC/PX: CPT

## 2023-09-17 PROCEDURE — 87493 C DIFF AMPLIFIED PROBE: CPT | Performed by: INTERNAL MEDICINE

## 2023-09-17 PROCEDURE — 63710000001 METHYLPREDNISOLONE PER 4 MG: Performed by: INTERNAL MEDICINE

## 2023-09-17 RX ORDER — METHYLPREDNISOLONE 16 MG/1
4 TABLET ORAL
Status: COMPLETED | OUTPATIENT
Start: 2023-09-17 | End: 2023-09-17

## 2023-09-17 RX ORDER — ECHINACEA PURPUREA EXTRACT 125 MG
2 TABLET ORAL AS NEEDED
Status: DISPENSED | OUTPATIENT
Start: 2023-09-17

## 2023-09-17 RX ADMIN — METOPROLOL TARTRATE 25 MG: 25 TABLET, FILM COATED ORAL at 08:54

## 2023-09-17 RX ADMIN — HYDROCODONE BITARTRATE AND ACETAMINOPHEN 1 TABLET: 7.5; 325 TABLET ORAL at 11:48

## 2023-09-17 RX ADMIN — METHYLPREDNISOLONE 4 MG: 16 TABLET ORAL at 13:38

## 2023-09-17 RX ADMIN — METHYLPREDNISOLONE 4 MG: 16 TABLET ORAL at 17:48

## 2023-09-17 RX ADMIN — HYDROCODONE BITARTRATE AND ACETAMINOPHEN 1 TABLET: 7.5; 325 TABLET ORAL at 06:14

## 2023-09-17 RX ADMIN — METOPROLOL TARTRATE 25 MG: 25 TABLET, FILM COATED ORAL at 20:39

## 2023-09-17 RX ADMIN — FUROSEMIDE 20 MG: 20 TABLET ORAL at 08:55

## 2023-09-17 RX ADMIN — METHYLPREDNISOLONE 4 MG: 16 TABLET ORAL at 06:35

## 2023-09-17 RX ADMIN — CASTOR OIL AND BALSAM, PERU 1 APPLICATION: 788; 87 OINTMENT TOPICAL at 20:38

## 2023-09-17 RX ADMIN — HYDROCODONE BITARTRATE AND ACETAMINOPHEN 1 TABLET: 7.5; 325 TABLET ORAL at 17:48

## 2023-09-17 RX ADMIN — ALBUTEROL SULFATE 1.25 MG: 1.25 SOLUTION RESPIRATORY (INHALATION) at 19:35

## 2023-09-17 RX ADMIN — BUDESONIDE AND FORMOTEROL FUMARATE DIHYDRATE 2 PUFF: 160; 4.5 AEROSOL RESPIRATORY (INHALATION) at 07:07

## 2023-09-17 RX ADMIN — ALPRAZOLAM 0.5 MG: 0.5 TABLET ORAL at 20:39

## 2023-09-17 RX ADMIN — CASTOR OIL AND BALSAM, PERU 1 APPLICATION: 788; 87 OINTMENT TOPICAL at 11:45

## 2023-09-17 RX ADMIN — ALBUTEROL SULFATE 1.25 MG: 1.25 SOLUTION RESPIRATORY (INHALATION) at 03:01

## 2023-09-17 RX ADMIN — ENOXAPARIN SODIUM 40 MG: 40 INJECTION SUBCUTANEOUS at 08:55

## 2023-09-17 RX ADMIN — SALINE NASAL SPRAY 2 SPRAY: 1.5 SOLUTION NASAL at 13:38

## 2023-09-17 RX ADMIN — INSULIN LISPRO 2 UNITS: 100 INJECTION, SOLUTION INTRAVENOUS; SUBCUTANEOUS at 11:44

## 2023-09-17 RX ADMIN — HYDROCODONE BITARTRATE AND ACETAMINOPHEN 1 TABLET: 7.5; 325 TABLET ORAL at 23:26

## 2023-09-17 RX ADMIN — INSULIN LISPRO 2 UNITS: 100 INJECTION, SOLUTION INTRAVENOUS; SUBCUTANEOUS at 17:39

## 2023-09-17 RX ADMIN — ROFLUMILAST 250 MCG: 500 TABLET ORAL at 08:54

## 2023-09-17 RX ADMIN — BUDESONIDE AND FORMOTEROL FUMARATE DIHYDRATE 2 PUFF: 160; 4.5 AEROSOL RESPIRATORY (INHALATION) at 19:34

## 2023-09-17 RX ADMIN — ASPIRIN 81 MG: 81 TABLET, COATED ORAL at 08:55

## 2023-09-17 RX ADMIN — NYSTATIN 500000 UNITS: 100000 SUSPENSION ORAL at 17:39

## 2023-09-17 RX ADMIN — NYSTATIN 500000 UNITS: 100000 SUSPENSION ORAL at 11:45

## 2023-09-17 RX ADMIN — CLOPIDOGREL BISULFATE 75 MG: 75 TABLET, FILM COATED ORAL at 08:55

## 2023-09-17 NOTE — PROGRESS NOTES
Rehabilitation Nursing  Inpatient Rehabilitation Plan of Care Note    Plan of Care  Pain    Pain Management (Active)  Current Status (9/14/2023 6:29:00 PM): Potential for increased pain  Weekly Goal: Pain adequately managed  Discharge Goal: Pain adequately managed    Body Function Structure    Skin Integrity (Active)  Current Status (9/14/2023 6:29:00 PM): Stage 2 pressure injuries  Weekly Goal: Healing of wound  Discharge Goal: Healing of wound    Safety    Potential for Injury (Active)  Current Status (9/14/2023 6:29:00 PM): Potential for falls  Weekly Goal: No falls  Discharge Goal: No falls    Signed by: Vianey Coronado, Supervisor

## 2023-09-17 NOTE — PROGRESS NOTES
Physical Medicine and Rehabilitation  Inpatient Rehabilitation Interdisciplinary Plan of Care    Demographics            Age: 57Y            Gender: Female    Admission Date: 9/14/2023 6:29:00 PM  Rehabilitation Diagnosis:  debility    Plan of Care  Anticipated Discharge Date/Estimated Length of Stay: 14 days  Anticipated Discharge Destination: Community discharge with assistance  Discharge Plan : Pt plans to return home with daughter and son at discharge.  Medical Necessity Expected Level Rationale: good  Intensity and Duration: an average of 3 hours/5 days per week  Medical Supervision and 24 Hour Rehab Nursing: x  Physical Therapy: x  PT Intensity/Duration: PT 1.5 hours per day/5 days per week  Occupational Therapy: x  OT Intensity/Duration: OT 1.5 hours per day/5 days per week  Social Work: x  Therapeutic Recreation: x  Respiratory Therapy: x  Updated (if changes indicated)  No changes to plan.    Based on the patient's medical and functional status, their prognosis and  expected level of functional improvement is: good    Interdisciplinary Problem/Goals/Status  Mobility    [PT] Bed/Chair/Wheelchair (Active)  Current Status (9/15/2023 12:00:00 AM): CGA  Weekly Goal: MI  Discharge Goal: MI    [PT] Walk (Active)  Current Status (9/15/2023 12:00:00 AM): amb 60' RW CGA  Weekly Goal: amb 300' RW MI  Discharge Goal: amb 300' RW MI    Self Care    [OT] Dressing (Lower) (Active)  Current Status (9/15/2023 10:45:00 AM): modA/Geovanny  Weekly Goal: Geovanny  Discharge Goal: CGA    Pain    [RN] Pain Management (Active)  Current Status (9/14/2023 6:29:00 PM): Potential for increased pain  Weekly Goal: Pain adequately managed  Discharge Goal: Pain adequately managed    Body Function Structure    [RN] Skin Integrity (Active)  Current Status (9/14/2023 6:29:00 PM): Stage 2 pressure injuries  Weekly Goal: Healing of wound  Discharge Goal: Healing of wound    Safety    [RN] Potential for Injury (Active)  Current Status (9/14/2023  6:29:00 PM): Potential for falls  Weekly Goal: No falls  Discharge Goal: No falls    Medical Problems  Resp failure, very severe copd, Hyperkalemia    Comments:    Signed by: Fransisco Barrett MD

## 2023-09-18 VITALS
OXYGEN SATURATION: 95 % | BODY MASS INDEX: 38.24 KG/M2 | WEIGHT: 224 LBS | RESPIRATION RATE: 18 BRPM | TEMPERATURE: 98.2 F | HEART RATE: 67 BPM | SYSTOLIC BLOOD PRESSURE: 119 MMHG | DIASTOLIC BLOOD PRESSURE: 68 MMHG | HEIGHT: 64 IN

## 2023-09-18 LAB
ANION GAP SERPL CALCULATED.3IONS-SCNC: 8.5 MMOL/L (ref 5–15)
ANISOCYTOSIS BLD QL: ABNORMAL
BUN SERPL-MCNC: 30 MG/DL (ref 6–20)
BUN/CREAT SERPL: 78.9 (ref 7–25)
CALCIUM SPEC-SCNC: 9 MG/DL (ref 8.6–10.5)
CHLORIDE SERPL-SCNC: 98 MMOL/L (ref 98–107)
CLUMPED PLATELETS: PRESENT
CO2 SERPL-SCNC: 34.5 MMOL/L (ref 22–29)
CREAT SERPL-MCNC: 0.38 MG/DL (ref 0.57–1)
DEPRECATED RDW RBC AUTO: 50.8 FL (ref 37–54)
EGFRCR SERPLBLD CKD-EPI 2021: 116.3 ML/MIN/1.73
EOSINOPHIL # BLD MANUAL: 0.18 10*3/MM3 (ref 0–0.4)
EOSINOPHIL NFR BLD MANUAL: 1 % (ref 0.3–6.2)
ERYTHROCYTE [DISTWIDTH] IN BLOOD BY AUTOMATED COUNT: 14.5 % (ref 12.3–15.4)
GLUCOSE BLDC GLUCOMTR-MCNC: 146 MG/DL (ref 70–130)
GLUCOSE BLDC GLUCOMTR-MCNC: 148 MG/DL (ref 70–130)
GLUCOSE BLDC GLUCOMTR-MCNC: 172 MG/DL (ref 70–130)
GLUCOSE BLDC GLUCOMTR-MCNC: 192 MG/DL (ref 70–130)
GLUCOSE SERPL-MCNC: 150 MG/DL (ref 65–99)
HCT VFR BLD AUTO: 51.5 % (ref 34–46.6)
HGB BLD-MCNC: 14.6 G/DL (ref 12–15.9)
HYPOCHROMIA BLD QL: ABNORMAL
LYMPHOCYTES # BLD MANUAL: 2.75 10*3/MM3 (ref 0.7–3.1)
LYMPHOCYTES NFR BLD MANUAL: 5 % (ref 5–12)
MCH RBC QN AUTO: 27.1 PG (ref 26.6–33)
MCHC RBC AUTO-ENTMCNC: 28.3 G/DL (ref 31.5–35.7)
MCV RBC AUTO: 95.7 FL (ref 79–97)
METAMYELOCYTES NFR BLD MANUAL: 1 % (ref 0–0)
MONOCYTES # BLD: 0.92 10*3/MM3 (ref 0.1–0.9)
NEUTROPHILS # BLD AUTO: 14.31 10*3/MM3 (ref 1.7–7)
NEUTROPHILS NFR BLD MANUAL: 78 % (ref 42.7–76)
NRBC SPEC MANUAL: 2 /100 WBC (ref 0–0.2)
PLATELET # BLD AUTO: 208 10*3/MM3 (ref 140–450)
PMV BLD AUTO: 10.6 FL (ref 6–12)
POTASSIUM SERPL-SCNC: 4.3 MMOL/L (ref 3.5–5.2)
RBC # BLD AUTO: 5.38 10*6/MM3 (ref 3.77–5.28)
SMALL PLATELETS BLD QL SMEAR: ADEQUATE
SODIUM SERPL-SCNC: 141 MMOL/L (ref 136–145)
VARIANT LYMPHS NFR BLD MANUAL: 15 % (ref 19.6–45.3)
WBC NRBC COR # BLD: 18.35 10*3/MM3 (ref 3.4–10.8)

## 2023-09-18 PROCEDURE — 85007 BL SMEAR W/DIFF WBC COUNT: CPT | Performed by: INTERNAL MEDICINE

## 2023-09-18 PROCEDURE — 94664 DEMO&/EVAL PT USE INHALER: CPT

## 2023-09-18 PROCEDURE — 85025 COMPLETE CBC W/AUTO DIFF WBC: CPT | Performed by: INTERNAL MEDICINE

## 2023-09-18 PROCEDURE — 97530 THERAPEUTIC ACTIVITIES: CPT

## 2023-09-18 PROCEDURE — 94799 UNLISTED PULMONARY SVC/PX: CPT

## 2023-09-18 PROCEDURE — 80048 BASIC METABOLIC PNL TOTAL CA: CPT | Performed by: INTERNAL MEDICINE

## 2023-09-18 PROCEDURE — 97110 THERAPEUTIC EXERCISES: CPT

## 2023-09-18 PROCEDURE — 63710000001 INSULIN LISPRO (HUMAN) PER 5 UNITS: Performed by: INTERNAL MEDICINE

## 2023-09-18 PROCEDURE — 25010000002 ENOXAPARIN PER 10 MG: Performed by: INTERNAL MEDICINE

## 2023-09-18 PROCEDURE — 82948 REAGENT STRIP/BLOOD GLUCOSE: CPT

## 2023-09-18 PROCEDURE — 97535 SELF CARE MNGMENT TRAINING: CPT

## 2023-09-18 PROCEDURE — 97116 GAIT TRAINING THERAPY: CPT

## 2023-09-18 PROCEDURE — 94760 N-INVAS EAR/PLS OXIMETRY 1: CPT

## 2023-09-18 PROCEDURE — 63710000001 METHYLPREDNISOLONE PER 4 MG: Performed by: INTERNAL MEDICINE

## 2023-09-18 RX ORDER — LOPERAMIDE HYDROCHLORIDE 2 MG/1
2 CAPSULE ORAL 4 TIMES DAILY PRN
Status: DISPENSED | OUTPATIENT
Start: 2023-09-18

## 2023-09-18 RX ADMIN — TIOTROPIUM BROMIDE INHALATION SPRAY 2 PUFF: 3.12 SPRAY, METERED RESPIRATORY (INHALATION) at 07:07

## 2023-09-18 RX ADMIN — CASTOR OIL AND BALSAM, PERU 1 APPLICATION: 788; 87 OINTMENT TOPICAL at 08:31

## 2023-09-18 RX ADMIN — ENOXAPARIN SODIUM 40 MG: 40 INJECTION SUBCUTANEOUS at 08:30

## 2023-09-18 RX ADMIN — ALBUTEROL SULFATE 1.25 MG: 1.25 SOLUTION RESPIRATORY (INHALATION) at 19:05

## 2023-09-18 RX ADMIN — BUDESONIDE AND FORMOTEROL FUMARATE DIHYDRATE 2 PUFF: 160; 4.5 AEROSOL RESPIRATORY (INHALATION) at 19:05

## 2023-09-18 RX ADMIN — ASPIRIN 81 MG: 81 TABLET, COATED ORAL at 08:30

## 2023-09-18 RX ADMIN — LOPERAMIDE HYDROCHLORIDE 2 MG: 2 CAPSULE ORAL at 21:18

## 2023-09-18 RX ADMIN — ACETAMINOPHEN 650 MG: 325 TABLET ORAL at 04:24

## 2023-09-18 RX ADMIN — METOPROLOL TARTRATE 25 MG: 25 TABLET, FILM COATED ORAL at 20:52

## 2023-09-18 RX ADMIN — NYSTATIN 500000 UNITS: 100000 SUSPENSION ORAL at 08:31

## 2023-09-18 RX ADMIN — METOPROLOL TARTRATE 25 MG: 25 TABLET, FILM COATED ORAL at 08:30

## 2023-09-18 RX ADMIN — METHYLPREDNISOLONE 4 MG: 16 TABLET ORAL at 21:00

## 2023-09-18 RX ADMIN — CASTOR OIL AND BALSAM, PERU 1 APPLICATION: 788; 87 OINTMENT TOPICAL at 21:02

## 2023-09-18 RX ADMIN — INSULIN LISPRO 2 UNITS: 100 INJECTION, SOLUTION INTRAVENOUS; SUBCUTANEOUS at 11:54

## 2023-09-18 RX ADMIN — METHYLPREDNISOLONE 4 MG: 16 TABLET ORAL at 06:31

## 2023-09-18 RX ADMIN — HYDROCODONE BITARTRATE AND ACETAMINOPHEN 1 TABLET: 7.5; 325 TABLET ORAL at 18:46

## 2023-09-18 RX ADMIN — CLOPIDOGREL BISULFATE 75 MG: 75 TABLET, FILM COATED ORAL at 08:30

## 2023-09-18 RX ADMIN — FUROSEMIDE 20 MG: 20 TABLET ORAL at 08:30

## 2023-09-18 RX ADMIN — BUDESONIDE AND FORMOTEROL FUMARATE DIHYDRATE 2 PUFF: 160; 4.5 AEROSOL RESPIRATORY (INHALATION) at 07:07

## 2023-09-18 RX ADMIN — HYDROCODONE BITARTRATE AND ACETAMINOPHEN 1 TABLET: 7.5; 325 TABLET ORAL at 06:57

## 2023-09-18 RX ADMIN — NYSTATIN 500000 UNITS: 100000 SUSPENSION ORAL at 17:16

## 2023-09-18 RX ADMIN — LOPERAMIDE HYDROCHLORIDE 2 MG: 2 CAPSULE ORAL at 11:02

## 2023-09-18 RX ADMIN — HYDROCODONE BITARTRATE AND ACETAMINOPHEN 1 TABLET: 7.5; 325 TABLET ORAL at 13:01

## 2023-09-18 RX ADMIN — NYSTATIN 500000 UNITS: 100000 SUSPENSION ORAL at 11:54

## 2023-09-18 RX ADMIN — ALPRAZOLAM 0.5 MG: 0.5 TABLET ORAL at 20:52

## 2023-09-18 NOTE — PROGRESS NOTES
Occupational Therapy:    Physical Therapy: Individual: 90 minutes.    Speech Language Pathology:    Signed by: Darlyn Gandhi, Physical Therapist

## 2023-09-18 NOTE — PROGRESS NOTES
[facility]PS CMG Coordinator  Inpatient Rehabilitation Admission    Ethnic Group:  Marital Status:    IRF Admission Date:  09/14/2023  Admission Class:  Admit From:    Pre-Hospital Living:    Payment Sources:  Impairment Group:  Date of Onset of Impairment:    Etiologic Diagnosis Code(s):  Rank Code      Description  1    J44.1     Chronic obstructive pulmonary disease with                 (acute) exacerbation    Comorbidities:  Rank Code      Description    1    R53.81    Other malaise  2    B37.0     Candidal stomatitis  3    C21.0     Malignant neoplasm of anus, unspecified  4    I69.354   Hemiplegia and hemiparesis following cerebral                 infarction affecting left non-dominant side  5    E11.9     Type 2 diabetes mellitus without complications  6    D72.829   Elevated white blood cell count, unspecified  7    I50.32    Chronic diastolic (congestive) heart failure  8    I11.0     Hypertensive heart disease with heart failure  9    Z68.38    Body mass index (BMI) 38.0-38.9, adult  10   K21.9     Gastro-esophageal reflux disease without                 esophagitis  11   Z99.81    Dependence on supplemental oxygen  12   G47.33    Obstructive sleep apnea (adult) (pediatric)  13   Z87.891   Personal history of nicotine dependence  14   E66.9     Obesity, unspecified  15   E87.5     Hyperkalemia  16   K59.00    Constipation, unspecified  17   F41.9     Anxiety disorder, unspecified    Height on Admission:  Weight on Admission:    Are there any arthritis conditions recorded for Impairment Group, Etiologic  Diagnosis, or Comorbid Conditions that meet all of the regulatory requirements  for IRF classification (in 42 .29(b)(2)(x), (xi), and xii))?  No    FENG Bladder Accidents:   - Accidents.    FENG Bowel Accident:  -Accidents.    Signed by: Nurse Sai

## 2023-09-18 NOTE — PROGRESS NOTES
Nutrition Services    Patient Name:  Liz Jiang  YOB: 1965  MRN: [id number]  Admit Date:  9/14/2023    Related to labs and intakes, liberalized diet to Cardiac/CCD and am not restricting Potassium at this time.  Will continue to monitor labs and intakes.  Thank you.    Electronically signed by:  Paloma Blanc RD  09/18/23 15:53 EDT

## 2023-09-18 NOTE — THERAPY TREATMENT NOTE
Inpatient Rehabilitation - Occupational Therapy Treatment Note     [facility]     Patient Name: Liz Jiang  : 1965  MRN: [id number]    Today's Date: 2023                 Admit Date: 2023       No diagnosis found.    Patient Active Problem List   Diagnosis    Chest pain    COPD (chronic obstructive pulmonary disease)    Tobacco abuse    GERD (gastroesophageal reflux disease)    Anxiety    Arthritis    Nausea and vomiting    Generalized abdominal pain    Right upper quadrant pain    Gallbladder disease    Abnormal biliary HIDA scan    Dyslipidemia    Tachycardia    Anal cancer    Port-A-Cath in place    Debility       Past Medical History:   Diagnosis Date    Acid reflux disease     Anal cancer 2019    Arthritis     Asthma, extrinsic     Cholelithiasis     Chronic headaches     COPD (chronic obstructive pulmonary disease)     CVA (cerebral vascular accident)     w/ right sided deficit    CVA (cerebral vascular accident)     Diabetes mellitus     only has high blood sugar when on steriods    History of radiation therapy 2020    Whole pelvis/anal mass    Obstructive sleep apnea treated with BiPAP     On home oxygen therapy     2L     Sleep apnea, obstructive        Past Surgical History:   Procedure Laterality Date    ABDOMINAL SURGERY      CARDIAC CATHETERIZATION      CAROTID ENDARTERECTOMY Left 2018    CHOLECYSTECTOMY WITH INTRAOPERATIVE CHOLANGIOGRAM N/A 2017    Procedure: CHOLECYSTECTOMY LAPAROSCOPIC INTRAOPERATIVE CHOLANGIOGRAM;  Surgeon: Carolin Marie MD;  Location: [facility];  Service:     COLONOSCOPY      HYSTERECTOMY      for heavy bleeding/ complete    KIDNEY STONE SURGERY      TUBAL ABDOMINAL LIGATION      VENOUS ACCESS DEVICE (PORT) INSERTION Left 2019    Procedure: INSERTION VENOUS ACCESS DEVICE;  Surgeon: Carolin Marie MD;  Location: [facility];  Service: General             IRF OT ASSESSMENT FLOWSHEET (last 12 hours)       IRF OT Evaluation and Treatment        Row Name 09/18/23 1406          OT Time and Intention    Document Type daily treatment  -TM     Mode of Treatment occupational therapy  -TM     Patient Effort adequate  -TM     Symptoms Noted During/After Treatment none  -TM       Row Name 09/18/23 1406          General Information    General Observations of Patient Pt agreeable for therapy.  -TM     Existing Precautions/Restrictions fall;oxygen therapy device and L/min  -TM       Row Name 09/18/23 1406          Cognition/Psychosocial    Orientation Status (Cognition) oriented x 3  -TM     Follows Commands (Cognition) WFL  -TM       Row Name 09/18/23 1406          Lower Body Dressing    Position (Lower Body Dressing) supported sitting  -TM     Comment (Lower Body Dressing) CGA/Geovanny  -TM       Row Name 09/18/23 1406          Grooming    [place] Level (Grooming) set up  -TM     Position (Grooming) supported sitting  -TM       Row Name 09/18/23 1406          Toileting    [place] Level (Toileting) supervision;contact guard assist;verbal cues  -TM     Assistive Device Use (Toileting) raised toilet seat;grab bar/safety frame  -TM     Position (Toileting) supported sitting  -TM       Row Name 09/18/23 1406          Transfer Assessment/Treatment    Comment, (Transfers) transfer from bedside recliner to w/c and w/c to bedside recliner with supervision and verbal cues.  -TM       Row Name 09/18/23 1406          Toilet Transfer    Type (Toilet Transfer) stand pivot/stand step  -TM     [place] Level (Toilet Transfer) supervision;verbal cues  -TM     Assistive Device (Toilet Transfer) wheelchair;raised toilet seat;grab bars/safety frame  -TM       Row Name 09/18/23 1406          Motor Skills    Motor Skills coordination;functional endurance;motor control/coordination interventions  -TM     Motor Control/Coordination Interventions therapeutic exercise/ROM;fine motor manipulation/dexterity activities;gross motor coordination activities;other (see comments)   BUE ther ex/act, GMC/FMC  -               User Key  (r) = Recorded By, (t) = Taken By, (c) = Cosigned By      Initials Name Effective Dates     Shaina Ballard OT 06/16/21 -                      Occupational Therapy Education       Title: PT OT SLP Therapies (Done)       Topic: Occupational Therapy (Done)       Point: ADL training (Done)       Description:   Instruct learner(s) on proper safety adaptation and remediation techniques during self care or transfers.   Instruct in proper use of assistive devices.                  Learning Progress Summary             Patient Acceptance, E,D, VU,NR by  at 9/18/2023 1359    Acceptance, E, VU,NR by  at 9/16/2023 1155    Acceptance, E,D, VU,NR by  at 9/15/2023 1308                         Point: Precautions (Done)       Description:   Instruct learner(s) on prescribed precautions during self-care and functional transfers.                  Learning Progress Summary             Patient Acceptance, E,D, VU,NR by  at 9/18/2023 1359    Acceptance, E, VU,NR by  at 9/16/2023 1155    Acceptance, E,D, VU,NR by  at 9/15/2023 1308                                         User Key       Initials Effective Dates Name Provider Type Discipline     06/16/21 -  Shaina Ballard, MICHAEL Occupational Therapist OT     05/25/21 -  Gaby Carty OT Occupational Therapist OT                        OT Recommendation and Plan    Planned Therapy Interventions (OT): activity tolerance training, adaptive equipment training, BADL retraining, IADL retraining, occupation/activity based interventions, patient/caregiver education/training, ROM/therapeutic exercise, strengthening exercise, transfer/mobility retraining                    Time Calculation:      Time Calculation- OT       Row Name 09/18/23 1400 09/18/23 1359          Time Calculation- OT    OT Start Time 1250  -TM 1045  -TM     OT Stop Time 1330  -TM 1135  -TM     OT Time Calculation (min) 40 min  -TM 50 min  -TM      Total Timed Code Minutes- OT 40 minute(s)  -TM 50 minute(s)  -TM     OT Non-Billable Time (min) -- 15 min  -TM               User Key  (r) = Recorded By, (t) = Taken By, (c) = Cosigned By      Initials Name Provider Type    TM Shaina Ballard OT Occupational Therapist                  Therapy Charges for Today       Code Description Service Date Service Provider Modifiers Qty    12430853467 HC OT SELF CARE/MGMT/TRAIN EA 15 MIN 9/18/2023 Shaina Ballard, OT GO 2    09818345480 HC OT THERAPEUTIC ACT EA 15 MIN 9/18/2023 Shaina Ballard, OT GO 3    03449299236 HC OT THER PROC EA 15 MIN 9/18/2023 Shaina Ballard, MICHAEL GO 1                     Shaina Ballard OT  9/18/2023

## 2023-09-18 NOTE — PROGRESS NOTES
Rehabilitation Nursing  Inpatient Rehabilitation Plan of Care Note    Plan of Care  Copy from Dez    Pain Management (Active)  Current Status (9/14/2023 6:29:00 PM): Potential for increased pain  Weekly Goal: Pain adequately managed  Discharge Goal: Pain adequately managed    Body Function Structure    Skin Integrity (Active)  Current Status (9/14/2023 6:29:00 PM): Stage 2 pressure injuries  Weekly Goal: Healing of wound  Discharge Goal: Healing of wound    Safety    Potential for Injury (Active)  Current Status (9/14/2023 6:29:00 PM): Potential for falls  Weekly Goal: No falls  Discharge Goal: No falls    Signed by: Selene Crockett, Nurse

## 2023-09-18 NOTE — NURSING NOTE
Notified Pradeep family member per ms [facility]oco request of moving to room 102b with all belongings and triology machine .

## 2023-09-18 NOTE — PROGRESS NOTES
Patient Assessment Instrument  Quality Indicators - Admission FY 2023    Section A. Ethnicity/ Race/Language  Ethnicity:  Race:  Preferred Language:    Section A. Transportation      Section B. Hearing and Vision        Section B. Health Literacy        Section C. Cognitive Patterns      Section C. Signs and Symptoms of Delirium (from CAM)      Section D. Mood      Section D. Social Isolation      Section ES0939. Prior Functioning    Self Care: Patient needed partial assistance from another person to complete any  activities.  Indoor Mobility: Patient completed the activities by themself, with or without  an assistive device, with no assistance from a helper.  Stairs: Patient completed the activities by themself, with or without an  assistive device, with no assistance from a helper.  Functional Cognition: Patient completed the activities by themself, with or  without an assistive device, with no assistance from a helper.    Section NC9572. Prior Device Use      Section SY4206. Self Care Performance      Section GP2187. Self Care Discharge Goals      Section PL2148. Mobility Performance      Section NQ6276. Mobility Discharge Goals      Section H. Bladder and Bowel  Bladder Continence: Always continent (no documented incontinence).  Bowel Continence: Always continent (no documented incontinence).    Section I. Active Diagnosis  Comorbidities and Co-existing Conditions:   Diabetes Mellitus (DM) - e.g.,  diabetic retinopathy, nephropathy, and neuropathy).    Section J. Health Conditions  Patient has not had any falls in the past year. Patient has not had major  surgery during the 100 days prior to admission.    Section J. Health Conditions (Pain)      Section K. Swallowing/Nutritional Status  Regular food (solids and liquids swallowed safely without supervision or  modified food or liquid consistency).  Nutritional Approaches on Admission:  Nutritional Approaches on Admission:  Therapeutic diet (e.g., low salt,  diabetic, low cholesterol), None    Section M. Skin Conditions      Section N. Medication    Potential Clinically Significant Medication Issues: No issues found during  review  Patient is taking medications in the following pharmacological classification:  I. Antiplatelet An indication is noted for all medications in the Antiplatelet  drug class. E. Anticoagulant An indication is noted for all medications in the  Anticoagulant drug class. H. Opioid An indication is noted for all medications  in the Opiod drug class. J. Hypoglycemic (including insulin) An indication is  noted for all medications in the Hypoglycemic drug class.  Potential Clinically Significant Medication Issues: No issues found during  review    Section O. Special Treatments, Procedures, and Programs  IV Access: Central  (e.g., PICC, tunneled, port)    Signed by: Vianey Coronado, Supervisor

## 2023-09-18 NOTE — PROGRESS NOTES
Inpatient Rehabilitation Functional Measures Assessment and Plan of Care    Plan of Care  Self Care    Performed Intervention(s)  ADL retraining/AE education, fxal mobility, BUE ther ex/act, GMC/FMC  Self Care    Dressing (Lower) (Active)  Current Status (9/18/2023 10:45:00 AM): CGA/Geovanny  Weekly Goal: CGA  Discharge Goal: CGA    Functional Measures  FENG Eating:  FENG Grooming:  FENG Bathing:  FENG Upper Body Dressing:  FENG Lower Body Dressing:  FENG Toileting:    FENG Bladder Management  Level of Assistance:  Frequency/Number of Accidents this Shift:    FENG Bowel Management  Level of Assistance:  Frequency/Number of Accidents this Shift:    FENG Bed/Chair/Wheelchair Transfer:  FENG Toilet Transfer:  FENG Tub/Shower Transfer:    Previously Documented Mode of Locomotion at Discharge:  [facility] Expected Mode of Locomotion at Discharge:  FENG Walk/Wheelchair:  FENG Stairs:    FENG Comprehension:  FENG Expression:  FENG Social Interaction:  FENG Problem Solving:  FENG Memory:    Therapy Mode Minutes  Occupational Therapy: Individual: 90 minutes.  Physical Therapy:  Speech Language Pathology:    Discharge Functional Goals:    Signed by: Shaina Ballard OT

## 2023-09-18 NOTE — PROGRESS NOTES
PPS CMG Coordinator  Inpatient Rehabilitation Admission    Ethnic Group: White.  Marital Status:  Marital Status: .    IRF Admission Date:  09/14/2023  Admission Class: Initial Rehab.  Admit From:  [facility]    Pre-Hospital Living: Home. Pre-Hospital Living  With: (2) Family/Relatives.    Payment Sources: Primary: Medicare Fee for Service  Secondary: Not Listed.  Impairment Group: 10.1 Chronic Obstructive Pulmonary disease  Date of Onset of Impairment: 09/07/2023    Etiologic Diagnosis Code(s):  Rank Code      Description  1    J44.1     Chronic obstructive pulmonary disease with                 (acute) exacerbation    Comorbidities:      Height on Admission: 64 inches.  Weight on Admission: 224 pounds.    Are there any arthritis conditions recorded for Impairment Group, Etiologic  Diagnosis, or Comorbid Conditions that meet all of the regulatory requirements  for IRF classification (in 42 .29(b)(2)(x), (xi), and xii))?    FENG Bladder Accidents:  0 - Accidents.  Bladder Score = 7. Patient has not had an accident.  FENG Bowel Accident: 0 -Accidents.  Bowel Score = 6. Patient has no accidents, but uses a device/medications.  medication    Signed by: Vianey Coronado, Supervisor

## 2023-09-18 NOTE — THERAPY TREATMENT NOTE
Inpatient Rehabilitation - Physical Therapy Treatment Note        [facility]     Patient Name: Liz Jiang  : 1965  MRN: [id number]    Today's Date: 2023                    Admit Date: 2023      Visit Dx:   No diagnosis found.    Patient Active Problem List   Diagnosis    Chest pain    COPD (chronic obstructive pulmonary disease)    Tobacco abuse    GERD (gastroesophageal reflux disease)    Anxiety    Arthritis    Nausea and vomiting    Generalized abdominal pain    Right upper quadrant pain    Gallbladder disease    Abnormal biliary HIDA scan    Dyslipidemia    Tachycardia    Anal cancer    Port-A-Cath in place    Debility       Past Medical History:   Diagnosis Date    Acid reflux disease     Anal cancer 2019    Arthritis     Asthma, extrinsic     Cholelithiasis     Chronic headaches     COPD (chronic obstructive pulmonary disease)     CVA (cerebral vascular accident)     w/ right sided deficit    CVA (cerebral vascular accident)     Diabetes mellitus     only has high blood sugar when on steriods    History of radiation therapy 2020    Whole pelvis/anal mass    Obstructive sleep apnea treated with BiPAP     On home oxygen therapy     2L     Sleep apnea, obstructive        Past Surgical History:   Procedure Laterality Date    ABDOMINAL SURGERY      CARDIAC CATHETERIZATION      CAROTID ENDARTERECTOMY Left 2018    CHOLECYSTECTOMY WITH INTRAOPERATIVE CHOLANGIOGRAM N/A 2017    Procedure: CHOLECYSTECTOMY LAPAROSCOPIC INTRAOPERATIVE CHOLANGIOGRAM;  Surgeon: Carolin Marie MD;  Location: [facility];  Service:     COLONOSCOPY      HYSTERECTOMY      for heavy bleeding/ complete    KIDNEY STONE SURGERY      TUBAL ABDOMINAL LIGATION      VENOUS ACCESS DEVICE (PORT) INSERTION Left 2019    Procedure: INSERTION VENOUS ACCESS DEVICE;  Surgeon: Carolin Marie MD;  Location: [facility];  Service: General       PT ASSESSMENT (last 12 hours)       IRF PT Evaluation and Treatment        Row Name 09/18/23 1157          PT Time and Intention    Document Type daily treatment  -LB     Mode of Treatment individual therapy;physical therapy  -LB     Patient/Family/Caregiver Comments/Observations she has a small portable O2 concentrator from home and used it during therapy.  -LB       Row Name 09/18/23 1157          General Information    Existing Precautions/Restrictions fall;oxygen therapy device and L/min  -LB       Row Name 09/18/23 1157          Pain Scale: FACES Pre/Post-Treatment    Pain: FACES Scale, Pretreatment 0-->no hurt  -LB     Posttreatment Pain Rating 0-->no hurt  -LB     Pre/Posttreatment Pain Comment she reports having recurring diarrhea today and last night.  -LB       Row Name 09/18/23 1157          Cognition/Psychosocial    Affect/Mental Status (Cognition) WFL  -LB     Follows Commands (Cognition) WFL  -LB     Personal Safety Interventions gait belt;nonskid shoes/slippers when out of bed;supervised activity  -LB       Row Name 09/18/23 1157          Sit-Stand Transfer    Sit-Stand [place] (Transfers) verbal cues;nonverbal cues (demo/gesture);supervision  -LB     Assistive Device (Sit-Stand Transfers) walker, front-wheeled  -LB       Row Name 09/18/23 1157          Stand-Sit Transfer    Stand-Sit [place] (Transfers) verbal cues;nonverbal cues (demo/gesture);supervision  -LB     Assistive Device (Stand-Sit Transfers) walker, front-wheeled  -LB       Row Name 09/18/23 1157          Toilet Transfer    Type (Toilet Transfer) stand pivot/stand step  -LB     [place] Level (Toilet Transfer) supervision;verbal cues;nonverbal cues (demo/gesture)  -LB     Assistive Device (Toilet Transfer) grab bars/safety frame;raised toilet seat  -LB     Comment, (Toilet Transfer) she went 3x during therapy session  -LB       Row Name 09/18/23 1157          Gait/Stairs (Locomotion)    [place] Level (Gait) verbal cues;nonverbal cues (demo/gesture);supervision  -LB     Assistive Device (Gait)  walker, front-wheeled  -LB     Distance in Feet (Gait) 30' 50'  -LB     Deviations/Abnormal Patterns (Gait) gait speed decreased;stride length decreased  -LB     Bilateral Gait Deviations forward flexed posture  -LB       Row Name 09/18/23 1157          Motor Skills    Therapeutic Exercise --  sitting ex with rest breaks to go to the bathroom  -LB       Row Name 09/18/23 1157          Positioning and Restraints    Pre-Treatment Position sitting in chair/recliner  -LB     In Chair call light within reach;encouraged to call for assist  -LB       Row Name 09/18/23 1157          Therapy Assessment/Plan (PT)    Patient's Goals For Discharge return home  -LB       Row Name 09/18/23 1157          Therapy Assessment/Plan (PT)    Rehab Potential/Prognosis (PT) adequate, monitor progress closely  -LB     Frequency of Treatment (PT) 5 times per week  -LB     Estimated Duration of Therapy (PT) 1 week;2 weeks  -LB     Problem List (PT) balance;mobility;strength  low tolerance to activity  -LB     Activity Limitations Related to Problem List (PT) unable to ambulate safely;unable to transfer safely  -LB       Row Name 09/18/23 1157          Daily Progress Summary (PT)    Daily Progress Summary (PT) therapy was limited today by repeated episodes of diarrhea but pt participated the best she could.  -LB     Recommendations (PT) continue PT  -LB       Row Name 09/18/23 1157          Therapy Plan Review/Discharge Plan (PT)    Anticipated Equipment Needs at Discharge (PT Eval) --  tbd  -LB     Anticipated Discharge Disposition (PT) home with assist;home with home health  -LB       Row Name 09/18/23 1157          IRF PT Goals    Bed Mobility Goal Selection (PT-IRF) bed mobility, PT goal 1  -LB     Transfer Goal Selection (PT-IRF) transfers, PT goal 1  -LB     Gait (Walking Locomotion) Goal Selection (PT-IRF) gait, PT goal 1  -LB       Row Name 09/18/23 1157          Bed Mobility Goal 1 (PT-IRF)    Activity/Assistive Device (Bed Mobility  Goal 1, PT-IRF) sit to supine/supine to sit  -LB     [place] Level (Bed Mobility Goal 1, PT-IRF) modified independence  -LB     Time Frame (Bed Mobility Goal 1, PT-IRF) by discharge  -LB       Row Name 09/18/23 1157          Transfer Goal 1 (PT-IRF)    Activity/Assistive Device (Transfer Goal 1, PT-IRF) sit-to-stand/stand-to-sit;bed-to-chair/chair-to-bed  -LB     [place] Level (Transfer Goal 1, PT-IRF) modified independence  -LB     Time Frame (Transfer Goal 1, PT-IRF) by discharge  -LB       Row Name 09/18/23 1157          Gait/Walking Locomotion Goal 1 (PT-IRF)    Activity/Assistive Device (Gait/Walking Locomotion Goal 1, PT-IRF) walker, rolling  -LB     Gait/Walking Locomotion Distance Goal 1 (PT-IRF) 300'  -LB     [place] Level (Gait/Walking Locomotion Goal 1, PT-IRF) modified independence  -LB     Time Frame (Gait/Walking Locomotion Goal 1, PT-IRF) by discharge  -LB               User Key  (r) = Recorded By, (t) = Taken By, (c) = Cosigned By      Initials Name Provider Type    LB Darlyn Gandhi, PT Physical Therapist                  Wound 09/14/23 1840 Left gluteal Pressure Injury (Active)   Dressing Appearance open to air 09/18/23 0753   Base non-blanchable;dry;purple;maroon/purple 09/18/23 0753   Drainage Amount none 09/18/23 0753   Care, Wound barrier applied 09/18/23 0753   Dressing Care open to air 09/18/23 0753       Wound 09/14/23 1840 Right gluteal Pressure Injury (Active)   Dressing Appearance open to air 09/18/23 0753   Base non-blanchable 09/18/23 0753   Drainage Amount none 09/18/23 0753   Care, Wound barrier applied 09/18/23 0753   Dressing Care open to air 09/18/23 0753     Physical Therapy Education       Title: PT OT SLP Therapies (Done)       Topic: Physical Therapy (Done)       Point: Mobility training (Done)       Learning Progress Summary             Patient Acceptance, E, VU,NR by LB at 9/18/2023 1210    Acceptance, E, VU,NR by LB at 9/15/2023 0956                          Point: Home exercise program (Done)       Learning Progress Summary             Patient Acceptance, E, VU,NR by LB at 9/18/2023 1210    Acceptance, E, VU,NR by LB at 9/15/2023 0956                         Point: Body mechanics (Done)       Learning Progress Summary             Patient Acceptance, E, VU,NR by LB at 9/18/2023 1210    Acceptance, E, VU,NR by LB at 9/15/2023 0956                         Point: Precautions (Done)       Learning Progress Summary             Patient Acceptance, E, VU,NR by LB at 9/18/2023 1210    Acceptance, E, VU,NR by LB at 9/15/2023 0956                                         User Key       Initials Effective Dates Name Provider Type Discipline     06/16/21 -  Darlyn Gandhi, PT Physical Therapist PT                    PT Recommendation and Plan    Planned Therapy Interventions (PT): balance training, bed mobility training, gait training, patient/family education, strengthening, transfer training  Frequency of Treatment (PT): 5 times per week  Anticipated Equipment Needs at Discharge (PT Eval):  (tbd)  Daily Progress Summary (PT)  Daily Progress Summary (PT): therapy was limited today by repeated episodes of diarrhea but pt participated the best she could.  Recommendations (PT): continue PT               Time Calculation:      PT Charges       Row Name 09/18/23 1210             Time Calculation    Start Time 0830  -LB      Stop Time 1000  -LB      Time Calculation (min) 90 min  -LB      PT Received On 09/18/23  -LB                User Key  (r) = Recorded By, (t) = Taken By, (c) = Cosigned By      Initials Name Provider Type     Darlyn Gandhi, PT Physical Therapist                    Therapy Charges for Today       Code Description Service Date Service Provider Modifiers Qty    59424023726 HC GAIT TRAINING EA 15 MIN 9/18/2023 Darlyn Gandhi, PT GP 1    47629436781 HC PT THER PROC EA 15 MIN 9/18/2023 Darlyn Gandhi, PT GP 2    04931205330 HC PT  THERAPEUTIC ACT EA 15 MIN 9/18/2023 Darlyn Gandhi, PT GP 3                     Darlyn Gandhi, PT  9/18/2023

## 2023-09-19 LAB
GLUCOSE BLDC GLUCOMTR-MCNC: 122 MG/DL (ref 70–130)
GLUCOSE BLDC GLUCOMTR-MCNC: 129 MG/DL (ref 70–130)
GLUCOSE BLDC GLUCOMTR-MCNC: 151 MG/DL (ref 70–130)
GLUCOSE BLDC GLUCOMTR-MCNC: 174 MG/DL (ref 70–130)

## 2023-09-19 PROCEDURE — 63710000001 METHYLPREDNISOLONE PER 4 MG: Performed by: INTERNAL MEDICINE

## 2023-09-19 PROCEDURE — 97116 GAIT TRAINING THERAPY: CPT

## 2023-09-19 PROCEDURE — 97530 THERAPEUTIC ACTIVITIES: CPT

## 2023-09-19 PROCEDURE — 97110 THERAPEUTIC EXERCISES: CPT

## 2023-09-19 PROCEDURE — 63710000001 INSULIN LISPRO (HUMAN) PER 5 UNITS: Performed by: INTERNAL MEDICINE

## 2023-09-19 PROCEDURE — 94664 DEMO&/EVAL PT USE INHALER: CPT

## 2023-09-19 PROCEDURE — 94799 UNLISTED PULMONARY SVC/PX: CPT

## 2023-09-19 PROCEDURE — 25010000002 ENOXAPARIN PER 10 MG: Performed by: INTERNAL MEDICINE

## 2023-09-19 PROCEDURE — 94761 N-INVAS EAR/PLS OXIMETRY MLT: CPT

## 2023-09-19 PROCEDURE — 82948 REAGENT STRIP/BLOOD GLUCOSE: CPT

## 2023-09-19 PROCEDURE — 97535 SELF CARE MNGMENT TRAINING: CPT

## 2023-09-19 RX ORDER — SIMETHICONE 80 MG
80 TABLET,CHEWABLE ORAL 4 TIMES DAILY PRN
Status: DISCONTINUED | OUTPATIENT
Start: 2023-09-19 | End: 2023-09-27 | Stop reason: HOSPADM

## 2023-09-19 RX ADMIN — HYDROCODONE BITARTRATE AND ACETAMINOPHEN 1 TABLET: 7.5; 325 TABLET ORAL at 00:13

## 2023-09-19 RX ADMIN — METOPROLOL TARTRATE 25 MG: 25 TABLET, FILM COATED ORAL at 20:38

## 2023-09-19 RX ADMIN — HYDROCODONE BITARTRATE AND ACETAMINOPHEN 1 TABLET: 7.5; 325 TABLET ORAL at 23:47

## 2023-09-19 RX ADMIN — HYDROCODONE BITARTRATE AND ACETAMINOPHEN 1 TABLET: 7.5; 325 TABLET ORAL at 12:11

## 2023-09-19 RX ADMIN — CASTOR OIL AND BALSAM, PERU 1 APPLICATION: 788; 87 OINTMENT TOPICAL at 08:45

## 2023-09-19 RX ADMIN — BUDESONIDE AND FORMOTEROL FUMARATE DIHYDRATE 2 PUFF: 160; 4.5 AEROSOL RESPIRATORY (INHALATION) at 07:23

## 2023-09-19 RX ADMIN — ASPIRIN 81 MG: 81 TABLET, COATED ORAL at 08:37

## 2023-09-19 RX ADMIN — INSULIN LISPRO 2 UNITS: 100 INJECTION, SOLUTION INTRAVENOUS; SUBCUTANEOUS at 18:14

## 2023-09-19 RX ADMIN — BUDESONIDE AND FORMOTEROL FUMARATE DIHYDRATE 2 PUFF: 160; 4.5 AEROSOL RESPIRATORY (INHALATION) at 18:51

## 2023-09-19 RX ADMIN — ALBUTEROL SULFATE 1.25 MG: 1.25 SOLUTION RESPIRATORY (INHALATION) at 18:51

## 2023-09-19 RX ADMIN — CLOPIDOGREL BISULFATE 75 MG: 75 TABLET, FILM COATED ORAL at 08:37

## 2023-09-19 RX ADMIN — ANORECTAL OINTMENT 1 APPLICATION: 15.7; .44; 24; 20.6 OINTMENT TOPICAL at 20:38

## 2023-09-19 RX ADMIN — FUROSEMIDE 20 MG: 20 TABLET ORAL at 08:37

## 2023-09-19 RX ADMIN — ENOXAPARIN SODIUM 40 MG: 40 INJECTION SUBCUTANEOUS at 08:37

## 2023-09-19 RX ADMIN — HYDROCODONE BITARTRATE AND ACETAMINOPHEN 1 TABLET: 7.5; 325 TABLET ORAL at 06:01

## 2023-09-19 RX ADMIN — ALPRAZOLAM 0.5 MG: 0.5 TABLET ORAL at 20:37

## 2023-09-19 RX ADMIN — TIOTROPIUM BROMIDE INHALATION SPRAY 2 PUFF: 3.12 SPRAY, METERED RESPIRATORY (INHALATION) at 07:23

## 2023-09-19 RX ADMIN — HYDROCODONE BITARTRATE AND ACETAMINOPHEN 1 TABLET: 7.5; 325 TABLET ORAL at 18:09

## 2023-09-19 RX ADMIN — METOPROLOL TARTRATE 25 MG: 25 TABLET, FILM COATED ORAL at 08:37

## 2023-09-19 RX ADMIN — CASTOR OIL AND BALSAM, PERU 1 APPLICATION: 788; 87 OINTMENT TOPICAL at 20:38

## 2023-09-19 RX ADMIN — METHYLPREDNISOLONE 4 MG: 16 TABLET ORAL at 06:34

## 2023-09-19 NOTE — THERAPY TREATMENT NOTE
Inpatient Rehabilitation - Physical Therapy Treatment Note        [facility]     Patient Name: Liz Jiang  : 1965  MRN: [id number]    Today's Date: 2023                    Admit Date: 2023      Visit Dx:   No diagnosis found.    Patient Active Problem List   Diagnosis    Chest pain    COPD (chronic obstructive pulmonary disease)    Tobacco abuse    GERD (gastroesophageal reflux disease)    Anxiety    Arthritis    Nausea and vomiting    Generalized abdominal pain    Right upper quadrant pain    Gallbladder disease    Abnormal biliary HIDA scan    Dyslipidemia    Tachycardia    Anal cancer    Port-A-Cath in place    Debility       Past Medical History:   Diagnosis Date    Acid reflux disease     Anal cancer 2019    Arthritis     Asthma, extrinsic     Cholelithiasis     Chronic headaches     COPD (chronic obstructive pulmonary disease)     CVA (cerebral vascular accident)     w/ right sided deficit    CVA (cerebral vascular accident)     Diabetes mellitus     only has high blood sugar when on steriods    History of radiation therapy 2020    Whole pelvis/anal mass    Obstructive sleep apnea treated with BiPAP     On home oxygen therapy     2L     Sleep apnea, obstructive        Past Surgical History:   Procedure Laterality Date    ABDOMINAL SURGERY      CARDIAC CATHETERIZATION      CAROTID ENDARTERECTOMY Left 2018    CHOLECYSTECTOMY WITH INTRAOPERATIVE CHOLANGIOGRAM N/A 2017    Procedure: CHOLECYSTECTOMY LAPAROSCOPIC INTRAOPERATIVE CHOLANGIOGRAM;  Surgeon: Carolin Marie MD;  Location: [facility];  Service:     COLONOSCOPY      HYSTERECTOMY      for heavy bleeding/ complete    KIDNEY STONE SURGERY      TUBAL ABDOMINAL LIGATION      VENOUS ACCESS DEVICE (PORT) INSERTION Left 2019    Procedure: INSERTION VENOUS ACCESS DEVICE;  Surgeon: Carolin Marie MD;  Location: [facility];  Service: General       PT ASSESSMENT (last 12 hours)       IRF PT Evaluation and Treatment        Row Name 09/19/23 1233          PT Time and Intention    Document Type daily treatment  2nd session  -LB     Mode of Treatment individual therapy;physical therapy  -LB       Row Name 09/19/23 1233          General Information    Existing Precautions/Restrictions fall;oxygen therapy device and L/min  -LB       Row Name 09/19/23 1233          Pain Scale: FACES Pre/Post-Treatment    Pain: FACES Scale, Pretreatment 0-->no hurt  -LB     Posttreatment Pain Rating 0-->no hurt  -LB       Row Name 09/19/23 1233          Cognition/Psychosocial    Affect/Mental Status (Cognition) WFL  -LB     Follows Commands (Cognition) WFL  -LB     Personal Safety Interventions gait belt;nonskid shoes/slippers when out of bed;supervised activity  -LB       Row Name 09/19/23 1233          Sit-Stand Transfer    Sit-Stand [place] (Transfers) verbal cues;nonverbal cues (demo/gesture);supervision  -LB     Assistive Device (Sit-Stand Transfers) walker, front-wheeled  -LB       Row Name 09/19/23 1233          Stand-Sit Transfer    Stand-Sit [place] (Transfers) verbal cues;nonverbal cues (demo/gesture);supervision  -LB     Assistive Device (Stand-Sit Transfers) walker, front-wheeled  -LB       Row Name 09/19/23 1233          Gait/Stairs (Locomotion)    [place] Level (Gait) verbal cues;nonverbal cues (demo/gesture);supervision  -LB     Assistive Device (Gait) --  rollator  -LB     Distance in Feet (Gait) 70' 50' 60'  -LB     Deviations/Abnormal Patterns (Gait) gait speed decreased;stride length decreased  -LB     Bilateral Gait Deviations forward flexed posture  -LB     Comment, (Gait/Stairs) rest breaks needed due to dyspnea  -LB       Row Name 09/19/23 1233          Balance    Comment, Balance sitting bag toss and  with reacher 2x-for endurance, rest breaks prn  -LB       Row Name 09/19/23 1233          Positioning and Restraints    Pre-Treatment Position sitting in chair/recliner  -LB     In Wheelchair with OT  -LB       Row  Name 09/19/23 1233          Vital Signs    Intra SpO2 (%) 86  after amb  -LB     O2 Delivery Intra Treatment supplemental O2  -LB       Row Name 09/19/23 1233          Daily Progress Summary (PT)    Recommendations (PT) continue PT  -LB               User Key  (r) = Recorded By, (t) = Taken By, (c) = Cosigned By      Initials Name Provider Type    LB Darlyn Gandhi, PT Physical Therapist                  Wound 09/14/23 1840 Left gluteal Pressure Injury (Active)   Dressing Appearance open to air 09/18/23 1950   Closure Open to air 09/18/23 1950   Base non-blanchable;dry;purple;maroon/purple 09/18/23 1950   Drainage Amount none 09/18/23 1950   Care, Wound barrier applied 09/18/23 1950   Dressing Care open to air 09/18/23 1950       Wound 09/14/23 1840 Right gluteal Pressure Injury (Active)   Dressing Appearance open to air 09/18/23 1950   Closure Open to air 09/18/23 1950   Base non-blanchable 09/18/23 1950   Drainage Amount none 09/18/23 1950   Care, Wound barrier applied 09/18/23 1950   Dressing Care open to air 09/18/23 1950     Physical Therapy Education       Title: PT OT SLP Therapies (Done)       Topic: Physical Therapy (Done)       Point: Mobility training (Done)       Learning Progress Summary             Patient Acceptance, E, VU,NR by LB at 9/19/2023 1236    Acceptance, E, VU,NR by LB at 9/18/2023 1210    Acceptance, E, VU,NR by LB at 9/15/2023 0956                         Point: Home exercise program (Done)       Learning Progress Summary             Patient Acceptance, E, VU,NR by LB at 9/19/2023 1236    Acceptance, E, VU,NR by LB at 9/18/2023 1210    Acceptance, E, VU,NR by LB at 9/15/2023 0956                         Point: Body mechanics (Done)       Learning Progress Summary             Patient Acceptance, E, VU,NR by LB at 9/19/2023 1236    Acceptance, E, VU,NR by LB at 9/18/2023 1210    Acceptance, E, VU,NR by LB at 9/15/2023 0956                         Point: Precautions (Done)        Learning Progress Summary             Patient Acceptance, E, VU,NR by LB at 9/19/2023 1236    Acceptance, E, VU,NR by LB at 9/18/2023 1210    Acceptance, E, VU,NR by LB at 9/15/2023 0956                                         User Key       Initials Effective Dates Name Provider Type Discipline    LB 06/16/21 -  Darlyn Gandhi PT Physical Therapist PT                    PT Recommendation and Plan    Planned Therapy Interventions (PT): balance training, bed mobility training, gait training, patient/family education, strengthening, transfer training  Frequency of Treatment (PT): 5 times per week  Anticipated Equipment Needs at Discharge (PT Eval):  (tbd)  Daily Progress Summary (PT)  Daily Progress Summary (PT): therapy was limited today by repeated episodes of diarrhea but pt participated the best she could.  Recommendations (PT): continue PT               Time Calculation:      PT Charges       Row Name 09/19/23 1236             Time Calculation    Start Time 1000  -LB      Stop Time 1045  -LB      Time Calculation (min) 45 min  -LB      PT Received On 09/19/23  -LB                User Key  (r) = Recorded By, (t) = Taken By, (c) = Cosigned By      Initials Name Provider Type    LB Darlyn Gandhi, PT Physical Therapist                    Therapy Charges for Today       Code Description Service Date Service Provider Modifiers Qty    55470535008 HC GAIT TRAINING EA 15 MIN 9/18/2023 Darlyn Gandhi, PT GP 1    75887360621 HC PT THER PROC EA 15 MIN 9/18/2023 Darlyn Gandhi, PT GP 2    52561107667 HC PT THERAPEUTIC ACT EA 15 MIN 9/18/2023 Darlyn Gandhi, PT GP 3    09867032897 HC GAIT TRAINING EA 15 MIN 9/19/2023 Darlyn Gandhi, PT GP 2    83317434634 HC PT THER PROC EA 15 MIN 9/19/2023 Darlyn Gandhi, PT GP 1                     Darlyn Gandhi PT  9/19/2023

## 2023-09-19 NOTE — PROGRESS NOTES
[facility]  PROGRESS NOTE     Patient Identification:  Name:  Liz Jiang  Age:  57 y.o.  Sex:  female  :  1965  MRN:  [id number]  Visit Number:  [id number]  ROOM: [facility]/     Primary Care Provider:  Paty Fischer APRN    Length of stay in inpatient status:  5    Subjective     Chief Compliant:  No chief complaint on file.      History of Presenting Illness: 57-year-old female being treated for debility.  Patient did have prolonged hospitalization secondary to respiratory failure with advanced COPD.  Patient not able to tolerate Daliresp secondary to severe diarrhea.  No new complaints otherwise    Objective     Current Hospital Meds:aspirin, 81 mg, Oral, Daily  budesonide-formoterol, 2 puff, Inhalation, BID - RT  castor oil-balsam peru, 1 application , Topical, Q12H  clopidogrel, 75 mg, Oral, Daily  enoxaparin, 40 mg, Subcutaneous, Daily  furosemide, 20 mg, Oral, Daily  insulin lispro, 2-7 Units, Subcutaneous, TID With Meals  metoprolol tartrate, 25 mg, Oral, Q12H  nystatin, 5 mL, Swish & Swallow, 4x Daily  tiotropium bromide monohydrate, 2 puff, Inhalation, Daily - RT       ----------------------------------------------------------------------------------------------------------------------  Vital Signs:  Temp:  [98.1 °F (36.7 °C)-98.2 °F (36.8 °C)] 98.1 °F (36.7 °C)  Heart Rate:  [] 102  Resp:  [18-20] 18  BP: (106-119)/(68-77) 106/77  SpO2:  [92 %-96 %] 92 %  on  Flow (L/min):  [3.5-4] 3.5;   Device (Oxygen Therapy): nasal cannula  Body mass index is 38.45 kg/m².    Wt Readings from Last 3 Encounters:   23 102 kg (224 lb)   22 77.1 kg (170 lb)   20 76.8 kg (169 lb 6.4 oz)     Intake & Output (last 3 days)          07 07 07 07 07 07 07 0700    P.O. 2160 2520 960     Total Intake(mL/kg) 2160 (21.2) 2520 (24.7) 960 (9.4)     Net +2160 +2520 +960             Urine Unmeasured Occurrence 5 x 8 x 8 x      Stool Unmeasured Occurrence 4 x 6 x 1 x           Diet: Regular/House Diet, Diabetic Diets, Renal Diets; Consistent Carbohydrate; Low Sodium (2-3g); Texture: Regular Texture (IDDSI 7); Fluid Consistency: Thin (IDDSI 0)  ----------------------------------------------------------------------------------------------------------------------  Physical exam:  Constitutional: No acute distress  HEENT: Normocephalic atraumatic  Neck:   Supple  Cardiovascular: Regular rate and rhythm  Pulmonary/Chest: Decreased breath sounds bilaterally but clear  Abdominal: Positive bowel sounds soft.   Musculoskeletal: No arthropathy  Neurological: No focal deficits  Skin: No rash  Peripheral vascular: No edema  Genitourinary:  ----------------------------------------------------------------------------------------------------------------------    Last echocardiogram:  Results for orders placed during the hospital encounter of 08/24/21    Adult Transthoracic Echo Complete W/ Cont if Necessary Per Protocol    Interpretation Summary  · Left ventricular ejection fraction appears to be 66 - 70%. Left ventricular systolic function is normal.  · Left ventricular diastolic function is consistent with (grade I) impaired relaxation.    ----------------------------------------------------------------------------------------------------------------------  Results from last 7 days   Lab Units 09/18/23  0223 09/15/23  0458   WBC 10*3/mm3 18.35* 21.30*   HEMOGLOBIN g/dL 14.6 14.7   HEMATOCRIT % 51.5* 49.8*   MCV fL 95.7 95.2   MCHC g/dL 28.3* 29.5*   PLATELETS 10*3/mm3 208 205         Results from last 7 days   Lab Units 09/18/23  0223 09/16/23  0818 09/15/23  0458   SODIUM mmol/L 141 136 139   POTASSIUM mmol/L 4.3 5.0 5.3*   CHLORIDE mmol/L 98 94* 94*   CO2 mmol/L 34.5* 31.3* 37.7*   BUN mg/dL 30* 25* 30*   CREATININE mg/dL 0.38* 0.45* 0.41*   CALCIUM mg/dL 9.0 9.1 9.3   GLUCOSE mg/dL 150* 198* 202*   ALBUMIN g/dL  --   --  3.8   BILIRUBIN mg/dL  --    --  0.4   ALK PHOS U/L  --   --  48   AST (SGOT) U/L  --   --  15   ALT (SGPT) U/L  --   --  24   Estimated Creatinine Clearance: 187.5 mL/min (A) (by C-G formula based on SCr of 0.38 mg/dL (L)).  No results found for: AMMONIA              Glucose   Date/Time Value Ref Range Status   09/19/2023 0619 122 70 - 130 mg/dL Final   09/18/2023 1927 172 (H) 70 - 130 mg/dL Final   09/18/2023 1622 148 (H) 70 - 130 mg/dL Final   09/18/2023 1108 192 (H) 70 - 130 mg/dL Final   09/18/2023 0557 146 (H) 70 - 130 mg/dL Final   09/17/2023 2019 258 (H) 70 - 130 mg/dL Final   09/17/2023 1620 157 (H) 70 - 130 mg/dL Final   09/17/2023 1054 155 (H) 70 - 130 mg/dL Final     Lab Results   Component Value Date    TSH 5.090 (H) 04/29/2020    FREET4 1.10 02/11/2014     No results found for: PREGTESTUR, PREGSERUM, HCG, HCGQUANT  Pain Management Panel          Latest Ref Rng & Units 2/23/2022   Pain Management Panel   Amphetamine, Urine Qual Negative Negative    Barbiturates Screen, Urine Negative Negative    Benzodiazepine Screen, Urine Negative Positive    Buprenorphine, Screen, Urine Negative Negative    Cocaine Screen, Urine Negative Negative    Methadone Screen , Urine Negative Negative    Methamphetamine, Ur Negative Negative      Brief Urine Lab Results       None          No results found for: BLOODCX      No results found for: URINECX  No results found for: WOUNDCX  No results found for: STOOLCX        I have personally looked at the labs and they are summarized above.  ----------------------------------------------------------------------------------------------------------------------  Detailed radiology reports for the last 24 hours:    Imaging Results (Last 24 Hours)       ** No results found for the last 24 hours. **          Final impressions for the last 30 days of radiology reports:    X-ray chest PA and lateral    Result Date: 9/13/2023  Mild right basilar atelectasis. Images reviewed, interpreted, and dictated by Dr. Beebe  Jacques Siu. Transcribed by Yaz Quick PA-C.    XR chest AP portable    Result Date: 9/9/2023  There has been no significant interval change  .  Continued follow up recommended . Images reviewed, interpreted, and dictated by Dr. DIANE Womack. Transcribed by Cristhian Neff PA-C.    US abdomen limited    Result Date: 9/7/2023  Fatty liver.. Images reviewed, interpreted, and dictated by Sharlene Norwood MD    XR chest AP portable    Result Date: 9/7/2023  Low lung volumes with worsening right basilar opacity which may represent atelectasis or pneumonia. Images reviewed, interpreted, and dictated by Sharlene Norwood MD   I have personally looked at the radiology images and read the final radiology report.    Assessment & Plan    Debility after prolonged hospitalization for treatment of respiratory failure--patient requiring supervision for bed mobility; able to ambulate 33 feet and 50 feet with rollator yesterday.  Required contact-guard assistance for toileting and supervision for transfers.  Patient's biggest issue at this time is endurance.    COPD continue with inhalers.  Patient getting steroid taper as well.  Appears to be relatively stable at this time.  Have discontinued Daliresp secondary to side effects    CHF preserved EF compensated continue Lasix    Leukocytosis likely secondary to steroids.    Obesity with BMI 38.45 kg per metered square--complicates all aspects..    VTE Prophylaxis:   Mechanical Order History:       None          Pharmalogical Order History:        Ordered     Dose Route Frequency Stop    09/14/23 9669  Enoxaparin Sodium (LOVENOX) syringe 40 mg         40 mg SC Daily --                        Otoniel Mclaughlin MD  [facility]  09/19/23  09:42 EDT

## 2023-09-19 NOTE — PLAN OF CARE
Goal Outcome Evaluation:    Problem: Fall Injury Risk  Goal: Absence of Fall and Fall-Related Injury  Outcome: Ongoing, Progressing  Intervention: Identify and Manage Contributors  Recent Flowsheet Documentation  Taken 9/19/2023 1200 by Isha Lyle RN  Medication Review/Management: medications reviewed  Taken 9/19/2023 1000 by Isha Lyle RN  Medication Review/Management: medications reviewed  Intervention: Promote Injury-Free Environment  Recent Flowsheet Documentation  Taken 9/19/2023 1200 by Isha Lyle RN  Safety Promotion/Fall Prevention:   assistive device/personal items within reach   safety round/check completed  Taken 9/19/2023 1000 by Isha Lyle RN  Safety Promotion/Fall Prevention:   assistive device/personal items within reach   safety round/check completed

## 2023-09-19 NOTE — PROGRESS NOTES
Rehabilitation Nursing  Inpatient Rehabilitation Plan of Care Note    Plan of Care  Copy from Dez    Pain Management (Active)  Current Status (9/14/2023 6:29:00 PM): Potential for increased pain  Weekly Goal: Pain adequately managed  Discharge Goal: Pain adequately managed    Body Function Structure    Skin Integrity (Active)  Current Status (9/14/2023 6:29:00 PM): Stage 2 pressure injuries  Weekly Goal: Healing of wound  Discharge Goal: Healing of wound    Safety    Potential for Injury (Active)  Current Status (9/14/2023 6:29:00 PM): Potential for falls  Weekly Goal: No falls  Discharge Goal: No falls    Signed by: Ofelia Novak RN

## 2023-09-19 NOTE — SIGNIFICANT NOTE
09/19/23 1000   Plan   Plan Team conference held today.  Spoke to pt about how she is doing in therapy and plans for discharge on 9-27-23.  Pt is agreeable to continued rehab stay to improve in therapy and endurance levels.  Pt plans to return home with daughter and grandson at discharge.  Family to assist pt at home.   Patient/Family in Agreement with Plan yes

## 2023-09-19 NOTE — NURSING NOTE
CONTACTED DR CELESTE VIA [employer] CHAT PER PATIENT REQUEST ABOUT GAS MEDICATION AND CALMOSEPTINE CREAM.

## 2023-09-19 NOTE — PROGRESS NOTES
Occupational Therapy:    Physical Therapy: Individual: 90 minutes.    Speech Language Pathology:    Signed by: Brijesh Silver PTA

## 2023-09-19 NOTE — THERAPY TREATMENT NOTE
Inpatient Rehabilitation - Physical Therapy Treatment Note        [facility]     Patient Name: Liz Jiang  : 1965  MRN: [id number]    Today's Date: 2023                    Admit Date: 2023      Visit Dx:   No diagnosis found.    Patient Active Problem List   Diagnosis    Chest pain    COPD (chronic obstructive pulmonary disease)    Tobacco abuse    GERD (gastroesophageal reflux disease)    Anxiety    Arthritis    Nausea and vomiting    Generalized abdominal pain    Right upper quadrant pain    Gallbladder disease    Abnormal biliary HIDA scan    Dyslipidemia    Tachycardia    Anal cancer    Port-A-Cath in place    Debility       Past Medical History:   Diagnosis Date    Acid reflux disease     Anal cancer 2019    Arthritis     Asthma, extrinsic     Cholelithiasis     Chronic headaches     COPD (chronic obstructive pulmonary disease)     CVA (cerebral vascular accident)     w/ right sided deficit    CVA (cerebral vascular accident)     Diabetes mellitus     only has high blood sugar when on steriods    History of radiation therapy 2020    Whole pelvis/anal mass    Obstructive sleep apnea treated with BiPAP     On home oxygen therapy     2L     Sleep apnea, obstructive        Past Surgical History:   Procedure Laterality Date    ABDOMINAL SURGERY      CARDIAC CATHETERIZATION      CAROTID ENDARTERECTOMY Left 2018    CHOLECYSTECTOMY WITH INTRAOPERATIVE CHOLANGIOGRAM N/A 2017    Procedure: CHOLECYSTECTOMY LAPAROSCOPIC INTRAOPERATIVE CHOLANGIOGRAM;  Surgeon: Carolin Marie MD;  Location: [facility];  Service:     COLONOSCOPY      HYSTERECTOMY      for heavy bleeding/ complete    KIDNEY STONE SURGERY      TUBAL ABDOMINAL LIGATION      VENOUS ACCESS DEVICE (PORT) INSERTION Left 2019    Procedure: INSERTION VENOUS ACCESS DEVICE;  Surgeon: Carolin Marie MD;  Location: [facility];  Service: General       PT ASSESSMENT (last 12 hours)       IRF PT Evaluation and Treatment        Row Name 09/19/23 1245 09/19/23 1233       PT Time and Intention    Document Type daily treatment  -RG daily treatment  2nd session  -LB    Mode of Treatment individual therapy;physical therapy  -RG individual therapy;physical therapy  -LB    Patient/Family/Caregiver Comments/Observations Pt and nursing in agreement for skilled PT on this date. Pt declined to ambulate 1st session stating that she wanted to do her exercises 1st.  -RG --      Row Name 09/19/23 1245 09/19/23 1233       General Information    Existing Precautions/Restrictions fall;oxygen therapy device and L/min  -RG fall;oxygen therapy device and L/min  -LB      Row Name 09/19/23 1245 09/19/23 1233       Pain Scale: FACES Pre/Post-Treatment    Pain: FACES Scale, Pretreatment 0-->no hurt  -RG 0-->no hurt  -LB    Posttreatment Pain Rating 0-->no hurt  -RG 0-->no hurt  -LB      Row Name 09/19/23 1245 09/19/23 1233       Cognition/Psychosocial    Affect/Mental Status (Cognition) WFL  -RG WFL  -LB    Follows Commands (Cognition) WFL  -RG WFL  -LB    Personal Safety Interventions gait belt;nonskid shoes/slippers when out of bed;fall prevention program maintained  -RG gait belt;nonskid shoes/slippers when out of bed;supervised activity  -LB      Row Name 09/19/23 1245 09/19/23 1233       Sit-Stand Transfer    Sit-Stand [place] (Transfers) verbal cues;nonverbal cues (demo/gesture);supervision  -RG verbal cues;nonverbal cues (demo/gesture);supervision  -LB    Assistive Device (Sit-Stand Transfers) walker, front-wheeled  -RG walker, front-wheeled  -LB      Row Name 09/19/23 1245 09/19/23 1233       Stand-Sit Transfer    Stand-Sit [place] (Transfers) verbal cues;nonverbal cues (demo/gesture);supervision  -RG verbal cues;nonverbal cues (demo/gesture);supervision  -LB    Assistive Device (Stand-Sit Transfers) walker, front-wheeled  -RG walker, front-wheeled  -LB      Row Name 09/19/23 1245          Toilet Transfer    Type (Toilet Transfer) stand  pivot/stand step  -RG     [place] Level (Toilet Transfer) verbal cues;nonverbal cues (demo/gesture);contact guard  -RG       Row Name 09/19/23 1245 09/19/23 1233       Gait/Stairs (Locomotion)    [place] Level (Gait) verbal cues;nonverbal cues (demo/gesture);supervision  -RG verbal cues;nonverbal cues (demo/gesture);supervision  -LB    Assistive Device (Gait) --  rollator  -RG --  rollator  -LB    Distance in Feet (Gait) -- 70' 50' 60'  -LB    Deviations/Abnormal Patterns (Gait) gait speed decreased;stride length decreased  -RG gait speed decreased;stride length decreased  -LB    Bilateral Gait Deviations forward flexed posture  -RG forward flexed posture  -LB    Comment, (Gait/Stairs) declined 1st session  -RG rest breaks needed due to dyspnea  -LB      Row Name 09/19/23 1233          Balance    Comment, Balance sitting bag toss and  with reacher 2x-for endurance, rest breaks prn  -LB       Row Name 09/19/23 1245          Hip (Therapeutic Exercise)    Hip (Therapeutic Exercise) strengthening exercise  -RG     Hip Strengthening (Therapeutic Exercise) bilateral;flexion;aBduction;aDduction;marching while seated;sitting;2 lb free weight;resistance band;green;10 repetitions;2 sets  -RG       Row Name 09/19/23 1245          Knee (Therapeutic Exercise)    Knee (Therapeutic Exercise) strengthening exercise  -RG     Knee Strengthening (Therapeutic Exercise) bilateral;flexion;extension;marching while seated;LAQ (long arc quad);sitting;2 lb free weight;resistance band;green;10 repetitions;2 sets  -RG       Row Name 09/19/23 1245          Ankle (Therapeutic Exercise)    Ankle (Therapeutic Exercise) strengthening exercise  -RG     Ankle Strengthening (Therapeutic Exercise) bilateral;dorsiflexion;plantarflexion;sitting;10 repetitions;2 sets  -RG       Row Name 09/19/23 1245 09/19/23 1233       Positioning and Restraints    Pre-Treatment Position sitting in chair/recliner  -RG sitting in chair/recliner  -LB     Post Treatment Position wheelchair  -RG --    In Wheelchair notified nsg;sitting;with PT  -RG with OT  -LB      Row Name 09/19/23 1233          Vital Signs    Intra SpO2 (%) 86  after amb  -LB     O2 Delivery Intra Treatment supplemental O2  -LB       Row Name 09/19/23 1233          Daily Progress Summary (PT)    Recommendations (PT) continue PT  -LB               User Key  (r) = Recorded By, (t) = Taken By, (c) = Cosigned By      Initials Name Provider Type    LB Darlyn Gandhi, PT Physical Therapist    RG Brijesh Silver, PTA Physical Therapist Assistant                  Wound 09/14/23 1840 Left gluteal Pressure Injury (Active)   Dressing Appearance open to air 09/18/23 1950   Closure Open to air 09/18/23 1950   Base non-blanchable;dry;purple;maroon/purple 09/18/23 1950   Drainage Amount none 09/18/23 1950   Care, Wound barrier applied 09/18/23 1950   Dressing Care open to air 09/18/23 1950       Wound 09/14/23 1840 Right gluteal Pressure Injury (Active)   Dressing Appearance open to air 09/18/23 1950   Closure Open to air 09/18/23 1950   Base non-blanchable 09/18/23 1950   Drainage Amount none 09/18/23 1950   Care, Wound barrier applied 09/18/23 1950   Dressing Care open to air 09/18/23 1950     Physical Therapy Education       Title: PT OT SLP Therapies (Done)       Topic: Physical Therapy (Done)       Point: Mobility training (Done)       Learning Progress Summary             Patient Acceptance, E,D, VU,NR by RG at 9/19/2023 1249    Acceptance, E, VU,NR by LB at 9/19/2023 1236    Acceptance, E, VU,NR by LB at 9/18/2023 1210    Acceptance, E, VU,NR by LB at 9/15/2023 0956                         Point: Home exercise program (Done)       Learning Progress Summary             Patient Acceptance, E,D, VU,NR by RG at 9/19/2023 1249    Acceptance, E, VU,NR by LB at 9/19/2023 1236    Acceptance, E, VU,NR by LB at 9/18/2023 1210    Acceptance, E, VU,NR by LB at 9/15/2023 0956                         Point: Body  mechanics (Done)       Learning Progress Summary             Patient Acceptance, E,D, VU,NR by RG at 9/19/2023 1249    Acceptance, E, VU,NR by LB at 9/19/2023 1236    Acceptance, E, VU,NR by LB at 9/18/2023 1210    Acceptance, E, VU,NR by LB at 9/15/2023 0956                         Point: Precautions (Done)       Learning Progress Summary             Patient Acceptance, E,D, VU,NR by RG at 9/19/2023 1249    Acceptance, E, VU,NR by LB at 9/19/2023 1236    Acceptance, E, VU,NR by LB at 9/18/2023 1210    Acceptance, E, VU,NR by LB at 9/15/2023 0956                                         User Key       Initials Effective Dates Name Provider Type Discipline     06/16/21 -  Darlyn Gandhi, PT Physical Therapist PT     06/16/21 -  Brijesh Silver PTA Physical Therapist Assistant PT                    PT Recommendation and Plan                          Time Calculation:      PT Charges       Row Name 09/19/23 1249 09/19/23 1236          Time Calculation    Start Time 0915  - 1000  -LB     Stop Time 1000  -RG 1045  -LB     Time Calculation (min) 45 min  -RG 45 min  -LB     PT Received On 09/19/23  - 09/19/23  -LB        Time Calculation- PT    Total Timed Code Minutes- PT 45 minute(s)  - --               User Key  (r) = Recorded By, (t) = Taken By, (c) = Cosigned By      Initials Name Provider Type     Darlyn Gandhi, PT Physical Therapist     Brijesh Silver PTA Physical Therapist Assistant                    Therapy Charges for Today       Code Description Service Date Service Provider Modifiers Qty    71194499057 HC PT THERAPEUTIC ACT EA 15 MIN 9/19/2023 Brijesh Silver PTA GP, CQ 1    16742859147 HC PT THER PROC EA 15 MIN 9/19/2023 Brijesh Silver PTA GP, CQ 2                     Brijesh Silver PTA  9/19/2023

## 2023-09-19 NOTE — PLAN OF CARE
Problem: Fall Injury Risk  Goal: Absence of Fall and Fall-Related Injury  Outcome: Ongoing, Progressing     Problem: Rehabilitation (IRF) Plan of Care  Goal: Plan of Care Review  Outcome: Ongoing, Progressing  Flowsheets (Taken 9/18/2023 2202)  Progress: improving  Plan of Care Reviewed With: patient  Goal: Patient-Specific Goal (Individualized)  Outcome: Ongoing, Progressing  Goal: Absence of New-Onset Illness or Injury  Outcome: Ongoing, Progressing  Goal: Optimal Comfort and Wellbeing  Outcome: Ongoing, Progressing  Goal: Home and Community Transition Plan Established  Outcome: Ongoing, Progressing     Problem: Skin Injury Risk Increased  Goal: Skin Health and Integrity  Outcome: Ongoing, Progressing     Problem: Pain Chronic (Persistent) (Comorbidity Management)  Goal: Acceptable Pain Control and Functional Ability  Outcome: Ongoing, Progressing   Goal Outcome Evaluation:  Plan of Care Reviewed With: patient        Progress: improving     Continue with plan of care.

## 2023-09-19 NOTE — THERAPY TREATMENT NOTE
Inpatient Rehabilitation - Occupational Therapy Treatment Note     [facility]     Patient Name: Liz Jiang  : 1965  MRN: [id number]    Today's Date: 2023                 Admit Date: 2023       No diagnosis found.    Patient Active Problem List   Diagnosis    Chest pain    COPD (chronic obstructive pulmonary disease)    Tobacco abuse    GERD (gastroesophageal reflux disease)    Anxiety    Arthritis    Nausea and vomiting    Generalized abdominal pain    Right upper quadrant pain    Gallbladder disease    Abnormal biliary HIDA scan    Dyslipidemia    Tachycardia    Anal cancer    Port-A-Cath in place    Debility       Past Medical History:   Diagnosis Date    Acid reflux disease     Anal cancer 2019    Arthritis     Asthma, extrinsic     Cholelithiasis     Chronic headaches     COPD (chronic obstructive pulmonary disease)     CVA (cerebral vascular accident)     w/ right sided deficit    CVA (cerebral vascular accident)     Diabetes mellitus     only has high blood sugar when on steriods    History of radiation therapy 2020    Whole pelvis/anal mass    Obstructive sleep apnea treated with BiPAP     On home oxygen therapy     2L     Sleep apnea, obstructive        Past Surgical History:   Procedure Laterality Date    ABDOMINAL SURGERY      CARDIAC CATHETERIZATION      CAROTID ENDARTERECTOMY Left 2018    CHOLECYSTECTOMY WITH INTRAOPERATIVE CHOLANGIOGRAM N/A 2017    Procedure: CHOLECYSTECTOMY LAPAROSCOPIC INTRAOPERATIVE CHOLANGIOGRAM;  Surgeon: Carolin Marie MD;  Location: [facility];  Service:     COLONOSCOPY      HYSTERECTOMY      for heavy bleeding/ complete    KIDNEY STONE SURGERY      TUBAL ABDOMINAL LIGATION      VENOUS ACCESS DEVICE (PORT) INSERTION Left 2019    Procedure: INSERTION VENOUS ACCESS DEVICE;  Surgeon: Carolin Marie MD;  Location: [facility];  Service: General             IRF OT ASSESSMENT FLOWSHEET (last 12 hours)       IRF OT Evaluation and Treatment        Row Name 09/19/23 1407          OT Time and Intention    Document Type daily treatment  -TM     Mode of Treatment occupational therapy  -TM     Patient Effort adequate  -TM     Symptoms Noted During/After Treatment none  -TM       Row Name 09/19/23 1407          General Information    General Observations of Patient Pt agreeable for therapy.  -TM     Existing Precautions/Restrictions fall;oxygen therapy device and L/min  -TM       Row Name 09/19/23 1407          Cognition/Psychosocial    Orientation Status (Cognition) oriented x 3  -TM     Follows Commands (Cognition) WFL  -TM       Row Name 09/19/23 1407          Grooming    [place] Level (Grooming) set up  -TM     Position (Grooming) supported sitting  -TM       Row Name 09/19/23 1407          Toileting    [place] Level (Toileting) supervision;contact guard assist;verbal cues  -TM     Assistive Device Use (Toileting) raised toilet seat;grab bar/safety frame  -TM     Position (Toileting) supported sitting  -TM       Row Name 09/19/23 1407          Transfer Assessment/Treatment    Comment, (Transfers) transfer from bedside recliner to w/c and w/c to bedside recliner with supervision and verbal cues for safety.  -TM       Row Name 09/19/23 1407          Toilet Transfer    Type (Toilet Transfer) stand pivot/stand step  -TM     [place] Level (Toilet Transfer) supervision;verbal cues  -TM     Assistive Device (Toilet Transfer) wheelchair;raised toilet seat;grab bars/safety frame  -TM       Row Name 09/19/23 1407          Motor Skills    Motor Skills coordination;functional endurance;motor control/coordination interventions  -TM     Motor Control/Coordination Interventions therapeutic exercise/ROM;fine motor manipulation/dexterity activities;gross motor coordination activities;other (see comments)  BUE ther ex/act, GMC/FMC  -               User Key  (r) = Recorded By, (t) = Taken By, (c) = Cosigned By      Initials Name Effective Dates    SILVER Ballard  Shaina Chapa OT 06/16/21 -                      Occupational Therapy Education       Title: PT OT SLP Therapies (Done)       Topic: Occupational Therapy (Done)       Point: ADL training (Done)       Description:   Instruct learner(s) on proper safety adaptation and remediation techniques during self care or transfers.   Instruct in proper use of assistive devices.                  Learning Progress Summary             Patient Acceptance, E,D, VU,NR by TM at 9/19/2023 1406    Acceptance, E,D, VU,NR by TM at 9/18/2023 1359    Acceptance, E, VU,NR by  at 9/16/2023 1155    Acceptance, E,D, VU,NR by TM at 9/15/2023 1308                         Point: Precautions (Done)       Description:   Instruct learner(s) on prescribed precautions during self-care and functional transfers.                  Learning Progress Summary             Patient Acceptance, E,D, VU,NR by TM at 9/19/2023 1406    Acceptance, E,D, VU,NR by TM at 9/18/2023 1359    Acceptance, E, VU,NR by  at 9/16/2023 1155    Acceptance, E,D, VU,NR by TM at 9/15/2023 1308                                         User Key       Initials Effective Dates Name Provider Type Discipline     06/16/21 -  Shaina Ballard, OT Occupational Therapist OT     05/25/21 -  Gaby Carty OT Occupational Therapist OT                        OT Recommendation and Plan    Planned Therapy Interventions (OT): activity tolerance training, adaptive equipment training, BADL retraining, IADL retraining, occupation/activity based interventions, patient/caregiver education/training, ROM/therapeutic exercise, strengthening exercise, transfer/mobility retraining                    Time Calculation:      Time Calculation- OT       Row Name 09/19/23 1407 09/19/23 1406          Time Calculation- OT    OT Start Time 1245  -TM 1045  -TM     OT Stop Time 1330  -TM 1130  -TM     OT Time Calculation (min) 45 min  -TM 45 min  -TM     Total Timed Code Minutes- OT 45 minute(s)  -TM 45  minute(s)  -TM     OT Non-Billable Time (min) -- 15 min  -TM               User Key  (r) = Recorded By, (t) = Taken By, (c) = Cosigned By      Initials Name Provider Type    TM Shaina Ballard OT Occupational Therapist                  Therapy Charges for Today       Code Description Service Date Service Provider Modifiers Qty    42666692442 HC OT SELF CARE/MGMT/TRAIN EA 15 MIN 9/18/2023 Shaina Ballard, OT GO 2    86552400635 HC OT THERAPEUTIC ACT EA 15 MIN 9/18/2023 Shaina Ballard, OT GO 3    87293114368 HC OT THER PROC EA 15 MIN 9/18/2023 Shaina Ballard, OT GO 1    85239016067 HC OT SELF CARE/MGMT/TRAIN EA 15 MIN 9/19/2023 Shaina Ballard, OT GO 2    85358397691 HC OT THERAPEUTIC ACT EA 15 MIN 9/19/2023 Shaina Ballard, OT GO 3    37549579961 HC OT THER PROC EA 15 MIN 9/19/2023 Shaina Ballard, OT GO 1                     Shaina Ballard OT  9/19/2023

## 2023-09-20 LAB
ANION GAP SERPL CALCULATED.3IONS-SCNC: 9.3 MMOL/L (ref 5–15)
BUN SERPL-MCNC: 26 MG/DL (ref 6–20)
BUN/CREAT SERPL: 61.9 (ref 7–25)
CALCIUM SPEC-SCNC: 9 MG/DL (ref 8.6–10.5)
CHLORIDE SERPL-SCNC: 95 MMOL/L (ref 98–107)
CO2 SERPL-SCNC: 34.7 MMOL/L (ref 22–29)
CREAT SERPL-MCNC: 0.42 MG/DL (ref 0.57–1)
DEPRECATED RDW RBC AUTO: 52 FL (ref 37–54)
EGFRCR SERPLBLD CKD-EPI 2021: 113.5 ML/MIN/1.73
EOSINOPHIL # BLD MANUAL: 0.35 10*3/MM3 (ref 0–0.4)
EOSINOPHIL NFR BLD MANUAL: 2 % (ref 0.3–6.2)
ERYTHROCYTE [DISTWIDTH] IN BLOOD BY AUTOMATED COUNT: 14.6 % (ref 12.3–15.4)
GLUCOSE BLDC GLUCOMTR-MCNC: 114 MG/DL (ref 70–130)
GLUCOSE BLDC GLUCOMTR-MCNC: 120 MG/DL (ref 70–130)
GLUCOSE BLDC GLUCOMTR-MCNC: 135 MG/DL (ref 70–130)
GLUCOSE BLDC GLUCOMTR-MCNC: 139 MG/DL (ref 70–130)
GLUCOSE SERPL-MCNC: 96 MG/DL (ref 65–99)
HCT VFR BLD AUTO: 52 % (ref 34–46.6)
HGB BLD-MCNC: 15 G/DL (ref 12–15.9)
HYPOCHROMIA BLD QL: ABNORMAL
LYMPHOCYTES # BLD MANUAL: 2.63 10*3/MM3 (ref 0.7–3.1)
LYMPHOCYTES NFR BLD MANUAL: 4 % (ref 5–12)
MCH RBC QN AUTO: 27.7 PG (ref 26.6–33)
MCHC RBC AUTO-ENTMCNC: 28.8 G/DL (ref 31.5–35.7)
MCV RBC AUTO: 96.1 FL (ref 79–97)
METAMYELOCYTES NFR BLD MANUAL: 1 % (ref 0–0)
MONOCYTES # BLD: 0.7 10*3/MM3 (ref 0.1–0.9)
NEUTROPHILS # BLD AUTO: 13.67 10*3/MM3 (ref 1.7–7)
NEUTROPHILS NFR BLD MANUAL: 77 % (ref 42.7–76)
NEUTS BAND NFR BLD MANUAL: 1 % (ref 0–5)
PLAT MORPH BLD: NORMAL
PLATELET # BLD AUTO: 183 10*3/MM3 (ref 140–450)
PMV BLD AUTO: 10.7 FL (ref 6–12)
POTASSIUM SERPL-SCNC: 4.1 MMOL/L (ref 3.5–5.2)
RBC # BLD AUTO: 5.41 10*6/MM3 (ref 3.77–5.28)
SODIUM SERPL-SCNC: 139 MMOL/L (ref 136–145)
VARIANT LYMPHS NFR BLD MANUAL: 15 % (ref 19.6–45.3)
WBC NRBC COR # BLD: 17.53 10*3/MM3 (ref 3.4–10.8)

## 2023-09-20 PROCEDURE — 97110 THERAPEUTIC EXERCISES: CPT

## 2023-09-20 PROCEDURE — 97116 GAIT TRAINING THERAPY: CPT

## 2023-09-20 PROCEDURE — 97530 THERAPEUTIC ACTIVITIES: CPT

## 2023-09-20 PROCEDURE — 63710000001 ONDANSETRON PER 8 MG: Performed by: FAMILY MEDICINE

## 2023-09-20 PROCEDURE — 82948 REAGENT STRIP/BLOOD GLUCOSE: CPT

## 2023-09-20 PROCEDURE — 94799 UNLISTED PULMONARY SVC/PX: CPT

## 2023-09-20 PROCEDURE — 85007 BL SMEAR W/DIFF WBC COUNT: CPT | Performed by: FAMILY MEDICINE

## 2023-09-20 PROCEDURE — 94664 DEMO&/EVAL PT USE INHALER: CPT

## 2023-09-20 PROCEDURE — 25010000002 ENOXAPARIN PER 10 MG: Performed by: INTERNAL MEDICINE

## 2023-09-20 PROCEDURE — 80048 BASIC METABOLIC PNL TOTAL CA: CPT | Performed by: FAMILY MEDICINE

## 2023-09-20 PROCEDURE — 94761 N-INVAS EAR/PLS OXIMETRY MLT: CPT

## 2023-09-20 PROCEDURE — 85025 COMPLETE CBC W/AUTO DIFF WBC: CPT | Performed by: FAMILY MEDICINE

## 2023-09-20 PROCEDURE — 97535 SELF CARE MNGMENT TRAINING: CPT

## 2023-09-20 RX ORDER — ALBUTEROL SULFATE 90 UG/1
2 AEROSOL, METERED RESPIRATORY (INHALATION) EVERY 4 HOURS PRN
Status: DISCONTINUED | OUTPATIENT
Start: 2023-09-20 | End: 2023-09-27 | Stop reason: HOSPADM

## 2023-09-20 RX ORDER — ONDANSETRON 4 MG/1
4 TABLET, FILM COATED ORAL EVERY 6 HOURS PRN
Status: DISCONTINUED | OUTPATIENT
Start: 2023-09-20 | End: 2023-09-27 | Stop reason: HOSPADM

## 2023-09-20 RX ADMIN — HYDROCODONE BITARTRATE AND ACETAMINOPHEN 1 TABLET: 7.5; 325 TABLET ORAL at 11:04

## 2023-09-20 RX ADMIN — METOPROLOL TARTRATE 25 MG: 25 TABLET, FILM COATED ORAL at 20:59

## 2023-09-20 RX ADMIN — ONDANSETRON HYDROCHLORIDE 4 MG: 4 TABLET, FILM COATED ORAL at 16:59

## 2023-09-20 RX ADMIN — BUDESONIDE AND FORMOTEROL FUMARATE DIHYDRATE 2 PUFF: 160; 4.5 AEROSOL RESPIRATORY (INHALATION) at 07:52

## 2023-09-20 RX ADMIN — HYDROCODONE BITARTRATE AND ACETAMINOPHEN 1 TABLET: 7.5; 325 TABLET ORAL at 22:35

## 2023-09-20 RX ADMIN — BUDESONIDE AND FORMOTEROL FUMARATE DIHYDRATE 2 PUFF: 160; 4.5 AEROSOL RESPIRATORY (INHALATION) at 18:56

## 2023-09-20 RX ADMIN — ONDANSETRON HYDROCHLORIDE 4 MG: 4 TABLET, FILM COATED ORAL at 21:30

## 2023-09-20 RX ADMIN — ALBUTEROL SULFATE 1.25 MG: 1.25 SOLUTION RESPIRATORY (INHALATION) at 07:52

## 2023-09-20 RX ADMIN — HYDROCODONE BITARTRATE AND ACETAMINOPHEN 1 TABLET: 7.5; 325 TABLET ORAL at 17:01

## 2023-09-20 RX ADMIN — TIOTROPIUM BROMIDE INHALATION SPRAY 2 PUFF: 3.12 SPRAY, METERED RESPIRATORY (INHALATION) at 07:52

## 2023-09-20 RX ADMIN — CASTOR OIL AND BALSAM, PERU 1 APPLICATION: 788; 87 OINTMENT TOPICAL at 21:02

## 2023-09-20 RX ADMIN — CLOPIDOGREL BISULFATE 75 MG: 75 TABLET, FILM COATED ORAL at 08:38

## 2023-09-20 RX ADMIN — HYDROCODONE BITARTRATE AND ACETAMINOPHEN 1 TABLET: 7.5; 325 TABLET ORAL at 05:19

## 2023-09-20 RX ADMIN — ASPIRIN 81 MG: 81 TABLET, COATED ORAL at 08:38

## 2023-09-20 RX ADMIN — METOPROLOL TARTRATE 25 MG: 25 TABLET, FILM COATED ORAL at 08:38

## 2023-09-20 RX ADMIN — ONDANSETRON HYDROCHLORIDE 4 MG: 4 TABLET, FILM COATED ORAL at 08:38

## 2023-09-20 RX ADMIN — CASTOR OIL AND BALSAM, PERU 1 APPLICATION: 788; 87 OINTMENT TOPICAL at 08:38

## 2023-09-20 RX ADMIN — ALPRAZOLAM 0.5 MG: 0.5 TABLET ORAL at 20:59

## 2023-09-20 RX ADMIN — FUROSEMIDE 20 MG: 20 TABLET ORAL at 08:38

## 2023-09-20 RX ADMIN — ANORECTAL OINTMENT 1 APPLICATION: 15.7; .44; 24; 20.6 OINTMENT TOPICAL at 11:07

## 2023-09-20 RX ADMIN — SIMETHICONE 80 MG: 80 TABLET, CHEWABLE ORAL at 21:00

## 2023-09-20 NOTE — THERAPY TREATMENT NOTE
Inpatient Rehabilitation - Physical Therapy Treatment Note        [facility]     Patient Name: Liz Jiang  : 1965  MRN: [id number]    Today's Date: 2023                    Admit Date: 2023      Visit Dx:   No diagnosis found.    Patient Active Problem List   Diagnosis    Chest pain    COPD (chronic obstructive pulmonary disease)    Tobacco abuse    GERD (gastroesophageal reflux disease)    Anxiety    Arthritis    Nausea and vomiting    Generalized abdominal pain    Right upper quadrant pain    Gallbladder disease    Abnormal biliary HIDA scan    Dyslipidemia    Tachycardia    Anal cancer    Port-A-Cath in place    Debility       Past Medical History:   Diagnosis Date    Acid reflux disease     Anal cancer 2019    Arthritis     Asthma, extrinsic     Cholelithiasis     Chronic headaches     COPD (chronic obstructive pulmonary disease)     CVA (cerebral vascular accident)     w/ right sided deficit    CVA (cerebral vascular accident)     Diabetes mellitus     only has high blood sugar when on steriods    History of radiation therapy 2020    Whole pelvis/anal mass    Obstructive sleep apnea treated with BiPAP     On home oxygen therapy     2L     Sleep apnea, obstructive        Past Surgical History:   Procedure Laterality Date    ABDOMINAL SURGERY      CARDIAC CATHETERIZATION      CAROTID ENDARTERECTOMY Left 2018    CHOLECYSTECTOMY WITH INTRAOPERATIVE CHOLANGIOGRAM N/A 2017    Procedure: CHOLECYSTECTOMY LAPAROSCOPIC INTRAOPERATIVE CHOLANGIOGRAM;  Surgeon: Carolin Marie MD;  Location: [facility];  Service:     COLONOSCOPY      HYSTERECTOMY      for heavy bleeding/ complete    KIDNEY STONE SURGERY      TUBAL ABDOMINAL LIGATION      VENOUS ACCESS DEVICE (PORT) INSERTION Left 2019    Procedure: INSERTION VENOUS ACCESS DEVICE;  Surgeon: Carolin Marie MD;  Location: [facility];  Service: General       PT ASSESSMENT (last 12 hours)       IRF PT Evaluation and Treatment        Row Name 09/20/23 1407          PT Time and Intention    Document Type daily treatment  BID treatment session  -LL     Mode of Treatment individual therapy;physical therapy  -LL     Patient/Family/Caregiver Comments/Observations Patient had no new c/o this date and was agreeable to BID PT participation.  Patient requires frequent & extended rest breaks throughout both sessions d/t dyspenia.  -       Row Name 09/20/23 1407          General Information    Existing Precautions/Restrictions fall;oxygen therapy device and L/min  -LL       Row Name 09/20/23 1407          Pain Scale: FACES Pre/Post-Treatment    Pain: FACES Scale, Pretreatment 0-->no hurt  -LL     Posttreatment Pain Rating 0-->no hurt  -LL       Row Name 09/20/23 1407          Cognition/Psychosocial    Affect/Mental Status (Cognition) WFL  -     Orientation Status (Cognition) oriented x 3  -LL     Follows Commands (Cognition) WFL  -     Personal Safety Interventions gait belt;nonskid shoes/slippers when out of bed  -LL     Cognitive Function WFL  -       Row Name 09/20/23 1407          Transfer Assessment/Treatment    Comment, (Transfers) transfer from bedside recliner to w/c and w/c to bedside recliner with supervision and verbal cues for safety.  -LL       Row Name 09/20/23 1407          Sit-Stand Transfer    Sit-Stand [place] (Transfers) verbal cues;nonverbal cues (demo/gesture);supervision  -LL     Assistive Device (Sit-Stand Transfers) walker, front-wheeled  -LL       Row Name 09/20/23 1407          Stand-Sit Transfer    Stand-Sit [place] (Transfers) verbal cues;nonverbal cues (demo/gesture);supervision  -LL     Assistive Device (Stand-Sit Transfers) walker, front-wheeled  -LL       Row Name 09/20/23 1407          Toilet Transfer    Type (Toilet Transfer) stand pivot/stand step  -LL     [place] Level (Toilet Transfer) supervision  -       Row Name 09/20/23 1407          Gait/Stairs (Locomotion)    [place] Level (Gait)  verbal cues;nonverbal cues (demo/gesture);supervision  -LL     Assistive Device (Gait) --  rollator  -LL     Distance in Feet (Gait) 80' x 2  -LL     Deviations/Abnormal Patterns (Gait) gait speed decreased;stride length decreased  -LL     Bilateral Gait Deviations forward flexed posture  -LL     Comment, (Gait/Stairs) Patient requires frequent & extended rest breaks throughout task  -LL       Row Name 09/20/23 1407          Hip (Therapeutic Exercise)    Hip Strengthening (Therapeutic Exercise) bilateral;flexion;extension;aBduction;aDduction;marching while seated;sitting;2 lb free weight;resistance band;green  -LL       Row Name 09/20/23 1407          Knee (Therapeutic Exercise)    Knee Strengthening (Therapeutic Exercise) bilateral;flexion;extension;marching while seated;LAQ (long arc quad);hamstring curls;sitting;2 lb free weight;resistance band;green  -LL       Row Name 09/20/23 1407          Ankle (Therapeutic Exercise)    Ankle Strengthening (Therapeutic Exercise) bilateral;dorsiflexion;plantarflexion;sitting;2 lb free weight  -LL       Row Name 09/20/23 1407          Positioning and Restraints    Pre-Treatment Position sitting in chair/recliner  BID  -LL     Post Treatment Position chair  In AM; WC in PM  -LL     In Chair sitting;call light within reach;encouraged to call for assist  In AM  -LL     In Wheelchair sitting;with OT  In PM  -LL               User Key  (r) = Recorded By, (t) = Taken By, (c) = Cosigned By      Initials Name Provider Type    LL Laura Novak, ANJALI Physical Therapist Assistant                  Wound 09/14/23 1840 Left gluteal Pressure Injury (Active)   Dressing Appearance open to air 09/20/23 0740   Closure None 09/19/23 1915   Base non-blanchable;dry;purple;maroon/purple 09/20/23 0740   Care, Wound barrier applied 09/20/23 0740   Dressing Care open to air 09/19/23 1915       Wound 09/14/23 1840 Right gluteal Pressure Injury (Active)   Dressing Appearance open to air 09/20/23 0740    Closure None 09/19/23 1915   Base non-blanchable 09/20/23 0740   Drainage Amount none 09/19/23 1915   Care, Wound barrier applied 09/20/23 0740   Dressing Care open to air 09/19/23 1915     Physical Therapy Education       Title: PT OT SLP Therapies (Done)       Topic: Physical Therapy (Done)       Point: Mobility training (Done)       Learning Progress Summary             Patient Acceptance, E,D, VU,NR by LL at 9/20/2023 1414    Acceptance, E,D, VU,NR by RG at 9/19/2023 1249    Acceptance, E, VU,NR by LB at 9/19/2023 1236    Acceptance, E, VU,NR by LB at 9/18/2023 1210    Acceptance, E, VU,NR by LB at 9/15/2023 0956                         Point: Home exercise program (Done)       Learning Progress Summary             Patient Acceptance, E,D, VU,NR by LL at 9/20/2023 1414    Acceptance, E,D, VU,NR by RG at 9/19/2023 1249    Acceptance, E, VU,NR by LB at 9/19/2023 1236    Acceptance, E, VU,NR by LB at 9/18/2023 1210    Acceptance, E, VU,NR by LB at 9/15/2023 0956                         Point: Body mechanics (Done)       Learning Progress Summary             Patient Acceptance, E,D, VU,NR by LL at 9/20/2023 1414    Acceptance, E,D, VU,NR by RG at 9/19/2023 1249    Acceptance, E, VU,NR by LB at 9/19/2023 1236    Acceptance, E, VU,NR by LB at 9/18/2023 1210    Acceptance, E, VU,NR by LB at 9/15/2023 0956                         Point: Precautions (Done)       Learning Progress Summary             Patient Acceptance, E,D, VU,NR by LL at 9/20/2023 1414    Acceptance, E,D, VU,NR by RG at 9/19/2023 1249    Acceptance, E, VU,NR by LB at 9/19/2023 1236    Acceptance, E, VU,NR by LB at 9/18/2023 1210    Acceptance, E, VU,NR by LB at 9/15/2023 0956                                         User Key       Initials Effective Dates Name Provider Type Discipline    LB 06/16/21 -  Darlyn Gandhi, PT Physical Therapist PT    LL 05/02/16 -  Laura Novak PTA Physical Therapist Assistant PT    RG 06/16/21 -  Brijesh Silver,  ANJALI Physical Therapist Assistant PT                    PT Recommendation and Plan                          Time Calculation:      PT Charges       Row Name 09/20/23 1415 09/20/23 1414          Time Calculation    Start Time 1245  -LL 1045  -LL     Stop Time 1330  -LL 1130  -LL     Time Calculation (min) 45 min  -LL 45 min  -LL     PT Received On -- 09/20/23  -LL        Time Calculation- PT    Total Timed Code Minutes- PT 45 minute(s)  -LL 45 minute(s)  -LL               User Key  (r) = Recorded By, (t) = Taken By, (c) = Cosigned By      Initials Name Provider Type    LL Laura Novak PTA Physical Therapist Assistant                    Therapy Charges for Today       Code Description Service Date Service Provider Modifiers Qty    35584161492 HC GAIT TRAINING EA 15 MIN 9/20/2023 Laura Novak PTA GP, CQ 2    94632734233 HC PT THERAPEUTIC ACT EA 15 MIN 9/20/2023 Laura Novak PTA GP, CQ 2    34869709939 HC PT THER PROC EA 15 MIN 9/20/2023 Laura Novak PTA GP, CQ 2                     Laura. ANJALI Novak  9/20/2023

## 2023-09-20 NOTE — PROGRESS NOTES
Rehabilitation Nursing  Inpatient Rehabilitation Plan of Care Note    Plan of Care  Copy from Dez    Pain Management (Active)  Current Status (9/14/2023 6:29:00 PM): Potential for increased pain  Weekly Goal: Pain adequately managed  Discharge Goal: Pain adequately managed    Body Function Structure    Skin Integrity (Active)  Current Status (9/14/2023 6:29:00 PM): Stage 2 pressure injuries  Weekly Goal: Healing of wound  Discharge Goal: Healing of wound    Safety    Potential for Injury (Active)  Current Status (9/14/2023 6:29:00 PM): Potential for falls  Weekly Goal: No falls  Discharge Goal: No falls    Signed by: Margaret Grubbs, Nurse

## 2023-09-20 NOTE — NURSING NOTE
"INFORMED PATIENT OF NEEDING TO RING FOR ASSISTANCE WHEN WANTING TO GO TO THE RESTROOM, PATIENT STATES \"I HAVE BEEN DOING IT AND EVERYONE KNOWS.\"  "

## 2023-09-20 NOTE — PROGRESS NOTES
Occupational Therapy:    Physical Therapy: Individual: 90 minutes.    Speech Language Pathology:    Signed by: Laura Novak PTA

## 2023-09-20 NOTE — SIGNIFICANT NOTE
09/20/23 1310   Plan   Plan Spoke to nathanael Fernández about plans for pt to be discharged home on 9-27-23 and how she is doing in therapy.  Son has an appointment in [place] on 9-27-23, therefore, will get someone else to transport pt home.  Pt  says she does not want [facility] to transport her home.  Son says his sister Thiago and brother Will will assist pt at home.  Discussed possible need for home health therapy.  Will follow.   Patient/Family in Agreement with Plan yes

## 2023-09-20 NOTE — PROGRESS NOTES
[facility]  PROGRESS NOTE     Patient Identification:  Name:  Liz Jiang  Age:  57 y.o.  Sex:  female  :  1965  MRN:  [id number]  Visit Number:  [id number]  ROOM: 102/     Primary Care Provider:  Paty Fischer APRN    Length of stay in inpatient status:  6    Subjective     Chief Compliant:  No chief complaint on file.      History of Presenting Illness:  57-year-old female who is recent prolonged admission for respiratory failure secondary to COPD.  States she is doing reasonably well this morning has no new complaints    Objective     Current Hospital Meds:aspirin, 81 mg, Oral, Daily  budesonide-formoterol, 2 puff, Inhalation, BID - RT  castor oil-balsam peru, 1 application , Topical, Q12H  clopidogrel, 75 mg, Oral, Daily  enoxaparin, 40 mg, Subcutaneous, Daily  furosemide, 20 mg, Oral, Daily  insulin lispro, 2-7 Units, Subcutaneous, TID With Meals  Menthol-Zinc Oxide, 1 application , Topical, BID  metoprolol tartrate, 25 mg, Oral, Q12H  nystatin, 5 mL, Swish & Swallow, 4x Daily  tiotropium bromide monohydrate, 2 puff, Inhalation, Daily - RT    Pharmacy Consult,       ----------------------------------------------------------------------------------------------------------------------  Vital Signs:  Temp:  [98.1 °F (36.7 °C)-98.2 °F (36.8 °C)] 98.1 °F (36.7 °C)  Heart Rate:  [] 110  Resp:  [18-20] 18  BP: (113-123)/(65-81) 123/81  SpO2:  [91 %-95 %] 93 %  on  Flow (L/min):  [3.5-4] 4;   Device (Oxygen Therapy): nasal cannula  Body mass index is 38.45 kg/m².    Wt Readings from Last 3 Encounters:   23 102 kg (224 lb)   22 77.1 kg (170 lb)   20 76.8 kg (169 lb 6.4 oz)     Intake & Output (last 3 days)          07 07 07 07 0700    P.O. 2520 960 1800 240    Total Intake(mL/kg) 2520 (24.7) 960 (9.4) 1800 (17.6) 240 (2.4)    Net +2520 +960 +1800 +240            Urine Unmeasured Occurrence  8 x 8 x 5 x 1 x    Stool Unmeasured Occurrence 6 x 1 x            Diet: Regular/House Diet, Diabetic Diets, Renal Diets; Consistent Carbohydrate; Low Sodium (2-3g); Texture: Regular Texture (IDDSI 7); Fluid Consistency: Thin (IDDSI 0)  ----------------------------------------------------------------------------------------------------------------------  Physical exam:  Constitutional: No acute distress  HEENT: Normocephalic atraumatic  Neck:   Supple  Cardiovascular: Regular rate and rhythm  Pulmonary/Chest: Decreased breath sounds bilaterally  Abdominal: Positive bowel sounds soft.   Musculoskeletal: No arthropathy  Neurological: No focal deficits generalized weakness  Skin: No rash  Peripheral vascular: No edema  Genitourinary:  ----------------------------------------------------------------------------------------------------------------------    Last echocardiogram:  Results for orders placed during the hospital encounter of 08/24/21    Adult Transthoracic Echo Complete W/ Cont if Necessary Per Protocol    Interpretation Summary  · Left ventricular ejection fraction appears to be 66 - 70%. Left ventricular systolic function is normal.  · Left ventricular diastolic function is consistent with (grade I) impaired relaxation.    ----------------------------------------------------------------------------------------------------------------------  Results from last 7 days   Lab Units 09/20/23  0002 09/18/23  0223 09/15/23  0458   WBC 10*3/mm3 17.53* 18.35* 21.30*   HEMOGLOBIN g/dL 15.0 14.6 14.7   HEMATOCRIT % 52.0* 51.5* 49.8*   MCV fL 96.1 95.7 95.2   MCHC g/dL 28.8* 28.3* 29.5*   PLATELETS 10*3/mm3 183 208 205         Results from last 7 days   Lab Units 09/20/23  0002 09/18/23  0223 09/16/23  0818 09/15/23  0458   SODIUM mmol/L 139 141 136 139   POTASSIUM mmol/L 4.1 4.3 5.0 5.3*   CHLORIDE mmol/L 95* 98 94* 94*   CO2 mmol/L 34.7* 34.5* 31.3* 37.7*   BUN mg/dL 26* 30* 25* 30*   CREATININE mg/dL 0.42* 0.38* 0.45*  0.41*   CALCIUM mg/dL 9.0 9.0 9.1 9.3   GLUCOSE mg/dL 96 150* 198* 202*   ALBUMIN g/dL  --   --   --  3.8   BILIRUBIN mg/dL  --   --   --  0.4   ALK PHOS U/L  --   --   --  48   AST (SGOT) U/L  --   --   --  15   ALT (SGPT) U/L  --   --   --  24   Estimated Creatinine Clearance: 169.6 mL/min (A) (by C-G formula based on SCr of 0.42 mg/dL (L)).  No results found for: AMMONIA              Glucose   Date/Time Value Ref Range Status   09/20/2023 0612 114 70 - 130 mg/dL Final   09/19/2023 2001 174 (H) 70 - 130 mg/dL Final   09/19/2023 1614 151 (H) 70 - 130 mg/dL Final   09/19/2023 1121 129 70 - 130 mg/dL Final   09/19/2023 0619 122 70 - 130 mg/dL Final   09/18/2023 1927 172 (H) 70 - 130 mg/dL Final   09/18/2023 1622 148 (H) 70 - 130 mg/dL Final   09/18/2023 1108 192 (H) 70 - 130 mg/dL Final     Lab Results   Component Value Date    TSH 5.090 (H) 04/29/2020    FREET4 1.10 02/11/2014     No results found for: PREGTESTUR, PREGSERUM, HCG, HCGQUANT  Pain Management Panel          Latest Ref Rng & Units 2/23/2022   Pain Management Panel   Amphetamine, Urine Qual Negative Negative    Barbiturates Screen, Urine Negative Negative    Benzodiazepine Screen, Urine Negative Positive    Buprenorphine, Screen, Urine Negative Negative    Cocaine Screen, Urine Negative Negative    Methadone Screen , Urine Negative Negative    Methamphetamine, Ur Negative Negative      Brief Urine Lab Results       None          No results found for: BLOODCX      No results found for: URINECX  No results found for: WOUNDCX  No results found for: STOOLCX        I have personally looked at the labs and they are summarized above.  ----------------------------------------------------------------------------------------------------------------------  Detailed radiology reports for the last 24 hours:    Imaging Results (Last 24 Hours)       ** No results found for the last 24 hours. **          Final impressions for the last 30 days of radiology  reports:    X-ray chest PA and lateral    Result Date: 9/13/2023  Mild right basilar atelectasis. Images reviewed, interpreted, and dictated by Dr. Abiel Siu. Transcribed by Yaz Quick PA-C.    XR chest AP portable    Result Date: 9/9/2023  There has been no significant interval change  .  Continued follow up recommended . Images reviewed, interpreted, and dictated by Dr. DIANE Woamck. Transcribed by Cristhian Neff PA-C.    US abdomen limited    Result Date: 9/7/2023  Fatty liver.. Images reviewed, interpreted, and dictated by Sharlene Norwood MD    XR chest AP portable    Result Date: 9/7/2023  Low lung volumes with worsening right basilar opacity which may represent atelectasis or pneumonia. Images reviewed, interpreted, and dictated by Sharlene Norwood MD   I have personally looked at the radiology images and read the final radiology report.    Assessment & Plan    Debility--requiring supervision to contact-guard for toileting.  Supervision for transfers.  Ambulated 70 feet, 50 feet and 60 feet with rollator and supervision yesterday.  Patient still continue to work on building endurance at this time    COPD--getting steroid taper at this time.  Continue with neb treatments as needed    CHF preserved EF compensated on Lasix    Leukocytosis likely secondary to steroids    VTE Prophylaxis:   Mechanical Order History:       None          Pharmalogical Order History:        Ordered     Dose Route Frequency Stop    09/14/23 1442  Enoxaparin Sodium (LOVENOX) syringe 40 mg         40 mg SC Daily --                        Otoniel Mclaughlin MD  [facility]  09/20/23  09:46 EDT

## 2023-09-20 NOTE — PLAN OF CARE
Goal Outcome Evaluation:  Plan of Care Reviewed With: patient        Progress: improving  Outcome Evaluation: PROGRESSING WITH CURRENT THERAPIES; CONTINUE PLAN OF CARE; UP IN RECLINER; MONITOR

## 2023-09-20 NOTE — PROGRESS NOTES
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  non-dominant side  E11.9 Type 2 diabetes mellitus without complications  D72.829 Elevated white blood cell count, unspecified  I50.32 Chronic diastolic (congestive) heart failure  I11.0 Hypertensive heart disease with heart failure  Z68.38 Body mass index (BMI) 38.0-38.9, adult  K21.9 Gastro-esophageal reflux disease without esophagitis  Z99.81 Dependence on supplemental oxygen  G47.33 Obstructive sleep apnea (adult) (pediatric)  Z87.891 Personal history of nicotine dependence  E66.9 Obesity, unspecified  E87.5 Hyperkalemia  K59.00 Constipation, unspecified  F41.9 Anxiety disorder, unspecified      Plan of Care  Anticipated Discharge Date/Estimated Length of Stay: 14 days  Anticipated Discharge Destination: Community discharge with assistance  Discharge Plan : Pt plans to return home with daughter and son at discharge.  Medical Necessity Expected Level Rationale: good  Intensity and Duration: an average of 3 hours/5 days per week  Medical Supervision and 24 Hour Rehab Nursing: x  Physical Therapy: x  PT Intensity/Duration: PT 1.5 hours per day/5 days per week  Occupational Therapy: x  OT Intensity/Duration: OT 1.5 hours per day/5 days per week  Social Work: x  Therapeutic Recreation: x  Respiratory Therapy: x  Updated (if changes indicated)    Anticipated Discharge Date/Estimated Length of Stay:   9-27-23      Discharge Plan of  Care:    Based on the patient's medical and functional status, their prognosis and  expected level of functional improvement is: fair-good      Interdisciplinary Problem/Goals/Status  Pain    [RN] Pain Management(Active)  Current Status(09/14/2023): Potential for increased pain  Weekly Goal(09/21/2023): Pain adequately managed  Discharge Goal: Pain adequately managed        Body Function Structure    [RN] Skin Integrity(Active)  Current Status(09/14/2023): Stage 2 pressure injuries  Weekly Goal(09/21/2023): Healing of wound  Discharge Goal: Healing of wound        Safety    [RN] Potential for Injury(Active)  Current Status(09/14/2023): Potential for falls  Weekly Goal(09/21/2023): No falls  Discharge Goal: No falls        Mobility    [PT] Bed/Chair/Wheelchair(Active)  Current Status(09/15/2023): CGA  Weekly Goal(09/22/2023): MI  Discharge Goal: MI    [PT] Walk(Active)  Current Status(09/15/2023): amb 60' RW CGA  Weekly Goal(09/22/2023): amb 300' RW MI  Discharge Goal: amb 300' RW MI        Self Care    [OT] Dressing (Lower)(Active)  Current Status(09/18/2023): CGA/Geovanny  Weekly Goal(09/26/2023): CGA  Discharge Goal: CGA    Comments: Pt plans to return home with daughter and grandson at discharge.    Signed by: JARROD Granados    Physician CoSigned By: Otoniel Mclaughlin 09/20/2023 08:29:38

## 2023-09-20 NOTE — THERAPY TREATMENT NOTE
Inpatient Rehabilitation - Occupational Therapy Treatment Note     [facility]     Patient Name: Liz Jiang  : 1965  MRN: [id number]    Today's Date: 2023                 Admit Date: 2023       No diagnosis found.    Patient Active Problem List   Diagnosis    Chest pain    COPD (chronic obstructive pulmonary disease)    Tobacco abuse    GERD (gastroesophageal reflux disease)    Anxiety    Arthritis    Nausea and vomiting    Generalized abdominal pain    Right upper quadrant pain    Gallbladder disease    Abnormal biliary HIDA scan    Dyslipidemia    Tachycardia    Anal cancer    Port-A-Cath in place    Debility       Past Medical History:   Diagnosis Date    Acid reflux disease     Anal cancer 2019    Arthritis     Asthma, extrinsic     Cholelithiasis     Chronic headaches     COPD (chronic obstructive pulmonary disease)     CVA (cerebral vascular accident)     w/ right sided deficit    CVA (cerebral vascular accident)     Diabetes mellitus     only has high blood sugar when on steriods    History of radiation therapy 2020    Whole pelvis/anal mass    Obstructive sleep apnea treated with BiPAP     On home oxygen therapy     2L     Sleep apnea, obstructive        Past Surgical History:   Procedure Laterality Date    ABDOMINAL SURGERY      CARDIAC CATHETERIZATION      CAROTID ENDARTERECTOMY Left 2018    CHOLECYSTECTOMY WITH INTRAOPERATIVE CHOLANGIOGRAM N/A 2017    Procedure: CHOLECYSTECTOMY LAPAROSCOPIC INTRAOPERATIVE CHOLANGIOGRAM;  Surgeon: Carolin Marie MD;  Location: [facility];  Service:     COLONOSCOPY      HYSTERECTOMY      for heavy bleeding/ complete    KIDNEY STONE SURGERY      TUBAL ABDOMINAL LIGATION      VENOUS ACCESS DEVICE (PORT) INSERTION Left 2019    Procedure: INSERTION VENOUS ACCESS DEVICE;  Surgeon: Carolin Marie MD;  Location: [facility];  Service: General             IRF OT ASSESSMENT FLOWSHEET (last 12 hours)       IRF OT Evaluation and Treatment        Row Name 09/20/23 1504          OT Time and Intention    Document Type daily treatment  -TM     Mode of Treatment occupational therapy  -TM     Patient Effort adequate  -TM     Symptoms Noted During/After Treatment none  -TM       Row Name 09/20/23 1504          General Information    General Observations of Patient Pt agreeable for therapy.  -TM     Existing Precautions/Restrictions fall;oxygen therapy device and L/min  -TM       Row Name 09/20/23 1504          Cognition/Psychosocial    Orientation Status (Cognition) oriented x 3  -TM     Follows Commands (Cognition) WFL  -TM       Row Name 09/20/23 1504          Lower Body Dressing    [place] Level (Lower Body Dressing) contact guard assist;verbal cues  -TM     Position (Lower Body Dressing) supported sitting  -TM       Row Name 09/20/23 1504          Grooming    [place] Level (Grooming) set up  -TM     Position (Grooming) supported sitting  -TM       Row Name 09/20/23 1505          Transfer Assessment/Treatment    Comment, (Transfers) transfer from bedside recliner to w/c and w/c to bedside recliner with supervision and verbal cues for safety  -TM       Row Name 09/20/23 1508          Motor Skills    Motor Skills coordination;functional endurance;motor control/coordination interventions  -TM     Motor Control/Coordination Interventions therapeutic exercise/ROM;gross motor coordination activities;fine motor manipulation/dexterity activities;other (see comments)  BUE ther ex/act, GMC/FMC  -TM               User Key  (r) = Recorded By, (t) = Taken By, (c) = Cosigned By      Initials Name Effective Dates    TM Shaina Ballard, OT 06/16/21 -                      Occupational Therapy Education       Title: PT OT SLP Therapies (Done)       Topic: Occupational Therapy (Done)       Point: ADL training (Done)       Description:   Instruct learner(s) on proper safety adaptation and remediation techniques during self care or transfers.   Instruct in  proper use of assistive devices.                  Learning Progress Summary             Patient Acceptance, E,D, VU,NR by TM at 9/20/2023 1503    Acceptance, E,D, VU,NR by TM at 9/19/2023 1406    Acceptance, E,D, VU,NR by TM at 9/18/2023 1359    Acceptance, E, VU,NR by HB at 9/16/2023 1155    Acceptance, E,D, VU,NR by TM at 9/15/2023 1308                         Point: Precautions (Done)       Description:   Instruct learner(s) on prescribed precautions during self-care and functional transfers.                  Learning Progress Summary             Patient Acceptance, E,D, VU,NR by TM at 9/20/2023 1503    Acceptance, E,D, VU,NR by TM at 9/19/2023 1406    Acceptance, E,D, VU,NR by TM at 9/18/2023 1359    Acceptance, E, VU,NR by HB at 9/16/2023 1155    Acceptance, E,D, VU,NR by TM at 9/15/2023 1308                                         User Key       Initials Effective Dates Name Provider Type Discipline     06/16/21 -  Shaina Ballard, OT Occupational Therapist OT     05/25/21 -  Gaby Carty OT Occupational Therapist OT                        OT Recommendation and Plan    Planned Therapy Interventions (OT): activity tolerance training, adaptive equipment training, BADL retraining, IADL retraining, occupation/activity based interventions, patient/caregiver education/training, ROM/therapeutic exercise, strengthening exercise, transfer/mobility retraining                    Time Calculation:      Time Calculation- OT       Row Name 09/20/23 1502 09/20/23 1459          Time Calculation- OT    OT Start Time 1330  -TM 0830  -TM     OT Stop Time 1425  -TM 0915  -TM     OT Time Calculation (min) 55 min  -TM 45 min  -TM     Total Timed Code Minutes- OT 55 minute(s)  -TM 45 minute(s)  -TM     OT Non-Billable Time (min) -- 15 min  -TM               User Key  (r) = Recorded By, (t) = Taken By, (c) = Cosigned By      Initials Name Provider Type    TM Shaina Ballard, OT Occupational Therapist                   Therapy Charges for Today       Code Description Service Date Service Provider Modifiers Qty    73380881422 HC OT SELF CARE/MGMT/TRAIN EA 15 MIN 9/19/2023 Shaina Ballard, OT GO 2    78065174785 HC OT THERAPEUTIC ACT EA 15 MIN 9/19/2023 Shaina Ballard, OT GO 3    89365468090 HC OT THER PROC EA 15 MIN 9/19/2023 Shaina Ballard, OT GO 1    46794260045 HC OT SELF CARE/MGMT/TRAIN EA 15 MIN 9/20/2023 Shaina Ballard, OT GO 2    70577592527 HC OT THER PROC EA 15 MIN 9/20/2023 Shaina Ballard, OT GO 1    56455751525 HC OT THERAPEUTIC ACT EA 15 MIN 9/20/2023 Shaina Ballard, OT GO 3    50871988691 HC OT THER PROC EA 15 MIN 9/20/2023 Shaina Ballard, OT GO 1                     Shaina Ballard OT  9/20/2023

## 2023-09-21 ENCOUNTER — APPOINTMENT (OUTPATIENT)
Dept: GENERAL RADIOLOGY | Facility: HOSPITAL | Age: 58
DRG: 948 | End: 2023-09-21
Payer: MEDICARE

## 2023-09-21 LAB
ANION GAP SERPL CALCULATED.3IONS-SCNC: 1.8 MMOL/L (ref 5–15)
BASO STIPL COARSE BLD QL SMEAR: NORMAL
BASOPHILS # BLD AUTO: 0.06 10*3/MM3 (ref 0–0.2)
BASOPHILS NFR BLD AUTO: 0.3 % (ref 0–1.5)
BUN SERPL-MCNC: 23 MG/DL (ref 6–20)
BUN/CREAT SERPL: 41.1 (ref 7–25)
CALCIUM SPEC-SCNC: 9.4 MG/DL (ref 8.6–10.5)
CHLORIDE SERPL-SCNC: 91 MMOL/L (ref 98–107)
CO2 SERPL-SCNC: 43.2 MMOL/L (ref 22–29)
CREAT SERPL-MCNC: 0.56 MG/DL (ref 0.57–1)
D-LACTATE SERPL-SCNC: 1.5 MMOL/L (ref 0.5–2)
DEPRECATED RDW RBC AUTO: 51.7 FL (ref 37–54)
EGFRCR SERPLBLD CKD-EPI 2021: 105.9 ML/MIN/1.73
EOSINOPHIL # BLD AUTO: 0.04 10*3/MM3 (ref 0–0.4)
EOSINOPHIL NFR BLD AUTO: 0.2 % (ref 0.3–6.2)
ERYTHROCYTE [DISTWIDTH] IN BLOOD BY AUTOMATED COUNT: 14 % (ref 12.3–15.4)
GLUCOSE BLDC GLUCOMTR-MCNC: 123 MG/DL (ref 70–130)
GLUCOSE BLDC GLUCOMTR-MCNC: 136 MG/DL (ref 70–130)
GLUCOSE BLDC GLUCOMTR-MCNC: 172 MG/DL (ref 70–130)
GLUCOSE BLDC GLUCOMTR-MCNC: 175 MG/DL (ref 70–130)
GLUCOSE SERPL-MCNC: 187 MG/DL (ref 65–99)
HCT VFR BLD AUTO: 47.7 % (ref 34–46.6)
HGB BLD-MCNC: 13.4 G/DL (ref 12–15.9)
HYPOCHROMIA BLD QL: NORMAL
IMM GRANULOCYTES # BLD AUTO: 0.3 10*3/MM3 (ref 0–0.05)
IMM GRANULOCYTES NFR BLD AUTO: 1.7 % (ref 0–0.5)
LARGE PLATELETS: NORMAL
LYMPHOCYTES # BLD AUTO: 0.97 10*3/MM3 (ref 0.7–3.1)
LYMPHOCYTES NFR BLD AUTO: 5.4 % (ref 19.6–45.3)
MACROCYTES BLD QL SMEAR: NORMAL
MCH RBC QN AUTO: 28.1 PG (ref 26.6–33)
MCHC RBC AUTO-ENTMCNC: 28.1 G/DL (ref 31.5–35.7)
MCV RBC AUTO: 100 FL (ref 79–97)
MONOCYTES # BLD AUTO: 0.98 10*3/MM3 (ref 0.1–0.9)
MONOCYTES NFR BLD AUTO: 5.5 % (ref 5–12)
NEUTROPHILS NFR BLD AUTO: 15.58 10*3/MM3 (ref 1.7–7)
NEUTROPHILS NFR BLD AUTO: 86.9 % (ref 42.7–76)
NRBC BLD AUTO-RTO: 0 /100 WBC (ref 0–0.2)
PLATELET # BLD AUTO: 150 10*3/MM3 (ref 140–450)
PMV BLD AUTO: 10.2 FL (ref 6–12)
POTASSIUM SERPL-SCNC: 4.8 MMOL/L (ref 3.5–5.2)
PROCALCITONIN SERPL-MCNC: 0.07 NG/ML (ref 0–0.25)
RBC # BLD AUTO: 4.77 10*6/MM3 (ref 3.77–5.28)
SARS-COV-2 RNA RESP QL NAA+PROBE: NOT DETECTED
SODIUM SERPL-SCNC: 136 MMOL/L (ref 136–145)
STOMATOCYTES BLD QL SMEAR: NORMAL
WBC NRBC COR # BLD: 17.93 10*3/MM3 (ref 3.4–10.8)

## 2023-09-21 PROCEDURE — 94799 UNLISTED PULMONARY SVC/PX: CPT

## 2023-09-21 PROCEDURE — 83605 ASSAY OF LACTIC ACID: CPT | Performed by: FAMILY MEDICINE

## 2023-09-21 PROCEDURE — 85007 BL SMEAR W/DIFF WBC COUNT: CPT | Performed by: FAMILY MEDICINE

## 2023-09-21 PROCEDURE — 94760 N-INVAS EAR/PLS OXIMETRY 1: CPT

## 2023-09-21 PROCEDURE — 87635 SARS-COV-2 COVID-19 AMP PRB: CPT | Performed by: FAMILY MEDICINE

## 2023-09-21 PROCEDURE — 85025 COMPLETE CBC W/AUTO DIFF WBC: CPT | Performed by: FAMILY MEDICINE

## 2023-09-21 PROCEDURE — 25010000002 ENOXAPARIN PER 10 MG: Performed by: INTERNAL MEDICINE

## 2023-09-21 PROCEDURE — 97530 THERAPEUTIC ACTIVITIES: CPT

## 2023-09-21 PROCEDURE — 63710000001 INSULIN LISPRO (HUMAN) PER 5 UNITS: Performed by: INTERNAL MEDICINE

## 2023-09-21 PROCEDURE — 97116 GAIT TRAINING THERAPY: CPT

## 2023-09-21 PROCEDURE — 97110 THERAPEUTIC EXERCISES: CPT

## 2023-09-21 PROCEDURE — 80048 BASIC METABOLIC PNL TOTAL CA: CPT | Performed by: FAMILY MEDICINE

## 2023-09-21 PROCEDURE — 25010000002 PIPERACILLIN SOD-TAZOBACTAM PER 1 G: Performed by: FAMILY MEDICINE

## 2023-09-21 PROCEDURE — 84145 PROCALCITONIN (PCT): CPT | Performed by: FAMILY MEDICINE

## 2023-09-21 PROCEDURE — 71045 X-RAY EXAM CHEST 1 VIEW: CPT

## 2023-09-21 PROCEDURE — 94664 DEMO&/EVAL PT USE INHALER: CPT

## 2023-09-21 PROCEDURE — 87040 BLOOD CULTURE FOR BACTERIA: CPT | Performed by: FAMILY MEDICINE

## 2023-09-21 PROCEDURE — 97535 SELF CARE MNGMENT TRAINING: CPT

## 2023-09-21 PROCEDURE — 71045 X-RAY EXAM CHEST 1 VIEW: CPT | Performed by: RADIOLOGY

## 2023-09-21 PROCEDURE — 82948 REAGENT STRIP/BLOOD GLUCOSE: CPT

## 2023-09-21 PROCEDURE — 63710000001 ONDANSETRON PER 8 MG: Performed by: FAMILY MEDICINE

## 2023-09-21 PROCEDURE — 94761 N-INVAS EAR/PLS OXIMETRY MLT: CPT

## 2023-09-21 RX ADMIN — HYDROCODONE BITARTRATE AND ACETAMINOPHEN 1 TABLET: 7.5; 325 TABLET ORAL at 15:59

## 2023-09-21 RX ADMIN — CASTOR OIL AND BALSAM, PERU 1 APPLICATION: 788; 87 OINTMENT TOPICAL at 21:06

## 2023-09-21 RX ADMIN — ENOXAPARIN SODIUM 40 MG: 40 INJECTION SUBCUTANEOUS at 07:41

## 2023-09-21 RX ADMIN — HYDROCODONE BITARTRATE AND ACETAMINOPHEN 1 TABLET: 7.5; 325 TABLET ORAL at 10:05

## 2023-09-21 RX ADMIN — METOPROLOL TARTRATE 25 MG: 25 TABLET, FILM COATED ORAL at 07:40

## 2023-09-21 RX ADMIN — ONDANSETRON HYDROCHLORIDE 4 MG: 4 TABLET, FILM COATED ORAL at 16:16

## 2023-09-21 RX ADMIN — HYDROCODONE BITARTRATE AND ACETAMINOPHEN 1 TABLET: 7.5; 325 TABLET ORAL at 04:35

## 2023-09-21 RX ADMIN — TIOTROPIUM BROMIDE INHALATION SPRAY 2 PUFF: 3.12 SPRAY, METERED RESPIRATORY (INHALATION) at 07:12

## 2023-09-21 RX ADMIN — INSULIN LISPRO 2 UNITS: 100 INJECTION, SOLUTION INTRAVENOUS; SUBCUTANEOUS at 17:18

## 2023-09-21 RX ADMIN — ANORECTAL OINTMENT 1 APPLICATION: 15.7; .44; 24; 20.6 OINTMENT TOPICAL at 21:06

## 2023-09-21 RX ADMIN — SIMETHICONE 80 MG: 80 TABLET, CHEWABLE ORAL at 07:38

## 2023-09-21 RX ADMIN — BUDESONIDE AND FORMOTEROL FUMARATE DIHYDRATE 2 PUFF: 160; 4.5 AEROSOL RESPIRATORY (INHALATION) at 18:51

## 2023-09-21 RX ADMIN — FUROSEMIDE 20 MG: 20 TABLET ORAL at 07:40

## 2023-09-21 RX ADMIN — LOPERAMIDE HYDROCHLORIDE 2 MG: 2 CAPSULE ORAL at 05:11

## 2023-09-21 RX ADMIN — SIMETHICONE 80 MG: 80 TABLET, CHEWABLE ORAL at 21:36

## 2023-09-21 RX ADMIN — ALBUTEROL SULFATE 1.25 MG: 1.25 SOLUTION RESPIRATORY (INHALATION) at 15:44

## 2023-09-21 RX ADMIN — ALBUTEROL SULFATE 2 PUFF: 90 AEROSOL, METERED RESPIRATORY (INHALATION) at 21:06

## 2023-09-21 RX ADMIN — ANORECTAL OINTMENT 1 APPLICATION: 15.7; .44; 24; 20.6 OINTMENT TOPICAL at 07:44

## 2023-09-21 RX ADMIN — ONDANSETRON HYDROCHLORIDE 4 MG: 4 TABLET, FILM COATED ORAL at 10:42

## 2023-09-21 RX ADMIN — BUDESONIDE AND FORMOTEROL FUMARATE DIHYDRATE 2 PUFF: 160; 4.5 AEROSOL RESPIRATORY (INHALATION) at 07:12

## 2023-09-21 RX ADMIN — HYDROCODONE BITARTRATE AND ACETAMINOPHEN 1 TABLET: 7.5; 325 TABLET ORAL at 21:35

## 2023-09-21 RX ADMIN — ASPIRIN 81 MG: 81 TABLET, COATED ORAL at 07:40

## 2023-09-21 RX ADMIN — DOXYCYCLINE 100 MG: 100 INJECTION, POWDER, LYOPHILIZED, FOR SOLUTION INTRAVENOUS at 22:47

## 2023-09-21 RX ADMIN — METOPROLOL TARTRATE 25 MG: 25 TABLET, FILM COATED ORAL at 21:01

## 2023-09-21 RX ADMIN — CASTOR OIL AND BALSAM, PERU 1 APPLICATION: 788; 87 OINTMENT TOPICAL at 08:51

## 2023-09-21 RX ADMIN — CLOPIDOGREL BISULFATE 75 MG: 75 TABLET, FILM COATED ORAL at 07:40

## 2023-09-21 RX ADMIN — PIPERACILLIN SODIUM AND TAZOBACTAM SODIUM 3.38 G: 3; .375 INJECTION, POWDER, LYOPHILIZED, FOR SOLUTION INTRAVENOUS at 22:36

## 2023-09-21 RX ADMIN — ONDANSETRON HYDROCHLORIDE 4 MG: 4 TABLET, FILM COATED ORAL at 05:12

## 2023-09-21 RX ADMIN — ALBUTEROL SULFATE 2 PUFF: 90 AEROSOL, METERED RESPIRATORY (INHALATION) at 07:21

## 2023-09-21 NOTE — PLAN OF CARE
Goal Outcome Evaluation:   Patient resting on bed side, c/o of HA. States she feels its contributing to her N/V. Patient repositioned for comfort. Continues to participate in therapy. Will continue to monitor.

## 2023-09-21 NOTE — THERAPY TREATMENT NOTE
Inpatient Rehabilitation - Physical Therapy Treatment Note        [facility]     Patient Name: Liz Jiang  : 1965  MRN: [id number]    Today's Date: 2023                    Admit Date: 2023      Visit Dx:   No diagnosis found.    Patient Active Problem List   Diagnosis    Chest pain    COPD (chronic obstructive pulmonary disease)    Tobacco abuse    GERD (gastroesophageal reflux disease)    Anxiety    Arthritis    Nausea and vomiting    Generalized abdominal pain    Right upper quadrant pain    Gallbladder disease    Abnormal biliary HIDA scan    Dyslipidemia    Tachycardia    Anal cancer    Port-A-Cath in place    Debility       Past Medical History:   Diagnosis Date    Acid reflux disease     Anal cancer 2019    Arthritis     Asthma, extrinsic     Cholelithiasis     Chronic headaches     COPD (chronic obstructive pulmonary disease)     CVA (cerebral vascular accident)     w/ right sided deficit    CVA (cerebral vascular accident)     Diabetes mellitus     only has high blood sugar when on steriods    History of radiation therapy 2020    Whole pelvis/anal mass    Obstructive sleep apnea treated with BiPAP     On home oxygen therapy     2L     Sleep apnea, obstructive        Past Surgical History:   Procedure Laterality Date    ABDOMINAL SURGERY      CARDIAC CATHETERIZATION      CAROTID ENDARTERECTOMY Left 2018    CHOLECYSTECTOMY WITH INTRAOPERATIVE CHOLANGIOGRAM N/A 2017    Procedure: CHOLECYSTECTOMY LAPAROSCOPIC INTRAOPERATIVE CHOLANGIOGRAM;  Surgeon: Carolin Marie MD;  Location: [facility];  Service:     COLONOSCOPY      HYSTERECTOMY      for heavy bleeding/ complete    KIDNEY STONE SURGERY      TUBAL ABDOMINAL LIGATION      VENOUS ACCESS DEVICE (PORT) INSERTION Left 2019    Procedure: INSERTION VENOUS ACCESS DEVICE;  Surgeon: Carolin Marie MD;  Location: [facility];  Service: General       PT ASSESSMENT (last 12 hours)       IRF PT Evaluation and Treatment        Row Name 09/21/23 1417          PT Time and Intention    Document Type daily treatment  -LB     Mode of Treatment individual therapy;physical therapy  -LB       Row Name 09/21/23 1417          General Information    Existing Precautions/Restrictions fall;oxygen therapy device and L/min  -LB       Row Name 09/21/23 1417          Pain Scale: FACES Pre/Post-Treatment    Pain: FACES Scale, Pretreatment 0-->no hurt  -LB     Posttreatment Pain Rating 0-->no hurt  -LB       Row Name 09/21/23 1417          Cognition/Psychosocial    Affect/Mental Status (Cognition) WFL  -LB     Follows Commands (Cognition) WFL  -LB     Personal Safety Interventions gait belt;nonskid shoes/slippers when out of bed;supervised activity  -LB       Row Name 09/21/23 1417          Bed Mobility    Comment, (Bed Mobility) not observed today  -LB       Row Name 09/21/23 1417          Sit-Stand Transfer    Sit-Stand [place] (Transfers) verbal cues;nonverbal cues (demo/gesture);supervision  -LB     Assistive Device (Sit-Stand Transfers) walker, front-wheeled  -LB       Row Name 09/21/23 1417          Stand-Sit Transfer    Stand-Sit [place] (Transfers) verbal cues;nonverbal cues (demo/gesture);supervision  -LB     Assistive Device (Stand-Sit Transfers) walker, front-wheeled  -LB       Row Name 09/21/23 1417          Toilet Transfer    Type (Toilet Transfer) stand pivot/stand step  -LB     Victor[place] Level (Toilet Transfer) verbal cues;nonverbal cues (demo/gesture);modified independence  -LB     Assistive Device (Toilet Transfer) grab bars/safety frame;raised toilet seat  rollator  -LB       Row Name 09/21/23 1417          Gait/Stairs (Locomotion)    [place] Level (Gait) verbal cues;nonverbal cues (demo/gesture);supervision  -LB     Assistive Device (Gait) walker, front-wheeled  -LB     Distance in Feet (Gait) 25' 10'  -LB     Deviations/Abnormal Patterns (Gait) gait speed decreased;stride length decreased  -LB     Bilateral Gait  Deviations forward flexed posture  -LB     Comment, (Gait/Stairs) she had more dyspnea and hypoxia today and was not able to amb as far as previous days.  -LB       Row Name 09/21/23 1417          Balance    Comment, Balance sitting bag toss 2x and  with reacher 1x-activity is to improve endurance to activity  -LB       Row Name 09/21/23 1417          Motor Skills    Therapeutic Exercise --  sitting ex  -LB     Additional Documentation --  rest breaks prn, she needed longer and more frequent breaks today  -LB       Row Name 09/21/23 1417          Positioning and Restraints    Pre-Treatment Position sitting in chair/recliner  -LB     In Chair reclined;call light within reach;encouraged to call for assist;legs elevated  -LB       Row Name 09/21/23 1417          Vital Signs    Intra SpO2 (%) 72  -LB     O2 Delivery Intra Treatment supplemental O2  -LB     Post SpO2 (%) 90  very slow recovery time  -LB     O2 Delivery Post Treatment supplemental O2  -LB       Row Name 09/21/23 1417          Daily Progress Summary (PT)    Daily Progress Summary (PT) She was more tired today, had more hypoxia, and tolerated less activity.  -LB     Recommendations (PT) continue PT as tolerated  -LB               User Key  (r) = Recorded By, (t) = Taken By, (c) = Cosigned By      Initials Name Provider Type    LB Darlyn Gandhi, PT Physical Therapist                  Wound 09/14/23 1840 Left gluteal Pressure Injury (Active)   Dressing Appearance open to air 09/21/23 0800   Closure None 09/21/23 0800   Base non-blanchable;dry;purple;maroon/purple 09/21/23 0800   Drainage Amount none 09/21/23 0800   Care, Wound barrier applied 09/21/23 0800   Dressing Care open to air 09/21/23 0800       Wound 09/14/23 1840 Right gluteal Pressure Injury (Active)   Dressing Appearance open to air 09/21/23 0800   Closure None 09/21/23 0800   Base non-blanchable 09/21/23 0800   Drainage Amount none 09/21/23 0800   Care, Wound barrier applied  09/21/23 0800   Dressing Care open to air 09/21/23 0800     Physical Therapy Education       Title: PT OT SLP Therapies (Done)       Topic: Physical Therapy (Done)       Point: Mobility training (Done)       Learning Progress Summary             Patient Acceptance, E, VU,NR by LB at 9/21/2023 1426    Acceptance, TB, NR by DL at 9/20/2023 2059    Acceptance, E,D, VU,NR by LL at 9/20/2023 1414    Acceptance, E,D, VU,NR by RG at 9/19/2023 1249    Acceptance, E, VU,NR by LB at 9/19/2023 1236    Acceptance, E, VU,NR by LB at 9/18/2023 1210    Acceptance, E, VU,NR by LB at 9/15/2023 0956                         Point: Home exercise program (Done)       Learning Progress Summary             Patient Acceptance, E, VU,NR by LB at 9/21/2023 1426    Acceptance, TB, NR by DL at 9/20/2023 2059    Acceptance, E,D, VU,NR by LL at 9/20/2023 1414    Acceptance, E,D, VU,NR by RG at 9/19/2023 1249    Acceptance, E, VU,NR by LB at 9/19/2023 1236    Acceptance, E, VU,NR by LB at 9/18/2023 1210    Acceptance, E, VU,NR by LB at 9/15/2023 0956                         Point: Body mechanics (Done)       Learning Progress Summary             Patient Acceptance, E, VU,NR by LB at 9/21/2023 1426    Acceptance, TB, NR by DL at 9/20/2023 2059    Acceptance, E,D, VU,NR by LL at 9/20/2023 1414    Acceptance, E,D, VU,NR by RG at 9/19/2023 1249    Acceptance, E, VU,NR by LB at 9/19/2023 1236    Acceptance, E, VU,NR by LB at 9/18/2023 1210    Acceptance, E, VU,NR by LB at 9/15/2023 0956                         Point: Precautions (Done)       Learning Progress Summary             Patient Acceptance, E, VU,NR by LB at 9/21/2023 1426    Acceptance, TB, NR by DL at 9/20/2023 2059    Acceptance, E,D, VU,NR by LL at 9/20/2023 1414    Acceptance, E,D, VU,NR by RG at 9/19/2023 1249    Acceptance, E, VU,NR by LB at 9/19/2023 1236    Acceptance, E, VU,NR by LB at 9/18/2023 1210    Acceptance, E, VU,NR by LB at 9/15/2023 0956                                          User Key       Initials Effective Dates Name Provider Type Discipline    LB 06/16/21 -  Darlyn Gandhi, PT Physical Therapist PT    LL 05/02/16 -  Laura Novak PTA Physical Therapist Assistant PT    RG 06/16/21 -  Brijesh Silver PTA Physical Therapist Assistant PT    DL 03/16/22 -  Ofelia Novak, RN Registered Nurse Nurse                    PT Recommendation and Plan    Planned Therapy Interventions (PT): balance training, bed mobility training, gait training, patient/family education, strengthening, transfer training  Frequency of Treatment (PT): 5 times per week  Anticipated Equipment Needs at Discharge (PT Eval):  (tbd)  Daily Progress Summary (PT)  Daily Progress Summary (PT): She was more tired today, had more hypoxia, and tolerated less activity.  Recommendations (PT): continue PT as tolerated               Time Calculation:      PT Charges       Row Name 09/21/23 1427             Time Calculation    Start Time 0915  -LB      Stop Time 1045  -LB      Time Calculation (min) 90 min  -LB      PT Received On 09/21/23  -LB                User Key  (r) = Recorded By, (t) = Taken By, (c) = Cosigned By      Initials Name Provider Type    LB Darlyn Gandhi, PT Physical Therapist                    Therapy Charges for Today       Code Description Service Date Service Provider Modifiers Qty    17125491149 HC GAIT TRAINING EA 15 MIN 9/21/2023 Darlyn Gandhi, PT GP 1    09828255636 HC PT THER PROC EA 15 MIN 9/21/2023 Darlyn Gandhi, PT GP 3    20937414789 HC PT THERAPEUTIC ACT EA 15 MIN 9/21/2023 Darlyn Gandhi, PT GP 2                     Darlyn Gandhi, PT  9/21/2023

## 2023-09-21 NOTE — PROGRESS NOTES
Occupational Therapy: Individual: 90 minutes.    Physical Therapy:    Speech Language Pathology:    Signed by: Shaina Ballard OT

## 2023-09-21 NOTE — THERAPY TREATMENT NOTE
Inpatient Rehabilitation - Occupational Therapy Treatment Note     [facility]     Patient Name: Liz Jiang  : 1965  MRN: [id number]    Today's Date: 2023                 Admit Date: 2023       No diagnosis found.    Patient Active Problem List   Diagnosis    Chest pain    COPD (chronic obstructive pulmonary disease)    Tobacco abuse    GERD (gastroesophageal reflux disease)    Anxiety    Arthritis    Nausea and vomiting    Generalized abdominal pain    Right upper quadrant pain    Gallbladder disease    Abnormal biliary HIDA scan    Dyslipidemia    Tachycardia    Anal cancer    Port-A-Cath in place    Debility       Past Medical History:   Diagnosis Date    Acid reflux disease     Anal cancer 2019    Arthritis     Asthma, extrinsic     Cholelithiasis     Chronic headaches     COPD (chronic obstructive pulmonary disease)     CVA (cerebral vascular accident)     w/ right sided deficit    CVA (cerebral vascular accident)     Diabetes mellitus     only has high blood sugar when on steriods    History of radiation therapy 2020    Whole pelvis/anal mass    Obstructive sleep apnea treated with BiPAP     On home oxygen therapy     2L     Sleep apnea, obstructive        Past Surgical History:   Procedure Laterality Date    ABDOMINAL SURGERY      CARDIAC CATHETERIZATION      CAROTID ENDARTERECTOMY Left 2018    CHOLECYSTECTOMY WITH INTRAOPERATIVE CHOLANGIOGRAM N/A 2017    Procedure: CHOLECYSTECTOMY LAPAROSCOPIC INTRAOPERATIVE CHOLANGIOGRAM;  Surgeon: Carolin Marie MD;  Location: [facility];  Service:     COLONOSCOPY      HYSTERECTOMY      for heavy bleeding/ complete    KIDNEY STONE SURGERY      TUBAL ABDOMINAL LIGATION      VENOUS ACCESS DEVICE (PORT) INSERTION Left 2019    Procedure: INSERTION VENOUS ACCESS DEVICE;  Surgeon: Carolin Marie MD;  Location: [facility];  Service: General             IRF OT ASSESSMENT FLOWSHEET (last 12 hours)       IRF OT Evaluation and Treatment        Row Name 09/21/23 1414          OT Time and Intention    Document Type daily treatment  -TM     Mode of Treatment occupational therapy  -TM     Patient Effort adequate  -TM     Symptoms Noted During/After Treatment none  -TM       Row Name 09/21/23 1414          General Information    General Observations of Patient Pt agreeable for therapy.  -TM     Existing Precautions/Restrictions fall;oxygen therapy device and L/min  -TM       Row Name 09/21/23 1414          Cognition/Psychosocial    Orientation Status (Cognition) oriented x 3  -TM     Follows Commands (Cognition) WFL  -TM       Row Name 09/21/23 1414          Lower Body Dressing    [place] Level (Lower Body Dressing) contact guard assist;supervision;verbal cues  -TM     Position (Lower Body Dressing) supported sitting  -TM       Row Name 09/21/23 1414          Grooming    [place] Level (Grooming) set up  -TM     Position (Grooming) supported sitting  -TM       Row Name 09/21/23 1414          Transfer Assessment/Treatment    Comment, (Transfers) transfer from bedside recliner to w/c with supervision and verbal cues for safety  -TM       Row Name 09/21/23 1414          Motor Skills    Motor Skills coordination;functional endurance;motor control/coordination interventions  -TM     Motor Control/Coordination Interventions therapeutic exercise/ROM;fine motor manipulation/dexterity activities;gross motor coordination activities;other (see comments)  BUE ther ex/act, GMC/FMC, bilateral coord  -TM               User Key  (r) = Recorded By, (t) = Taken By, (c) = Cosigned By      Initials Name Effective Dates    TM Shaina Ballard, OT 06/16/21 -                      Occupational Therapy Education       Title: PT OT SLP Therapies (In Progress)       Topic: Occupational Therapy (Done)       Point: ADL training (Done)       Description:   Instruct learner(s) on proper safety adaptation and remediation techniques during self care or transfers.    Instruct in proper use of assistive devices.                  Learning Progress Summary             Patient Acceptance, E,D, VU,NR by TM at 9/21/2023 1413    Acceptance, TB, NR by DL at 9/20/2023 2059    Acceptance, E,D, VU,NR by TM at 9/20/2023 1503    Acceptance, E,D, VU,NR by TM at 9/19/2023 1406    Acceptance, E,D, VU,NR by TM at 9/18/2023 1359    Acceptance, E, VU,NR by HB at 9/16/2023 1155    Acceptance, E,D, VU,NR by TM at 9/15/2023 1308                         Point: Precautions (Done)       Description:   Instruct learner(s) on prescribed precautions during self-care and functional transfers.                  Learning Progress Summary             Patient Acceptance, E,D, VU,NR by TM at 9/21/2023 1413    Acceptance, TB, NR by DL at 9/20/2023 2059    Acceptance, E,D, VU,NR by TM at 9/20/2023 1503    Acceptance, E,D, VU,NR by TM at 9/19/2023 1406    Acceptance, E,D, VU,NR by TM at 9/18/2023 1359    Acceptance, E, VU,NR by HB at 9/16/2023 1155    Acceptance, E,D, VU,NR by TM at 9/15/2023 1308                                         User Key       Initials Effective Dates Name Provider Type Discipline    TM 06/16/21 -  Shaina Ballard OT Occupational Therapist OT    HB 05/25/21 -  Gaby Carty OT Occupational Therapist OT    DL 03/16/22 -  Ofelia Novak, RN Registered Nurse Nurse                        OT Recommendation and Plan    Planned Therapy Interventions (OT): activity tolerance training, adaptive equipment training, BADL retraining, IADL retraining, occupation/activity based interventions, patient/caregiver education/training, ROM/therapeutic exercise, strengthening exercise, transfer/mobility retraining                    Time Calculation:      Time Calculation- OT       Row Name 09/21/23 1414 09/21/23 1413          Time Calculation- OT    OT Start Time 1345  -TM 0800  -TM     OT Stop Time 1400  -TM 0915  -TM     OT Time Calculation (min) 15 min  -TM 75 min  -TM     Total Timed Code Minutes-  OT 15 minute(s)  -TM 75 minute(s)  -TM     OT Non-Billable Time (min) -- 15 min  -TM               User Key  (r) = Recorded By, (t) = Taken By, (c) = Cosigned By      Initials Name Provider Type    TM Shaina Ballard, MICHAEL Occupational Therapist                  Therapy Charges for Today       Code Description Service Date Service Provider Modifiers Qty    02566131553 HC OT SELF CARE/MGMT/TRAIN EA 15 MIN 9/20/2023 Shaina Ballard, OT GO 2    74014132847 HC OT THER PROC EA 15 MIN 9/20/2023 Shaina Ballard, OT GO 1    81622626254 HC OT THERAPEUTIC ACT EA 15 MIN 9/20/2023 Shaina Ballard, OT GO 3    33628514826 HC OT THER PROC EA 15 MIN 9/20/2023 Shaina Ballard, OT GO 1    59976669627 HC OT SELF CARE/MGMT/TRAIN EA 15 MIN 9/21/2023 Shaina Ballard, OT GO 1    14557971692 HC OT THERAPEUTIC ACT EA 15 MIN 9/21/2023 Shaina Ballard, OT GO 3    90371897554 HC OT THER PROC EA 15 MIN 9/21/2023 Shaina Ballard, OT GO 2                     Shaina Ballard OT  9/21/2023

## 2023-09-21 NOTE — PLAN OF CARE
Goal Outcome Evaluation:  Plan of Care Reviewed With: patient        Progress: improving       Problem: Fall Injury Risk  Goal: Absence of Fall and Fall-Related Injury  Outcome: Ongoing, Progressing  Intervention: Identify and Manage Contributors  Recent Flowsheet Documentation  Taken 9/21/2023 0800 by Brian Mejia RN  Medication Review/Management: medications reviewed  Intervention: Promote Injury-Free Environment  Recent Flowsheet Documentation  Taken 9/21/2023 0800 by Brian Mejia RN  Safety Promotion/Fall Prevention: safety round/check completed     Problem: Rehabilitation (IRF) Plan of Care  Goal: Plan of Care Review  Outcome: Ongoing, Progressing  Flowsheets (Taken 9/21/2023 1039)  Progress: improving  Plan of Care Reviewed With: patient  Goal: Patient-Specific Goal (Individualized)  Outcome: Ongoing, Progressing  Goal: Absence of New-Onset Illness or Injury  Outcome: Ongoing, Progressing  Intervention: Prevent Fall and Fall Injury  Recent Flowsheet Documentation  Taken 9/21/2023 0800 by Brian Mejia RN  Safety Promotion/Fall Prevention: safety round/check completed  Intervention: Prevent Infection  Recent Flowsheet Documentation  Taken 9/21/2023 0800 by Brian Mejia RN  Infection Prevention:   hand hygiene promoted   rest/sleep promoted  Intervention: Prevent VTE (Venous Thromboembolism)  Recent Flowsheet Documentation  Taken 9/21/2023 0800 by Brian Mejia RN  VTE Prevention/Management: (lovenox) other (see comments)  Goal: Optimal Comfort and Wellbeing  Outcome: Ongoing, Progressing  Goal: Home and Community Transition Plan Established  Outcome: Ongoing, Progressing     Problem: Skin Injury Risk Increased  Goal: Skin Health and Integrity  Outcome: Ongoing, Progressing  Intervention: Promote and Optimize Oral Intake  Recent Flowsheet Documentation  Taken 9/21/2023 0800 by Brian Mejia RN  Oral Nutrition Promotion: physical activity promoted  Intervention: Optimize Skin Protection  Recent Flowsheet  Documentation  Taken 9/21/2023 0800 by Brian Mejia RN  Pressure Reduction Techniques:   frequent weight shift encouraged   heels elevated off bed   weight shift assistance provided  Pressure Reduction Devices:   pressure-redistributing mattress utilized   heel offloading device utilized   positioning supports utilized  Skin Protection:   adhesive use limited   incontinence pads utilized     Problem: Pain Chronic (Persistent) (Comorbidity Management)  Goal: Acceptable Pain Control and Functional Ability  Outcome: Ongoing, Progressing  Intervention: Manage Persistent Pain  Recent Flowsheet Documentation  Taken 9/21/2023 0800 by Brian Mejia RN  Sleep/Rest Enhancement: regular sleep/rest pattern promoted  Medication Review/Management: medications reviewed  Intervention: Develop Pain Management Plan  Recent Flowsheet Documentation  Taken 9/21/2023 0800 by Brian Mejia RN  Pain Management Interventions: see MAR  Intervention: Optimize Psychosocial Wellbeing  Recent Flowsheet Documentation  Taken 9/21/2023 0800 by Brian Mejia RN  Diversional Activities:   television   smartphone

## 2023-09-22 LAB
GLUCOSE BLDC GLUCOMTR-MCNC: 109 MG/DL (ref 70–130)
GLUCOSE BLDC GLUCOMTR-MCNC: 138 MG/DL (ref 70–130)
GLUCOSE BLDC GLUCOMTR-MCNC: 161 MG/DL (ref 70–130)
GLUCOSE BLDC GLUCOMTR-MCNC: 238 MG/DL (ref 70–130)
MRSA DNA SPEC QL NAA+PROBE: NORMAL

## 2023-09-22 PROCEDURE — 82948 REAGENT STRIP/BLOOD GLUCOSE: CPT

## 2023-09-22 PROCEDURE — 25010000002 ENOXAPARIN PER 10 MG: Performed by: INTERNAL MEDICINE

## 2023-09-22 PROCEDURE — 25010000002 PIPERACILLIN SOD-TAZOBACTAM PER 1 G: Performed by: FAMILY MEDICINE

## 2023-09-22 PROCEDURE — 87641 MR-STAPH DNA AMP PROBE: CPT | Performed by: FAMILY MEDICINE

## 2023-09-22 PROCEDURE — 94664 DEMO&/EVAL PT USE INHALER: CPT

## 2023-09-22 PROCEDURE — 97110 THERAPEUTIC EXERCISES: CPT

## 2023-09-22 PROCEDURE — 97530 THERAPEUTIC ACTIVITIES: CPT

## 2023-09-22 PROCEDURE — 97535 SELF CARE MNGMENT TRAINING: CPT | Performed by: OCCUPATIONAL THERAPIST

## 2023-09-22 PROCEDURE — 94799 UNLISTED PULMONARY SVC/PX: CPT

## 2023-09-22 PROCEDURE — 94761 N-INVAS EAR/PLS OXIMETRY MLT: CPT

## 2023-09-22 PROCEDURE — 63710000001 INSULIN LISPRO (HUMAN) PER 5 UNITS: Performed by: INTERNAL MEDICINE

## 2023-09-22 PROCEDURE — 63710000001 ONDANSETRON PER 8 MG: Performed by: FAMILY MEDICINE

## 2023-09-22 PROCEDURE — 25010000002 FUROSEMIDE PER 20 MG: Performed by: FAMILY MEDICINE

## 2023-09-22 RX ORDER — FUROSEMIDE 10 MG/ML
20 INJECTION INTRAMUSCULAR; INTRAVENOUS ONCE
Status: DISCONTINUED | OUTPATIENT
Start: 2023-09-22 | End: 2023-09-22

## 2023-09-22 RX ORDER — FUROSEMIDE 10 MG/ML
20 INJECTION INTRAMUSCULAR; INTRAVENOUS
Status: COMPLETED | OUTPATIENT
Start: 2023-09-22 | End: 2023-09-22

## 2023-09-22 RX ADMIN — CLOPIDOGREL BISULFATE 75 MG: 75 TABLET, FILM COATED ORAL at 08:45

## 2023-09-22 RX ADMIN — ANORECTAL OINTMENT 1 APPLICATION: 15.7; .44; 24; 20.6 OINTMENT TOPICAL at 21:34

## 2023-09-22 RX ADMIN — DOXYCYCLINE 100 MG: 100 INJECTION, POWDER, LYOPHILIZED, FOR SOLUTION INTRAVENOUS at 23:11

## 2023-09-22 RX ADMIN — FUROSEMIDE 20 MG: 10 INJECTION, SOLUTION INTRAMUSCULAR; INTRAVENOUS at 16:56

## 2023-09-22 RX ADMIN — PIPERACILLIN SODIUM AND TAZOBACTAM SODIUM 3.38 G: 3; .375 INJECTION, POWDER, LYOPHILIZED, FOR SOLUTION INTRAVENOUS at 12:01

## 2023-09-22 RX ADMIN — HYDROCODONE BITARTRATE AND ACETAMINOPHEN 1 TABLET: 7.5; 325 TABLET ORAL at 15:24

## 2023-09-22 RX ADMIN — BUDESONIDE AND FORMOTEROL FUMARATE DIHYDRATE 2 PUFF: 160; 4.5 AEROSOL RESPIRATORY (INHALATION) at 19:34

## 2023-09-22 RX ADMIN — BUDESONIDE AND FORMOTEROL FUMARATE DIHYDRATE 2 PUFF: 160; 4.5 AEROSOL RESPIRATORY (INHALATION) at 07:30

## 2023-09-22 RX ADMIN — ANORECTAL OINTMENT 1 APPLICATION: 15.7; .44; 24; 20.6 OINTMENT TOPICAL at 08:48

## 2023-09-22 RX ADMIN — ASPIRIN 81 MG: 81 TABLET, COATED ORAL at 08:45

## 2023-09-22 RX ADMIN — CASTOR OIL AND BALSAM, PERU 1 APPLICATION: 788; 87 OINTMENT TOPICAL at 21:34

## 2023-09-22 RX ADMIN — INSULIN LISPRO 2 UNITS: 100 INJECTION, SOLUTION INTRAVENOUS; SUBCUTANEOUS at 08:45

## 2023-09-22 RX ADMIN — HYDROCODONE BITARTRATE AND ACETAMINOPHEN 1 TABLET: 7.5; 325 TABLET ORAL at 21:34

## 2023-09-22 RX ADMIN — PIPERACILLIN SODIUM AND TAZOBACTAM SODIUM 3.38 G: 3; .375 INJECTION, POWDER, LYOPHILIZED, FOR SOLUTION INTRAVENOUS at 04:02

## 2023-09-22 RX ADMIN — ALBUTEROL SULFATE 1.25 MG: 1.25 SOLUTION RESPIRATORY (INHALATION) at 19:34

## 2023-09-22 RX ADMIN — TIOTROPIUM BROMIDE INHALATION SPRAY 2 PUFF: 3.12 SPRAY, METERED RESPIRATORY (INHALATION) at 07:30

## 2023-09-22 RX ADMIN — HYDROCODONE BITARTRATE AND ACETAMINOPHEN 1 TABLET: 7.5; 325 TABLET ORAL at 04:17

## 2023-09-22 RX ADMIN — DOXYCYCLINE 100 MG: 100 INJECTION, POWDER, LYOPHILIZED, FOR SOLUTION INTRAVENOUS at 09:54

## 2023-09-22 RX ADMIN — METOPROLOL TARTRATE 25 MG: 25 TABLET, FILM COATED ORAL at 21:34

## 2023-09-22 RX ADMIN — SIMETHICONE 80 MG: 80 TABLET, CHEWABLE ORAL at 08:45

## 2023-09-22 RX ADMIN — CASTOR OIL AND BALSAM, PERU 1 APPLICATION: 788; 87 OINTMENT TOPICAL at 08:48

## 2023-09-22 RX ADMIN — ALBUTEROL SULFATE 1.25 MG: 1.25 SOLUTION RESPIRATORY (INHALATION) at 14:01

## 2023-09-22 RX ADMIN — ENOXAPARIN SODIUM 40 MG: 40 INJECTION SUBCUTANEOUS at 08:45

## 2023-09-22 RX ADMIN — PIPERACILLIN SODIUM AND TAZOBACTAM SODIUM 3.38 G: 3; .375 INJECTION, POWDER, LYOPHILIZED, FOR SOLUTION INTRAVENOUS at 21:33

## 2023-09-22 RX ADMIN — FUROSEMIDE 20 MG: 10 INJECTION, SOLUTION INTRAMUSCULAR; INTRAVENOUS at 09:51

## 2023-09-22 RX ADMIN — ALBUTEROL SULFATE 1.25 MG: 1.25 SOLUTION RESPIRATORY (INHALATION) at 07:31

## 2023-09-22 RX ADMIN — ONDANSETRON HYDROCHLORIDE 4 MG: 4 TABLET, FILM COATED ORAL at 15:24

## 2023-09-22 RX ADMIN — ONDANSETRON HYDROCHLORIDE 4 MG: 4 TABLET, FILM COATED ORAL at 08:45

## 2023-09-22 RX ADMIN — METOPROLOL TARTRATE 25 MG: 25 TABLET, FILM COATED ORAL at 08:45

## 2023-09-22 RX ADMIN — INSULIN LISPRO 2 UNITS: 100 INJECTION, SOLUTION INTRAVENOUS; SUBCUTANEOUS at 12:01

## 2023-09-22 RX ADMIN — HYDROCODONE BITARTRATE AND ACETAMINOPHEN 1 TABLET: 7.5; 325 TABLET ORAL at 09:51

## 2023-09-22 NOTE — PLAN OF CARE
Problem: Fall Injury Risk  Goal: Absence of Fall and Fall-Related Injury  Outcome: Ongoing, Progressing  Intervention: Identify and Manage Contributors  Recent Flowsheet Documentation  Taken 9/21/2023 2000 by Crystal Martinez RN  Medication Review/Management: medications reviewed  Intervention: Promote Injury-Free Environment  Recent Flowsheet Documentation  Taken 9/22/2023 0000 by Crystal Martinez, RN  Safety Promotion/Fall Prevention:   safety round/check completed   fall prevention program maintained  Taken 9/21/2023 2200 by Crystal Martinez, RN  Safety Promotion/Fall Prevention:   safety round/check completed   fall prevention program maintained   nonskid shoes/slippers when out of bed  Taken 9/21/2023 2000 by Crystal Martinez, RN  Safety Promotion/Fall Prevention:   fall prevention program maintained   safety round/check completed   room organization consistent   nonskid shoes/slippers when out of bed   clutter free environment maintained   assistive device/personal items within reach   Goal Outcome Evaluation:  Plan of Care Reviewed With: patient        Progress: no change  Outcome Evaluation: pt calm and cooperative; resting well throughout the night; no acute distress noted; pt started on IV antibotic therapy

## 2023-09-22 NOTE — PROGRESS NOTES
Occupational Therapy:    Physical Therapy: Individual: 45 minutes.    Speech Language Pathology:    Signed by: Brijesh Silver PTA

## 2023-09-22 NOTE — THERAPY PROGRESS REPORT/RE-CERT
Inpatient Rehabilitation - Physical Therapy Progress Note        [facility]     Patient Name: Liz Jiang  : 1965  MRN: [id number]    Today's Date: 2023                    Admit Date: 2023      Visit Dx:   No diagnosis found.    Patient Active Problem List   Diagnosis    Chest pain    COPD (chronic obstructive pulmonary disease)    Tobacco abuse    GERD (gastroesophageal reflux disease)    Anxiety    Arthritis    Nausea and vomiting    Generalized abdominal pain    Right upper quadrant pain    Gallbladder disease    Abnormal biliary HIDA scan    Dyslipidemia    Tachycardia    Anal cancer    Port-A-Cath in place    Debility       Past Medical History:   Diagnosis Date    Acid reflux disease     Anal cancer 2019    Arthritis     Asthma, extrinsic     Cholelithiasis     Chronic headaches     COPD (chronic obstructive pulmonary disease)     CVA (cerebral vascular accident)     w/ right sided deficit    CVA (cerebral vascular accident)     Diabetes mellitus     only has high blood sugar when on steriods    History of radiation therapy 2020    Whole pelvis/anal mass    Obstructive sleep apnea treated with BiPAP     On home oxygen therapy     2L     Sleep apnea, obstructive        Past Surgical History:   Procedure Laterality Date    ABDOMINAL SURGERY      CARDIAC CATHETERIZATION      CAROTID ENDARTERECTOMY Left 2018    CHOLECYSTECTOMY WITH INTRAOPERATIVE CHOLANGIOGRAM N/A 2017    Procedure: CHOLECYSTECTOMY LAPAROSCOPIC INTRAOPERATIVE CHOLANGIOGRAM;  Surgeon: Carolin Marie MD;  Location: [facility];  Service:     COLONOSCOPY      HYSTERECTOMY      for heavy bleeding/ complete    KIDNEY STONE SURGERY      TUBAL ABDOMINAL LIGATION      VENOUS ACCESS DEVICE (PORT) INSERTION Left 2019    Procedure: INSERTION VENOUS ACCESS DEVICE;  Surgeon: Carolin Marie MD;  Location: [facility];  Service: General       PT ASSESSMENT (last 12 hours)       IRF PT Evaluation and Treatment        Row Name 09/22/23 1409          PT Time and Intention    Document Type daily treatment;progress note  -RG     Mode of Treatment individual therapy;physical therapy  -RG     Patient/Family/Caregiver Comments/Observations Pt initially refused PT session on this date.  She became sob overnight with low O2 sats.  Pt agreed to do 1 session later in the day.  -RG       Row Name 09/22/23 1409          General Information    Existing Precautions/Restrictions fall;oxygen therapy device and L/min  -RG       Row Name 09/22/23 1409          Pain Scale: FACES Pre/Post-Treatment    Pain: FACES Scale, Pretreatment 0-->no hurt  -RG     Posttreatment Pain Rating 0-->no hurt  -RG       Row Name 09/22/23 1409          Cognition/Psychosocial    Affect/Mental Status (Cognition) WFL  -RG     Follows Commands (Cognition) WFL  -RG     Personal Safety Interventions fall prevention program maintained;gait belt;nonskid shoes/slippers when out of bed  -RG       Row Name 09/22/23 1409          Toilet Transfer    Assistive Device (Toilet Transfer) --  rollator  -       Row Name 09/22/23 1409          Gait/Stairs (Locomotion)    [place] Level (Gait) verbal cues;nonverbal cues (demo/gesture);supervision  -RG     Assistive Device (Gait) walker, front-wheeled  -RG     Deviations/Abnormal Patterns (Gait) gait speed decreased;stride length decreased  -     Bilateral Gait Deviations forward flexed posture  -RG     Comment, (Gait/Stairs) did not ambulate today  -RG       Row Name 09/22/23 1409          Hip (Therapeutic Exercise)    Hip Strengthening (Therapeutic Exercise) bilateral;flexion;aBduction;aDduction;external rotation;internal rotation;heel slides;supine;10 repetitions;2 sets  -       Row Name 09/22/23 1409          Knee (Therapeutic Exercise)    Knee Strengthening (Therapeutic Exercise) bilateral;flexion;extension;heel slides;SAQ (short arc quad);supine;10 repetitions;2 sets  -       Row Name 09/22/23 1409          Ankle  (Therapeutic Exercise)    Ankle Strengthening (Therapeutic Exercise) bilateral;dorsiflexion;plantarflexion;supine;10 repetitions;2 sets  -RG       Row Name 09/22/23 1409          Positioning and Restraints    Pre-Treatment Position in bed  -RG     Post Treatment Position bed  -RG     In Bed notified nsg;supine;with family/caregiver;legs elevated  -RG       Row Name 09/22/23 1409          Transfer Goal 1 (PT-IRF)    Progress/Outcomes (Transfer Goal 1, PT-IRF) goal ongoing  -RG       Row Name 09/22/23 1409          Gait/Walking Locomotion Goal 1 (PT-IRF)    Progress/Outcomes (Gait/Walking Locomotion Goal 1, PT-IRF) goal ongoing  -RG               User Key  (r) = Recorded By, (t) = Taken By, (c) = Cosigned By      Initials Name Provider Type    RG Brijesh Silver, PTA Physical Therapist Assistant                  Wound 09/14/23 1840 Left gluteal Pressure Injury (Active)   Dressing Appearance open to air 09/22/23 0800   Closure None 09/22/23 0800   Base non-blanchable;dry;purple;maroon/purple 09/22/23 0800   Drainage Amount none 09/22/23 0800   Care, Wound barrier applied;pressure removed 09/22/23 0800   Dressing Care open to air 09/22/23 0800       Wound 09/14/23 1840 Right gluteal Pressure Injury (Active)   Dressing Appearance open to air 09/22/23 0800   Closure None 09/22/23 0800   Base non-blanchable 09/22/23 0800   Drainage Amount none 09/22/23 0800   Care, Wound barrier applied;pressure removed 09/22/23 0800   Dressing Care open to air 09/22/23 0800     Physical Therapy Education       Title: PT OT SLP Therapies (Done)       Topic: Physical Therapy (Done)       Point: Mobility training (Done)       Learning Progress Summary             Patient Acceptance, E,D, VU,NR by RG at 9/22/2023 1417    Acceptance, E, VU,NR by LB at 9/21/2023 1426    Acceptance, TB, NR by DL at 9/20/2023 2059    Acceptance, E,D, VU,NR by LL at 9/20/2023 1414    Acceptance, E,D, VU,NR by RG at 9/19/2023 1249    Acceptance, E, VU,NR by LB at  9/19/2023 1236    Acceptance, E, VU,NR by LB at 9/18/2023 1210    Acceptance, E, VU,NR by LB at 9/15/2023 0956                         Point: Home exercise program (Done)       Learning Progress Summary             Patient Acceptance, E,D, VU,NR by RG at 9/22/2023 1417    Acceptance, E, VU,NR by LB at 9/21/2023 1426    Acceptance, TB, NR by DL at 9/20/2023 2059    Acceptance, E,D, VU,NR by LL at 9/20/2023 1414    Acceptance, E,D, VU,NR by RG at 9/19/2023 1249    Acceptance, E, VU,NR by LB at 9/19/2023 1236    Acceptance, E, VU,NR by LB at 9/18/2023 1210    Acceptance, E, VU,NR by LB at 9/15/2023 0956                         Point: Body mechanics (Done)       Learning Progress Summary             Patient Acceptance, E,D, VU,NR by RG at 9/22/2023 1417    Acceptance, E, VU,NR by LB at 9/21/2023 1426    Acceptance, TB, NR by DL at 9/20/2023 2059    Acceptance, E,D, VU,NR by LL at 9/20/2023 1414    Acceptance, E,D, VU,NR by RG at 9/19/2023 1249    Acceptance, E, VU,NR by LB at 9/19/2023 1236    Acceptance, E, VU,NR by LB at 9/18/2023 1210    Acceptance, E, VU,NR by LB at 9/15/2023 0956                         Point: Precautions (Done)       Learning Progress Summary             Patient Acceptance, E,D, VU,NR by RG at 9/22/2023 1417    Acceptance, E, VU,NR by LB at 9/21/2023 1426    Acceptance, TB, NR by DL at 9/20/2023 2059    Acceptance, E,D, VU,NR by LL at 9/20/2023 1414    Acceptance, E,D, VU,NR by RG at 9/19/2023 1249    Acceptance, E, VU,NR by LB at 9/19/2023 1236    Acceptance, E, VU,NR by LB at 9/18/2023 1210    Acceptance, E, VU,NR by  at 9/15/2023 0956                                         User Key       Initials Effective Dates Name Provider Type Discipline    LB 06/16/21 -  Darlyn Gandhi, PT Physical Therapist PT    LL 05/02/16 -  Laura Novak, ANJALI Physical Therapist Assistant PT    RG 06/16/21 -  Brijesh Silver PTA Physical Therapist Assistant PT    DL 03/16/22 -  Ofelia Novak, CHELSEA  Registered Nurse Nurse                    PT Recommendation and Plan                          Time Calculation:      PT Charges       Row Name 09/22/23 1417             Time Calculation    Start Time 1330  -RG      Stop Time 1415  -RG      Time Calculation (min) 45 min  -RG      PT Received On 09/22/23  -RG         Time Calculation- PT    Total Timed Code Minutes- PT 45 minute(s)  -RG                User Key  (r) = Recorded By, (t) = Taken By, (c) = Cosigned By      Initials Name Provider Type     Brijesh Silver PTA Physical Therapist Assistant                    Therapy Charges for Today       Code Description Service Date Service Provider Modifiers Qty    02885917050 HC PT THERAPEUTIC ACT EA 15 MIN 9/22/2023 Brijesh Silver PTA GP, CQ 1    75924352339 HC PT THER PROC EA 15 MIN 9/22/2023 Brijesh Silver PTA GP, CQ 2                     Brijesh Silver PTA  9/22/2023

## 2023-09-22 NOTE — PROGRESS NOTES
Occupational Therapy: Individual: 45 minutes.    Physical Therapy:    Speech Language Pathology:    Signed by: Sofi Elkins OT

## 2023-09-22 NOTE — NURSING NOTE
Attempted IV times 2 by this nurse; notified HCPM Krish, 20G to LFA by MEGHANN Rutherford, pt tolerated well.

## 2023-09-22 NOTE — NURSING NOTE
Pt complaining of chills, aches, and shortness of breath, states she thinks is getting sick; V/S T 98.8, bp 98/65; p 123; R 22; O2 sat 89% 4LPM NC; Dr Mclaughlin made aware, new orders noted.

## 2023-09-22 NOTE — THERAPY TREATMENT NOTE
Inpatient Rehabilitation - Occupational Therapy Treatment Note     [facility]     Patient Name: Liz Jiang  : 1965  MRN: [id number]    Today's Date: 2023                 Admit Date: 2023       No diagnosis found.    Patient Active Problem List   Diagnosis    Chest pain    COPD (chronic obstructive pulmonary disease)    Tobacco abuse    GERD (gastroesophageal reflux disease)    Anxiety    Arthritis    Nausea and vomiting    Generalized abdominal pain    Right upper quadrant pain    Gallbladder disease    Abnormal biliary HIDA scan    Dyslipidemia    Tachycardia    Anal cancer    Port-A-Cath in place    Debility       Past Medical History:   Diagnosis Date    Acid reflux disease     Anal cancer 2019    Arthritis     Asthma, extrinsic     Cholelithiasis     Chronic headaches     COPD (chronic obstructive pulmonary disease)     CVA (cerebral vascular accident)     w/ right sided deficit    CVA (cerebral vascular accident)     Diabetes mellitus     only has high blood sugar when on steriods    History of radiation therapy 2020    Whole pelvis/anal mass    Obstructive sleep apnea treated with BiPAP     On home oxygen therapy     2L     Sleep apnea, obstructive        Past Surgical History:   Procedure Laterality Date    ABDOMINAL SURGERY      CARDIAC CATHETERIZATION      CAROTID ENDARTERECTOMY Left 2018    CHOLECYSTECTOMY WITH INTRAOPERATIVE CHOLANGIOGRAM N/A 2017    Procedure: CHOLECYSTECTOMY LAPAROSCOPIC INTRAOPERATIVE CHOLANGIOGRAM;  Surgeon: Carolin Marie MD;  Location: [facility];  Service:     COLONOSCOPY      HYSTERECTOMY      for heavy bleeding/ complete    KIDNEY STONE SURGERY      TUBAL ABDOMINAL LIGATION      VENOUS ACCESS DEVICE (PORT) INSERTION Left 2019    Procedure: INSERTION VENOUS ACCESS DEVICE;  Surgeon: Carolin Marie MD;  Location: [facility];  Service: General             IRF OT ASSESSMENT FLOWSHEET (last 12 hours)       IRF OT Evaluation and Treatment        Row Name 09/22/23 [facility]          OT Time and Intention    Document Type daily treatment  -BF     Mode of Treatment occupational therapy  -BF     Patient Effort adequate  -BF     Symptoms Noted During/After Treatment shortness of breath;other (see comments)  Pt's O2 dropped to 62% while on 4 L O2 after transferring from toilet. RN notiified and O2 returned to 90% after increasing O2 for a few minutes. Pt returned to fowlers position in bed with O2 set on 5 L, RN aware.  -BF     Evaluation/Treatment Not Performed patient/family declined, not feeling well  -BF     Comment, Evaluation/Treatment Not Performed Pt declined OT in AM secondary to not feeling well w/ RN aware and addressing, agreeable to OT in PM.  -BF       Row Name 09/22/23 135          General Information    Existing Precautions/Restrictions fall;oxygen therapy device and L/min  -BF       Row Name 09/22/23 135          Cognition/Psychosocial    Affect/Mental Status (Cognition) anxious  -BF     Orientation Status (Cognition) oriented x 3  -BF     Follows Commands (Cognition) WFL  -BF       Row Name 09/22/23 [facility]          Lower Body Dressing    [place] Level (Lower Body Dressing) contact guard assist  -BF     Position (Lower Body Dressing) supported sitting  -BF     Comment (Lower Body Dressing) CGA  -BF       Row Name 09/22/23 1357          Grooming    [place] Level (Grooming) set up  -BF     Position (Grooming) supported sitting  -BF       Row Name 09/22/23 1357          Toileting    [place] Level (Toileting) supervision;contact guard assist;verbal cues  -BF     Assistive Device Use (Toileting) grab bar/safety frame  -BF     Position (Toileting) supported sitting  -BF       Row Name 09/22/23 [facility]          Bed-Chair Transfer    Bed-Chair [place] (Transfers) contact guard;verbal cues;nonverbal cues (demo/gesture)  -BF     Assistive Device (Bed-Chair Transfers) wheelchair  -BF       Row Name 09/22/23 1357          Chair-Bed Transfer     Chair-Bed [place] (Transfers) contact guard;verbal cues;nonverbal cues (demo/gesture)  -BF     Assistive Device (Chair-Bed Transfers) wheelchair  -BF       Row Name 09/22/23 1357          Toilet Transfer    [place] Level (Toilet Transfer) contact guard;verbal cues;nonverbal cues (demo/gesture)  -     Assistive Device (Toilet Transfer) grab bars/safety frame;wheelchair  -BF       Row Name 09/22/23 1357          Positioning and Restraints    In Bed fowlers;call light within reach;encouraged to call for assist;notified nsg;with PT  -BF               User Key  (r) = Recorded By, (t) = Taken By, (c) = Cosigned By      Initials Name Effective Dates    BF Sofi Elkins, OT 07/11/23 -                      Occupational Therapy Education       Title: PT OT SLP Therapies (Done)       Topic: Occupational Therapy (Done)       Point: ADL training (Done)       Description:   Instruct learner(s) on proper safety adaptation and remediation techniques during self care or transfers.   Instruct in proper use of assistive devices.                  Learning Progress Summary             Patient Acceptance, E, NR,VU by BF at 9/22/2023 1357    Acceptance, E,D, VU,NR by TM at 9/21/2023 1413    Acceptance, TB, NR by DL at 9/20/2023 2059    Acceptance, E,D, VU,NR by TM at 9/20/2023 1503    Acceptance, E,D, VU,NR by TM at 9/19/2023 1406    Acceptance, E,D, VU,NR by TM at 9/18/2023 1359    Acceptance, E, VU,NR by HB at 9/16/2023 1155    Acceptance, E,D, VU,NR by TM at 9/15/2023 1308                         Point: Precautions (Done)       Description:   Instruct learner(s) on prescribed precautions during self-care and functional transfers.                  Learning Progress Summary             Patient Acceptance, E, NR,VU by BF at 9/22/2023 1357    Acceptance, E,D, VU,NR by TM at 9/21/2023 1413    Acceptance, TB, NR by DL at 9/20/2023 2059    Acceptance, E,D, VU,NR by TM at 9/20/2023 1503    Acceptance, E,D, VU,NR by TM  at 9/19/2023 1406    Acceptance, E,D, VU,NR by TM at 9/18/2023 1359    Acceptance, E, VU,NR by  at 9/16/2023 1155    Acceptance, E,D, VU,NR by TM at 9/15/2023 1308                                         User Key       Initials Effective Dates Name Provider Type Discipline    BF 07/11/23 -  Sofi Elkins, OT Occupational Therapist OT    TM 06/16/21 -  Shaina Ballard, OT Occupational Therapist OT    HB 05/25/21 -  Gaby Carty OT Occupational Therapist OT    DL 03/16/22 -  Ofelia Novak, RN Registered Nurse Nurse                        OT Recommendation and Plan                         Time Calculation:      Time Calculation- OT       Row Name 09/22/23 1408             Time Calculation- OT    OT Start Time 1245  -BF      OT Stop Time 1330  -BF      OT Time Calculation (min) 45 min  -BF      Total Timed Code Minutes- OT 45 minute(s)  -BF      OT Non-Billable Time (min) 10 min  -BF                User Key  (r) = Recorded By, (t) = Taken By, (c) = Cosigned By      Initials Name Provider Type    BF Sofi Elkins, OT Occupational Therapist                  Therapy Charges for Today       Code Description Service Date Service Provider Modifiers Qty    19937531524 HC OT SELF CARE/MGMT/TRAIN EA 15 MIN 9/22/2023 Sofi Elkins OT GO 3                     Sofi Elkins OT  9/22/2023

## 2023-09-22 NOTE — PROGRESS NOTES
Was called this evening with pt complaining of increased aches/some shortness of breath.  Pt tested negative for covid.  CXR--possible pneumonia on left lung field, however normal procalcitonin//leukocytosis.  Awaiting radiologist review.  Will empirically treat likely pna with zosyn/doxycycline.

## 2023-09-22 NOTE — PROGRESS NOTES
[facility]  PROGRESS NOTE     Patient Identification:  Name:  Liz Jiang  Age:  57 y.o.  Sex:  female  :  1965  MRN:  [id number]  Visit Number:  [id number]  ROOM: 110/2S     Primary Care Provider:  Paty Fischer APRN    Length of stay in inpatient status:  8    Subjective     Chief Compliant:  No chief complaint on file.      History of Presenting Illness: 57-year-old female with history of advanced COPD, CHF with preserved EF, debility.  Patient had some increased shortness of breath last evening and chills.  Patient did have work-up performed and chest x-ray according to radiology had findings consistent with pulmonary congestion but I was concerned about the possibility of small infiltrate.  Patient states she still somewhat short of breath this morning.  States she does not have a cough at this time    Objective     Current Hospital Meds:aspirin, 81 mg, Oral, Daily  budesonide-formoterol, 2 puff, Inhalation, BID - RT  castor oil-balsam peru, 1 application , Topical, Q12H  clopidogrel, 75 mg, Oral, Daily  doxycycline, 100 mg, Intravenous, Q12H  enoxaparin, 40 mg, Subcutaneous, Daily  furosemide, 20 mg, Intravenous, BID  insulin lispro, 2-7 Units, Subcutaneous, TID With Meals  Menthol-Zinc Oxide, 1 application , Topical, BID  metoprolol tartrate, 25 mg, Oral, Q12H  piperacillin-tazobactam, 3.375 g, Intravenous, Q8H  tiotropium bromide monohydrate, 2 puff, Inhalation, Daily - RT    Pharmacy Consult,       ----------------------------------------------------------------------------------------------------------------------  Vital Signs:  Temp:  [98.2 °F (36.8 °C)-98.9 °F (37.2 °C)] 98.2 °F (36.8 °C)  Heart Rate:  [104-130] 105  Resp:  [18-24] 20  BP: ()/(61-76) 109/61  SpO2:  [89 %-96 %] 95 %  on  Flow (L/min):  [4-4.5] 4.5;   Device (Oxygen Therapy): humidified;nasal cannula  Body mass index is 38.45 kg/m².    Wt Readings from Last 3 Encounters:   23 102 kg (224 lb)    02/23/22 77.1 kg (170 lb)   09/14/20 76.8 kg (169 lb 6.4 oz)     Intake & Output (last 3 days)         09/19 0701 09/20 0700 09/20 0701 09/21 0700 09/21 0701 09/22 0700 09/22 0701 09/23 0700    P.O. 1800 1320 1200 600    Total Intake(mL/kg) 1800 (17.6) 1320 (12.9) 1200 (11.8) 600 (5.9)    Net +1800 +1320 +1200 +600            Urine Unmeasured Occurrence 5 x 5 x 3 x 1 x    Stool Unmeasured Occurrence  2 x            Diet: Regular/House Diet, Diabetic Diets, Renal Diets; Consistent Carbohydrate; Low Sodium (2-3g); Texture: Regular Texture (IDDSI 7); Fluid Consistency: Thin (IDDSI 0)  ----------------------------------------------------------------------------------------------------------------------  Physical exam:  Constitutional: Chronically ill-appearing female in no distress  HEENT: Normocephalic atraumatic  Neck:   Supple  Cardiovascular: Regular rate and rhythm  Pulmonary/Chest: Few scattered rhonchi  Abdominal:  .  Positive bowel sounds soft  Musculoskeletal: No arthropathy  Neurological: No focal deficits  Skin: No rash  Peripheral vascular: 1+ edema lower extremities genitourinary:  ----------------------------------------------------------------------------------------------------------------------    Last echocardiogram:  Results for orders placed during the hospital encounter of 08/24/21    Adult Transthoracic Echo Complete W/ Cont if Necessary Per Protocol    Interpretation Summary  · Left ventricular ejection fraction appears to be 66 - 70%. Left ventricular systolic function is normal.  · Left ventricular diastolic function is consistent with (grade I) impaired relaxation.    ----------------------------------------------------------------------------------------------------------------------  Results from last 7 days   Lab Units 09/21/23  2218 09/21/23 2035 09/20/23  0002 09/18/23  0223   LACTATE mmol/L 1.5  --   --   --    WBC 10*3/mm3  --  17.93* 17.53* 18.35*   HEMOGLOBIN g/dL  --  13.4 15.0  14.6   HEMATOCRIT %  --  47.7* 52.0* 51.5*   MCV fL  --  100.0* 96.1 95.7   MCHC g/dL  --  28.1* 28.8* 28.3*   PLATELETS 10*3/mm3  --  150 183 208         Results from last 7 days   Lab Units 09/21/23 2035 09/20/23  0002 09/18/23  0223   SODIUM mmol/L 136 139 141   POTASSIUM mmol/L 4.8 4.1 4.3   CHLORIDE mmol/L 91* 95* 98   CO2 mmol/L 43.2* 34.7* 34.5*   BUN mg/dL 23* 26* 30*   CREATININE mg/dL 0.56* 0.42* 0.38*   CALCIUM mg/dL 9.4 9.0 9.0   GLUCOSE mg/dL 187* 96 150*   Estimated Creatinine Clearance: 127.2 mL/min (A) (by C-G formula based on SCr of 0.56 mg/dL (L)).  No results found for: AMMONIA              Glucose   Date/Time Value Ref Range Status   09/22/2023 1048 238 (H) 70 - 130 mg/dL Final   09/22/2023 0610 161 (H) 70 - 130 mg/dL Final   09/21/2023 2035 172 (H) 70 - 130 mg/dL Final   09/21/2023 1614 175 (H) 70 - 130 mg/dL Final   09/21/2023 1117 123 70 - 130 mg/dL Final   09/21/2023 0631 136 (H) 70 - 130 mg/dL Final   09/20/2023 2010 135 (H) 70 - 130 mg/dL Final   09/20/2023 1600 120 70 - 130 mg/dL Final     Lab Results   Component Value Date    TSH 5.090 (H) 04/29/2020    FREET4 1.10 02/11/2014     No results found for: PREGTESTUR, PREGSERUM, HCG, HCGQUANT  Pain Management Panel          Latest Ref Rng & Units 2/23/2022   Pain Management Panel   Amphetamine, Urine Qual Negative Negative    Barbiturates Screen, Urine Negative Negative    Benzodiazepine Screen, Urine Negative Positive    Buprenorphine, Screen, Urine Negative Negative    Cocaine Screen, Urine Negative Negative    Methadone Screen , Urine Negative Negative    Methamphetamine, Ur Negative Negative      Brief Urine Lab Results       None          No results found for: BLOODCX      No results found for: URINECX  No results found for: WOUNDCX  No results found for: STOOLCX  Results from last 7 days   Lab Units 09/21/23  2210 09/21/23 2035   PROCALCITONIN ng/mL  --  0.07   LACTATE mmol/L 1.5  --        I have personally looked at the labs and they  are summarized above.  ----------------------------------------------------------------------------------------------------------------------  Detailed radiology reports for the last 24 hours:    Imaging Results (Last 24 Hours)       Procedure Component Value Units Date/Time    XR Chest 1 View [[id number]] Collected: 09/21/23 2339     Updated: 09/21/23 2342    Narrative:      PROCEDURE: Portable chest x-ray examination performed on September 21, 2023. Single view. Semiupright position.     HISTORY: Shortness of breath. Chills.     FINDINGS:     Heart size at the upper limits of normal.  Left anterior chest port with catheter tip to the SVC.  Coarsened bronchovascular pattern to the lungs.  Edema.  No pleural effusion or pneumothorax.  Surgical clips in the left neck.  No free air in the upper abdomen.       Impression:         1.  Central pulmonary vascular congestion with edema.  2.  Hypoinflated lungs.  3.  No pleural effusion or pneumothorax.  4.  Normal heart size.  5.  Left anterior chest port with catheter tip to the SVC.        This report was finalized on 9/21/2023 11:40 PM by Rashaad Burnett MD.             Final impressions for the last 30 days of radiology reports:    XR Chest 1 View    Result Date: 9/21/2023   1.  Central pulmonary vascular congestion with edema. 2.  Hypoinflated lungs. 3.  No pleural effusion or pneumothorax. 4.  Normal heart size. 5.  Left anterior chest port with catheter tip to the SVC.   This report was finalized on 9/21/2023 11:40 PM by Rashaad Burnett MD.      X-ray chest PA and lateral    Result Date: 9/13/2023  Mild right basilar atelectasis. Images reviewed, interpreted, and dictated by Dr. Abiel Siu. Transcribed by Yaz Quick PA-C.    XR chest AP portable    Result Date: 9/9/2023  There has been no significant interval change  .  Continued follow up recommended . Images reviewed, interpreted, and dictated by Dr. DIANE Womack. Transcribed by Cristhian eNff,  JAYME.    US abdomen limited    Result Date: 9/7/2023  Fatty liver.. Images reviewed, interpreted, and dictated by Sharlene Norwood MD    XR chest AP portable    Result Date: 9/7/2023  Low lung volumes with worsening right basilar opacity which may represent atelectasis or pneumonia. Images reviewed, interpreted, and dictated by Sharlene Norwood MD   I have personally looked at the radiology images and read the final radiology report.    Assessment & Plan    Debility--requires supervision for transfers.  Ambulated 25 feet and 10 feet with front wheel walker and supervision.  Patient does have poor endurance secondary to advanced lung disease.  Requiring contact-guard for lower body dressing and set up for grooming.    CHF with preserved EF--appears to have a mild exacerbation.  Will give Lasix 20 mg IV every 12 hours today.  Will reevaluate later today    COPD--mild exacerbation versus questionable pneumonia in the left lung base.  Patient was empirically started on Zosyn and doxycycline last evening.  Patient's procalcitonin level is within normal limits patient but does have a leukocytosis.  Will monitor for now and may de-escalate antibiotics in the next 24 to 48 hours.    Diabetes mellitus reasonably well controlled    VTE Prophylaxis:   Mechanical Order History:       None          Pharmalogical Order History:        Ordered     Dose Route Frequency Stop    09/14/23 4039  Enoxaparin Sodium (LOVENOX) syringe 40 mg         40 mg SC Daily --                        Otoniel Mclaughlin MD  [facility] Hospitalist  09/22/23  10:54 EDT

## 2023-09-22 NOTE — PLAN OF CARE
Goal Outcome Evaluation:  Plan of Care Reviewed With: patient        Progress: improving       Problem: Fall Injury Risk  Goal: Absence of Fall and Fall-Related Injury  Outcome: Ongoing, Progressing  Intervention: Identify and Manage Contributors  Recent Flowsheet Documentation  Taken 9/22/2023 0800 by Brian Mejia RN  Medication Review/Management: medications reviewed  Intervention: Promote Injury-Free Environment  Recent Flowsheet Documentation  Taken 9/22/2023 0800 by Brian Mejia RN  Safety Promotion/Fall Prevention: safety round/check completed     Problem: Rehabilitation (IRF) Plan of Care  Goal: Plan of Care Review  Outcome: Ongoing, Progressing  Flowsheets (Taken 9/22/2023 1025)  Progress: improving  Plan of Care Reviewed With: patient  Goal: Patient-Specific Goal (Individualized)  Outcome: Ongoing, Progressing  Goal: Absence of New-Onset Illness or Injury  Outcome: Ongoing, Progressing  Intervention: Prevent Fall and Fall Injury  Recent Flowsheet Documentation  Taken 9/22/2023 0800 by Brian Mejia RN  Safety Promotion/Fall Prevention: safety round/check completed  Intervention: Prevent Infection  Recent Flowsheet Documentation  Taken 9/22/2023 0800 by Brian Mejia RN  Infection Prevention:   hand hygiene promoted   rest/sleep promoted  Intervention: Prevent VTE (Venous Thromboembolism)  Recent Flowsheet Documentation  Taken 9/22/2023 0800 by Brian Mejia RN  VTE Prevention/Management: (Lovenox) other (see comments)  Goal: Optimal Comfort and Wellbeing  Outcome: Ongoing, Progressing  Goal: Home and Community Transition Plan Established  Outcome: Ongoing, Progressing     Problem: Skin Injury Risk Increased  Goal: Skin Health and Integrity  Outcome: Ongoing, Progressing  Intervention: Promote and Optimize Oral Intake  Recent Flowsheet Documentation  Taken 9/22/2023 0800 by Brian Mejia RN  Oral Nutrition Promotion: physical activity promoted  Intervention: Optimize Skin Protection  Recent Flowsheet  Documentation  Taken 9/22/2023 0800 by Brian Mejia RN  Pressure Reduction Techniques:   frequent weight shift encouraged   heels elevated off bed   weight shift assistance provided  Pressure Reduction Devices:   pressure-redistributing mattress utilized   heel offloading device utilized   positioning supports utilized  Skin Protection:   adhesive use limited   incontinence pads utilized     Problem: Pain Chronic (Persistent) (Comorbidity Management)  Goal: Acceptable Pain Control and Functional Ability  Outcome: Ongoing, Progressing  Intervention: Manage Persistent Pain  Recent Flowsheet Documentation  Taken 9/22/2023 0800 by Brian Mejia RN  Sleep/Rest Enhancement: regular sleep/rest pattern promoted  Medication Review/Management: medications reviewed  Intervention: Develop Pain Management Plan  Recent Flowsheet Documentation  Taken 9/22/2023 0800 by Brian Mejia RN  Pain Management Interventions: see MAR  Intervention: Optimize Psychosocial Wellbeing  Recent Flowsheet Documentation  Taken 9/22/2023 0800 by Brian Mejia RN  Diversional Activities:   television   smartphone     Problem: Adjustment to Illness (Sepsis/Septic Shock)  Goal: Optimal Coping  Outcome: Ongoing, Progressing     Problem: Bleeding (Sepsis/Septic Shock)  Goal: Absence of Bleeding  Outcome: Ongoing, Progressing     Problem: Glycemic Control Impaired (Sepsis/Septic Shock)  Goal: Blood Glucose Level Within Desired Range  Outcome: Ongoing, Progressing  Intervention: Optimize Glycemic Control  Recent Flowsheet Documentation  Taken 9/22/2023 0800 by Brian Mejia RN  Glycemic Management: blood glucose monitored     Problem: Infection Progression (Sepsis/Septic Shock)  Goal: Absence of Infection Signs and Symptoms  Outcome: Ongoing, Progressing  Intervention: Initiate Sepsis Management  Recent Flowsheet Documentation  Taken 9/22/2023 0800 by Brian Mejia RN  Infection Prevention:   hand hygiene promoted   rest/sleep promoted  Intervention:  Promote Recovery  Recent Flowsheet Documentation  Taken 9/22/2023 0800 by Brian Mejia, RN  Sleep/Rest Enhancement: regular sleep/rest pattern promoted     Problem: Nutrition Impaired (Sepsis/Septic Shock)  Goal: Optimal Nutrition Intake  Outcome: Ongoing, Progressing

## 2023-09-23 LAB
ANION GAP SERPL CALCULATED.3IONS-SCNC: 3 MMOL/L (ref 5–15)
BASOPHILS # BLD AUTO: 0.05 10*3/MM3 (ref 0–0.2)
BASOPHILS NFR BLD AUTO: 0.4 % (ref 0–1.5)
BUN SERPL-MCNC: 25 MG/DL (ref 6–20)
BUN/CREAT SERPL: 50 (ref 7–25)
CALCIUM SPEC-SCNC: 8.9 MG/DL (ref 8.6–10.5)
CHLORIDE SERPL-SCNC: 89 MMOL/L (ref 98–107)
CO2 SERPL-SCNC: 48 MMOL/L (ref 22–29)
CREAT SERPL-MCNC: 0.5 MG/DL (ref 0.57–1)
DEPRECATED RDW RBC AUTO: 50.9 FL (ref 37–54)
EGFRCR SERPLBLD CKD-EPI 2021: 108.9 ML/MIN/1.73
EOSINOPHIL # BLD AUTO: 0.07 10*3/MM3 (ref 0–0.4)
EOSINOPHIL NFR BLD AUTO: 0.5 % (ref 0.3–6.2)
ERYTHROCYTE [DISTWIDTH] IN BLOOD BY AUTOMATED COUNT: 13.9 % (ref 12.3–15.4)
GLUCOSE BLDC GLUCOMTR-MCNC: 112 MG/DL (ref 70–130)
GLUCOSE BLDC GLUCOMTR-MCNC: 138 MG/DL (ref 70–130)
GLUCOSE BLDC GLUCOMTR-MCNC: 139 MG/DL (ref 70–130)
GLUCOSE BLDC GLUCOMTR-MCNC: 147 MG/DL (ref 70–130)
GLUCOSE SERPL-MCNC: 140 MG/DL (ref 65–99)
HCT VFR BLD AUTO: 43.2 % (ref 34–46.6)
HGB BLD-MCNC: 11.9 G/DL (ref 12–15.9)
HYPOCHROMIA BLD QL: NORMAL
IMM GRANULOCYTES # BLD AUTO: 0.17 10*3/MM3 (ref 0–0.05)
IMM GRANULOCYTES NFR BLD AUTO: 1.3 % (ref 0–0.5)
LYMPHOCYTES # BLD AUTO: 1.05 10*3/MM3 (ref 0.7–3.1)
LYMPHOCYTES NFR BLD AUTO: 8.2 % (ref 19.6–45.3)
MAGNESIUM SERPL-MCNC: 2.1 MG/DL (ref 1.6–2.6)
MCH RBC QN AUTO: 27.5 PG (ref 26.6–33)
MCHC RBC AUTO-ENTMCNC: 27.5 G/DL (ref 31.5–35.7)
MCV RBC AUTO: 99.8 FL (ref 79–97)
MICROCYTES BLD QL: NORMAL
MONOCYTES # BLD AUTO: 0.81 10*3/MM3 (ref 0.1–0.9)
MONOCYTES NFR BLD AUTO: 6.3 % (ref 5–12)
NEUTROPHILS NFR BLD AUTO: 10.68 10*3/MM3 (ref 1.7–7)
NEUTROPHILS NFR BLD AUTO: 83.3 % (ref 42.7–76)
NRBC BLD AUTO-RTO: 0 /100 WBC (ref 0–0.2)
PLAT MORPH BLD: NORMAL
PLATELET # BLD AUTO: 117 10*3/MM3 (ref 140–450)
PMV BLD AUTO: 10.6 FL (ref 6–12)
POTASSIUM SERPL-SCNC: 4.4 MMOL/L (ref 3.5–5.2)
RBC # BLD AUTO: 4.33 10*6/MM3 (ref 3.77–5.28)
SODIUM SERPL-SCNC: 140 MMOL/L (ref 136–145)
STOMATOCYTES BLD QL SMEAR: NORMAL
WBC NRBC COR # BLD: 12.83 10*3/MM3 (ref 3.4–10.8)

## 2023-09-23 PROCEDURE — 94761 N-INVAS EAR/PLS OXIMETRY MLT: CPT

## 2023-09-23 PROCEDURE — 25010000002 PIPERACILLIN SOD-TAZOBACTAM PER 1 G: Performed by: FAMILY MEDICINE

## 2023-09-23 PROCEDURE — 85007 BL SMEAR W/DIFF WBC COUNT: CPT | Performed by: FAMILY MEDICINE

## 2023-09-23 PROCEDURE — 94760 N-INVAS EAR/PLS OXIMETRY 1: CPT

## 2023-09-23 PROCEDURE — 83735 ASSAY OF MAGNESIUM: CPT | Performed by: FAMILY MEDICINE

## 2023-09-23 PROCEDURE — 85025 COMPLETE CBC W/AUTO DIFF WBC: CPT | Performed by: FAMILY MEDICINE

## 2023-09-23 PROCEDURE — 25010000002 FUROSEMIDE PER 20 MG: Performed by: FAMILY MEDICINE

## 2023-09-23 PROCEDURE — 82948 REAGENT STRIP/BLOOD GLUCOSE: CPT

## 2023-09-23 PROCEDURE — 94799 UNLISTED PULMONARY SVC/PX: CPT

## 2023-09-23 PROCEDURE — 25010000002 ENOXAPARIN PER 10 MG: Performed by: INTERNAL MEDICINE

## 2023-09-23 PROCEDURE — 63710000001 ONDANSETRON PER 8 MG: Performed by: FAMILY MEDICINE

## 2023-09-23 PROCEDURE — 94664 DEMO&/EVAL PT USE INHALER: CPT

## 2023-09-23 PROCEDURE — 80048 BASIC METABOLIC PNL TOTAL CA: CPT | Performed by: FAMILY MEDICINE

## 2023-09-23 RX ORDER — FUROSEMIDE 10 MG/ML
20 INJECTION INTRAMUSCULAR; INTRAVENOUS ONCE
Status: COMPLETED | OUTPATIENT
Start: 2023-09-23 | End: 2023-09-23

## 2023-09-23 RX ADMIN — ONDANSETRON HYDROCHLORIDE 4 MG: 4 TABLET, FILM COATED ORAL at 16:48

## 2023-09-23 RX ADMIN — HYDROCODONE BITARTRATE AND ACETAMINOPHEN 1 TABLET: 7.5; 325 TABLET ORAL at 16:51

## 2023-09-23 RX ADMIN — DOXYCYCLINE 100 MG: 100 INJECTION, POWDER, LYOPHILIZED, FOR SOLUTION INTRAVENOUS at 22:19

## 2023-09-23 RX ADMIN — ANORECTAL OINTMENT 1 APPLICATION: 15.7; .44; 24; 20.6 OINTMENT TOPICAL at 20:43

## 2023-09-23 RX ADMIN — BUDESONIDE AND FORMOTEROL FUMARATE DIHYDRATE 2 PUFF: 160; 4.5 AEROSOL RESPIRATORY (INHALATION) at 07:31

## 2023-09-23 RX ADMIN — FUROSEMIDE 20 MG: 10 INJECTION, SOLUTION INTRAMUSCULAR; INTRAVENOUS at 10:15

## 2023-09-23 RX ADMIN — ANORECTAL OINTMENT 1 APPLICATION: 15.7; .44; 24; 20.6 OINTMENT TOPICAL at 09:00

## 2023-09-23 RX ADMIN — HYDROCODONE BITARTRATE AND ACETAMINOPHEN 1 TABLET: 7.5; 325 TABLET ORAL at 04:46

## 2023-09-23 RX ADMIN — PIPERACILLIN SODIUM AND TAZOBACTAM SODIUM 3.38 G: 3; .375 INJECTION, POWDER, LYOPHILIZED, FOR SOLUTION INTRAVENOUS at 12:21

## 2023-09-23 RX ADMIN — HYDROCODONE BITARTRATE AND ACETAMINOPHEN 1 TABLET: 7.5; 325 TABLET ORAL at 22:31

## 2023-09-23 RX ADMIN — PIPERACILLIN SODIUM AND TAZOBACTAM SODIUM 3.38 G: 3; .375 INJECTION, POWDER, LYOPHILIZED, FOR SOLUTION INTRAVENOUS at 04:46

## 2023-09-23 RX ADMIN — CASTOR OIL AND BALSAM, PERU 1 APPLICATION: 788; 87 OINTMENT TOPICAL at 20:44

## 2023-09-23 RX ADMIN — CLOPIDOGREL BISULFATE 75 MG: 75 TABLET, FILM COATED ORAL at 10:14

## 2023-09-23 RX ADMIN — BUDESONIDE AND FORMOTEROL FUMARATE DIHYDRATE 2 PUFF: 160; 4.5 AEROSOL RESPIRATORY (INHALATION) at 19:37

## 2023-09-23 RX ADMIN — DOXYCYCLINE 100 MG: 100 INJECTION, POWDER, LYOPHILIZED, FOR SOLUTION INTRAVENOUS at 10:20

## 2023-09-23 RX ADMIN — METOPROLOL TARTRATE 25 MG: 25 TABLET, FILM COATED ORAL at 20:44

## 2023-09-23 RX ADMIN — HYDROCODONE BITARTRATE AND ACETAMINOPHEN 1 TABLET: 7.5; 325 TABLET ORAL at 10:28

## 2023-09-23 RX ADMIN — LOPERAMIDE HYDROCHLORIDE 2 MG: 2 CAPSULE ORAL at 11:12

## 2023-09-23 RX ADMIN — ALBUTEROL SULFATE 1.25 MG: 1.25 SOLUTION RESPIRATORY (INHALATION) at 19:37

## 2023-09-23 RX ADMIN — ENOXAPARIN SODIUM 40 MG: 40 INJECTION SUBCUTANEOUS at 10:14

## 2023-09-23 RX ADMIN — METOPROLOL TARTRATE 25 MG: 25 TABLET, FILM COATED ORAL at 10:14

## 2023-09-23 RX ADMIN — PIPERACILLIN SODIUM AND TAZOBACTAM SODIUM 3.38 G: 3; .375 INJECTION, POWDER, LYOPHILIZED, FOR SOLUTION INTRAVENOUS at 19:43

## 2023-09-23 RX ADMIN — ALBUTEROL SULFATE 1.25 MG: 1.25 SOLUTION RESPIRATORY (INHALATION) at 07:31

## 2023-09-23 RX ADMIN — ASPIRIN 81 MG: 81 TABLET, COATED ORAL at 10:14

## 2023-09-23 RX ADMIN — ALPRAZOLAM 0.5 MG: 0.5 TABLET ORAL at 20:47

## 2023-09-23 RX ADMIN — CASTOR OIL AND BALSAM, PERU 1 APPLICATION: 788; 87 OINTMENT TOPICAL at 10:15

## 2023-09-23 RX ADMIN — TIOTROPIUM BROMIDE INHALATION SPRAY 2 PUFF: 3.12 SPRAY, METERED RESPIRATORY (INHALATION) at 07:31

## 2023-09-23 NOTE — PLAN OF CARE
Problem: Fall Injury Risk  Goal: Absence of Fall and Fall-Related Injury  Outcome: Ongoing, Progressing  Intervention: Identify and Manage Contributors  Recent Flowsheet Documentation  Taken 9/22/2023 2000 by Crystal Martinez RN  Medication Review/Management: medications reviewed  Intervention: Promote Injury-Free Environment  Recent Flowsheet Documentation  Taken 9/23/2023 0200 by Crystal Martinez, RN  Safety Promotion/Fall Prevention:   safety round/check completed   fall prevention program maintained  Taken 9/23/2023 0000 by Crystal Martinez, RN  Safety Promotion/Fall Prevention:   safety round/check completed   fall prevention program maintained  Taken 9/22/2023 2200 by Crystal Martinez, RN  Safety Promotion/Fall Prevention:   safety round/check completed   fall prevention program maintained  Taken 9/22/2023 2000 by Crystal Martinez, RN  Safety Promotion/Fall Prevention:   safety round/check completed   fall prevention program maintained   room organization consistent   nonskid shoes/slippers when out of bed   clutter free environment maintained   assistive device/personal items within reach   Goal Outcome Evaluation:  Plan of Care Reviewed With: patient        Progress: no change  Outcome Evaluation: Patient calm and cooperative; resting well throughout the night; no acute distress noted.

## 2023-09-23 NOTE — PLAN OF CARE
Goal Outcome Evaluation:  Plan of Care Reviewed With: patient        Progress: improving  Outcome Evaluation: patient resting in bed. prn meds given for pain. continue plan of care.

## 2023-09-23 NOTE — PROGRESS NOTES
[facility]  PROGRESS NOTE     Patient Identification:  Name:  Liz Jiang  Age:  57 y.o.  Sex:  female  :  1965  MRN:  [id number]  Visit Number:  [id number]  ROOM: 110/2S     Primary Care Provider:  Paty Fischer APRN    Length of stay in inpatient status:  9    Subjective     Chief Compliant:  No chief complaint on file.      History of Presenting Illness: Patient is a 57-year-old female with debility, CHF preserved EF, advanced COPD diabetes mellitus.  Patient still feels somewhat short of breath.  States that she is improved with the diuresis.  States she is not having a cough at this time.  Leukocytosis has improved overnight.  No other acute complaints    Objective     Current Hospital Meds:aspirin, 81 mg, Oral, Daily  budesonide-formoterol, 2 puff, Inhalation, BID - RT  castor oil-balsam peru, 1 application , Topical, Q12H  clopidogrel, 75 mg, Oral, Daily  doxycycline, 100 mg, Intravenous, Q12H  enoxaparin, 40 mg, Subcutaneous, Daily  furosemide, 20 mg, Intravenous, Once  insulin lispro, 2-7 Units, Subcutaneous, TID With Meals  Menthol-Zinc Oxide, 1 application , Topical, BID  metoprolol tartrate, 25 mg, Oral, Q12H  piperacillin-tazobactam, 3.375 g, Intravenous, Q8H  tiotropium bromide monohydrate, 2 puff, Inhalation, Daily - RT    Pharmacy Consult,       ----------------------------------------------------------------------------------------------------------------------  Vital Signs:  Temp:  [97.8 °F (36.6 °C)-98.2 °F (36.8 °C)] 97.8 °F (36.6 °C)  Heart Rate:  [] 99  Resp:  [18-20] 20  BP: (101-123)/(65-80) 101/65  SpO2:  [92 %-94 %] 92 %  on  Flow (L/min):  [4.5-5] 5;   Device (Oxygen Therapy): humidified;nasal cannula  Body mass index is 38.45 kg/m².    Wt Readings from Last 3 Encounters:   23 102 kg (224 lb)   22 77.1 kg (170 lb)   20 76.8 kg (169 lb 6.4 oz)     Intake & Output (last 3 days)          07  0701 09/23 0700 09/23 0701 09/24 0700    P.O. 1320 1200 1620     Total Intake(mL/kg) 1320 (12.9) 1200 (11.8) 1620 (15.9)     Urine (mL/kg/hr)  300 (0.1) 1100 (0.4)     Stool   0     Total Output  300 1100     Net +1320 +900 +520             Urine Unmeasured Occurrence 5 x 3 x 1 x     Stool Unmeasured Occurrence 2 x  2 x           Diet: Regular/House Diet, Diabetic Diets, Renal Diets; Consistent Carbohydrate; Low Sodium (2-3g); Texture: Regular Texture (IDDSI 7); Fluid Consistency: Thin (IDDSI 0)  ----------------------------------------------------------------------------------------------------------------------  Physical exam:  Constitutional: Chronically ill-appearing female in no distress  HEENT: Normocephalic atraumatic  Neck:   Supple  Cardiovascular: Regular rate and rhythm  Pulmonary/Chest: Decreased breath sounds but clear  Abdominal:  .  Positive bowel sounds  Musculoskeletal: No arthropathy  Neurological: No focal deficits  Skin: No rash  Peripheral vascular: Trace to 1+ edema lower extremities but it is improved from last evaluation  Genitourinary:  ----------------------------------------------------------------------------------------------------------------------    Last echocardiogram:  Results for orders placed during the hospital encounter of 08/24/21    Adult Transthoracic Echo Complete W/ Cont if Necessary Per Protocol    Interpretation Summary  · Left ventricular ejection fraction appears to be 66 - 70%. Left ventricular systolic function is normal.  · Left ventricular diastolic function is consistent with (grade I) impaired relaxation.    ----------------------------------------------------------------------------------------------------------------------  Results from last 7 days   Lab Units 09/23/23  0141 09/21/23 2218 09/21/23 2035 09/20/23  0002   LACTATE mmol/L  --  1.5  --   --    WBC 10*3/mm3 12.83*  --  17.93* 17.53*   HEMOGLOBIN g/dL 11.9*  --  13.4 15.0   HEMATOCRIT % 43.2  --   47.7* 52.0*   MCV fL 99.8*  --  100.0* 96.1   MCHC g/dL 27.5*  --  28.1* 28.8*   PLATELETS 10*3/mm3 117*  --  150 183         Results from last 7 days   Lab Units 09/23/23  0141 09/21/23 2035 09/20/23  0002   SODIUM mmol/L 140 136 139   POTASSIUM mmol/L 4.4 4.8 4.1   MAGNESIUM mg/dL 2.1  --   --    CHLORIDE mmol/L 89* 91* 95*   CO2 mmol/L 48.0* 43.2* 34.7*   BUN mg/dL 25* 23* 26*   CREATININE mg/dL 0.50* 0.56* 0.42*   CALCIUM mg/dL 8.9 9.4 9.0   GLUCOSE mg/dL 140* 187* 96   Estimated Creatinine Clearance: 142.5 mL/min (A) (by C-G formula based on SCr of 0.5 mg/dL (L)).  No results found for: AMMONIA              Glucose   Date/Time Value Ref Range Status   09/23/2023 0614 139 (H) 70 - 130 mg/dL Final   09/22/2023 2020 138 (H) 70 - 130 mg/dL Final   09/22/2023 1605 109 70 - 130 mg/dL Final   09/22/2023 1048 238 (H) 70 - 130 mg/dL Final   09/22/2023 0610 161 (H) 70 - 130 mg/dL Final   09/21/2023 2035 172 (H) 70 - 130 mg/dL Final   09/21/2023 1614 175 (H) 70 - 130 mg/dL Final   09/21/2023 1117 123 70 - 130 mg/dL Final     Lab Results   Component Value Date    TSH 5.090 (H) 04/29/2020    FREET4 1.10 02/11/2014     No results found for: PREGTESTUR, PREGSERUM, HCG, HCGQUANT  Pain Management Panel          Latest Ref Rng & Units 2/23/2022   Pain Management Panel   Amphetamine, Urine Qual Negative Negative    Barbiturates Screen, Urine Negative Negative    Benzodiazepine Screen, Urine Negative Positive    Buprenorphine, Screen, Urine Negative Negative    Cocaine Screen, Urine Negative Negative    Methadone Screen , Urine Negative Negative    Methamphetamine, Ur Negative Negative      Brief Urine Lab Results       None          Blood Culture   Date Value Ref Range Status   09/21/2023 No growth at 24 hours  Preliminary   09/21/2023 No growth at 24 hours  Preliminary         No results found for: URINECX  No results found for: WOUNDCX  No results found for: STOOLCX  Results from last 7 days   Lab Units 09/21/23  5404  09/21/23 2035   PROCALCITONIN ng/mL  --  0.07   LACTATE mmol/L 1.5  --        I have personally looked at the labs and they are summarized above.  ----------------------------------------------------------------------------------------------------------------------  Detailed radiology reports for the last 24 hours:    Imaging Results (Last 24 Hours)       ** No results found for the last 24 hours. **          Final impressions for the last 30 days of radiology reports:    XR Chest 1 View    Result Date: 9/21/2023   1.  Central pulmonary vascular congestion with edema. 2.  Hypoinflated lungs. 3.  No pleural effusion or pneumothorax. 4.  Normal heart size. 5.  Left anterior chest port with catheter tip to the SVC.   This report was finalized on 9/21/2023 11:40 PM by Rashaad Burnett MD.      X-ray chest PA and lateral    Result Date: 9/13/2023  Mild right basilar atelectasis. Images reviewed, interpreted, and dictated by Dr. Abiel Siu. Transcribed by Yaz Quick PA-C.    XR chest AP portable    Result Date: 9/9/2023  There has been no significant interval change  .  Continued follow up recommended . Images reviewed, interpreted, and dictated by Dr. DIANE Womack. Transcribed by Cristhian Neff PA-C.    US abdomen limited    Result Date: 9/7/2023  Fatty liver.. Images reviewed, interpreted, and dictated by Sharlene Norwood MD    XR chest AP portable    Result Date: 9/7/2023  Low lung volumes with worsening right basilar opacity which may represent atelectasis or pneumonia. Images reviewed, interpreted, and dictated by Sharlene Norwood MD   I have personally looked at the radiology images and read the final radiology report.    Assessment & Plan    Debility--patient worked on lower extremity strengthening yesterday.  Patient did not ambulate as she was being actively diuresed.  Requires contact-guard assistance for lower body dressing; set up for grooming; contact-guard assistance to supervision for  toileting.    CHF preserved EF--I will give Lasix 20 mg IV x1 today.  Patient seems to be clinically improving with diuresis.    Question of the left lower lobe pneumonia.  I did empirically treat patient Zosyn and doxycycline.  Will complete a least additional 3-day treatment and will reevaluate and likely de-escalate tomorrow.    Advanced COPD continue O2 and she is requiring anywhere between 4 and 5 L of O2 at this time.  Reviewed ABG done at [facility] in [place] 2 weeks ago and at that time patient had a normal pH of 7.39 but does have a PCO2 of 80 and a bicarb level 48 on the blood gas.  So patient does have compensated respiratory acidosis likely chronically    Diabetes mellitus fairly well controlled    VTE Prophylaxis:   Mechanical Order History:       None          Pharmalogical Order History:        Ordered     Dose Route Frequency Stop    09/14/23 1829  Enoxaparin Sodium (LOVENOX) syringe 40 mg         40 mg SC Daily --                        Otoniel Mclaughlin MD  [facility] Hospitalist  09/23/23  09:47 EDT

## 2023-09-23 NOTE — PROGRESS NOTES
Rehabilitation Nursing  Inpatient Rehabilitation Plan of Care Note    Plan of Care  Copy from Dez    Pain Management (Active)  Current Status (9/14/2023 6:29:00 PM): Potential for increased pain  Weekly Goal: Pain adequately managed  Discharge Goal: Pain adequately managed    Body Function Structure    Skin Integrity (Active)  Current Status (9/14/2023 6:29:00 PM): Stage 2 pressure injuries  Weekly Goal: Healing of wound  Discharge Goal: Healing of wound    Safety    Potential for Injury (Active)  Current Status (9/14/2023 6:29:00 PM): Potential for falls  Weekly Goal: No falls  Discharge Goal: No falls    Signed by: Soraida Mckeon, Nurse

## 2023-09-24 ENCOUNTER — APPOINTMENT (OUTPATIENT)
Dept: GENERAL RADIOLOGY | Facility: HOSPITAL | Age: 58
DRG: 948 | End: 2023-09-24
Payer: MEDICARE

## 2023-09-24 LAB
ANION GAP SERPL CALCULATED.3IONS-SCNC: 2.6 MMOL/L (ref 5–15)
BASOPHILS # BLD AUTO: 0.02 10*3/MM3 (ref 0–0.2)
BASOPHILS NFR BLD AUTO: 0.2 % (ref 0–1.5)
BUN SERPL-MCNC: 22 MG/DL (ref 6–20)
BUN/CREAT SERPL: 55 (ref 7–25)
CALCIUM SPEC-SCNC: 8.6 MG/DL (ref 8.6–10.5)
CHLORIDE SERPL-SCNC: 89 MMOL/L (ref 98–107)
CO2 SERPL-SCNC: 46.4 MMOL/L (ref 22–29)
CREAT SERPL-MCNC: 0.4 MG/DL (ref 0.57–1)
DEPRECATED RDW RBC AUTO: 49.9 FL (ref 37–54)
EGFRCR SERPLBLD CKD-EPI 2021: 114.9 ML/MIN/1.73
EOSINOPHIL # BLD AUTO: 0.06 10*3/MM3 (ref 0–0.4)
EOSINOPHIL NFR BLD AUTO: 0.5 % (ref 0.3–6.2)
ERYTHROCYTE [DISTWIDTH] IN BLOOD BY AUTOMATED COUNT: 14 % (ref 12.3–15.4)
GLUCOSE BLDC GLUCOMTR-MCNC: 121 MG/DL (ref 70–130)
GLUCOSE BLDC GLUCOMTR-MCNC: 167 MG/DL (ref 70–130)
GLUCOSE BLDC GLUCOMTR-MCNC: 190 MG/DL (ref 70–130)
GLUCOSE BLDC GLUCOMTR-MCNC: 234 MG/DL (ref 70–130)
GLUCOSE SERPL-MCNC: 130 MG/DL (ref 65–99)
HCT VFR BLD AUTO: 40.8 % (ref 34–46.6)
HGB BLD-MCNC: 11.6 G/DL (ref 12–15.9)
HYPOCHROMIA BLD QL: NORMAL
IMM GRANULOCYTES # BLD AUTO: 0.13 10*3/MM3 (ref 0–0.05)
IMM GRANULOCYTES NFR BLD AUTO: 1.1 % (ref 0–0.5)
LYMPHOCYTES # BLD AUTO: 1 10*3/MM3 (ref 0.7–3.1)
LYMPHOCYTES NFR BLD AUTO: 8.8 % (ref 19.6–45.3)
MCH RBC QN AUTO: 27.7 PG (ref 26.6–33)
MCHC RBC AUTO-ENTMCNC: 28.4 G/DL (ref 31.5–35.7)
MCV RBC AUTO: 97.4 FL (ref 79–97)
MICROCYTES BLD QL: NORMAL
MONOCYTES # BLD AUTO: 0.72 10*3/MM3 (ref 0.1–0.9)
MONOCYTES NFR BLD AUTO: 6.3 % (ref 5–12)
NEUTROPHILS NFR BLD AUTO: 83.1 % (ref 42.7–76)
NEUTROPHILS NFR BLD AUTO: 9.47 10*3/MM3 (ref 1.7–7)
NRBC BLD AUTO-RTO: 0 /100 WBC (ref 0–0.2)
PLAT MORPH BLD: NORMAL
PLATELET # BLD AUTO: 116 10*3/MM3 (ref 140–450)
PMV BLD AUTO: 11.3 FL (ref 6–12)
POTASSIUM SERPL-SCNC: 3.5 MMOL/L (ref 3.5–5.2)
RBC # BLD AUTO: 4.19 10*6/MM3 (ref 3.77–5.28)
SODIUM SERPL-SCNC: 138 MMOL/L (ref 136–145)
WBC NRBC COR # BLD: 11.4 10*3/MM3 (ref 3.4–10.8)

## 2023-09-24 PROCEDURE — 94799 UNLISTED PULMONARY SVC/PX: CPT

## 2023-09-24 PROCEDURE — 82948 REAGENT STRIP/BLOOD GLUCOSE: CPT

## 2023-09-24 PROCEDURE — 71045 X-RAY EXAM CHEST 1 VIEW: CPT

## 2023-09-24 PROCEDURE — 80048 BASIC METABOLIC PNL TOTAL CA: CPT | Performed by: FAMILY MEDICINE

## 2023-09-24 PROCEDURE — 94660 CPAP INITIATION&MGMT: CPT

## 2023-09-24 PROCEDURE — 25010000002 PIPERACILLIN SOD-TAZOBACTAM PER 1 G: Performed by: FAMILY MEDICINE

## 2023-09-24 PROCEDURE — 85007 BL SMEAR W/DIFF WBC COUNT: CPT | Performed by: FAMILY MEDICINE

## 2023-09-24 PROCEDURE — 25010000002 FUROSEMIDE PER 20 MG: Performed by: FAMILY MEDICINE

## 2023-09-24 PROCEDURE — 63710000001 PREDNISONE PER 1 MG: Performed by: FAMILY MEDICINE

## 2023-09-24 PROCEDURE — 85025 COMPLETE CBC W/AUTO DIFF WBC: CPT | Performed by: FAMILY MEDICINE

## 2023-09-24 PROCEDURE — 94760 N-INVAS EAR/PLS OXIMETRY 1: CPT

## 2023-09-24 PROCEDURE — 71045 X-RAY EXAM CHEST 1 VIEW: CPT | Performed by: RADIOLOGY

## 2023-09-24 PROCEDURE — 94664 DEMO&/EVAL PT USE INHALER: CPT

## 2023-09-24 PROCEDURE — 25010000002 ENOXAPARIN PER 10 MG: Performed by: INTERNAL MEDICINE

## 2023-09-24 PROCEDURE — 63710000001 INSULIN LISPRO (HUMAN) PER 5 UNITS: Performed by: INTERNAL MEDICINE

## 2023-09-24 RX ORDER — PREDNISONE 20 MG/1
40 TABLET ORAL DAILY
Status: COMPLETED | OUTPATIENT
Start: 2023-09-24 | End: 2023-09-27

## 2023-09-24 RX ORDER — PREDNISONE 20 MG/1
40 TABLET ORAL DAILY
Status: DISCONTINUED | OUTPATIENT
Start: 2023-09-24 | End: 2023-09-24

## 2023-09-24 RX ORDER — FUROSEMIDE 10 MG/ML
20 INJECTION INTRAMUSCULAR; INTRAVENOUS ONCE
Status: COMPLETED | OUTPATIENT
Start: 2023-09-24 | End: 2023-09-24

## 2023-09-24 RX ORDER — POTASSIUM CHLORIDE 20 MEQ/1
40 TABLET, EXTENDED RELEASE ORAL ONCE
Status: COMPLETED | OUTPATIENT
Start: 2023-09-24 | End: 2023-09-24

## 2023-09-24 RX ORDER — ALBUTEROL SULFATE 2.5 MG/3ML
2.5 SOLUTION RESPIRATORY (INHALATION)
Status: DISCONTINUED | OUTPATIENT
Start: 2023-09-24 | End: 2023-09-27 | Stop reason: HOSPADM

## 2023-09-24 RX ADMIN — PIPERACILLIN SODIUM AND TAZOBACTAM SODIUM 3.38 G: 3; .375 INJECTION, POWDER, LYOPHILIZED, FOR SOLUTION INTRAVENOUS at 12:47

## 2023-09-24 RX ADMIN — BUDESONIDE AND FORMOTEROL FUMARATE DIHYDRATE 2 PUFF: 160; 4.5 AEROSOL RESPIRATORY (INHALATION) at 06:48

## 2023-09-24 RX ADMIN — POTASSIUM CHLORIDE 40 MEQ: 1500 TABLET, EXTENDED RELEASE ORAL at 16:58

## 2023-09-24 RX ADMIN — PIPERACILLIN SODIUM AND TAZOBACTAM SODIUM 3.38 G: 3; .375 INJECTION, POWDER, LYOPHILIZED, FOR SOLUTION INTRAVENOUS at 03:46

## 2023-09-24 RX ADMIN — METOPROLOL TARTRATE 25 MG: 25 TABLET, FILM COATED ORAL at 09:37

## 2023-09-24 RX ADMIN — FUROSEMIDE 20 MG: 10 INJECTION, SOLUTION INTRAMUSCULAR; INTRAVENOUS at 11:43

## 2023-09-24 RX ADMIN — TIOTROPIUM BROMIDE INHALATION SPRAY 2 PUFF: 3.12 SPRAY, METERED RESPIRATORY (INHALATION) at 06:48

## 2023-09-24 RX ADMIN — ANORECTAL OINTMENT 1 APPLICATION: 15.7; .44; 24; 20.6 OINTMENT TOPICAL at 09:37

## 2023-09-24 RX ADMIN — ENOXAPARIN SODIUM 40 MG: 40 INJECTION SUBCUTANEOUS at 09:37

## 2023-09-24 RX ADMIN — ALBUTEROL SULFATE 2.5 MG: 2.5 SOLUTION RESPIRATORY (INHALATION) at 13:59

## 2023-09-24 RX ADMIN — CASTOR OIL AND BALSAM, PERU 1 APPLICATION: 788; 87 OINTMENT TOPICAL at 09:37

## 2023-09-24 RX ADMIN — HYDROCODONE BITARTRATE AND ACETAMINOPHEN 1 TABLET: 7.5; 325 TABLET ORAL at 11:41

## 2023-09-24 RX ADMIN — BUDESONIDE AND FORMOTEROL FUMARATE DIHYDRATE 2 PUFF: 160; 4.5 AEROSOL RESPIRATORY (INHALATION) at 19:08

## 2023-09-24 RX ADMIN — ALBUTEROL SULFATE 1.25 MG: 1.25 SOLUTION RESPIRATORY (INHALATION) at 06:48

## 2023-09-24 RX ADMIN — DOXYCYCLINE 100 MG: 100 INJECTION, POWDER, LYOPHILIZED, FOR SOLUTION INTRAVENOUS at 11:47

## 2023-09-24 RX ADMIN — HYDROCODONE BITARTRATE AND ACETAMINOPHEN 1 TABLET: 7.5; 325 TABLET ORAL at 17:37

## 2023-09-24 RX ADMIN — INSULIN LISPRO 2 UNITS: 100 INJECTION, SOLUTION INTRAVENOUS; SUBCUTANEOUS at 17:00

## 2023-09-24 RX ADMIN — METOPROLOL TARTRATE 25 MG: 25 TABLET, FILM COATED ORAL at 20:21

## 2023-09-24 RX ADMIN — PREDNISONE 40 MG: 20 TABLET ORAL at 11:46

## 2023-09-24 RX ADMIN — ASPIRIN 81 MG: 81 TABLET, COATED ORAL at 09:36

## 2023-09-24 RX ADMIN — DOXYCYCLINE 100 MG: 100 INJECTION, POWDER, LYOPHILIZED, FOR SOLUTION INTRAVENOUS at 23:07

## 2023-09-24 RX ADMIN — INSULIN LISPRO 2 UNITS: 100 INJECTION, SOLUTION INTRAVENOUS; SUBCUTANEOUS at 11:41

## 2023-09-24 RX ADMIN — HYDROCODONE BITARTRATE AND ACETAMINOPHEN 1 TABLET: 7.5; 325 TABLET ORAL at 05:48

## 2023-09-24 RX ADMIN — CLOPIDOGREL BISULFATE 75 MG: 75 TABLET, FILM COATED ORAL at 09:37

## 2023-09-24 RX ADMIN — HYDROCODONE BITARTRATE AND ACETAMINOPHEN 1 TABLET: 7.5; 325 TABLET ORAL at 23:06

## 2023-09-24 RX ADMIN — ALBUTEROL SULFATE 2.5 MG: 2.5 SOLUTION RESPIRATORY (INHALATION) at 19:08

## 2023-09-24 RX ADMIN — ANORECTAL OINTMENT 1 APPLICATION: 15.7; .44; 24; 20.6 OINTMENT TOPICAL at 20:21

## 2023-09-24 RX ADMIN — ALPRAZOLAM 0.5 MG: 0.5 TABLET ORAL at 20:24

## 2023-09-24 RX ADMIN — PIPERACILLIN SODIUM AND TAZOBACTAM SODIUM 3.38 G: 3; .375 INJECTION, POWDER, LYOPHILIZED, FOR SOLUTION INTRAVENOUS at 20:18

## 2023-09-24 RX ADMIN — CASTOR OIL AND BALSAM, PERU 1 APPLICATION: 788; 87 OINTMENT TOPICAL at 20:21

## 2023-09-24 NOTE — PLAN OF CARE
Goal Outcome Evaluation:  Plan of Care Reviewed With: patient        Progress: improving  Outcome Evaluation: patient resting in bed at this time. chest xray and iv lasix ordered this shift. prn pain meds given as requested. continue plan of care.

## 2023-09-24 NOTE — PROGRESS NOTES
Rehabilitation Nursing  Inpatient Rehabilitation Plan of Care Note    Plan of Care  Copy from POC    Pain    Pain Management (Active)  Current Status (9/14/2023 6:29:00 PM): Potential for increased pain  Weekly Goal: Pain adequately managed  Discharge Goal: Pain adequately managed    Body Function Structure    Skin Integrity (Active)  Current Status (9/14/2023 6:29:00 PM): Stage 2 pressure injuries  Weekly Goal: Healing of wound  Discharge Goal: Healing of wound    Safety    Potential for Injury (Active)  Current Status (9/14/2023 6:29:00 PM): Potential for falls  Weekly Goal: No falls  Discharge Goal: No falls    RN Interventions    Pain -  RN: assess pain q shift and PRN; meds per MD order; optimize patient comfort  [RN]    Body Function Structure -  RN: assess skin q shift; wound care per orders; turn patient q 2 hrs [RN]    Safety -  RN: assess safety q 1 hr; call light and items within reach; side rails up x2  [RN]    Self Care -  OT: ADL retraining/AE education, fxal mobility, BUE ther ex/act, GMC/FMC  [RN]    Signed by: Monisha Navarrete Nurse

## 2023-09-24 NOTE — PROGRESS NOTES
[facility]  PROGRESS NOTE     Patient Identification:  Name:  Liz Jiang  Age:  57 y.o.  Sex:  female  :  1965  MRN:  [id number]  Visit Number:  [id number]  ROOM: 110/2S     Primary Care Provider:  Paty Fischer APRN    Length of stay in inpatient status:  10    Subjective     Chief Compliant:  No chief complaint on file.      History of Presenting Illness: 57-year-old female with debility, CHF preserved EF, advanced COPD and diabetes mellitus.  Patient still having some difficulty with her breathing at this time and did desat briefly this morning when she got out of bed.  We have been diuresing patient over the last couple of days and it appears that the edema is improved.  Patient denies cough.    Objective     Current Hospital Meds:albuterol, 2.5 mg, Nebulization, Q6H - RT  aspirin, 81 mg, Oral, Daily  budesonide-formoterol, 2 puff, Inhalation, BID - RT  castor oil-balsam peru, 1 application , Topical, Q12H  clopidogrel, 75 mg, Oral, Daily  doxycycline, 100 mg, Intravenous, Q12H  enoxaparin, 40 mg, Subcutaneous, Daily  furosemide, 20 mg, Intravenous, Once  insulin lispro, 2-7 Units, Subcutaneous, TID With Meals  Menthol-Zinc Oxide, 1 application , Topical, BID  metoprolol tartrate, 25 mg, Oral, Q12H  piperacillin-tazobactam, 3.375 g, Intravenous, Q8H  tiotropium bromide monohydrate, 2 puff, Inhalation, Daily - RT    Pharmacy Consult,       ----------------------------------------------------------------------------------------------------------------------  Vital Signs:  Temp:  [97.8 °F (36.6 °C)-98.2 °F (36.8 °C)] 97.8 °F (36.6 °C)  Heart Rate:  [] 57  Resp:  [18-20] 20  BP: (100-107)/(64-76) 100/64  SpO2:  [91 %-96 %] 92 %  on  Flow (L/min):  [5] 5;   Device (Oxygen Therapy): humidified;nasal cannula  Body mass index is 38.45 kg/m².    Wt Readings from Last 3 Encounters:   23 102 kg (224 lb)   22 77.1 kg (170 lb)   20 76.8 kg (169 lb 6.4 oz)     Intake &  Output (last 3 days)         09/21 0701  09/22 0700 09/22 0701 09/23 0700 09/23 0701 09/24 0700 09/24 0701 09/25 0700    P.O. 1200 1620 1080 240    I.V. (mL/kg)   300 (2.9) 100 (1)    Total Intake(mL/kg) 1200 (11.8) 1620 (15.9) 1380 (13.5) 340 (3.3)    Urine (mL/kg/hr) 300 (0.1) 1100 (0.4) 500 (0.2)     Stool  0 0     Total Output 300 1100 500     Net +900 +520 +880 +340            Urine Unmeasured Occurrence 3 x 1 x 4 x 1 x    Stool Unmeasured Occurrence  2 x 2 x 1 x          Diet: Regular/House Diet, Diabetic Diets, Renal Diets; Consistent Carbohydrate; Low Sodium (2-3g); Texture: Regular Texture (IDDSI 7); Fluid Consistency: Thin (IDDSI 0)  ----------------------------------------------------------------------------------------------------------------------  Physical exam:  Constitutional: No acute distress  HEENT: Normocephalic atraumatic  Neck:   Supple  Cardiovascular: Regular rate and rhythm  Pulmonary/Chest: Clear but decreased breath sounds bilaterally  Abdominal:  .  Positive bowel sounds soft  Musculoskeletal: No arthropathy  Neurological: No focal deficits  Skin: No rash  Peripheral vascular: No edema  Genitourinary:  ----------------------------------------------------------------------------------------------------------------------    Last echocardiogram:  Results for orders placed during the hospital encounter of 08/24/21    Adult Transthoracic Echo Complete W/ Cont if Necessary Per Protocol    Interpretation Summary  · Left ventricular ejection fraction appears to be 66 - 70%. Left ventricular systolic function is normal.  · Left ventricular diastolic function is consistent with (grade I) impaired relaxation.    ----------------------------------------------------------------------------------------------------------------------  Results from last 7 days   Lab Units 09/24/23  0030 09/23/23  0141 09/21/23 2218 09/21/23 2035   LACTATE mmol/L  --   --  1.5  --    WBC 10*3/mm3 11.40* 12.83*  --   17.93*   HEMOGLOBIN g/dL 11.6* 11.9*  --  13.4   HEMATOCRIT % 40.8 43.2  --  47.7*   MCV fL 97.4* 99.8*  --  100.0*   MCHC g/dL 28.4* 27.5*  --  28.1*   PLATELETS 10*3/mm3 116* 117*  --  150         Results from last 7 days   Lab Units 09/24/23  0030 09/23/23  0141 09/21/23 2035   SODIUM mmol/L 138 140 136   POTASSIUM mmol/L 3.5 4.4 4.8   MAGNESIUM mg/dL  --  2.1  --    CHLORIDE mmol/L 89* 89* 91*   CO2 mmol/L 46.4* 48.0* 43.2*   BUN mg/dL 22* 25* 23*   CREATININE mg/dL 0.40* 0.50* 0.56*   CALCIUM mg/dL 8.6 8.9 9.4   GLUCOSE mg/dL 130* 140* 187*   Estimated Creatinine Clearance: 178.1 mL/min (A) (by C-G formula based on SCr of 0.4 mg/dL (L)).  No results found for: AMMONIA              Glucose   Date/Time Value Ref Range Status   09/24/2023 0637 121 70 - 130 mg/dL Final   09/23/2023 1926 147 (H) 70 - 130 mg/dL Final   09/23/2023 1609 112 70 - 130 mg/dL Final   09/23/2023 1139 138 (H) 70 - 130 mg/dL Final   09/23/2023 0614 139 (H) 70 - 130 mg/dL Final   09/22/2023 2020 138 (H) 70 - 130 mg/dL Final   09/22/2023 1605 109 70 - 130 mg/dL Final   09/22/2023 1048 238 (H) 70 - 130 mg/dL Final     Lab Results   Component Value Date    TSH 5.090 (H) 04/29/2020    FREET4 1.10 02/11/2014     No results found for: PREGTESTUR, PREGSERUM, HCG, HCGQUANT  Pain Management Panel          Latest Ref Rng & Units 2/23/2022   Pain Management Panel   Amphetamine, Urine Qual Negative Negative    Barbiturates Screen, Urine Negative Negative    Benzodiazepine Screen, Urine Negative Positive    Buprenorphine, Screen, Urine Negative Negative    Cocaine Screen, Urine Negative Negative    Methadone Screen , Urine Negative Negative    Methamphetamine, Ur Negative Negative      Brief Urine Lab Results       None          Blood Culture   Date Value Ref Range Status   09/21/2023 No growth at 2 days  Preliminary   09/21/2023 No growth at 2 days  Preliminary         No results found for: URINECX  No results found for: WOUNDCX  No results found for:  STOOLCX  Results from last 7 days   Lab Units 09/21/23 2218 09/21/23 2035   PROCALCITONIN ng/mL  --  0.07   LACTATE mmol/L 1.5  --        I have personally looked at the labs and they are summarized above.  ----------------------------------------------------------------------------------------------------------------------  Detailed radiology reports for the last 24 hours:    Imaging Results (Last 24 Hours)       Procedure Component Value Units Date/Time    XR Chest 1 View [[id number]] Resulted: 09/24/23 0947     Updated: 09/24/23 0947          Final impressions for the last 30 days of radiology reports:    XR Chest 1 View    Result Date: 9/21/2023   1.  Central pulmonary vascular congestion with edema. 2.  Hypoinflated lungs. 3.  No pleural effusion or pneumothorax. 4.  Normal heart size. 5.  Left anterior chest port with catheter tip to the SVC.   This report was finalized on 9/21/2023 11:40 PM by Rashaad Burnett MD.      X-ray chest PA and lateral    Result Date: 9/13/2023  Mild right basilar atelectasis. Images reviewed, interpreted, and dictated by Dr. Abiel Siu. Transcribed by Yaz Quick PA-C.    XR chest AP portable    Result Date: 9/9/2023  There has been no significant interval change  .  Continued follow up recommended . Images reviewed, interpreted, and dictated by Dr. DIANE Womack. Transcribed by Cristhian Neff PA-C.    US abdomen limited    Result Date: 9/7/2023  Fatty liver.. Images reviewed, interpreted, and dictated by Sharlene Norwood MD    XR chest AP portable    Result Date: 9/7/2023  Low lung volumes with worsening right basilar opacity which may represent atelectasis or pneumonia. Images reviewed, interpreted, and dictated by Sharlene Norwood MD   I have personally looked at the radiology images and read the final radiology report.    Assessment & Plan    Debility--patient was not able to be quite as active in therapy Friday secondary to pulmonary issues.    CHF preserved EF we will  give Lasix 20 mg IV x1 today.  Repeat chest x-ray to reevaluate today    Questionable left lower lobe pneumonia--patient clinically does not appear to have pneumonia we will discontinue Zosyn today and will continue 5-day course of doxycycline.  Reevaluate with chest x-ray today.  Patient has a normal white count at this time and no cough.    Advanced COPD requiring O2.  Patient will have neb treatments scheduled for every 6 hours.    Will reevaluate after Lasix today.  Start prednisone 40mg daily for next 3days.    Diabetes mellitus continue current treatment    VTE Prophylaxis:   Mechanical Order History:       None          Pharmalogical Order History:        Ordered     Dose Route Frequency Stop    09/14/23 5054  Enoxaparin Sodium (LOVENOX) syringe 40 mg         40 mg SC Daily --                        Otoniel Mclaughlin MD  [facility]  09/24/23  10:09 EDT

## 2023-09-25 LAB
ANION GAP SERPL CALCULATED.3IONS-SCNC: 11.7 MMOL/L (ref 5–15)
BASOPHILS # BLD AUTO: 0.03 10*3/MM3 (ref 0–0.2)
BASOPHILS NFR BLD AUTO: 0.3 % (ref 0–1.5)
BUN SERPL-MCNC: 17 MG/DL (ref 6–20)
BUN/CREAT SERPL: 38.6 (ref 7–25)
CALCIUM SPEC-SCNC: 9.2 MG/DL (ref 8.6–10.5)
CHLORIDE SERPL-SCNC: 89 MMOL/L (ref 98–107)
CO2 SERPL-SCNC: 39.3 MMOL/L (ref 22–29)
CREAT SERPL-MCNC: 0.44 MG/DL (ref 0.57–1)
DEPRECATED RDW RBC AUTO: 49.1 FL (ref 37–54)
EGFRCR SERPLBLD CKD-EPI 2021: 112.3 ML/MIN/1.73
EOSINOPHIL # BLD AUTO: 0.02 10*3/MM3 (ref 0–0.4)
EOSINOPHIL NFR BLD AUTO: 0.2 % (ref 0.3–6.2)
ERYTHROCYTE [DISTWIDTH] IN BLOOD BY AUTOMATED COUNT: 14.3 % (ref 12.3–15.4)
GLUCOSE BLDC GLUCOMTR-MCNC: 112 MG/DL (ref 70–130)
GLUCOSE BLDC GLUCOMTR-MCNC: 129 MG/DL (ref 70–130)
GLUCOSE BLDC GLUCOMTR-MCNC: 163 MG/DL (ref 70–130)
GLUCOSE BLDC GLUCOMTR-MCNC: 226 MG/DL (ref 70–130)
GLUCOSE SERPL-MCNC: 118 MG/DL (ref 65–99)
HCT VFR BLD AUTO: 42.9 % (ref 34–46.6)
HGB BLD-MCNC: 12.7 G/DL (ref 12–15.9)
HYPOCHROMIA BLD QL: NORMAL
IMM GRANULOCYTES # BLD AUTO: 0.1 10*3/MM3 (ref 0–0.05)
IMM GRANULOCYTES NFR BLD AUTO: 1 % (ref 0–0.5)
LARGE PLATELETS: NORMAL
LYMPHOCYTES # BLD AUTO: 1.31 10*3/MM3 (ref 0.7–3.1)
LYMPHOCYTES NFR BLD AUTO: 12.6 % (ref 19.6–45.3)
MACROCYTES BLD QL SMEAR: NORMAL
MCH RBC QN AUTO: 27.9 PG (ref 26.6–33)
MCHC RBC AUTO-ENTMCNC: 29.6 G/DL (ref 31.5–35.7)
MCV RBC AUTO: 94.3 FL (ref 79–97)
MONOCYTES # BLD AUTO: 0.76 10*3/MM3 (ref 0.1–0.9)
MONOCYTES NFR BLD AUTO: 7.3 % (ref 5–12)
NEUTROPHILS NFR BLD AUTO: 78.6 % (ref 42.7–76)
NEUTROPHILS NFR BLD AUTO: 8.18 10*3/MM3 (ref 1.7–7)
NRBC BLD AUTO-RTO: 0 /100 WBC (ref 0–0.2)
PLATELET # BLD AUTO: 124 10*3/MM3 (ref 140–450)
PMV BLD AUTO: 11.1 FL (ref 6–12)
POTASSIUM SERPL-SCNC: 4.2 MMOL/L (ref 3.5–5.2)
RBC # BLD AUTO: 4.55 10*6/MM3 (ref 3.77–5.28)
SMALL PLATELETS BLD QL SMEAR: NORMAL
SODIUM SERPL-SCNC: 140 MMOL/L (ref 136–145)
WBC NRBC COR # BLD: 10.4 10*3/MM3 (ref 3.4–10.8)

## 2023-09-25 PROCEDURE — 97535 SELF CARE MNGMENT TRAINING: CPT

## 2023-09-25 PROCEDURE — 94799 UNLISTED PULMONARY SVC/PX: CPT

## 2023-09-25 PROCEDURE — 80048 BASIC METABOLIC PNL TOTAL CA: CPT | Performed by: FAMILY MEDICINE

## 2023-09-25 PROCEDURE — 25010000002 ENOXAPARIN PER 10 MG: Performed by: INTERNAL MEDICINE

## 2023-09-25 PROCEDURE — 97530 THERAPEUTIC ACTIVITIES: CPT

## 2023-09-25 PROCEDURE — 85007 BL SMEAR W/DIFF WBC COUNT: CPT | Performed by: FAMILY MEDICINE

## 2023-09-25 PROCEDURE — 63710000001 PREDNISONE PER 1 MG: Performed by: FAMILY MEDICINE

## 2023-09-25 PROCEDURE — 63710000001 INSULIN LISPRO (HUMAN) PER 5 UNITS: Performed by: INTERNAL MEDICINE

## 2023-09-25 PROCEDURE — 94660 CPAP INITIATION&MGMT: CPT

## 2023-09-25 PROCEDURE — 94760 N-INVAS EAR/PLS OXIMETRY 1: CPT

## 2023-09-25 PROCEDURE — 94761 N-INVAS EAR/PLS OXIMETRY MLT: CPT

## 2023-09-25 PROCEDURE — 82948 REAGENT STRIP/BLOOD GLUCOSE: CPT

## 2023-09-25 PROCEDURE — 97110 THERAPEUTIC EXERCISES: CPT

## 2023-09-25 PROCEDURE — 94664 DEMO&/EVAL PT USE INHALER: CPT

## 2023-09-25 PROCEDURE — 85025 COMPLETE CBC W/AUTO DIFF WBC: CPT | Performed by: FAMILY MEDICINE

## 2023-09-25 RX ORDER — BUMETANIDE 1 MG/1
0.5 TABLET ORAL ONCE
Status: COMPLETED | OUTPATIENT
Start: 2023-09-25 | End: 2023-09-25

## 2023-09-25 RX ORDER — ALPRAZOLAM 0.5 MG/1
0.5 TABLET ORAL DAILY PRN
Status: DISCONTINUED | OUTPATIENT
Start: 2023-09-25 | End: 2023-09-27 | Stop reason: HOSPADM

## 2023-09-25 RX ADMIN — ALBUTEROL SULFATE 2.5 MG: 2.5 SOLUTION RESPIRATORY (INHALATION) at 13:17

## 2023-09-25 RX ADMIN — ALBUTEROL SULFATE 2.5 MG: 2.5 SOLUTION RESPIRATORY (INHALATION) at 00:50

## 2023-09-25 RX ADMIN — INSULIN LISPRO 3 UNITS: 100 INJECTION, SOLUTION INTRAVENOUS; SUBCUTANEOUS at 17:31

## 2023-09-25 RX ADMIN — BUDESONIDE AND FORMOTEROL FUMARATE DIHYDRATE 2 PUFF: 160; 4.5 AEROSOL RESPIRATORY (INHALATION) at 06:50

## 2023-09-25 RX ADMIN — ALBUTEROL SULFATE 2.5 MG: 2.5 SOLUTION RESPIRATORY (INHALATION) at 19:06

## 2023-09-25 RX ADMIN — TIOTROPIUM BROMIDE INHALATION SPRAY 2 PUFF: 3.12 SPRAY, METERED RESPIRATORY (INHALATION) at 07:01

## 2023-09-25 RX ADMIN — HYDROCODONE BITARTRATE AND ACETAMINOPHEN 1 TABLET: 7.5; 325 TABLET ORAL at 23:07

## 2023-09-25 RX ADMIN — METOPROLOL TARTRATE 25 MG: 25 TABLET, FILM COATED ORAL at 20:38

## 2023-09-25 RX ADMIN — CASTOR OIL AND BALSAM, PERU 1 APPLICATION: 788; 87 OINTMENT TOPICAL at 20:37

## 2023-09-25 RX ADMIN — ASPIRIN 81 MG: 81 TABLET, COATED ORAL at 08:26

## 2023-09-25 RX ADMIN — ANORECTAL OINTMENT 1 APPLICATION: 15.7; .44; 24; 20.6 OINTMENT TOPICAL at 08:40

## 2023-09-25 RX ADMIN — PREDNISONE 40 MG: 20 TABLET ORAL at 08:26

## 2023-09-25 RX ADMIN — DOXYCYCLINE 100 MG: 100 INJECTION, POWDER, LYOPHILIZED, FOR SOLUTION INTRAVENOUS at 11:45

## 2023-09-25 RX ADMIN — HYDROCODONE BITARTRATE AND ACETAMINOPHEN 1 TABLET: 7.5; 325 TABLET ORAL at 06:12

## 2023-09-25 RX ADMIN — ACETAMINOPHEN 650 MG: 325 TABLET ORAL at 16:38

## 2023-09-25 RX ADMIN — METOPROLOL TARTRATE 25 MG: 25 TABLET, FILM COATED ORAL at 08:26

## 2023-09-25 RX ADMIN — HYDROCODONE BITARTRATE AND ACETAMINOPHEN 1 TABLET: 7.5; 325 TABLET ORAL at 17:34

## 2023-09-25 RX ADMIN — CLOPIDOGREL BISULFATE 75 MG: 75 TABLET, FILM COATED ORAL at 08:26

## 2023-09-25 RX ADMIN — ENOXAPARIN SODIUM 40 MG: 40 INJECTION SUBCUTANEOUS at 08:26

## 2023-09-25 RX ADMIN — HYDROCODONE BITARTRATE AND ACETAMINOPHEN 1 TABLET: 7.5; 325 TABLET ORAL at 11:42

## 2023-09-25 RX ADMIN — ALBUTEROL SULFATE 2.5 MG: 2.5 SOLUTION RESPIRATORY (INHALATION) at 06:51

## 2023-09-25 RX ADMIN — DOXYCYCLINE 100 MG: 100 INJECTION, POWDER, LYOPHILIZED, FOR SOLUTION INTRAVENOUS at 22:48

## 2023-09-25 RX ADMIN — ANORECTAL OINTMENT 1 APPLICATION: 15.7; .44; 24; 20.6 OINTMENT TOPICAL at 20:37

## 2023-09-25 RX ADMIN — CASTOR OIL AND BALSAM, PERU 1 APPLICATION: 788; 87 OINTMENT TOPICAL at 08:38

## 2023-09-25 RX ADMIN — BUMETANIDE 0.5 MG: 1 TABLET ORAL at 11:41

## 2023-09-25 NOTE — THERAPY TREATMENT NOTE
Inpatient Rehabilitation - Occupational Therapy Treatment Note     [place]     Patient Name: Liz Jiang  : 1965  MRN: [id number]    Today's Date: 2023                 Admit Date: 2023       No diagnosis found.    Patient Active Problem List   Diagnosis    Chest pain    COPD (chronic obstructive pulmonary disease)    Tobacco abuse    GERD (gastroesophageal reflux disease)    Anxiety    Arthritis    Nausea and vomiting    Generalized abdominal pain    Right upper quadrant pain    Gallbladder disease    Abnormal biliary HIDA scan    Dyslipidemia    Tachycardia    Anal cancer    Port-A-Cath in place    Debility       Past Medical History:   Diagnosis Date    Acid reflux disease     Anal cancer 2019    Arthritis     Asthma, extrinsic     Cholelithiasis     Chronic headaches     COPD (chronic obstructive pulmonary disease)     CVA (cerebral vascular accident)     w/ right sided deficit    CVA (cerebral vascular accident)     Diabetes mellitus     only has high blood sugar when on steriods    History of radiation therapy 2020    Whole pelvis/anal mass    Obstructive sleep apnea treated with BiPAP     On home oxygen therapy     2L     Sleep apnea, obstructive        Past Surgical History:   Procedure Laterality Date    ABDOMINAL SURGERY      CARDIAC CATHETERIZATION      CAROTID ENDARTERECTOMY Left 2018    CHOLECYSTECTOMY WITH INTRAOPERATIVE CHOLANGIOGRAM N/A 2017    Procedure: CHOLECYSTECTOMY LAPAROSCOPIC INTRAOPERATIVE CHOLANGIOGRAM;  Surgeon: Carolin Marie MD;  Location: [facility];  Service:     COLONOSCOPY      HYSTERECTOMY      for heavy bleeding/ complete    KIDNEY STONE SURGERY      TUBAL ABDOMINAL LIGATION      VENOUS ACCESS DEVICE (PORT) INSERTION Left 2019    Procedure: INSERTION VENOUS ACCESS DEVICE;  Surgeon: Carolin Marie MD;  Location: [facility];  Service: General             IRF OT ASSESSMENT FLOWSHEET (last 12 hours)       IRF OT Evaluation and Treatment        Row Name 09/25/23 1253          OT Time and Intention    Document Type daily treatment  -TM     Mode of Treatment occupational therapy  -TM     Patient Effort adequate  -TM     Symptoms Noted During/After Treatment other (see comments)  Pt reported not feeling good on this date in am with RN aware/addressing. In pm, pt reported feeling better, RN present/aware.  -TM       Row Name 09/25/23 1253          General Information    General Observations of Patient Pt agreeable for therapy.  -TM     Existing Precautions/Restrictions fall;oxygen therapy device and L/min  -TM       Row Name 09/25/23 1253          Cognition/Psychosocial    Orientation Status (Cognition) oriented x 3  -TM     Follows Commands (Cognition) WFL  -TM       Row Name 09/25/23 1253          Lower Body Dressing    [place]ett Level (Lower Body Dressing) standby assist;supervision;verbal cues  -TM     Position (Lower Body Dressing) edge of bed sitting  -TM       Row Name 09/25/23 1253          Grooming    [place]ett Level (Grooming) set up  -TM     Position (Grooming) supported sitting  -TM       Row Name 09/25/23 1253          Toileting    [place] Level (Toileting) supervision;verbal cues;other (see comments)  SBA  -TM     Assistive Device Use (Toileting) grab bar/safety frame  -TM     Position (Toileting) supported sitting  -TM       Row Name 09/25/23 1253          Bed-Chair Transfer    Bed-Chair [place] (Transfers) standby assist;supervision;verbal cues  -TM     Assistive Device (Bed-Chair Transfers) wheelchair  -TM       Row Name 09/25/23 1253          Chair-Bed Transfer    Chair-Bed [place] (Transfers) standby assist;supervision;verbal cues  -TM     Assistive Device (Chair-Bed Transfers) wheelchair  -TM       Row Name 09/25/23 1253          Toilet Transfer    Type (Toilet Transfer) stand pivot/stand step  -TM     [place]arnett Level (Toilet Transfer) standby assist;supervision;verbal cues  -TM     Assistive Device (Toilet  Transfer) grab bars/safety frame;wheelchair  -       Row Name 09/25/23 1253          Motor Skills    Motor Skills coordination;functional endurance;motor control/coordination interventions  -     Motor Control/Coordination Interventions therapeutic exercise/ROM;fine motor manipulation/dexterity activities;gross motor coordination activities;other (see comments)  BUE ther ex/act, GMC/FMC  -               User Key  (r) = Recorded By, (t) = Taken By, (c) = Cosigned By      Initials Name Effective Dates     Nellie Shainaeliseo Chapa, OT 06/16/21 -                      Occupational Therapy Education       Title: PT OT SLP Therapies (Done)       Topic: Occupational Therapy (Done)       Point: ADL training (Done)       Description:   Instruct learner(s) on proper safety adaptation and remediation techniques during self care or transfers.   Instruct in proper use of assistive devices.                  Learning Progress Summary             Patient Acceptance, E,D, VU,NR by TM at 9/25/2023 1241    Acceptance, E, NR,VU by BF at 9/22/2023 1357    Acceptance, E,D, VU,NR by TM at 9/21/2023 1413    Acceptance, TB, NR by DL at 9/20/2023 2059    Acceptance, E,D, VU,NR by TM at 9/20/2023 1503    Acceptance, E,D, VU,NR by TM at 9/19/2023 1406    Acceptance, E,D, VU,NR by TM at 9/18/2023 1359    Acceptance, E, VU,NR by HB at 9/16/2023 1155    Acceptance, E,D, VU,NR by TM at 9/15/2023 1308                         Point: Precautions (Done)       Description:   Instruct learner(s) on prescribed precautions during self-care and functional transfers.                  Learning Progress Summary             Patient Acceptance, E,D, VU,NR by TM at 9/25/2023 1241    Acceptance, E, NR,VU by BF at 9/22/2023 1357    Acceptance, E,D, VU,NR by TM at 9/21/2023 1413    Acceptance, TB, NR by DL at 9/20/2023 2059    Acceptance, E,D, VU,NR by TM at 9/20/2023 1503    Acceptance, E,D, VU,NR by TM at 9/19/2023 1406    Acceptance, E,D, VU,NR by TM at  9/18/2023 1359    Acceptance, E, VU,NR by HB at 9/16/2023 1155    Acceptance, E,D, VU,NR by TM at 9/15/2023 1308                                         User Key       Initials Effective Dates Name Provider Type Discipline    BF 07/11/23 -  Sofi Elkins, OT Occupational Therapist OT    TM 06/16/21 -  Shaina Ballard, OT Occupational Therapist OT    HB 05/25/21 -  Gaby Carty OT Occupational Therapist OT    DL 03/16/22 -  Ofelia Novak, RN Registered Nurse Nurse                        OT Recommendation and Plan    Planned Therapy Interventions (OT): activity tolerance training, adaptive equipment training, BADL retraining, IADL retraining, occupation/activity based interventions, patient/caregiver education/training, ROM/therapeutic exercise, strengthening exercise, transfer/mobility retraining                    Time Calculation:      Time Calculation- OT       Row Name 09/25/23 1308 09/25/23 1241 09/25/23 1240       Time Calculation- OT    OT Start Time 1245  -TM 1050  -TM 0835  -TM    OT Stop Time 1315  -TM 1120  -TM 0915  -TM    OT Time Calculation (min) 30 min  -TM 30 min  -TM 40 min  -TM    Total Timed Code Minutes- OT 30 minute(s)  -TM 30 minute(s)  -TM 40 minute(s)  -TM    OT Non-Billable Time (min) -- -- 15 min  -TM              User Key  (r) = Recorded By, (t) = Taken By, (c) = Cosigned By      Initials Name Provider Type    TM Shaina Ballard, MICHAEL Occupational Therapist                  Therapy Charges for Today       Code Description Service Date Service Provider Modifiers Qty    84141689249 HC OT SELF CARE/MGMT/TRAIN EA 15 MIN 9/25/2023 Shaina Ballard, OT GO 3    58008447382 HC OT THERAPEUTIC ACT EA 15 MIN 9/25/2023 Shaina Ballard, OT GO 3    98393179744 HC OT THER PROC EA 15 MIN 9/25/2023 Shaina Ballard, OT GO 1                     Shania Ballard OT  9/25/2023

## 2023-09-25 NOTE — DISCHARGE INSTR - OTHER ORDERS
[facility] [phone] for PT/OT and nursing.    Pt has DME at home.

## 2023-09-25 NOTE — PROGRESS NOTES
Inpatient Rehabilitation Functional Measures Assessment and Plan of Care    Plan of Care  Self Care    [OT] Dressing (Lower)(Active)  Current Status(09/25/2023): supervision/SBA  Weekly Goal(10/03/2023): supervision/[place]ma  Discharge Goal: supervision/Alma    Performed Intervention(s)  ADL retraining/AE education, fxal mobility, BUE ther ex/act, GMC/FMC    Functional Measures  FENG Eating:  FENG Grooming:  FENG Bathing:  FENG Upper Body Dressing:  FENG Lower Body Dressing:  FENG Toileting:    FENG Bladder Management  Level of Assistance:  Frequency/Number of Accidents this Shift:    FENG Bowel Management  Level of Assistance:  Frequency/Number of Accidents this Shift:    FENG Bed/Chair/Wheelchair Transfer:  FENG Toilet Transfer:  FENG Tub/Shower Transfer:    Previously Documented Mode of Locomotion at Discharge:  [facility] Expected Mode of Locomotion at Discharge:  FENG Walk/Wheelchair:  FENG Stairs:    FENG Comprehension:  FENG Expression:  FENG Social Interaction:  FENG Problem Solving:  FENG Memory:    Therapy Mode Minutes  Occupational Therapy: Individual: 100 minutes.  Physical Therapy:  Speech Language Pathology:    Discharge Functional Goals:    Signed by: Shaina Ballard OT

## 2023-09-25 NOTE — PLAN OF CARE
Problem: Fall Injury Risk  Goal: Absence of Fall and Fall-Related Injury  Outcome: Ongoing, Progressing     Problem: Rehabilitation (IRF) Plan of Care  Goal: Plan of Care Review  Outcome: Ongoing, Progressing  Flowsheets (Taken 9/25/2023 1052)  Progress: improving  Plan of Care Reviewed With: patient  Goal: Patient-Specific Goal (Individualized)  Outcome: Ongoing, Progressing  Goal: Absence of New-Onset Illness or Injury  Outcome: Ongoing, Progressing  Goal: Optimal Comfort and Wellbeing  Outcome: Ongoing, Progressing  Goal: Home and Community Transition Plan Established  Outcome: Ongoing, Progressing     Problem: Skin Injury Risk Increased  Goal: Skin Health and Integrity  Outcome: Ongoing, Progressing     Problem: Pain Chronic (Persistent) (Comorbidity Management)  Goal: Acceptable Pain Control and Functional Ability  Outcome: Ongoing, Progressing     Problem: Adjustment to Illness (Sepsis/Septic Shock)  Goal: Optimal Coping  Outcome: Ongoing, Progressing     Problem: Bleeding (Sepsis/Septic Shock)  Goal: Absence of Bleeding  Outcome: Ongoing, Progressing     Problem: Glycemic Control Impaired (Sepsis/Septic Shock)  Goal: Blood Glucose Level Within Desired Range  Outcome: Ongoing, Progressing     Problem: Infection Progression (Sepsis/Septic Shock)  Goal: Absence of Infection Signs and Symptoms  Outcome: Ongoing, Progressing     Problem: Nutrition Impaired (Sepsis/Septic Shock)  Goal: Optimal Nutrition Intake  Outcome: Ongoing, Progressing     Problem: Impaired Wound Healing  Goal: Optimal Wound Healing  Outcome: Ongoing, Progressing   Goal Outcome Evaluation:  Plan of Care Reviewed With: patient        Progress: improving

## 2023-09-25 NOTE — SIGNIFICANT NOTE
09/25/23 7732   Plan   Plan Contacted [facility] [phone] per Yi with referral, possible discharge on 9-27-23, need for HH PT 2-3 x wk x 8 wks for strengthening, endurance, gait training, transfer training, balance, therapeutic exercise, bed mobility, home safety, OT 2-3 x wk x 8 wks for ADL re-training, home safety, functional mobility, strengthening, and nursing for cardiopulmonary assessments.  Faxed face sheet, H&P, PT/OT notes, and MD progress notes to HH via epic.  HH to be contacted at discharge.  Ambulatory referral for home health with face to face and discharge summary to be faxed at discharge.

## 2023-09-25 NOTE — PROGRESS NOTES
Rehabilitation Nursing  Inpatient Rehabilitation Plan of Care Note    Plan of Care  Copy from Dez    Pain Management (Active)  Current Status (9/14/2023 6:29:00 PM): Potential for increased pain  Weekly Goal: Pain adequately managed  Discharge Goal: Pain adequately managed    Body Function Structure    Skin Integrity (Active)  Current Status (9/14/2023 6:29:00 PM): Stage 2 pressure injuries  Weekly Goal: Healing of wound  Discharge Goal: Healing of wound    Safety    Potential for Injury (Active)  Current Status (9/14/2023 6:29:00 PM): Potential for falls  Weekly Goal: No falls  Discharge Goal: No falls    Signed by: Charlee Mehta RN

## 2023-09-25 NOTE — SIGNIFICANT NOTE
09/25/23 1357   OTHER   Discipline physical therapy assistant   Rehab Time/Intention   Session Not Performed patient/family declined treatment  (Pt declined PT session in AM stating that she had just gotten back in bed and on Bipap secondary to soa. Will f/u at later date.)

## 2023-09-25 NOTE — PROGRESS NOTES
CC: Follow-up on debility status post hospitalization for respiratory failure.    Interview History/HPI: Patient did have what sounds to be either an exacerbation of diastolic CHF or COPD exacerbation in the last few days.  Patient has been changed from her trilogy to a BiPAP and she states she can tell she is at least 50% improved.  She states she feels much better this morning.  She does not think she has much edema.  Minimal cough now.  She is tolerating her diet without specific complaints, no current chest pain.          Current Hospital Meds:  albuterol, 2.5 mg, Nebulization, Q6H - RT  aspirin, 81 mg, Oral, Daily  budesonide-formoterol, 2 puff, Inhalation, BID - RT  castor oil-balsam peru, 1 application , Topical, Q12H  clopidogrel, 75 mg, Oral, Daily  doxycycline, 100 mg, Intravenous, Q12H  enoxaparin, 40 mg, Subcutaneous, Daily  insulin lispro, 2-7 Units, Subcutaneous, TID With Meals  Menthol-Zinc Oxide, 1 application , Topical, BID  metoprolol tartrate, 25 mg, Oral, Q12H  predniSONE, 40 mg, Oral, Daily  tiotropium bromide monohydrate, 2 puff, Inhalation, Daily - RT    Pharmacy Consult,         Vitals:    09/25/23 0651   BP:    Pulse:    Resp: 20   Temp:    SpO2: 92%         Intake/Output Summary (Last 24 hours) at 9/25/2023 0810  Last data filed at 9/25/2023 0500  Gross per 24 hour   Intake 1880 ml   Output 1800 ml   Net 80 ml       EXAM: 116/78, temperature was 98.1, lungs have bilateral breath sounds, overall fairly good air excursion, diminished bases, no wheezing heard today.  She has mild tachypnea with normal conversation but not using  muscles, heart regular rate and rhythm without murmur rub or gallop, extremities without edema, strength is symmetric throughout, mood is adequate.      Diet: Regular/House Diet, Diabetic Diets, Renal Diets; Consistent Carbohydrate; Low Sodium (2-3g); Texture: Regular Texture (IDDSI 7); Fluid Consistency: Thin (IDDSI 0)        LABS:     Lab Results (last 48  hours)       Procedure Component Value Units Date/Time    Basic Metabolic Panel [[id number]]  (Abnormal) Collected: 09/25/23 0655    Specimen: Blood Updated: 09/25/23 0747     Glucose 118 mg/dL      BUN 17 mg/dL      Creatinine 0.44 mg/dL      Sodium 140 mmol/L      Potassium 4.2 mmol/L      Comment: Slight hemolysis detected by analyzer. Results may be affected.        Chloride 89 mmol/L      CO2 39.3 mmol/L      Calcium 9.2 mg/dL      BUN/Creatinine Ratio 38.6     Anion Gap 11.7 mmol/L      eGFR 112.3 mL/min/1.73     Narrative:      GFR Normal >60  Chronic Kidney Disease <60  Kidney Failure <15      CBC & Differential [[id number]]  (Abnormal) Collected: 09/25/23 0655    Specimen: Blood Updated: 09/25/23 0714    Narrative:      The following orders were created for panel order CBC & Differential.  Procedure                               Abnormality         Status                     ---------                               -----------         ------                     CBC Auto Differential[[id number]]        Abnormal            Final result               Scan Slide[[id number]]                                       Final result                 Please view results for these tests on the individual orders.    Scan Slide [[id number]] Collected: 09/25/23 0655    Specimen: Blood Updated: 09/25/23 0714     Hypochromia Mod/2+     Macrocytes Slight/1+     Platelet Estimate Decreased     Large Platelets Slight/1+    CBC Auto Differential [[id number]]  (Abnormal) Collected: 09/25/23 0655    Specimen: Blood Updated: 09/25/23 0714     WBC 10.40 10*3/mm3      RBC 4.55 10*6/mm3      Hemoglobin 12.7 g/dL      Hematocrit 42.9 %      MCV 94.3 fL      MCH 27.9 pg      MCHC 29.6 g/dL      RDW 14.3 %      RDW-SD 49.1 fl      MPV 11.1 fL      Platelets 124 10*3/mm3      Neutrophil % 78.6 %      Lymphocyte % 12.6 %      Monocyte % 7.3 %      Eosinophil % 0.2 %      Basophil % 0.3 %      Immature Grans % 1.0 %      Neutrophils, Absolute 8.18  10*3/mm3      Lymphocytes, Absolute 1.31 10*3/mm3      Monocytes, Absolute 0.76 10*3/mm3      Eosinophils, Absolute 0.02 10*3/mm3      Basophils, Absolute 0.03 10*3/mm3      Immature Grans, Absolute 0.10 10*3/mm3      nRBC 0.0 /100 WBC     POC Glucose Once [[id number]]  (Normal) Collected: 09/25/23 0606    Specimen: Blood Updated: 09/25/23 0612     Glucose 112 mg/dL     Blood Culture - Blood, Arm, Left [[id number]]  (Normal) Collected: 09/21/23 2218    Specimen: Blood from Arm, Left Updated: 09/24/23 2231     Blood Culture No growth at 3 days    Blood Culture - Blood, Arm, Right [[id number]]  (Normal) Collected: 09/21/23 2218    Specimen: Blood from Arm, Right Updated: 09/24/23 2231     Blood Culture No growth at 3 days    POC Glucose Once [[id number]]  (Abnormal) Collected: 09/24/23 2046    Specimen: Blood Updated: 09/24/23 2053     Glucose 234 mg/dL     POC Glucose Once [[id number]]  (Abnormal) Collected: 09/24/23 1607    Specimen: Blood Updated: 09/24/23 1613     Glucose 190 mg/dL     POC Glucose Once [[id number]]  (Abnormal) Collected: 09/24/23 1124    Specimen: Blood Updated: 09/24/23 1130     Glucose 167 mg/dL     POC Glucose Once [[id number]]  (Normal) Collected: 09/24/23 0637    Specimen: Blood Updated: 09/24/23 0646     Glucose 121 mg/dL     CBC & Differential [[id number]]  (Abnormal) Collected: 09/24/23 0030    Specimen: Blood Updated: 09/24/23 0203    Narrative:      The following orders were created for panel order CBC & Differential.  Procedure                               Abnormality         Status                     ---------                               -----------         ------                     CBC Auto Differential[[id number]]        Abnormal            Final result               Scan Slide[[id number]]                                       Final result                 Please view results for these tests on the individual orders.    Scan Slide [[id number]] Collected: 09/24/23 0030    Specimen: Blood  Updated: 09/24/23 0203     Hypochromia Slight/1+     Microcytes Slight/1+     Platelet Morphology Normal    CBC Auto Differential [[id number]]  (Abnormal) Collected: 09/24/23 0030    Specimen: Blood Updated: 09/24/23 0203     WBC 11.40 10*3/mm3      RBC 4.19 10*6/mm3      Hemoglobin 11.6 g/dL      Hematocrit 40.8 %      MCV 97.4 fL      MCH 27.7 pg      MCHC 28.4 g/dL      RDW 14.0 %      RDW-SD 49.9 fl      MPV 11.3 fL      Platelets 116 10*3/mm3      Neutrophil % 83.1 %      Lymphocyte % 8.8 %      Monocyte % 6.3 %      Eosinophil % 0.5 %      Basophil % 0.2 %      Immature Grans % 1.1 %      Neutrophils, Absolute 9.47 10*3/mm3      Lymphocytes, Absolute 1.00 10*3/mm3      Monocytes, Absolute 0.72 10*3/mm3      Eosinophils, Absolute 0.06 10*3/mm3      Basophils, Absolute 0.02 10*3/mm3      Immature Grans, Absolute 0.13 10*3/mm3      nRBC 0.0 /100 WBC     Basic Metabolic Panel [[id number]]  (Abnormal) Collected: 09/24/23 0030    Specimen: Blood Updated: 09/24/23 0112     Glucose 130 mg/dL      BUN 22 mg/dL      Creatinine 0.40 mg/dL      Sodium 138 mmol/L      Potassium 3.5 mmol/L      Comment: Slight hemolysis detected by analyzer. Results may be affected.        Chloride 89 mmol/L      CO2 46.4 mmol/L      Calcium 8.6 mg/dL      BUN/Creatinine Ratio 55.0     Anion Gap 2.6 mmol/L      eGFR 114.9 mL/min/1.73     Narrative:      GFR Normal >60  Chronic Kidney Disease <60  Kidney Failure <15      POC Glucose Once [[id number]]  (Abnormal) Collected: 09/23/23 1926    Specimen: Blood Updated: 09/23/23 1946     Glucose 147 mg/dL     POC Glucose Once [[id number]]  (Normal) Collected: 09/23/23 1609    Specimen: Blood Updated: 09/23/23 1615     Glucose 112 mg/dL     POC Glucose Once [[id number]]  (Abnormal) Collected: 09/23/23 1139    Specimen: Blood Updated: 09/23/23 1145     Glucose 138 mg/dL                  Radiology:    Imaging Results (Last 72 Hours)       Procedure Component Value Units Date/Time    XR Chest 1 View  [[id number]] Collected: 09/24/23 1125     Updated: 09/24/23 1128    Narrative:      EXAM:    XR Chest, 1 View     EXAM DATE:    9/24/2023 10:47 AM     CLINICAL HISTORY:    followup pulm edema     TECHNIQUE:    Frontal view of the chest.     COMPARISON:    9/21/2023     FINDINGS:    Lungs and pleural spaces:  Improvement in interstitial thickening.   Vascular congestion slightly improved.  No pneumothorax.    Heart:  Cardiomegaly slightly improved.    Mediastinum:  Unremarkable as visualized.    Bones/joints:  Bony structures are stable.    Tubes, lines and devices:  Left Port-A-Cath.       Impression:      1.  Mild radiographic improvement in changes of CHF/edema.  2.  Otherwise stable chest.        This report was finalized on 9/24/2023 11:26 AM by Dr. Dionte Hodgson MD.               Results for orders placed during the hospital encounter of 08/24/21    Adult Transthoracic Echo Complete W/ Cont if Necessary Per Protocol    Interpretation Summary  · Left ventricular ejection fraction appears to be 66 - 70%. Left ventricular systolic function is normal.  · Left ventricular diastolic function is consistent with (grade I) impaired relaxation.      Assessment/Plan:   Debility status post prolonged hospitalization, with PT, patient supervision for sit to stand and stand to sit, with a front wheeled walker.  5 independent for toilet transfer, patient with a front wheel walker walk 25 then 10 feet with supervision.  With OT, last session patient's O2 dropped to 62% while she was on 4 L transferring from the toilet.  O2 returned to 90% after increasing O2 briefly.  Patient was contact-guard for lower body dressing, set up for grooming, supervision to contact-guard for toileting hygiene, contact-guard with transfers with OT.  Patient states she does have a daughter that lives with her that is in and out.    COPD, very severe by PFTs even 2 years ago, chest x-ray from yesterday image reviewed, mild changes of CHF.  EF normal  at least as of 7/21 with grade 1 diastolic changes.  Patient has had a Lasix over the last 3 days, she overall appears euvolemic.  Vital signs are acceptable, I am going to give 1 additional dose of Bumex p.o. today.  Patient completed Zosyn for 3 days and is completing doxycycline course, patient was also restarted on prednisone at 40 mg as well.  She is on Spiriva, she is feeling better also after wearing BiPAP.  Overall improved.    Hyperglycemia, HbA1c was 6.8, glucoses are controlled at the current time off medication.  (Sliding scale only)    DVT prophylaxis, subcu Lovenox    Severe obesity by BMI of 38, adversely affecting her overall rehabilitation and health.    Leukocytosis, improved    Thrombocytopenia, appears to be improving.    Fransisco Barrett MD

## 2023-09-25 NOTE — SIGNIFICANT NOTE
09/25/23 0906   Plan   Plan Spoke to pt about recommendations for home health PT, OT, and nursing.  Informed her about home health agencies in [facility].  Pt says to ask son Pradeep about HH preference.  Pt voiced having wheelchair at home.  Pt is unsure if she will be medically ready for discharge on 9-27-23.  Team conference will be held on 9-26-23.  Contacted dameon Fernández [phone] about recommendations for home health and choices for agencies.  [facility] is preferred by son.  Informed him about team conference meeting in the am to discuss if pt is ready for discharge on 9-27-23.  Son says pt will have 24 hour assistance from family when she returns home.  Daughter Paloma, son Will and Will's significant other Veronica will be helping pt at home.  Dameon Fernández is supportive too. Pt has a portable O2 concentrator in her room for transport home.  Son Tae and his significant other Veronica will transport pt home on 9-27-23 if she is discharged.  Will follow.

## 2023-09-25 NOTE — PROGRESS NOTES
Nutrition Services    Patient Name:  Liz Jiang  YOB: 1965  MRN: [id number]  Admit Date:  9/14/2023    Will liberalized diet to only include only CCD and Cardiac to help promote intakes.  Will continue to follow and monitor pertinent labs, weights, and intakes.      Electronically signed by:  Paloma Blanc RD  09/25/23 11:14 EDT

## 2023-09-25 NOTE — PROGRESS NOTES
Occupational Therapy:    Physical Therapy: Individual: 0 minutes.    Speech Language Pathology:    Signed by: Brijesh Silver PTA

## 2023-09-25 NOTE — SIGNIFICANT NOTE
09/25/23 1545   OTHER   Discipline physical therapy assistant   Rehab Time/Intention   Session Not Performed patient/family declined treatment  (Refused treatment this date due to c/o SOA; pt on Bi-pap on several occasions.)   Recommendation   PT - Next Appointment 09/26/23

## 2023-09-26 LAB
BACTERIA SPEC AEROBE CULT: NORMAL
BACTERIA SPEC AEROBE CULT: NORMAL
GLUCOSE BLDC GLUCOMTR-MCNC: 121 MG/DL (ref 70–130)
GLUCOSE BLDC GLUCOMTR-MCNC: 131 MG/DL (ref 70–130)
GLUCOSE BLDC GLUCOMTR-MCNC: 131 MG/DL (ref 70–130)
GLUCOSE BLDC GLUCOMTR-MCNC: 168 MG/DL (ref 70–130)

## 2023-09-26 PROCEDURE — 94664 DEMO&/EVAL PT USE INHALER: CPT

## 2023-09-26 PROCEDURE — 63710000001 INSULIN LISPRO (HUMAN) PER 5 UNITS: Performed by: INTERNAL MEDICINE

## 2023-09-26 PROCEDURE — 63710000001 PREDNISONE PER 1 MG: Performed by: FAMILY MEDICINE

## 2023-09-26 PROCEDURE — 97530 THERAPEUTIC ACTIVITIES: CPT

## 2023-09-26 PROCEDURE — 94761 N-INVAS EAR/PLS OXIMETRY MLT: CPT

## 2023-09-26 PROCEDURE — 97110 THERAPEUTIC EXERCISES: CPT

## 2023-09-26 PROCEDURE — 94799 UNLISTED PULMONARY SVC/PX: CPT

## 2023-09-26 PROCEDURE — 94660 CPAP INITIATION&MGMT: CPT

## 2023-09-26 PROCEDURE — 25010000002 ENOXAPARIN PER 10 MG: Performed by: INTERNAL MEDICINE

## 2023-09-26 PROCEDURE — 97535 SELF CARE MNGMENT TRAINING: CPT

## 2023-09-26 PROCEDURE — 82948 REAGENT STRIP/BLOOD GLUCOSE: CPT

## 2023-09-26 RX ORDER — BUMETANIDE 1 MG/1
0.5 TABLET ORAL ONCE
Status: COMPLETED | OUTPATIENT
Start: 2023-09-26 | End: 2023-09-26

## 2023-09-26 RX ADMIN — HYDROCODONE BITARTRATE AND ACETAMINOPHEN 1 TABLET: 7.5; 325 TABLET ORAL at 11:09

## 2023-09-26 RX ADMIN — ENOXAPARIN SODIUM 40 MG: 40 INJECTION SUBCUTANEOUS at 09:00

## 2023-09-26 RX ADMIN — LOPERAMIDE HYDROCHLORIDE 2 MG: 2 CAPSULE ORAL at 08:00

## 2023-09-26 RX ADMIN — ASPIRIN 81 MG: 81 TABLET, COATED ORAL at 09:00

## 2023-09-26 RX ADMIN — ANORECTAL OINTMENT 1 APPLICATION: 15.7; .44; 24; 20.6 OINTMENT TOPICAL at 09:12

## 2023-09-26 RX ADMIN — PREDNISONE 40 MG: 20 TABLET ORAL at 09:00

## 2023-09-26 RX ADMIN — BUDESONIDE AND FORMOTEROL FUMARATE DIHYDRATE 2 PUFF: 160; 4.5 AEROSOL RESPIRATORY (INHALATION) at 07:22

## 2023-09-26 RX ADMIN — HYDROCODONE BITARTRATE AND ACETAMINOPHEN 1 TABLET: 7.5; 325 TABLET ORAL at 17:33

## 2023-09-26 RX ADMIN — CLOPIDOGREL BISULFATE 75 MG: 75 TABLET, FILM COATED ORAL at 09:00

## 2023-09-26 RX ADMIN — ALPRAZOLAM 0.5 MG: 0.5 TABLET ORAL at 23:06

## 2023-09-26 RX ADMIN — CASTOR OIL AND BALSAM, PERU 1 APPLICATION: 788; 87 OINTMENT TOPICAL at 09:02

## 2023-09-26 RX ADMIN — METOPROLOL TARTRATE 25 MG: 25 TABLET, FILM COATED ORAL at 20:37

## 2023-09-26 RX ADMIN — METOPROLOL TARTRATE 25 MG: 25 TABLET, FILM COATED ORAL at 09:00

## 2023-09-26 RX ADMIN — TIOTROPIUM BROMIDE INHALATION SPRAY 2 PUFF: 3.12 SPRAY, METERED RESPIRATORY (INHALATION) at 07:21

## 2023-09-26 RX ADMIN — DOXYCYCLINE 100 MG: 100 INJECTION, POWDER, LYOPHILIZED, FOR SOLUTION INTRAVENOUS at 11:54

## 2023-09-26 RX ADMIN — INSULIN LISPRO 2 UNITS: 100 INJECTION, SOLUTION INTRAVENOUS; SUBCUTANEOUS at 17:33

## 2023-09-26 RX ADMIN — LOPERAMIDE HYDROCHLORIDE 2 MG: 2 CAPSULE ORAL at 16:06

## 2023-09-26 RX ADMIN — BUMETANIDE 0.5 MG: 1 TABLET ORAL at 11:10

## 2023-09-26 RX ADMIN — HYDROCODONE BITARTRATE AND ACETAMINOPHEN 1 TABLET: 7.5; 325 TABLET ORAL at 23:06

## 2023-09-26 RX ADMIN — ALBUTEROL SULFATE 2.5 MG: 2.5 SOLUTION RESPIRATORY (INHALATION) at 13:40

## 2023-09-26 RX ADMIN — ALBUTEROL SULFATE 2.5 MG: 2.5 SOLUTION RESPIRATORY (INHALATION) at 07:22

## 2023-09-26 RX ADMIN — HYDROCODONE BITARTRATE AND ACETAMINOPHEN 1 TABLET: 7.5; 325 TABLET ORAL at 05:00

## 2023-09-26 NOTE — PLAN OF CARE
Problem: Fall Injury Risk  Goal: Absence of Fall and Fall-Related Injury  Outcome: Ongoing, Progressing  Intervention: Identify and Manage Contributors  Recent Flowsheet Documentation  Taken 9/26/2023 0745 by Lionel Hutchins, RN  Medication Review/Management: medications reviewed  Intervention: Promote Injury-Free Environment  Recent Flowsheet Documentation  Taken 9/26/2023 1802 by Lionel Hutchins, RN  Safety Promotion/Fall Prevention: safety round/check completed  Taken 9/26/2023 1603 by Lionel Hutchins, RN  Safety Promotion/Fall Prevention: safety round/check completed  Taken 9/26/2023 1404 by Lionel Hutchins, RN  Safety Promotion/Fall Prevention: safety round/check completed  Taken 9/26/2023 1204 by Lionel Hutchins, RN  Safety Promotion/Fall Prevention: safety round/check completed  Taken 9/26/2023 1002 by Lionel Hutchins, RN  Safety Promotion/Fall Prevention: safety round/check completed  Taken 9/26/2023 0745 by Lionel Hutchins, RN  Safety Promotion/Fall Prevention:   safety round/check completed   nonskid shoes/slippers when out of bed   gait belt   fall prevention program maintained   clutter free environment maintained   assistive device/personal items within reach     Problem: Rehabilitation (IRF) Plan of Care  Goal: Plan of Care Review  Outcome: Ongoing, Progressing  Flowsheets (Taken 9/26/2023 1216)  Progress: improving  Plan of Care Reviewed With: patient  Goal: Patient-Specific Goal (Individualized)  Outcome: Ongoing, Progressing  Goal: Absence of New-Onset Illness or Injury  Outcome: Ongoing, Progressing  Intervention: Prevent Fall and Fall Injury  Recent Flowsheet Documentation  Taken 9/26/2023 1802 by Lionel Hutchins, RN  Safety Promotion/Fall Prevention: safety round/check completed  Taken 9/26/2023 1603 by Lionel Hutchins, RN  Safety Promotion/Fall Prevention: safety round/check completed  Taken 9/26/2023 1404 by Lionel Hutchins, RN  Safety Promotion/Fall Prevention: safety round/check  completed  Taken 9/26/2023 1204 by Lionel Hutchins, RN  Safety Promotion/Fall Prevention: safety round/check completed  Taken 9/26/2023 1002 by Lionel Hutchins RN  Safety Promotion/Fall Prevention: safety round/check completed  Taken 9/26/2023 0745 by Lionel Hutchins, RN  Safety Promotion/Fall Prevention:   safety round/check completed   nonskid shoes/slippers when out of bed   gait belt   fall prevention program maintained   clutter free environment maintained   assistive device/personal items within reach  Goal: Optimal Comfort and Wellbeing  Outcome: Ongoing, Progressing  Goal: Home and Community Transition Plan Established  Outcome: Ongoing, Progressing     Problem: Skin Injury Risk Increased  Goal: Skin Health and Integrity  Outcome: Ongoing, Progressing  Intervention: Promote and Optimize Oral Intake  Recent Flowsheet Documentation  Taken 9/26/2023 0745 by Lionel Hutchins, RN  Oral Nutrition Promotion: physical activity promoted  Intervention: Optimize Skin Protection  Recent Flowsheet Documentation  Taken 9/26/2023 0745 by Lionel Hutchins, RN  Pressure Reduction Techniques:   pressure points protected   heels elevated off bed   frequent weight shift encouraged  Pressure Reduction Devices: pressure-redistributing mattress utilized     Problem: Pain Chronic (Persistent) (Comorbidity Management)  Goal: Acceptable Pain Control and Functional Ability  Outcome: Ongoing, Progressing  Intervention: Manage Persistent Pain  Recent Flowsheet Documentation  Taken 9/26/2023 0745 by Lionel Hutchins RN  Medication Review/Management: medications reviewed  Intervention: Develop Pain Management Plan  Recent Flowsheet Documentation  Taken 9/26/2023 0745 by Lionel Hutchins, RN  Pain Management Interventions: see MAR  Intervention: Optimize Psychosocial Wellbeing  Recent Flowsheet Documentation  Taken 9/26/2023 0745 by Lionel Hutchins, RN  Supportive Measures:   self-care encouraged   self-reflection promoted    self-responsibility promoted   verbalization of feelings encouraged  Diversional Activities: smartphone  Family/Support System Care:   self-care encouraged   support provided     Problem: Adjustment to Illness (Sepsis/Septic Shock)  Goal: Optimal Coping  Outcome: Ongoing, Progressing  Intervention: Optimize Psychosocial Adjustment to Illness  Recent Flowsheet Documentation  Taken 9/26/2023 0745 by Lionel Hutchins, RN  Supportive Measures:   self-care encouraged   self-reflection promoted   self-responsibility promoted   verbalization of feelings encouraged  Family/Support System Care:   self-care encouraged   support provided     Problem: Bleeding (Sepsis/Septic Shock)  Goal: Absence of Bleeding  Outcome: Ongoing, Progressing     Problem: Glycemic Control Impaired (Sepsis/Septic Shock)  Goal: Blood Glucose Level Within Desired Range  Outcome: Ongoing, Progressing  Intervention: Optimize Glycemic Control  Recent Flowsheet Documentation  Taken 9/26/2023 0745 by Lionel Hutchins, RN  Glycemic Management: blood glucose monitored     Problem: Infection Progression (Sepsis/Septic Shock)  Goal: Absence of Infection Signs and Symptoms  Outcome: Ongoing, Progressing     Problem: Nutrition Impaired (Sepsis/Septic Shock)  Goal: Optimal Nutrition Intake  Outcome: Ongoing, Progressing     Problem: Impaired Wound Healing  Goal: Optimal Wound Healing  Outcome: Ongoing, Progressing  Intervention: Promote Wound Healing  Recent Flowsheet Documentation  Taken 9/26/2023 0745 by Lionel Hutchins, RN  Oral Nutrition Promotion: physical activity promoted  Pain Management Interventions: see MAR   Goal Outcome Evaluation:  Plan of Care Reviewed With: patient        Progress: improving

## 2023-09-26 NOTE — SIGNIFICANT NOTE
09/26/23 1410   Plan   Plan Team conference held today.  Spoke to pt about being scheduled for discharge on 9-27-23, how she is doing in therapy, and referral to [facility] for PT, OT, nursing.  Pt has DME at home.  Family are suppose to provide 24 hour assistance at home.  Pt says she does not feel like she is ready for discharge tomorrow.  Pt says she is feeling better and says she completed her therapy time today.  Reviewed requirement for 3 hours of therapy per day/5 days per week.   MD is aware and will evaluate in the am.  Contacted nathanael Fernández [phone] with this information who says his brother Will and his significant other Veronica can transport pt home tomorrow if she is discharged.  Nathanael Fernández to be contacted on 9-27-23 to confirm if pt will be discharged or staying longer in rehab.  Will follow.

## 2023-09-26 NOTE — PROGRESS NOTES
Assisted By: Jessica RODRIGUEZ    CC: Follow-up on debility from prolonged hospitalization and chronic respiratory failure.    Interview History/HPI: Patient was working with occupational therapy.  He feels like she is getting stronger but does not feel like she is strong as she should be before she goes home.  She has not been participating in many of her physical therapy sessions however.  She feels her breathing is doing about the same, she wears 4 L at home.          Current Hospital Meds:  albuterol, 2.5 mg, Nebulization, Q6H - RT  aspirin, 81 mg, Oral, Daily  budesonide-formoterol, 2 puff, Inhalation, BID - RT  castor oil-balsam peru, 1 application , Topical, Q12H  clopidogrel, 75 mg, Oral, Daily  doxycycline, 100 mg, Intravenous, Q12H  enoxaparin, 40 mg, Subcutaneous, Daily  insulin lispro, 2-7 Units, Subcutaneous, TID With Meals  Menthol-Zinc Oxide, 1 application , Topical, BID  metoprolol tartrate, 25 mg, Oral, Q12H  predniSONE, 40 mg, Oral, Daily  tiotropium bromide monohydrate, 2 puff, Inhalation, Daily - RT    Pharmacy Consult,         Vitals:    09/26/23 0722   BP: 127/90   Pulse: 91   Resp: 20   Temp: 99 °F (37.2 °C)   SpO2: 99%         Intake/Output Summary (Last 24 hours) at 9/26/2023 0927  Last data filed at 9/26/2023 0700  Gross per 24 hour   Intake 1180 ml   Output --   Net 1180 ml       EXAM: Patient is sitting at the OT table, she can speak in full sentences with some tachypnea with normal conversation which I feel like is probably her baseline.  Not using  muscles, lungs have bilateral breath sounds in all the diminished air clear throughout anterior and posterior, heart regular rate and rhythm, she has no edema.      Diet: Regular/House Diet, Diabetic Diets; Consistent Carbohydrate; Texture: Regular Texture (IDDSI 7); Fluid Consistency: Thin (IDDSI 0)        LABS:     Lab Results (last 48 hours)       Procedure Component Value Units Date/Time    POC Glucose Once [[id number]]  (Normal)  Collected: 09/26/23 0603    Specimen: Blood Updated: 09/26/23 0609     Glucose 121 mg/dL     Blood Culture - Blood, Arm, Left [[id number]]  (Normal) Collected: 09/21/23 2218    Specimen: Blood from Arm, Left Updated: 09/25/23 2231     Blood Culture No growth at 4 days    Blood Culture - Blood, Arm, Right [[id number]]  (Normal) Collected: 09/21/23 2218    Specimen: Blood from Arm, Right Updated: 09/25/23 2231     Blood Culture No growth at 4 days    POC Glucose Once [[id number]]  (Abnormal) Collected: 09/25/23 1955    Specimen: Blood Updated: 09/25/23 2002     Glucose 163 mg/dL     POC Glucose Once [[id number]]  (Abnormal) Collected: 09/25/23 1627    Specimen: Blood Updated: 09/25/23 1633     Glucose 226 mg/dL     POC Glucose Once [[id number]]  (Normal) Collected: 09/25/23 1110    Specimen: Blood Updated: 09/25/23 1120     Glucose 129 mg/dL     Basic Metabolic Panel [[id number]]  (Abnormal) Collected: 09/25/23 0655    Specimen: Blood Updated: 09/25/23 0747     Glucose 118 mg/dL      BUN 17 mg/dL      Creatinine 0.44 mg/dL      Sodium 140 mmol/L      Potassium 4.2 mmol/L      Comment: Slight hemolysis detected by analyzer. Results may be affected.        Chloride 89 mmol/L      CO2 39.3 mmol/L      Calcium 9.2 mg/dL      BUN/Creatinine Ratio 38.6     Anion Gap 11.7 mmol/L      eGFR 112.3 mL/min/1.73     Narrative:      GFR Normal >60  Chronic Kidney Disease <60  Kidney Failure <15      CBC & Differential [[id number]]  (Abnormal) Collected: 09/25/23 0655    Specimen: Blood Updated: 09/25/23 0714    Narrative:      The following orders were created for panel order CBC & Differential.  Procedure                               Abnormality         Status                     ---------                               -----------         ------                     CBC Auto Differential[[id number]]        Abnormal            Final result               Scan Slide[[id number]]                                       Final result                  Please view results for these tests on the individual orders.    Scan Slide [[id number]] Collected: 09/25/23 0655    Specimen: Blood Updated: 09/25/23 0714     Hypochromia Mod/2+     Macrocytes Slight/1+     Platelet Estimate Decreased     Large Platelets Slight/1+    CBC Auto Differential [[id number]]  (Abnormal) Collected: 09/25/23 0655    Specimen: Blood Updated: 09/25/23 0714     WBC 10.40 10*3/mm3      RBC 4.55 10*6/mm3      Hemoglobin 12.7 g/dL      Hematocrit 42.9 %      MCV 94.3 fL      MCH 27.9 pg      MCHC 29.6 g/dL      RDW 14.3 %      RDW-SD 49.1 fl      MPV 11.1 fL      Platelets 124 10*3/mm3      Neutrophil % 78.6 %      Lymphocyte % 12.6 %      Monocyte % 7.3 %      Eosinophil % 0.2 %      Basophil % 0.3 %      Immature Grans % 1.0 %      Neutrophils, Absolute 8.18 10*3/mm3      Lymphocytes, Absolute 1.31 10*3/mm3      Monocytes, Absolute 0.76 10*3/mm3      Eosinophils, Absolute 0.02 10*3/mm3      Basophils, Absolute 0.03 10*3/mm3      Immature Grans, Absolute 0.10 10*3/mm3      nRBC 0.0 /100 WBC     POC Glucose Once [[id number]]  (Normal) Collected: 09/25/23 0606    Specimen: Blood Updated: 09/25/23 0612     Glucose 112 mg/dL     POC Glucose Once [[id number]]  (Abnormal) Collected: 09/24/23 2046    Specimen: Blood Updated: 09/24/23 2053     Glucose 234 mg/dL     POC Glucose Once [[id number]]  (Abnormal) Collected: 09/24/23 1607    Specimen: Blood Updated: 09/24/23 1613     Glucose 190 mg/dL     POC Glucose Once [[id number]]  (Abnormal) Collected: 09/24/23 1124    Specimen: Blood Updated: 09/24/23 1130     Glucose 167 mg/dL                  Radiology:    Imaging Results (Last 72 Hours)       Procedure Component Value Units Date/Time    XR Chest 1 View [[id number]] Collected: 09/24/23 1125     Updated: 09/24/23 1128    Narrative:      EXAM:    XR Chest, 1 View     EXAM DATE:    9/24/2023 10:47 AM     CLINICAL HISTORY:    followup pulm edema     TECHNIQUE:    Frontal view of the chest.     COMPARISON:     9/21/2023     FINDINGS:    Lungs and pleural spaces:  Improvement in interstitial thickening.   Vascular congestion slightly improved.  No pneumothorax.    Heart:  Cardiomegaly slightly improved.    Mediastinum:  Unremarkable as visualized.    Bones/joints:  Bony structures are stable.    Tubes, lines and devices:  Left Port-A-Cath.       Impression:      1.  Mild radiographic improvement in changes of CHF/edema.  2.  Otherwise stable chest.        This report was finalized on 9/24/2023 11:26 AM by Dr. Dionte Hodgson MD.               Results for orders placed during the hospital encounter of 08/24/21    Adult Transthoracic Echo Complete W/ Cont if Necessary Per Protocol    Interpretation Summary  · Left ventricular ejection fraction appears to be 66 - 70%. Left ventricular systolic function is normal.  · Left ventricular diastolic function is consistent with (grade I) impaired relaxation.      Assessment/Plan:   Debility status post prolonged hospitalization.  Patient is not participating with some of her PT sessions.  Discussed in case conference today, she overall is supervision for her ADLs and mobility.  Tentatively we are planning on discharge tomorrow.    Shortness of breath secondary to HFpEF [facility] grade 2 diastolic [facility] (EF 65[facility] as well as COPD.  By last PFTs which were over 6 years ago patient already had very severe disease with FEV1 percentage of 26%.  I am attempting to get her most recent PFTs from her pulmonologist in [place] but I feel like she was overall not been stated from both diastolic heart failure and COPD at this time.  Chest x-ray 9/24 showed some improvement in the changes CHF.  Patient is completing prednisone and doxycycline course for COPD exacerbation.  Her saturations 99% on 4 L patient is on Symbicort and Spiriva as well as nebulizer treatments.  We will repeat a Bumex dose today recheck electrolytes in the a.m. with BNP    DVT prophylaxis,  subcu Lovenox    History of CVA, on DAPT.    History of anal cancer, she can follow-up as an outpatient.    Fransisco Barrett MD

## 2023-09-26 NOTE — PLAN OF CARE
Goal Outcome Evaluation:  Plan of Care Reviewed With: patient        Progress: improving  Outcome Evaluation: PROGRESSING WITH CURRENT THERAPIES; CONTINUE PLAN OF CARE; MONITOR

## 2023-09-26 NOTE — THERAPY TREATMENT NOTE
Inpatient Rehabilitation - Physical Therapy Treatment Note        [facility]     Patient Name: Liz Jiang  : 1965  MRN: [id number]    Today's Date: 2023                    Admit Date: 2023      Visit Dx:   No diagnosis found.    Patient Active Problem List   Diagnosis    Chest pain    COPD (chronic obstructive pulmonary disease)    Tobacco abuse    GERD (gastroesophageal reflux disease)    Anxiety    Arthritis    Nausea and vomiting    Generalized abdominal pain    Right upper quadrant pain    Gallbladder disease    Abnormal biliary HIDA scan    Dyslipidemia    Tachycardia    Anal cancer    Port-A-Cath in place    Debility       Past Medical History:   Diagnosis Date    Acid reflux disease     Anal cancer 2019    Arthritis     Asthma, extrinsic     Cholelithiasis     Chronic headaches     COPD (chronic obstructive pulmonary disease)     CVA (cerebral vascular accident)     w/ right sided deficit    CVA (cerebral vascular accident)     Diabetes mellitus     only has high blood sugar when on steriods    History of radiation therapy 2020    Whole pelvis/anal mass    Obstructive sleep apnea treated with BiPAP     On home oxygen therapy     2L     Sleep apnea, obstructive        Past Surgical History:   Procedure Laterality Date    ABDOMINAL SURGERY      CARDIAC CATHETERIZATION      CAROTID ENDARTERECTOMY Left 2018    CHOLECYSTECTOMY WITH INTRAOPERATIVE CHOLANGIOGRAM N/A 2017    Procedure: CHOLECYSTECTOMY LAPAROSCOPIC INTRAOPERATIVE CHOLANGIOGRAM;  Surgeon: Carolin Marie MD;  Location: [facility];  Service:     COLONOSCOPY      HYSTERECTOMY      for heavy bleeding/ complete    KIDNEY STONE SURGERY      TUBAL ABDOMINAL LIGATION      VENOUS ACCESS DEVICE (PORT) INSERTION Left 2019    Procedure: INSERTION VENOUS ACCESS DEVICE;  Surgeon: Carolin Marie MD;  Location: [facility];  Service: General       PT ASSESSMENT (last 12 hours)       IRF PT Evaluation and Treatment        Row Name 09/26/23 1336          PT Time and Intention    Document Type daily treatment  -LL     Mode of Treatment individual therapy;physical therapy  -LL     Patient/Family/Caregiver Comments/Observations Patient had no new c/o and was agreeable to PT participation this date.  Patient requires frequent and extended rest breaks to complete tasks.  -LL       Row Name 09/26/23 1336          General Information    Existing Precautions/Restrictions fall;oxygen therapy device and L/min  -LL       Row Name 09/26/23 1336          Pain Scale: FACES Pre/Post-Treatment    Pain: FACES Scale, Pretreatment 0-->no hurt  -LL     Posttreatment Pain Rating 0-->no hurt  -LL       Row Name 09/26/23 1336          Cognition/Psychosocial    Affect/Mental Status (Cognition) WFL  -LL     Orientation Status (Cognition) oriented x 3  -LL     Follows Commands (Cognition) WFL  -LL     Personal Safety Interventions gait belt;nonskid shoes/slippers when out of bed  -LL     Cognitive Function WFL  -LL       Row Name 09/26/23 1336          Bed Mobility    Bed Mobility bed mobility (all) activities  -LL     All Activities, Bee (Bed Mobility) supervision  -LL       Row Name 09/26/23 1336          Bed-Chair Transfer    Bed-Chair Bee (Transfers) supervision  -LL     Assistive Device (Bed-Chair Transfers) wheelchair  -LL       Row Name 09/26/23 1336          Chair-Bed Transfer    Chair-Bed Bee (Transfers) supervision  -LL     Assistive Device (Chair-Bed Transfers) wheelchair  -LL       Row Name 09/26/23 1336          Sit-Stand Transfer    Sit-Stand Bee (Transfers) supervision  -LL     Assistive Device (Sit-Stand Transfers) walker, front-wheeled  -LL       Row Name 09/26/23 1336          Stand-Sit Transfer    Stand-Sit Bee (Transfers) supervision  -LL     Assistive Device (Stand-Sit Transfers) walker, front-wheeled  -LL       Row Name 09/26/23 1336          Gait/Stairs (Locomotion)    Comment, (Gait/Stairs)  Patient requested no ambulation this date.  -LL       Row Name 09/26/23 1336          Balance    Comment, Balance Unsupported sitting bean bag toss with B UE's reaching outside JOZEF & crossing midline  -LL       Row Name 09/26/23 1336          Hip (Therapeutic Exercise)    Hip Strengthening (Therapeutic Exercise) bilateral;flexion;extension;aBduction;aDduction;marching while seated;sitting;1 lb free weight;resistance band;green  -LL       Row Name 09/26/23 1336          Knee (Therapeutic Exercise)    Knee Strengthening (Therapeutic Exercise) bilateral;flexion;extension;marching while seated;LAQ (long arc quad);hamstring curls;sitting;1 lb free weight;resistance band;green  -LL       Row Name 09/26/23 1336          Ankle (Therapeutic Exercise)    Ankle Strengthening (Therapeutic Exercise) bilateral;dorsiflexion;plantarflexion;sitting;1 lb free weight  -LL       Row Name 09/26/23 1336          Positioning and Restraints    Pre-Treatment Position in bed  -LL     Post Treatment Position bed  -LL     In Bed fowlers;call light within reach;encouraged to call for assist;side rails up x2  -LL       Row Name 09/26/23 1336          Transfer Goal 1 (PT-IRF)    Progress/Outcomes (Transfer Goal 1, PT-IRF) goal ongoing  -LL       Row Name 09/26/23 1336          Gait/Walking Locomotion Goal 1 (PT-IRF)    Progress/Outcomes (Gait/Walking Locomotion Goal 1, PT-IRF) goal ongoing  -LL               User Key  (r) = Recorded By, (t) = Taken By, (c) = Cosigned By      Initials Name Provider Type    LL Laura Novak, ANJALI Physical Therapist Assistant                  Wound 09/14/23 1840 Left gluteal Pressure Injury (Active)   Dressing Appearance open to air 09/25/23 1915   Closure None 09/25/23 1915   Base non-blanchable 09/25/23 1915   Drainage Amount none 09/25/23 1915   Care, Wound barrier applied;pressure removed 09/25/23 1915   Dressing Care open to air 09/25/23 1915       Wound 09/14/23 1840 Right gluteal Pressure Injury (Active)    Dressing Appearance open to air 09/25/23 1915   Closure None 09/25/23 1915   Base non-blanchable 09/25/23 1915   Drainage Amount none 09/25/23 1915   Care, Wound barrier applied;pressure removed 09/25/23 1915   Dressing Care open to air 09/25/23 1915     Physical Therapy Education       Title: PT OT SLP Therapies (Done)       Topic: Physical Therapy (Done)       Point: Mobility training (Done)       Learning Progress Summary             Patient Acceptance, E,D, VU by LL at 9/26/2023 1345    Acceptance, E,D, VU,NR by RG at 9/22/2023 1417    Acceptance, E, VU,NR by LB at 9/21/2023 1426    Acceptance, TB, NR by DL at 9/20/2023 2059    Acceptance, E,D, VU,NR by LL at 9/20/2023 1414    Acceptance, E,D, VU,NR by RG at 9/19/2023 1249    Acceptance, E, VU,NR by LB at 9/19/2023 1236    Acceptance, E, VU,NR by LB at 9/18/2023 1210    Acceptance, E, VU,NR by LB at 9/15/2023 0956                         Point: Home exercise program (Done)       Learning Progress Summary             Patient Acceptance, E,D, VU by LL at 9/26/2023 1345    Acceptance, E,D, VU,NR by RG at 9/22/2023 1417    Acceptance, E, VU,NR by LB at 9/21/2023 1426    Acceptance, TB, NR by DL at 9/20/2023 2059    Acceptance, E,D, VU,NR by LL at 9/20/2023 1414    Acceptance, E,D, VU,NR by RG at 9/19/2023 1249    Acceptance, E, VU,NR by LB at 9/19/2023 1236    Acceptance, E, VU,NR by LB at 9/18/2023 1210    Acceptance, E, VU,NR by LB at 9/15/2023 0956                         Point: Body mechanics (Done)       Learning Progress Summary             Patient Acceptance, E,D, VU by LL at 9/26/2023 1345    Acceptance, E,D, VU,NR by RG at 9/22/2023 1417    Acceptance, E, VU,NR by LB at 9/21/2023 1426    Acceptance, TB, NR by DL at 9/20/2023 2059    Acceptance, E,D, VU,NR by LL at 9/20/2023 1414    Acceptance, E,D, VU,NR by RG at 9/19/2023 1249    Acceptance, E, VU,NR by LB at 9/19/2023 1236    Acceptance, E, VU,NR by LB at 9/18/2023 1210    Acceptance, E, VU,NR by LB at  9/15/2023 0956                         Point: Precautions (Done)       Learning Progress Summary             Patient Acceptance, E,D, VU by LL at 9/26/2023 1345    Acceptance, E,D, VU,NR by RG at 9/22/2023 1417    Acceptance, E, VU,NR by LB at 9/21/2023 1426    Acceptance, TB, NR by DL at 9/20/2023 2059    Acceptance, E,D, VU,NR by LL at 9/20/2023 1414    Acceptance, E,D, VU,NR by RG at 9/19/2023 1249    Acceptance, E, VU,NR by LB at 9/19/2023 1236    Acceptance, E, VU,NR by LB at 9/18/2023 1210    Acceptance, E, VU,NR by LB at 9/15/2023 0956                                         User Key       Initials Effective Dates Name Provider Type Discipline    LB 06/16/21 -  Darlyn Gandhi, PT Physical Therapist PT    LL 05/02/16 -  Laura Novak PTA Physical Therapist Assistant PT    RG 06/16/21 -  Brijesh Silver PTA Physical Therapist Assistant PT    DL 03/16/22 -  Ofelia Novak, RN Registered Nurse Nurse                    PT Recommendation and Plan                          Time Calculation:      PT Charges       Row Name 09/26/23 1345             Time Calculation    Start Time 1000  -LL      Stop Time 1130  -LL      Time Calculation (min) 90 min  -LL      PT Received On 09/26/23  -LL         Time Calculation- PT    Total Timed Code Minutes- PT 90 minute(s)  -LL                User Key  (r) = Recorded By, (t) = Taken By, (c) = Cosigned By      Initials Name Provider Type    LL Laura Novak PTA Physical Therapist Assistant                    Therapy Charges for Today       Code Description Service Date Service Provider Modifiers Qty    62596711933 HC PT THERAPEUTIC ACT EA 15 MIN 9/26/2023 Laura Novak PTA GP, CQ 3    94887469562 HC PT THER PROC EA 15 MIN 9/26/2023 Laura Novak PTA GP, CQ 3                     Laura. ANJALI Novak  9/26/2023

## 2023-09-26 NOTE — THERAPY TREATMENT NOTE
Inpatient Rehabilitation - Occupational Therapy Treatment Note     [place]     Patient Name: Liz Jiang  : 1965  MRN: [id number]    Today's Date: 2023                 Admit Date: 2023       No diagnosis found.    Patient Active Problem List   Diagnosis    Chest pain    COPD (chronic obstructive pulmonary disease)    Tobacco abuse    GERD (gastroesophageal reflux disease)    Anxiety    Arthritis    Nausea and vomiting    Generalized abdominal pain    Right upper quadrant pain    Gallbladder disease    Abnormal biliary HIDA scan    Dyslipidemia    Tachycardia    Anal cancer    Port-A-Cath in place    Debility       Past Medical History:   Diagnosis Date    Acid reflux disease     Anal cancer 2019    Arthritis     Asthma, extrinsic     Cholelithiasis     Chronic headaches     COPD (chronic obstructive pulmonary disease)     CVA (cerebral vascular accident)     w/ right sided deficit    CVA (cerebral vascular accident)     Diabetes mellitus     only has high blood sugar when on steriods    History of radiation therapy 2020    Whole pelvis/anal mass    Obstructive sleep apnea treated with BiPAP     On home oxygen therapy     2L     Sleep apnea, obstructive        Past Surgical History:   Procedure Laterality Date    ABDOMINAL SURGERY      CARDIAC CATHETERIZATION      CAROTID ENDARTERECTOMY Left 2018    CHOLECYSTECTOMY WITH INTRAOPERATIVE CHOLANGIOGRAM N/A 2017    Procedure: CHOLECYSTECTOMY LAPAROSCOPIC INTRAOPERATIVE CHOLANGIOGRAM;  Surgeon: Carolin Marie MD;  Location: [facility];  Service:     COLONOSCOPY      HYSTERECTOMY      for heavy bleeding/ complete    KIDNEY STONE SURGERY      TUBAL ABDOMINAL LIGATION      VENOUS ACCESS DEVICE (PORT) INSERTION Left 2019    Procedure: INSERTION VENOUS ACCESS DEVICE;  Surgeon: Carolin Marie MD;  Location: [facility];  Service: General             IRF OT ASSESSMENT FLOWSHEET (last 12 hours)       IRF OT Evaluation and Treatment        Row Name 09/26/23 1002          OT Time and Intention    Document Type daily treatment  -TM     Mode of Treatment occupational therapy  -TM     Patient Effort adequate  -TM     Symptoms Noted During/After Treatment none  -TM       Row Name 09/26/23 1002          General Information    General Observations of Patient Pt agreeable for therapy.  -TM     Existing Precautions/Restrictions fall;oxygen therapy device and L/min  -TM       Row Name 09/26/23 1002          Cognition/Psychosocial    Orientation Status (Cognition) oriented x 3  -TM     Follows Commands (Cognition) WFL  -TM       Row Name 09/26/23 1002          Bathing    Comment (Bathing) SBA/supervision  -TM       Row Name 09/26/23 1002          Upper Body Dressing    [place] Level (Upper Body Dressing) set up assistance  -TM     Position (Upper Body Dressing) edge of bed sitting  -TM       Row Name 09/26/23 1002          Lower Body Dressing    [place] Level (Lower Body Dressing) standby assist;supervision;verbal cues  -TM     Position (Lower Body Dressing) edge of bed sitting  -TM       Row Name 09/26/23 1002          Grooming    [place] Level (Grooming) set up  -TM     Position (Grooming) supported sitting  -TM       Row Name 09/26/23 1002          Toileting    [place] Level (Toileting) supervision;other (see comments)  SBA  -TM     Assistive Device Use (Toileting) commode chair  -TM     Position (Toileting) supported sitting  -TM       Row Name 09/26/23 1002          Bed-Chair Transfer    Bed-Chair [place] (Transfers) standby assist;supervision;verbal cues  -TM     Assistive Device (Bed-Chair Transfers) wheelchair  -TM       Row Name 09/26/23 1002          Chair-Bed Transfer    Chair-Bed [place] (Transfers) standby assist;supervision;verbal cues  -TM     Assistive Device (Chair-Bed Transfers) wheelchair  -TM       Row Name 09/26/23 1002          Toilet Transfer    Type (Toilet Transfer) stand pivot/stand step  -TM      [place] Level (Toilet Transfer) standby assist;supervision;verbal cues  -     Assistive Device (Toilet Transfer) commode, bedside without drop arms  -       Row Name 09/26/23 1002          Motor Skills    Motor Skills coordination;functional endurance;motor control/coordination interventions  -     Motor Control/Coordination Interventions therapeutic exercise/ROM;fine motor manipulation/dexterity activities;gross motor coordination activities;other (see comments)  BUE ther ex/act, GMC/FMC  -               User Key  (r) = Recorded By, (t) = Taken By, (c) = Cosigned By      Initials Name Effective Dates    TM Shaina Ballard, OT 06/16/21 -                      Occupational Therapy Education       Title: PT OT SLP Therapies (Done)       Topic: Occupational Therapy (Done)       Point: ADL training (Done)       Description:   Instruct learner(s) on proper safety adaptation and remediation techniques during self care or transfers.   Instruct in proper use of assistive devices.                  Learning Progress Summary             Patient Acceptance, E,D, VU,NR by TM at 9/26/2023 1000    Acceptance, E,D, VU,NR by TM at 9/25/2023 1241    Acceptance, E, NR,VU by BF at 9/22/2023 1357    Acceptance, E,D, VU,NR by TM at 9/21/2023 1413    Acceptance, TB, NR by DL at 9/20/2023 2059    Acceptance, E,D, VU,NR by TM at 9/20/2023 1503    Acceptance, E,D, VU,NR by TM at 9/19/2023 1406    Acceptance, E,D, VU,NR by TM at 9/18/2023 1359    Acceptance, E, VU,NR by HB at 9/16/2023 1155    Acceptance, E,D, VU,NR by TM at 9/15/2023 1308                         Point: Precautions (Done)       Description:   Instruct learner(s) on prescribed precautions during self-care and functional transfers.                  Learning Progress Summary             Patient Acceptance, E,D, VU,NR by TM at 9/26/2023 1000    Acceptance, E,D, VU,NR by TM at 9/25/2023 1241    Acceptance, E, NR,VU by BF at 9/22/2023 1357    Acceptance, E,D,  VU,NR by TM at 9/21/2023 1413    Acceptance, TB, NR by DL at 9/20/2023 2059    Acceptance, E,D, VU,NR by TM at 9/20/2023 1503    Acceptance, E,D, VU,NR by TM at 9/19/2023 1406    Acceptance, E,D, VU,NR by TM at 9/18/2023 1359    Acceptance, E, VU,NR by HB at 9/16/2023 1155    Acceptance, E,D, VU,NR by TM at 9/15/2023 1308                                         User Key       Initials Effective Dates Name Provider Type Discipline    BF 07/11/23 -  Sofi Elkins OT Occupational Therapist OT    TM 06/16/21 -  Shaina Ballard, OT Occupational Therapist OT    HB 05/25/21 -  Gaby Carty OT Occupational Therapist OT    DL 03/16/22 -  Ofelia Novak, RN Registered Nurse Nurse                        OT Recommendation and Plan    Planned Therapy Interventions (OT): activity tolerance training, adaptive equipment training, BADL retraining, IADL retraining, occupation/activity based interventions, patient/caregiver education/training, ROM/therapeutic exercise, strengthening exercise, transfer/mobility retraining                    Time Calculation:      Time Calculation- OT       Row Name 09/26/23 1300 09/26/23 1000          Time Calculation- OT    OT Start Time 1240  -TM 0805  -TM     OT Stop Time 1300  -TM 0915  -TM     OT Time Calculation (min) 20 min  -TM 70 min  -TM     Total Timed Code Minutes- OT 20 minute(s)  -TM 70 minute(s)  -TM     OT Non-Billable Time (min) -- 15 min  -TM               User Key  (r) = Recorded By, (t) = Taken By, (c) = Cosigned By      Initials Name Provider Type    TM Shaina Ballard, OT Occupational Therapist                  Therapy Charges for Today       Code Description Service Date Service Provider Modifiers Qty    37003270727 HC OT SELF CARE/MGMT/TRAIN EA 15 MIN 9/25/2023 Shaina Ballard OT GO 3    58494976514 HC OT THERAPEUTIC ACT EA 15 MIN 9/25/2023 Shaina Ballard OT GO 3    23047257100 HC OT THER PROC EA 15 MIN 9/25/2023 Shaina Ballard OT  GO 1    02155809731 HC OT SELF CARE/MGMT/TRAIN EA 15 MIN 9/26/2023 Shaina Ballard OT GO 2    34927551185 HC OT THERAPEUTIC ACT EA 15 MIN 9/26/2023 Shaina Ballard OT GO 3    93656746234 HC OT THER PROC EA 15 MIN 9/26/2023 Shaina Ballard OT GO 1                     Shaina Ballard OT  9/26/2023

## 2023-09-27 ENCOUNTER — APPOINTMENT (OUTPATIENT)
Dept: GENERAL RADIOLOGY | Facility: HOSPITAL | Age: 58
DRG: 948 | End: 2023-09-27
Payer: MEDICARE

## 2023-09-27 VITALS
DIASTOLIC BLOOD PRESSURE: 71 MMHG | WEIGHT: 224 LBS | TEMPERATURE: 98.1 F | OXYGEN SATURATION: 98 % | BODY MASS INDEX: 38.24 KG/M2 | HEIGHT: 64 IN | SYSTOLIC BLOOD PRESSURE: 129 MMHG | RESPIRATION RATE: 18 BRPM | HEART RATE: 61 BPM

## 2023-09-27 LAB
ALBUMIN SERPL-MCNC: 3.4 G/DL (ref 3.5–5.2)
ALBUMIN/GLOB SERPL: 1.3 G/DL
ALP SERPL-CCNC: 46 U/L (ref 39–117)
ALT SERPL W P-5'-P-CCNC: 32 U/L (ref 1–33)
ANION GAP SERPL CALCULATED.3IONS-SCNC: 10 MMOL/L (ref 5–15)
AST SERPL-CCNC: 18 U/L (ref 1–32)
BASOPHILS # BLD AUTO: 0.02 10*3/MM3 (ref 0–0.2)
BASOPHILS NFR BLD AUTO: 0.2 % (ref 0–1.5)
BILIRUB SERPL-MCNC: 0.3 MG/DL (ref 0–1.2)
BUN SERPL-MCNC: 30 MG/DL (ref 6–20)
BUN/CREAT SERPL: 60 (ref 7–25)
CALCIUM SPEC-SCNC: 9.1 MG/DL (ref 8.6–10.5)
CHLORIDE SERPL-SCNC: 93 MMOL/L (ref 98–107)
CO2 SERPL-SCNC: 38 MMOL/L (ref 22–29)
CREAT SERPL-MCNC: 0.5 MG/DL (ref 0.57–1)
DEPRECATED RDW RBC AUTO: 50.4 FL (ref 37–54)
EGFRCR SERPLBLD CKD-EPI 2021: 108.9 ML/MIN/1.73
EOSINOPHIL # BLD AUTO: 0.08 10*3/MM3 (ref 0–0.4)
EOSINOPHIL NFR BLD AUTO: 0.9 % (ref 0.3–6.2)
ERYTHROCYTE [DISTWIDTH] IN BLOOD BY AUTOMATED COUNT: 14.8 % (ref 12.3–15.4)
GLOBULIN UR ELPH-MCNC: 2.7 GM/DL
GLUCOSE BLDC GLUCOMTR-MCNC: 125 MG/DL (ref 70–130)
GLUCOSE SERPL-MCNC: 169 MG/DL (ref 65–99)
HCT VFR BLD AUTO: 42.8 % (ref 34–46.6)
HGB BLD-MCNC: 12.5 G/DL (ref 12–15.9)
IMM GRANULOCYTES # BLD AUTO: 0.07 10*3/MM3 (ref 0–0.05)
IMM GRANULOCYTES NFR BLD AUTO: 0.8 % (ref 0–0.5)
LYMPHOCYTES # BLD AUTO: 1.26 10*3/MM3 (ref 0.7–3.1)
LYMPHOCYTES NFR BLD AUTO: 14.6 % (ref 19.6–45.3)
MCH RBC QN AUTO: 27.7 PG (ref 26.6–33)
MCHC RBC AUTO-ENTMCNC: 29.2 G/DL (ref 31.5–35.7)
MCV RBC AUTO: 94.7 FL (ref 79–97)
MONOCYTES # BLD AUTO: 0.69 10*3/MM3 (ref 0.1–0.9)
MONOCYTES NFR BLD AUTO: 8 % (ref 5–12)
NEUTROPHILS NFR BLD AUTO: 6.5 10*3/MM3 (ref 1.7–7)
NEUTROPHILS NFR BLD AUTO: 75.5 % (ref 42.7–76)
NRBC BLD AUTO-RTO: 0 /100 WBC (ref 0–0.2)
NT-PROBNP SERPL-MCNC: 396.8 PG/ML (ref 0–900)
PLATELET # BLD AUTO: 133 10*3/MM3 (ref 140–450)
PMV BLD AUTO: 11.4 FL (ref 6–12)
POTASSIUM SERPL-SCNC: 3.3 MMOL/L (ref 3.5–5.2)
PROT SERPL-MCNC: 6.1 G/DL (ref 6–8.5)
RBC # BLD AUTO: 4.52 10*6/MM3 (ref 3.77–5.28)
SODIUM SERPL-SCNC: 141 MMOL/L (ref 136–145)
WBC NRBC COR # BLD: 8.62 10*3/MM3 (ref 3.4–10.8)

## 2023-09-27 PROCEDURE — 63710000001 PREDNISONE PER 1 MG: Performed by: FAMILY MEDICINE

## 2023-09-27 PROCEDURE — 94799 UNLISTED PULMONARY SVC/PX: CPT

## 2023-09-27 PROCEDURE — 83880 ASSAY OF NATRIURETIC PEPTIDE: CPT | Performed by: INTERNAL MEDICINE

## 2023-09-27 PROCEDURE — 94664 DEMO&/EVAL PT USE INHALER: CPT

## 2023-09-27 PROCEDURE — 97530 THERAPEUTIC ACTIVITIES: CPT

## 2023-09-27 PROCEDURE — 71045 X-RAY EXAM CHEST 1 VIEW: CPT

## 2023-09-27 PROCEDURE — 97535 SELF CARE MNGMENT TRAINING: CPT

## 2023-09-27 PROCEDURE — 85025 COMPLETE CBC W/AUTO DIFF WBC: CPT | Performed by: INTERNAL MEDICINE

## 2023-09-27 PROCEDURE — 97110 THERAPEUTIC EXERCISES: CPT

## 2023-09-27 PROCEDURE — 94761 N-INVAS EAR/PLS OXIMETRY MLT: CPT

## 2023-09-27 PROCEDURE — 80053 COMPREHEN METABOLIC PANEL: CPT | Performed by: INTERNAL MEDICINE

## 2023-09-27 PROCEDURE — 25010000002 ENOXAPARIN PER 10 MG: Performed by: INTERNAL MEDICINE

## 2023-09-27 PROCEDURE — 82948 REAGENT STRIP/BLOOD GLUCOSE: CPT

## 2023-09-27 PROCEDURE — 63710000001 ONDANSETRON PER 8 MG: Performed by: FAMILY MEDICINE

## 2023-09-27 RX ORDER — ALPRAZOLAM 1 MG/1
0.5 TABLET ORAL 2 TIMES DAILY PRN
Qty: 3 TABLET | Refills: 0 | Status: SHIPPED | OUTPATIENT
Start: 2023-09-27

## 2023-09-27 RX ORDER — NALOXONE HYDROCHLORIDE 4 MG/.1ML
SPRAY NASAL
Qty: 2 EACH | Refills: 0 | Status: SHIPPED | OUTPATIENT
Start: 2023-09-27 | End: 2023-09-27 | Stop reason: SDUPTHER

## 2023-09-27 RX ORDER — HYDROCODONE BITARTRATE AND ACETAMINOPHEN 7.5; 325 MG/1; MG/1
1 TABLET ORAL EVERY 8 HOURS PRN
Qty: 9 TABLET | Refills: 0 | Status: SHIPPED | OUTPATIENT
Start: 2023-09-27 | End: 2023-10-03

## 2023-09-27 RX ORDER — NALOXONE HYDROCHLORIDE 4 MG/.1ML
SPRAY NASAL
Qty: 2 EACH | Refills: 0 | Status: SHIPPED | OUTPATIENT
Start: 2023-09-27

## 2023-09-27 RX ORDER — POTASSIUM CHLORIDE 20 MEQ/1
30 TABLET, EXTENDED RELEASE ORAL ONCE
Status: DISCONTINUED | OUTPATIENT
Start: 2023-09-27 | End: 2023-09-27 | Stop reason: HOSPADM

## 2023-09-27 RX ADMIN — LOPERAMIDE HYDROCHLORIDE 2 MG: 2 CAPSULE ORAL at 09:21

## 2023-09-27 RX ADMIN — CASTOR OIL AND BALSAM, PERU 1 APPLICATION: 788; 87 OINTMENT TOPICAL at 08:39

## 2023-09-27 RX ADMIN — CLOPIDOGREL BISULFATE 75 MG: 75 TABLET, FILM COATED ORAL at 08:41

## 2023-09-27 RX ADMIN — ENOXAPARIN SODIUM 40 MG: 40 INJECTION SUBCUTANEOUS at 08:39

## 2023-09-27 RX ADMIN — BUDESONIDE AND FORMOTEROL FUMARATE DIHYDRATE 2 PUFF: 160; 4.5 AEROSOL RESPIRATORY (INHALATION) at 07:40

## 2023-09-27 RX ADMIN — HYDROCODONE BITARTRATE AND ACETAMINOPHEN 1 TABLET: 7.5; 325 TABLET ORAL at 11:02

## 2023-09-27 RX ADMIN — METOPROLOL TARTRATE 25 MG: 25 TABLET, FILM COATED ORAL at 08:39

## 2023-09-27 RX ADMIN — HYDROCODONE BITARTRATE AND ACETAMINOPHEN 1 TABLET: 7.5; 325 TABLET ORAL at 04:47

## 2023-09-27 RX ADMIN — TIOTROPIUM BROMIDE INHALATION SPRAY 2 PUFF: 3.12 SPRAY, METERED RESPIRATORY (INHALATION) at 07:40

## 2023-09-27 RX ADMIN — ASPIRIN 81 MG: 81 TABLET, COATED ORAL at 08:39

## 2023-09-27 RX ADMIN — ONDANSETRON HYDROCHLORIDE 4 MG: 4 TABLET, FILM COATED ORAL at 02:40

## 2023-09-27 RX ADMIN — PREDNISONE 40 MG: 20 TABLET ORAL at 08:39

## 2023-09-27 RX ADMIN — ANORECTAL OINTMENT 1 APPLICATION: 15.7; .44; 24; 20.6 OINTMENT TOPICAL at 08:41

## 2023-09-27 RX ADMIN — ALBUTEROL SULFATE 2.5 MG: 2.5 SOLUTION RESPIRATORY (INHALATION) at 07:39

## 2023-09-27 NOTE — PROGRESS NOTES
Occupational Therapy:    Physical Therapy: Individual: 55 minutes.    Speech Language Pathology:    Signed by: Laura Novak PTA

## 2023-09-27 NOTE — SIGNIFICANT NOTE
09/27/23 1123   Plan   Plan Contacted [facility] [phone] and left message for Yi or Yolanda to call SS.  Faxed ambulatory referral for home health with face to face to HH via [facility].   Final Discharge Disposition Code 06 - home with home health care

## 2023-09-27 NOTE — PROGRESS NOTES
Patient Assessment Instrument  Quality Indicators - Discharge FY 2023    Section A. Transportation      Section A. Medication List        Section B. Health Literacy      Section C. BIMS      Section C. Signs and Symptoms of Delirium (from CAM)      Section D. Mood      Section D. Social Isolation      Section GG. Self-Care Performance     Eating: Patient completed the activities by themself with no assistance from a  helper.   Oral Hygiene: [place] sets up or cleans up; patient completes activity. [place]  assists only prior to or following the activity.   Toileting Hygiene: : [place] provides verbal cues and/or touching/steadying  and/or contact guard assistance as patient completes activity.   Shower/Bathe Self: [place] provides verbal cues and/or touching/steadying and/or  contact guard assistance as patient completes activity.   Upper Body Dressing: [place] sets up or cleans up; patient completes activity.  [place] assists only prior to or following the activity.   Lower Body Dressing: [place] provides verbal cues and/or touching/steadying  and/or contact guard assistance as patient completes activity.   Putting On/Taking Off Footwear: [place] provides verbal cues and/or  touching/steadying and/or contact guard assistance as patient completes  activity.    Section GG. Mobility Performance      Section J. Health Conditions Discharge      Section J. Health Conditions (Pain)      Section K. Swallowing/Nutritional Status  Nutritional Approaches Past 7 Days:  Nutritional Approaches at Discharge:    Section M. Skin Conditions Discharge      . Current Number of Unhealed Pressure Ulcers      Section N. Medication        Section O. Special Treatments, Procedures, and Programs    Signed by: Shaina Ballard OT

## 2023-09-27 NOTE — PROGRESS NOTES
"Patient Assessment Instrument  Quality Indicators - Discharge FY 2023    Section A. Transportation      Section A. Medication List  Subsequent Provider:  06 - Home under care of organized home health service  organization  Medication List to Subsequent Provider at Discharge:  Yes - Current reconciled  medication list provided to the subsequent provider  Route(s) of Medication List Transmission to Subsequent Provider:  Paper-based  (e.g., fax, copies, printouts)  Medication List to Patient:  Not applicable.  Patient discharged to a subsequent  provider as defined in 44D    Section B. Health Literacy  Frequency of Needing Assistance Reading:  Never    Section C. BIMS  Brief Interview for Mental Status (BIMS) was conducted.  Repetition of Three Words: Three words  Able to report correct year: Correct  Able to report correct month: Accurate within 5 days  Able to report correct day of the week: Correct  Able to recall \"sock\": Yes, no cue required  Able to recall \"blue\": Yes, no cue required  Able to recall \"bed\": Yes, no cue required    BIMS SUMMARY SCORE: 15 Cognitively intact    Section C. Signs and Symptoms of Delirium (from CAM)  Acute Change in Mental Status:   No  Inattention:   Behavior not present  Disorganized Thinking:   Behavior not present  Altered Level of Consciousness:   Behavior not present    Section D. Mood  Presence of little interest or pleasure in doing things:   No  Frequency of having little interest or pleasure in doing things:   Never or 1  day  Presence of feeling down, depressed, or hopeless:   No  Frequency of feeling down, depressed, or hopeless:   Never or 1 day   Interview Ended. Above responses do not meet criteria to continue  Total Severity Score:   00    Section D. Social Isolation  Frequency of Feeling Lonely or Isolated:  Never    Section GG. Self-Care Performance      Section GG. Mobility Performance      Section J. Health Conditions Discharge      Section J. Health Conditions " (Pain)  Pain Effect on Sleep:   Occasionally  Pain Interference with Therapy Activities:   Occasionally  Pain Interference with Day-to-Day Activities:   Occasionally    Section K. Swallowing/Nutritional Status  Nutritional Approaches Past 7 Days:  Nutritional Approaches at Discharge:    Section M. Skin Conditions Discharge  Unhealed Pressure Ulcer(s)/Injurie(s) at Stage 1 or Higher:  Yes.    . Current Number of Unhealed Pressure Ulcers    Number of Unhealed Stage 1 Pressure Injuries: 0  Number of Unhealed Stage 2: 2  Number of Unhealed Stage 2 at Admission: 2  Number of Unhealed Stage 3: 0  Number of Unhealed Stage 4: 0  Number of Unhealed Unstageable Due to Non-removable Dressing/Device: 0  Number of Unhealed Unstageable Due to Slough/Eschar: 0  Number of Unhealed Unstageable Injuries Presenting as Deep Tissue Injury: 0    Section N. Medication    Medication Intervention: Not applicable - There were no potential clinically  significant medication issues identified since admission or patient is not  taking any medications.      Section O. Special Treatments, Procedures, and Programs  IV Medications: Antibiotics   IV Access: Central  (e.g., PICC, tunneled, port)    Signed by: Nurse Marisol

## 2023-09-27 NOTE — SIGNIFICANT NOTE
09/27/23 1120   PT Discharge Summary   Reason for Discharge Discharge from facility   Outcomes Achieved Patient able to partially acheive established goals   Discharge Destination Home with assist;Home with home health

## 2023-09-27 NOTE — THERAPY DISCHARGE NOTE
Inpatient Rehabilitation - IRF Occupational Therapy Treatment Note/Discharge   [place]     Patient Name: Liz Jiang  : 1965  MRN: [id number]  Today's Date: 2023               Admit Date: 2023       ICD-10-CM ICD-9-CM   1. Anxiety  F41.9 300.00   2. Anal cancer  C21.0 154.3   3. Debility  R53.81 799.3   4. Chronic obstructive pulmonary disease, unspecified COPD type  J44.9 496     Patient Active Problem List   Diagnosis    Chest pain    COPD (chronic obstructive pulmonary disease)    Tobacco abuse    GERD (gastroesophageal reflux disease)    Anxiety    Arthritis    Nausea and vomiting    Generalized abdominal pain    Right upper quadrant pain    Gallbladder disease    Abnormal biliary HIDA scan    Dyslipidemia    Tachycardia    Anal cancer    Port-A-Cath in place    Debility     Past Medical History:   Diagnosis Date    Acid reflux disease     Anal cancer 2019    Arthritis     Asthma, extrinsic     Cholelithiasis     Chronic headaches     COPD (chronic obstructive pulmonary disease)     CVA (cerebral vascular accident)     w/ right sided deficit    CVA (cerebral vascular accident)     Diabetes mellitus     only has high blood sugar when on steriods    History of radiation therapy 2020    Whole pelvis/anal mass    Obstructive sleep apnea treated with BiPAP     On home oxygen therapy     2L     Sleep apnea, obstructive      Past Surgical History:   Procedure Laterality Date    ABDOMINAL SURGERY      CARDIAC CATHETERIZATION      CAROTID ENDARTERECTOMY Left 2018    CHOLECYSTECTOMY WITH INTRAOPERATIVE CHOLANGIOGRAM N/A 2017    Procedure: CHOLECYSTECTOMY LAPAROSCOPIC INTRAOPERATIVE CHOLANGIOGRAM;  Surgeon: Carolin Marie MD;  Location: [facility];  Service:     COLONOSCOPY      HYSTERECTOMY      for heavy bleeding/ complete    KIDNEY STONE SURGERY      TUBAL ABDOMINAL LIGATION      VENOUS ACCESS DEVICE (PORT) INSERTION Left 2019    Procedure: INSERTION VENOUS ACCESS DEVICE;   Surgeon: Carolin Marie MD;  Location: [facility] OR;  Service: General       IRF OT ASSESSMENT FLOWSHEET (last 12 hours)       IRF OT Evaluation and Treatment       Row Name 09/27/23 0949          OT Time and Intention    Document Type discharge evaluation;daily treatment  -TM     Mode of Treatment occupational therapy  -TM     Patient Effort adequate  -TM     Symptoms Noted During/After Treatment none  -TM       Row Name 09/27/23 0949          General Information    General Observations of Patient Pt agreeable for therapy in am at bedside prior to discharge. Recommended 24 hour assistance and supervision for safety with pt/son verb understanding. Pt/family education provided with recommended supervision/SBA with all ADLs/transfers and home safety education with pt/son verb understanding. No questions/concerns voiced by pt/son.  -TM     Existing Precautions/Restrictions fall;oxygen therapy device and L/min  -TM       Row Name 09/27/23 0949          Cognition/Psychosocial    Orientation Status (Cognition) oriented x 3  -TM     Follows Commands (Cognition) WFL  -TM       Row Name 09/27/23 0949          Bathing    Comment (Bathing) SBA/supervision; recommended sponge bathing for safey  -TM       Row Name 09/27/23 0949          Upper Body Dressing    [place] Level (Upper Body Dressing) set up assistance  -TM     Position (Upper Body Dressing) edge of bed sitting  -TM       Row Name 09/27/23 0949          Lower Body Dressing    [place] Level (Lower Body Dressing) standby assist;supervision;verbal cues  -TM     Position (Lower Body Dressing) edge of bed sitting  -TM       Row Name 09/27/23 0949          Grooming    [place] Level (Grooming) set up  -TM     Position (Grooming) supported sitting  -TM       Row Name 09/27/23 0949          Toileting    Assistive Device Use (Toileting) commode chair  -TM     Position (Toileting) supported sitting  -TM     Comment (Toileting) supervision/SBA  -TM       Row Name  09/27/23 0949          Self-Feeding    [place] Level (Self-Feeding) modified independence  -TM     Position (Self-Feeding) supported sitting  -TM       Row Name 09/27/23 0949          Toilet Transfer    [place] Level (Toilet Transfer) standby assist;supervision;verbal cues  -     Assistive Device (Toilet Transfer) commode, bedside without drop arms  -TM       Row Name 09/27/23 0949          Motor Skills    Motor Skills coordination;functional endurance;motor control/coordination interventions  -     Motor Control/Coordination Interventions therapeutic exercise/ROM;fine motor manipulation/dexterity activities;gross motor coordination activities;other (see comments)  BUE ther ex/act, GMC/FMC  -TM       Row Name 09/27/23 0949          LB Dressing Goal 1 (OT-IRF)    Progress/Outcomes (LB Dressing Goal 1, OT-IRF) goal met  -TM       Row Name 09/27/23 0949          LB Dressing Goal 2 (OT-IRF)    Progress/Outcomes (LB Dressing Goal 2, OT-IRF) goal met  -TM       Row Name 09/27/23 0949          Toileting Goal 1 (OT-IRF)    Progress/Outcomes (Toileting Goal 1, OT-IRF) goal met  -TM       Row Name 09/27/23 0949          Toileting Goal 2 (OT-IRF)    Progress/Outcomes (Toileting Goal 2, OT-IRF) goal met  -TM               User Key  (r) = Recorded By, (t) = Taken By, (c) = Cosigned By      Initials Name Effective Dates    TM NellieReggieShaina Sammi, OT 06/16/21 -                   Wound 09/14/23 1840 Left gluteal Pressure Injury (Active)   Wound Image   09/27/23 0143   Dressing Appearance open to air 09/27/23 0739   Closure None 09/27/23 0143   Base non-blanchable;dry 09/27/23 0143   Drainage Amount none 09/27/23 0143   Care, Wound barrier applied 09/27/23 0739   Dressing Care open to air 09/27/23 0739       Wound 09/14/23 1840 Right gluteal Pressure Injury (Active)   Wound Image   09/27/23 0142   Dressing Appearance open to air 09/27/23 0739   Closure None 09/26/23 1915   Base non-blanchable;dry 09/27/23 0142    Drainage Amount none 09/27/23 0142   Care, Wound barrier applied 09/27/23 0739   Dressing Care open to air 09/27/23 0739       Occupational Therapy Education       Title: PT OT SLP Therapies (Resolved)       Topic: Occupational Therapy (Resolved)       Point: ADL training (Resolved)       Description:   Instruct learner(s) on proper safety adaptation and remediation techniques during self care or transfers.   Instruct in proper use of assistive devices.                  Learning Progress Summary             Patient Acceptance, E,D, VU,NR by TM at 9/27/2023 1238    Acceptance, E,D, VU,NR by TM at 9/26/2023 1000    Acceptance, E,D, VU,NR by TM at 9/25/2023 1241    Acceptance, E, NR,VU by BF at 9/22/2023 1357    Acceptance, E,D, VU,NR by TM at 9/21/2023 1413    Acceptance, TB, NR by DL at 9/20/2023 2059    Acceptance, E,D, VU,NR by TM at 9/20/2023 1503    Acceptance, E,D, VU,NR by TM at 9/19/2023 1406    Acceptance, E,D, VU,NR by TM at 9/18/2023 1359    Acceptance, E, VU,NR by HB at 9/16/2023 1155    Acceptance, E,D, VU,NR by TM at 9/15/2023 1308   Family Acceptance, E,D, VU,NR by TM at 9/27/2023 1238                         Point: Precautions (Resolved)       Description:   Instruct learner(s) on prescribed precautions during self-care and functional transfers.                  Learning Progress Summary             Patient Acceptance, E,D, VU,NR by TM at 9/27/2023 1238    Acceptance, E,D, VU,NR by TM at 9/26/2023 1000    Acceptance, E,D, VU,NR by TM at 9/25/2023 1241    Acceptance, E, NR,VU by BF at 9/22/2023 1357    Acceptance, E,D, VU,NR by TM at 9/21/2023 1413    Acceptance, TB, NR by DL at 9/20/2023 2059    Acceptance, E,D, VU,NR by TM at 9/20/2023 1503    Acceptance, E,D, VU,NR by TM at 9/19/2023 1406    Acceptance, E,D, VU,NR by TM at 9/18/2023 1359    Acceptance, E, VU,NR by HB at 9/16/2023 1155    Acceptance, E,D, VU,NR by TM at 9/15/2023 1308   Family Acceptance, E,D, VU,NR by TM at 9/27/2023 1238                                          User Key       Initials Effective Dates Name Provider Type Discipline    BF 07/11/23 -  Sofi Elkins, OT Occupational Therapist OT    TM 06/16/21 -  Shaina Ballard, OT Occupational Therapist OT    HB 05/25/21 -  Gaby Carty, OT Occupational Therapist OT    DL 03/16/22 -  Ofelia Novak, RN Registered Nurse Nurse                    OT Recommendation and Plan  Planned Therapy Interventions (OT): activity tolerance training, adaptive equipment training, BADL retraining, IADL retraining, occupation/activity based interventions, patient/caregiver education/training, ROM/therapeutic exercise, strengthening exercise, transfer/mobility retraining           OT IRF GOALS       Row Name 09/27/23 0949 09/15/23 1056          LB Dressing Goal 1 (OT-IRF)    [place] (LB Dressing Goal 1, OT-IRF) -- minimum assist (75% or more patient effort)  -TM     Time Frame (LB Dressing Goal 1, OT-IRF) -- short-term goal (STG)  -TM     Progress/Outcomes (LB Dressing Goal 1, OT-IRF) goal met  -TM --        LB Dressing Goal 2 (OT-IRF)    [place] (LB Dressing Goal 2, OT-IRF) -- contact guard required  -TM     Time Frame (LB Dressing Goal 2, OT-IRF) -- long-term goal (LTG);by discharge  -TM     Progress/Outcomes (LB Dressing Goal 2, OT-IRF) goal met  -TM --        Toileting Goal 1 (OT-IRF)    [place] Level (Toileting Goal 1, OT-IRF) -- contact guard required  -TM     Progress/Outcomes (Toileting Goal 1, OT-IRF) goal met  -TM --     Time Frame (Toileting Goal 1, OT-IRF) -- short-term goal (STG)  -TM        Toileting Goal 2 (OT-IRF)    [place] Level (Toileting Goal 2, OT-IRF) -- standby assist;supervision required  -TM     Progress/Outcomes (Toileting Goal 2, OT-IRF) goal met  -TM --     Time Frame (Toileting Goal 2, OT-IRF) -- long-term goal (LTG);by discharge  -TM               User Key  (r) = Recorded By, (t) = Taken By, (c) = Cosigned By      Initials Name Provider Type    TM  Shaina Ballard OT Occupational Therapist                        Time Calculation:    Time Calculation- OT       Row Name 09/27/23 1239 09/27/23 1238          Time Calculation- OT    OT Start Time 0835  -TM 0815  -TM     OT Stop Time 0915  -TM 0830  -TM     OT Time Calculation (min) 40 min  -TM 15 min  -TM     Total Timed Code Minutes- OT 40 minute(s)  -TM 15 minute(s)  -TM               User Key  (r) = Recorded By, (t) = Taken By, (c) = Cosigned By      Initials Name Provider Type    TM Shaina Ballard OT Occupational Therapist                    Therapy Charges for Today       Code Description Service Date Service Provider Modifiers Qty    84987977604 HC OT SELF CARE/MGMT/TRAIN EA 15 MIN 9/26/2023 Shaina Ballard OT GO 2    28430397750 HC OT THERAPEUTIC ACT EA 15 MIN 9/26/2023 Shaina Ballard OT GO 3    92551099575 HC OT THER PROC EA 15 MIN 9/26/2023 Shaina Ballard OT GO 1    47721814557 HC OT THERAPEUTIC ACT EA 15 MIN 9/27/2023 Shaina Ballard OT GO 3    06272770793 HC OT SELF CARE/MGMT/TRAIN EA 15 MIN 9/27/2023 Shaina Ballard OT GO 1                 OT Discharge Summary  Reason for Discharge: Discharge from facility  Outcomes Achieved: Refer to plan of care for updates on goals achieved  Discharge Destination: Home with assist, Home with home health    Shaina Ballard OT  9/27/2023

## 2023-09-27 NOTE — SIGNIFICANT NOTE
09/27/23 1210   Plan   Plan RN says pt is gone.  Attempted to contact son Pradeep [phone] without success/voicemail is full.  Left message for pt [phone] about [facility] nurse, PT and OT to visit her on 9-29-23.

## 2023-09-27 NOTE — SIGNIFICANT NOTE
09/27/23 1130   Plan   Plan SS received call from Yolanda with [facility].  Informed her about MD order to draw CMP/CBC in 2 days/call to attending.   nurse, PT and OT to visit pt on 9-29-23.  Discharge summary to be faxed to  when available.  Pt is returning to her home today with daughter and grandson.  Pt will have 24 hour assistance.  Son Will is providing transportation home.

## 2023-09-27 NOTE — PROGRESS NOTES
Patient Assessment Instrument  Quality Indicators - Discharge FY 2023    Section A. Transportation  Issues Due to Lack of Transportation:  No    Section A. Medication List        Section B. Health Literacy      Section C. BIMS      Section C. Signs and Symptoms of Delirium (from CAM)      Section D. Mood      Section D. Social Isolation      Section GG. Self-Care Performance      Section GG. Mobility Performance      Section J. Health Conditions Discharge      Section J. Health Conditions (Pain)      Section K. Swallowing/Nutritional Status  Nutritional Approaches Past 7 Days:  Nutritional Approaches at Discharge:    Section M. Skin Conditions Discharge      . Current Number of Unhealed Pressure Ulcers      Section N. Medication        Section O. Special Treatments, Procedures, and Programs    Signed by: JARROD Granados

## 2023-09-27 NOTE — SIGNIFICANT NOTE
09/27/23 3540   Plan   Plan RN says pt can be discharged home today.  Contacted son Pradeep [phone] about pt being discharged today; he will notify brother Will who will transport pt home.   Patient/Family in Agreement with Plan yes

## 2023-09-27 NOTE — SIGNIFICANT NOTE
09/27/23 1348   Plan   Plan SS spoke to pt [phone] about [facility] nurse, PT, and OT to visit her on 9-29-23.  Explained MD ordered CMP/CBC on 9-29-23 and home health will call her about visiting.  Pt is unsure what will be going on that day but will do the best she can.  Explained she does not have to go to home health they will come to her home for services/care.  Pt voiced having difficulty getting up the steps into her home.  Discussed building a W/C ramp and she says she could contribute financially to getting one built.  Informed pt SS will contact [facility] about assisting with W/C ramp.  Left message for Pastor Vipin Carty with [facility] [phone].  Notified [facility] [phone] per Yi who says they will call pt on 9-29-23 about visits.   Patient/Family in Agreement with Plan yes

## 2023-09-27 NOTE — PROGRESS NOTES
Case Management  Inpatient Rehabilitation Team Conference    Conference Date/Time: 9/26/2023 6:22:05 AM    Team Conference Attendees:  MD Ninfa Cerna SW Jessica Bill, CHELSEA,   CHELSEA Jang, PT  Shaina Ballard OT    Demographics            Age: 57Y            Gender: Female    Admission Date: 9/14/2023 6:29:00 PM  Rehabilitation Diagnosis:  debility  Comorbidities: R53.81 Other malaise  B37.0 Candidal stomatitis  C21.0 Malignant neoplasm of anus, unspecified  I69.354 Hemiplegia and hemiparesis following cerebral infarction affecting left  non-dominant side  E11.9 Type 2 diabetes mellitus without complications  D72.829 Elevated white blood cell count, unspecified  I50.32 Chronic diastolic (congestive) heart failure  I11.0 Hypertensive heart disease with heart failure  Z68.38 Body mass index (BMI) 38.0-38.9, adult  K21.9 Gastro-esophageal reflux disease without esophagitis  Z99.81 Dependence on supplemental oxygen  G47.33 Obstructive sleep apnea (adult) (pediatric)  Z87.891 Personal history of nicotine dependence  E66.9 Obesity, unspecified  E87.5 Hyperkalemia  K59.00 Constipation, unspecified  F41.9 Anxiety disorder, unspecified      Plan of Care  Anticipated Discharge Date/Estimated Length of Stay: 9-27-23  Anticipated Discharge Destination: Community discharge with assistance  Discharge Plan : Pt plans to return home with daughter and son at discharge.  Medical Necessity Expected Level Rationale: fair-good  Intensity and Duration: an average of 3 hours/5 days per week  Medical Supervision and 24 Hour Rehab Nursing: x  Physical Therapy: x  PT Intensity/Duration: PT 1.5 hours per day/5 days per week  Occupational Therapy: x  OT Intensity/Duration: OT 1.5 hours per day/5 days per week  Social Work: x  Therapeutic Recreation: x  Respiratory Therapy: x  Updated (if changes indicated)    Anticipated Discharge Date/Estimated Length of Stay:   9-27-23      Discharge Plan  of Care: Home Health Services Physical Therapy strengthening,  endurance, gait training, transfer training, balance, therapeutic exercise, bed  mobility, home safety 3 times per week for 4 weeks Occupational Therapy ADL  re-training, home safety, functional mobility, strengthening 3 times per week  for 4 weeks    Based on the patient's medical and functional status, their prognosis and  expected level of functional improvement is: fair      Interdisciplinary Problem/Goals/Status  Pain    [RN] Pain Management(Active)  Current Status(09/14/2023): Potential for increased pain  Weekly Goal(09/27/2023): Pain adequately managed  Discharge Goal: Pain adequately managed        Body Function Structure    [RN] Skin Integrity(Active)  Current Status(09/14/2023): Stage 2 pressure injuries  Weekly Goal(09/27/2023): Healing of wound  Discharge Goal: Healing of wound        Safety    [RN] Potential for Injury(Active)  Current Status(09/14/2023): Potential for falls  Weekly Goal(09/27/2023): No falls  Discharge Goal: No falls        Mobility    [PT] Bed/Chair/Wheelchair(Active)  Current Status(09/15/2023): CGA  Weekly Goal(09/22/2023): MI  Discharge Goal: MI    [PT] Walk(Active)  Current Status(09/15/2023): amb 60' RW CGA  Weekly Goal(09/22/2023): amb 300' RW MI  Discharge Goal: amb 300' RW MI        Self Care    [OT] Dressing (Lower)(Active)  Current Status(09/25/2023): supervision/SBA  Weekly Goal(10/03/2023): supervision/[place]  Discharge Goal: supervision/[place]    Comments: Pt plans to return home with daughter Paloma and grandnathanael Anthony at  discharge.  Son Demetrius stays some at pt's home.  Son Will lives closeby and he  is supportive.  Son Pradeep is also supportive.  Family will provide 24 hour  assistance.    Signed by: JARROD Granados    Physician CoSigned By: Fransisco Barrett 09/27/2023 06:40:57

## 2023-09-27 NOTE — THERAPY DISCHARGE NOTE
Inpatient Rehabilitation - Physical Therapy Treatment Note/Discharge   [place]     Patient Name: Liz Jiang  : 1965  MRN: [id number]  Today's Date: 2023                Admit Date: 2023    Visit Dx:    ICD-10-CM ICD-9-CM   1. Anxiety  F41.9 300.00   2. Anal cancer  C21.0 154.3   3. Debility  R53.81 799.3   4. Chronic obstructive pulmonary disease, unspecified COPD type  J44.9 496     Patient Active Problem List   Diagnosis    Chest pain    COPD (chronic obstructive pulmonary disease)    Tobacco abuse    GERD (gastroesophageal reflux disease)    Anxiety    Arthritis    Nausea and vomiting    Generalized abdominal pain    Right upper quadrant pain    Gallbladder disease    Abnormal biliary HIDA scan    Dyslipidemia    Tachycardia    Anal cancer    Port-A-Cath in place    Debility     Past Medical History:   Diagnosis Date    Acid reflux disease     Anal cancer 2019    Arthritis     Asthma, extrinsic     Cholelithiasis     Chronic headaches     COPD (chronic obstructive pulmonary disease)     CVA (cerebral vascular accident)     w/ right sided deficit    CVA (cerebral vascular accident)     Diabetes mellitus     only has high blood sugar when on steriods    History of radiation therapy 2020    Whole pelvis/anal mass    Obstructive sleep apnea treated with BiPAP     On home oxygen therapy     2L     Sleep apnea, obstructive      Past Surgical History:   Procedure Laterality Date    ABDOMINAL SURGERY      CARDIAC CATHETERIZATION      CAROTID ENDARTERECTOMY Left 2018    CHOLECYSTECTOMY WITH INTRAOPERATIVE CHOLANGIOGRAM N/A 2017    Procedure: CHOLECYSTECTOMY LAPAROSCOPIC INTRAOPERATIVE CHOLANGIOGRAM;  Surgeon: Carolin Marie MD;  Location: [facility];  Service:     COLONOSCOPY      HYSTERECTOMY      for heavy bleeding/ complete    KIDNEY STONE SURGERY      TUBAL ABDOMINAL LIGATION      VENOUS ACCESS DEVICE (PORT) INSERTION Left 2019    Procedure: INSERTION VENOUS ACCESS  DEVICE;  Surgeon: Carolin Marie MD;  Location: [facility];  Service: General       PT ASSESSMENT (last 12 hours)       IRF PT Evaluation and Treatment       Row Name 09/27/23 1111          PT Time and Intention    Document Type --  Discharge treatment  -LL     Mode of Treatment individual therapy;physical therapy  -LL     Patient/Family/Caregiver Comments/Observations Patient agreeable to PT participation this date prior to discharge.  Patient being discharged home this date with assistance of family and with home health PT referral.  Education with patient regarding  home safety, HEP.  Written materials issued and no questions/concerns voiced.  -LL       Row Name 09/27/23 1111          General Information    Existing Precautions/Restrictions fall;oxygen therapy device and L/min  -LL       Row Name 09/27/23 1111          Pain Scale: FACES Pre/Post-Treatment    Pain: FACES Scale, Pretreatment 0-->no hurt  -LL     Posttreatment Pain Rating 0-->no hurt  -LL       Row Name 09/27/23 1111          Cognition/Psychosocial    Affect/Mental Status (Cognition) WFL  -LL     Orientation Status (Cognition) oriented x 3  -LL     Follows Commands (Cognition) WFL  -LL     Personal Safety Interventions gait belt;nonskid shoes/slippers when out of bed  -LL     Cognitive Function WFL  -LL       Row Name 09/27/23 1111          Bed Mobility    Bed Mobility bed mobility (all) activities  -LL     All Activities, Tripp (Bed Mobility) modified independence  -LL       Row Name 09/27/23 1111          Bed-Chair Transfer    Bed-Chair [place] (Transfers) supervision;modified independence  -LL     Assistive Device (Bed-Chair Transfers) wheelchair  -LL       Row Name 09/27/23 1111          Chair-Bed Transfer    Chair-Bed [place] (Transfers) supervision;modified independence  -LL     Assistive Device (Chair-Bed Transfers) wheelchair  -LL       Row Name 09/27/23 1111          Sit-Stand Transfer    Sit-Stand [place]pp  (Transfers) supervision;modified independence  -LL     Assistive Device (Sit-Stand Transfers) walker, front-wheeled  -LL       Row Name 09/27/23 1111          Stand-Sit Transfer    Stand-Sit [place] (Transfers) supervision;modified independence  -LL     Assistive Device (Stand-Sit Transfers) walker, front-wheeled  -LL       Row Name 09/27/23 1111          Motor Skills    Therapeutic Exercise --  HEP reviewed; GTB issued  -LL       Row Name 09/27/23 1111          Hip (Therapeutic Exercise)    Hip Strengthening (Therapeutic Exercise) bilateral;flexion;extension;aBduction;aDduction;marching while seated;sitting;1 lb free weight;resistance band;green  -LL       Row Name 09/27/23 1111          Knee (Therapeutic Exercise)    Knee Strengthening (Therapeutic Exercise) bilateral;flexion;extension;marching while seated;LAQ (long arc quad);hamstring curls;sitting;1 lb free weight;resistance band;green  -LL       Row Name 09/27/23 1111          Ankle (Therapeutic Exercise)    Ankle Strengthening (Therapeutic Exercise) bilateral;dorsiflexion;plantarflexion;sitting;1 lb free weight  -LL       Row Name 09/27/23 1111          Positioning and Restraints    Pre-Treatment Position in bed  -LL     Post Treatment Position bed  -LL       Row Name 09/27/23 1111          Bed Mobility Goal 1 (PT-IRF)    Progress/Outcomes (Bed Mobility Goal 1, PT-IRF) goal met  -LL       Row Name 09/27/23 1111          Transfer Goal 1 (PT-IRF)    Progress/Outcomes (Transfer Goal 1, PT-IRF) goal partially met  -LL       Row Name 09/27/23 1111          Gait/Walking Locomotion Goal 1 (PT-IRF)    Progress/Outcomes (Gait/Walking Locomotion Goal 1, PT-IRF) goal partially met  -LL               User Key  (r) = Recorded By, (t) = Taken By, (c) = Cosigned By      Initials Name Provider Type    Laura Shah PTA Physical Therapist Assistant                    Physical Therapy Education       Title: PT OT SLP Therapies (Done)       Topic: Physical Therapy  (Resolved)       Point: Mobility training (Resolved)       Learning Progress Summary             Patient Acceptance, E,D,H, VU by LL at 9/27/2023 1116    Acceptance, E,D, VU by LL at 9/26/2023 1345    Acceptance, E,D, VU,NR by RG at 9/22/2023 1417    Acceptance, E, VU,NR by LB at 9/21/2023 1426    Acceptance, TB, NR by DL at 9/20/2023 2059    Acceptance, E,D, VU,NR by LL at 9/20/2023 1414    Acceptance, E,D, VU,NR by RG at 9/19/2023 1249    Acceptance, E, VU,NR by LB at 9/19/2023 1236    Acceptance, E, VU,NR by LB at 9/18/2023 1210    Acceptance, E, VU,NR by LB at 9/15/2023 0956                         Point: Home exercise program (Resolved)       Learning Progress Summary             Patient Acceptance, E,D,H, VU by LL at 9/27/2023 1116    Acceptance, E,D, VU by LL at 9/26/2023 1345    Acceptance, E,D, VU,NR by RG at 9/22/2023 1417    Acceptance, E, VU,NR by LB at 9/21/2023 1426    Acceptance, TB, NR by DL at 9/20/2023 2059    Acceptance, E,D, VU,NR by LL at 9/20/2023 1414    Acceptance, E,D, VU,NR by RG at 9/19/2023 1249    Acceptance, E, VU,NR by LB at 9/19/2023 1236    Acceptance, E, VU,NR by LB at 9/18/2023 1210    Acceptance, E, VU,NR by LB at 9/15/2023 0956                         Point: Body mechanics (Resolved)       Learning Progress Summary             Patient Acceptance, E,D,H, VU by LL at 9/27/2023 1116    Acceptance, E,D, VU by LL at 9/26/2023 1345    Acceptance, E,D, VU,NR by RG at 9/22/2023 1417    Acceptance, E, VU,NR by LB at 9/21/2023 1426    Acceptance, TB, NR by DL at 9/20/2023 2059    Acceptance, E,D, VU,NR by LL at 9/20/2023 1414    Acceptance, E,D, VU,NR by RG at 9/19/2023 1249    Acceptance, E, VU,NR by LB at 9/19/2023 1236    Acceptance, E, VU,NR by LB at 9/18/2023 1210    Acceptance, E, VU,NR by LB at 9/15/2023 0956                         Point: Precautions (Resolved)       Learning Progress Summary             Patient Acceptance, E,D,H, VU by LL at 9/27/2023 1116    Acceptance, E,D, VU  by LL at 9/26/2023 1345    Acceptance, E,D, VU,NR by RG at 9/22/2023 1417    Acceptance, E, VU,NR by LB at 9/21/2023 1426    Acceptance, TB, NR by DL at 9/20/2023 2059    Acceptance, E,D, VU,NR by LL at 9/20/2023 1414    Acceptance, E,D, VU,NR by RG at 9/19/2023 1249    Acceptance, E, VU,NR by LB at 9/19/2023 1236    Acceptance, E, VU,NR by LB at 9/18/2023 1210    Acceptance, E, VU,NR by LB at 9/15/2023 0956                                         User Key       Initials Effective Dates Name Provider Type Discipline    LB 06/16/21 -  Darlyn Gandhi, PT Physical Therapist PT    LL 05/02/16 -  Laura Novak PTA Physical Therapist Assistant PT    RG 06/16/21 -  Brijesh Silver PTA Physical Therapist Assistant PT    DL 03/16/22 -  Ofelia Novak, RN Registered Nurse Nurse                    PT Recommendation and Plan                  Time Calculation:    PT Charges       Row Name 09/27/23 1122             Time Calculation    Start Time 1000  -LL      Stop Time 1055  -LL      Time Calculation (min) 55 min  -LL      PT Received On 09/27/23  -LL         Time Calculation- PT    Total Timed Code Minutes- PT 55 minute(s)  -LL                User Key  (r) = Recorded By, (t) = Taken By, (c) = Cosigned By      Initials Name Provider Type    LL Laura Novak, ANJALI Physical Therapist Assistant                    Therapy Charges for Today       Code Description Service Date Service Provider Modifiers Qty    99187071588 HC PT THERAPEUTIC ACT EA 15 MIN 9/26/2023 Laura Novak, PTA GP, CQ 3    54251081550 HC PT THER PROC EA 15 MIN 9/26/2023 Laura Novak, PTA GP, CQ 3    94974627705 HC PT THERAPEUTIC ACT EA 15 MIN 9/27/2023 Laura Novak, PTA GP, CQ 1    10789147293 HC PT THER PROC EA 15 MIN 9/27/2023 Laura Novak, PTA GP, CQ 3                 PT Discharge Summary  Reason for Discharge: Discharge from facility  Outcomes Achieved: Patient able to partially acheive established goals  Discharge Destination: Home with  assist, Home with home health    Laura. Kishore, PTA  9/27/2023

## 2023-09-27 NOTE — DISCHARGE SUMMARY
Date of admission: 9/14/2023  Date of discharge: 9/27/2023    Principal diagnosis: Debility status post hospitalization for COPD exacerbation and HFpEF.  Secondary diagnosis:  COPD, very severe even by indices 2017, with exacerbation here.  Grade 2 diastolic heart failure  Hypertension  History of anal cancer  CVA in the past on DAPT  History of diabetes but not currently requiring medication  Chronic anxiety  Chronic total anal pain from anal cancer  Severe obesity by BMI of 38    Procedures:  Chest x-ray    Consultants:  None    Exam: Assisted by Col[facility] TRAN.  Patient is sitting up in bed states she feels better today.  She states she wants to go home.  She states she is breathing about at baseline, denies any chest pain.  129/71, 20, 60, 98.14 L saturation 98%.  Lungs have bilateral breath sounds with fairly good air excursion and clear anterior and posterior, heart regular rate and rhythm, no significant edema noted abdomen soft benign, strength is symmetric.    Hospital course: Patient was admitted to the acute inpatient rehab facility status post hospitalization for COPD exacerbation and diastolic heart failure exacerbation.  Patient has worked with occupational physical therapy.  Initially with PT patient was contact-guard with transfers and walked 60 then 40 feet contact-guard front wheeled walker.  With OT, patient was initially min to mod bathing, set up upper body dressing, min to mod lower body dressing, set up for grooming.  Through work with PT and OT patient was overall supervision for ADLs and transfers.  Over the last several days participation with physical therapy has been hit and miss but she is improving.  Improving with transfers.  Her condition has improved on discharge.  She did have a COPD exacerbation while here, imaging was thought to represent a mixture of diastolic heart failure and possible pneumonia.  Patient completed course of antibiotics here and completed a course of prednisone as  well.  Patient's breathing is back to baseline, she has a trilogy at home she also has oxygen she wears at 4 L at home.  Her oxygen status on that oxygen is good on discharge.  I did check a chest x-ray prior to discharge, showing some improvement.  BNP was normal.  She was hypokalemic, I ordered potassium supplementation but patient refused to wait around to get the potassium supplementation.  She does have potassium pills at home.  She takes chronic Xanax, reviewed her Valente and after reviewing this I did give her 3 days of 0.5 mg twice daily as needed.  She also has his chronic anal cancer pain, she is status post resection for this and treatment.  She however due to COVID missed multiple follow-ups for this anal cancer therefore I have given her 3 days of Norco to use as needed but I have also scheduled her to see Dr. Jones again as an outpatient to follow-up on the rectal cancers again she missed multiple appointments during COVID.  Her other medical problems remained stable while here.    Disposition: Home with family and home health    Follow-up:  PCP next week   to follow-up anal cancer    Diet: Cardiac    Weightbearing: As tolerated    Resume home Trelegy/oxygen    Condition: Stable    Medication:  Xanax 0.5 mg twice daily as needed  Aspirin 81 mg a day  Plavix 75 mg a day  Trelegy 1 puff daily  Lasix 20 mg a day  Norco 7.5 every 8 hours as needed for pain  Combivent 1 puff 4 times a day as needed  Metoprolol 25 mg twice daily  Narcan  MiraLAX 17 g daily  Potassium 10 mEq daily  Daliresp 500 mg daily  Ventolin HFA 2 puffs every 6 hours as needed    Greater than 30-minute discharge

## 2023-09-27 NOTE — PROGRESS NOTES
Occupational Therapy: Individual: 55 minutes.    Physical Therapy:    Speech Language Pathology:    Signed by: Shaina Ballard OT

## 2023-09-27 NOTE — PROGRESS NOTES
Patient Assessment Instrument  Quality Indicators - Discharge FY 2023    Section A. Transportation      Section A. Medication List        Section B. Health Literacy      Section C. BIMS      Section C. Signs and Symptoms of Delirium (from CAM)      Section D. Mood      Section D. Social Isolation      Section GG. Self-Care Performance      Section GG. Mobility Performance     Roll Left and Right: Patient completed the activities by themself with no  assistance from a helper.   Sit to Lying: Patient completed the activities by themself with no assistance  from a helper.   Lying to Sitting on Side of Bed: Patient completed the activities by themself  with no assistance from a helper.   Sit to Stand: [place] provides verbal cues and/or touching/steadying and/or  contact guard assistance as patient completes activity. Assistance may be  provided throughout the activity or intermittently.   Chair/Bed to Chair Transfer: [place] provides verbal cues and/or  touching/steadying and/or contact guard assistance as patient completes  activity. Assistance may be provided throughout the activity or intermittently.   Toilet Transfer [place] provides verbal cues and/or touching/steadying and/or  contact guard assistance as patient completes activity. Assistance may be  provided throughout the activity or intermittently.   Car Transfer: [place] provides verbal cues and/or touching/steadying and/or  contact guard assistance as patient completes activity. Assistance may be  provided throughout the activity or intermittently.   Walk 10 Feet:   [place] provides verbal cues and/or touching/steadying and/or  contact guard assistance as patient completes activity. Assistance may be  provided throughout the activity or intermittently.  Walk 50 Feet with 2 Turns:   [place] provides verbal cues and/or  touching/steadying and/or contact guard assistance as patient completes  activity. Assistance may be provided throughout the activity or  intermittently.  Walk 150 Feet:   Not attempted due to medical or safety concerns.  Walking 10 Feet on Uneven Surfaces:   Not attempted due to medical or safety  concerns.  1 Step Over Curb or Up/Down Stair:   Not attempted due to medical or safety  concerns.  Picking up an Object:   Not attempted due to medical or safety concerns. Uses  Wheelchair and/or Scooter: No    Section J. Health Conditions Discharge      Section J. Health Conditions (Pain)      Section K. Swallowing/Nutritional Status  Nutritional Approaches Past 7 Days:  Nutritional Approaches at Discharge:    Section M. Skin Conditions Discharge      . Current Number of Unhealed Pressure Ulcers      Section N. Medication        Section O. Special Treatments, Procedures, and Programs    Signed by: Laura Novak PTA

## 2023-09-27 NOTE — PROGRESS NOTES
Patient Assessment Instrument  Quality Indicators - Discharge FY 2023    Section A. Transportation      Section A. Medication List        Section B. Health Literacy      Section C. BIMS      Section C. Signs and Symptoms of Delirium (from CAM)      Section D. Mood      Section D. Social Isolation      Section GG. Self-Care Performance      Section GG. Mobility Performance      Section J. Health Conditions Discharge  Fall(s) Since Admission:  No    Section J. Health Conditions (Pain)      Section K. Swallowing/Nutritional Status  Nutritional Approaches Past 7 Days:  Therapeutic diet (e.g., low salt, diabetic,  low cholesterol)  Nutritional Approaches at Discharge:  Therapeutic diet (e.g., low salt,  diabetic, low cholesterol)    Section M. Skin Conditions Discharge      . Current Number of Unhealed Pressure Ulcers      Section N. Medication    Medication Intervention: Not applicable - There were no potential clinically  significant medication issues identified since admission or patient is not  taking any medications.  Patient is taking medications in the following pharmacological classification:  F. Antibiotic An indication is noted for all medications in the Antibiotic drug  class. J. Hypoglycemic (including insulin) An indication is noted for all  medications in the Hypoglycemic drug class. E. Anticoagulant An indication is  noted for all medications in the Anticoagulant drug class. H. Opioid An  indication is noted for all medications in the Opiod drug class. I. Antiplatelet  An indication is noted for all medications in the Antiplatelet drug class.    Section O. Special Treatments, Procedures, and Programs  Oxygen Therapy: Continuous   Non-Invasive Mechanical Ventilator: CPAP   IV Medications: Antibiotics   IV Access: Peripheral, Central  (e.g., PICC, tunneled, port)    Signed by: Vianey Coronado, Supervisor

## 2023-09-27 NOTE — NURSING NOTE
Instructed patient that Dr Barrett has ordered her 30 meq of potassium and wants her to take the dose before she leaves, pt refused and said she will get some from her PCP tomorrow at her scheduled visit..I instructed her of the importance of getting medication before she leaves and she still refused to stay.

## 2023-09-29 ENCOUNTER — LAB REQUISITION (OUTPATIENT)
Dept: LAB | Facility: HOSPITAL | Age: 58
End: 2023-09-29
Payer: MEDICARE

## 2023-09-29 DIAGNOSIS — I10 ESSENTIAL (PRIMARY) HYPERTENSION: ICD-10-CM

## 2023-09-29 LAB
ALBUMIN SERPL-MCNC: 3.5 G/DL (ref 3.5–5.2)
ALBUMIN/GLOB SERPL: 1.4 G/DL
ALP SERPL-CCNC: 51 U/L (ref 39–117)
ALT SERPL W P-5'-P-CCNC: 29 U/L (ref 1–33)
ANION GAP SERPL CALCULATED.3IONS-SCNC: 9.1 MMOL/L (ref 5–15)
ANISOCYTOSIS BLD QL: NORMAL
AST SERPL-CCNC: 16 U/L (ref 1–32)
BASOPHILS # BLD AUTO: 0.04 10*3/MM3 (ref 0–0.2)
BASOPHILS NFR BLD AUTO: 0.4 % (ref 0–1.5)
BILIRUB SERPL-MCNC: 0.3 MG/DL (ref 0–1.2)
BUN SERPL-MCNC: 20 MG/DL (ref 6–20)
BUN/CREAT SERPL: 43.5 (ref 7–25)
CALCIUM SPEC-SCNC: 9 MG/DL (ref 8.6–10.5)
CHLORIDE SERPL-SCNC: 97 MMOL/L (ref 98–107)
CHOLEST SERPL-MCNC: 199 MG/DL (ref 0–200)
CO2 SERPL-SCNC: 38.9 MMOL/L (ref 22–29)
CREAT SERPL-MCNC: 0.46 MG/DL (ref 0.57–1)
DEPRECATED RDW RBC AUTO: 53.2 FL (ref 37–54)
EGFRCR SERPLBLD CKD-EPI 2021: 111.1 ML/MIN/1.73
EOSINOPHIL # BLD AUTO: 0.17 10*3/MM3 (ref 0–0.4)
EOSINOPHIL NFR BLD AUTO: 1.8 % (ref 0.3–6.2)
ERYTHROCYTE [DISTWIDTH] IN BLOOD BY AUTOMATED COUNT: 15.2 % (ref 12.3–15.4)
GLOBULIN UR ELPH-MCNC: 2.5 GM/DL
GLUCOSE SERPL-MCNC: 221 MG/DL (ref 65–99)
HBA1C MFR BLD: 6.8 % (ref 4.8–5.6)
HCT VFR BLD AUTO: 44.4 % (ref 34–46.6)
HDLC SERPL-MCNC: 48 MG/DL (ref 40–60)
HGB BLD-MCNC: 12.7 G/DL (ref 12–15.9)
HYPOCHROMIA BLD QL: NORMAL
IMM GRANULOCYTES # BLD AUTO: 0.07 10*3/MM3 (ref 0–0.05)
IMM GRANULOCYTES NFR BLD AUTO: 0.7 % (ref 0–0.5)
LDLC SERPL CALC-MCNC: 97 MG/DL (ref 0–100)
LDLC/HDLC SERPL: 1.81 {RATIO}
LYMPHOCYTES # BLD AUTO: 1.23 10*3/MM3 (ref 0.7–3.1)
LYMPHOCYTES NFR BLD AUTO: 13 % (ref 19.6–45.3)
MCH RBC QN AUTO: 27.7 PG (ref 26.6–33)
MCHC RBC AUTO-ENTMCNC: 28.6 G/DL (ref 31.5–35.7)
MCV RBC AUTO: 96.9 FL (ref 79–97)
MONOCYTES # BLD AUTO: 0.5 10*3/MM3 (ref 0.1–0.9)
MONOCYTES NFR BLD AUTO: 5.3 % (ref 5–12)
NEUTROPHILS NFR BLD AUTO: 7.46 10*3/MM3 (ref 1.7–7)
NEUTROPHILS NFR BLD AUTO: 78.8 % (ref 42.7–76)
NRBC BLD AUTO-RTO: 0 /100 WBC (ref 0–0.2)
NT-PROBNP SERPL-MCNC: 305.8 PG/ML (ref 0–900)
PLAT MORPH BLD: NORMAL
PLATELET # BLD AUTO: 163 10*3/MM3 (ref 140–450)
PMV BLD AUTO: 11.4 FL (ref 6–12)
POTASSIUM SERPL-SCNC: 3.7 MMOL/L (ref 3.5–5.2)
PROT SERPL-MCNC: 6 G/DL (ref 6–8.5)
RBC # BLD AUTO: 4.58 10*6/MM3 (ref 3.77–5.28)
SODIUM SERPL-SCNC: 145 MMOL/L (ref 136–145)
T4 FREE SERPL-MCNC: 1.18 NG/DL (ref 0.93–1.7)
TRIGL SERPL-MCNC: 321 MG/DL (ref 0–150)
TSH SERPL DL<=0.05 MIU/L-ACNC: 4.75 UIU/ML (ref 0.27–4.2)
VLDLC SERPL-MCNC: 54 MG/DL (ref 5–40)
WBC NRBC COR # BLD: 9.47 10*3/MM3 (ref 3.4–10.8)

## 2023-09-29 PROCEDURE — 84439 ASSAY OF FREE THYROXINE: CPT | Performed by: NURSE PRACTITIONER

## 2023-09-29 PROCEDURE — 82607 VITAMIN B-12: CPT | Performed by: NURSE PRACTITIONER

## 2023-09-29 PROCEDURE — 84443 ASSAY THYROID STIM HORMONE: CPT | Performed by: NURSE PRACTITIONER

## 2023-09-29 PROCEDURE — 83036 HEMOGLOBIN GLYCOSYLATED A1C: CPT | Performed by: NURSE PRACTITIONER

## 2023-09-29 PROCEDURE — 85007 BL SMEAR W/DIFF WBC COUNT: CPT | Performed by: NURSE PRACTITIONER

## 2023-09-29 PROCEDURE — 82746 ASSAY OF FOLIC ACID SERUM: CPT | Performed by: NURSE PRACTITIONER

## 2023-09-29 PROCEDURE — 85025 COMPLETE CBC W/AUTO DIFF WBC: CPT | Performed by: NURSE PRACTITIONER

## 2023-09-29 PROCEDURE — 80053 COMPREHEN METABOLIC PANEL: CPT | Performed by: NURSE PRACTITIONER

## 2023-09-29 PROCEDURE — 83880 ASSAY OF NATRIURETIC PEPTIDE: CPT | Performed by: NURSE PRACTITIONER

## 2023-09-29 PROCEDURE — 80061 LIPID PANEL: CPT | Performed by: NURSE PRACTITIONER

## 2023-09-29 PROCEDURE — 82306 VITAMIN D 25 HYDROXY: CPT | Performed by: NURSE PRACTITIONER

## 2023-09-30 LAB
25(OH)D3 SERPL-MCNC: 9.5 NG/ML (ref 30–100)
FOLATE SERPL-MCNC: 6.83 NG/ML (ref 4.78–24.2)
VIT B12 BLD-MCNC: 290 PG/ML (ref 211–946)

## 2023-10-12 ENCOUNTER — APPOINTMENT (OUTPATIENT)
Dept: GENERAL RADIOLOGY | Facility: HOSPITAL | Age: 58
End: 2023-10-12
Payer: MEDICARE

## 2023-10-12 ENCOUNTER — HOSPITAL ENCOUNTER (INPATIENT)
Facility: HOSPITAL | Age: 58
LOS: 4 days | Discharge: HOME | End: 2023-10-17
Attending: STUDENT IN AN ORGANIZED HEALTH CARE EDUCATION/TRAINING PROGRAM | Admitting: HOSPITALIST
Payer: MEDICARE

## 2023-10-12 ENCOUNTER — APPOINTMENT (OUTPATIENT)
Dept: CT IMAGING | Facility: HOSPITAL | Age: 58
End: 2023-10-12
Payer: MEDICARE

## 2023-10-12 DIAGNOSIS — J41.0 SIMPLE CHRONIC BRONCHITIS: ICD-10-CM

## 2023-10-12 DIAGNOSIS — J96.22 ACUTE ON CHRONIC RESPIRATORY FAILURE WITH HYPOXIA AND HYPERCAPNIA: Primary | ICD-10-CM

## 2023-10-12 DIAGNOSIS — J96.21 ACUTE ON CHRONIC RESPIRATORY FAILURE WITH HYPOXIA AND HYPERCAPNIA: Primary | ICD-10-CM

## 2023-10-12 LAB
A-A DO2: 100.9 MMHG (ref 0–300)
A-A DO2: 65.4 MMHG (ref 0–300)
A-A DO2: 72.2 MMHG (ref 0–300)
A-A DO2: 83.2 MMHG (ref 0–300)
ALBUMIN SERPL-MCNC: 3.9 G/DL (ref 3.5–5.2)
ALBUMIN/GLOB SERPL: 1.2 G/DL
ALP SERPL-CCNC: 77 U/L (ref 39–117)
ALT SERPL W P-5'-P-CCNC: 25 U/L (ref 1–33)
ANION GAP SERPL CALCULATED.3IONS-SCNC: 12.5 MMOL/L (ref 5–15)
APTT PPP: 25.3 SECONDS (ref 26.5–34.5)
ARTERIAL PATENCY WRIST A: POSITIVE
AST SERPL-CCNC: 21 U/L (ref 1–32)
ATMOSPHERIC PRESS: 722 MMHG
ATMOSPHERIC PRESS: 722 MMHG
ATMOSPHERIC PRESS: 723 MMHG
ATMOSPHERIC PRESS: 723 MMHG
BACTERIA UR QL AUTO: ABNORMAL /HPF
BASE EXCESS BLDA CALC-SCNC: 23.4 MMOL/L (ref 0–2)
BASE EXCESS BLDA CALC-SCNC: 24.1 MMOL/L (ref 0–2)
BASE EXCESS BLDA CALC-SCNC: 24.7 MMOL/L (ref 0–2)
BASE EXCESS BLDA CALC-SCNC: 26.2 MMOL/L (ref 0–2)
BASOPHILS # BLD AUTO: 0.05 10*3/MM3 (ref 0–0.2)
BASOPHILS NFR BLD AUTO: 0.5 % (ref 0–1.5)
BDY SITE: ABNORMAL
BILIRUB SERPL-MCNC: 0.8 MG/DL (ref 0–1.2)
BILIRUB UR QL STRIP: NEGATIVE
BUN SERPL-MCNC: 11 MG/DL (ref 6–20)
BUN/CREAT SERPL: 25 (ref 7–25)
CALCIUM SPEC-SCNC: 9.4 MG/DL (ref 8.6–10.5)
CHLORIDE SERPL-SCNC: 81 MMOL/L (ref 98–107)
CLARITY UR: ABNORMAL
CO2 BLDA-SCNC: 55.8 MMOL/L (ref 22–33)
CO2 BLDA-SCNC: 56.1 MMOL/L (ref 22–33)
CO2 BLDA-SCNC: 57.4 MMOL/L (ref 22–33)
CO2 BLDA-SCNC: 57.8 MMOL/L (ref 22–33)
CO2 SERPL-SCNC: 45.5 MMOL/L (ref 22–29)
COHGB MFR BLD: 2.3 % (ref 0–5)
COHGB MFR BLD: 2.3 % (ref 0–5)
COHGB MFR BLD: 2.4 % (ref 0–5)
COHGB MFR BLD: 2.5 % (ref 0–5)
COLOR UR: ABNORMAL
CREAT SERPL-MCNC: 0.44 MG/DL (ref 0.57–1)
CRP SERPL-MCNC: 1.59 MG/DL (ref 0–0.5)
D-LACTATE SERPL-SCNC: 1.4 MMOL/L (ref 0.5–2)
D-LACTATE SERPL-SCNC: 2.9 MMOL/L (ref 0.5–2)
DEPRECATED RDW RBC AUTO: 51.6 FL (ref 37–54)
EGFRCR SERPLBLD CKD-EPI 2021: 112.3 ML/MIN/1.73
EOSINOPHIL # BLD AUTO: 0.03 10*3/MM3 (ref 0–0.4)
EOSINOPHIL NFR BLD AUTO: 0.3 % (ref 0.3–6.2)
EPAP: 6
EPAP: 8
EPAP: 8
ERYTHROCYTE [DISTWIDTH] IN BLOOD BY AUTOMATED COUNT: 15 % (ref 12.3–15.4)
FLUAV RNA RESP QL NAA+PROBE: NOT DETECTED
FLUBV RNA RESP QL NAA+PROBE: NOT DETECTED
GAS FLOW AIRWAY: 4 LPM
GEN 5 2HR TROPONIN T REFLEX: 37 NG/L
GLOBULIN UR ELPH-MCNC: 3.2 GM/DL
GLUCOSE SERPL-MCNC: 192 MG/DL (ref 65–99)
GLUCOSE UR STRIP-MCNC: NEGATIVE MG/DL
HCO3 BLDA-SCNC: 53.5 MMOL/L (ref 20–26)
HCO3 BLDA-SCNC: 53.5 MMOL/L (ref 20–26)
HCO3 BLDA-SCNC: 54.9 MMOL/L (ref 20–26)
HCO3 BLDA-SCNC: 55.4 MMOL/L (ref 20–26)
HCT VFR BLD AUTO: 46.3 % (ref 34–46.6)
HCT VFR BLD CALC: 42.6 % (ref 38–51)
HCT VFR BLD CALC: 43.8 % (ref 38–51)
HCT VFR BLD CALC: 43.9 % (ref 38–51)
HCT VFR BLD CALC: 44.1 % (ref 38–51)
HGB BLD-MCNC: 13.6 G/DL (ref 12–15.9)
HGB BLDA-MCNC: 13.9 G/DL (ref 13.5–17.5)
HGB BLDA-MCNC: 14.3 G/DL (ref 13.5–17.5)
HGB BLDA-MCNC: 14.3 G/DL (ref 13.5–17.5)
HGB BLDA-MCNC: 14.4 G/DL (ref 13.5–17.5)
HGB UR QL STRIP.AUTO: NEGATIVE
HYALINE CASTS UR QL AUTO: ABNORMAL /LPF
IMM GRANULOCYTES # BLD AUTO: 0.05 10*3/MM3 (ref 0–0.05)
IMM GRANULOCYTES NFR BLD AUTO: 0.5 % (ref 0–0.5)
INHALED O2 CONCENTRATION: 34 %
INHALED O2 CONCENTRATION: 34 %
INHALED O2 CONCENTRATION: 36 %
INHALED O2 CONCENTRATION: 36 %
INR PPP: 0.95 (ref 0.9–1.1)
IPAP: 20
KETONES UR QL STRIP: NEGATIVE
LEUKOCYTE ESTERASE UR QL STRIP.AUTO: NEGATIVE
LYMPHOCYTES # BLD AUTO: 1.13 10*3/MM3 (ref 0.7–3.1)
LYMPHOCYTES NFR BLD AUTO: 11.8 % (ref 19.6–45.3)
Lab: ABNORMAL
MAGNESIUM SERPL-MCNC: 1.8 MG/DL (ref 1.6–2.6)
MCH RBC QN AUTO: 28 PG (ref 26.6–33)
MCHC RBC AUTO-ENTMCNC: 29.4 G/DL (ref 31.5–35.7)
MCV RBC AUTO: 95.3 FL (ref 79–97)
METHGB BLD QL: <-0.1 % (ref 0–3)
MODALITY: ABNORMAL
MONOCYTES # BLD AUTO: 0.61 10*3/MM3 (ref 0.1–0.9)
MONOCYTES NFR BLD AUTO: 6.4 % (ref 5–12)
NEUTROPHILS NFR BLD AUTO: 7.7 10*3/MM3 (ref 1.7–7)
NEUTROPHILS NFR BLD AUTO: 80.5 % (ref 42.7–76)
NITRITE UR QL STRIP: NEGATIVE
NOTIFIED BY: ABNORMAL
NOTIFIED WHO: ABNORMAL
NRBC BLD AUTO-RTO: 0 /100 WBC (ref 0–0.2)
NT-PROBNP SERPL-MCNC: 232.7 PG/ML (ref 0–900)
OXYHGB MFR BLDV: 89.3 % (ref 94–99)
OXYHGB MFR BLDV: 92.2 % (ref 94–99)
OXYHGB MFR BLDV: 93 % (ref 94–99)
OXYHGB MFR BLDV: 93.7 % (ref 94–99)
PCO2 BLDA: 76.3 MM HG (ref 35–45)
PCO2 BLDA: 76.8 MM HG (ref 35–45)
PCO2 BLDA: 82.8 MM HG (ref 35–45)
PCO2 BLDA: 83.1 MM HG (ref 35–45)
PCO2 TEMP ADJ BLD: ABNORMAL MM[HG]
PH BLDA: 7.42 PH UNITS (ref 7.35–7.45)
PH BLDA: 7.43 PH UNITS (ref 7.35–7.45)
PH BLDA: 7.45 PH UNITS (ref 7.35–7.45)
PH BLDA: 7.47 PH UNITS (ref 7.35–7.45)
PH UR STRIP.AUTO: >=9 [PH] (ref 5–8)
PH, TEMP CORRECTED: ABNORMAL
PLATELET # BLD AUTO: 198 10*3/MM3 (ref 140–450)
PMV BLD AUTO: 10.6 FL (ref 6–12)
PO2 BLDA: 57.8 MM HG (ref 83–108)
PO2 BLDA: 68.4 MM HG (ref 83–108)
PO2 BLDA: 72.5 MM HG (ref 83–108)
PO2 BLDA: 72.5 MM HG (ref 83–108)
PO2 TEMP ADJ BLD: ABNORMAL MM[HG]
POTASSIUM SERPL-SCNC: 3.1 MMOL/L (ref 3.5–5.2)
PROCALCITONIN SERPL-MCNC: 0.03 NG/ML (ref 0–0.25)
PROT SERPL-MCNC: 7.1 G/DL (ref 6–8.5)
PROT UR QL STRIP: ABNORMAL
PROTHROMBIN TIME: 13.1 SECONDS (ref 12.1–14.7)
RBC # BLD AUTO: 4.86 10*6/MM3 (ref 3.77–5.28)
RBC # UR STRIP: ABNORMAL /HPF
REF LAB TEST METHOD: ABNORMAL
SAO2 % BLDCOA: 91.4 % (ref 94–99)
SAO2 % BLDCOA: 94.1 % (ref 94–99)
SAO2 % BLDCOA: 94.9 % (ref 94–99)
SAO2 % BLDCOA: 95.5 % (ref 94–99)
SARS-COV-2 RNA RESP QL NAA+PROBE: NOT DETECTED
SET MECH RESP RATE: 24
SET MECH RESP RATE: 26
SET MECH RESP RATE: 26
SODIUM SERPL-SCNC: 139 MMOL/L (ref 136–145)
SP GR UR STRIP: 1.02 (ref 1–1.03)
SQUAMOUS #/AREA URNS HPF: ABNORMAL /HPF
TROPONIN T DELTA: -11 NG/L
TROPONIN T SERPL HS-MCNC: 48 NG/L
UROBILINOGEN UR QL STRIP: ABNORMAL
VENTILATOR MODE: ABNORMAL
WBC # UR STRIP: ABNORMAL /HPF
WBC NRBC COR # BLD: 9.57 10*3/MM3 (ref 3.4–10.8)

## 2023-10-12 PROCEDURE — 0202U NFCT DS 22 TRGT SARS-COV-2: CPT | Performed by: HOSPITALIST

## 2023-10-12 PROCEDURE — 94799 UNLISTED PULMONARY SVC/PX: CPT

## 2023-10-12 PROCEDURE — 83050 HGB METHEMOGLOBIN QUAN: CPT

## 2023-10-12 PROCEDURE — 85730 THROMBOPLASTIN TIME PARTIAL: CPT | Performed by: NURSE PRACTITIONER

## 2023-10-12 PROCEDURE — 83605 ASSAY OF LACTIC ACID: CPT | Performed by: NURSE PRACTITIONER

## 2023-10-12 PROCEDURE — 82375 ASSAY CARBOXYHB QUANT: CPT

## 2023-10-12 PROCEDURE — 25510000001 IOPAMIDOL PER 1 ML: Performed by: NURSE PRACTITIONER

## 2023-10-12 PROCEDURE — 82805 BLOOD GASES W/O2 SATURATION: CPT

## 2023-10-12 PROCEDURE — 87040 BLOOD CULTURE FOR BACTERIA: CPT | Performed by: NURSE PRACTITIONER

## 2023-10-12 PROCEDURE — 5A09357 ASSISTANCE WITH RESPIRATORY VENTILATION, LESS THAN 24 CONSECUTIVE HOURS, CONTINUOUS POSITIVE AIRWAY PRESSURE: ICD-10-PCS | Performed by: STUDENT IN AN ORGANIZED HEALTH CARE EDUCATION/TRAINING PROGRAM

## 2023-10-12 PROCEDURE — 87636 SARSCOV2 & INF A&B AMP PRB: CPT | Performed by: NURSE PRACTITIONER

## 2023-10-12 PROCEDURE — 94640 AIRWAY INHALATION TREATMENT: CPT

## 2023-10-12 PROCEDURE — 36600 WITHDRAWAL OF ARTERIAL BLOOD: CPT

## 2023-10-12 PROCEDURE — 71275 CT ANGIOGRAPHY CHEST: CPT

## 2023-10-12 PROCEDURE — 83880 ASSAY OF NATRIURETIC PEPTIDE: CPT | Performed by: NURSE PRACTITIONER

## 2023-10-12 PROCEDURE — 84145 PROCALCITONIN (PCT): CPT | Performed by: NURSE PRACTITIONER

## 2023-10-12 PROCEDURE — 36415 COLL VENOUS BLD VENIPUNCTURE: CPT

## 2023-10-12 PROCEDURE — 85610 PROTHROMBIN TIME: CPT | Performed by: NURSE PRACTITIONER

## 2023-10-12 PROCEDURE — 94660 CPAP INITIATION&MGMT: CPT

## 2023-10-12 PROCEDURE — 93010 ELECTROCARDIOGRAM REPORT: CPT | Performed by: INTERNAL MEDICINE

## 2023-10-12 PROCEDURE — 71045 X-RAY EXAM CHEST 1 VIEW: CPT | Performed by: RADIOLOGY

## 2023-10-12 PROCEDURE — 81001 URINALYSIS AUTO W/SCOPE: CPT | Performed by: NURSE PRACTITIONER

## 2023-10-12 PROCEDURE — 85025 COMPLETE CBC W/AUTO DIFF WBC: CPT | Performed by: NURSE PRACTITIONER

## 2023-10-12 PROCEDURE — 99285 EMERGENCY DEPT VISIT HI MDM: CPT

## 2023-10-12 PROCEDURE — 25010000002 METHYLPREDNISOLONE PER 125 MG: Performed by: NURSE PRACTITIONER

## 2023-10-12 PROCEDURE — 71045 X-RAY EXAM CHEST 1 VIEW: CPT

## 2023-10-12 PROCEDURE — 86140 C-REACTIVE PROTEIN: CPT | Performed by: NURSE PRACTITIONER

## 2023-10-12 PROCEDURE — 93005 ELECTROCARDIOGRAM TRACING: CPT | Performed by: NURSE PRACTITIONER

## 2023-10-12 PROCEDURE — 84484 ASSAY OF TROPONIN QUANT: CPT | Performed by: NURSE PRACTITIONER

## 2023-10-12 PROCEDURE — 80053 COMPREHEN METABOLIC PANEL: CPT | Performed by: NURSE PRACTITIONER

## 2023-10-12 PROCEDURE — 83735 ASSAY OF MAGNESIUM: CPT | Performed by: HOSPITALIST

## 2023-10-12 PROCEDURE — 71275 CT ANGIOGRAPHY CHEST: CPT | Performed by: RADIOLOGY

## 2023-10-12 RX ORDER — METOPROLOL TARTRATE 5 MG/5ML
5 INJECTION INTRAVENOUS ONCE
Status: COMPLETED | OUTPATIENT
Start: 2023-10-12 | End: 2023-10-12

## 2023-10-12 RX ORDER — METHYLPREDNISOLONE SODIUM SUCCINATE 125 MG/2ML
125 INJECTION, POWDER, LYOPHILIZED, FOR SOLUTION INTRAMUSCULAR; INTRAVENOUS ONCE
Status: COMPLETED | OUTPATIENT
Start: 2023-10-12 | End: 2023-10-12

## 2023-10-12 RX ORDER — IPRATROPIUM BROMIDE AND ALBUTEROL SULFATE 2.5; .5 MG/3ML; MG/3ML
3 SOLUTION RESPIRATORY (INHALATION) ONCE
Status: COMPLETED | OUTPATIENT
Start: 2023-10-12 | End: 2023-10-12

## 2023-10-12 RX ADMIN — METOPROLOL TARTRATE 5 MG: 1 INJECTION, SOLUTION INTRAVENOUS at 21:56

## 2023-10-12 RX ADMIN — METHYLPREDNISOLONE SODIUM SUCCINATE 125 MG: 125 INJECTION, POWDER, FOR SOLUTION INTRAMUSCULAR; INTRAVENOUS at 23:33

## 2023-10-12 RX ADMIN — IPRATROPIUM BROMIDE AND ALBUTEROL SULFATE 3 ML: .5; 2.5 SOLUTION RESPIRATORY (INHALATION) at 23:44

## 2023-10-12 RX ADMIN — IOPAMIDOL 100 ML: 755 INJECTION, SOLUTION INTRAVENOUS at 21:12

## 2023-10-12 RX ADMIN — METOPROLOL TARTRATE 5 MG: 1 INJECTION, SOLUTION INTRAVENOUS at 16:47

## 2023-10-12 NOTE — ED PROVIDER NOTES
Subjective   History of Present Illness  Patient is a 57-year-old female presents emergency room with shortness of breath.  Patient reports he does have a history of COPD.  Patient reports that she was discharged 2 to 3 weeks ago from [facility] in [place] due to pneumonia and states she feels she may have this again.  Patient reports that she wears 4 L of oxygen at all times.    Shortness of Breath  Associated symptoms: wheezing        Review of Systems   Constitutional: Negative.    HENT: Negative.     Eyes: Negative.    Respiratory:  Positive for shortness of breath and wheezing.    Cardiovascular: Negative.    Gastrointestinal: Negative.    Endocrine: Negative.    Genitourinary: Negative.    Musculoskeletal: Negative.    Skin: Negative.    Allergic/Immunologic: Negative.    Neurological: Negative.    Hematological: Negative.    Psychiatric/Behavioral: Negative.         Past Medical History:   Diagnosis Date    Acid reflux disease     Anal cancer 12/5/2019    Arthritis     Asthma, extrinsic     Cholelithiasis     Chronic headaches     COPD (chronic obstructive pulmonary disease)     CVA (cerebral vascular accident)     w/ right sided deficit    CVA (cerebral vascular accident)     Diabetes mellitus     only has high blood sugar when on steriods    History of radiation therapy 02/27/2020    Whole pelvis/anal mass    Obstructive sleep apnea treated with BiPAP     On home oxygen therapy     2L     Sleep apnea, obstructive        Allergies   Allergen Reactions    Morphine Other (See Comments)     Cause low O2    Roflumilast Diarrhea     Significant diarrhea.        Past Surgical History:   Procedure Laterality Date    ABDOMINAL SURGERY      CARDIAC CATHETERIZATION      CAROTID ENDARTERECTOMY Left 2018    CHOLECYSTECTOMY WITH INTRAOPERATIVE CHOLANGIOGRAM N/A 1/30/2017    Procedure: CHOLECYSTECTOMY LAPAROSCOPIC INTRAOPERATIVE CHOLANGIOGRAM;  Surgeon: Carolin Marie MD;  Location: [facility];  Service:     COLONOSCOPY       HYSTERECTOMY      for heavy bleeding/ complete    KIDNEY STONE SURGERY      TUBAL ABDOMINAL LIGATION      VENOUS ACCESS DEVICE (PORT) INSERTION Left 2019    Procedure: INSERTION VENOUS ACCESS DEVICE;  Surgeon: Carolin Marie MD;  Location: [facility];  Service: General       Family History   Problem Relation Age of Onset    Diabetes Mother     Hypertension Mother     Arrhythmia Mother     Pneumonia Father     Coronary artery disease Other     Arrhythmia Sister     Heart attack Brother     Lymphoma Brother         hodgkin's     Other Brother         CABG    Breast cancer Neg Hx        Social History     Socioeconomic History    Marital status:    Tobacco Use    Smoking status: Former     Packs/day: 1.50     Years: 30.00     Additional pack years: 0.00     Total pack years: 45.00     Types: Electronic Cigarette, Cigarettes     Quit date:      Years since quittin.7    Smokeless tobacco: Never   Vaping Use    Vaping Use: Unknown   Substance and Sexual Activity    Alcohol use: No    Drug use: Defer    Sexual activity: Defer           Objective   Physical Exam  Vitals and nursing note reviewed.   Constitutional:       Appearance: She is well-developed.   HENT:      Head: Normocephalic.      Right Ear: External ear normal.      Left Ear: External ear normal.   Eyes:      Conjunctiva/sclera: Conjunctivae normal.      Pupils: Pupils are equal, round, and reactive to light.   Cardiovascular:      Rate and Rhythm: Normal rate and regular rhythm.      Heart sounds: Normal heart sounds.   Pulmonary:      Effort: Pulmonary effort is normal.      Breath sounds: Wheezing present.   Abdominal:      General: Bowel sounds are normal.      Palpations: Abdomen is soft.   Musculoskeletal:         General: Normal range of motion.      Cervical back: Normal range of motion and neck supple.   Skin:     General: Skin is warm and dry.      Capillary Refill: Capillary refill takes less than 2 seconds.    Neurological:      Mental Status: She is alert and oriented to person, place, and time.   Psychiatric:         Behavior: Behavior normal.         Thought Content: Thought content normal.         Procedures       Results for orders placed or performed during the hospital encounter of 10/12/23   COVID-19 and FLU A/B PCR - Swab, Nasopharynx    Specimen: Nasopharynx; Swab   Result Value Ref Range    COVID19 Not Detected Not Detected - Ref. Range    Influenza A PCR Not Detected Not Detected    Influenza B PCR Not Detected Not Detected   Respiratory Panel PCR w/COVID-19(SARS-CoV-2) ANNABEL/MAURY/JULES/PAD/COR/MAD/MELA In-House, NP Swab in UTM/VTM, 3-4 HR TAT - Swab, Nasopharynx    Specimen: Nasopharynx; Swab   Result Value Ref Range    ADENOVIRUS, PCR Not Detected Not Detected    Coronavirus 229E Not Detected Not Detected    Coronavirus HKU1 Not Detected Not Detected    Coronavirus NL63 Not Detected Not Detected    Coronavirus OC43 Not Detected Not Detected    COVID19 Not Detected Not Detected - Ref. Range    Human Metapneumovirus Not Detected Not Detected    Human Rhinovirus/Enterovirus Not Detected Not Detected    Influenza A PCR Not Detected Not Detected    Influenza B PCR Not Detected Not Detected    Parainfluenza Virus 1 Not Detected Not Detected    Parainfluenza Virus 2 Not Detected Not Detected    Parainfluenza Virus 3 Not Detected Not Detected    Parainfluenza Virus 4 Not Detected Not Detected    RSV, PCR Not Detected Not Detected    Bordetella pertussis pcr Not Detected Not Detected    Bordetella parapertussis PCR Not Detected Not Detected    Chlamydophila pneumoniae PCR Not Detected Not Detected    Mycoplasma pneumo by PCR Not Detected Not Detected   Comprehensive Metabolic Panel    Specimen: Blood   Result Value Ref Range    Glucose 192 (H) 65 - 99 mg/dL    BUN 11 6 - 20 mg/dL    Creatinine 0.44 (L) 0.57 - 1.00 mg/dL    Sodium 139 136 - 145 mmol/L    Potassium 3.1 (L) 3.5 - 5.2 mmol/L    Chloride 81 (L) 98 - 107  mmol/L    CO2 45.5 (H) 22.0 - 29.0 mmol/L    Calcium 9.4 8.6 - 10.5 mg/dL    Total Protein 7.1 6.0 - 8.5 g/dL    Albumin 3.9 3.5 - 5.2 g/dL    ALT (SGPT) 25 1 - 33 U/L    AST (SGOT) 21 1 - 32 U/L    Alkaline Phosphatase 77 39 - 117 U/L    Total Bilirubin 0.8 0.0 - 1.2 mg/dL    Globulin 3.2 gm/dL    A/G Ratio 1.2 g/dL    BUN/Creatinine Ratio 25.0 7.0 - 25.0    Anion Gap 12.5 5.0 - 15.0 mmol/L    eGFR 112.3 >60.0 mL/min/1.73   aPTT    Specimen: Blood   Result Value Ref Range    PTT 25.3 (L) 26.5 - 34.5 seconds   Protime-INR    Specimen: Blood   Result Value Ref Range    Protime 13.1 12.1 - 14.7 Seconds    INR 0.95 0.90 - 1.10   Urinalysis With Microscopic If Indicated (No Culture) - Urine, Catheter    Specimen: Urine, Catheter   Result Value Ref Range    Color, UA Dark Yellow (A) Yellow, Straw    Appearance, UA Cloudy (A) Clear    pH, UA >=9.0 (H) 5.0 - 8.0    Specific Gravity, UA 1.019 1.005 - 1.030    Glucose, UA Negative Negative    Ketones, UA Negative Negative    Bilirubin, UA Negative Negative    Blood, UA Negative Negative    Protein, UA 30 mg/dL (1+) (A) Negative    Leuk Esterase, UA Negative Negative    Nitrite, UA Negative Negative    Urobilinogen, UA 2.0 E.U./dL (A) 0.2 - 1.0 E.U./dL   High Sensitivity Troponin T    Specimen: Blood   Result Value Ref Range    HS Troponin T 48 (H) <10 ng/L   BNP    Specimen: Blood   Result Value Ref Range    proBNP 232.7 0.0 - 900.0 pg/mL   Procalcitonin    Specimen: Blood   Result Value Ref Range    Procalcitonin 0.03 0.00 - 0.25 ng/mL   C-reactive Protein    Specimen: Blood   Result Value Ref Range    C-Reactive Protein 1.59 (H) 0.00 - 0.50 mg/dL   CBC Auto Differential    Specimen: Blood   Result Value Ref Range    WBC 9.57 3.40 - 10.80 10*3/mm3    RBC 4.86 3.77 - 5.28 10*6/mm3    Hemoglobin 13.6 12.0 - 15.9 g/dL    Hematocrit 46.3 34.0 - 46.6 %    MCV 95.3 79.0 - 97.0 fL    MCH 28.0 26.6 - 33.0 pg    MCHC 29.4 (L) 31.5 - 35.7 g/dL    RDW 15.0 12.3 - 15.4 %    RDW-SD 51.6  37.0 - 54.0 fl    MPV 10.6 6.0 - 12.0 fL    Platelets 198 140 - 450 10*3/mm3    Neutrophil % 80.5 (H) 42.7 - 76.0 %    Lymphocyte % 11.8 (L) 19.6 - 45.3 %    Monocyte % 6.4 5.0 - 12.0 %    Eosinophil % 0.3 0.3 - 6.2 %    Basophil % 0.5 0.0 - 1.5 %    Immature Grans % 0.5 0.0 - 0.5 %    Neutrophils, Absolute 7.70 (H) 1.70 - 7.00 10*3/mm3    Lymphocytes, Absolute 1.13 0.70 - 3.10 10*3/mm3    Monocytes, Absolute 0.61 0.10 - 0.90 10*3/mm3    Eosinophils, Absolute 0.03 0.00 - 0.40 10*3/mm3    Basophils, Absolute 0.05 0.00 - 0.20 10*3/mm3    Immature Grans, Absolute 0.05 0.00 - 0.05 10*3/mm3    nRBC 0.0 0.0 - 0.2 /100 WBC   Blood Gas, Arterial With Co-Ox    Specimen: Arterial Blood   Result Value Ref Range    Site Left Radial     William's Test Positive     pH, Arterial 7.470 (H) 7.350 - 7.450 pH units    pCO2, Arterial 76.3 (C) 35.0 - 45.0 mm Hg    pO2, Arterial 57.8 (L) 83.0 - 108.0 mm Hg    HCO3, Arterial 55.4 (H) 20.0 - 26.0 mmol/L    Base Excess, Arterial 26.2 (H) 0.0 - 2.0 mmol/L    O2 Saturation, Arterial 91.4 (L) 94.0 - 99.0 %    Hemoglobin, Blood Gas 13.9 13.5 - 17.5 g/dL    Hematocrit, Blood Gas 42.6 38.0 - 51.0 %    Oxyhemoglobin 89.3 (L) 94 - 99 %    Methemoglobin <-0.10 (L) 0.00 - 3.00 %    Carboxyhemoglobin 2.5 0 - 5 %    A-a DO2 100.9 0.0 - 300.0 mmHg    CO2 Content 57.8 (H) 22 - 33 mmol/L    Barometric Pressure for Blood Gas 722 mmHg    Modality Nasal Cannula     FIO2 36 %    Flow Rate 4.0 lpm    Ventilator Mode NA     Notified Who SYED AND RN     Notified By [id number]     Notified Time 10/12/2023 16:19     Collected by [id number]     pH, Temp Corrected      pCO2, Temperature Corrected      pO2, Temperature Corrected     Lactic Acid, Plasma    Specimen: Blood   Result Value Ref Range    Lactate 2.9 (C) 0.5 - 2.0 mmol/L   Urinalysis, Microscopic Only - Urine, Catheter    Specimen: Urine, Catheter   Result Value Ref Range    RBC, UA 3-5 (A) None Seen, 0-2 /HPF    WBC, UA 0-2 None Seen, 0-2 /HPF    Bacteria, UA  None Seen None Seen /HPF    Squamous Epithelial Cells, UA 3-6 (A) None Seen, 0-2 /HPF    Hyaline Casts, UA 7-12 None Seen /LPF    Methodology Automated Microscopy    STAT Lactic Acid, Reflex    Specimen: Arm, Left; Blood   Result Value Ref Range    Lactate 1.4 0.5 - 2.0 mmol/L   High Sensitivity Troponin T 2Hr    Specimen: Arm, Left; Blood   Result Value Ref Range    HS Troponin T 37 (H) <10 ng/L    Troponin T Delta -11 (L) >=-4 - <+4 ng/L   Blood Gas, Arterial With Co-Ox    Specimen: Arterial Blood   Result Value Ref Range    Site Right Radial     William's Test Positive     pH, Arterial 7.430 7.350 - 7.450 pH units    pCO2, Arterial 82.8 (C) 35.0 - 45.0 mm Hg    pO2, Arterial 68.4 (L) 83.0 - 108.0 mm Hg    HCO3, Arterial 54.9 (H) 20.0 - 26.0 mmol/L    Base Excess, Arterial 24.7 (H) 0.0 - 2.0 mmol/L    O2 Saturation, Arterial 94.1 94.0 - 99.0 %    Hemoglobin, Blood Gas 14.4 13.5 - 17.5 g/dL    Hematocrit, Blood Gas 44.1 38.0 - 51.0 %    Oxyhemoglobin 92.2 (L) 94 - 99 %    Methemoglobin <-0.10 (L) 0.00 - 3.00 %    Carboxyhemoglobin 2.3 0 - 5 %    A-a DO2 83.2 0.0 - 300.0 mmHg    CO2 Content 57.4 (H) 22 - 33 mmol/L    Barometric Pressure for Blood Gas 722 mmHg    Modality BiPap     FIO2 36 %    Ventilator Mode NA     Set [facility] Resp Rate 24.0     IPAP 20     EPAP 6     Notified Who MAINOR HUERTA AND RN     Notified By [id number]     Notified Time 10/12/2023 19:00     Collected by [id number]     pH, Temp Corrected      pCO2, Temperature Corrected      pO2, Temperature Corrected     Blood Gas, Arterial With Co-Ox    Specimen: Arterial Blood   Result Value Ref Range    Site Right Radial     William's Test Positive     pH, Arterial 7.451 (H) 7.350 - 7.450 pH units    pCO2, Arterial 76.8 (C) 35.0 - 45.0 mm Hg    pO2, Arterial 72.5 (L) 83.0 - 108.0 mm Hg    HCO3, Arterial 53.5 (H) 20.0 - 26.0 mmol/L    Base Excess, Arterial 24.1 (H) 0.0 - 2.0 mmol/L    O2 Saturation, Arterial 95.5 94.0 - 99.0 %    Hemoglobin, Blood Gas 14.3 13.5 - 17.5 g/dL     Hematocrit, Blood Gas 43.9 38.0 - 51.0 %    Oxyhemoglobin 93.7 (L) 94 - 99 %    Methemoglobin <-0.10 (L) 0.00 - 3.00 %    Carboxyhemoglobin 2.3 0 - 5 %    A-a DO2 72.2 0.0 - 300.0 mmHg    CO2 Content 55.8 (H) 22 - 33 mmol/L    Barometric Pressure for Blood Gas 723 mmHg    Modality BiPap     FIO2 34 %    Ventilator Mode NA     Set [facility] Resp Rate 26.0     IPAP 20     EPAP 8     Notified Who SYED AND RN     Notified By [id number]     Notified Time 10/12/2023 20:41     Collected by [id number]     pH, Temp Corrected      pCO2, Temperature Corrected      pO2, Temperature Corrected     Blood Gas, Arterial With Co-Ox    Specimen: Arterial Blood   Result Value Ref Range    Site Right Radial     William's Test Positive     pH, Arterial 7.417 7.350 - 7.450 pH units    pCO2, Arterial 83.1 (C) 35.0 - 45.0 mm Hg    pO2, Arterial 72.5 (L) 83.0 - 108.0 mm Hg    HCO3, Arterial 53.5 (H) 20.0 - 26.0 mmol/L    Base Excess, Arterial 23.4 (H) 0.0 - 2.0 mmol/L    O2 Saturation, Arterial 94.9 94.0 - 99.0 %    Hemoglobin, Blood Gas 14.3 13.5 - 17.5 g/dL    Hematocrit, Blood Gas 43.8 38.0 - 51.0 %    Oxyhemoglobin 93.0 (L) 94 - 99 %    Methemoglobin <-0.10 (L) 0.00 - 3.00 %    Carboxyhemoglobin 2.4 0 - 5 %    A-a DO2 65.4 0.0 - 300.0 mmHg    CO2 Content 56.1 (H) 22 - 33 mmol/L    Barometric Pressure for Blood Gas 723 mmHg    Modality BiPap     FIO2 34 %    Ventilator Mode NA     Set [facility] Resp Rate 26.0     IPAP 20     EPAP 8     Notified Who ANIL AND RN     Notified By [id number]     Notified Time 10/12/2023 22:37     Collected by [id number]     pH, Temp Corrected      pCO2, Temperature Corrected      pO2, Temperature Corrected     Magnesium    Specimen: Arm, Left; Blood   Result Value Ref Range    Magnesium 1.8 1.6 - 2.6 mg/dL   Blood Gas, Arterial With Co-Ox    Specimen: Arterial Blood   Result Value Ref Range    Site Right Radial     William's Test Positive     pH, Arterial 7.450 7.350 - 7.450 pH units    pCO2, Arterial 76.8 (C) 35.0 - 45.0 mm Hg     pO2, Arterial 50.5 (C) 83.0 - 108.0 mm Hg    HCO3, Arterial 53.4 (H) 20.0 - 26.0 mmol/L    Base Excess, Arterial 24.0 (H) 0.0 - 2.0 mmol/L    O2 Saturation, Arterial 86.2 (L) 94.0 - 99.0 %    Hemoglobin, Blood Gas 14.4 13.5 - 17.5 g/dL    Hematocrit, Blood Gas 44.0 38.0 - 51.0 %    Oxyhemoglobin 84.3 (L) 94 - 99 %    Methemoglobin 0.00 0.00 - 3.00 %    Carboxyhemoglobin 2.3 0 - 5 %    A-a DO2 108.4 0.0 - 300.0 mmHg    CO2 Content 55.7 (H) 22 - 33 mmol/L    Barometric Pressure for Blood Gas 724 mmHg    Modality Nasal Cannula     FIO2 36 %    Flow Rate 4.0 lpm    Ventilator Mode NA     Notified Who ANIL DAHL AND RN     Notified By [id number]     Notified Time 10/13/2023 01:54     Collected by [id number]     pH, Temp Corrected      pCO2, Temperature Corrected      pO2, Temperature Corrected     ECG 12 Lead Dyspnea   Result Value Ref Range    QT Interval 332 ms    QTC Interval 482 ms   ECG 12 Lead Tachycardia   Result Value Ref Range    QT Interval 364 ms    QTC Interval 522 ms        CT Angiogram Chest Pulmonary Embolism   Final Result   Impression:       No acute process in the chest.       This report was finalized on 10/12/2023 10:04 PM by Carli Lopez MD.          XR Chest AP   Final Result     Persistent interstitial thickening and mild vascular congestion likely   CHF superimposed on COPD. No interval worsening since the prior exam.           This report was finalized on 10/12/2023 4:57 PM by Dr. Dionte Hodgson MD.               ED Course  ED Course as of 10/13/23 0404   Thu Oct 12, 2023   1626 EKG shows sinus tachycardia, rate 127.  WA interval 120, QRS duration 116, QTc 482 ms.  Right bundle branch block.  PACs.  No evidence for STEMI.  Electronically signed by Carlos Kenyon MD, 10/12/23, 4:27 PM EDT.   [CM]   2330 D/w Dr. Thorpe, we will try steroids and breathing treatments, repeat ABG in about 1 hour.  [MB]   Fri Oct 13, 2023   0400 Dr Thorpe, admitting patient to his service. [MB]      ED Course  User Index  [CM] Carlos Kenyon MD  [MB] Rachana Smyth APRN                Electronically signed by HESHAM Sutton, 10/12/23, 10:07 PM EDT.                             Medical Decision Making  Amount and/or Complexity of Data Reviewed  Labs: ordered.  Radiology: ordered.  ECG/medicine tests: ordered.    Risk  Prescription drug management.  Decision regarding hospitalization.        Final diagnoses:   Acute on chronic respiratory failure with hypoxia and hypercapnia       ED Disposition  ED Disposition       ED Disposition   Decision to Admit    Condition   --    Comment   Level of Care: Progressive Care [20]   Diagnosis: Acute on chronic respiratory failure with hypoxia and hypercapnia [[id number]]   Admitting Physician: RADHA ALVES [[id number]]   Attending Physician: RADHA ALVES [[id number]]   Certification: I Certify That Inpatient Hospital Services Are Medically Necessary For Greater Than 2 Midnights                 No follow-up provider specified.       Medication List      No changes were made to your prescriptions during this visit.            Rachana Smyth APRN  10/13/23 0404

## 2023-10-13 ENCOUNTER — APPOINTMENT (OUTPATIENT)
Dept: CARDIOLOGY | Facility: HOSPITAL | Age: 58
End: 2023-10-13
Payer: MEDICARE

## 2023-10-13 PROBLEM — J96.22 ACUTE ON CHRONIC RESPIRATORY FAILURE WITH HYPOXIA AND HYPERCAPNIA: Status: ACTIVE | Noted: 2023-10-13

## 2023-10-13 PROBLEM — J96.21 ACUTE ON CHRONIC RESPIRATORY FAILURE WITH HYPOXIA AND HYPERCAPNIA: Status: ACTIVE | Noted: 2023-10-13

## 2023-10-13 LAB
A-A DO2: 108.4 MMHG (ref 0–300)
A-A DO2: 62.7 MMHG (ref 0–300)
A-A DO2: 67.6 MMHG (ref 0–300)
ALBUMIN SERPL-MCNC: 3.9 G/DL (ref 3.5–5.2)
ALBUMIN/GLOB SERPL: 1.2 G/DL
ALP SERPL-CCNC: 75 U/L (ref 39–117)
ALT SERPL W P-5'-P-CCNC: 20 U/L (ref 1–33)
ANION GAP SERPL CALCULATED.3IONS-SCNC: 7.8 MMOL/L (ref 5–15)
ARTERIAL PATENCY WRIST A: ABNORMAL
ARTERIAL PATENCY WRIST A: POSITIVE
ARTERIAL PATENCY WRIST A: POSITIVE
AST SERPL-CCNC: 16 U/L (ref 1–32)
ATMOSPHERIC PRESS: 724 MMHG
B PARAPERT DNA SPEC QL NAA+PROBE: NOT DETECTED
B PERT DNA SPEC QL NAA+PROBE: NOT DETECTED
BASE EXCESS BLDA CALC-SCNC: 23.6 MMOL/L (ref 0–2)
BASE EXCESS BLDA CALC-SCNC: 24 MMOL/L (ref 0–2)
BASE EXCESS BLDA CALC-SCNC: 24.1 MMOL/L (ref 0–2)
BASOPHILS # BLD AUTO: 0.01 10*3/MM3 (ref 0–0.2)
BASOPHILS NFR BLD AUTO: 0.2 % (ref 0–1.5)
BDY SITE: ABNORMAL
BILIRUB SERPL-MCNC: 0.8 MG/DL (ref 0–1.2)
BUN SERPL-MCNC: 12 MG/DL (ref 6–20)
BUN/CREAT SERPL: 30 (ref 7–25)
C PNEUM DNA NPH QL NAA+NON-PROBE: NOT DETECTED
CALCIUM SPEC-SCNC: 9.7 MG/DL (ref 8.6–10.5)
CHLORIDE SERPL-SCNC: 83 MMOL/L (ref 98–107)
CO2 BLDA-SCNC: 54.3 MMOL/L (ref 22–33)
CO2 BLDA-SCNC: 54.8 MMOL/L (ref 22–33)
CO2 BLDA-SCNC: 55.7 MMOL/L (ref 22–33)
CO2 SERPL-SCNC: 44.2 MMOL/L (ref 22–29)
COHGB MFR BLD: 2.2 % (ref 0–5)
COHGB MFR BLD: 2.3 % (ref 0–5)
COHGB MFR BLD: 2.4 % (ref 0–5)
CREAT SERPL-MCNC: 0.4 MG/DL (ref 0.57–1)
DEPRECATED RDW RBC AUTO: 50.6 FL (ref 37–54)
EGFRCR SERPLBLD CKD-EPI 2021: 114.9 ML/MIN/1.73
EOSINOPHIL # BLD AUTO: 0 10*3/MM3 (ref 0–0.4)
EOSINOPHIL NFR BLD AUTO: 0 % (ref 0.3–6.2)
EPAP: 8
EPAP: 8
ERYTHROCYTE [DISTWIDTH] IN BLOOD BY AUTOMATED COUNT: 15 % (ref 12.3–15.4)
FLUAV SUBTYP SPEC NAA+PROBE: NOT DETECTED
FLUBV RNA ISLT QL NAA+PROBE: NOT DETECTED
GAS FLOW AIRWAY: 4 LPM
GLOBULIN UR ELPH-MCNC: 3.2 GM/DL
GLUCOSE BLDC GLUCOMTR-MCNC: 155 MG/DL (ref 70–130)
GLUCOSE BLDC GLUCOMTR-MCNC: 166 MG/DL (ref 70–130)
GLUCOSE BLDC GLUCOMTR-MCNC: 168 MG/DL (ref 70–130)
GLUCOSE BLDC GLUCOMTR-MCNC: 209 MG/DL (ref 70–130)
GLUCOSE SERPL-MCNC: 194 MG/DL (ref 65–99)
HADV DNA SPEC NAA+PROBE: NOT DETECTED
HCO3 BLDA-SCNC: 52.1 MMOL/L (ref 20–26)
HCO3 BLDA-SCNC: 52.6 MMOL/L (ref 20–26)
HCO3 BLDA-SCNC: 53.4 MMOL/L (ref 20–26)
HCOV 229E RNA SPEC QL NAA+PROBE: NOT DETECTED
HCOV HKU1 RNA SPEC QL NAA+PROBE: NOT DETECTED
HCOV NL63 RNA SPEC QL NAA+PROBE: NOT DETECTED
HCOV OC43 RNA SPEC QL NAA+PROBE: NOT DETECTED
HCT VFR BLD AUTO: 46.3 % (ref 34–46.6)
HCT VFR BLD CALC: 42.8 % (ref 38–51)
HCT VFR BLD CALC: 44 % (ref 38–51)
HCT VFR BLD CALC: 44.7 % (ref 38–51)
HGB BLD-MCNC: 14.2 G/DL (ref 12–15.9)
HGB BLDA-MCNC: 14 G/DL (ref 13.5–17.5)
HGB BLDA-MCNC: 14.4 G/DL (ref 13.5–17.5)
HGB BLDA-MCNC: 14.6 G/DL (ref 13.5–17.5)
HMPV RNA NPH QL NAA+NON-PROBE: NOT DETECTED
HPIV1 RNA ISLT QL NAA+PROBE: NOT DETECTED
HPIV2 RNA SPEC QL NAA+PROBE: NOT DETECTED
HPIV3 RNA NPH QL NAA+PROBE: NOT DETECTED
HPIV4 P GENE NPH QL NAA+PROBE: NOT DETECTED
IMM GRANULOCYTES # BLD AUTO: 0.05 10*3/MM3 (ref 0–0.05)
IMM GRANULOCYTES NFR BLD AUTO: 0.9 % (ref 0–0.5)
INHALED O2 CONCENTRATION: 30 %
INHALED O2 CONCENTRATION: 30 %
INHALED O2 CONCENTRATION: 36 %
IPAP: 24
IPAP: 24
LYMPHOCYTES # BLD AUTO: 0.5 10*3/MM3 (ref 0.7–3.1)
LYMPHOCYTES NFR BLD AUTO: 8.6 % (ref 19.6–45.3)
Lab: ABNORMAL
M PNEUMO IGG SER IA-ACNC: NOT DETECTED
MCH RBC QN AUTO: 28.3 PG (ref 26.6–33)
MCHC RBC AUTO-ENTMCNC: 30.7 G/DL (ref 31.5–35.7)
MCV RBC AUTO: 92.4 FL (ref 79–97)
METHGB BLD QL: 0 % (ref 0–3)
METHGB BLD QL: 0 % (ref 0–3)
METHGB BLD QL: <-0.1 % (ref 0–3)
MODALITY: ABNORMAL
MONOCYTES # BLD AUTO: 0.17 10*3/MM3 (ref 0.1–0.9)
MONOCYTES NFR BLD AUTO: 2.9 % (ref 5–12)
NEUTROPHILS NFR BLD AUTO: 5.08 10*3/MM3 (ref 1.7–7)
NEUTROPHILS NFR BLD AUTO: 87.4 % (ref 42.7–76)
NOTIFIED BY: ABNORMAL
NOTIFIED WHO: ABNORMAL
NRBC BLD AUTO-RTO: 0 /100 WBC (ref 0–0.2)
OXYHGB MFR BLDV: 84.3 % (ref 94–99)
OXYHGB MFR BLDV: 89 % (ref 94–99)
OXYHGB MFR BLDV: 90.3 % (ref 94–99)
PCO2 BLDA: 70.1 MM HG (ref 35–45)
PCO2 BLDA: 71.7 MM HG (ref 35–45)
PCO2 BLDA: 76.8 MM HG (ref 35–45)
PCO2 TEMP ADJ BLD: ABNORMAL MM[HG]
PH BLDA: 7.45 PH UNITS (ref 7.35–7.45)
PH BLDA: 7.47 PH UNITS (ref 7.35–7.45)
PH BLDA: 7.48 PH UNITS (ref 7.35–7.45)
PH, TEMP CORRECTED: ABNORMAL
PLATELET # BLD AUTO: 211 10*3/MM3 (ref 140–450)
PMV BLD AUTO: 10.9 FL (ref 6–12)
PO2 BLDA: 50.5 MM HG (ref 83–108)
PO2 BLDA: 57.4 MM HG (ref 83–108)
PO2 BLDA: 60.6 MM HG (ref 83–108)
PO2 TEMP ADJ BLD: ABNORMAL MM[HG]
POTASSIUM SERPL-SCNC: 3.5 MMOL/L (ref 3.5–5.2)
PROT SERPL-MCNC: 7.1 G/DL (ref 6–8.5)
QT INTERVAL: 332 MS
QT INTERVAL: 364 MS
QTC INTERVAL: 482 MS
QTC INTERVAL: 522 MS
RBC # BLD AUTO: 5.01 10*6/MM3 (ref 3.77–5.28)
RHINOVIRUS RNA SPEC NAA+PROBE: NOT DETECTED
RSV RNA NPH QL NAA+NON-PROBE: NOT DETECTED
SAO2 % BLDCOA: 86.2 % (ref 94–99)
SAO2 % BLDCOA: 91 % (ref 94–99)
SAO2 % BLDCOA: 92.3 % (ref 94–99)
SARS-COV-2 RNA NPH QL NAA+NON-PROBE: NOT DETECTED
SET MECH RESP RATE: 26
SET MECH RESP RATE: 26
SODIUM SERPL-SCNC: 135 MMOL/L (ref 136–145)
VENTILATOR MODE: ABNORMAL
WBC NRBC COR # BLD: 5.81 10*3/MM3 (ref 3.4–10.8)

## 2023-10-13 PROCEDURE — 80053 COMPREHEN METABOLIC PANEL: CPT | Performed by: HOSPITALIST

## 2023-10-13 PROCEDURE — 36600 WITHDRAWAL OF ARTERIAL BLOOD: CPT

## 2023-10-13 PROCEDURE — 93306 TTE W/DOPPLER COMPLETE: CPT

## 2023-10-13 PROCEDURE — 25010000002 HEPARIN (PORCINE) PER 1000 UNITS: Performed by: HOSPITALIST

## 2023-10-13 PROCEDURE — 82948 REAGENT STRIP/BLOOD GLUCOSE: CPT

## 2023-10-13 PROCEDURE — 25010000002 METHYLPREDNISOLONE PER 40 MG: Performed by: HOSPITALIST

## 2023-10-13 PROCEDURE — 82375 ASSAY CARBOXYHB QUANT: CPT

## 2023-10-13 PROCEDURE — 94799 UNLISTED PULMONARY SVC/PX: CPT

## 2023-10-13 PROCEDURE — 25810000003 SODIUM CHLORIDE 0.9 % SOLUTION: Performed by: INTERNAL MEDICINE

## 2023-10-13 PROCEDURE — 94761 N-INVAS EAR/PLS OXIMETRY MLT: CPT

## 2023-10-13 PROCEDURE — 25010000002 SULFUR HEXAFLUORIDE MICROSPH 60.7-25 MG RECONSTITUTED SUSPENSION: Performed by: HOSPITALIST

## 2023-10-13 PROCEDURE — 99222 1ST HOSP IP/OBS MODERATE 55: CPT | Performed by: INTERNAL MEDICINE

## 2023-10-13 PROCEDURE — 82805 BLOOD GASES W/O2 SATURATION: CPT

## 2023-10-13 PROCEDURE — 63710000001 INSULIN LISPRO (HUMAN) PER 5 UNITS: Performed by: HOSPITALIST

## 2023-10-13 PROCEDURE — 93306 TTE W/DOPPLER COMPLETE: CPT | Performed by: SPECIALIST

## 2023-10-13 PROCEDURE — 93010 ELECTROCARDIOGRAM REPORT: CPT | Performed by: INTERNAL MEDICINE

## 2023-10-13 PROCEDURE — 94664 DEMO&/EVAL PT USE INHALER: CPT

## 2023-10-13 PROCEDURE — 85025 COMPLETE CBC W/AUTO DIFF WBC: CPT | Performed by: HOSPITALIST

## 2023-10-13 PROCEDURE — 83050 HGB METHEMOGLOBIN QUAN: CPT

## 2023-10-13 PROCEDURE — 93005 ELECTROCARDIOGRAM TRACING: CPT | Performed by: NURSE PRACTITIONER

## 2023-10-13 PROCEDURE — 25810000003 SODIUM CHLORIDE 0.9 % SOLUTION: Performed by: HOSPITALIST

## 2023-10-13 RX ORDER — INSULIN LISPRO 100 [IU]/ML
2-7 INJECTION, SOLUTION INTRAVENOUS; SUBCUTANEOUS
Status: DISCONTINUED | OUTPATIENT
Start: 2023-10-13 | End: 2023-10-17 | Stop reason: HOSPADM

## 2023-10-13 RX ORDER — AMOXICILLIN 250 MG
2 CAPSULE ORAL 2 TIMES DAILY
Status: DISCONTINUED | OUTPATIENT
Start: 2023-10-13 | End: 2023-10-17 | Stop reason: HOSPADM

## 2023-10-13 RX ORDER — IPRATROPIUM BROMIDE AND ALBUTEROL SULFATE 2.5; .5 MG/3ML; MG/3ML
3 SOLUTION RESPIRATORY (INHALATION) ONCE
Status: COMPLETED | OUTPATIENT
Start: 2023-10-13 | End: 2023-10-13

## 2023-10-13 RX ORDER — POTASSIUM CHLORIDE 20 MEQ/1
40 TABLET, EXTENDED RELEASE ORAL EVERY 4 HOURS
Qty: 4 TABLET | Refills: 0 | Status: COMPLETED | OUTPATIENT
Start: 2023-10-13 | End: 2023-10-13

## 2023-10-13 RX ORDER — BISACODYL 5 MG/1
5 TABLET, DELAYED RELEASE ORAL DAILY PRN
Status: DISCONTINUED | OUTPATIENT
Start: 2023-10-13 | End: 2023-10-17 | Stop reason: HOSPADM

## 2023-10-13 RX ORDER — METHYLPREDNISOLONE SODIUM SUCCINATE 40 MG/ML
40 INJECTION, POWDER, LYOPHILIZED, FOR SOLUTION INTRAMUSCULAR; INTRAVENOUS EVERY 12 HOURS
Status: DISCONTINUED | OUTPATIENT
Start: 2023-10-13 | End: 2023-10-16

## 2023-10-13 RX ORDER — POLYETHYLENE GLYCOL 3350 17 G/17G
17 POWDER, FOR SOLUTION ORAL DAILY PRN
Status: DISCONTINUED | OUTPATIENT
Start: 2023-10-13 | End: 2023-10-17 | Stop reason: HOSPADM

## 2023-10-13 RX ORDER — LEVOTHYROXINE SODIUM 0.03 MG/1
25 TABLET ORAL
Status: CANCELLED | OUTPATIENT
Start: 2023-10-13

## 2023-10-13 RX ORDER — ASPIRIN 81 MG/1
81 TABLET ORAL DAILY
Status: CANCELLED | OUTPATIENT
Start: 2023-10-13

## 2023-10-13 RX ORDER — BUDESONIDE AND FORMOTEROL FUMARATE DIHYDRATE 160; 4.5 UG/1; UG/1
2 AEROSOL RESPIRATORY (INHALATION)
Status: CANCELLED | OUTPATIENT
Start: 2023-10-13

## 2023-10-13 RX ORDER — CLOPIDOGREL BISULFATE 75 MG/1
75 TABLET ORAL DAILY
Status: DISCONTINUED | OUTPATIENT
Start: 2023-10-13 | End: 2023-10-17 | Stop reason: HOSPADM

## 2023-10-13 RX ORDER — GUAIFENESIN 600 MG/1
1200 TABLET, EXTENDED RELEASE ORAL 2 TIMES DAILY PRN
COMMUNITY

## 2023-10-13 RX ORDER — FUROSEMIDE 20 MG/1
20 TABLET ORAL DAILY
Status: CANCELLED | OUTPATIENT
Start: 2023-10-13

## 2023-10-13 RX ORDER — HYDROCODONE BITARTRATE AND ACETAMINOPHEN 7.5; 325 MG/1; MG/1
1 TABLET ORAL ONCE
Status: COMPLETED | OUTPATIENT
Start: 2023-10-13 | End: 2023-10-13

## 2023-10-13 RX ORDER — ALPRAZOLAM 0.5 MG/1
0.5 TABLET ORAL 2 TIMES DAILY PRN
Status: CANCELLED | OUTPATIENT
Start: 2023-10-13

## 2023-10-13 RX ORDER — GUAIFENESIN 600 MG/1
1200 TABLET, EXTENDED RELEASE ORAL 2 TIMES DAILY PRN
Status: DISCONTINUED | OUTPATIENT
Start: 2023-10-13 | End: 2023-10-17 | Stop reason: HOSPADM

## 2023-10-13 RX ORDER — NITROGLYCERIN 0.4 MG/1
0.4 TABLET SUBLINGUAL
Status: DISCONTINUED | OUTPATIENT
Start: 2023-10-13 | End: 2023-10-17 | Stop reason: HOSPADM

## 2023-10-13 RX ORDER — ONDANSETRON 4 MG/1
4 TABLET, ORALLY DISINTEGRATING ORAL EVERY 8 HOURS PRN
COMMUNITY
End: 2023-10-17 | Stop reason: HOSPADM

## 2023-10-13 RX ORDER — ASPIRIN 81 MG/1
81 TABLET ORAL DAILY
Status: DISCONTINUED | OUTPATIENT
Start: 2023-10-13 | End: 2023-10-17 | Stop reason: HOSPADM

## 2023-10-13 RX ORDER — LEVOTHYROXINE SODIUM 0.03 MG/1
25 TABLET ORAL
COMMUNITY

## 2023-10-13 RX ORDER — GLUCAGON 1 MG/ML
1 KIT INJECTION
Status: DISCONTINUED | OUTPATIENT
Start: 2023-10-13 | End: 2023-10-17 | Stop reason: HOSPADM

## 2023-10-13 RX ORDER — GUAIFENESIN 600 MG/1
1200 TABLET, EXTENDED RELEASE ORAL 2 TIMES DAILY PRN
Status: CANCELLED | OUTPATIENT
Start: 2023-10-13

## 2023-10-13 RX ORDER — LEVOTHYROXINE SODIUM 0.03 MG/1
25 TABLET ORAL
Status: DISCONTINUED | OUTPATIENT
Start: 2023-10-14 | End: 2023-10-17 | Stop reason: HOSPADM

## 2023-10-13 RX ORDER — ALUMINA, MAGNESIA, AND SIMETHICONE 2400; 2400; 240 MG/30ML; MG/30ML; MG/30ML
15 SUSPENSION ORAL 3 TIMES DAILY PRN
Status: DISCONTINUED | OUTPATIENT
Start: 2023-10-13 | End: 2023-10-17 | Stop reason: HOSPADM

## 2023-10-13 RX ORDER — BUDESONIDE 0.5 MG/2ML
0.5 INHALANT ORAL
Status: DISCONTINUED | OUTPATIENT
Start: 2023-10-13 | End: 2023-10-17 | Stop reason: HOSPADM

## 2023-10-13 RX ORDER — CLOPIDOGREL BISULFATE 75 MG/1
75 TABLET ORAL DAILY
Status: CANCELLED | OUTPATIENT
Start: 2023-10-13

## 2023-10-13 RX ORDER — SODIUM CHLORIDE 9 MG/ML
40 INJECTION, SOLUTION INTRAVENOUS AS NEEDED
Status: DISCONTINUED | OUTPATIENT
Start: 2023-10-13 | End: 2023-10-17 | Stop reason: HOSPADM

## 2023-10-13 RX ORDER — SODIUM CHLORIDE 9 MG/ML
50 INJECTION, SOLUTION INTRAVENOUS CONTINUOUS
Status: DISCONTINUED | OUTPATIENT
Start: 2023-10-13 | End: 2023-10-15

## 2023-10-13 RX ORDER — ARFORMOTEROL TARTRATE 15 UG/2ML
15 SOLUTION RESPIRATORY (INHALATION)
Status: DISCONTINUED | OUTPATIENT
Start: 2023-10-13 | End: 2023-10-17 | Stop reason: HOSPADM

## 2023-10-13 RX ORDER — HYDROCODONE BITARTRATE AND ACETAMINOPHEN 10; 325 MG/1; MG/1
1 TABLET ORAL EVERY 6 HOURS SCHEDULED
COMMUNITY

## 2023-10-13 RX ORDER — SODIUM CHLORIDE 0.9 % (FLUSH) 0.9 %
10 SYRINGE (ML) INJECTION AS NEEDED
Status: DISCONTINUED | OUTPATIENT
Start: 2023-10-13 | End: 2023-10-17 | Stop reason: HOSPADM

## 2023-10-13 RX ORDER — NICOTINE POLACRILEX 4 MG
15 LOZENGE BUCCAL
Status: DISCONTINUED | OUTPATIENT
Start: 2023-10-13 | End: 2023-10-17 | Stop reason: HOSPADM

## 2023-10-13 RX ORDER — ALPRAZOLAM 0.5 MG/1
0.5 TABLET ORAL NIGHTLY PRN
Status: DISCONTINUED | OUTPATIENT
Start: 2023-10-13 | End: 2023-10-17 | Stop reason: HOSPADM

## 2023-10-13 RX ORDER — HYDROCODONE BITARTRATE AND ACETAMINOPHEN 10; 325 MG/1; MG/1
1 TABLET ORAL EVERY 6 HOURS SCHEDULED
Status: CANCELLED | OUTPATIENT
Start: 2023-10-13

## 2023-10-13 RX ORDER — HYDROCODONE BITARTRATE AND ACETAMINOPHEN 7.5; 325 MG/1; MG/1
1 TABLET ORAL EVERY 8 HOURS PRN
Status: DISCONTINUED | OUTPATIENT
Start: 2023-10-13 | End: 2023-10-14

## 2023-10-13 RX ORDER — POTASSIUM CHLORIDE 20 MEQ/1
10 TABLET, EXTENDED RELEASE ORAL DAILY
Status: DISCONTINUED | OUTPATIENT
Start: 2023-10-13 | End: 2023-10-17 | Stop reason: HOSPADM

## 2023-10-13 RX ORDER — DEXTROSE MONOHYDRATE 25 G/50ML
25 INJECTION, SOLUTION INTRAVENOUS
Status: DISCONTINUED | OUTPATIENT
Start: 2023-10-13 | End: 2023-10-17 | Stop reason: HOSPADM

## 2023-10-13 RX ORDER — METOPROLOL TARTRATE 5 MG/5ML
5 INJECTION INTRAVENOUS ONCE
Status: COMPLETED | OUTPATIENT
Start: 2023-10-13 | End: 2023-10-13

## 2023-10-13 RX ORDER — BISACODYL 10 MG
10 SUPPOSITORY, RECTAL RECTAL DAILY PRN
Status: DISCONTINUED | OUTPATIENT
Start: 2023-10-13 | End: 2023-10-17 | Stop reason: HOSPADM

## 2023-10-13 RX ORDER — HEPARIN SODIUM 5000 [USP'U]/ML
5000 INJECTION, SOLUTION INTRAVENOUS; SUBCUTANEOUS EVERY 12 HOURS SCHEDULED
Status: DISCONTINUED | OUTPATIENT
Start: 2023-10-13 | End: 2023-10-17 | Stop reason: HOSPADM

## 2023-10-13 RX ORDER — FUROSEMIDE 40 MG/1
20 TABLET ORAL DAILY
Status: CANCELLED | OUTPATIENT
Start: 2023-10-13

## 2023-10-13 RX ORDER — SODIUM CHLORIDE 0.9 % (FLUSH) 0.9 %
10 SYRINGE (ML) INJECTION EVERY 12 HOURS SCHEDULED
Status: DISCONTINUED | OUTPATIENT
Start: 2023-10-13 | End: 2023-10-17 | Stop reason: HOSPADM

## 2023-10-13 RX ORDER — POTASSIUM CHLORIDE 20 MEQ/1
10 TABLET, EXTENDED RELEASE ORAL DAILY
Status: CANCELLED | OUTPATIENT
Start: 2023-10-13

## 2023-10-13 RX ADMIN — HYDROCODONE BITARTRATE AND ACETAMINOPHEN 1 TABLET: 7.5; 325 TABLET ORAL at 14:20

## 2023-10-13 RX ADMIN — POTASSIUM CHLORIDE 40 MEQ: 1500 TABLET, EXTENDED RELEASE ORAL at 21:06

## 2023-10-13 RX ADMIN — METHYLPREDNISOLONE SODIUM SUCCINATE 40 MG: 40 INJECTION, POWDER, FOR SOLUTION INTRAMUSCULAR; INTRAVENOUS at 21:06

## 2023-10-13 RX ADMIN — IPRATROPIUM BROMIDE AND ALBUTEROL SULFATE 3 ML: 2.5; .5 SOLUTION RESPIRATORY (INHALATION) at 00:58

## 2023-10-13 RX ADMIN — METOPROLOL TARTRATE 25 MG: 25 TABLET, FILM COATED ORAL at 21:07

## 2023-10-13 RX ADMIN — HEPARIN SODIUM 5000 UNITS: 5000 INJECTION INTRAVENOUS; SUBCUTANEOUS at 21:06

## 2023-10-13 RX ADMIN — ARFORMOTEROL TARTRATE 15 MCG: 15 SOLUTION RESPIRATORY (INHALATION) at 19:07

## 2023-10-13 RX ADMIN — DOCUSATE SODIUM 50 MG AND SENNOSIDES 8.6 MG 2 TABLET: 8.6; 5 TABLET, FILM COATED ORAL at 09:47

## 2023-10-13 RX ADMIN — INSULIN LISPRO 3 UNITS: 100 INJECTION, SOLUTION INTRAVENOUS; SUBCUTANEOUS at 16:45

## 2023-10-13 RX ADMIN — HYDROCODONE BITARTRATE AND ACETAMINOPHEN 1 TABLET: 7.5; 325 TABLET ORAL at 00:31

## 2023-10-13 RX ADMIN — HYDROCODONE BITARTRATE AND ACETAMINOPHEN 1 TABLET: 7.5; 325 TABLET ORAL at 22:03

## 2023-10-13 RX ADMIN — SODIUM CHLORIDE 50 ML/HR: 9 INJECTION, SOLUTION INTRAVENOUS at 23:18

## 2023-10-13 RX ADMIN — Medication 10 ML: at 09:47

## 2023-10-13 RX ADMIN — ALPRAZOLAM 0.5 MG: 0.5 TABLET ORAL at 22:52

## 2023-10-13 RX ADMIN — IPRATROPIUM BROMIDE 0.5 MG: 0.5 SOLUTION RESPIRATORY (INHALATION) at 06:28

## 2023-10-13 RX ADMIN — BUDESONIDE 0.5 MG: 0.5 SUSPENSION RESPIRATORY (INHALATION) at 19:08

## 2023-10-13 RX ADMIN — INSULIN LISPRO 2 UNITS: 100 INJECTION, SOLUTION INTRAVENOUS; SUBCUTANEOUS at 06:47

## 2023-10-13 RX ADMIN — CLOPIDOGREL BISULFATE 75 MG: 75 TABLET, FILM COATED ORAL at 14:20

## 2023-10-13 RX ADMIN — METOPROLOL TARTRATE 25 MG: 25 TABLET, FILM COATED ORAL at 14:20

## 2023-10-13 RX ADMIN — Medication 10 ML: at 21:09

## 2023-10-13 RX ADMIN — ASPIRIN 81 MG: 81 TABLET, COATED ORAL at 14:20

## 2023-10-13 RX ADMIN — POTASSIUM CHLORIDE 10 MEQ: 1500 TABLET, EXTENDED RELEASE ORAL at 14:21

## 2023-10-13 RX ADMIN — METOPROLOL TARTRATE 5 MG: 1 INJECTION, SOLUTION INTRAVENOUS at 04:07

## 2023-10-13 RX ADMIN — IPRATROPIUM BROMIDE 0.5 MG: 0.5 SOLUTION RESPIRATORY (INHALATION) at 03:09

## 2023-10-13 RX ADMIN — HEPARIN SODIUM 5000 UNITS: 5000 INJECTION INTRAVENOUS; SUBCUTANEOUS at 09:46

## 2023-10-13 RX ADMIN — IPRATROPIUM BROMIDE 0.5 MG: 0.5 SOLUTION RESPIRATORY (INHALATION) at 13:19

## 2023-10-13 RX ADMIN — METHYLPREDNISOLONE SODIUM SUCCINATE 40 MG: 40 INJECTION, POWDER, FOR SOLUTION INTRAMUSCULAR; INTRAVENOUS at 09:46

## 2023-10-13 RX ADMIN — METOPROLOL TARTRATE 25 MG: 25 TABLET, FILM COATED ORAL at 09:47

## 2023-10-13 RX ADMIN — SULFUR HEXAFLUORIDE 2 ML: KIT at 18:43

## 2023-10-13 RX ADMIN — IPRATROPIUM BROMIDE 0.5 MG: 0.5 SOLUTION RESPIRATORY (INHALATION) at 19:08

## 2023-10-13 RX ADMIN — BUDESONIDE 0.5 MG: 0.5 SUSPENSION RESPIRATORY (INHALATION) at 13:19

## 2023-10-13 RX ADMIN — ALUMINUM HYDROXIDE, MAGNESIUM HYDROXIDE, AND DIMETHICONE 15 ML: 400; 400; 40 SUSPENSION ORAL at 18:15

## 2023-10-13 RX ADMIN — SODIUM CHLORIDE 100 ML/HR: 9 INJECTION, SOLUTION INTRAVENOUS at 09:45

## 2023-10-13 RX ADMIN — POTASSIUM CHLORIDE 40 MEQ: 1500 TABLET, EXTENDED RELEASE ORAL at 16:45

## 2023-10-13 NOTE — PROGRESS NOTES
Patient seen and examined, awake, currently on BiPAP, chart reviewed.    -Acute on chronic hypoxic respiratory failure  -COPD exacerbation  -Metabolic alkalosis with concomitant respiratory acidosis  -Troponin elevation likely non-STEMI type II  -Acute hypokalemia likely exacerbated by metabolic alkalosis  -History of diastolic heart failure  -Chronic opiates and benzodiazepines use  -History of anal cancer with chronic pain  -Diabetes type 2 diet controlled  -Chronic anxiety disorder  -Essential hypertension    Neb treatment, inhaled corticosteroids, short course of systemic corticosteroids, Accu-Chek and sliding scale, will taper dose of Norco and Xanax to the minimize to minimize further blunting of respiratory drive.  Gentle hydration, aspiration precaution, empiric antibiotic, watch for volume overload, follow-up 2D echo.  DVT and GI prophylaxis.  Pulmonary critical care service consult.

## 2023-10-13 NOTE — PLAN OF CARE
Goal Outcome Evaluation:              Outcome Evaluation: Pt off bipap most of the day on 4 L NC.  NS @ 50ml/hr started per MD order.  Pt up to BSC with assist as needed.  Consistant Carb diet started as ordered.  Meds given as ordered.  Pain med given as ordered.  Bed in low position with call light in reach.

## 2023-10-13 NOTE — ED NOTES
Patient transported to PCU via stretcher by ed tech, rn, and respiratory therapist. Zoll attached to patient, VSS, bedding clean and dry, IV dated and initialed.

## 2023-10-13 NOTE — CONSULTS
"  COPD Education        Referring Provider: Antonia Hendrix RN, Case Management  Dr. Vasquez  Reason for Consultation: COPD Exacerbation  Pulmonologist: Dr. Fox, Patient requested appointment with Dr. Hope  Last outpatient pulmonary visit: 23  Length of Diagnosis: \"years\" per patient  Last Hospital stay for COPD? 23-23 (\[facility]")    Subjective .     Age: 57 y.o.  Sex: female  What stage have you been told you are in? Stage IV  Do you use a CPAP or BIPAP? yes  Trilogy Machine  Have you had any recent weight loss? no  How many pillows do you use? 2    Last PFT/when/where? [facility], 23  FEV1: 27%    Classification of Airflow Limitation Severity in COPD (Based on Post-Bronchodilator FEV1)  Gold 1: Mild FEV1 ? 80% predicted   Gold 2:  Moderate 50% ? FEV1 < 80% predicted   Gold 3: Severe 30% ? FEV1 < 50% predicted   Gold 4: Very Severe FEV1 < 30% predicted     FEV1/FVC: 47%    Do you have dyspnea? yes Dyspnea on exertion  Home O2: 4L [place], [facility]    Social History:  Social History     Socioeconomic History    Marital status:    Tobacco Use    Smoking status: Former     Packs/day: 1.50     Years: 30.00     Additional pack years: 0.00     Total pack years: 45.00     Types: Electronic Cigarette, Cigarettes     Quit date:      Years since quittin.7    Smokeless tobacco: Never   Vaping Use    Vaping Use: Unknown   Substance and Sexual Activity    Alcohol use: No    Drug use: Defer    Sexual activity: Defer       Smoking Cessation: No    Airway Clearance methods utilized: no    History of Sleep Apnea: yes  Patient's Last ABG:  Site   Date Value Ref Range Status   10/13/2023 Right Radial  Final     William's Test   Date Value Ref Range Status   10/13/2023 Positive  Final     pH, Arterial   Date Value Ref Range Status   10/13/2023 7.474 (H) 7.350 - 7.450 pH units Final     Comment:     83 Value above reference range     pCO2, Arterial   Date Value Ref Range Status   10/13/2023 71.7 " (C) 35.0 - 45.0 mm Hg Final     Comment:     86 Value above critical limit     pO2, Arterial   Date Value Ref Range Status   10/13/2023 60.6 (L) 83.0 - 108.0 mm Hg Final     Comment:     84 Value below reference range     HCO3, Arterial   Date Value Ref Range Status   10/13/2023 52.6 (H) 20.0 - 26.0 mmol/L Final     Comment:     83 Value above reference range     Base Excess, Arterial   Date Value Ref Range Status   10/13/2023 24.1 (H) 0.0 - 2.0 mmol/L Final     O2 Saturation, Arterial   Date Value Ref Range Status   10/13/2023 92.3 (L) 94.0 - 99.0 % Final     Comment:     84 Value below reference range     Hemoglobin, Blood Gas   Date Value Ref Range Status   10/13/2023 14.0 13.5 - 17.5 g/dL Final     Hematocrit, Blood Gas   Date Value Ref Range Status   10/13/2023 42.8 38.0 - 51.0 % Final     Oxyhemoglobin   Date Value Ref Range Status   10/13/2023 90.3 (L) 94 - 99 % Final     Comment:     84 Value below reference range     Methemoglobin   Date Value Ref Range Status   10/13/2023 <-0.10 (L) 0.00 - 3.00 % Final     Comment:     94 Value below reportable range < _0.1     Carboxyhemoglobin   Date Value Ref Range Status   10/13/2023 2.4 0 - 5 % Final     CO2 Content   Date Value Ref Range Status   10/13/2023 54.8 (H) 22 - 33 mmol/L Final     Barometric Pressure for Blood Gas   Date Value Ref Range Status   10/13/2023 724 mmHg Final     Modality   Date Value Ref Range Status   10/13/2023 BiPap  Final     FIO2   Date Value Ref Range Status   10/13/2023 30 % Final        Objective     SpO2 SpO2: 94 % (10/13/23 1000)  Device Device (Oxygen Therapy): nasal cannula (10/13/23 0941)  Flow Flow (L/min): 2 (10/13/23 0941)  NIPPV Compliance: No, patient utilizes Trilogy Machine at home HS.  Home Equipment Used: Trilogy Machine, O2 Provided by: [facility] [employer]  Breath Sounds: diminished breath sounds- throughout  CAT Assessment: (COPD Assessment Test)   I never cough. 0[x] 1[] 2[] 3[] 4[] 5[] I cough all the time.  I  have no phlegm (mucus) in my chest. 0[] 1[] 2[x] 3[] 4[] 5[] My chest is completely full of phlegm (mucus).  My chest does not feel tight at all. 0[] 1[] 2[] 3[] 4[x] 5[] My chest feels very tight.  When I walk up a hill or one flight of stairs I am not breathless. 0[] 1[] 2[] 3[] 4[x] 5[] When I walk up a hill or one flight of stairs I am very breathless.  I am not limited doing any activities at home 0[] 1[] 2[] 3[] 4[x] 5[] I am very limited doing activities at home.  I am confident leaving my home despite my lung condition. 0[] 1[] 2[] 3[] 4[x] 5[] I am not at all confident leaving my home because of my lung condition.  I sleep soundly. 0[] 1[] 2[] 3[x] 4[] 5[] I don't sleep soundly because of my lung condition.  I have lots of energy. 0[] 1[] 2[] 3[x] 4[] 5[]  I have no energy at all.  CAT Score(COPD Assessment Test): 24  Score  Impact Guidance    0-9 Low If smoke, encourage to stop smoking  Flu, Pneumonia, and Covid need to be up to date  Avoid COPD Triggers    10-20 Medium Encouraged all guidance for low impact score  Consider additional medications    21-40 High Encouraged all medium impact scores  Recommend referral to pulmonologist                   Home Medications:  Medications Prior to Admission   Medication Sig Dispense Refill Last Dose    albuterol sulfate  (90 Base) MCG/ACT inhaler Inhale 2 puffs Every 6 (Six) Hours As Needed for Wheezing or Shortness of Air. 18 g 0 Past Week    ALPRAZolam (XANAX) 1 MG tablet Take ½ tablet by mouth 2 (Two) Times a Day As Needed for Anxiety or Sleep. 3 tablet 0 10/12/2023    ASPIRIN LOW DOSE 81 MG EC tablet Take 1 tablet by mouth Daily.  3 10/12/2023    clopidogrel (PLAVIX) 75 MG tablet Take 1 tablet by mouth Daily.  3 10/12/2023    Fluticasone-Umeclidin-Vilant 200-62.5-25 MCG/ACT aerosol powder  Inhale 1 puff Daily.   10/12/2023    furosemide (LASIX) 20 MG tablet Take 1 tablet by mouth Daily.   10/12/2023    guaiFENesin (MUCINEX) 600 MG 12 hr tablet Take 2  "tablets by mouth 2 (Two) Times a Day As Needed for Cough or Congestion.   Past Week    HYDROcodone-acetaminophen (NORCO)  MG per tablet Take 1 tablet by mouth Every 6 (Six) Hours.   10/12/2023    ipratropium-albuterol (COMBIVENT RESPIMAT)  MCG/ACT inhaler Inhale 1 puff 4 (Four) Times a Day As Needed for Wheezing or Shortness of Air.   10/12/2023    levothyroxine (SYNTHROID, LEVOTHROID) 25 MCG tablet Take 1 tablet by mouth Every Morning.   10/12/2023    metoprolol tartrate (LOPRESSOR) 25 MG tablet Take 1 tablet by mouth 2 (Two) Times a Day.   10/12/2023    ondansetron ODT (ZOFRAN-ODT) 4 MG disintegrating tablet Place 1 tablet on the tongue Every 8 (Eight) Hours As Needed for Nausea or Vomiting.   Past Week    polyethylene glycol (MIRALAX) 17 g packet Take 17 g by mouth Daily As Needed (constipation).   Past Week    potassium chloride (MICRO-K) 10 MEQ CR capsule Take 1 capsule by mouth Daily.   10/12/2023    naloxone (NARCAN) 4 MG/0.1ML nasal spray Call 911. Don't prime. [place] in 1 nostril for overdose. Repeat in 2-3 minutes in other nostril if no or minimal breathing/responsiveness. 2 each 0 Unknown     Barriers to Learning? No    Discussion: COPD education was given to Ms. Jiang via the booklet , \"A Patient's Guide to COPD\". Discussion of the COPD zones (Green/Yellow/Red) was competed with emphasizes on what to do in the yellow and red zones. She was in her room and pleasantly interested in COPD education.      Proper Inhaler technique was illustrated with and without the given Aero Chamber spacer I provided to the patient. Instruction on rescue and maintenance medications, the function of each and the importance of using them as prescribed.      Pursed Lip breathing was instructed as well as when to utilize the breathing technique.      Ms. Jiang stated she would like an appointment to see Pulmonology. I let her know she had an appointment with Dr. Fox on 11/2/23. Her response was she would " like to have try Pulmonology here at [facility], she had trouble seeing eye to eye with Dr. Fox. I placed a referral to [facility] and made her an appointment. She will see Dr. Hope on 11/8/23. I also discussed the COPD clinic with Ms. Jiang but she stated she has a hard time with transportation and did not want to commit at this time. I did leave her with contact information to the COPD Clinic incase she changes her mind.      She is on Trelegy at home for COPD maintenance and states she uses it every day. Here she is only on Pulmicort, I added Brovana to her COPD regimen.     Discussed with SANGEETHA Sutton, RRT  COPD Navigator  10/13/23  12:21 EDT

## 2023-10-13 NOTE — CASE MANAGEMENT/SOCIAL WORK
Discharge Planning Assessment   [place]     Patient Name: Liz Jiang  MRN: [id number]  Today's Date: 10/13/2023    Admit Date: 10/12/2023    Plan: SS received consult for D/C planning pt with [facility] and recent d/c from [facility] and pt wants home Health. SS spoke with pt at bedside. Pt states she lives alone and plans to return home at discharge. Pt utilizes [facility] for nursing and PT/OT. [facility] will need new order at discharge. Pt PCP Tabitha Rno. Pt utilizes Hospital bed, O2@4L, Trilogy-via SEMS, nebulizer, shower chair, BSC, pulse ox, b/p cuff, rollator-via Gautam[employer]Rite. Pt does not have POA/living will. Pt states unsure of transportation at this time. SS to follow and assist with discharge planning.   Discharge Needs Assessment       Row Name 10/13/23 1408       Living Environment    People in Home alone    Current Living Arrangements home    Potentially Unsafe Housing Conditions none    Primary Care Provided by self    Provides Primary Care For no one    Family Caregiver if Needed child(isidro), adult    Quality of Family Relationships helpful;involved;supportive    Able to Return to Prior Arrangements yes       Resource/Environmental Concerns    Resource/Environmental Concerns none       Transition Planning    Patient/Family Anticipates Transition to home with help/services    Transportation Anticipated family or friend will provide       Discharge Needs Assessment    Equipment Currently Used at Home wheelchair;hospital bed;oxygen;nebulizer;shower chair;commode;pulse ox;rollator;bp cuff  Trilogy via SEMS    Concerns to be Addressed discharge planning  pt with [facility] and recent d/c from [facility]              Discharge Plan       Row Name 10/13/23 1411       Plan    Plan SS received consult for D/C planning pt with [facility] and recent d/c from [facility] and pt wants home Health. SS spoke with pt at bedside. Pt states she lives alone and plans to return home at discharge. Pt utilizes [facility] for nursing and PT/OT. [facility] will need new order at discharge. Pt PCP Tabitha Ron. Pt utilizes Hospital bed, O2@4L, Trilogy-via SEMS, nebulizer, shower chair, BSC, pulse ox, b/p cuff, rollator-via Gautam-Rite. Pt does not have POA/living will. Pt states unsure of transportation at this time. SS to follow and assist with discharge planning.               Expected Discharge Date and Time       Expected Discharge Date Expected Discharge Time    Oct 16, 2023            Demographic Summary       Row Name 10/13/23 1462       General Information    Admission Type inpatient    Arrived From home    Referral Source nursing    Reason for Consult discharge planning  pt with [facility] and recent d/c from [facility]    Preferred Language English               LIANE Mareie

## 2023-10-13 NOTE — CONSULTS
Referring Provider: hospitalist  Reason for Consultation: copd exacerbation      Chief complaint - SOB      History of present illness: 57-year-old female with past medical history significant for COPD, chronic hypoxic respiratory failure using 4 L at baseline, grade 2 diastolic heart failure, hypertension, history of mL cancer, history of CVA, history of diabetes type 2 mellitus, chronic anxiety, chronic pain secondary to cancer she [facility] evaluation of shortness of breath.  She was recently at [facility] for COPD and CHF exacerbation around first week of September.    Presented to emergency room for evaluation of her shortness of breath.  All the labs medications ins and outs vitals treatment given in the ER then treatment given on the floor reviewed.    Review of Systems  History obtained from chart review and the patient  General ROS: Generalized fatigue   Psychological ROS: negative for - anxiety or depression  ENT ROS: negative for - headaches, visual changes or vocal changes  Respiratory ROS: positive for - cough and shortness of breath  Cardiovascular ROS: no chest pain or dyspnea on exertion  Gastrointestinal ROS: no abdominal pain, change in bowel habits, or black or bloody stools  Musculoskeletal ROS: negative for - joint pain, joint stiffness or joint swelling  Neurological ROS: no TIA or stroke symptoms  Hematological: no bleeding  Skin: no bruises, no rash        History  Past Medical History:   Diagnosis Date    Acid reflux disease     Anal cancer 12/5/2019    Arthritis     Asthma, extrinsic     Cholelithiasis     Chronic headaches     COPD (chronic obstructive pulmonary disease)     CVA (cerebral vascular accident)     w/ right sided deficit    CVA (cerebral vascular accident)     Diabetes mellitus     only has high blood sugar when on steriods    History of radiation therapy 02/27/2020    Whole pelvis/anal mass    Obstructive sleep apnea treated with  BiPAP     On home oxygen therapy     2L     Sleep apnea, obstructive    ,   Past Surgical History:   Procedure Laterality Date    ABDOMINAL SURGERY      CARDIAC CATHETERIZATION      CAROTID ENDARTERECTOMY Left 2018    CHOLECYSTECTOMY WITH INTRAOPERATIVE CHOLANGIOGRAM N/A 2017    Procedure: CHOLECYSTECTOMY LAPAROSCOPIC INTRAOPERATIVE CHOLANGIOGRAM;  Surgeon: Carolin Marie MD;  Location: [facility];  Service:     COLONOSCOPY      HYSTERECTOMY      for heavy bleeding/ complete    KIDNEY STONE SURGERY      TUBAL ABDOMINAL LIGATION      VENOUS ACCESS DEVICE (PORT) INSERTION Left 2019    Procedure: INSERTION VENOUS ACCESS DEVICE;  Surgeon: Carolin Marie MD;  Location: [facility];  Service: General   ,   Family History   Problem Relation Age of Onset    Diabetes Mother     Hypertension Mother     Arrhythmia Mother     Pneumonia Father     Coronary artery disease Other     Arrhythmia Sister     Heart attack Brother     Lymphoma Brother         hodgkin's     Other Brother         CABG    Breast cancer Neg Hx    ,   Social History     Tobacco Use    Smoking status: Former     Packs/day: 1.50     Years: 30.00     Additional pack years: 0.00     Total pack years: 45.00     Types: Electronic Cigarette, Cigarettes     Quit date:      Years since quittin.7    Smokeless tobacco: Never   Vaping Use    Vaping Use: Unknown   Substance Use Topics    Alcohol use: No    Drug use: Defer   ,   Medications Prior to Admission   Medication Sig Dispense Refill Last Dose    albuterol sulfate  (90 Base) MCG/ACT inhaler Inhale 2 puffs Every 6 (Six) Hours As Needed for Wheezing or Shortness of Air. 18 g 0 Past Week    ALPRAZolam (XANAX) 1 MG tablet Take ½ tablet by mouth 2 (Two) Times a Day As Needed for Anxiety or Sleep. 3 tablet 0 10/12/2023    ASPIRIN LOW DOSE 81 MG EC tablet Take 1 tablet by mouth Daily.  3 10/12/2023    clopidogrel (PLAVIX) 75 MG tablet Take 1 tablet by mouth Daily.  3 10/12/2023     Fluticasone-Umeclidin-Vilant 200-62.5-25 MCG/ACT aerosol powder  Inhale 1 puff Daily.   10/12/2023    furosemide (LASIX) 20 MG tablet Take 1 tablet by mouth Daily.   10/12/2023    guaiFENesin (MUCINEX) 600 MG 12 hr tablet Take 2 tablets by mouth 2 (Two) Times a Day As Needed for Cough or Congestion.   Past Week    HYDROcodone-acetaminophen (NORCO)  MG per tablet Take 1 tablet by mouth Every 6 (Six) Hours.   10/12/2023    ipratropium-albuterol (COMBIVENT RESPIMAT)  MCG/ACT inhaler Inhale 1 puff 4 (Four) Times a Day As Needed for Wheezing or Shortness of Air.   10/12/2023    levothyroxine (SYNTHROID, LEVOTHROID) 25 MCG tablet Take 1 tablet by mouth Every Morning.   10/12/2023    metoprolol tartrate (LOPRESSOR) 25 MG tablet Take 1 tablet by mouth 2 (Two) Times a Day.   10/12/2023    ondansetron ODT (ZOFRAN-ODT) 4 MG disintegrating tablet Place 1 tablet on the tongue Every 8 (Eight) Hours As Needed for Nausea or Vomiting.   Past Week    polyethylene glycol (MIRALAX) 17 g packet Take 17 g by mouth Daily As Needed (constipation).   Past Week    potassium chloride (MICRO-K) 10 MEQ CR capsule Take 1 capsule by mouth Daily.   10/12/2023    naloxone (NARCAN) 4 MG/0.1ML nasal spray Call 911. Don't prime. [place] in 1 nostril for overdose. Repeat in 2-3 minutes in other nostril if no or minimal breathing/responsiveness. 2 each 0 Unknown   , Scheduled Meds:  heparin (porcine), 5,000 Units, Subcutaneous, Q12H  insulin lispro, 2-7 Units, Subcutaneous, 4x Daily AC & at Bedtime  ipratropium, 0.5 mg, Nebulization, Q6H - RT  methylPREDNISolone sodium succinate, 40 mg, Intravenous, Q12H  metoprolol tartrate, 25 mg, Oral, Q12H  senna-docusate sodium, 2 tablet, Oral, BID  sodium chloride, 10 mL, Intravenous, Q12H    , Continuous Infusions:    and Allergies:  Morphine and Roflumilast    Objective     Vital Signs   Temp:  [97.2 °F (36.2 °C)-98.9 °F (37.2 °C)] 97.2 °F (36.2 °C)  Heart Rate:  [] 107  Resp:  [15-28] 15  BP:  (110-152)/() 113/98    Physical Exam:             General-chronically ill in appearance, not in any acute distress    HEENT- pupils equally reactive to light, normal in size, no scleral icterus    Neck-supple    Respiratory-respirations normal-on auscultation no wheezing no crackles, decreased breath sound    Cardiovascular-NSR  GI-nontender nondistended bowel sounds positive    CNS-nonfocal    Musculoskeletal -no edema  Extremities- no obvious deformity noticed     Psychiatric-mood good, good eye contact, alert awake oriented  Skin- no visible rash                                                                   Results Review:    LABS:    Lab Results   Component Value Date    GLUCOSE 194 (H) 10/13/2023    BUN 12 10/13/2023    CREATININE 0.40 (L) 10/13/2023    EGFRIFNONA >150 02/23/2022    BCR 30.0 (H) 10/13/2023    CO2 44.2 (H) 10/13/2023    CALCIUM 9.7 10/13/2023    ALBUMIN 3.9 10/13/2023    LABIL2 1.5 02/11/2014    AST 16 10/13/2023    ALT 20 10/13/2023    WBC 5.81 10/13/2023    HGB 14.2 10/13/2023    HCT 46.3 10/13/2023    MCV 92.4 10/13/2023     10/13/2023     (L) 10/13/2023    K 3.5 10/13/2023    CL 83 (L) 10/13/2023    ANIONGAP 7.8 10/13/2023       Lab Results   Component Value Date    INR 0.95 10/12/2023    INR 0.90 03/27/2017    PROTIME 13.1 10/12/2023    PROTIME 9.9 03/27/2017       Results from last 7 days   Lab Units 10/12/23  1621   INR  0.95   APTT seconds 25.3*          I reviewed the patient's new clinical results.  I reviewed the patient's new imaging results and agree with the interpretation.    Microbiology Results (last 10 days)       Procedure Component Value - Date/Time    Respiratory Panel PCR w/COVID-19(SARS-CoV-2) ANNABEL/MAURY/JULES/PAD/COR/MAD/MELA In-House, NP Swab in UTM/VTM, 3-4 HR TAT - Swab, Nasopharynx [[id number]]  (Normal) Collected: 10/12/23 3740    Lab Status: Final result Specimen: Swab from Nasopharynx Updated: 10/13/23 0034     ADENOVIRUS, PCR Not Detected      Coronavirus 229E Not Detected     Coronavirus HKU1 Not Detected     Coronavirus NL63 Not Detected     Coronavirus OC43 Not Detected     COVID19 Not Detected     Human Metapneumovirus Not Detected     Human Rhinovirus/Enterovirus Not Detected     Influenza A PCR Not Detected     Influenza B PCR Not Detected     Parainfluenza Virus 1 Not Detected     Parainfluenza Virus 2 Not Detected     Parainfluenza Virus 3 Not Detected     Parainfluenza Virus 4 Not Detected     RSV, PCR Not Detected     Bordetella pertussis pcr Not Detected     Bordetella parapertussis PCR Not Detected     Chlamydophila pneumoniae PCR Not Detected     Mycoplasma pneumo by PCR Not Detected    Narrative:      In the setting of a positive respiratory panel with a viral infection PLUS a negative procalcitonin without other underlying concern for bacterial infection, consider observing off antibiotics or discontinuation of antibiotics and continue supportive care. If the respiratory panel is positive for atypical bacterial infection (Bordetella pertussis, Chlamydophila pneumoniae, or Mycoplasma pneumoniae), consider antibiotic de-escalation to target atypical bacterial infection.    Blood Culture - Blood, Arm, Right [[id number]]  (Normal) Collected: 10/12/23 1648    Lab Status: Preliminary result Specimen: Blood from Arm, Right Updated: 10/14/23 1700     Blood Culture No growth at 2 days    Blood Culture - Blood, Wrist, Left [[id number]]  (Normal) Collected: 10/12/23 1648    Lab Status: Preliminary result Specimen: Blood from Wrist, Left Updated: 10/14/23 1700     Blood Culture No growth at 2 days    COVID-19 and FLU A/B PCR - Swab, Nasopharynx [[id number]]  (Normal) Collected: 10/12/23 1622    Lab Status: Final result Specimen: Swab from Nasopharynx Updated: 10/12/23 1652     COVID19 Not Detected     Influenza A PCR Not Detected     Influenza B PCR Not Detected    Narrative:      Fact sheet for providers: https://www.fda.gov/media/968340/download    Fact  sheet for patients: https://www.fda.gov/media/462396/download    Test performed by PCR.           Site   Date Value Ref Range Status   10/14/2023 Right Radial  Final     William's Test   Date Value Ref Range Status   10/14/2023 Positive  Final     pH, Arterial   Date Value Ref Range Status   10/14/2023 7.446 7.350 - 7.450 pH units Final     pCO2, Arterial   Date Value Ref Range Status   10/14/2023 69.2 (C) 35.0 - 45.0 mm Hg Final     Comment:     86 Value above critical limit     pO2, Arterial   Date Value Ref Range Status   10/14/2023 62.6 (L) 83.0 - 108.0 mm Hg Final     Comment:     84 Value below reference range     HCO3, Arterial   Date Value Ref Range Status   10/14/2023 47.5 (H) 20.0 - 26.0 mmol/L Final     Comment:     83 Value above reference range     Base Excess, Arterial   Date Value Ref Range Status   10/14/2023 19.6 (H) 0.0 - 2.0 mmol/L Final     O2 Saturation, Arterial   Date Value Ref Range Status   10/14/2023 92.1 (L) 94.0 - 99.0 % Final     Comment:     84 Value below reference range     Hemoglobin, Blood Gas   Date Value Ref Range Status   10/14/2023 13.1 (L) 13.5 - 17.5 g/dL Final     Comment:     84 Value below reference range     Hematocrit, Blood Gas   Date Value Ref Range Status   10/14/2023 40.1 38.0 - 51.0 % Final     Oxyhemoglobin   Date Value Ref Range Status   10/14/2023 90.4 (L) 94 - 99 % Final     Comment:     84 Value below reference range     Methemoglobin   Date Value Ref Range Status   10/14/2023 <-0.10 (L) 0.00 - 3.00 % Final     Comment:     94 Value below reportable range < _0.1     Carboxyhemoglobin   Date Value Ref Range Status   10/14/2023 2.1 0 - 5 % Final     CO2 Content   Date Value Ref Range Status   10/14/2023 49.7 (H) 22 - 33 mmol/L Final     Barometric Pressure for Blood Gas   Date Value Ref Range Status   10/14/2023 720 mmHg Final     Modality   Date Value Ref Range Status   10/14/2023 BiPap  Final     FIO2   Date Value Ref Range Status   10/14/2023 28 % Final     CT  Angiogram Chest Pulmonary Embolism    Result Date: 10/12/2023  Impression: Impression:  No acute process in the chest.  This report was finalized on 10/12/2023 10:04 PM by Carli Lopez MD.      XR Chest AP    Result Date: 10/12/2023  Impression:   Persistent interstitial thickening and mild vascular congestion likely CHF superimposed on COPD. No interval worsening since the prior exam.   This report was finalized on 10/12/2023 4:57 PM by Dr. Dionte Hodgson MD.      XR Chest AP    Result Date: 9/27/2023  Impression: 1.  Persistent but slightly improved changes of CHF/edema. 2.  No focal airspace disease identified.   This report was finalized on 9/27/2023 9:47 AM by Dr. Dionte Hodgson MD.      XR Chest 1 View    Result Date: 9/24/2023  Impression: 1.  Mild radiographic improvement in changes of CHF/edema. 2.  Otherwise stable chest.   This report was finalized on 9/24/2023 11:26 AM by Dr. Dionte Hodgson MD.      XR Chest 1 View    Result Date: 9/21/2023  Impression:  1.  Central pulmonary vascular congestion with edema. 2.  Hypoinflated lungs. 3.  No pleural effusion or pneumothorax. 4.  Normal heart size. 5.  Left anterior chest port with catheter tip to the SVC.   This report was finalized on 9/21/2023 11:40 PM by Rashaad Burnett MD.      Assessment & Plan   COPD exacerbation-  BiPAP settings and graphics reviewed.  Continue current settings.  Patient's home triology settings reviewed and they are appropriate.  Patient was made aware of it.  Cont inhalers  Chest x-ray and blood gases reviewed.  Chest x-ray does not show any major infiltrate.  Blood gases shows improvement.  Continue steroids  Continue nebs  Continue BiPAP overnight and on as needed basis in the day.  Diuretics to improve lung compliance.  Getting saline.  Decrease the rate.  If urine output remains good recommend discontinuing the saline.      Diabetes mellitus-continue to monitor blood sugars target 1 40-1 80.    Anxiety-continue current  medications.  Medication list reviewed.      DVT prophylaxis-    Bedside rounds were done with RT and patient's nurse. All the lab and clinical findings were discussed with them and plan was also discussed in great detail.    Family member present- son        Echo-  Results for orders placed during the hospital encounter of 10/12/23    Adult Transthoracic Echo Complete W/ Cont if Necessary Per Protocol    Interpretation Summary    Left ventricular systolic function is normal. Left ventricular ejection fraction appears to be 66 - 70%.    Left ventricular diastolic function is consistent with (grade I) impaired relaxation.    Estimated right ventricular systolic pressure from tricuspid regurgitation is normal (<35 mmHg).             Acute on chronic respiratory failure with hypoxia and hypercapnia          Elijah Hope MD  10/13/23  08:29 EDT

## 2023-10-13 NOTE — H&P
[facility]  HISTORY & PHYSICAL    Patient Identification:  Name:  Liz Jiang  Age:  57 y.o.  Sex:  female  :  1965  MRN:  [id number]   Visit Number:  [id number]  Admit Date: 10/12/2023   Primary Care Physician:  Paty Fischer APRN     Subjective     Chief complaint:   Chief Complaint   Patient presents with    Shortness of Breath     History of presenting illness:   Patient is a 57 y.o. female with past medical history significant for COPD, chronic respiratory failure on 4 L at baseline, grade 2 diastolic heart failure, hypertension, history of anal cancer, history of CVA on DAPT, history of type II DM, chronic anxiety, chronic pain secondary to cancer that presented to the [facility] emergency department for evaluation of shortness of breath.  Patient was recently hospitalized at [facility] for COPD exacerbation and CHF exacerbation on 2023.  Patient was transferred to inpatient rehab at [facility] 2023 through 2023.    Examined at bedside in ED. Patient states she has felt increasing shortness of breath over the past few days.  States she had otherwise felt well since discharge from rehab.  She states she has had a dry cough and wheezing as well.  She does note she has been exposed to COVID over the past several days by her son.  States she has had nasal congestion, ear, postnasal drip, nausea, fatigue, and chills.  ROS Otherwise negative.    Upon arrival to the ED, vitals were temperature 98.6, , RR 20, /101, SPO2 87% on home 4 L. In the ED, patient became increasingly tachycardic at times, HR as high as 130.  Patient received Lopressor 5 mg IV x 2.  That time patient's heart rate decreased to the 90s, however has remained tachycardic since then, ranging from 100-125.  Labs significant for CRP 1.59, lactate 2.9.  Potassium 3.1.  Initial high-sensitivity troponin 48, repeat 37, delta -11. ABG on baseline 4 L pH 7.470,  PCO2 76.3, PO2 57.8, HCO3 55.4.  Patient placed on BiPAP with multiple setting changes, with no improvement in PCO2, but some improvement in PO2 and pH.  When tried again on home 4 L, PO2 decreased to 50.5 again.    CXR shows persistent interstitial thickening and mild vascular congestion likely CHF superimposed on COPD.  No worsening since prior.    CT angiogram of chest PE protocol shows no acute process in the chest.    Patient has been admitted to the PCU.   ---------------------------------------------------------------------------------------------------------------------   Review of Systems   Constitutional:  Positive for chills and fatigue. Negative for diaphoresis and fever.   HENT:  Positive for congestion, postnasal drip, rhinorrhea and sinus pressure.    Respiratory:  Positive for cough, shortness of breath and wheezing.    Cardiovascular:  Negative for chest pain, palpitations and leg swelling.   Gastrointestinal:  Positive for nausea. Negative for abdominal pain, constipation, diarrhea and vomiting.   Genitourinary:  Negative for difficulty urinating, dysuria and flank pain.   Musculoskeletal:  Positive for myalgias. Negative for arthralgias and neck stiffness.   Skin:  Negative for rash and wound.   Neurological:  Negative for dizziness, weakness and light-headedness.   Psychiatric/Behavioral:  Negative for agitation and confusion.       ---------------------------------------------------------------------------------------------------------------------   Past Medical History:   Diagnosis Date    Acid reflux disease     Anal cancer 12/5/2019    Arthritis     Asthma, extrinsic     Cholelithiasis     Chronic headaches     COPD (chronic obstructive pulmonary disease)     CVA (cerebral vascular accident)     w/ right sided deficit    CVA (cerebral vascular accident)     Diabetes mellitus     only has high blood sugar when on steriods    History of radiation therapy 02/27/2020    Whole pelvis/anal mass     Obstructive sleep apnea treated with BiPAP     On home oxygen therapy     2L     Sleep apnea, obstructive      Past Surgical History:   Procedure Laterality Date    ABDOMINAL SURGERY      CARDIAC CATHETERIZATION      CAROTID ENDARTERECTOMY Left 2018    CHOLECYSTECTOMY WITH INTRAOPERATIVE CHOLANGIOGRAM N/A 2017    Procedure: CHOLECYSTECTOMY LAPAROSCOPIC INTRAOPERATIVE CHOLANGIOGRAM;  Surgeon: Carolin Marie MD;  Location: [facility];  Service:     COLONOSCOPY      HYSTERECTOMY      for heavy bleeding/ complete    KIDNEY STONE SURGERY      TUBAL ABDOMINAL LIGATION      VENOUS ACCESS DEVICE (PORT) INSERTION Left 2019    Procedure: INSERTION VENOUS ACCESS DEVICE;  Surgeon: Carolin Marie MD;  Location: [facility];  Service: General     Family History   Problem Relation Age of Onset    Diabetes Mother     Hypertension Mother     Arrhythmia Mother     Pneumonia Father     Coronary artery disease Other     Arrhythmia Sister     Heart attack Brother     Lymphoma Brother         hodgkin's     Other Brother         CABG    Breast cancer Neg Hx      Social History     Socioeconomic History    Marital status:    Tobacco Use    Smoking status: Former     Packs/day: 1.50     Years: 30.00     Additional pack years: 0.00     Total pack years: 45.00     Types: Electronic Cigarette, Cigarettes     Quit date:      Years since quittin.7    Smokeless tobacco: Never   Vaping Use    Vaping Use: Unknown   Substance and Sexual Activity    Alcohol use: No    Drug use: Defer    Sexual activity: Defer     ---------------------------------------------------------------------------------------------------------------------   Allergies:  Morphine and Roflumilast  ---------------------------------------------------------------------------------------------------------------------   Medications below are reported home medications pulling from within the system; at this time, these medications have not been reconciled  unless otherwise specified and are in the verification process for further verifcation as current home medications.    Prior to Admission Medications       Prescriptions Last Dose Informant Patient Reported? Taking?    albuterol sulfate  (90 Base) MCG/ACT inhaler  Self, Child No No    Inhale 2 puffs Every 6 (Six) Hours As Needed for Wheezing or Shortness of Air.    ALPRAZolam (XANAX) 1 MG tablet   No No    Take ½ tablet by mouth 2 (Two) Times a Day As Needed for Anxiety or Sleep.    ASPIRIN LOW DOSE 81 MG EC tablet  Self, Child Yes No    Take 1 tablet by mouth Daily.    clopidogrel (PLAVIX) 75 MG tablet  Self, Child Yes No    Take 1 tablet by mouth Daily.    Fluticasone-Umeclidin-Vilant 200-62.5-25 MCG/ACT aerosol powder   Child, Self Yes No    Inhale 1 puff Daily.    furosemide (LASIX) 20 MG tablet  Self, Child Yes No    Take 1 tablet by mouth Daily.    ipratropium-albuterol (COMBIVENT RESPIMAT)  MCG/ACT inhaler  Self, Child Yes No    Inhale 1 puff 4 (Four) Times a Day As Needed for Wheezing or Shortness of Air.    metoprolol tartrate (LOPRESSOR) 25 MG tablet  Self, Child Yes No    Take 1 tablet by mouth 2 (Two) Times a Day.    naloxone (NARCAN) 4 MG/0.1ML nasal spray   No No    Call 911. Don't prime. [place] in 1 nostril for overdose. Repeat in 2-3 minutes in other nostril if no or minimal breathing/responsiveness.    polyethylene glycol (MIRALAX) 17 g packet  Self, Child Yes No    Take 17 g by mouth Daily As Needed (constipation).    potassium chloride (MICRO-K) 10 MEQ CR capsule  Self, Child Yes No    Take 1 capsule by mouth Daily.    roflumilast (DALIRESP) 500 MCG tablet tablet  Self, Child Yes No    Take 1 tablet by mouth Daily.          ---------------------------------------------------------------------------------------------------------------------    Objective     Hospital Scheduled Meds:  ipratropium, 0.5 mg, Nebulization, Q6H - RT  methylPREDNISolone sodium succinate, 40 mg, Intravenous,  Q12H           Current listed hospital scheduled medications may not yet reflect those currently placed in orders that are signed and held, awaiting patient's arrival to floor/unit.    ---------------------------------------------------------------------------------------------------------------------   Vital Signs:  Temp:  [98.9 °F (37.2 °C)] 98.9 °F (37.2 °C)  Heart Rate:  [] 115  Resp:  [20-28] 22  BP: (122-152)/() 123/85  Mean Arterial Pressure (Non-Invasive) for the past 24 hrs (Last 3 readings):   Noninvasive MAP (mmHg)   10/13/23 0100 95   10/13/23 0000 106   10/12/23 2340 109     SpO2 Percentage    10/13/23 0204 10/13/23 0209 10/13/23 0216   SpO2: 92% 93% 96%     SpO2:  [87 %-100 %] 96 %  on  Flow (L/min):  [4] 4;   Device (Oxygen Therapy): nasal cannula    Body mass index is 28.68 kg/m².  Wt Readings from Last 3 Encounters:   10/12/23 75.8 kg (167 lb 1.6 oz)   09/14/23 102 kg (224 lb)   02/23/22 77.1 kg (170 lb)     ---------------------------------------------------------------------------------------------------------------------   Physical Exam:  Physical Exam  Constitutional:       General: She is not in acute distress.     Appearance: Normal appearance.   HENT:      Head: Normocephalic and atraumatic.      Right Ear: External ear normal.      Left Ear: External ear normal.      Nose: No nasal deformity.      Mouth/Throat:      Lips: Pink.      Mouth: Mucous membranes are moist.   Eyes:      Conjunctiva/sclera: Conjunctivae normal.      Pupils: Pupils are equal, round, and reactive to light.   Cardiovascular:      Rate and Rhythm: Regular rhythm. Tachycardia present.      Pulses:           Dorsalis pedis pulses are 2+ on the right side and 2+ on the left side.      Heart sounds: Normal heart sounds.   Pulmonary:      Effort: Pulmonary effort is normal.      Breath sounds: Wheezing and rhonchi present. No rales.   Abdominal:      General: Abdomen is flat. Bowel sounds are normal.       Palpations: Abdomen is soft.      Tenderness: There is no guarding or rebound.   Genitourinary:     Comments: No tijerina catheter in place   Musculoskeletal:      Cervical back: Neck supple. Normal range of motion.      Right lower leg: No edema.      Left lower leg: No edema.   Skin:     General: Skin is warm and dry.   Neurological:      General: No focal deficit present.      Mental Status: She is alert and oriented to person, place, and time.   Psychiatric:         Mood and Affect: Mood normal.         Behavior: Behavior normal.       ---------------------------------------------------------------------------------------------------------------------  EKG: Sinus tachycardia with PACs.  Heart rate 127.      Telemetry:        I have personally reviewed the EKG/Telemetry strip  ---------------------------------------------------------------------------------------------------------------------   Results from last 7 days   Lab Units 10/12/23  1908 10/12/23  1621   HSTROP T ng/L 37* 48*     Results from last 7 days   Lab Units 10/12/23  1621   PROBNP pg/mL 232.7       Results from last 7 days   Lab Units 10/13/23  0152   PH, ARTERIAL pH units 7.450   PO2 ART mm Hg 50.5*   PCO2, ARTERIAL mm Hg 76.8*   HCO3 ART mmol/L 53.4*     Results from last 7 days   Lab Units 10/12/23  1908 10/12/23  1648 10/12/23  1621   CRP mg/dL  --   --  1.59*   LACTATE mmol/L 1.4 2.9*  --    WBC 10*3/mm3  --   --  9.57   HEMOGLOBIN g/dL  --   --  13.6   HEMATOCRIT %  --   --  46.3   MCV fL  --   --  95.3   MCHC g/dL  --   --  29.4*   PLATELETS 10*3/mm3  --   --  198   INR   --   --  0.95     Results from last 7 days   Lab Units 10/12/23  1908 10/12/23  1621   SODIUM mmol/L  --  139   POTASSIUM mmol/L  --  3.1*   MAGNESIUM mg/dL 1.8  --    CHLORIDE mmol/L  --  81*   CO2 mmol/L  --  45.5*   BUN mg/dL  --  11   CREATININE mg/dL  --  0.44*   CALCIUM mg/dL  --  9.4   GLUCOSE mg/dL  --  192*   ALBUMIN g/dL  --  3.9   BILIRUBIN mg/dL  --  0.8   ALK  "PHOS U/L  --  77   AST (SGOT) U/L  --  21   ALT (SGPT) U/L  --  25   Estimated Creatinine Clearance: 138.8 mL/min (A) (by C-G formula based on SCr of 0.44 mg/dL (L)).  No results found for: \"AMMONIA\"    No results found for: \"HGBA1C\", \"POCGLU\"  Lab Results   Component Value Date    HGBA1C 6.80 (H) 09/29/2023     Lab Results   Component Value Date    TSH 4.750 (H) 09/29/2023    FREET4 1.18 09/29/2023       No results found for: \"BLOODCX\"  No results found for: \"URINECX\"  No results found for: \"WOUNDCX\"  No results found for: \"STOOLCX\"  No results found for: \"RESPCX\"  No results found for: \"MRSACX\"  Pain Management Panel          Latest Ref Rng & Units 2/23/2022   Pain Management Panel   Amphetamine, Urine Qual Negative Negative    Barbiturates Screen, Urine Negative Negative    Benzodiazepine Screen, Urine Negative Positive    Buprenorphine, Screen, Urine Negative Negative    Cocaine Screen, Urine Negative Negative    Methadone Screen , Urine Negative Negative    Methamphetamine, Ur Negative Negative      I have personally reviewed the above laboratory results.   ---------------------------------------------------------------------------------------------------------------------  Imaging Results (Last 7 Days)       Procedure Component Value Units Date/Time    CT Angiogram Chest Pulmonary Embolism [[id number]] Collected: 10/12/23 2202     Updated: 10/12/23 2206    Narrative:      Procedure: Routine chest CT performed with axial imaging using 100 cc  Optiray 320 nonionic IV contrast. Coronal and sagittal reformats  acquired.     History: Pulmonary embolism (PE) suspected, high prob     Comparison: None available     Findings:           No evidence of thoracic aortic aneurysm or dissection.  No focal infiltrate. Moderate emphysema. Right basilar atelectasis due  to elevated hemidiaphragm.   No lymphadenopathy.  No pleural effusion.  No pneumothorax.  No fracture.       Impression:      Impression:     No acute process in " the chest.     This report was finalized on 10/12/2023 10:04 PM by Carli Lopez MD.       XR Chest AP [[id number]] Collected: 10/12/23 1657     Updated: 10/12/23 1659    Narrative:      EXAM:    XR Chest, 1 View     EXAM DATE:    10/12/2023 5:19 PM     CLINICAL HISTORY:    soa     TECHNIQUE:    Frontal view of the chest.     COMPARISON:    9/27/2023     FINDINGS:    Lungs and pleural spaces:  Persistent interstitial thickening and mild  vascular congestion likely CHF superimposed on COPD. No interval  worsening since the prior exam.  No pneumothorax.    Heart:  Unremarkable as visualized.  No cardiomegaly.    Mediastinum:  Unremarkable as visualized.    Bones/joints:  Unremarkable as visualized.    Tubes, lines and devices:  Left Port-A-Cath noted.       Impression:        Persistent interstitial thickening and mild vascular congestion likely  CHF superimposed on COPD. No interval worsening since the prior exam.        This report was finalized on 10/12/2023 4:57 PM by Dr. Dionte Hodgson MD.             I have personally reviewed the above radiology results.     Last Echocardiogram:  Results for orders placed during the hospital encounter of 08/24/21    Adult Transthoracic Echo Complete W/ Cont if Necessary Per Protocol    Interpretation Summary  · Left ventricular ejection fraction appears to be 66 - 70%. Left ventricular systolic function is normal.  · Left ventricular diastolic function is consistent with (grade I) impaired relaxation.    ---------------------------------------------------------------------------------------------------------------------    Assessment & Plan      Active Hospital Problems    Diagnosis  POA    **Acute on chronic respiratory failure with hypoxia and hypercapnia [J96.21, J96.22]  Yes     Acute on chronic hypoxic hypercapnic respiratory failure, POA  COPD in acute exacerbation, POA  Patient presented shortness of breath  Imaging in ED negative for pneumonia or other acute process.   Chest x-ray comments on COPD superimposed on CHF.  Initial ABG on home 4 L pH 7.470, PCO2 76.3 PO2 57.8, O2 sat 91.4.  After being placed on BiPAP, CO2 has stayed elevated/increased.  PCO2 has improved as well as pH.  Patient was taken off BiPAP and PaO2 worsened to 50.5.  Continue on BiPAP, changed FiO2 to 30%.  Repeat ABG at 6 AM  Solu-Medrol 40 mg twice daily  Atrovent every 6 hours  Inpatient pulmonology consult, assistance appreciated  Respiratory PCR panel negative  No indication for antibiotic treatment at this time, continue to monitor vital signs.  Sinus tachycardia, POA   Likely secondary to DuoNebs  IV Lopressor given in ED with some improvement.  Will order additional IV Lopressor as well as restart home p.o. Lopressor.  Elevated troponin, POA  Suspect type II elevation secondary to hypoxia  Denies chest pain  Hypokalemia, POA  Replacement protocol ordered  Continue monitor with daily CMP    CHRONIC MEDICAL PROBLEMS    Grade 2 diastolic heart failure  Does not appear to be in exacerbation at this time  Daily weights  Monitor for signs symptoms of volume overload  Strict I's and O's  Essential hypertension  BP appears well controlled   Plan to resume home antihypertensive regimen once reconciled per pharmacy with appropriate holding parameters to prevent hypotension and/or bradycardia   Closely monitor BP per hospital protocol, titrate medications as necessary  History of anal cancer  History of CVA on DAPT  History of type 2 diabetes  Not currently requiring medical management  Chronic anxiety  Chronic anal pain secondary to anal cancer  Restart home meds as indicated per med rec    F/E/N: No IVF at this time.  Continue monitor electrolytes, replacement protocol in place.  NPO sips with meds.    ---------------------------------------------------  DVT Prophylaxis: Subcu heparin  GI Prophylaxis: Bowel regimen  Activity: Up with assistance    The patient is considered to be a high risk patient due to:  Acute on chronic hypoxic hypercapnic respiratory failure    INPATIENT status due to the need for care which can only be reasonably provided in an hospital setting such as aggressive/expedited ancillary services and/or consultation services, the necessity for IV medications, close physician monitoring and/or the possible need for procedures.  In such, I feel patient’s risk for adverse outcomes and need for care warrant INPATIENT evaluation and predict the patient’s care encounter to likely last beyond 2 midnights.      Code Status: Full code    Disposition/Discharge planning: Pending clinical course    I have discussed the patient's assessment and plan with Dr. Thorpe.      Dariela Parsons PA-C  Hospitalist Service -- [facility]       10/13/23  03:09 EDT    Attending Physician: Dr. Thorpe.

## 2023-10-14 LAB
A-A DO2: 48.6 MMHG (ref 0–300)
ANION GAP SERPL CALCULATED.3IONS-SCNC: 5.8 MMOL/L (ref 5–15)
ARTERIAL PATENCY WRIST A: POSITIVE
ATMOSPHERIC PRESS: 720 MMHG
BASE EXCESS BLDA CALC-SCNC: 19.6 MMOL/L (ref 0–2)
BASOPHILS # BLD AUTO: 0.02 10*3/MM3 (ref 0–0.2)
BASOPHILS NFR BLD AUTO: 0.2 % (ref 0–1.5)
BDY SITE: ABNORMAL
BH CV ECHO MEAS - ACS: 1.5 CM
BH CV ECHO MEAS - AO MAX PG: 10.7 MMHG
BH CV ECHO MEAS - AO MEAN PG: 5.5 MMHG
BH CV ECHO MEAS - AO ROOT DIAM: 3.2 CM
BH CV ECHO MEAS - AO V2 MAX: 163 CM/SEC
BH CV ECHO MEAS - AO V2 VTI: 26.9 CM
BH CV ECHO MEAS - AVA(I,D): 2.37 CM2
BH CV ECHO MEAS - EDV(CUBED): 82.3 ML
BH CV ECHO MEAS - EDV(MOD-SP4): 45.6 ML
BH CV ECHO MEAS - EF(MOD-SP4): 68.4 %
BH CV ECHO MEAS - ESV(CUBED): 24.4 ML
BH CV ECHO MEAS - ESV(MOD-SP4): 14.4 ML
BH CV ECHO MEAS - FS: 33.3 %
BH CV ECHO MEAS - IVS/LVPW: 1 CM
BH CV ECHO MEAS - IVSD: 0.95 CM
BH CV ECHO MEAS - LA DIMENSION: 3.3 CM
BH CV ECHO MEAS - LAT PEAK E' VEL: 8.7 CM/SEC
BH CV ECHO MEAS - LV DIASTOLIC VOL/BSA (35-75): 24 CM2
BH CV ECHO MEAS - LV MASS(C)D: 135.2 GRAMS
BH CV ECHO MEAS - LV MAX PG: 7.5 MMHG
BH CV ECHO MEAS - LV MEAN PG: 3.7 MMHG
BH CV ECHO MEAS - LV SYSTOLIC VOL/BSA (12-30): 7.6 CM2
BH CV ECHO MEAS - LV V1 MAX: 137.3 CM/SEC
BH CV ECHO MEAS - LV V1 VTI: 20.3 CM
BH CV ECHO MEAS - LVIDD: 4.4 CM
BH CV ECHO MEAS - LVIDS: 2.9 CM
BH CV ECHO MEAS - LVOT AREA: 3.1 CM2
BH CV ECHO MEAS - LVOT DIAM: 2 CM
BH CV ECHO MEAS - LVPWD: 0.95 CM
BH CV ECHO MEAS - MED PEAK E' VEL: 6.7 CM/SEC
BH CV ECHO MEAS - MV A MAX VEL: 100 CM/SEC
BH CV ECHO MEAS - MV E MAX VEL: 67 CM/SEC
BH CV ECHO MEAS - MV E/A: 0.67
BH CV ECHO MEAS - PA ACC TIME: 0.13 SEC
BH CV ECHO MEAS - RAP SYSTOLE: 10 MMHG
BH CV ECHO MEAS - RVSP: 34.8 MMHG
BH CV ECHO MEAS - SI(MOD-SP4): 16.4 ML/M2
BH CV ECHO MEAS - SV(LVOT): 63.9 ML
BH CV ECHO MEAS - SV(MOD-SP4): 31.2 ML
BH CV ECHO MEAS - TAPSE (>1.6): 0.87 CM
BH CV ECHO MEAS - TR MAX PG: 24.8 MMHG
BH CV ECHO MEAS - TR MAX VEL: 249 CM/SEC
BH CV ECHO MEASUREMENTS AVERAGE E/E' RATIO: 8.7
BUN SERPL-MCNC: 25 MG/DL (ref 6–20)
BUN/CREAT SERPL: 39.7 (ref 7–25)
CALCIUM SPEC-SCNC: 9.4 MG/DL (ref 8.6–10.5)
CHLORIDE SERPL-SCNC: 91 MMOL/L (ref 98–107)
CO2 BLDA-SCNC: 49.7 MMOL/L (ref 22–33)
CO2 SERPL-SCNC: 39.2 MMOL/L (ref 22–29)
COHGB MFR BLD: 2.1 % (ref 0–5)
CREAT SERPL-MCNC: 0.63 MG/DL (ref 0.57–1)
DEPRECATED RDW RBC AUTO: 53.2 FL (ref 37–54)
EGFRCR SERPLBLD CKD-EPI 2021: 103 ML/MIN/1.73
EOSINOPHIL # BLD AUTO: 0 10*3/MM3 (ref 0–0.4)
EOSINOPHIL NFR BLD AUTO: 0 % (ref 0.3–6.2)
EPAP: 8
ERYTHROCYTE [DISTWIDTH] IN BLOOD BY AUTOMATED COUNT: 15.4 % (ref 12.3–15.4)
GLUCOSE BLDC GLUCOMTR-MCNC: 218 MG/DL (ref 70–130)
GLUCOSE BLDC GLUCOMTR-MCNC: 251 MG/DL (ref 70–130)
GLUCOSE BLDC GLUCOMTR-MCNC: 253 MG/DL (ref 70–130)
GLUCOSE BLDC GLUCOMTR-MCNC: 274 MG/DL (ref 70–130)
GLUCOSE SERPL-MCNC: 283 MG/DL (ref 65–99)
HCO3 BLDA-SCNC: 47.5 MMOL/L (ref 20–26)
HCT VFR BLD AUTO: 44 % (ref 34–46.6)
HCT VFR BLD CALC: 40.1 % (ref 38–51)
HGB BLD-MCNC: 13 G/DL (ref 12–15.9)
HGB BLDA-MCNC: 13.1 G/DL (ref 13.5–17.5)
IMM GRANULOCYTES # BLD AUTO: 0.1 10*3/MM3 (ref 0–0.05)
IMM GRANULOCYTES NFR BLD AUTO: 1 % (ref 0–0.5)
INHALED O2 CONCENTRATION: 28 %
IPAP: 24
LYMPHOCYTES # BLD AUTO: 0.43 10*3/MM3 (ref 0.7–3.1)
LYMPHOCYTES NFR BLD AUTO: 4.3 % (ref 19.6–45.3)
Lab: ABNORMAL
Lab: ABNORMAL
MCH RBC QN AUTO: 28.2 PG (ref 26.6–33)
MCHC RBC AUTO-ENTMCNC: 29.5 G/DL (ref 31.5–35.7)
MCV RBC AUTO: 95.4 FL (ref 79–97)
METHGB BLD QL: <-0.1 % (ref 0–3)
MODALITY: ABNORMAL
MONOCYTES # BLD AUTO: 0.44 10*3/MM3 (ref 0.1–0.9)
MONOCYTES NFR BLD AUTO: 4.4 % (ref 5–12)
NEUTROPHILS NFR BLD AUTO: 9.11 10*3/MM3 (ref 1.7–7)
NEUTROPHILS NFR BLD AUTO: 90.1 % (ref 42.7–76)
NOTE: ABNORMAL
NOTIFIED BY: ABNORMAL
NOTIFIED WHO: ABNORMAL
NRBC BLD AUTO-RTO: 0 /100 WBC (ref 0–0.2)
OXYHGB MFR BLDV: 90.4 % (ref 94–99)
PCO2 BLDA: 69.2 MM HG (ref 35–45)
PCO2 TEMP ADJ BLD: ABNORMAL MM[HG]
PH BLDA: 7.45 PH UNITS (ref 7.35–7.45)
PH, TEMP CORRECTED: ABNORMAL
PLATELET # BLD AUTO: 218 10*3/MM3 (ref 140–450)
PMV BLD AUTO: 11.4 FL (ref 6–12)
PO2 BLDA: 62.6 MM HG (ref 83–108)
PO2 TEMP ADJ BLD: ABNORMAL MM[HG]
POTASSIUM SERPL-SCNC: 4.7 MMOL/L (ref 3.5–5.2)
RBC # BLD AUTO: 4.61 10*6/MM3 (ref 3.77–5.28)
SAO2 % BLDCOA: 92.1 % (ref 94–99)
SET MECH RESP RATE: 22
SODIUM SERPL-SCNC: 136 MMOL/L (ref 136–145)
VENTILATOR MODE: ABNORMAL
WBC NRBC COR # BLD: 10.1 10*3/MM3 (ref 3.4–10.8)

## 2023-10-14 PROCEDURE — 80048 BASIC METABOLIC PNL TOTAL CA: CPT | Performed by: HOSPITALIST

## 2023-10-14 PROCEDURE — 25010000002 METHYLPREDNISOLONE PER 40 MG: Performed by: HOSPITALIST

## 2023-10-14 PROCEDURE — 94664 DEMO&/EVAL PT USE INHALER: CPT

## 2023-10-14 PROCEDURE — 94799 UNLISTED PULMONARY SVC/PX: CPT

## 2023-10-14 PROCEDURE — 25810000003 SODIUM CHLORIDE 0.9 % SOLUTION: Performed by: INTERNAL MEDICINE

## 2023-10-14 PROCEDURE — 94660 CPAP INITIATION&MGMT: CPT

## 2023-10-14 PROCEDURE — 83050 HGB METHEMOGLOBIN QUAN: CPT

## 2023-10-14 PROCEDURE — 99232 SBSQ HOSP IP/OBS MODERATE 35: CPT | Performed by: INTERNAL MEDICINE

## 2023-10-14 PROCEDURE — 85025 COMPLETE CBC W/AUTO DIFF WBC: CPT | Performed by: HOSPITALIST

## 2023-10-14 PROCEDURE — 94761 N-INVAS EAR/PLS OXIMETRY MLT: CPT

## 2023-10-14 PROCEDURE — 82805 BLOOD GASES W/O2 SATURATION: CPT

## 2023-10-14 PROCEDURE — 25010000002 HEPARIN (PORCINE) PER 1000 UNITS: Performed by: HOSPITALIST

## 2023-10-14 PROCEDURE — 82948 REAGENT STRIP/BLOOD GLUCOSE: CPT

## 2023-10-14 PROCEDURE — 63710000001 INSULIN LISPRO (HUMAN) PER 5 UNITS: Performed by: HOSPITALIST

## 2023-10-14 PROCEDURE — 82375 ASSAY CARBOXYHB QUANT: CPT

## 2023-10-14 PROCEDURE — 36600 WITHDRAWAL OF ARTERIAL BLOOD: CPT

## 2023-10-14 RX ORDER — HYDROCODONE BITARTRATE AND ACETAMINOPHEN 7.5; 325 MG/1; MG/1
1 TABLET ORAL EVERY 6 HOURS PRN
Status: DISCONTINUED | OUTPATIENT
Start: 2023-10-14 | End: 2023-10-17 | Stop reason: HOSPADM

## 2023-10-14 RX ADMIN — METHYLPREDNISOLONE SODIUM SUCCINATE 40 MG: 40 INJECTION, POWDER, FOR SOLUTION INTRAMUSCULAR; INTRAVENOUS at 20:49

## 2023-10-14 RX ADMIN — HEPARIN SODIUM 5000 UNITS: 5000 INJECTION INTRAVENOUS; SUBCUTANEOUS at 08:55

## 2023-10-14 RX ADMIN — IPRATROPIUM BROMIDE 0.5 MG: 0.5 SOLUTION RESPIRATORY (INHALATION) at 12:09

## 2023-10-14 RX ADMIN — CLOPIDOGREL BISULFATE 75 MG: 75 TABLET, FILM COATED ORAL at 08:55

## 2023-10-14 RX ADMIN — METHYLPREDNISOLONE SODIUM SUCCINATE 40 MG: 40 INJECTION, POWDER, FOR SOLUTION INTRAMUSCULAR; INTRAVENOUS at 08:55

## 2023-10-14 RX ADMIN — BUDESONIDE 0.5 MG: 0.5 SUSPENSION RESPIRATORY (INHALATION) at 18:14

## 2023-10-14 RX ADMIN — METOPROLOL TARTRATE 25 MG: 25 TABLET, FILM COATED ORAL at 08:55

## 2023-10-14 RX ADMIN — SODIUM CHLORIDE 50 ML/HR: 9 INJECTION, SOLUTION INTRAVENOUS at 20:49

## 2023-10-14 RX ADMIN — INSULIN LISPRO 3 UNITS: 100 INJECTION, SOLUTION INTRAVENOUS; SUBCUTANEOUS at 16:38

## 2023-10-14 RX ADMIN — METOPROLOL TARTRATE 25 MG: 25 TABLET, FILM COATED ORAL at 20:49

## 2023-10-14 RX ADMIN — HYDROCODONE BITARTRATE AND ACETAMINOPHEN 1 TABLET: 7.5; 325 TABLET ORAL at 06:52

## 2023-10-14 RX ADMIN — Medication 10 ML: at 08:55

## 2023-10-14 RX ADMIN — LEVOTHYROXINE SODIUM 25 MCG: 25 TABLET ORAL at 06:44

## 2023-10-14 RX ADMIN — Medication 10 ML: at 20:50

## 2023-10-14 RX ADMIN — IPRATROPIUM BROMIDE 0.5 MG: 0.5 SOLUTION RESPIRATORY (INHALATION) at 00:53

## 2023-10-14 RX ADMIN — IPRATROPIUM BROMIDE 0.5 MG: 0.5 SOLUTION RESPIRATORY (INHALATION) at 18:14

## 2023-10-14 RX ADMIN — ALPRAZOLAM 0.5 MG: 0.5 TABLET ORAL at 21:41

## 2023-10-14 RX ADMIN — ASPIRIN 81 MG: 81 TABLET, COATED ORAL at 08:55

## 2023-10-14 RX ADMIN — INSULIN LISPRO 4 UNITS: 100 INJECTION, SOLUTION INTRAVENOUS; SUBCUTANEOUS at 20:49

## 2023-10-14 RX ADMIN — HEPARIN SODIUM 5000 UNITS: 5000 INJECTION INTRAVENOUS; SUBCUTANEOUS at 20:49

## 2023-10-14 RX ADMIN — IPRATROPIUM BROMIDE 0.5 MG: 0.5 SOLUTION RESPIRATORY (INHALATION) at 06:10

## 2023-10-14 RX ADMIN — HYDROCODONE BITARTRATE AND ACETAMINOPHEN 1 TABLET: 7.5; 325 TABLET ORAL at 22:45

## 2023-10-14 RX ADMIN — ARFORMOTEROL TARTRATE 15 MCG: 15 SOLUTION RESPIRATORY (INHALATION) at 06:10

## 2023-10-14 RX ADMIN — BUDESONIDE 0.5 MG: 0.5 SUSPENSION RESPIRATORY (INHALATION) at 06:10

## 2023-10-14 RX ADMIN — POTASSIUM CHLORIDE 10 MEQ: 1500 TABLET, EXTENDED RELEASE ORAL at 08:55

## 2023-10-14 RX ADMIN — INSULIN LISPRO 4 UNITS: 100 INJECTION, SOLUTION INTRAVENOUS; SUBCUTANEOUS at 11:33

## 2023-10-14 RX ADMIN — ARFORMOTEROL TARTRATE 15 MCG: 15 SOLUTION RESPIRATORY (INHALATION) at 18:14

## 2023-10-14 RX ADMIN — INSULIN LISPRO 4 UNITS: 100 INJECTION, SOLUTION INTRAVENOUS; SUBCUTANEOUS at 06:52

## 2023-10-14 RX ADMIN — HYDROCODONE BITARTRATE AND ACETAMINOPHEN 1 TABLET: 7.5; 325 TABLET ORAL at 16:38

## 2023-10-14 NOTE — PLAN OF CARE
Problem: COPD (Chronic Obstructive Pulmonary Disease) Comorbidity  Goal: Maintenance of COPD Symptom Control  Outcome: Ongoing, Progressing  Intervention: Maintain COPD-Symptom Control  Recent Flowsheet Documentation  Taken 10/14/2023 1400 by Joanne Piña RN  Supportive Measures:   active listening utilized   decision-making supported   verbalization of feelings encouraged  Taken 10/14/2023 0900 by Joanne Piña RN  Supportive Measures:   active listening utilized   decision-making supported   verbalization of feelings encouraged  Medication Review/Management: medications reviewed     Problem: Skin Injury Risk Increased  Goal: Skin Health and Integrity  Outcome: Ongoing, Progressing  Intervention: Optimize Skin Protection  Recent Flowsheet Documentation  Taken 10/14/2023 1400 by Joanne Piña RN  Pressure Reduction Techniques: frequent weight shift encouraged  Pressure Reduction Devices: pressure-redistributing mattress utilized  Skin Protection:   adhesive use limited   incontinence pads utilized   tubing/devices free from skin contact  Taken 10/14/2023 0900 by Joanne Piña RN  Pressure Reduction Techniques: frequent weight shift encouraged  Pressure Reduction Devices: pressure-redistributing mattress utilized  Skin Protection:   adhesive use limited   incontinence pads utilized   tubing/devices free from skin contact     Problem: Fall Injury Risk  Goal: Absence of Fall and Fall-Related Injury  Outcome: Ongoing, Progressing  Intervention: Identify and Manage Contributors  Recent Flowsheet Documentation  Taken 10/14/2023 0900 by Joanne Piña RN  Medication Review/Management: medications reviewed  Intervention: Promote Injury-Free Environment  Recent Flowsheet Documentation  Taken 10/14/2023 1500 by Joanne Piña RN  Safety Promotion/Fall Prevention:   activity supervised   assistive device/personal items within reach   clutter free environment maintained   fall prevention program  maintained   muscle strengthening facilitated   nonskid shoes/slippers when out of bed   room organization consistent   safety round/check completed  Taken 10/14/2023 1300 by Joanne Piña, RN  Safety Promotion/Fall Prevention:   activity supervised   assistive device/personal items within reach   clutter free environment maintained   fall prevention program maintained   nonskid shoes/slippers when out of bed   room organization consistent   safety round/check completed  Taken 10/14/2023 1100 by Joanne Piña, RN  Safety Promotion/Fall Prevention:   activity supervised   assistive device/personal items within reach   clutter free environment maintained   fall prevention program maintained   nonskid shoes/slippers when out of bed   room organization consistent   safety round/check completed  Taken 10/14/2023 0900 by Joanne Piña, RN  Safety Promotion/Fall Prevention:   activity supervised   assistive device/personal items within reach   clutter free environment maintained   fall prevention program maintained   nonskid shoes/slippers when out of bed   room organization consistent   safety round/check completed  Taken 10/14/2023 0700 by Joanne Piña, RN  Safety Promotion/Fall Prevention:   activity supervised   assistive device/personal items within reach   clutter free environment maintained   fall prevention program maintained   nonskid shoes/slippers when out of bed   room organization consistent   safety round/check completed   Goal Outcome Evaluation:           Progress: no change  Outcome Evaluation: Pt has been alert and oriented x4. 3L NC and tolerating well. Pt denies any pain or discomfort at this time. Pt is resting in bed with family at the bedside. No distress noted. Call light within reach. Bed alarm set. Will continue to follow plan of care throughout shift until 7p.

## 2023-10-14 NOTE — PROGRESS NOTES
[facility]     Patient Identification:  Name:  Liz Jiang  Age:  57 y.o.  Sex:  female  :  1965  MRN:  [id number]  Visit Number:  [id number]  Primary Care Provider:  Tabitha Ron APRN    Length of stay:  1    Chief complaint: Shortness of breath    Subjective:    Patient reports continued shortness of breath which is relatively unchanged in comparison to yesterday.  She denies any fevers, chills, sweats or rigors.  No new events overnight.  ----------------------------------------------------------------------------------------------------------------------  [facility] Meds:  arformoterol, 15 mcg, Nebulization, BID - RT  aspirin, 81 mg, Oral, Daily  budesonide, 0.5 mg, Nebulization, BID - RT  clopidogrel, 75 mg, Oral, Daily  heparin (porcine), 5,000 Units, Subcutaneous, Q12H  insulin lispro, 2-7 Units, Subcutaneous, 4x Daily AC & at Bedtime  ipratropium, 0.5 mg, Nebulization, Q6H - RT  levothyroxine, 25 mcg, Oral, Q AM  methylPREDNISolone sodium succinate, 40 mg, Intravenous, Q12H  metoprolol tartrate, 25 mg, Oral, BID  potassium chloride, 10 mEq, Oral, Daily  senna-docusate sodium, 2 tablet, Oral, BID  sodium chloride, 10 mL, Intravenous, Q12H      sodium chloride, 50 mL/hr, Last Rate: 50 mL/hr (10/13/23 9757)      ----------------------------------------------------------------------------------------------------------------------  Vital Signs:  Temp:  [97.3 °F (36.3 °C)-98.1 °F (36.7 °C)] 97.9 °F (36.6 °C)  Heart Rate:  [] 98  Resp:  [12-28] 20  BP: ()/(69-96) 114/80      10/12/23  1609 10/13/23  0400 10/14/23  0400   Weight: 75.8 kg (167 lb 1.6 oz) 85 kg (187 lb 6.3 oz) 85 kg (187 lb 6.3 oz)     Body mass index is 32.17 kg/m².    Intake/Output Summary (Last 24 hours) at 10/14/2023 1829  Last data filed at 10/14/2023 1600  Gross per 24 hour   Intake 2315 ml   Output 900 ml   Net 1415 ml     Diet: Diabetic Diets; Consistent Carbohydrate;  Texture: Regular Texture (IDDSI 7); Fluid Consistency: Thin (IDDSI 0)  ----------------------------------------------------------------------------------------------------------------------  Physical exam:  Constitutional: Chronically ill-appearing  female in no apparent distress.  HENT:  Head:  Normocephalic and atraumatic.  Mouth:  Moist mucous membranes.    Eyes:  Conjunctivae and EOM are normal.  Pupils are equal, round, and reactive to light.  No scleral icterus.    Neck:  Neck supple. No thyromegaly.  No JVD present.    Cardiovascular:  Regular rate and rhythm with no murmurs, rubs, clicks or gallops appreciated.  Pulmonary/Chest: Decreased breath sounds bilaterally with expiratory wheezing  Abdominal:  Soft. Nontender. Nondistended  Bowel sounds are normal in all four quadrants. No organomegally appreciated.   Musculoskeletal:   No edema, no tenderness, and no deformity.  No red or swollen joints anywhere.    Neurological:  Alert, Cranial nerves II-XII intact with no focal deficits.  No facial droop.  No slurred speech.   Skin:  Warm and dry to palpation with no rashes or lesions appreciated.  Peripheral vascular:  2+ radial and pedal pulses in bilateral upper and lower extremities.  Psychiatric:  Alert and oriented x3, demonstrates appropriate judgment and insight.  --------------------------------------------------------------------------------------------------------------  ----------------------------------------------------------------------------------------------------------------------  Results from last 7 days   Lab Units 10/12/23  1908 10/12/23  1621   HSTROP T ng/L 37* 48*     Results from last 7 days   Lab Units 10/14/23  0000 10/13/23  0721 10/12/23  1908 10/12/23  1648 10/12/23  1621   CRP mg/dL  --   --   --   --  1.59*   LACTATE mmol/L  --   --  1.4 2.9*  --    WBC 10*3/mm3 10.10 5.81  --   --  9.57   HEMOGLOBIN g/dL 13.0 14.2  --   --  13.6   HEMATOCRIT % 44.0 46.3  --   --  46.3  "  MCV fL 95.4 92.4  --   --  95.3   MCHC g/dL 29.5* 30.7*  --   --  29.4*   PLATELETS 10*3/mm3 218 211  --   --  198   INR   --   --   --   --  0.95     Results from last 7 days   Lab Units 10/14/23  0457   PH, ARTERIAL pH units 7.446   PO2 ART mm Hg 62.6*   PCO2, ARTERIAL mm Hg 69.2*   HCO3 ART mmol/L 47.5*     Results from last 7 days   Lab Units 10/14/23  0000 10/13/23  0721 10/12/23  1908 10/12/23  1621   SODIUM mmol/L 136 135*  --  139   POTASSIUM mmol/L 4.7 3.5  --  3.1*   MAGNESIUM mg/dL  --   --  1.8  --    CHLORIDE mmol/L 91* 83*  --  81*   CO2 mmol/L 39.2* 44.2*  --  45.5*   BUN mg/dL 25* 12  --  11   CREATININE mg/dL 0.63 0.40*  --  0.44*   CALCIUM mg/dL 9.4 9.7  --  9.4   GLUCOSE mg/dL 283* 194*  --  192*   ALBUMIN g/dL  --  3.9  --  3.9   BILIRUBIN mg/dL  --  0.8  --  0.8   ALK PHOS U/L  --  75  --  77   AST (SGOT) U/L  --  16  --  21   ALT (SGPT) U/L  --  20  --  25   Estimated Creatinine Clearance: 102.6 mL/min (by C-G formula based on SCr of 0.63 mg/dL).    No results found for: \"AMMONIA\"      Blood Culture   Date Value Ref Range Status   10/12/2023 No growth at 2 days  Preliminary   10/12/2023 No growth at 2 days  Preliminary     No results found for: \"URINECX\"  No results found for: \"WOUNDCX\"  No results found for: \"STOOLCX\"    I have personally looked at the labs and they are summarized above.  ----------------------------------------------------------------------------------------------------------------------  Imaging Results (Last 24 Hours)       ** No results found for the last 24 hours. **          ----------------------------------------------------------------------------------------------------------------------  Assessment and Plan:    Acute on chronic hypoxic respiratory failure -continue supplemental oxygen to maintain O2 saturation greater than 90%    2.  COPD exacerbation -continue Solu-Medrol 40 mg IV twice daily as well as Brovana and Pulmicort and Atrovent    3.  Type II NSTEMI " -patient with negative delta on high-sensitivity troponin, no need for further evaluation at this time.    4.  Type 2 diabetes mellitus -continue Accu-Cheks before every meal and nightly with sliding scale insulin    5.  Essential hypertension -well-controlled    Disposition will likely require several more days hospitalization    Alexx Clemente DO   10/14/23   18:29 EDT

## 2023-10-14 NOTE — PLAN OF CARE
Goal Outcome Evaluation:  Plan of Care Reviewed With: patient, family        Progress: improving  Outcome Evaluation: Patient remains AAOx4 w/ VSS on 4 L/min O2 via NC. Patient continues to utilize BiPAP when sleeping. ABG levels have been closely monitored and continue to improve. Patient refuses bed alarm. Discussed ongoing plan of care with patient and family at bedside; no other questions or concerns verbalized.

## 2023-10-15 LAB
ANION GAP SERPL CALCULATED.3IONS-SCNC: 4.7 MMOL/L (ref 5–15)
BUN SERPL-MCNC: 25 MG/DL (ref 6–20)
BUN/CREAT SERPL: 61 (ref 7–25)
CALCIUM SPEC-SCNC: 9.2 MG/DL (ref 8.6–10.5)
CHLORIDE SERPL-SCNC: 100 MMOL/L (ref 98–107)
CO2 SERPL-SCNC: 36.3 MMOL/L (ref 22–29)
CREAT SERPL-MCNC: 0.41 MG/DL (ref 0.57–1)
DEPRECATED RDW RBC AUTO: 55.8 FL (ref 37–54)
EGFRCR SERPLBLD CKD-EPI 2021: 114.2 ML/MIN/1.73
ERYTHROCYTE [DISTWIDTH] IN BLOOD BY AUTOMATED COUNT: 15.3 % (ref 12.3–15.4)
GLUCOSE BLDC GLUCOMTR-MCNC: 160 MG/DL (ref 70–130)
GLUCOSE BLDC GLUCOMTR-MCNC: 200 MG/DL (ref 70–130)
GLUCOSE BLDC GLUCOMTR-MCNC: 218 MG/DL (ref 70–130)
GLUCOSE BLDC GLUCOMTR-MCNC: 279 MG/DL (ref 70–130)
GLUCOSE SERPL-MCNC: 207 MG/DL (ref 65–99)
HCT VFR BLD AUTO: 43.6 % (ref 34–46.6)
HGB BLD-MCNC: 12.5 G/DL (ref 12–15.9)
MCH RBC QN AUTO: 28.3 PG (ref 26.6–33)
MCHC RBC AUTO-ENTMCNC: 28.7 G/DL (ref 31.5–35.7)
MCV RBC AUTO: 98.6 FL (ref 79–97)
PLATELET # BLD AUTO: 196 10*3/MM3 (ref 140–450)
PMV BLD AUTO: 11 FL (ref 6–12)
POTASSIUM SERPL-SCNC: 5 MMOL/L (ref 3.5–5.2)
RBC # BLD AUTO: 4.42 10*6/MM3 (ref 3.77–5.28)
SODIUM SERPL-SCNC: 141 MMOL/L (ref 136–145)
WBC NRBC COR # BLD: 12.64 10*3/MM3 (ref 3.4–10.8)

## 2023-10-15 PROCEDURE — 94799 UNLISTED PULMONARY SVC/PX: CPT

## 2023-10-15 PROCEDURE — 63710000001 INSULIN LISPRO (HUMAN) PER 5 UNITS: Performed by: HOSPITALIST

## 2023-10-15 PROCEDURE — 25010000002 HEPARIN (PORCINE) PER 1000 UNITS: Performed by: HOSPITALIST

## 2023-10-15 PROCEDURE — 94664 DEMO&/EVAL PT USE INHALER: CPT

## 2023-10-15 PROCEDURE — 85027 COMPLETE CBC AUTOMATED: CPT | Performed by: INTERNAL MEDICINE

## 2023-10-15 PROCEDURE — 94660 CPAP INITIATION&MGMT: CPT

## 2023-10-15 PROCEDURE — 80048 BASIC METABOLIC PNL TOTAL CA: CPT | Performed by: INTERNAL MEDICINE

## 2023-10-15 PROCEDURE — 82948 REAGENT STRIP/BLOOD GLUCOSE: CPT

## 2023-10-15 PROCEDURE — 94761 N-INVAS EAR/PLS OXIMETRY MLT: CPT

## 2023-10-15 PROCEDURE — 25010000002 METHYLPREDNISOLONE PER 40 MG: Performed by: HOSPITALIST

## 2023-10-15 RX ORDER — SODIUM CHLORIDE 0.9 % (FLUSH) 0.9 %
10 SYRINGE (ML) INJECTION AS NEEDED
Status: DISCONTINUED | OUTPATIENT
Start: 2023-10-15 | End: 2023-10-17 | Stop reason: HOSPADM

## 2023-10-15 RX ORDER — SODIUM CHLORIDE 0.9 % (FLUSH) 0.9 %
10 SYRINGE (ML) INJECTION EVERY 12 HOURS SCHEDULED
Status: DISCONTINUED | OUTPATIENT
Start: 2023-10-15 | End: 2023-10-17 | Stop reason: HOSPADM

## 2023-10-15 RX ORDER — HYDROXYZINE HYDROCHLORIDE 25 MG/1
25 TABLET, FILM COATED ORAL ONCE
Status: COMPLETED | OUTPATIENT
Start: 2023-10-15 | End: 2023-10-15

## 2023-10-15 RX ORDER — SODIUM CHLORIDE 9 MG/ML
40 INJECTION, SOLUTION INTRAVENOUS AS NEEDED
Status: DISCONTINUED | OUTPATIENT
Start: 2023-10-15 | End: 2023-10-17 | Stop reason: HOSPADM

## 2023-10-15 RX ADMIN — POTASSIUM CHLORIDE 10 MEQ: 1500 TABLET, EXTENDED RELEASE ORAL at 09:24

## 2023-10-15 RX ADMIN — METOPROLOL TARTRATE 25 MG: 25 TABLET, FILM COATED ORAL at 21:50

## 2023-10-15 RX ADMIN — ARFORMOTEROL TARTRATE 15 MCG: 15 SOLUTION RESPIRATORY (INHALATION) at 06:15

## 2023-10-15 RX ADMIN — HYDROCODONE BITARTRATE AND ACETAMINOPHEN 1 TABLET: 7.5; 325 TABLET ORAL at 16:15

## 2023-10-15 RX ADMIN — METOPROLOL TARTRATE 25 MG: 25 TABLET, FILM COATED ORAL at 09:25

## 2023-10-15 RX ADMIN — ALUMINUM HYDROXIDE, MAGNESIUM HYDROXIDE, AND DIMETHICONE 15 ML: 400; 400; 40 SUSPENSION ORAL at 16:57

## 2023-10-15 RX ADMIN — Medication 10 ML: at 21:52

## 2023-10-15 RX ADMIN — INSULIN LISPRO 4 UNITS: 100 INJECTION, SOLUTION INTRAVENOUS; SUBCUTANEOUS at 11:25

## 2023-10-15 RX ADMIN — DOCUSATE SODIUM 50 MG AND SENNOSIDES 8.6 MG 2 TABLET: 8.6; 5 TABLET, FILM COATED ORAL at 21:51

## 2023-10-15 RX ADMIN — HEPARIN SODIUM 5000 UNITS: 5000 INJECTION INTRAVENOUS; SUBCUTANEOUS at 09:24

## 2023-10-15 RX ADMIN — IPRATROPIUM BROMIDE 0.5 MG: 0.5 SOLUTION RESPIRATORY (INHALATION) at 18:31

## 2023-10-15 RX ADMIN — HYDROCODONE BITARTRATE AND ACETAMINOPHEN 1 TABLET: 7.5; 325 TABLET ORAL at 22:21

## 2023-10-15 RX ADMIN — ARFORMOTEROL TARTRATE 15 MCG: 15 SOLUTION RESPIRATORY (INHALATION) at 18:31

## 2023-10-15 RX ADMIN — HYDROCODONE BITARTRATE AND ACETAMINOPHEN 1 TABLET: 7.5; 325 TABLET ORAL at 05:05

## 2023-10-15 RX ADMIN — ASPIRIN 81 MG: 81 TABLET, COATED ORAL at 09:25

## 2023-10-15 RX ADMIN — LEVOTHYROXINE SODIUM 25 MCG: 25 TABLET ORAL at 05:05

## 2023-10-15 RX ADMIN — INSULIN LISPRO 2 UNITS: 100 INJECTION, SOLUTION INTRAVENOUS; SUBCUTANEOUS at 06:30

## 2023-10-15 RX ADMIN — INSULIN LISPRO 3 UNITS: 100 INJECTION, SOLUTION INTRAVENOUS; SUBCUTANEOUS at 16:55

## 2023-10-15 RX ADMIN — METHYLPREDNISOLONE SODIUM SUCCINATE 40 MG: 40 INJECTION, POWDER, FOR SOLUTION INTRAMUSCULAR; INTRAVENOUS at 21:51

## 2023-10-15 RX ADMIN — Medication 10 ML: at 09:25

## 2023-10-15 RX ADMIN — INSULIN LISPRO 3 UNITS: 100 INJECTION, SOLUTION INTRAVENOUS; SUBCUTANEOUS at 21:56

## 2023-10-15 RX ADMIN — BUDESONIDE 0.5 MG: 0.5 SUSPENSION RESPIRATORY (INHALATION) at 06:15

## 2023-10-15 RX ADMIN — BUDESONIDE 0.5 MG: 0.5 SUSPENSION RESPIRATORY (INHALATION) at 18:31

## 2023-10-15 RX ADMIN — CLOPIDOGREL BISULFATE 75 MG: 75 TABLET, FILM COATED ORAL at 09:25

## 2023-10-15 RX ADMIN — IPRATROPIUM BROMIDE 0.5 MG: 0.5 SOLUTION RESPIRATORY (INHALATION) at 01:33

## 2023-10-15 RX ADMIN — ALPRAZOLAM 0.5 MG: 0.5 TABLET ORAL at 22:21

## 2023-10-15 RX ADMIN — IPRATROPIUM BROMIDE 0.5 MG: 0.5 SOLUTION RESPIRATORY (INHALATION) at 12:24

## 2023-10-15 RX ADMIN — METHYLPREDNISOLONE SODIUM SUCCINATE 40 MG: 40 INJECTION, POWDER, FOR SOLUTION INTRAMUSCULAR; INTRAVENOUS at 09:24

## 2023-10-15 RX ADMIN — IPRATROPIUM BROMIDE 0.5 MG: 0.5 SOLUTION RESPIRATORY (INHALATION) at 06:15

## 2023-10-15 RX ADMIN — HEPARIN SODIUM 5000 UNITS: 5000 INJECTION INTRAVENOUS; SUBCUTANEOUS at 21:51

## 2023-10-15 RX ADMIN — HYDROXYZINE HYDROCHLORIDE 25 MG: 25 TABLET, FILM COATED ORAL at 09:58

## 2023-10-15 NOTE — PLAN OF CARE
Goal Outcome Evaluation:  Plan of Care Reviewed With: patient, family        Progress: improving  Outcome Evaluation: Patient remains AAOx4 with VSS on 3 L/min O2 via NC. Patient has ambulated to the bedside commode on a couple of occassions and did experience some shortness of breath and decreased %SpO2 with physical exertion. No significant changes this shift. Discussed ongoing plan of care with patient and family at bedside with no questions or concerns verbalized.

## 2023-10-15 NOTE — PROGRESS NOTES
[facility]     Patient Identification:  Name:  Liz Jiang  Age:  57 y.o.  Sex:  female  :  1965  MRN:  [id number]  Visit Number:  [id number]  Primary Care Provider:  Tabitha Ron APRN    Length of stay:  2    Chief complaint: Shortness of breath    Subjective:    Patient reports shortness of breath has improved in comparison to yesterday.  She denies any fevers, chills or any other symptoms at this time.  Patient does report feeling close to her baseline functioning status and is hopeful for discharge tomorrow.  ----------------------------------------------------------------------------------------------------------------------  [facility] Meds:  arformoterol, 15 mcg, Nebulization, BID - RT  aspirin, 81 mg, Oral, Daily  budesonide, 0.5 mg, Nebulization, BID - RT  clopidogrel, 75 mg, Oral, Daily  heparin (porcine), 5,000 Units, Subcutaneous, Q12H  insulin lispro, 2-7 Units, Subcutaneous, 4x Daily AC & at Bedtime  ipratropium, 0.5 mg, Nebulization, Q6H - RT  levothyroxine, 25 mcg, Oral, Q AM  methylPREDNISolone sodium succinate, 40 mg, Intravenous, Q12H  metoprolol tartrate, 25 mg, Oral, BID  potassium chloride, 10 mEq, Oral, Daily  senna-docusate sodium, 2 tablet, Oral, BID  sodium chloride, 10 mL, Intravenous, Q12H           ----------------------------------------------------------------------------------------------------------------------  Vital Signs:  Temp:  [96.1 °F (35.6 °C)-98.5 °F (36.9 °C)] 96.1 °F (35.6 °C)  Heart Rate:  [] 84  Resp:  [13-31] 14  BP: (111-148)/() 134/95      10/13/23  0400 10/14/23  0400 10/15/23  0400   Weight: 85 kg (187 lb 6.3 oz) 85 kg (187 lb 6.3 oz) 85.2 kg (187 lb 13.3 oz)     Body mass index is 32.24 kg/m².    Intake/Output Summary (Last 24 hours) at 10/15/2023 7605  Last data filed at 10/15/2023 1200  Gross per 24 hour   Intake 4311.2 ml   Output 500 ml   Net 3811.2 ml     Diet: Diabetic Diets;  Consistent Carbohydrate; Texture: Regular Texture (IDDSI 7); Fluid Consistency: Thin (IDDSI 0)  ----------------------------------------------------------------------------------------------------------------------  Physical exam:  Constitutional: Chronically ill-appearing  female in no apparent distress.  HENT:  Head:  Normocephalic and atraumatic.  Mouth:  Moist mucous membranes.    Eyes:  Conjunctivae and EOM are normal.  Pupils are equal, round, and reactive to light.  No scleral icterus.    Neck:  Neck supple. No thyromegaly.  No JVD present.    Cardiovascular:  Regular rate and rhythm with no murmurs, rubs, clicks or gallops appreciated.  Pulmonary/Chest: Faint end expiratory wheezing with no crackles or rhonchi appreciated  Abdominal:  Soft. Nontender. Nondistended  Bowel sounds are normal in all four quadrants. No organomegally appreciated.   Musculoskeletal:   No edema, no tenderness, and no deformity.  No red or swollen joints anywhere.    Neurological:  Alert, Cranial nerves II-XII intact with no focal deficits.  No facial droop.  No slurred speech.   Skin:  Warm and dry to palpation with no rashes or lesions appreciated.  Peripheral vascular:  2+ radial and pedal pulses in bilateral upper and lower extremities.  Psychiatric:  Alert and oriented x3, demonstrates appropriate judgment and insight.  --------------------------------------------------------------------------------------------------------------  ----------------------------------------------------------------------------------------------------------------------  Results from last 7 days   Lab Units 10/12/23  1908 10/12/23  1621   HSTROP T ng/L 37* 48*     Results from last 7 days   Lab Units 10/15/23  0052 10/14/23  0000 10/13/23  0721 10/12/23  1908 10/12/23  1648 10/12/23  1621   CRP mg/dL  --   --   --   --   --  1.59*   LACTATE mmol/L  --   --   --  1.4 2.9*  --    WBC 10*3/mm3 12.64* 10.10 5.81  --   --  9.57   HEMOGLOBIN g/dL  "12.5 13.0 14.2  --   --  13.6   HEMATOCRIT % 43.6 44.0 46.3  --   --  46.3   MCV fL 98.6* 95.4 92.4  --   --  95.3   MCHC g/dL 28.7* 29.5* 30.7*  --   --  29.4*   PLATELETS 10*3/mm3 196 218 211  --   --  198   INR   --   --   --   --   --  0.95     Results from last 7 days   Lab Units 10/14/23  0457   PH, ARTERIAL pH units 7.446   PO2 ART mm Hg 62.6*   PCO2, ARTERIAL mm Hg 69.2*   HCO3 ART mmol/L 47.5*     Results from last 7 days   Lab Units 10/15/23  0052 10/14/23  0000 10/13/23  0721 10/12/23  1908 10/12/23  1621   SODIUM mmol/L 141 136 135*  --  139   POTASSIUM mmol/L 5.0 4.7 3.5  --  3.1*   MAGNESIUM mg/dL  --   --   --  1.8  --    CHLORIDE mmol/L 100 91* 83*  --  81*   CO2 mmol/L 36.3* 39.2* 44.2*  --  45.5*   BUN mg/dL 25* 25* 12  --  11   CREATININE mg/dL 0.41* 0.63 0.40*  --  0.44*   CALCIUM mg/dL 9.2 9.4 9.7  --  9.4   GLUCOSE mg/dL 207* 283* 194*  --  192*   ALBUMIN g/dL  --   --  3.9  --  3.9   BILIRUBIN mg/dL  --   --  0.8  --  0.8   ALK PHOS U/L  --   --  75  --  77   AST (SGOT) U/L  --   --  16  --  21   ALT (SGPT) U/L  --   --  20  --  25   Estimated Creatinine Clearance: 158 mL/min (A) (by C-G formula based on SCr of 0.41 mg/dL (L)).    No results found for: \"AMMONIA\"      Blood Culture   Date Value Ref Range Status   10/12/2023 No growth at 2 days  Preliminary   10/12/2023 No growth at 2 days  Preliminary     No results found for: \"URINECX\"  No results found for: \"WOUNDCX\"  No results found for: \"STOOLCX\"    I have personally looked at the labs and they are summarized above.  ----------------------------------------------------------------------------------------------------------------------  Imaging Results (Last 24 Hours)       ** No results found for the last 24 hours. **          ----------------------------------------------------------------------------------------------------------------------  Assessment and Plan:    Acute on chronic hypoxic respiratory failure -continue supplemental " oxygen to maintain O2 saturation greater than 90%    2.  COPD exacerbation -continue Solu-Medrol 40 mg IV twice daily as well as Brovana and Pulmicort and Atrovent    3.  Type II NSTEMI -patient with negative delta on high-sensitivity troponin, no need for further evaluation at this time.    4.  Type 2 diabetes mellitus -continue Accu-Cheks before every meal and nightly with sliding scale insulin    5.  Essential hypertension -well-controlled    Disposition possible discharge in the next 24 to 48 hours    Alexx Clemente,    10/15/23   14:55 EDT

## 2023-10-15 NOTE — PLAN OF CARE
Problem: COPD (Chronic Obstructive Pulmonary Disease) Comorbidity  Goal: Maintenance of COPD Symptom Control  Outcome: Ongoing, Progressing  Intervention: Maintain COPD-Symptom Control  Recent Flowsheet Documentation  Taken 10/15/2023 1400 by Joanne Piña RN  Supportive Measures:   active listening utilized   decision-making supported   relaxation techniques promoted  Taken 10/15/2023 0900 by Joanne Piña RN  Supportive Measures:   active listening utilized   decision-making supported   relaxation techniques promoted     Problem: Skin Injury Risk Increased  Goal: Skin Health and Integrity  Outcome: Ongoing, Progressing  Intervention: Optimize Skin Protection  Recent Flowsheet Documentation  Taken 10/15/2023 1400 by Joanne Piña RN  Pressure Reduction Techniques: frequent weight shift encouraged  Pressure Reduction Devices: pressure-redistributing mattress utilized  Skin Protection: adhesive use limited  Taken 10/15/2023 0900 by Joanne Piña RN  Pressure Reduction Techniques: frequent weight shift encouraged  Pressure Reduction Devices: pressure-redistributing mattress utilized  Skin Protection: adhesive use limited     Problem: Fall Injury Risk  Goal: Absence of Fall and Fall-Related Injury  Outcome: Ongoing, Progressing  Intervention: Promote Injury-Free Environment  Recent Flowsheet Documentation  Taken 10/15/2023 1300 by Joanne Piña RN  Safety Promotion/Fall Prevention:   activity supervised   assistive device/personal items within reach   clutter free environment maintained   fall prevention program maintained   nonskid shoes/slippers when out of bed   room organization consistent   safety round/check completed  Taken 10/15/2023 1100 by Joanne Piña RN  Safety Promotion/Fall Prevention:   activity supervised   assistive device/personal items within reach   clutter free environment maintained   fall prevention program maintained   nonskid shoes/slippers when out of bed    room organization consistent   safety round/check completed  Taken 10/15/2023 0900 by Joanne Piña, RN  Safety Promotion/Fall Prevention:   activity supervised   assistive device/personal items within reach   clutter free environment maintained   fall prevention program maintained   nonskid shoes/slippers when out of bed   room organization consistent   safety round/check completed  Taken 10/15/2023 0700 by Joanne Piña, RN  Safety Promotion/Fall Prevention:   activity supervised   assistive device/personal items within reach   clutter free environment maintained   fall prevention program maintained   muscle strengthening facilitated   room organization consistent   safety round/check completed   Goal Outcome Evaluation:           Progress: no change  Outcome Evaluation: Pt continues to be alert and oriented x4. 3L humidified NC. Pt ambulated in room today, reports shortness of air on exertion. Denies pain or discomfort at this time. Resting in bed currently with no distress noted. Family at bedside. Call light within reach. Will continue to follow plan of care throughout shift until 7p.

## 2023-10-16 LAB
GLUCOSE BLDC GLUCOMTR-MCNC: 223 MG/DL (ref 70–130)
GLUCOSE BLDC GLUCOMTR-MCNC: 224 MG/DL (ref 70–130)
GLUCOSE BLDC GLUCOMTR-MCNC: 253 MG/DL (ref 70–130)
GLUCOSE BLDC GLUCOMTR-MCNC: 294 MG/DL (ref 70–130)

## 2023-10-16 PROCEDURE — 99233 SBSQ HOSP IP/OBS HIGH 50: CPT | Performed by: INTERNAL MEDICINE

## 2023-10-16 PROCEDURE — 63710000001 INSULIN LISPRO (HUMAN) PER 5 UNITS: Performed by: HOSPITALIST

## 2023-10-16 PROCEDURE — 94761 N-INVAS EAR/PLS OXIMETRY MLT: CPT

## 2023-10-16 PROCEDURE — 25010000002 METHYLPREDNISOLONE PER 40 MG: Performed by: HOSPITALIST

## 2023-10-16 PROCEDURE — 94660 CPAP INITIATION&MGMT: CPT

## 2023-10-16 PROCEDURE — 94799 UNLISTED PULMONARY SVC/PX: CPT

## 2023-10-16 PROCEDURE — 63710000001 PREDNISONE PER 1 MG: Performed by: INTERNAL MEDICINE

## 2023-10-16 PROCEDURE — 93005 ELECTROCARDIOGRAM TRACING: CPT | Performed by: INTERNAL MEDICINE

## 2023-10-16 PROCEDURE — 82948 REAGENT STRIP/BLOOD GLUCOSE: CPT

## 2023-10-16 PROCEDURE — 25010000002 HEPARIN (PORCINE) PER 1000 UNITS: Performed by: HOSPITALIST

## 2023-10-16 PROCEDURE — 93010 ELECTROCARDIOGRAM REPORT: CPT | Performed by: INTERNAL MEDICINE

## 2023-10-16 PROCEDURE — 94664 DEMO&/EVAL PT USE INHALER: CPT

## 2023-10-16 RX ORDER — FUROSEMIDE 20 MG/1
20 TABLET ORAL DAILY
Status: CANCELLED | OUTPATIENT
Start: 2023-10-16

## 2023-10-16 RX ORDER — PREDNISONE 10 MG/1
10 TABLET ORAL DAILY
Qty: 3 TABLET | Refills: 0 | Status: DISCONTINUED | OUTPATIENT
Start: 2023-10-22 | End: 2023-10-17 | Stop reason: HOSPADM

## 2023-10-16 RX ORDER — PREDNISONE 5 MG/1
5 TABLET ORAL DAILY
Qty: 3 TABLET | Refills: 0 | Status: DISCONTINUED | OUTPATIENT
Start: 2023-10-25 | End: 2023-10-17 | Stop reason: HOSPADM

## 2023-10-16 RX ORDER — PREDNISONE 20 MG/1
20 TABLET ORAL DAILY
Qty: 3 TABLET | Refills: 0 | Status: DISCONTINUED | OUTPATIENT
Start: 2023-10-16 | End: 2023-10-17 | Stop reason: HOSPADM

## 2023-10-16 RX ORDER — ACETAZOLAMIDE 250 MG/1
250 TABLET ORAL 2 TIMES DAILY
Status: DISCONTINUED | OUTPATIENT
Start: 2023-10-16 | End: 2023-10-17 | Stop reason: HOSPADM

## 2023-10-16 RX ADMIN — ALUMINUM HYDROXIDE, MAGNESIUM HYDROXIDE, AND DIMETHICONE 15 ML: 400; 400; 40 SUSPENSION ORAL at 09:26

## 2023-10-16 RX ADMIN — ARFORMOTEROL TARTRATE 15 MCG: 15 SOLUTION RESPIRATORY (INHALATION) at 06:41

## 2023-10-16 RX ADMIN — INSULIN LISPRO 4 UNITS: 100 INJECTION, SOLUTION INTRAVENOUS; SUBCUTANEOUS at 10:35

## 2023-10-16 RX ADMIN — METHYLPREDNISOLONE SODIUM SUCCINATE 40 MG: 40 INJECTION, POWDER, FOR SOLUTION INTRAMUSCULAR; INTRAVENOUS at 08:12

## 2023-10-16 RX ADMIN — METOPROLOL TARTRATE 25 MG: 25 TABLET, FILM COATED ORAL at 08:12

## 2023-10-16 RX ADMIN — LEVOTHYROXINE SODIUM 25 MCG: 25 TABLET ORAL at 05:49

## 2023-10-16 RX ADMIN — INSULIN LISPRO 3 UNITS: 100 INJECTION, SOLUTION INTRAVENOUS; SUBCUTANEOUS at 17:29

## 2023-10-16 RX ADMIN — ACETAZOLAMIDE 250 MG: 250 TABLET ORAL at 20:40

## 2023-10-16 RX ADMIN — BUDESONIDE 0.5 MG: 0.5 SUSPENSION RESPIRATORY (INHALATION) at 18:36

## 2023-10-16 RX ADMIN — PREDNISONE 20 MG: 20 TABLET ORAL at 17:29

## 2023-10-16 RX ADMIN — Medication 10 ML: at 08:13

## 2023-10-16 RX ADMIN — POTASSIUM CHLORIDE 10 MEQ: 1500 TABLET, EXTENDED RELEASE ORAL at 08:12

## 2023-10-16 RX ADMIN — HYDROCODONE BITARTRATE AND ACETAMINOPHEN 1 TABLET: 7.5; 325 TABLET ORAL at 14:49

## 2023-10-16 RX ADMIN — IPRATROPIUM BROMIDE 0.5 MG: 0.5 SOLUTION RESPIRATORY (INHALATION) at 00:23

## 2023-10-16 RX ADMIN — BUDESONIDE 0.5 MG: 0.5 SUSPENSION RESPIRATORY (INHALATION) at 06:41

## 2023-10-16 RX ADMIN — CLOPIDOGREL BISULFATE 75 MG: 75 TABLET, FILM COATED ORAL at 08:12

## 2023-10-16 RX ADMIN — Medication 10 ML: at 08:12

## 2023-10-16 RX ADMIN — ARFORMOTEROL TARTRATE 15 MCG: 15 SOLUTION RESPIRATORY (INHALATION) at 18:36

## 2023-10-16 RX ADMIN — INSULIN LISPRO 4 UNITS: 100 INJECTION, SOLUTION INTRAVENOUS; SUBCUTANEOUS at 20:40

## 2023-10-16 RX ADMIN — Medication 10 ML: at 20:41

## 2023-10-16 RX ADMIN — IPRATROPIUM BROMIDE 0.5 MG: 0.5 SOLUTION RESPIRATORY (INHALATION) at 13:11

## 2023-10-16 RX ADMIN — METOPROLOL TARTRATE 25 MG: 25 TABLET, FILM COATED ORAL at 20:40

## 2023-10-16 RX ADMIN — IPRATROPIUM BROMIDE 0.5 MG: 0.5 SOLUTION RESPIRATORY (INHALATION) at 06:41

## 2023-10-16 RX ADMIN — IPRATROPIUM BROMIDE 0.5 MG: 0.5 SOLUTION RESPIRATORY (INHALATION) at 18:36

## 2023-10-16 RX ADMIN — ALPRAZOLAM 0.5 MG: 0.5 TABLET ORAL at 22:06

## 2023-10-16 RX ADMIN — HEPARIN SODIUM 5000 UNITS: 5000 INJECTION INTRAVENOUS; SUBCUTANEOUS at 20:40

## 2023-10-16 RX ADMIN — ASPIRIN 81 MG: 81 TABLET, COATED ORAL at 08:12

## 2023-10-16 RX ADMIN — HEPARIN SODIUM 5000 UNITS: 5000 INJECTION INTRAVENOUS; SUBCUTANEOUS at 08:12

## 2023-10-16 RX ADMIN — HYDROCODONE BITARTRATE AND ACETAMINOPHEN 1 TABLET: 7.5; 325 TABLET ORAL at 05:49

## 2023-10-16 RX ADMIN — INSULIN LISPRO 3 UNITS: 100 INJECTION, SOLUTION INTRAVENOUS; SUBCUTANEOUS at 08:12

## 2023-10-16 RX ADMIN — HYDROCODONE BITARTRATE AND ACETAMINOPHEN 1 TABLET: 7.5; 325 TABLET ORAL at 20:39

## 2023-10-16 NOTE — NURSING NOTE
Report called to Tara, receiving RN on 3N. Pt updated on transfer. Belongings packed and sent with pt. No questions/concerns at this time.

## 2023-10-16 NOTE — CASE MANAGEMENT/SOCIAL WORK
Continued Stay Note   [place]     Patient Name: Liz Jiang  MRN: [id number]  Today's Date: 10/16/2023    Admit Date: 10/12/2023    Plan: CM received message from  for order to change [employer] settings.  CM phoned patient who states that she does have her machine here at the hospital.  CM faxed and phoned [employer] and spoke with Yesi who states that they will make changes today.   Discharge Plan       Row Name 10/16/23 1550       Plan    Plan CM received message from  for order to change Tr[employer] settings.  CM phoned patient who states that she does have her machine here at the hospital.  CM faxed and phoned [employer] and spoke with Yesi who states that they will make changes today.    Patient/Family in Agreement with Plan yes                   Discharge Codes    No documentation.                 Expected Discharge Date and Time       Expected Discharge Date Expected Discharge Time    Oct 16, 2023               Rachael Schreiber RN

## 2023-10-16 NOTE — DISCHARGE PLACEMENT REQUEST
"Liz Jiang (57 y.o. Female)       Date of Birth   1965    Social Security Number       Address   [address] [address]    Home Phone   [phone]    MRN   [id number]       [facility]    Marital Status                               Admission Date   10/12/23    Admission Type   Emergency    Admitting Provider   Fei Thorpe MD    Attending Provider   Alexx Clemente DO    Department, Room/Bed   [facility] PROGRESS CARE, P206/1P       Discharge Date       Discharge Disposition       Discharge Destination                                 Attending Provider: Alexx Clemente DO    Allergies: Morphine, Roflumilast    Isolation: None   Infection: None   Code Status: CPR    Ht: 162.6 cm (64\")   Wt: 88.4 kg (194 lb 14.2 oz)    Admission Cmt: None   Principal Problem: Acute on chronic respiratory failure with hypoxia and hypercapnia [J96.21,J96.22]                   Active Insurance as of 10/12/2023       Primary Coverage       Payor Plan Insurance Group Employer/Plan Group    MEDICARE MEDICARE A & B        Payor Plan Address Payor Plan Phone Number Payor Plan Fax Number Effective Dates    [address] [phone]  3/1/2018 - None Entered    [facility] [id number]         Subscriber Name Subscriber Birth Date Member ID       LIZ JIANG 1965 [id number]               Secondary Coverage       Payor Plan Insurance Group Employer/Plan Group    [place]        Payor Plan Address Payor Plan Phone Number Payor Plan Fax Number Effective Dates    [address] [phone]  9/4/2018 - None Entered    [facility] [id number]         Subscriber Name Subscriber Birth Date Member ID       LIZ JIANG 1965 [id number]                     Emergency Contacts        (Rel.) Home Phone Work Phone Mobile Phone    OLY OLIVAS (Sister) -- -- [phone]    Pradeep Peterson (Son) [phone] -- " [phone]    adeel haines (Friend) -- -- [phone]          [facility] PROGRESS CARE  [address]  [place] [address]  Phone:  [phone]  Fax:  [phone]        Patient: ROOM: [address]   Liz Jiang MRN:  [id number]   [address] [place] [address]  [place] [address] :  1965  SSN:    Phone: [phone] Sex:  F   PCP: Tabitha Ron                Emergency Contact Information      Name Relation Home Work Mobile     OLY OLIVAS Sister     [phone]     Pradeep Peterson Son [phone]     adeel haines Friend     [phone]          INSURANCE PAYOR PLAN GROUP # SUBSCRIBER ID   Primary:  Secondary:    [place] [id number]  [id number]      [id number]  [id number]   Admitting Diagnosis: Acute on chronic respiratory failure with hypoxia and hypercapnia [J96.21, J96.22]  Order Date:  Oct 16, 2023        Case Management  Consult       (Order ID: [id number])     Diagnosis:         Priority:  Routine Expected Date:   Expiration Date:        Interval:   Count:    Reason for Consult: to adjust trilogy-adjust her trilogy to achieve a tidal volume of 6 mL/kg with PS max of 28, PS minimum of 20.  EPAP max of 10, EPAP minimum of 6, rate of 14        Verbal Order Mode: Telephone with readback   Authorizing Provider: Chris Shi MD  Authorizing Provider's NPI: [id number]     Order Entered By: Rachael Schreiber RN 10/16/2023  3:42 PM     Electronically signed by:         Oxygen Therapy (last 2 days)       Date/Time SpO2 Device (Oxygen Therapy) Flow (L/min) Oxygen Concentration (%) ETCO2 (mmHg)    10/16/23 1444 -- humidified;nasal cannula 3 -- --    10/16/23 1400 96 humidified;nasal cannula 3 -- --    10/16/23 1311 94 nasal cannula;humidified 3 -- --    10/16/23 1300 93 humidified;nasal cannula 3 -- --    10/16/23 1100 97 humidified;nasal cannula 3 -- --    10/16/23 1000 94 -- -- -- --    10/16/23 0900 94 -- -- -- --    10/16/23 0812 94  humidified;nasal cannula 3 -- --    10/16/23 0700 98 humidified;nasal cannula 3 -- --    10/16/23 0656 100 -- -- -- --    10/16/23 0641 93 nasal cannula;humidified 3 -- --    10/16/23 0600 93 -- -- -- --    10/16/23 0500 98 -- -- -- --    10/16/23 0413 99 humidified;nasal cannula 3 -- --    10/16/23 0400 99 -- -- -- --    10/16/23 0300 90 -- -- -- --    10/16/23 0200 90 NPPV/NIV -- -- --    10/16/23 0132 93 NPPV/NIV -- 28 --    10/16/23 0100 90 -- -- -- --    10/16/23 0023 89 NPPV/NIV -- 28 --    10/16/23 0000 90 -- -- -- --    10/15/23 2330 96 NPPV/NIV -- 28 --    10/15/23 2300 95 -- -- -- --    10/15/23 2200 95 -- -- -- --    10/15/23 2100 94 -- -- -- --    10/15/23 2000 93 nasal cannula 3 -- --    10/15/23 1900 96 -- -- -- --    10/15/23 1832 100 -- -- -- --    10/15/23 1831 95 nasal cannula;humidified 3 -- --    10/15/23 1700 90 -- -- -- --    10/15/23 1600 96 -- -- -- --    10/15/23 1500 97 -- -- -- --    10/15/23 1400 95 -- -- -- --    10/15/23 1300 93 -- -- -- --    10/15/23 1224 90 nasal cannula;humidified 3 -- --    10/15/23 1100 95 -- -- -- --    10/15/23 1000 89 -- -- -- --    10/15/23 0900 92 -- -- -- --    10/15/23 0800 97 -- -- -- --    10/15/23 0700 97 nasal cannula 3 -- --    10/15/23 0638 100 -- -- -- --    10/15/23 0615 97 nasal cannula 3 -- --    10/15/23 0600 92 -- -- -- --    10/15/23 0500 94 -- -- -- --    10/15/23 0429 96 NPPV/NIV -- 28 --    10/15/23 0400 92 -- -- -- --    10/15/23 0300 95 -- -- -- --    10/15/23 0239 95 NPPV/NIV -- 28 --    10/15/23 0200 97 -- -- -- --    10/15/23 0133 94 nasal cannula 3 -- --    10/15/23 0100 95 -- -- -- --    10/15/23 0000 96 -- -- -- --    10/14/23 2325 95 NPPV/NIV -- 28 --    10/14/23 2300 93 -- -- -- --    10/14/23 2200 90 -- -- -- --    10/14/23 2100 95 -- -- -- --    10/14/23 2016 -- -- 3 -- --    10/14/23 2000 90 -- -- -- --    10/14/23 1900 96 -- -- -- --    10/14/23 1814 92 nasal cannula 3 -- --    10/14/23 1800 96 -- -- -- --    10/14/23 1700 92  -- -- -- --    10/14/23 1600 77 -- -- -- --    10/14/23 1500 100 -- -- -- --    10/14/23 1300 93 -- -- -- --    10/14/23 1218 98 -- -- -- --    10/14/23 1209 93 nasal cannula 3 -- --    10/14/23 1200 93 -- -- -- --    10/14/23 1100 92 -- -- -- --    10/14/23 1000 90 -- -- -- --    10/14/23 0914 92 -- -- -- --    10/14/23 0900 -- nasal cannula 2 -- --    10/14/23 0626 100 -- -- -- --    10/14/23 0610 95 nasal cannula 3 -- --    10/14/23 0600 96 nasal cannula 3 -- --    10/14/23 0500 97 -- -- -- --    10/14/23 0458 97 nasal cannula 3 -- --    10/14/23 0400 90 -- -- -- --    10/14/23 0324 93 NPPV/NIV -- 28 --    10/14/23 0300 90 -- -- -- --    10/14/23 0210 -- NPPV/NIV -- -- --    10/14/23 0200 97 -- -- -- --    10/14/23 0100 90 -- -- -- --    10/14/23 0054 91 NPPV/NIV -- 28 --    10/14/23 0053 88 NPPV/NIV -- 28 --    10/14/23 0000 91 -- -- -- --          Ventilator/Non-Invasive Ventilation Settings (From admission, onward)       Start     Ordered    10/13/23 0258  NIPPV - Provider Settings  (CPAP / BiPAP)  Until Discontinued        Comments: FiO2 30%   Question Answer Comment   Indication Acute Respiratory Failure    Type BIPAP    IPAP 24    EPAP 8    Breath Rate 26    Titrate Oxygen for SpO2 90% or Greater        10/13/23 0259    10/12/23 1905  NIPPV - Provider Settings  (CPAP / BiPAP)  Until Discontinued,   Status:  Canceled        Comments: Respiratory to manage   Question Answer Comment   Indication Acute Respiratory Failure    Type BIPAP    Titrate Oxygen for SpO2 90 - 95%        10/12/23 1905                     Physician Progress Notes (most recent note)        Alexx Clemente DO at 10/16/23 1542              [facility]     Patient Identification:  Name:  Liz Jiang  Age:  57 y.o.  Sex:  female  :  1965  MRN:  [id number]  Visit Number:  [id number]  Primary Care Provider:  Tabitha Ron APRN    Length of stay:  3    Chief complaint: Shortness of  breath    Subjective:    Patient seen and examined at bedside with no nursing staff present.  Patient reports shortness of breath continues to improve and she is near her baseline functioning status.  Patient was able to ambulate to the bathroom and take a shower yesterday but did become short of breath while doing so.  ----------------------------------------------------------------------------------------------------------------------  [facility] Meds:  acetaZOLAMIDE, 250 mg, Oral, BID  arformoterol, 15 mcg, Nebulization, BID - RT  aspirin, 81 mg, Oral, Daily  budesonide, 0.5 mg, Nebulization, BID - RT  clopidogrel, 75 mg, Oral, Daily  heparin (porcine), 5,000 Units, Subcutaneous, Q12H  insulin lispro, 2-7 Units, Subcutaneous, 4x Daily AC & at Bedtime  ipratropium, 0.5 mg, Nebulization, Q6H - RT  levothyroxine, 25 mcg, Oral, Q AM  methylPREDNISolone sodium succinate, 40 mg, Intravenous, Q12H  metoprolol tartrate, 25 mg, Oral, BID  potassium chloride, 10 mEq, Oral, Daily  senna-docusate sodium, 2 tablet, Oral, BID  sodium chloride, 10 mL, Intravenous, Q12H  sodium chloride, 10 mL, Intravenous, Q12H           ----------------------------------------------------------------------------------------------------------------------  Vital Signs:  Temp:  [97.5 °F (36.4 °C)-98.6 °F (37 °C)] 97.5 °F (36.4 °C)  Heart Rate:  [] 92  Resp:  [14-29] 20  BP: (120-153)/() 145/106      10/14/23  0400 10/15/23  0400 10/16/23  0400   Weight: 85 kg (187 lb 6.3 oz) 85.2 kg (187 lb 13.3 oz) 88.4 kg (194 lb 14.2 oz)     Body mass index is 33.45 kg/m².    Intake/Output Summary (Last 24 hours) at 10/16/2023 1542  Last data filed at 10/16/2023 1417  Gross per 24 hour   Intake 1080 ml   Output 2250 ml   Net -1170 ml     Diet: Diabetic Diets; Consistent Carbohydrate; Texture: Regular Texture (IDDSI 7); Fluid Consistency: Thin (IDDSI  0)  ----------------------------------------------------------------------------------------------------------------------  Physical exam:  Constitutional: Chronically ill-appearing  female in no apparent distress.  HENT:  Head:  Normocephalic and atraumatic.  Mouth:  Moist mucous membranes.    Eyes:  Conjunctivae and EOM are normal.  Pupils are equal, round, and reactive to light.  No scleral icterus.    Neck:  Neck supple. No thyromegaly.  No JVD present.    Cardiovascular:  Regular rate and rhythm with no murmurs, rubs, clicks or gallops appreciated.  Pulmonary/Chest: Faint end expiratory wheezing with no crackles or rhonchi appreciated  Abdominal:  Soft. Nontender. Nondistended  Bowel sounds are normal in all four quadrants. No organomegally appreciated.   Musculoskeletal:   No edema, no tenderness, and no deformity.  No red or swollen joints anywhere.    Neurological:  Alert, Cranial nerves II-XII intact with no focal deficits.  No facial droop.  No slurred speech.   Skin:  Warm and dry to palpation with no rashes or lesions appreciated.  Peripheral vascular:  2+ radial and pedal pulses in bilateral upper and lower extremities.  Psychiatric:  Alert and oriented x3, demonstrates appropriate judgment and insight.  --------------------------------------------------------------------------------------------------------------  ----------------------------------------------------------------------------------------------------------------------  Results from last 7 days   Lab Units 10/12/23  1908 10/12/23  1621   HSTROP T ng/L 37* 48*     Results from last 7 days   Lab Units 10/15/23  0052 10/14/23  0000 10/13/23  0721 10/12/23  1908 10/12/23  1648 10/12/23  1621   CRP mg/dL  --   --   --   --   --  1.59*   LACTATE mmol/L  --   --   --  1.4 2.9*  --    WBC 10*3/mm3 12.64* 10.10 5.81  --   --  9.57   HEMOGLOBIN g/dL 12.5 13.0 14.2  --   --  13.6   HEMATOCRIT % 43.6 44.0 46.3  --   --  46.3   MCV fL 98.6* 95.4  "92.4  --   --  95.3   MCHC g/dL 28.7* 29.5* 30.7*  --   --  29.4*   PLATELETS 10*3/mm3 196 218 211  --   --  198   INR   --   --   --   --   --  0.95     Results from last 7 days   Lab Units 10/14/23  0457   PH, ARTERIAL pH units 7.446   PO2 ART mm Hg 62.6*   PCO2, ARTERIAL mm Hg 69.2*   HCO3 ART mmol/L 47.5*     Results from last 7 days   Lab Units 10/15/23  0052 10/14/23  0000 10/13/23  0721 10/12/23  1908 10/12/23  1621   SODIUM mmol/L 141 136 135*  --  139   POTASSIUM mmol/L 5.0 4.7 3.5  --  3.1*   MAGNESIUM mg/dL  --   --   --  1.8  --    CHLORIDE mmol/L 100 91* 83*  --  81*   CO2 mmol/L 36.3* 39.2* 44.2*  --  45.5*   BUN mg/dL 25* 25* 12  --  11   CREATININE mg/dL 0.41* 0.63 0.40*  --  0.44*   CALCIUM mg/dL 9.2 9.4 9.7  --  9.4   GLUCOSE mg/dL 207* 283* 194*  --  192*   ALBUMIN g/dL  --   --  3.9  --  3.9   BILIRUBIN mg/dL  --   --  0.8  --  0.8   ALK PHOS U/L  --   --  75  --  77   AST (SGOT) U/L  --   --  16  --  21   ALT (SGPT) U/L  --   --  20  --  25   Estimated Creatinine Clearance: 161 mL/min (A) (by C-G formula based on SCr of 0.41 mg/dL (L)).    No results found for: \"AMMONIA\"      Blood Culture   Date Value Ref Range Status   10/12/2023 No growth at 2 days  Preliminary   10/12/2023 No growth at 2 days  Preliminary     No results found for: \"URINECX\"  No results found for: \"WOUNDCX\"  No results found for: \"STOOLCX\"    I have personally looked at the labs and they are summarized above.  ----------------------------------------------------------------------------------------------------------------------  Imaging Results (Last 24 Hours)       ** No results found for the last 24 hours. **          ----------------------------------------------------------------------------------------------------------------------  Assessment and Plan:    Acute on chronic hypoxic respiratory failure -patient has returned to baseline 2 L nasal cannula, acute phase appears resolved.    2.  COPD exacerbation -we will change " Solu-Medrol to prednisone taper.  Continue current nebulizer treatments.  Appreciate pulmonology recommendations, will start Diamox today.    3.  Type II NSTEMI -patient with negative delta on high-sensitivity troponin, no need for further evaluation at this time.    4.  Type 2 diabetes mellitus -continue Accu-Cheks before every meal and nightly with sliding scale insulin    5.  Essential hypertension -well-controlled    Disposition possible discharge in the next 24 to 48 hours    Alexx Clemente DO   10/16/23   15:42 EDT       Electronically signed by Alexx Clemente DO at 10/16/23 1544            Chris Shi MD   Physician  Pulmonology     Progress Notes      Signed     Date of Service: 10/16/23 1338  Creation Time: 10/16/23 1338     Signed       Expand All Collapse All    Progress Note Pulmonary           Subjective   no new complaints     Interval History: Oxygen requirement down to 3 L.  Patient feels better.  Able to tolerate hospital BiPAP at a setting of 22/8 with a backup rate of 24.                 Review of Systems:               Reviewed ; unchanged         Vital Signs  Temp:  [97.5 °F (36.4 °C)-98.6 °F (37 °C)] 97.5 °F (36.4 °C)  Heart Rate:  [] 78  Resp:  [14-29] 16  BP: (116-153)/() 130/91  Body mass index is 33.45 kg/m².     Intake/Output Summary (Last 24 hours) at 10/16/2023 1338  Last data filed at 10/16/2023 1328      Gross per 24 hour   Intake 840 ml   Output 2250 ml   Net -1410 ml      I/O this shift:  In: 840 [P.O.:840]  Out: 1350 [Urine:1350]     Physical Exam:  General- normal in appearance, not in any acute distress     HEENT- pupils equally reactive to light, normal in size, no scleral icterus     Neck- supple     No JVD, no carotid bruit     Respiratory-bilateral air entry, distant breath sound, occasional wheezing only on forced exhalation otherwise clear to auscultation  Cardiovascular-  Normal S1 and S2. No S3, S4 or murmurs.    GI-nontender nondistended  bowel sounds positive     CNS-alert oriented x3, grossly nonfocal     Extremities- pulses normal bilaterally , no clubbing and trace edema                   Results Review:                        Results from last 7 days   Lab Units 10/15/23  0052 10/14/23  0000 10/13/23  0721   WBC 10*3/mm3 12.64* 10.10 5.81   HEMOGLOBIN g/dL 12.5 13.0 14.2   PLATELETS 10*3/mm3 196 218 211              Results from last 7 days   Lab Units 10/15/23  0052 10/14/23  0000 10/13/23  0721 10/12/23  1908   SODIUM mmol/L 141 136 135*  --    POTASSIUM mmol/L 5.0 4.7 3.5  --    CHLORIDE mmol/L 100 91* 83*  --    CO2 mmol/L 36.3* 39.2* 44.2*  --    BUN mg/dL 25* 25* 12  --    CREATININE mg/dL 0.41* 0.63 0.40*  --    CALCIUM mg/dL 9.2 9.4 9.7  --    GLUCOSE mg/dL 207* 283* 194*  --    MAGNESIUM mg/dL  --   --   --  1.8            Lab Results   Component Value Date     INR 0.95 10/12/2023     INR 0.90 03/27/2017     PROTIME 13.1 10/12/2023     PROTIME 9.9 03/27/2017            Results from last 7 days   Lab Units 10/13/23  0721 10/12/23  1621   ALK PHOS U/L 75 77   BILIRUBIN mg/dL 0.8 0.8   ALT (SGPT) U/L 20 25   AST (SGOT) U/L 16 21           Results from last 7 days   Lab Units 10/14/23  0457   PH, ARTERIAL pH units 7.446   PO2 ART mm Hg 62.6*   PCO2, ARTERIAL mm Hg 69.2*   HCO3 ART mmol/L 47.5*      Imaging Results (Last 24 Hours)         ** No results found for the last 24 hours. **                      acetaZOLAMIDE, 250 mg, Oral, BID  arformoterol, 15 mcg, Nebulization, BID - RT  aspirin, 81 mg, Oral, Daily  budesonide, 0.5 mg, Nebulization, BID - RT  clopidogrel, 75 mg, Oral, Daily  heparin (porcine), 5,000 Units, Subcutaneous, Q12H  insulin lispro, 2-7 Units, Subcutaneous, 4x Daily AC & at Bedtime  ipratropium, 0.5 mg, Nebulization, Q6H - RT  levothyroxine, 25 mcg, Oral, Q AM  methylPREDNISolone sodium succinate, 40 mg, Intravenous, Q12H  metoprolol tartrate, 25 mg, Oral, BID  potassium chloride, 10 mEq, Oral, Daily  senna-docusate  sodium, 2 tablet, Oral, BID  sodium chloride, 10 mL, Intravenous, Q12H  sodium chloride, 10 mL, Intravenous, Q12H           Medication Review:            Assessment & Plan  #chronic hypercapnic hypoxic respiratory failure.  Admitted with COPD exacerbation.  Found to have worsening hypoxia.    Her gas exchange has improved.  Doing well on present BiPAP setting.  I have requested  to call DME to adjust her trilogy to achieve a tidal volume of 6 mL/kg with PS max of 28, PS minimum of 20.  EPAP max of 10, EPAP minimum of 6, rate of 14.     Noted post hypercapnic metabolic alkalosis.  We will try few doses of Diamox.     Diabetes mellitus-continue to monitor blood sugars target 1 40-1 80.     Anxiety-continue current medications.  Medication list reviewed.        DVT prophylaxis-                 Chris Shi MD  10/16/23  13:38 EDT

## 2023-10-16 NOTE — PROGRESS NOTES
Referring Provider: hospitalist  Reason for Consultation: copd exacerbation      Chief complaint - SOB      Sub- Overnight events reviewed   All the labs meds ins and out and vitals reviewed   Still c/o sob  Used bipap last night   Review of Systems  Positive for SOB otherwise negative        History  Past Medical History:   Diagnosis Date    Acid reflux disease     Anal cancer 12/5/2019    Arthritis     Asthma, extrinsic     Cholelithiasis     Chronic headaches     COPD (chronic obstructive pulmonary disease)     CVA (cerebral vascular accident)     w/ right sided deficit    CVA (cerebral vascular accident)     Diabetes mellitus     only has high blood sugar when on steriods    History of radiation therapy 02/27/2020    Whole pelvis/anal mass    Obstructive sleep apnea treated with BiPAP     On home oxygen therapy     2L     Sleep apnea, obstructive    ,   Past Surgical History:   Procedure Laterality Date    ABDOMINAL SURGERY      CARDIAC CATHETERIZATION      CAROTID ENDARTERECTOMY Left 2018    CHOLECYSTECTOMY WITH INTRAOPERATIVE CHOLANGIOGRAM N/A 1/30/2017    Procedure: CHOLECYSTECTOMY LAPAROSCOPIC INTRAOPERATIVE CHOLANGIOGRAM;  Surgeon: Carolin Marie MD;  Location: [facility];  Service:     COLONOSCOPY      HYSTERECTOMY      for heavy bleeding/ complete    KIDNEY STONE SURGERY      TUBAL ABDOMINAL LIGATION      VENOUS ACCESS DEVICE (PORT) INSERTION Left 12/17/2019    Procedure: INSERTION VENOUS ACCESS DEVICE;  Surgeon: Carolin Marie MD;  Location: [facility];  Service: General   ,   Family History   Problem Relation Age of Onset    Diabetes Mother     Hypertension Mother     Arrhythmia Mother     Pneumonia Father     Coronary artery disease Other     Arrhythmia Sister     Heart attack Brother     Lymphoma Brother         hodgkin's     Other Brother         CABG    Breast cancer Neg Hx    ,   Social History     Tobacco Use    Smoking status: Former     Packs/day: 1.50     Years: 30.00     Additional  pack years: 0.00     Total pack years: 45.00     Types: Electronic Cigarette, Cigarettes     Quit date:      Years since quittin.7    Smokeless tobacco: Never   Vaping Use    Vaping Use: Unknown   Substance Use Topics    Alcohol use: No    Drug use: Defer   ,   Medications Prior to Admission   Medication Sig Dispense Refill Last Dose    albuterol sulfate  (90 Base) MCG/ACT inhaler Inhale 2 puffs Every 6 (Six) Hours As Needed for Wheezing or Shortness of Air. 18 g 0 Past Week    ALPRAZolam (XANAX) 1 MG tablet Take ½ tablet by mouth 2 (Two) Times a Day As Needed for Anxiety or Sleep. 3 tablet 0 10/12/2023    ASPIRIN LOW DOSE 81 MG EC tablet Take 1 tablet by mouth Daily.  3 10/12/2023    clopidogrel (PLAVIX) 75 MG tablet Take 1 tablet by mouth Daily.  3 10/12/2023    Fluticasone-Umeclidin-Vilant 200-62.5-25 MCG/ACT aerosol powder  Inhale 1 puff Daily.   10/12/2023    furosemide (LASIX) 20 MG tablet Take 1 tablet by mouth Daily.   10/12/2023    guaiFENesin (MUCINEX) 600 MG 12 hr tablet Take 2 tablets by mouth 2 (Two) Times a Day As Needed for Cough or Congestion.   Past Week    HYDROcodone-acetaminophen (NORCO)  MG per tablet Take 1 tablet by mouth Every 6 (Six) Hours.   10/12/2023    ipratropium-albuterol (COMBIVENT RESPIMAT)  MCG/ACT inhaler Inhale 1 puff 4 (Four) Times a Day As Needed for Wheezing or Shortness of Air.   10/12/2023    levothyroxine (SYNTHROID, LEVOTHROID) 25 MCG tablet Take 1 tablet by mouth Every Morning.   10/12/2023    metoprolol tartrate (LOPRESSOR) 25 MG tablet Take 1 tablet by mouth 2 (Two) Times a Day.   10/12/2023    ondansetron ODT (ZOFRAN-ODT) 4 MG disintegrating tablet Place 1 tablet on the tongue Every 8 (Eight) Hours As Needed for Nausea or Vomiting.   Past Week    polyethylene glycol (MIRALAX) 17 g packet Take 17 g by mouth Daily As Needed (constipation).   Past Week    potassium chloride (MICRO-K) 10 MEQ CR capsule Take 1 capsule by mouth Daily.   10/12/2023     naloxone (NARCAN) 4 MG/0.1ML nasal spray Call 911. Don't prime. [place] in 1 nostril for overdose. Repeat in 2-3 minutes in other nostril if no or minimal breathing/responsiveness. 2 each 0 Unknown   , Scheduled Meds:  arformoterol, 15 mcg, Nebulization, BID - RT  aspirin, 81 mg, Oral, Daily  budesonide, 0.5 mg, Nebulization, BID - RT  clopidogrel, 75 mg, Oral, Daily  heparin (porcine), 5,000 Units, Subcutaneous, Q12H  insulin lispro, 2-7 Units, Subcutaneous, 4x Daily AC & at Bedtime  ipratropium, 0.5 mg, Nebulization, Q6H - RT  levothyroxine, 25 mcg, Oral, Q AM  methylPREDNISolone sodium succinate, 40 mg, Intravenous, Q12H  metoprolol tartrate, 25 mg, Oral, BID  potassium chloride, 10 mEq, Oral, Daily  senna-docusate sodium, 2 tablet, Oral, BID  sodium chloride, 10 mL, Intravenous, Q12H  sodium chloride, 10 mL, Intravenous, Q12H    , Continuous Infusions:    and Allergies:  Morphine and Roflumilast    Objective     Vital Signs   Temp:  [96.1 °F (35.6 °C)-98.6 °F (37 °C)] 98 °F (36.7 °C)  Heart Rate:  [] 81  Resp:  [13-31] 20  BP: (116-153)/() 145/98    Physical Exam:             General-chronically ill in appearance, not in any acute distress    HEENT- pupils equally reactive to light, normal in size, no scleral icterus    Neck-supple    Respiratory-resting, not in any respiratory distress, on nasal cannula oxygen    Cardiovascular-NSR  GI-grossly normal    CNS-alert awake     Musculoskeletal -no edema  Extremities- no obvious deformity noticed     Psychiatric- alert awake oriented  Skin- no visible rash                                                                   Results Review:    LABS:    Lab Results   Component Value Date    GLUCOSE 207 (H) 10/15/2023    BUN 25 (H) 10/15/2023    CREATININE 0.41 (L) 10/15/2023    EGFRIFNONA >150 02/23/2022    BCR 61.0 (H) 10/15/2023    CO2 36.3 (H) 10/15/2023    CALCIUM 9.2 10/15/2023    ALBUMIN 3.9 10/13/2023    LABIL2 1.5 02/11/2014    AST 16 10/13/2023     ALT 20 10/13/2023    WBC 12.64 (H) 10/15/2023    HGB 12.5 10/15/2023    HCT 43.6 10/15/2023    MCV 98.6 (H) 10/15/2023     10/15/2023     10/15/2023    K 5.0 10/15/2023     10/15/2023    ANIONGAP 4.7 (L) 10/15/2023       Lab Results   Component Value Date    INR 0.95 10/12/2023    INR 0.90 03/27/2017    PROTIME 13.1 10/12/2023    PROTIME 9.9 03/27/2017       Results from last 7 days   Lab Units 10/12/23  1621   INR  0.95   APTT seconds 25.3*          I reviewed the patient's new clinical results.  I reviewed the patient's new imaging results and agree with the interpretation.    Microbiology Results (last 10 days)       Procedure Component Value - Date/Time    Respiratory Panel PCR w/COVID-19(SARS-CoV-2) ANNABEL/MAURY/JULES/PAD/COR/MAD/MELA In-House, NP Swab in UTM/VTM, 3-4 HR TAT - Swab, Nasopharynx [[id number]]  (Normal) Collected: 10/12/23 2334    Lab Status: Final result Specimen: Swab from Nasopharynx Updated: 10/13/23 0034     ADENOVIRUS, PCR Not Detected     Coronavirus 229E Not Detected     Coronavirus HKU1 Not Detected     Coronavirus NL63 Not Detected     Coronavirus OC43 Not Detected     COVID19 Not Detected     Human Metapneumovirus Not Detected     Human Rhinovirus/Enterovirus Not Detected     Influenza A PCR Not Detected     Influenza B PCR Not Detected     Parainfluenza Virus 1 Not Detected     Parainfluenza Virus 2 Not Detected     Parainfluenza Virus 3 Not Detected     Parainfluenza Virus 4 Not Detected     RSV, PCR Not Detected     Bordetella pertussis pcr Not Detected     Bordetella parapertussis PCR Not Detected     Chlamydophila pneumoniae PCR Not Detected     Mycoplasma pneumo by PCR Not Detected    Narrative:      In the setting of a positive respiratory panel with a viral infection PLUS a negative procalcitonin without other underlying concern for bacterial infection, consider observing off antibiotics or discontinuation of antibiotics and continue supportive care. If the respiratory  panel is positive for atypical bacterial infection (Bordetella pertussis, Chlamydophila pneumoniae, or Mycoplasma pneumoniae), consider antibiotic de-escalation to target atypical bacterial infection.    Blood Culture - Blood, Arm, Right [[id number]]  (Normal) Collected: 10/12/23 1648    Lab Status: Preliminary result Specimen: Blood from Arm, Right Updated: 10/15/23 1700     Blood Culture No growth at 3 days    Blood Culture - Blood, Wrist, Left [[id number]]  (Normal) Collected: 10/12/23 1648    Lab Status: Preliminary result Specimen: Blood from Wrist, Left Updated: 10/15/23 1700     Blood Culture No growth at 3 days    COVID-19 and FLU A/B PCR - Swab, Nasopharynx [[id number]]  (Normal) Collected: 10/12/23 1622    Lab Status: Final result Specimen: Swab from Nasopharynx Updated: 10/12/23 1652     COVID19 Not Detected     Influenza A PCR Not Detected     Influenza B PCR Not Detected    Narrative:      Fact sheet for providers: https://www.fda.gov/media/357302/download    Fact sheet for patients: https://www.fda.gov/media/091680/download    Test performed by PCR.           Site   Date Value Ref Range Status   10/14/2023 Right Radial  Final     William's Test   Date Value Ref Range Status   10/14/2023 Positive  Final     pH, Arterial   Date Value Ref Range Status   10/14/2023 7.446 7.350 - 7.450 pH units Final     pCO2, Arterial   Date Value Ref Range Status   10/14/2023 69.2 (C) 35.0 - 45.0 mm Hg Final     Comment:     86 Value above critical limit     pO2, Arterial   Date Value Ref Range Status   10/14/2023 62.6 (L) 83.0 - 108.0 mm Hg Final     Comment:     84 Value below reference range     HCO3, Arterial   Date Value Ref Range Status   10/14/2023 47.5 (H) 20.0 - 26.0 mmol/L Final     Comment:     83 Value above reference range     Base Excess, Arterial   Date Value Ref Range Status   10/14/2023 19.6 (H) 0.0 - 2.0 mmol/L Final     O2 Saturation, Arterial   Date Value Ref Range Status   10/14/2023 92.1 (L) 94.0 - 99.0 %  Final     Comment:     84 Value below reference range     Hemoglobin, Blood Gas   Date Value Ref Range Status   10/14/2023 13.1 (L) 13.5 - 17.5 g/dL Final     Comment:     84 Value below reference range     Hematocrit, Blood Gas   Date Value Ref Range Status   10/14/2023 40.1 38.0 - 51.0 % Final     Oxyhemoglobin   Date Value Ref Range Status   10/14/2023 90.4 (L) 94 - 99 % Final     Comment:     84 Value below reference range     Methemoglobin   Date Value Ref Range Status   10/14/2023 <-0.10 (L) 0.00 - 3.00 % Final     Comment:     94 Value below reportable range < _0.1     Carboxyhemoglobin   Date Value Ref Range Status   10/14/2023 2.1 0 - 5 % Final     CO2 Content   Date Value Ref Range Status   10/14/2023 49.7 (H) 22 - 33 mmol/L Final     Barometric Pressure for Blood Gas   Date Value Ref Range Status   10/14/2023 720 mmHg Final     Modality   Date Value Ref Range Status   10/14/2023 BiPap  Final     FIO2   Date Value Ref Range Status   10/14/2023 28 % Final     CT Angiogram Chest Pulmonary Embolism    Result Date: 10/12/2023  Impression: Impression:  No acute process in the chest.  This report was finalized on 10/12/2023 10:04 PM by Carli Lopez MD.      XR Chest AP    Result Date: 10/12/2023  Impression:   Persistent interstitial thickening and mild vascular congestion likely CHF superimposed on COPD. No interval worsening since the prior exam.   This report was finalized on 10/12/2023 4:57 PM by Dr. Dionte Hodgson MD.      XR Chest AP    Result Date: 9/27/2023  Impression: 1.  Persistent but slightly improved changes of CHF/edema. 2.  No focal airspace disease identified.   This report was finalized on 9/27/2023 9:47 AM by Dr. Dionte Hodgson MD.      XR Chest 1 View    Result Date: 9/24/2023  Impression: 1.  Mild radiographic improvement in changes of CHF/edema. 2.  Otherwise stable chest.   This report was finalized on 9/24/2023 11:26 AM by Dr. Dionte Hodgson MD.      XR Chest 1 View    Result Date:  9/21/2023  Impression:  1.  Central pulmonary vascular congestion with edema. 2.  Hypoinflated lungs. 3.  No pleural effusion or pneumothorax. 4.  Normal heart size. 5.  Left anterior chest port with catheter tip to the SVC.   This report was finalized on 9/21/2023 11:40 PM by Rashaad Burnett MD.      Assessment & Plan   COPD exacerbation-  Latest BiPAP settings and graphics reviewed.  Continue current settings.    ABG shows improvement in pco2- to 69  Ph is alkalotic - 7.446  Cont inhalers  Latest Chest x-ray and blood gases reviewed.  Chest x-ray does not show any major infiltrate- d/w patient     Continue current doses of steroids  Continue nebs.  Continue BiPAP overnight and on as needed basis in the day.  Encouraged her to use 6-8 hours each night    Diuretics to improve lung compliance.  I/o reviewed  Dced saline       Diabetes mellitus-continue to monitor blood sugars target 1 40-1 80.    Anxiety-continue current medications.  Medication list reviewed.      DVT prophylaxis-    Bedside rounds were done with RT and patient's nurse. All the lab and clinical findings were discussed with them and plan was also discussed in great detail.    Family member present- son        Echo-  Results for orders placed during the hospital encounter of 10/12/23    Adult Transthoracic Echo Complete W/ Cont if Necessary Per Protocol    Interpretation Summary    Left ventricular systolic function is normal. Left ventricular ejection fraction appears to be 66 - 70%.    Left ventricular diastolic function is consistent with (grade I) impaired relaxation.    Estimated right ventricular systolic pressure from tricuspid regurgitation is normal (<35 mmHg).             Acute on chronic respiratory failure with hypoxia and hypercapnia    Case d/w nurse and team   This service is provided via audio video tele medicine   I am in [place] and patient is in giuliana Hope MD  10/16/23  07:33 EDT

## 2023-10-16 NOTE — PROGRESS NOTES
Progress Note Pulmonary      Subjective no new complaints    Interval History: Oxygen requirement down to 3 L.  Patient feels better.  Able to tolerate hospital BiPAP at a setting of 22/8 with a backup rate of 24.            Review of Systems:    Reviewed ; unchanged       Vital Signs  Temp:  [97.5 °F (36.4 °C)-98.6 °F (37 °C)] 97.5 °F (36.4 °C)  Heart Rate:  [] 78  Resp:  [14-29] 16  BP: (116-153)/() 130/91  Body mass index is 33.45 kg/m².    Intake/Output Summary (Last 24 hours) at 10/16/2023 1338  Last data filed at 10/16/2023 1328  Gross per 24 hour   Intake 840 ml   Output 2250 ml   Net -1410 ml     I/O this shift:  In: 840 [P.O.:840]  Out: 1350 [Urine:1350]    Physical Exam:  General- normal in appearance, not in any acute distress    HEENT- pupils equally reactive to light, normal in size, no scleral icterus    Neck- supple    No JVD, no carotid bruit    Respiratory-bilateral air entry, distant breath sound, occasional wheezing only on forced exhalation otherwise clear to auscultation  Cardiovascular-  Normal S1 and S2. No S3, S4 or murmurs.    GI-nontender nondistended bowel sounds positive    CNS-alert oriented x3, grossly nonfocal    Extremities- pulses normal bilaterally , no clubbing and trace edema        Results Review:      Results from last 7 days   Lab Units 10/15/23  0052 10/14/23  0000 10/13/23  0721   WBC 10*3/mm3 12.64* 10.10 5.81   HEMOGLOBIN g/dL 12.5 13.0 14.2   PLATELETS 10*3/mm3 196 218 211     Results from last 7 days   Lab Units 10/15/23  0052 10/14/23  0000 10/13/23  0721 10/12/23  1908   SODIUM mmol/L 141 136 135*  --    POTASSIUM mmol/L 5.0 4.7 3.5  --    CHLORIDE mmol/L 100 91* 83*  --    CO2 mmol/L 36.3* 39.2* 44.2*  --    BUN mg/dL 25* 25* 12  --    CREATININE mg/dL 0.41* 0.63 0.40*  --    CALCIUM mg/dL 9.2 9.4 9.7  --    GLUCOSE mg/dL 207* 283* 194*  --    MAGNESIUM mg/dL  --   --   --  1.8     Lab Results   Component Value Date    INR 0.95 10/12/2023    INR 0.90  03/27/2017    PROTIME 13.1 10/12/2023    PROTIME 9.9 03/27/2017     Results from last 7 days   Lab Units 10/13/23  0721 10/12/23  1621   ALK PHOS U/L 75 77   BILIRUBIN mg/dL 0.8 0.8   ALT (SGPT) U/L 20 25   AST (SGOT) U/L 16 21     Results from last 7 days   Lab Units 10/14/23  0457   PH, ARTERIAL pH units 7.446   PO2 ART mm Hg 62.6*   PCO2, ARTERIAL mm Hg 69.2*   HCO3 ART mmol/L 47.5*     Imaging Results (Last 24 Hours)       ** No results found for the last 24 hours. **                 acetaZOLAMIDE, 250 mg, Oral, BID  arformoterol, 15 mcg, Nebulization, BID - RT  aspirin, 81 mg, Oral, Daily  budesonide, 0.5 mg, Nebulization, BID - RT  clopidogrel, 75 mg, Oral, Daily  heparin (porcine), 5,000 Units, Subcutaneous, Q12H  insulin lispro, 2-7 Units, Subcutaneous, 4x Daily AC & at Bedtime  ipratropium, 0.5 mg, Nebulization, Q6H - RT  levothyroxine, 25 mcg, Oral, Q AM  methylPREDNISolone sodium succinate, 40 mg, Intravenous, Q12H  metoprolol tartrate, 25 mg, Oral, BID  potassium chloride, 10 mEq, Oral, Daily  senna-docusate sodium, 2 tablet, Oral, BID  sodium chloride, 10 mL, Intravenous, Q12H  sodium chloride, 10 mL, Intravenous, Q12H           Medication Review:     Assessment & Plan     #chronic hypercapnic hypoxic respiratory failure.  Admitted with COPD exacerbation.  Found to have worsening hypoxia.    Her gas exchange has improved.  Doing well on present BiPAP setting.  I have requested  to call DME to adjust her trilogy to achieve a tidal volume of 6 mL/kg with PS max of 28, PS minimum of 20.  EPAP max of 10, EPAP minimum of 6, rate of 14.    Noted post hypercapnic metabolic alkalosis.  We will try few doses of Diamox.     Diabetes mellitus-continue to monitor blood sugars target 1 40-1 80.     Anxiety-continue current medications.  Medication list reviewed.        DVT prophylaxis-             Chris Shi MD  10/16/23  13:38 EDT

## 2023-10-16 NOTE — CASE MANAGEMENT/SOCIAL WORK
Discharge Planning Assessment  [facility]     Patient Name: Liz Jiang  MRN: [id number]  Today's Date: 10/16/2023    Admit Date: 10/12/2023     Discharge Plan       Row Name 10/16/23 1557       Plan    Plan SS spoke with pt at bedside. Pt states she lives alone and plans to return home at discharge. Pt utilizes [facility] for nursing and PT/OT. [facility] will need new order at discharge. Pt PCP Tabitha Ron. Pt utilizes Hospital bed, O2@4L, Trilogy-via [facility], nebulizer, shower chair, BSC, pulse ox, b/p cuff, rollator-via [facility]. Pt does not have POA/living will. Pt family to provide transportation at d/c. Pt states expected d/c date is tomorrow 10/17/23. SS to follow and assist with discharge planning.        TONY MarieeW

## 2023-10-16 NOTE — PLAN OF CARE
Goal Outcome Evaluation:              Outcome Evaluation: Pt A/Ox4. 3L NC, Bipap worn intermittently through shift. NSR with BBB on monitor. PRN Xanax and Norco given. Afebrile. Adequate UOP. VSS. No complaints throughout shift. Bed in lowest position with wheels locked. Bed alarm set and call light within reach. Plan of care continued.

## 2023-10-16 NOTE — PLAN OF CARE
Problem: Adult Inpatient Plan of Care  Goal: Plan of Care Review  Outcome: Ongoing, Progressing   Pt anxious throughout day. Poss d/c tomorrow. Med surg orders now. Family at bedside. VSS on 3LNC. Updated on plan of care. No questions/concerns at this time.

## 2023-10-16 NOTE — PROGRESS NOTES
[facility]     Patient Identification:  Name:  Liz Jiang  Age:  57 y.o.  Sex:  female  :  1965  MRN:  [id number]  Visit Number:  [id number]  Primary Care Provider:  Tabitha Ron APRN    Length of stay:  3    Chief complaint: Shortness of breath    Subjective:    Patient seen and examined at bedside with no nursing staff present.  Patient reports shortness of breath continues to improve and she is near her baseline functioning status.  Patient was able to ambulate to the bathroom and take a shower yesterday but did become short of breath while doing so.  ----------------------------------------------------------------------------------------------------------------------  [facility] Meds:  acetaZOLAMIDE, 250 mg, Oral, BID  arformoterol, 15 mcg, Nebulization, BID - RT  aspirin, 81 mg, Oral, Daily  budesonide, 0.5 mg, Nebulization, BID - RT  clopidogrel, 75 mg, Oral, Daily  heparin (porcine), 5,000 Units, Subcutaneous, Q12H  insulin lispro, 2-7 Units, Subcutaneous, 4x Daily AC & at Bedtime  ipratropium, 0.5 mg, Nebulization, Q6H - RT  levothyroxine, 25 mcg, Oral, Q AM  methylPREDNISolone sodium succinate, 40 mg, Intravenous, Q12H  metoprolol tartrate, 25 mg, Oral, BID  potassium chloride, 10 mEq, Oral, Daily  senna-docusate sodium, 2 tablet, Oral, BID  sodium chloride, 10 mL, Intravenous, Q12H  sodium chloride, 10 mL, Intravenous, Q12H           ----------------------------------------------------------------------------------------------------------------------  Vital Signs:  Temp:  [97.5 °F (36.4 °C)-98.6 °F (37 °C)] 97.5 °F (36.4 °C)  Heart Rate:  [] 92  Resp:  [14-29] 20  BP: (120-153)/() 145/106      10/14/23  0400 10/15/23  0400 10/16/23  0400   Weight: 85 kg (187 lb 6.3 oz) 85.2 kg (187 lb 13.3 oz) 88.4 kg (194 lb 14.2 oz)     Body mass index is 33.45 kg/m².    Intake/Output Summary (Last 24 hours) at 10/16/2023 6931  Last data filed  at 10/16/2023 1417  Gross per 24 hour   Intake 1080 ml   Output 2250 ml   Net -1170 ml     Diet: Diabetic Diets; Consistent Carbohydrate; Texture: Regular Texture (IDDSI 7); Fluid Consistency: Thin (IDDSI 0)  ----------------------------------------------------------------------------------------------------------------------  Physical exam:  Constitutional: Chronically ill-appearing  female in no apparent distress.  HENT:  Head:  Normocephalic and atraumatic.  Mouth:  Moist mucous membranes.    Eyes:  Conjunctivae and EOM are normal.  Pupils are equal, round, and reactive to light.  No scleral icterus.    Neck:  Neck supple. No thyromegaly.  No JVD present.    Cardiovascular:  Regular rate and rhythm with no murmurs, rubs, clicks or gallops appreciated.  Pulmonary/Chest: Faint end expiratory wheezing with no crackles or rhonchi appreciated  Abdominal:  Soft. Nontender. Nondistended  Bowel sounds are normal in all four quadrants. No organomegally appreciated.   Musculoskeletal:   No edema, no tenderness, and no deformity.  No red or swollen joints anywhere.    Neurological:  Alert, Cranial nerves II-XII intact with no focal deficits.  No facial droop.  No slurred speech.   Skin:  Warm and dry to palpation with no rashes or lesions appreciated.  Peripheral vascular:  2+ radial and pedal pulses in bilateral upper and lower extremities.  Psychiatric:  Alert and oriented x3, demonstrates appropriate judgment and insight.  --------------------------------------------------------------------------------------------------------------  ----------------------------------------------------------------------------------------------------------------------  Results from last 7 days   Lab Units 10/12/23  1908 10/12/23  1621   HSTROP T ng/L 37* 48*     Results from last 7 days   Lab Units 10/15/23  0052 10/14/23  0000 10/13/23  0721 10/12/23  1908 10/12/23  1648 10/12/23  1621   CRP mg/dL  --   --   --   --   --  1.59*  "  LACTATE mmol/L  --   --   --  1.4 2.9*  --    WBC 10*3/mm3 12.64* 10.10 5.81  --   --  9.57   HEMOGLOBIN g/dL 12.5 13.0 14.2  --   --  13.6   HEMATOCRIT % 43.6 44.0 46.3  --   --  46.3   MCV fL 98.6* 95.4 92.4  --   --  95.3   MCHC g/dL 28.7* 29.5* 30.7*  --   --  29.4*   PLATELETS 10*3/mm3 196 218 211  --   --  198   INR   --   --   --   --   --  0.95     Results from last 7 days   Lab Units 10/14/23  0457   PH, ARTERIAL pH units 7.446   PO2 ART mm Hg 62.6*   PCO2, ARTERIAL mm Hg 69.2*   HCO3 ART mmol/L 47.5*     Results from last 7 days   Lab Units 10/15/23  0052 10/14/23  0000 10/13/23  0721 10/12/23  1908 10/12/23  1621   SODIUM mmol/L 141 136 135*  --  139   POTASSIUM mmol/L 5.0 4.7 3.5  --  3.1*   MAGNESIUM mg/dL  --   --   --  1.8  --    CHLORIDE mmol/L 100 91* 83*  --  81*   CO2 mmol/L 36.3* 39.2* 44.2*  --  45.5*   BUN mg/dL 25* 25* 12  --  11   CREATININE mg/dL 0.41* 0.63 0.40*  --  0.44*   CALCIUM mg/dL 9.2 9.4 9.7  --  9.4   GLUCOSE mg/dL 207* 283* 194*  --  192*   ALBUMIN g/dL  --   --  3.9  --  3.9   BILIRUBIN mg/dL  --   --  0.8  --  0.8   ALK PHOS U/L  --   --  75  --  77   AST (SGOT) U/L  --   --  16  --  21   ALT (SGPT) U/L  --   --  20  --  25   Estimated Creatinine Clearance: 161 mL/min (A) (by C-G formula based on SCr of 0.41 mg/dL (L)).    No results found for: \"AMMONIA\"      Blood Culture   Date Value Ref Range Status   10/12/2023 No growth at 2 days  Preliminary   10/12/2023 No growth at 2 days  Preliminary     No results found for: \"URINECX\"  No results found for: \"WOUNDCX\"  No results found for: \"STOOLCX\"    I have personally looked at the labs and they are summarized above.  ----------------------------------------------------------------------------------------------------------------------  Imaging Results (Last 24 Hours)       ** No results found for the last 24 hours. **      "     ----------------------------------------------------------------------------------------------------------------------  Assessment and Plan:    Acute on chronic hypoxic respiratory failure -patient has returned to baseline 2 L nasal cannula, acute phase appears resolved.    2.  COPD exacerbation -we will change Solu-Medrol to prednisone taper.  Continue current nebulizer treatments.  Appreciate pulmonology recommendations, will start Diamox today.    3.  Type II NSTEMI -patient with negative delta on high-sensitivity troponin, no need for further evaluation at this time.    4.  Type 2 diabetes mellitus -continue Accu-Cheks before every meal and nightly with sliding scale insulin    5.  Essential hypertension -well-controlled    Disposition possible discharge in the next 24 to 48 hours    Alexx Clemente DO   10/16/23   15:42 EDT

## 2023-10-17 ENCOUNTER — READMISSION MANAGEMENT (OUTPATIENT)
Dept: CALL CENTER | Facility: HOSPITAL | Age: 58
End: 2023-10-17
Payer: MEDICARE

## 2023-10-17 VITALS
HEIGHT: 64 IN | HEART RATE: 80 BPM | SYSTOLIC BLOOD PRESSURE: 114 MMHG | WEIGHT: 197.97 LBS | OXYGEN SATURATION: 95 % | RESPIRATION RATE: 18 BRPM | TEMPERATURE: 98.5 F | DIASTOLIC BLOOD PRESSURE: 76 MMHG | BODY MASS INDEX: 33.8 KG/M2

## 2023-10-17 PROBLEM — J96.21 ACUTE ON CHRONIC RESPIRATORY FAILURE WITH HYPOXIA AND HYPERCAPNIA: Status: RESOLVED | Noted: 2023-10-13 | Resolved: 2023-10-17

## 2023-10-17 PROBLEM — J96.22 ACUTE ON CHRONIC RESPIRATORY FAILURE WITH HYPOXIA AND HYPERCAPNIA: Status: RESOLVED | Noted: 2023-10-13 | Resolved: 2023-10-17

## 2023-10-17 LAB
BACTERIA SPEC AEROBE CULT: NORMAL
BACTERIA SPEC AEROBE CULT: NORMAL
GLUCOSE BLDC GLUCOMTR-MCNC: 167 MG/DL (ref 70–130)
GLUCOSE BLDC GLUCOMTR-MCNC: 172 MG/DL (ref 70–130)
GLUCOSE BLDC GLUCOMTR-MCNC: 209 MG/DL (ref 70–130)

## 2023-10-17 PROCEDURE — 63710000001 PREDNISONE PER 1 MG: Performed by: INTERNAL MEDICINE

## 2023-10-17 PROCEDURE — 94799 UNLISTED PULMONARY SVC/PX: CPT

## 2023-10-17 PROCEDURE — 94761 N-INVAS EAR/PLS OXIMETRY MLT: CPT

## 2023-10-17 PROCEDURE — 94660 CPAP INITIATION&MGMT: CPT

## 2023-10-17 PROCEDURE — 94664 DEMO&/EVAL PT USE INHALER: CPT

## 2023-10-17 PROCEDURE — 63710000001 INSULIN LISPRO (HUMAN) PER 5 UNITS: Performed by: HOSPITALIST

## 2023-10-17 PROCEDURE — 82948 REAGENT STRIP/BLOOD GLUCOSE: CPT

## 2023-10-17 PROCEDURE — 25010000002 HEPARIN (PORCINE) PER 1000 UNITS: Performed by: HOSPITALIST

## 2023-10-17 RX ORDER — DOXYCYCLINE 100 MG/1
CAPSULE ORAL
Qty: 10 CAPSULE | Refills: 0 | Status: SHIPPED | OUTPATIENT
Start: 2023-10-17

## 2023-10-17 RX ORDER — PREDNISONE 5 MG/1
TABLET ORAL
Qty: 3 TABLET | Refills: 0 | Status: SHIPPED | OUTPATIENT
Start: 2023-10-18 | End: 2023-10-27

## 2023-10-17 RX ORDER — PREDNISONE 20 MG/1
40 TABLET ORAL DAILY
Qty: 10 TABLET | Refills: 0 | Status: SHIPPED | OUTPATIENT
Start: 2023-10-17

## 2023-10-17 RX ADMIN — ALUMINUM HYDROXIDE, MAGNESIUM HYDROXIDE, AND DIMETHICONE 15 ML: 400; 400; 40 SUSPENSION ORAL at 03:07

## 2023-10-17 RX ADMIN — ASPIRIN 81 MG: 81 TABLET, COATED ORAL at 08:41

## 2023-10-17 RX ADMIN — LEVOTHYROXINE SODIUM 25 MCG: 25 TABLET ORAL at 06:10

## 2023-10-17 RX ADMIN — INSULIN LISPRO 3 UNITS: 100 INJECTION, SOLUTION INTRAVENOUS; SUBCUTANEOUS at 16:39

## 2023-10-17 RX ADMIN — POTASSIUM CHLORIDE 10 MEQ: 1500 TABLET, EXTENDED RELEASE ORAL at 08:42

## 2023-10-17 RX ADMIN — ARFORMOTEROL TARTRATE 15 MCG: 15 SOLUTION RESPIRATORY (INHALATION) at 06:25

## 2023-10-17 RX ADMIN — Medication 10 ML: at 08:41

## 2023-10-17 RX ADMIN — IPRATROPIUM BROMIDE 0.5 MG: 0.5 SOLUTION RESPIRATORY (INHALATION) at 13:20

## 2023-10-17 RX ADMIN — CLOPIDOGREL BISULFATE 75 MG: 75 TABLET, FILM COATED ORAL at 08:41

## 2023-10-17 RX ADMIN — ACETAZOLAMIDE 250 MG: 250 TABLET ORAL at 08:41

## 2023-10-17 RX ADMIN — HEPARIN SODIUM 5000 UNITS: 5000 INJECTION INTRAVENOUS; SUBCUTANEOUS at 08:40

## 2023-10-17 RX ADMIN — HYDROCODONE BITARTRATE AND ACETAMINOPHEN 1 TABLET: 7.5; 325 TABLET ORAL at 16:39

## 2023-10-17 RX ADMIN — HYDROCODONE BITARTRATE AND ACETAMINOPHEN 1 TABLET: 7.5; 325 TABLET ORAL at 09:39

## 2023-10-17 RX ADMIN — INSULIN LISPRO 2 UNITS: 100 INJECTION, SOLUTION INTRAVENOUS; SUBCUTANEOUS at 08:30

## 2023-10-17 RX ADMIN — INSULIN LISPRO 2 UNITS: 100 INJECTION, SOLUTION INTRAVENOUS; SUBCUTANEOUS at 10:59

## 2023-10-17 RX ADMIN — PREDNISONE 20 MG: 20 TABLET ORAL at 08:41

## 2023-10-17 RX ADMIN — HYDROCODONE BITARTRATE AND ACETAMINOPHEN 1 TABLET: 7.5; 325 TABLET ORAL at 03:14

## 2023-10-17 RX ADMIN — METOPROLOL TARTRATE 25 MG: 25 TABLET, FILM COATED ORAL at 08:39

## 2023-10-17 RX ADMIN — IPRATROPIUM BROMIDE 0.5 MG: 0.5 SOLUTION RESPIRATORY (INHALATION) at 06:25

## 2023-10-17 RX ADMIN — BUDESONIDE 0.5 MG: 0.5 SUSPENSION RESPIRATORY (INHALATION) at 06:25

## 2023-10-17 NOTE — NURSING NOTE
PT was confident the wound is nearly healed and will manage independently. Discussed the chance of increased skin breakdown from pull ups and or briefs. PT is alert and orientated. She will report to nursing staff with any increase of pain or complications.

## 2023-10-17 NOTE — PLAN OF CARE
Goal Outcome Evaluation:              Outcome Evaluation: Patient arrived to floor this shift, PRN meds given for complaints of pain and anxiety, no other complaints or request at this time, VSS, Will continue plan of care.

## 2023-10-17 NOTE — DISCHARGE SUMMARY
[facility] HOSPITALIST MEDICINE DISCHARGE SUMMARY    Patient Identification:  Name:  Liz Jiang  Age:  57 y.o.  Sex:  female  :  1965  MRN:  [id number]  Visit Number:  [id number]    Date of Admission: 10/12/2023  Date of Discharge: 10/17/2023    PCP: Tabitha Ron APRN    DISCHARGE DIAGNOSIS   Acute on chronic hypoxic respiratory failure  COPD exacerbation  Type II NSTEMI  Type 2 diabetes mellitus  Essential hypertension      CONSULTS  Dr. Shi, Pulmonology      PROCEDURES PERFORMED   None      HOSPITAL COURSE  Ms. Jiang is a 57 y.o. female who presented to [facility] ED on 10/13/2020 with a chief complaint of ornis of breath.  Patient has a past medical history markable for COPD, chronic hypoxic respiratory failure on 4 L nasal cannula, chronic HFpEF, essential hypertension, type 2 diabetes mellitus, chronic pain and history of CVA.  It should be noted patient was recently hospitalized at [facility] in [facility] for COPD exacerbation as well as CHF exacerbation then transferred to inpatient rehab at [facility] from 2023 through 2023.  Patient was then discharged home and performing at her baseline functioning status.  However, patient states approximately 2 days prior to evaluation in the emergency department she began having worsening shortness of breath with nonproductive cough.  For this reason, she did present to the emergency department for further treatment and evaluation.  Initial evaluation in the emergency department did consist of basic laboratory work as well as physical exam and vital signs.  Initial vital signs found patient's blood pressure 136/101, respirations 20, heart rate 106, temperature 98.6 °F and oxygen saturation 87% on 4 L nasal cannula.  Please see initial H&P for the remainder of specific details.  Overall, patient was diagnosed with acute on chronic hypoxic respiratory failure secondary to COPD exacerbation and was admitted  for further treatment and evaluation.    Patient was started on Solu-Medrol 40 mg IV twice daily as well as Atrovent every 6 hours as well as Brovana and Pulmicort nebs.  Patient was continued on this therapy throughout the remainder of her hospital stay.  Patient did report improvement in shortness of breath and returned back to her baseline functioning status prior to discharge.  Patient is now on 4 L nasal cannula maintaining O2 saturation greater than 92%.  Have encouraged patient to follow-up with outpatient pulmonology services.  Pulmonology services were consulted during this hospital stay which did adjust her home trilogy settings.  Patient has also been enrolled in our COPD clinic and will be given a COPD rescue kit on discharge.  With this in mind, it is felt patient has reached maximum medical benefit of current hospitalization and will be discharged home in stable condition today.  The beforementioned plan was thoroughly discussed with the patient and she expressed understanding and willingness to proceed with the beforementioned plan.    VITAL SIGNS:      10/16/23  0400 10/16/23  1945 10/17/23  0500   Weight: 88.4 kg (194 lb 14.2 oz) 89.8 kg (197 lb 15.6 oz) 89.8 kg (197 lb 15.6 oz)     Body mass index is 33.98 kg/m².    PHYSICAL EXAM:  Constitutional: Chronically ill-appearing  female in no apparent distress.  HENT:  Head:  Normocephalic and atraumatic.  Mouth:  Moist mucous membranes.    Eyes:  Conjunctivae and EOM are normal.  Pupils are equal, round, and reactive to light.  No scleral icterus.    Neck:  Neck supple. No thyromegaly.  No JVD present.    Cardiovascular:  Regular rate and rhythm with no murmurs, rubs, clicks or gallops appreciated.  Pulmonary/Chest: Faint end expiratory wheezing with no crackles or rhonchi appreciated  Abdominal:  Soft. Nontender. Nondistended  Bowel sounds are normal in all four quadrants. No organomegally appreciated.   Musculoskeletal:   No edema, no  tenderness, and no deformity.  No red or swollen joints anywhere.    Neurological:  Alert, Cranial nerves II-XII intact with no focal deficits.  No facial droop.  No slurred speech.   Skin:  Warm and dry to palpation with no rashes or lesions appreciated.  Peripheral vascular:  2+ radial and pedal pulses in bilateral upper and lower extremities.  Psychiatric:  Alert and oriented x3, demonstrates appropriate judgment and insight.    DISCHARGE DISPOSITION   Stable    DISCHARGE MEDICATIONS:     Discharge Medications        New Medications        Instructions Start Date   doxycycline 100 MG capsule  Commonly known as: MONODOX   Take 1 capsule by mouth every 12 hours for 5 days as part of COPD Rescue Kit. (Only Start if in YELLOW ZONE.)      predniSONE 20 MG tablet  Commonly known as: DELTASONE   40 mg, Oral, Daily, Take 2 tablets daily for 5 days as part of COPD Rescue Kit. (Only Start if in YELLOW ZONE.)      predniSONE 5 MG tablet  Commonly known as: DELTASONE   Take 4 tablets by mouth Daily for 1 day, THEN 3 tablets Daily for 3 days, THEN 2 tablets Daily for 3 days, THEN 1 tablet Daily for 3 days.   Start Date: October 18, 2023            Continue These Medications        Instructions Start Date   ALPRAZolam 1 MG tablet  Commonly known as: XANAX   Take ½ tablet by mouth 2 (Two) Times a Day As Needed for Anxiety or Sleep.      ASPIRIN LOW DOSE 81 MG EC tablet  Generic drug: aspirin   81 mg, Oral, Daily      clopidogrel 75 MG tablet  Commonly known as: PLAVIX   Take 1 tablet by mouth Daily.      Fluticasone-Umeclidin-Vilant 200-62.5-25 MCG/ACT aerosol powder    1 puff, Inhalation, Daily      furosemide 20 MG tablet  Commonly known as: LASIX   20 mg, Oral, Daily      guaiFENesin 600 MG 12 hr tablet  Commonly known as: MUCINEX   1,200 mg, Oral, 2 Times Daily PRN      HYDROcodone-acetaminophen  MG per tablet  Commonly known as: NORCO   1 tablet, Oral, Every 6 Hours Scheduled      ipratropium-albuterol  MCG/ACT  inhaler  Commonly known as: COMBIVENT RESPIMAT   1 puff, Inhalation, 4 Times Daily PRN      levothyroxine 25 MCG tablet  Commonly known as: SYNTHROID, LEVOTHROID   25 mcg, Oral, Every Early Morning      metoprolol tartrate 25 MG tablet  Commonly known as: LOPRESSOR   25 mg, Oral, 2 Times Daily      naloxone 4 MG/0.1ML nasal spray  Commonly known as: NARCAN   Call 911. Don't prime. [place] in 1 nostril for overdose. Repeat in 2-3 minutes in other nostril if no or minimal breathing/responsiveness.      polyethylene glycol 17 g packet  Commonly known as: MIRALAX   17 g, Oral, Daily PRN      potassium chloride 10 MEQ CR capsule  Commonly known as: MICRO-K   10 mEq, Oral, Daily      Ventolin  (90 Base) MCG/ACT inhaler  Generic drug: albuterol sulfate HFA   2 puffs, Inhalation, Every 6 Hours PRN             Stop These Medications      ondansetron ODT 4 MG disintegrating tablet  Commonly known as: ZOFRAN-ODT              Diet Instructions    Diabetic, Consistent Carb         Your Scheduled Appointments      Oct 26, 2023  1:00 PM  NEW ONCOLOGY with Deanne Jones MD  [facility] HEMATOLOGY & ONCOLOGY ([place]) [address]  [facility] [address] [phone]   Bring ID and  Insurance cards.  New patients are to arrive 30 minutes prior to the appointement.  If you received paperwork in the mail, fill it out and bring it with them.  All other appt's need to be here 15 minutes early.        Nov 07, 2023  2:15 PM  Hospital Follow Up with Elijah Hope MD  [facility] PULMONARY & CRITICAL CARE MEDICINE ([place]) [address]  [facility] [address]  [phone]   Established: Please bring outside images or reports.       Nov 08, 2023  3:00 PM  New Patient with Elijah Hope MD  [facility] PULMONARY & CRITICAL CARE MEDICINE ([place]) [address]  [facility] [address] [phone]   New: Please bring list of medications and outside images or  reports.             Activity Instructions    Activity as Tolerated          Follow-up Information       Tena Cee MD .    Specialties: Pulmonary Disease, Sleep Medicine  Contact information:  [address]  [facility] [address] [phone]               [facility] .    Specialty: Pulmonology  Contact information:  [address] [facility] [address] [phone]             Tabitha Ron APRN .    Specialty: Nurse Practitioner  Contact information:  [address]  [facility] [address] [phone]                             TEST  RESULTS PENDING AT DISCHARGE  Pending Labs       Order Current Status    Blood Culture - Blood, Arm, Right Preliminary result    Blood Culture - Blood, Wrist, Left Preliminary result             The 10-year ASCVD risk score (Sanjay RECINOS, et al., 2019) is: 6.1%    Values used to calculate the score:      Age: 57 years      Sex: Female      Is Non- : No      Diabetic: Yes      Tobacco smoker: No      Systolic Blood Pressure: 114 mmHg      Is BP treated: Yes      HDL Cholesterol: 48 mg/dL      Total Cholesterol: 199 mg/dL     Alexx Clemente DO  10/17/23  16:04 EDT    Please note that this discharge summary required more than 30 minutes to complete.    Please send a copy of this dictation to the following providers:  Tabitha Ron APRN

## 2023-10-17 NOTE — CASE MANAGEMENT/SOCIAL WORK
Discharge Planning Assessment   [facility]     Patient Name: Liz Jiang  MRN: [id number]  Today's Date: 10/17/2023    Admit Date: 10/12/2023     Discharge Plan       Row Name 10/17/23 1708       Plan    Final Discharge Disposition Code 01 - home or self-care    Final Note Pt is being discharge home on this date. No other needs identified at this time.                 LIANE Daily

## 2023-10-17 NOTE — SIGNIFICANT NOTE
10/17/23 1207   Rehab Time/Intention   Session Not Performed other (see comments)   Recommendation   PT - Next Appointment   (Pt being D/C'd home, pt was partially willing but does not express need or want for hospital PT services.)

## 2023-10-18 LAB
QT INTERVAL: 334 MS
QTC INTERVAL: 468 MS

## 2023-10-18 NOTE — OUTREACH NOTE
Prep Survey      Flowsheet Row Responses   [facility] facility patient discharged from? [facility]   Is LACE score < 7 ? No   Eligibility Readm Mgmt   Discharge diagnosis COPD   Does the patient have one of the following disease processes/diagnoses(primary or secondary)? COPD   Does the patient have Home health ordered? No   Is there a DME ordered? No   Comments regarding appointments call for apmt   Medication alerts for this patient see AVS   General alerts for this patient HX COPD, CHF   Prep survey completed? Yes            Tanya ABRAHAM - Registered Nurse

## 2023-10-19 ENCOUNTER — READMISSION MANAGEMENT (OUTPATIENT)
Dept: CALL CENTER | Facility: HOSPITAL | Age: 58
End: 2023-10-19
Payer: MEDICARE

## 2023-10-19 NOTE — OUTREACH NOTE
COPD/PN Week 1 Survey      Flowsheet Row Responses   Yarsanism facility patient discharged from? [facility]   Does the patient have one of the following disease processes/diagnoses(primary or secondary)? COPD   Week 1 attempt successful? No   Unsuccessful attempts Attempt 1            Rosalie Billingsley Registered Nurse

## 2023-10-23 ENCOUNTER — READMISSION MANAGEMENT (OUTPATIENT)
Dept: CALL CENTER | Facility: HOSPITAL | Age: 58
End: 2023-10-23
Payer: MEDICARE

## 2023-10-23 NOTE — OUTREACH NOTE
COPD/PN Week 1 Survey      Flowsheet Row Responses   Confucianist facility patient discharged from? [facility]   Does the patient have one of the following disease processes/diagnoses(primary or secondary)? COPD   Week 1 attempt successful? No   Unsuccessful attempts Attempt 2            Courtney YOUNG - Registered Nurse

## 2023-10-25 ENCOUNTER — READMISSION MANAGEMENT (OUTPATIENT)
Dept: CALL CENTER | Facility: HOSPITAL | Age: 58
End: 2023-10-25
Payer: MEDICARE

## 2023-10-25 NOTE — OUTREACH NOTE
COPD/PN Week 1 Survey      Flowsheet Row Responses   Taoist facility patient discharged from? [facility]   Does the patient have one of the following disease processes/diagnoses(primary or secondary)? COPD   Week 1 attempt successful? No   Unsuccessful attempts Attempt 3   Revoke Decline to participate            Liss HAN - Licensed Nurse

## 2023-10-28 ENCOUNTER — HOSPITAL ENCOUNTER (EMERGENCY)
Facility: HOSPITAL | Age: 58
Discharge: HOME | End: 2023-10-28
Attending: STUDENT IN AN ORGANIZED HEALTH CARE EDUCATION/TRAINING PROGRAM
Payer: MEDICARE

## 2023-10-28 ENCOUNTER — APPOINTMENT (OUTPATIENT)
Dept: GENERAL RADIOLOGY | Facility: HOSPITAL | Age: 58
End: 2023-10-28
Payer: MEDICARE

## 2023-10-28 ENCOUNTER — APPOINTMENT (OUTPATIENT)
Dept: CT IMAGING | Facility: HOSPITAL | Age: 58
End: 2023-10-28
Payer: MEDICARE

## 2023-10-28 VITALS
SYSTOLIC BLOOD PRESSURE: 128 MMHG | WEIGHT: 180 LBS | HEART RATE: 112 BPM | BODY MASS INDEX: 30.73 KG/M2 | RESPIRATION RATE: 18 BRPM | OXYGEN SATURATION: 99 % | TEMPERATURE: 98.2 F | DIASTOLIC BLOOD PRESSURE: 79 MMHG | HEIGHT: 64 IN

## 2023-10-28 DIAGNOSIS — R09.1 PLEURISY: Primary | ICD-10-CM

## 2023-10-28 LAB
A-A DO2: 123.7 MMHG (ref 0–300)
A-A DO2: 86.4 MMHG (ref 0–300)
ALBUMIN SERPL-MCNC: 3.7 G/DL (ref 3.5–5.2)
ALBUMIN/GLOB SERPL: 1.2 G/DL
ALP SERPL-CCNC: 68 U/L (ref 39–117)
ALT SERPL W P-5'-P-CCNC: 15 U/L (ref 1–33)
ANION GAP SERPL CALCULATED.3IONS-SCNC: 6.7 MMOL/L (ref 5–15)
ARTERIAL PATENCY WRIST A: POSITIVE
ARTERIAL PATENCY WRIST A: POSITIVE
AST SERPL-CCNC: 15 U/L (ref 1–32)
ATMOSPHERIC PRESS: 731 MMHG
ATMOSPHERIC PRESS: 732 MMHG
BACTERIA UR QL AUTO: ABNORMAL /HPF
BASE EXCESS BLDA CALC-SCNC: 16.3 MMOL/L (ref 0–2)
BASE EXCESS BLDA CALC-SCNC: 19 MMOL/L (ref 0–2)
BASOPHILS # BLD AUTO: 0.05 10*3/MM3 (ref 0–0.2)
BASOPHILS NFR BLD AUTO: 0.4 % (ref 0–1.5)
BDY SITE: ABNORMAL
BDY SITE: ABNORMAL
BILIRUB SERPL-MCNC: 0.7 MG/DL (ref 0–1.2)
BILIRUB UR QL STRIP: NEGATIVE
BUN SERPL-MCNC: 13 MG/DL (ref 6–20)
BUN/CREAT SERPL: 34.2 (ref 7–25)
CALCIUM SPEC-SCNC: 9.7 MG/DL (ref 8.6–10.5)
CHLORIDE SERPL-SCNC: 92 MMOL/L (ref 98–107)
CLARITY UR: CLEAR
CO2 BLDA-SCNC: 46.5 MMOL/L (ref 22–33)
CO2 BLDA-SCNC: 49.3 MMOL/L (ref 22–33)
CO2 SERPL-SCNC: 42.3 MMOL/L (ref 22–29)
COHGB MFR BLD: 2.1 % (ref 0–5)
COHGB MFR BLD: 2.3 % (ref 0–5)
COLOR UR: ABNORMAL
CREAT SERPL-MCNC: 0.38 MG/DL (ref 0.57–1)
D DIMER PPP FEU-MCNC: 0.66 MCGFEU/ML (ref 0–0.58)
DEPRECATED RDW RBC AUTO: 54.4 FL (ref 37–54)
EGFRCR SERPLBLD CKD-EPI 2021: 116.3 ML/MIN/1.73
EOSINOPHIL # BLD AUTO: 0.04 10*3/MM3 (ref 0–0.4)
EOSINOPHIL NFR BLD AUTO: 0.3 % (ref 0.3–6.2)
EPAP: 8
ERYTHROCYTE [DISTWIDTH] IN BLOOD BY AUTOMATED COUNT: 15.3 % (ref 12.3–15.4)
GAS FLOW AIRWAY: 3 LPM
GEN 5 2HR TROPONIN T REFLEX: 33 NG/L
GLOBULIN UR ELPH-MCNC: 3.1 GM/DL
GLUCOSE SERPL-MCNC: 133 MG/DL (ref 65–99)
GLUCOSE UR STRIP-MCNC: NEGATIVE MG/DL
HCO3 BLDA-SCNC: 44.5 MMOL/L (ref 20–26)
HCO3 BLDA-SCNC: 47.2 MMOL/L (ref 20–26)
HCT VFR BLD AUTO: 46.3 % (ref 34–46.6)
HCT VFR BLD CALC: 41.6 % (ref 38–51)
HCT VFR BLD CALC: 44.4 % (ref 38–51)
HGB BLD-MCNC: 13.6 G/DL (ref 12–15.9)
HGB BLDA-MCNC: 13.6 G/DL (ref 13.5–17.5)
HGB BLDA-MCNC: 14.5 G/DL (ref 13.5–17.5)
HGB UR QL STRIP.AUTO: NEGATIVE
HOLD SPECIMEN: NORMAL
HOLD SPECIMEN: NORMAL
HYALINE CASTS UR QL AUTO: ABNORMAL /LPF
IMM GRANULOCYTES # BLD AUTO: 0.07 10*3/MM3 (ref 0–0.05)
IMM GRANULOCYTES NFR BLD AUTO: 0.6 % (ref 0–0.5)
INHALED O2 CONCENTRATION: 32 %
INHALED O2 CONCENTRATION: 40 %
IPAP: 24
KETONES UR QL STRIP: NEGATIVE
LEUKOCYTE ESTERASE UR QL STRIP.AUTO: ABNORMAL
LYMPHOCYTES # BLD AUTO: 1.68 10*3/MM3 (ref 0.7–3.1)
LYMPHOCYTES NFR BLD AUTO: 14.4 % (ref 19.6–45.3)
Lab: ABNORMAL
MCH RBC QN AUTO: 28.5 PG (ref 26.6–33)
MCHC RBC AUTO-ENTMCNC: 29.4 G/DL (ref 31.5–35.7)
MCV RBC AUTO: 96.9 FL (ref 79–97)
METHGB BLD QL: <-0.1 % (ref 0–3)
METHGB BLD QL: <-0.1 % (ref 0–3)
MODALITY: ABNORMAL
MODALITY: ABNORMAL
MONOCYTES # BLD AUTO: 0.83 10*3/MM3 (ref 0.1–0.9)
MONOCYTES NFR BLD AUTO: 7.1 % (ref 5–12)
NEUTROPHILS NFR BLD AUTO: 77.2 % (ref 42.7–76)
NEUTROPHILS NFR BLD AUTO: 9.03 10*3/MM3 (ref 1.7–7)
NITRITE UR QL STRIP: NEGATIVE
NOTIFIED BY: ABNORMAL
NOTIFIED BY: ABNORMAL
NOTIFIED WHO: ABNORMAL
NOTIFIED WHO: ABNORMAL
NRBC BLD AUTO-RTO: 0 /100 WBC (ref 0–0.2)
NT-PROBNP SERPL-MCNC: 288.4 PG/ML (ref 0–900)
OXYHGB MFR BLDV: 87.8 % (ref 94–99)
OXYHGB MFR BLDV: 93.8 % (ref 94–99)
PCO2 BLDA: 67.5 MM HG (ref 35–45)
PCO2 BLDA: 69.3 MM HG (ref 35–45)
PCO2 TEMP ADJ BLD: ABNORMAL MM[HG]
PCO2 TEMP ADJ BLD: ABNORMAL MM[HG]
PH BLDA: 7.43 PH UNITS (ref 7.35–7.45)
PH BLDA: 7.44 PH UNITS (ref 7.35–7.45)
PH UR STRIP.AUTO: >=9 [PH] (ref 5–8)
PH, TEMP CORRECTED: ABNORMAL
PH, TEMP CORRECTED: ABNORMAL
PLATELET # BLD AUTO: 187 10*3/MM3 (ref 140–450)
PMV BLD AUTO: 11.4 FL (ref 6–12)
PO2 BLDA: 55.3 MM HG (ref 83–108)
PO2 BLDA: 76.2 MM HG (ref 83–108)
PO2 TEMP ADJ BLD: ABNORMAL MM[HG]
PO2 TEMP ADJ BLD: ABNORMAL MM[HG]
POTASSIUM SERPL-SCNC: 3.7 MMOL/L (ref 3.5–5.2)
PROT SERPL-MCNC: 6.8 G/DL (ref 6–8.5)
PROT UR QL STRIP: ABNORMAL
QT INTERVAL: 350 MS
QT INTERVAL: 352 MS
QTC INTERVAL: 497 MS
QTC INTERVAL: 509 MS
RBC # BLD AUTO: 4.78 10*6/MM3 (ref 3.77–5.28)
RBC # UR STRIP: ABNORMAL /HPF
REF LAB TEST METHOD: ABNORMAL
SAO2 % BLDCOA: 89.6 % (ref 94–99)
SAO2 % BLDCOA: 95.6 % (ref 94–99)
SET MECH RESP RATE: 22
SODIUM SERPL-SCNC: 141 MMOL/L (ref 136–145)
SP GR UR STRIP: 1.02 (ref 1–1.03)
SQUAMOUS #/AREA URNS HPF: ABNORMAL /HPF
TROPONIN T DELTA: -8 NG/L
TROPONIN T SERPL HS-MCNC: 41 NG/L
UROBILINOGEN UR QL STRIP: ABNORMAL
VENTILATOR MODE: ABNORMAL
VENTILATOR MODE: ABNORMAL
WBC # UR STRIP: ABNORMAL /HPF
WBC NRBC COR # BLD: 11.7 10*3/MM3 (ref 3.4–10.8)
WHOLE BLOOD HOLD COAG: NORMAL
WHOLE BLOOD HOLD SPECIMEN: NORMAL

## 2023-10-28 PROCEDURE — 25010000002 METHYLPREDNISOLONE PER 125 MG: Performed by: STUDENT IN AN ORGANIZED HEALTH CARE EDUCATION/TRAINING PROGRAM

## 2023-10-28 PROCEDURE — 71045 X-RAY EXAM CHEST 1 VIEW: CPT

## 2023-10-28 PROCEDURE — 94799 UNLISTED PULMONARY SVC/PX: CPT

## 2023-10-28 PROCEDURE — 94761 N-INVAS EAR/PLS OXIMETRY MLT: CPT

## 2023-10-28 PROCEDURE — 84484 ASSAY OF TROPONIN QUANT: CPT | Performed by: STUDENT IN AN ORGANIZED HEALTH CARE EDUCATION/TRAINING PROGRAM

## 2023-10-28 PROCEDURE — 94640 AIRWAY INHALATION TREATMENT: CPT

## 2023-10-28 PROCEDURE — 71275 CT ANGIOGRAPHY CHEST: CPT

## 2023-10-28 PROCEDURE — 82805 BLOOD GASES W/O2 SATURATION: CPT

## 2023-10-28 PROCEDURE — 94664 DEMO&/EVAL PT USE INHALER: CPT

## 2023-10-28 PROCEDURE — 80053 COMPREHEN METABOLIC PANEL: CPT | Performed by: STUDENT IN AN ORGANIZED HEALTH CARE EDUCATION/TRAINING PROGRAM

## 2023-10-28 PROCEDURE — 71275 CT ANGIOGRAPHY CHEST: CPT | Performed by: RADIOLOGY

## 2023-10-28 PROCEDURE — 83050 HGB METHEMOGLOBIN QUAN: CPT

## 2023-10-28 PROCEDURE — 82375 ASSAY CARBOXYHB QUANT: CPT

## 2023-10-28 PROCEDURE — 25010000002 KETOROLAC TROMETHAMINE PER 15 MG: Performed by: STUDENT IN AN ORGANIZED HEALTH CARE EDUCATION/TRAINING PROGRAM

## 2023-10-28 PROCEDURE — 25010000002 HYDROMORPHONE PER 4 MG: Performed by: STUDENT IN AN ORGANIZED HEALTH CARE EDUCATION/TRAINING PROGRAM

## 2023-10-28 PROCEDURE — 36415 COLL VENOUS BLD VENIPUNCTURE: CPT

## 2023-10-28 PROCEDURE — 25510000001 IOPAMIDOL PER 1 ML: Performed by: STUDENT IN AN ORGANIZED HEALTH CARE EDUCATION/TRAINING PROGRAM

## 2023-10-28 PROCEDURE — 71045 X-RAY EXAM CHEST 1 VIEW: CPT | Performed by: RADIOLOGY

## 2023-10-28 PROCEDURE — 96376 TX/PRO/DX INJ SAME DRUG ADON: CPT

## 2023-10-28 PROCEDURE — 36600 WITHDRAWAL OF ARTERIAL BLOOD: CPT

## 2023-10-28 PROCEDURE — 99285 EMERGENCY DEPT VISIT HI MDM: CPT

## 2023-10-28 PROCEDURE — 93005 ELECTROCARDIOGRAM TRACING: CPT | Performed by: STUDENT IN AN ORGANIZED HEALTH CARE EDUCATION/TRAINING PROGRAM

## 2023-10-28 PROCEDURE — 81001 URINALYSIS AUTO W/SCOPE: CPT | Performed by: STUDENT IN AN ORGANIZED HEALTH CARE EDUCATION/TRAINING PROGRAM

## 2023-10-28 PROCEDURE — 85025 COMPLETE CBC W/AUTO DIFF WBC: CPT | Performed by: STUDENT IN AN ORGANIZED HEALTH CARE EDUCATION/TRAINING PROGRAM

## 2023-10-28 PROCEDURE — 96374 THER/PROPH/DIAG INJ IV PUSH: CPT

## 2023-10-28 PROCEDURE — 83880 ASSAY OF NATRIURETIC PEPTIDE: CPT | Performed by: STUDENT IN AN ORGANIZED HEALTH CARE EDUCATION/TRAINING PROGRAM

## 2023-10-28 PROCEDURE — 94660 CPAP INITIATION&MGMT: CPT

## 2023-10-28 PROCEDURE — 85379 FIBRIN DEGRADATION QUANT: CPT | Performed by: STUDENT IN AN ORGANIZED HEALTH CARE EDUCATION/TRAINING PROGRAM

## 2023-10-28 PROCEDURE — 96375 TX/PRO/DX INJ NEW DRUG ADDON: CPT

## 2023-10-28 PROCEDURE — 93010 ELECTROCARDIOGRAM REPORT: CPT | Performed by: INTERNAL MEDICINE

## 2023-10-28 RX ORDER — KETOROLAC TROMETHAMINE 30 MG/ML
30 INJECTION, SOLUTION INTRAMUSCULAR; INTRAVENOUS ONCE
Status: COMPLETED | OUTPATIENT
Start: 2023-10-28 | End: 2023-10-28

## 2023-10-28 RX ORDER — MELOXICAM 7.5 MG/1
7.5 TABLET ORAL DAILY
Qty: 14 TABLET | Refills: 0 | Status: SHIPPED | OUTPATIENT
Start: 2023-10-28 | End: 2023-10-28 | Stop reason: SDUPTHER

## 2023-10-28 RX ORDER — MELOXICAM 7.5 MG/1
7.5 TABLET ORAL DAILY
Qty: 14 TABLET | Refills: 0 | Status: SHIPPED | OUTPATIENT
Start: 2023-10-28

## 2023-10-28 RX ORDER — SODIUM CHLORIDE 0.9 % (FLUSH) 0.9 %
10 SYRINGE (ML) INJECTION AS NEEDED
Status: DISCONTINUED | OUTPATIENT
Start: 2023-10-28 | End: 2023-10-28 | Stop reason: HOSPADM

## 2023-10-28 RX ORDER — HYDROMORPHONE HYDROCHLORIDE 1 MG/ML
0.25 INJECTION, SOLUTION INTRAMUSCULAR; INTRAVENOUS; SUBCUTANEOUS ONCE
Status: COMPLETED | OUTPATIENT
Start: 2023-10-28 | End: 2023-10-28

## 2023-10-28 RX ORDER — METHYLPREDNISOLONE SODIUM SUCCINATE 125 MG/2ML
125 INJECTION, POWDER, LYOPHILIZED, FOR SOLUTION INTRAMUSCULAR; INTRAVENOUS ONCE
Status: COMPLETED | OUTPATIENT
Start: 2023-10-28 | End: 2023-10-28

## 2023-10-28 RX ORDER — METOPROLOL TARTRATE 5 MG/5ML
2.5 INJECTION INTRAVENOUS ONCE
Status: COMPLETED | OUTPATIENT
Start: 2023-10-28 | End: 2023-10-28

## 2023-10-28 RX ORDER — PREDNISONE 50 MG/1
50 TABLET ORAL DAILY
Qty: 3 TABLET | Refills: 0 | Status: SHIPPED | OUTPATIENT
Start: 2023-10-28 | End: 2023-10-28 | Stop reason: SDUPTHER

## 2023-10-28 RX ORDER — IPRATROPIUM BROMIDE AND ALBUTEROL SULFATE 2.5; .5 MG/3ML; MG/3ML
3 SOLUTION RESPIRATORY (INHALATION) ONCE
Status: COMPLETED | OUTPATIENT
Start: 2023-10-28 | End: 2023-10-28

## 2023-10-28 RX ORDER — PREDNISONE 50 MG/1
50 TABLET ORAL DAILY
Qty: 3 TABLET | Refills: 0 | Status: SHIPPED | OUTPATIENT
Start: 2023-10-28 | End: 2023-10-31

## 2023-10-28 RX ADMIN — METOPROLOL TARTRATE 2.5 MG: 1 INJECTION, SOLUTION INTRAVENOUS at 07:04

## 2023-10-28 RX ADMIN — KETOROLAC TROMETHAMINE 30 MG: 30 INJECTION, SOLUTION INTRAMUSCULAR; INTRAVENOUS at 10:30

## 2023-10-28 RX ADMIN — IOPAMIDOL 70 ML: 755 INJECTION, SOLUTION INTRAVENOUS at 14:42

## 2023-10-28 RX ADMIN — HYDROMORPHONE HYDROCHLORIDE 0.25 MG: 1 INJECTION, SOLUTION INTRAMUSCULAR; INTRAVENOUS; SUBCUTANEOUS at 08:30

## 2023-10-28 RX ADMIN — HYDROMORPHONE HYDROCHLORIDE 0.25 MG: 1 INJECTION, SOLUTION INTRAMUSCULAR; INTRAVENOUS; SUBCUTANEOUS at 12:14

## 2023-10-28 RX ADMIN — IPRATROPIUM BROMIDE AND ALBUTEROL SULFATE 3 ML: 2.5; .5 SOLUTION RESPIRATORY (INHALATION) at 05:15

## 2023-10-28 RX ADMIN — METHYLPREDNISOLONE SODIUM SUCCINATE 125 MG: 125 INJECTION, POWDER, FOR SOLUTION INTRAMUSCULAR; INTRAVENOUS at 05:44

## 2023-10-28 NOTE — ED PROVIDER NOTES
Subjective   History of Present Illness  57-year-old female with a past medical history of COPD, coronary disease, diabetes, CVA, and diabetes presents to the ER due to concerns for increasing cough and shortness of breath.  Symptoms have been present for the last several hours.  Patient wears oxygen at home continuously.  Patient notes chest pressure.  Denies nausea or diaphoresis.  Confirmed cough.  Confirms sputum production.  No fever or chills.  Vital stable.  Afebrile.  Cardiac noted.        Review of Systems   Respiratory:  Positive for cough, shortness of breath and wheezing.    Cardiovascular:  Positive for chest pain.   All other systems reviewed and are negative.      Past Medical History:   Diagnosis Date    Acid reflux disease     Anal cancer 12/5/2019    Arthritis     Asthma, extrinsic     Cholelithiasis     Chronic headaches     COPD (chronic obstructive pulmonary disease)     CVA (cerebral vascular accident)     w/ right sided deficit    CVA (cerebral vascular accident)     Diabetes mellitus     only has high blood sugar when on steriods    History of radiation therapy 02/27/2020    Whole pelvis/anal mass    Obstructive sleep apnea treated with BiPAP     On home oxygen therapy     2L     Sleep apnea, obstructive        Allergies   Allergen Reactions    Morphine Other (See Comments)     Cause low O2    Roflumilast Diarrhea     Significant diarrhea.        Past Surgical History:   Procedure Laterality Date    ABDOMINAL SURGERY      CARDIAC CATHETERIZATION      CAROTID ENDARTERECTOMY Left 2018    CHOLECYSTECTOMY WITH INTRAOPERATIVE CHOLANGIOGRAM N/A 1/30/2017    Procedure: CHOLECYSTECTOMY LAPAROSCOPIC INTRAOPERATIVE CHOLANGIOGRAM;  Surgeon: Carolin Marie MD;  Location: [facility];  Service:     COLONOSCOPY      HYSTERECTOMY      for heavy bleeding/ complete    KIDNEY STONE SURGERY      TUBAL ABDOMINAL LIGATION      VENOUS ACCESS DEVICE (PORT) INSERTION Left 12/17/2019    Procedure: INSERTION VENOUS  ACCESS DEVICE;  Surgeon: Carolin Marie MD;  Location: [facility];  Service: General       Family History   Problem Relation Age of Onset    Diabetes Mother     Hypertension Mother     Arrhythmia Mother     Pneumonia Father     Coronary artery disease Other     Arrhythmia Sister     Heart attack Brother     Lymphoma Brother         hodgkin's     Other Brother         CABG    Breast cancer Neg Hx        Social History     Socioeconomic History    Marital status:    Tobacco Use    Smoking status: Former     Packs/day: 1.50     Years: 30.00     Additional pack years: 0.00     Total pack years: 45.00     Types: Electronic Cigarette, Cigarettes     Quit date:      Years since quittin.8    Smokeless tobacco: Never   Vaping Use    Vaping Use: Unknown   Substance and Sexual Activity    Alcohol use: No    Drug use: Defer    Sexual activity: Defer           Objective   Physical Exam  Constitutional:       General: She is not in acute distress.     Appearance: Normal appearance. She is not ill-appearing.   HENT:      Head: Normocephalic and atraumatic.      Right Ear: External ear normal.      Left Ear: External ear normal.      Nose: Nose normal.      Mouth/Throat:      Mouth: Mucous membranes are moist.   Eyes:      Extraocular Movements: Extraocular movements intact.      Pupils: Pupils are equal, round, and reactive to light.   Cardiovascular:      Rate and Rhythm: Regular rhythm. Tachycardia present.      Heart sounds: No murmur heard.  Pulmonary:      Effort: Pulmonary effort is normal. No respiratory distress.      Breath sounds: Examination of the right-upper field reveals wheezing. Examination of the left-upper field reveals wheezing. Examination of the right-middle field reveals wheezing. Examination of the left-middle field reveals wheezing. Examination of the right-lower field reveals wheezing. Examination of the left-lower field reveals wheezing. Wheezing present.   Abdominal:      General:  Bowel sounds are normal.      Palpations: Abdomen is soft.      Tenderness: There is no abdominal tenderness.   Musculoskeletal:         General: No deformity or signs of injury. Normal range of motion.      Cervical back: Normal range of motion and neck supple.   Skin:     General: Skin is warm and dry.      Findings: No erythema.   Neurological:      General: No focal deficit present.      Mental Status: She is alert and oriented to person, place, and time. Mental status is at baseline.      Cranial Nerves: No cranial nerve deficit.   Psychiatric:         Mood and Affect: Mood normal.         Behavior: Behavior normal.         Thought Content: Thought content normal.         Procedures           ED Course  ED Course as of 10/28/23 1850   Sat Oct 28, 2023   0502 EKG notes sinus tachycardia.  126 bpm.  Right bundle branch block noted.  No acute ST elevation.  QTc 509. Electronically signed by Jose Manuel Green DO, 10/28/23, 5:02 AM EDT.   [SF]   0624 Care of this patient was transferred to the next attending physician at shift change.  Complete discussion of presentation, labs, imaging, care, and expected course occurred during transition of providers.  Vitals stable at transfer of care.    Electronically signed by Jose Manuel Green DO, 10/28/23, 6:35 AM EDT.   [SF]   0941 Assumed care of patient at change of shift.  Evaluated patient earlier in shift.  Reporting right-sided lower sharp chest pain which has been progressively worsening over the last few days.  Denies any coughing or fevers to me.  Chest x-ray shows coarsened markings throughout.  States that she has had issues with heart failure before and has noticed some symmetric mild swelling in her ankles over the last few days.  We will add on BNP.    On exam patient noted to be in respiratory distress with very poor air movement.  Placed on BiPAP.      Patient states that she is on hydrocodone  at home.  However review of PMDP shows that patient does not  have regular opiate prescriptions, just limited occasional limited opiate fills in the past, last September. Also has occasional limited xanax prescriptions.    Patient's primary concern is pain.  We will give low-dose Dilaudid and monitor. [KH]   1158 D-Dimer, Quant(!): 0.66 [KH]   1845 Due to patient's ongoing pain D-dimer was obtained which was elevated.  CTA negative for PE.  Does show bronchial irritation right middle lobe which is a site of patient's recently treated pneumonia.  Suspect that this is likely residual.  On further discussion with patient she states that the pain has been ongoing ever since she was previously in the hospital for pneumonia.  Suspect likely pleurisy.  This was discussed with patient.  Prescribed Mobic and prednisone.  Discussed GI precautions.  Discussed would not prescribe controlled substances as patient is high risk due to her chronic respiratory disease.  Patient discharged with return precautions.  Given no cough, fever or other infectious symptoms I have low suspicion for infectious etiology though this was discussed with patient that I cannot fully exclude this and that her symptoms get worse she should follow-up with her her doctor or return to the emergency room. Discussed antibiotics but decision to hold at this time due to lower suspicion for acute infection. [KH]      ED Course User Index  [KH] Zena Webster MD  [SF] Jose Manuel Green DO KASPER reviewed by Jose Manuel Green DO, Hale, Kelly L, MD       Medical Decision Making  Problems Addressed:  Pleurisy: complicated acute illness or injury    Amount and/or Complexity of Data Reviewed  Labs: ordered. Decision-making details documented in ED Course.  Radiology: ordered.  ECG/medicine tests: ordered.    Risk  Prescription drug management.        Final diagnoses:   Pleurisy       ED Disposition  ED Disposition       ED Disposition   Discharge    Condition   Stable    Comment   --                Tabitha Ron, APRN  [address] [facility] [address] [address]  [phone]               Medication List        New Prescriptions      meloxicam 7.5 MG tablet  Commonly known as: MOBIC  Take 1 tablet by mouth Daily.            Changed      * predniSONE 20 MG tablet  Commonly known as: DELTASONE  Take 2 tablets by mouth Daily. Take 2 tablets daily for 5 days as part of COPD Rescue Kit. (Only Start if in YELLOW ZONE.)  What changed: Another medication with the same name was changed. Make sure you understand how and when to take each.     * predniSONE 50 MG tablet  Commonly known as: DELTASONE  Take 1 tablet by mouth Daily for 3 days.  What changed:   medication strength  See the new instructions.           * This list has 2 medication(s) that are the same as other medications prescribed for you. Read the directions carefully, and ask your doctor or other care provider to review them with you.                   Where to Get Your Medications        These medications were sent to [facility] [address] [place] [address] [phone] [place] [phone] [place] [id number]      Phone: [phone]   meloxicam 7.5 MG tablet  predniSONE 50 MG tablet            Zena Webster MD  10/28/23 [id number]

## 2023-10-31 ENCOUNTER — TELEPHONE (OUTPATIENT)
Dept: PULMONOLOGY | Facility: CLINIC | Age: 58
End: 2023-10-31

## 2023-10-31 NOTE — TELEPHONE ENCOUNTER
Caller: OLY OLIVAS    Relationship to patient: Emergency Contact    Best call back number: [phone]    Patient is needing: PT IS ON TRELEGY MACHINE AND IN THE HOSP THEY SAID THEY CAN ADJUST IT BUT THE ORDER NEVER STATED THAT. THE ORDER NEEDS TO STATES THEY CAN COME OUT AND ADJUST IT. WHEN SHE PUTS IT ON ITS GIVING TOO MUCH PRESSURE. PLEASE CALL TO ADVISE

## 2023-11-08 ENCOUNTER — OFFICE VISIT (OUTPATIENT)
Dept: PULMONOLOGY | Facility: CLINIC | Age: 58
End: 2023-11-08
Payer: MEDICARE

## 2023-11-08 VITALS
HEIGHT: 64 IN | DIASTOLIC BLOOD PRESSURE: 62 MMHG | SYSTOLIC BLOOD PRESSURE: 130 MMHG | OXYGEN SATURATION: 82 % | BODY MASS INDEX: 30.73 KG/M2 | TEMPERATURE: 98.6 F | HEART RATE: 67 BPM | WEIGHT: 180 LBS

## 2023-11-08 DIAGNOSIS — R53.81 DEBILITY: ICD-10-CM

## 2023-11-08 DIAGNOSIS — J96.11 CHRONIC RESPIRATORY FAILURE WITH HYPOXIA: ICD-10-CM

## 2023-11-08 DIAGNOSIS — Z23 IMMUNIZATION DUE: ICD-10-CM

## 2023-11-08 DIAGNOSIS — J42 CHRONIC BRONCHITIS, UNSPECIFIED CHRONIC BRONCHITIS TYPE: Primary | ICD-10-CM

## 2023-11-08 NOTE — PROGRESS NOTES
Nina Jiang presents for the following COPD  .    History of Present Illness   Were you born premature?  no    Any Childhood infections? yes      Breathing problems when you were a child? no    Any childhood allergies?    no             At what age did you begin smoking? Former. 14YO    Smoking marijuana? no    Any IV drugs? no    How many packs per day? 1 FORMER    Lung Function Test? yes  Chest X-Ray? Yes   CT Chest? yes Allergy Test? no    Family hx of Lung disease or Lung Cancer?no    If FHx is posivitive for lung cancer, what is the relationship of the family member?     Any hospitalization in the last year? Yes. Was hospitalized about 2 weeks ago.     How far can you walk without getting short of breath? Pt in wheel chair. Can't walk at all without getting short of breath.    Any coughing? no    Any wheezing? no    Acid Reflux? yes    Do you snore? yes    Daytime Fatigue? yes    Any pets? no   Any pet allergies? no    Occupation? Unemployed    Have you been exposed to any chemicals at your job?Former . 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 Albuterol     Have you had the Influenza Vaccine? no   Would you like to receive this Vaccine today? yes     Have you had the Pneumonia Vaccine?  no  Would you like to receive this Vaccine today? yes     Above-mentioned questionnaire was reviewed by me in great detail.  Patient has been used more than 10 years.  As per the patient she had work-up of her lung disease and is attributed to her longstanding COPD.  Checked her pulse ox again and was 92%.  Review of Systems  Positive for sob   Active Problems:  Problem List Items Addressed This Visit          Pulmonary and Pneumonias    COPD (chronic obstructive pulmonary disease) - Primary    Relevant Orders    Complete PFT - Pre & Post Bronchodilator    Alpha - 1 - Antitrypsin    Chronic respiratory failure with hypoxia    Relevant Orders    Miscellaneous DME     Detailed AutoPAP Order       Symptoms and Signs    Debility     Other Visit Diagnoses       Immunization due                Past Medical History:  Past Medical History:   Diagnosis Date    Acid reflux disease     Anal cancer 2019    Arthritis     Asthma, extrinsic     Cholelithiasis     Chronic headaches     COPD (chronic obstructive pulmonary disease)     CVA (cerebral vascular accident)     w/ right sided deficit    CVA (cerebral vascular accident)     Diabetes mellitus     only has high blood sugar when on steriods    Dyslipidemia 2018    History of radiation therapy 2020    Whole pelvis/anal mass    Nausea and vomiting 2016    Obstructive sleep apnea treated with BiPAP     On home oxygen therapy     2L     Sleep apnea, obstructive        Family History:  Family History   Problem Relation Age of Onset    Diabetes Mother     Hypertension Mother     Arrhythmia Mother     Pneumonia Father     Coronary artery disease Other     Arrhythmia Sister     Heart attack Brother     Lymphoma Brother         hodgkin's     Other Brother         CABG    Breast cancer Neg Hx        Social History:  Social History     Tobacco Use    Smoking status: Former     Packs/day: 1.50     Years: 30.00     Additional pack years: 0.00     Total pack years: 45.00     Types: Electronic Cigarette, Cigarettes     Quit date:      Years since quittin.8    Smokeless tobacco: Never   Substance Use Topics    Alcohol use: No       Current Medications:  Current Outpatient Medications   Medication Sig Dispense Refill    albuterol sulfate  (90 Base) MCG/ACT inhaler Inhale 2 puffs Every 6 (Six) Hours As Needed for Wheezing or Shortness of Air. 18 g 0    ALPRAZolam (XANAX) 1 MG tablet Take ½ tablet by mouth 2 (Two) Times a Day As Needed for Anxiety or Sleep. 3 tablet 0    ASPIRIN LOW DOSE 81 MG EC tablet Take 1 tablet by mouth Daily.  3    clopidogrel (PLAVIX) 75 MG tablet Take 1 tablet by mouth Daily.  3     "doxycycline (MONODOX) 100 MG capsule Take 1 capsule by mouth every 12 hours for 5 days as part of COPD Rescue Kit. (Only Start if in YELLOW ZONE.) 10 capsule 0    Fluticasone-Umeclidin-Vilant 200-62.5-25 MCG/ACT aerosol powder  Inhale 1 puff Daily.      furosemide (LASIX) 20 MG tablet Take 1 tablet by mouth Daily.      guaiFENesin (MUCINEX) 600 MG 12 hr tablet Take 2 tablets by mouth 2 (Two) Times a Day As Needed for Cough or Congestion.      HYDROcodone-acetaminophen (NORCO)  MG per tablet Take 1 tablet by mouth Every 6 (Six) Hours.      ipratropium-albuterol (COMBIVENT RESPIMAT)  MCG/ACT inhaler Inhale 1 puff 4 (Four) Times a Day As Needed for Wheezing or Shortness of Air.      levothyroxine (SYNTHROID, LEVOTHROID) 25 MCG tablet Take 1 tablet by mouth Every Morning.      meloxicam (MOBIC) 7.5 MG tablet Take 1 tablet by mouth Daily. 14 tablet 0    metoprolol tartrate (LOPRESSOR) 25 MG tablet Take 1 tablet by mouth 2 (Two) Times a Day.      naloxone (NARCAN) 4 MG/0.1ML nasal spray Call 911. Don't prime. [place] in 1 nostril for overdose. Repeat in 2-3 minutes in other nostril if no or minimal breathing/responsiveness. 2 each 0    polyethylene glycol (MIRALAX) 17 g packet Take 17 g by mouth Daily As Needed (constipation).      potassium chloride (MICRO-K) 10 MEQ CR capsule Take 1 capsule by mouth Daily.      predniSONE (DELTASONE) 20 MG tablet Take 2 tablets by mouth Daily. Take 2 tablets daily for 5 days as part of COPD Rescue Kit. (Only Start if in YELLOW ZONE.) 10 tablet 0     No current facility-administered medications for this visit.       Allergies:  Allergies   Allergen Reactions    Morphine Other (See Comments)     Cause low O2    Roflumilast Diarrhea     Significant diarrhea.        Vitals:  /62   Pulse 67   Temp 98.6 °F (37 °C)   Ht 162.6 cm (64.02\")   Wt 81.6 kg (180 lb)   SpO2 (!) 82%   BMI 30.88 kg/m²     Imaging:    Imaging Results (Most Recent)       None            Pulmonary " "Functions Testing Results:    No results found for: \"FEV1\", \"FVC\", \"WHV0MDJ\", \"TLC\", \"DLCO\"    Results for orders placed or performed during the hospital encounter of 10/28/23   Comprehensive Metabolic Panel    Specimen: Arm, Right; Blood   Result Value Ref Range    Glucose 133 (H) 65 - 99 mg/dL    BUN 13 6 - 20 mg/dL    Creatinine 0.38 (L) 0.57 - 1.00 mg/dL    Sodium 141 136 - 145 mmol/L    Potassium 3.7 3.5 - 5.2 mmol/L    Chloride 92 (L) 98 - 107 mmol/L    CO2 42.3 (H) 22.0 - 29.0 mmol/L    Calcium 9.7 8.6 - 10.5 mg/dL    Total Protein 6.8 6.0 - 8.5 g/dL    Albumin 3.7 3.5 - 5.2 g/dL    ALT (SGPT) 15 1 - 33 U/L    AST (SGOT) 15 1 - 32 U/L    Alkaline Phosphatase 68 39 - 117 U/L    Total Bilirubin 0.7 0.0 - 1.2 mg/dL    Globulin 3.1 gm/dL    A/G Ratio 1.2 g/dL    BUN/Creatinine Ratio 34.2 (H) 7.0 - 25.0    Anion Gap 6.7 5.0 - 15.0 mmol/L    eGFR 116.3 >60.0 mL/min/1.73   High Sensitivity Troponin T    Specimen: Arm, Right; Blood   Result Value Ref Range    HS Troponin T 41 (H) <10 ng/L   CBC Auto Differential    Specimen: Arm, Right; Blood   Result Value Ref Range    WBC 11.70 (H) 3.40 - 10.80 10*3/mm3    RBC 4.78 3.77 - 5.28 10*6/mm3    Hemoglobin 13.6 12.0 - 15.9 g/dL    Hematocrit 46.3 34.0 - 46.6 %    MCV 96.9 79.0 - 97.0 fL    MCH 28.5 26.6 - 33.0 pg    MCHC 29.4 (L) 31.5 - 35.7 g/dL    RDW 15.3 12.3 - 15.4 %    RDW-SD 54.4 (H) 37.0 - 54.0 fl    MPV 11.4 6.0 - 12.0 fL    Platelets 187 140 - 450 10*3/mm3    Neutrophil % 77.2 (H) 42.7 - 76.0 %    Lymphocyte % 14.4 (L) 19.6 - 45.3 %    Monocyte % 7.1 5.0 - 12.0 %    Eosinophil % 0.3 0.3 - 6.2 %    Basophil % 0.4 0.0 - 1.5 %    Immature Grans % 0.6 (H) 0.0 - 0.5 %    Neutrophils, Absolute 9.03 (H) 1.70 - 7.00 10*3/mm3    Lymphocytes, Absolute 1.68 0.70 - 3.10 10*3/mm3    Monocytes, Absolute 0.83 0.10 - 0.90 10*3/mm3    Eosinophils, Absolute 0.04 0.00 - 0.40 10*3/mm3    Basophils, Absolute 0.05 0.00 - 0.20 10*3/mm3    Immature Grans, Absolute 0.07 (H) 0.00 - 0.05 " 10*3/mm3    nRBC 0.0 0.0 - 0.2 /100 WBC   Blood Gas, Arterial With Co-Ox    Specimen: Arterial Blood   Result Value Ref Range    Site Right Radial     William's Test Positive     pH, Arterial 7.441 7.350 - 7.450 pH units    pCO2, Arterial 69.3 (C) 35.0 - 45.0 mm Hg    pO2, Arterial 55.3 (L) 83.0 - 108.0 mm Hg    HCO3, Arterial 47.2 (H) 20.0 - 26.0 mmol/L    Base Excess, Arterial 19.0 (H) 0.0 - 2.0 mmol/L    O2 Saturation, Arterial 89.6 (L) 94.0 - 99.0 %    Hemoglobin, Blood Gas 13.6 13.5 - 17.5 g/dL    Hematocrit, Blood Gas 41.6 38.0 - 51.0 %    Oxyhemoglobin 87.8 (L) 94 - 99 %    Methemoglobin <-0.10 (L) 0.00 - 3.00 %    Carboxyhemoglobin 2.3 0 - 5 %    A-a DO2 86.4 0.0 - 300.0 mmHg    CO2 Content 49.3 (H) 22 - 33 mmol/L    Barometric Pressure for Blood Gas 731 mmHg    Modality Nasal Cannula     FIO2 32 %    Flow Rate 3.0 lpm    Ventilator Mode NA     Notified Who DR GUZMAN     Notified By [id number]     Notified Time 10/28/2023 05:09     Collected by [id number]     pH, Temp Corrected      pCO2, Temperature Corrected      pO2, Temperature Corrected     Urinalysis With Culture If Indicated - Urine, Clean Catch    Specimen: Urine, Clean Catch   Result Value Ref Range    Color, UA Dark Yellow (A) Yellow, Straw    Appearance, UA Clear Clear    pH, UA >=9.0 (H) 5.0 - 8.0    Specific Gravity, UA 1.021 1.005 - 1.030    Glucose, UA Negative Negative    Ketones, UA Negative Negative    Bilirubin, UA Negative Negative    Blood, UA Negative Negative    Protein, UA 30 mg/dL (1+) (A) Negative    Leuk Esterase, UA Trace (A) Negative    Nitrite, UA Negative Negative    Urobilinogen, UA 2.0 E.U./dL (A) 0.2 - 1.0 E.U./dL   Urinalysis, Microscopic Only - Urine, Clean Catch    Specimen: Urine, Clean Catch   Result Value Ref Range    RBC, UA None Seen None Seen, 0-2 /HPF    WBC, UA 0-2 None Seen, 0-2 /HPF    Bacteria, UA Trace (A) None Seen /HPF    Squamous Epithelial Cells, UA 3-6 (A) None Seen, 0-2 /HPF    Hyaline Casts, UA None Seen None Seen  /LPF    Methodology Manual Light Microscopy    High Sensitivity Troponin T 2Hr    Specimen: Arm, Right; Blood   Result Value Ref Range    HS Troponin T 33 (H) <10 ng/L    Troponin T Delta -8 (L) >=-4 - <+4 ng/L   BNP    Specimen: Arm, Right; Blood   Result Value Ref Range    proBNP 288.4 0.0 - 900.0 pg/mL   D-dimer, Quantitative    Specimen: Arm, Left; Blood   Result Value Ref Range    D-Dimer, Quantitative 0.66 (H) 0.00 - 0.58 MCGFEU/mL   Blood Gas, Arterial With Co-Ox    Specimen: Arterial Blood   Result Value Ref Range    Site Right Radial     William's Test Positive     pH, Arterial 7.427 7.350 - 7.450 pH units    pCO2, Arterial 67.5 (C) 35.0 - 45.0 mm Hg    pO2, Arterial 76.2 (L) 83.0 - 108.0 mm Hg    HCO3, Arterial 44.5 (H) 20.0 - 26.0 mmol/L    Base Excess, Arterial 16.3 (H) 0.0 - 2.0 mmol/L    O2 Saturation, Arterial 95.6 94.0 - 99.0 %    Hemoglobin, Blood Gas 14.5 13.5 - 17.5 g/dL    Hematocrit, Blood Gas 44.4 38.0 - 51.0 %    Oxyhemoglobin 93.8 (L) 94 - 99 %    Methemoglobin <-0.10 (L) 0.00 - 3.00 %    Carboxyhemoglobin 2.1 0 - 5 %    A-a DO2 123.7 0.0 - 300.0 mmHg    CO2 Content 46.5 (H) 22 - 33 mmol/L    Barometric Pressure for Blood Gas 732 mmHg    Modality BiPap     FIO2 40 %    Ventilator Mode BiPAP     Set [facility] Resp Rate 22.0     IPAP 24     EPAP 8     Notified Who DR LORENZ     Notified By [id number]     Notified Time 10/28/2023 10:59     Collected by [id number]     pH, Temp Corrected      pCO2, Temperature Corrected      pO2, Temperature Corrected     ECG 12 Lead Chest Pain   Result Value Ref Range    QT Interval 350 ms    QTC Interval 497 ms   ECG 12 Lead Dyspnea   Result Value Ref Range    QT Interval 352 ms    QTC Interval 509 ms   Green Top (Gel)   Result Value Ref Range    Extra Tube Hold for add-ons.    Lavender Top   Result Value Ref Range    Extra Tube hold for add-on    Gold Top - SST   Result Value Ref Range    Extra Tube Hold for add-ons.    Light Blue Top   Result Value Ref Range    Extra Tube Hold  for add-ons.        Objective   Physical Exam  General- normal in appearance, not in any acute distress    HEENT- pupils equally reactive to light, normal in size, no scleral icterus    Neck-supple    Respiratory-respirations normal-on auscultation no wheezing no crackles,     Cardiovascular-  Normal S1 and S2. No S3, S4 or murmurs. No JVD, no carotid bruit and no edema, pulses normal bilaterally     GI-nontender nondistended bowel sounds positive    CNS-nonfocal    Musculoskeletal -no edema  Extremities- no obvious deformity noticed     Psychiatric-mood good, good eye contact, alert awake oriented  Skin- no visible rash        Assessment & Plan   COPD-continue current inhalers.  We will repeat PFTs to assess patient's severity of COPD and accordingly will change inhalers.    Patient does not like to treology will change her to BiPAP.  She was more compliant with BiPAP during her recent hospital and likely better.  We will check alpha- 1 antitrypsin level.    Chronic hypoxic respiratory failure-continue oxygen to maintain saturations 88 to 92%.  She was educated that if she does not use oxygen she can feel lightheaded dizzy and can't pass out hit her head and can die.  Patient understood the conversation very well and will try and use oxygen all the time.     STEVEN-continue BiPAP.  We will get the records of sleep study on next visit.    Gave flu and pneumococcal vaccine in the office today.        ICD-10-CM ICD-9-CM   1. Chronic bronchitis, unspecified chronic bronchitis type  J42 491.9   2. Immunization due  Z23 V05.9   3. Chronic respiratory failure with hypoxia  J96.11 518.83     799.02   4. Debility  R53.81 799.3       No follow-ups on file.

## 2023-11-10 ENCOUNTER — PATIENT ROUNDING (BHMG ONLY) (OUTPATIENT)
Dept: PULMONOLOGY | Facility: CLINIC | Age: 58
End: 2023-11-10
Payer: MEDICARE

## 2023-11-10 NOTE — PROGRESS NOTES
November 10, 2023    Hello, may I speak with Liz Jiang?    My name is Paty       I am  with [facility]  [address]  [place] [place] [address] [phone].    Before we get started may I verify your date of birth? 1965    I am calling to officially welcome you to our practice and ask about your recent visit. Is this a good time to talk? yes    Tell me about your visit with us. What things went well?  Everything went well.        We're always looking for ways to make our patients' experiences even better. Do you have recommendations on ways we may improve?  no    Overall were you satisfied with your first visit to our practice? yes       I appreciate you taking the time to speak with me today. Is there anything else I can do for you? no      Thank you, and have a great day.

## 2023-11-18 ENCOUNTER — HOSPITAL ENCOUNTER (INPATIENT)
Facility: HOSPITAL | Age: 58
LOS: 25 days | Discharge: HOME | End: 2023-12-13
Attending: FAMILY MEDICINE | Admitting: INTERNAL MEDICINE
Payer: MEDICARE

## 2023-11-18 ENCOUNTER — APPOINTMENT (OUTPATIENT)
Dept: GENERAL RADIOLOGY | Facility: HOSPITAL | Age: 58
End: 2023-11-18
Payer: MEDICARE

## 2023-11-18 DIAGNOSIS — J96.21 ACUTE ON CHRONIC RESPIRATORY FAILURE WITH HYPOXIA AND HYPERCAPNIA: ICD-10-CM

## 2023-11-18 DIAGNOSIS — J96.22 ACUTE ON CHRONIC RESPIRATORY FAILURE WITH HYPOXIA AND HYPERCAPNIA: ICD-10-CM

## 2023-11-18 DIAGNOSIS — J44.1 COPD EXACERBATION: ICD-10-CM

## 2023-11-18 DIAGNOSIS — J96.01 ACUTE RESPIRATORY FAILURE WITH HYPOXIA: Primary | ICD-10-CM

## 2023-11-18 DIAGNOSIS — I20.89 STABLE ANGINA PECTORIS: ICD-10-CM

## 2023-11-18 LAB
A-A DO2: 109.2 MMHG (ref 0–300)
A-A DO2: 144.6 MMHG (ref 0–300)
A-A DO2: 80.3 MMHG (ref 0–300)
ABSOLUTE LUNG FLUID CONTENT: 15 % (ref 20–35)
ALBUMIN SERPL-MCNC: 3.7 G/DL (ref 3.5–5.2)
ALBUMIN/GLOB SERPL: 1.3 G/DL
ALP SERPL-CCNC: 72 U/L (ref 39–117)
ALT SERPL W P-5'-P-CCNC: 13 U/L (ref 1–33)
ANION GAP SERPL CALCULATED.3IONS-SCNC: 7.7 MMOL/L (ref 5–15)
APTT PPP: 26 SECONDS (ref 26.5–34.5)
ARTERIAL PATENCY WRIST A: ABNORMAL
ARTERIAL PATENCY WRIST A: POSITIVE
ARTERIAL PATENCY WRIST A: POSITIVE
AST SERPL-CCNC: 16 U/L (ref 1–32)
ATMOSPHERIC PRESS: 725 MMHG
ATMOSPHERIC PRESS: 726 MMHG
ATMOSPHERIC PRESS: 726 MMHG
B PARAPERT DNA SPEC QL NAA+PROBE: NOT DETECTED
B PERT DNA SPEC QL NAA+PROBE: NOT DETECTED
BASE EXCESS BLDA CALC-SCNC: 17.3 MMOL/L (ref 0–2)
BASE EXCESS BLDA CALC-SCNC: 18.5 MMOL/L (ref 0–2)
BASE EXCESS BLDA CALC-SCNC: 20.7 MMOL/L (ref 0–2)
BASOPHILS # BLD AUTO: 0.05 10*3/MM3 (ref 0–0.2)
BASOPHILS NFR BLD AUTO: 0.5 % (ref 0–1.5)
BDY SITE: ABNORMAL
BILIRUB SERPL-MCNC: 0.5 MG/DL (ref 0–1.2)
BILIRUB UR QL STRIP: NEGATIVE
BUN SERPL-MCNC: 14 MG/DL (ref 6–20)
BUN/CREAT SERPL: 36.8 (ref 7–25)
C PNEUM DNA NPH QL NAA+NON-PROBE: NOT DETECTED
CALCIUM SPEC-SCNC: 9.2 MG/DL (ref 8.6–10.5)
CHLORIDE SERPL-SCNC: 93 MMOL/L (ref 98–107)
CLARITY UR: CLEAR
CO2 BLDA-SCNC: 50.7 MMOL/L (ref 22–33)
CO2 BLDA-SCNC: 52.1 MMOL/L (ref 22–33)
CO2 BLDA-SCNC: 54.3 MMOL/L (ref 22–33)
CO2 SERPL-SCNC: 40.3 MMOL/L (ref 22–29)
COHGB MFR BLD: 2.4 % (ref 0–5)
COHGB MFR BLD: 2.5 % (ref 0–5)
COHGB MFR BLD: 2.6 % (ref 0–5)
COLOR UR: YELLOW
CREAT SERPL-MCNC: 0.38 MG/DL (ref 0.57–1)
CRP SERPL-MCNC: 4.51 MG/DL (ref 0–0.5)
D DIMER PPP FEU-MCNC: 0.49 MCGFEU/ML (ref 0–0.58)
D-LACTATE SERPL-SCNC: 1.7 MMOL/L (ref 0.5–2)
DEPRECATED RDW RBC AUTO: 54.8 FL (ref 37–54)
EGFRCR SERPLBLD CKD-EPI 2021: 116.3 ML/MIN/1.73
EOSINOPHIL # BLD AUTO: 0.03 10*3/MM3 (ref 0–0.4)
EOSINOPHIL NFR BLD AUTO: 0.3 % (ref 0.3–6.2)
EPAP: 6
EPAP: 6
ERYTHROCYTE [DISTWIDTH] IN BLOOD BY AUTOMATED COUNT: 15 % (ref 12.3–15.4)
ERYTHROCYTE [SEDIMENTATION RATE] IN BLOOD: 74 MM/HR (ref 0–30)
FLUAV RNA RESP QL NAA+PROBE: NOT DETECTED
FLUAV SUBTYP SPEC NAA+PROBE: NOT DETECTED
FLUBV RNA ISLT QL NAA+PROBE: NOT DETECTED
FLUBV RNA RESP QL NAA+PROBE: NOT DETECTED
GAS FLOW AIRWAY: 3 LPM
GEN 5 2HR TROPONIN T REFLEX: 39 NG/L
GLOBULIN UR ELPH-MCNC: 2.9 GM/DL
GLUCOSE SERPL-MCNC: 174 MG/DL (ref 65–99)
GLUCOSE UR STRIP-MCNC: NEGATIVE MG/DL
HADV DNA SPEC NAA+PROBE: NOT DETECTED
HBA1C MFR BLD: 6.6 % (ref 4.8–5.6)
HCO3 BLDA-SCNC: 47.9 MMOL/L (ref 20–26)
HCO3 BLDA-SCNC: 49.3 MMOL/L (ref 20–26)
HCO3 BLDA-SCNC: 51.5 MMOL/L (ref 20–26)
HCOV 229E RNA SPEC QL NAA+PROBE: NOT DETECTED
HCOV HKU1 RNA SPEC QL NAA+PROBE: NOT DETECTED
HCOV NL63 RNA SPEC QL NAA+PROBE: NOT DETECTED
HCOV OC43 RNA SPEC QL NAA+PROBE: NOT DETECTED
HCT VFR BLD AUTO: 44.8 % (ref 34–46.6)
HCT VFR BLD CALC: 41 % (ref 38–51)
HCT VFR BLD CALC: 42.3 % (ref 38–51)
HCT VFR BLD CALC: 43.9 % (ref 38–51)
HGB BLD-MCNC: 12.9 G/DL (ref 12–15.9)
HGB BLDA-MCNC: 13.4 G/DL (ref 13.5–17.5)
HGB BLDA-MCNC: 13.8 G/DL (ref 13.5–17.5)
HGB BLDA-MCNC: 14.3 G/DL (ref 13.5–17.5)
HGB UR QL STRIP.AUTO: NEGATIVE
HMPV RNA NPH QL NAA+NON-PROBE: NOT DETECTED
HOLD SPECIMEN: NORMAL
HOLD SPECIMEN: NORMAL
HPIV1 RNA ISLT QL NAA+PROBE: NOT DETECTED
HPIV2 RNA SPEC QL NAA+PROBE: NOT DETECTED
HPIV3 RNA NPH QL NAA+PROBE: NOT DETECTED
HPIV4 P GENE NPH QL NAA+PROBE: NOT DETECTED
HYPOCHROMIA BLD QL: NORMAL
IMM GRANULOCYTES # BLD AUTO: 0.03 10*3/MM3 (ref 0–0.05)
IMM GRANULOCYTES NFR BLD AUTO: 0.3 % (ref 0–0.5)
INHALED O2 CONCENTRATION: 32 %
INHALED O2 CONCENTRATION: 40 %
INHALED O2 CONCENTRATION: 45 %
INR PPP: 0.94 (ref 0.9–1.1)
IPAP: 22
IPAP: 22
KETONES UR QL STRIP: NEGATIVE
LEUKOCYTE ESTERASE UR QL STRIP.AUTO: NEGATIVE
LYMPHOCYTES # BLD AUTO: 1.34 10*3/MM3 (ref 0.7–3.1)
LYMPHOCYTES NFR BLD AUTO: 12.1 % (ref 19.6–45.3)
Lab: ABNORMAL
M PNEUMO IGG SER IA-ACNC: NOT DETECTED
MACROCYTES BLD QL SMEAR: NORMAL
MAGNESIUM SERPL-MCNC: 1.9 MG/DL (ref 1.6–2.6)
MCH RBC QN AUTO: 28.5 PG (ref 26.6–33)
MCHC RBC AUTO-ENTMCNC: 28.8 G/DL (ref 31.5–35.7)
MCV RBC AUTO: 99.1 FL (ref 79–97)
METHGB BLD QL: <-0.1 % (ref 0–3)
MODALITY: ABNORMAL
MONOCYTES # BLD AUTO: 0.67 10*3/MM3 (ref 0.1–0.9)
MONOCYTES NFR BLD AUTO: 6.1 % (ref 5–12)
NEUTROPHILS NFR BLD AUTO: 8.92 10*3/MM3 (ref 1.7–7)
NEUTROPHILS NFR BLD AUTO: 80.7 % (ref 42.7–76)
NITRITE UR QL STRIP: NEGATIVE
NOTE: ABNORMAL
NOTE: ABNORMAL
NOTIFIED BY: ABNORMAL
NOTIFIED WHO: ABNORMAL
NRBC BLD AUTO-RTO: 0 /100 WBC (ref 0–0.2)
NT-PROBNP SERPL-MCNC: 240 PG/ML (ref 0–900)
OXYHGB MFR BLDV: 62.2 % (ref 94–99)
OXYHGB MFR BLDV: 88.5 % (ref 94–99)
OXYHGB MFR BLDV: 89 % (ref 94–99)
PCO2 BLDA: 89.5 MM HG (ref 35–45)
PCO2 BLDA: 90.7 MM HG (ref 35–45)
PCO2 BLDA: 91.3 MM HG (ref 35–45)
PCO2 TEMP ADJ BLD: ABNORMAL MM[HG]
PH BLDA: 7.33 PH UNITS (ref 7.35–7.45)
PH BLDA: 7.35 PH UNITS (ref 7.35–7.45)
PH BLDA: 7.36 PH UNITS (ref 7.35–7.45)
PH UR STRIP.AUTO: 6.5 [PH] (ref 5–8)
PH, TEMP CORRECTED: ABNORMAL
PLAT MORPH BLD: NORMAL
PLATELET # BLD AUTO: 154 10*3/MM3 (ref 140–450)
PMV BLD AUTO: 11.4 FL (ref 6–12)
PO2 BLDA: 36.3 MM HG (ref 83–108)
PO2 BLDA: 61.7 MM HG (ref 83–108)
PO2 BLDA: 63.3 MM HG (ref 83–108)
PO2 TEMP ADJ BLD: ABNORMAL MM[HG]
POTASSIUM SERPL-SCNC: 3.6 MMOL/L (ref 3.5–5.2)
PROCALCITONIN SERPL-MCNC: 0.03 NG/ML (ref 0–0.25)
PROT SERPL-MCNC: 6.6 G/DL (ref 6–8.5)
PROT UR QL STRIP: NEGATIVE
PROTHROMBIN TIME: 13.1 SECONDS (ref 12.1–14.7)
RBC # BLD AUTO: 4.52 10*6/MM3 (ref 3.77–5.28)
RHINOVIRUS RNA SPEC NAA+PROBE: NOT DETECTED
RSV RNA NPH QL NAA+NON-PROBE: NOT DETECTED
SAO2 % BLDCOA: 63.4 % (ref 94–99)
SAO2 % BLDCOA: 90.7 % (ref 94–99)
SAO2 % BLDCOA: 91 % (ref 94–99)
SARS-COV-2 RNA NPH QL NAA+NON-PROBE: NOT DETECTED
SARS-COV-2 RNA RESP QL NAA+PROBE: NOT DETECTED
SET MECH RESP RATE: 22
SET MECH RESP RATE: 24
SODIUM SERPL-SCNC: 141 MMOL/L (ref 136–145)
SP GR UR STRIP: 1.01 (ref 1–1.03)
STOMATOCYTES BLD QL SMEAR: NORMAL
T4 FREE SERPL-MCNC: 1.09 NG/DL (ref 0.93–1.7)
TROPONIN T DELTA: -6 NG/L
TROPONIN T SERPL HS-MCNC: 29 NG/L
TROPONIN T SERPL HS-MCNC: 45 NG/L
TSH SERPL DL<=0.05 MIU/L-ACNC: 5.17 UIU/ML (ref 0.27–4.2)
UROBILINOGEN UR QL STRIP: NORMAL
VENTILATOR MODE: ABNORMAL
WBC NRBC COR # BLD AUTO: 11.04 10*3/MM3 (ref 3.4–10.8)
WHOLE BLOOD HOLD COAG: NORMAL
WHOLE BLOOD HOLD SPECIMEN: NORMAL

## 2023-11-18 PROCEDURE — 87040 BLOOD CULTURE FOR BACTERIA: CPT | Performed by: PHYSICIAN ASSISTANT

## 2023-11-18 PROCEDURE — 94799 UNLISTED PULMONARY SVC/PX: CPT

## 2023-11-18 PROCEDURE — 83050 HGB METHEMOGLOBIN QUAN: CPT

## 2023-11-18 PROCEDURE — 99222 1ST HOSP IP/OBS MODERATE 55: CPT | Performed by: INTERNAL MEDICINE

## 2023-11-18 PROCEDURE — 82805 BLOOD GASES W/O2 SATURATION: CPT

## 2023-11-18 PROCEDURE — 93005 ELECTROCARDIOGRAM TRACING: CPT | Performed by: PHYSICIAN ASSISTANT

## 2023-11-18 PROCEDURE — 5A09557 ASSISTANCE WITH RESPIRATORY VENTILATION, GREATER THAN 96 CONSECUTIVE HOURS, CONTINUOUS POSITIVE AIRWAY PRESSURE: ICD-10-PCS | Performed by: FAMILY MEDICINE

## 2023-11-18 PROCEDURE — 86140 C-REACTIVE PROTEIN: CPT | Performed by: PHYSICIAN ASSISTANT

## 2023-11-18 PROCEDURE — 36415 COLL VENOUS BLD VENIPUNCTURE: CPT

## 2023-11-18 PROCEDURE — 82375 ASSAY CARBOXYHB QUANT: CPT

## 2023-11-18 PROCEDURE — 83036 HEMOGLOBIN GLYCOSYLATED A1C: CPT | Performed by: PHYSICIAN ASSISTANT

## 2023-11-18 PROCEDURE — 84439 ASSAY OF FREE THYROXINE: CPT | Performed by: PHYSICIAN ASSISTANT

## 2023-11-18 PROCEDURE — 85730 THROMBOPLASTIN TIME PARTIAL: CPT | Performed by: PHYSICIAN ASSISTANT

## 2023-11-18 PROCEDURE — 83735 ASSAY OF MAGNESIUM: CPT | Performed by: PHYSICIAN ASSISTANT

## 2023-11-18 PROCEDURE — 94761 N-INVAS EAR/PLS OXIMETRY MLT: CPT

## 2023-11-18 PROCEDURE — 83880 ASSAY OF NATRIURETIC PEPTIDE: CPT | Performed by: PHYSICIAN ASSISTANT

## 2023-11-18 PROCEDURE — 85652 RBC SED RATE AUTOMATED: CPT | Performed by: PHYSICIAN ASSISTANT

## 2023-11-18 PROCEDURE — 36600 WITHDRAWAL OF ARTERIAL BLOOD: CPT

## 2023-11-18 PROCEDURE — 84443 ASSAY THYROID STIM HORMONE: CPT | Performed by: PHYSICIAN ASSISTANT

## 2023-11-18 PROCEDURE — 85025 COMPLETE CBC W/AUTO DIFF WBC: CPT | Performed by: PHYSICIAN ASSISTANT

## 2023-11-18 PROCEDURE — 0202U NFCT DS 22 TRGT SARS-COV-2: CPT | Performed by: INTERNAL MEDICINE

## 2023-11-18 PROCEDURE — 81003 URINALYSIS AUTO W/O SCOPE: CPT | Performed by: PHYSICIAN ASSISTANT

## 2023-11-18 PROCEDURE — 25010000002 HEPARIN (PORCINE) PER 1000 UNITS: Performed by: INTERNAL MEDICINE

## 2023-11-18 PROCEDURE — 84145 PROCALCITONIN (PCT): CPT | Performed by: PHYSICIAN ASSISTANT

## 2023-11-18 PROCEDURE — 94664 DEMO&/EVAL PT USE INHALER: CPT

## 2023-11-18 PROCEDURE — 71045 X-RAY EXAM CHEST 1 VIEW: CPT

## 2023-11-18 PROCEDURE — 94640 AIRWAY INHALATION TREATMENT: CPT

## 2023-11-18 PROCEDURE — 25010000002 METHYLPREDNISOLONE PER 40 MG: Performed by: INTERNAL MEDICINE

## 2023-11-18 PROCEDURE — 93005 ELECTROCARDIOGRAM TRACING: CPT | Performed by: INTERNAL MEDICINE

## 2023-11-18 PROCEDURE — 94726 PLETHYSMOGRAPHY LUNG VOLUMES: CPT

## 2023-11-18 PROCEDURE — 87636 SARSCOV2 & INF A&B AMP PRB: CPT | Performed by: PHYSICIAN ASSISTANT

## 2023-11-18 PROCEDURE — 85379 FIBRIN DEGRADATION QUANT: CPT | Performed by: INTERNAL MEDICINE

## 2023-11-18 PROCEDURE — 99285 EMERGENCY DEPT VISIT HI MDM: CPT

## 2023-11-18 PROCEDURE — 83605 ASSAY OF LACTIC ACID: CPT | Performed by: PHYSICIAN ASSISTANT

## 2023-11-18 PROCEDURE — 25010000002 METHYLPREDNISOLONE PER 125 MG: Performed by: STUDENT IN AN ORGANIZED HEALTH CARE EDUCATION/TRAINING PROGRAM

## 2023-11-18 PROCEDURE — 85007 BL SMEAR W/DIFF WBC COUNT: CPT | Performed by: PHYSICIAN ASSISTANT

## 2023-11-18 PROCEDURE — 25010000002 FUROSEMIDE PER 20 MG: Performed by: PHYSICIAN ASSISTANT

## 2023-11-18 PROCEDURE — 94660 CPAP INITIATION&MGMT: CPT

## 2023-11-18 PROCEDURE — 85610 PROTHROMBIN TIME: CPT | Performed by: PHYSICIAN ASSISTANT

## 2023-11-18 PROCEDURE — 84484 ASSAY OF TROPONIN QUANT: CPT | Performed by: PHYSICIAN ASSISTANT

## 2023-11-18 PROCEDURE — 84484 ASSAY OF TROPONIN QUANT: CPT | Performed by: INTERNAL MEDICINE

## 2023-11-18 PROCEDURE — 99223 1ST HOSP IP/OBS HIGH 75: CPT | Performed by: INTERNAL MEDICINE

## 2023-11-18 PROCEDURE — 80053 COMPREHEN METABOLIC PANEL: CPT | Performed by: PHYSICIAN ASSISTANT

## 2023-11-18 RX ORDER — ALPRAZOLAM 1 MG/1
1 TABLET ORAL DAILY PRN
Status: CANCELLED | OUTPATIENT
Start: 2023-11-18

## 2023-11-18 RX ORDER — BUDESONIDE AND FORMOTEROL FUMARATE DIHYDRATE 160; 4.5 UG/1; UG/1
2 AEROSOL RESPIRATORY (INHALATION)
Status: DISCONTINUED | OUTPATIENT
Start: 2023-11-18 | End: 2023-12-02

## 2023-11-18 RX ORDER — ACETAMINOPHEN 325 MG/1
650 TABLET ORAL EVERY 6 HOURS PRN
Status: DISCONTINUED | OUTPATIENT
Start: 2023-11-18 | End: 2023-12-13 | Stop reason: HOSPADM

## 2023-11-18 RX ORDER — METHYLPREDNISOLONE SODIUM SUCCINATE 40 MG/ML
40 INJECTION, POWDER, LYOPHILIZED, FOR SOLUTION INTRAMUSCULAR; INTRAVENOUS EVERY 12 HOURS
Status: DISCONTINUED | OUTPATIENT
Start: 2023-11-18 | End: 2023-11-22

## 2023-11-18 RX ORDER — ALPRAZOLAM 1 MG/1
1 TABLET ORAL DAILY PRN
Status: DISPENSED | OUTPATIENT
Start: 2023-11-18 | End: 2023-11-25

## 2023-11-18 RX ORDER — HYDROCODONE BITARTRATE AND ACETAMINOPHEN 10; 325 MG/1; MG/1
1 TABLET ORAL EVERY 6 HOURS PRN
Status: CANCELLED | OUTPATIENT
Start: 2023-11-18

## 2023-11-18 RX ORDER — SODIUM CHLORIDE 0.9 % (FLUSH) 0.9 %
10 SYRINGE (ML) INJECTION AS NEEDED
Status: DISCONTINUED | OUTPATIENT
Start: 2023-11-18 | End: 2023-12-13 | Stop reason: HOSPADM

## 2023-11-18 RX ORDER — SODIUM CHLORIDE 9 MG/ML
40 INJECTION, SOLUTION INTRAVENOUS AS NEEDED
Status: DISCONTINUED | OUTPATIENT
Start: 2023-11-18 | End: 2023-12-13 | Stop reason: HOSPADM

## 2023-11-18 RX ORDER — ALPRAZOLAM 1 MG/1
1 TABLET ORAL DAILY PRN
COMMUNITY
End: 2023-12-13 | Stop reason: HOSPADM

## 2023-11-18 RX ORDER — SODIUM CHLORIDE 0.9 % (FLUSH) 0.9 %
10 SYRINGE (ML) INJECTION EVERY 12 HOURS SCHEDULED
Status: DISCONTINUED | OUTPATIENT
Start: 2023-11-18 | End: 2023-12-13 | Stop reason: HOSPADM

## 2023-11-18 RX ORDER — BUDESONIDE AND FORMOTEROL FUMARATE DIHYDRATE 160; 4.5 UG/1; UG/1
2 AEROSOL RESPIRATORY (INHALATION)
Status: CANCELLED | OUTPATIENT
Start: 2023-11-18

## 2023-11-18 RX ORDER — AMOXICILLIN 250 MG
2 CAPSULE ORAL 2 TIMES DAILY
Status: DISCONTINUED | OUTPATIENT
Start: 2023-11-18 | End: 2023-12-13 | Stop reason: HOSPADM

## 2023-11-18 RX ORDER — NITROGLYCERIN 0.4 MG/1
0.4 TABLET SUBLINGUAL
Status: DISCONTINUED | OUTPATIENT
Start: 2023-11-18 | End: 2023-12-02

## 2023-11-18 RX ORDER — IPRATROPIUM BROMIDE AND ALBUTEROL SULFATE 2.5; .5 MG/3ML; MG/3ML
3 SOLUTION RESPIRATORY (INHALATION) ONCE
Status: COMPLETED | OUTPATIENT
Start: 2023-11-18 | End: 2023-11-18

## 2023-11-18 RX ORDER — POLYETHYLENE GLYCOL 3350 17 G/17G
17 POWDER, FOR SOLUTION ORAL DAILY PRN
Status: DISCONTINUED | OUTPATIENT
Start: 2023-11-18 | End: 2023-12-13 | Stop reason: HOSPADM

## 2023-11-18 RX ORDER — BISACODYL 5 MG/1
5 TABLET, DELAYED RELEASE ORAL DAILY PRN
Status: DISCONTINUED | OUTPATIENT
Start: 2023-11-18 | End: 2023-12-13 | Stop reason: HOSPADM

## 2023-11-18 RX ORDER — FLUTICASONE PROPIONATE 50 MCG
2 SPRAY, SUSPENSION (ML) NASAL DAILY
Status: CANCELLED | OUTPATIENT
Start: 2023-11-18

## 2023-11-18 RX ORDER — CLOPIDOGREL BISULFATE 75 MG/1
75 TABLET ORAL DAILY
Status: CANCELLED | OUTPATIENT
Start: 2023-11-18

## 2023-11-18 RX ORDER — IPRATROPIUM BROMIDE AND ALBUTEROL SULFATE 2.5; .5 MG/3ML; MG/3ML
3 SOLUTION RESPIRATORY (INHALATION) EVERY 4 HOURS PRN
COMMUNITY

## 2023-11-18 RX ORDER — FLUTICASONE PROPIONATE 50 MCG
2 SPRAY, SUSPENSION (ML) NASAL DAILY
COMMUNITY

## 2023-11-18 RX ORDER — POTASSIUM CHLORIDE 20 MEQ/1
10 TABLET, EXTENDED RELEASE ORAL DAILY
Status: CANCELLED | OUTPATIENT
Start: 2023-11-18

## 2023-11-18 RX ORDER — IPRATROPIUM BROMIDE AND ALBUTEROL SULFATE 2.5; .5 MG/3ML; MG/3ML
3 SOLUTION RESPIRATORY (INHALATION) EVERY 6 HOURS PRN
Status: CANCELLED | OUTPATIENT
Start: 2023-11-18

## 2023-11-18 RX ORDER — LEVOTHYROXINE SODIUM 0.03 MG/1
25 TABLET ORAL
Status: CANCELLED | OUTPATIENT
Start: 2023-11-19

## 2023-11-18 RX ORDER — FUROSEMIDE 20 MG/1
20 TABLET ORAL DAILY
Status: CANCELLED | OUTPATIENT
Start: 2023-11-18

## 2023-11-18 RX ORDER — METHYLPREDNISOLONE SODIUM SUCCINATE 125 MG/2ML
80 INJECTION, POWDER, LYOPHILIZED, FOR SOLUTION INTRAMUSCULAR; INTRAVENOUS ONCE
Status: COMPLETED | OUTPATIENT
Start: 2023-11-18 | End: 2023-11-18

## 2023-11-18 RX ORDER — BISACODYL 10 MG
10 SUPPOSITORY, RECTAL RECTAL DAILY PRN
Status: DISCONTINUED | OUTPATIENT
Start: 2023-11-18 | End: 2023-12-13 | Stop reason: HOSPADM

## 2023-11-18 RX ORDER — FUROSEMIDE 10 MG/ML
80 INJECTION INTRAMUSCULAR; INTRAVENOUS ONCE
Status: COMPLETED | OUTPATIENT
Start: 2023-11-18 | End: 2023-11-18

## 2023-11-18 RX ORDER — ASPIRIN 81 MG/1
324 TABLET, CHEWABLE ORAL ONCE
Status: DISCONTINUED | OUTPATIENT
Start: 2023-11-18 | End: 2023-12-13 | Stop reason: HOSPADM

## 2023-11-18 RX ORDER — HEPARIN SODIUM 5000 [USP'U]/ML
5000 INJECTION, SOLUTION INTRAVENOUS; SUBCUTANEOUS EVERY 8 HOURS SCHEDULED
Status: DISCONTINUED | OUTPATIENT
Start: 2023-11-18 | End: 2023-11-30

## 2023-11-18 RX ADMIN — IPRATROPIUM BROMIDE AND ALBUTEROL SULFATE 3 ML: 2.5; .5 SOLUTION RESPIRATORY (INHALATION) at 15:18

## 2023-11-18 RX ADMIN — IPRATROPIUM BROMIDE 0.5 MG: 0.5 SOLUTION RESPIRATORY (INHALATION) at 18:30

## 2023-11-18 RX ADMIN — METHYLPREDNISOLONE SODIUM SUCCINATE 40 MG: 40 INJECTION, POWDER, FOR SOLUTION INTRAMUSCULAR; INTRAVENOUS at 17:10

## 2023-11-18 RX ADMIN — METHYLPREDNISOLONE SODIUM SUCCINATE 80 MG: 125 INJECTION, POWDER, FOR SOLUTION INTRAMUSCULAR; INTRAVENOUS at 15:30

## 2023-11-18 RX ADMIN — ALPRAZOLAM 1 MG: 1 TABLET ORAL at 23:00

## 2023-11-18 RX ADMIN — Medication 10 ML: at 21:25

## 2023-11-18 RX ADMIN — HEPARIN SODIUM 5000 UNITS: 5000 INJECTION INTRAVENOUS; SUBCUTANEOUS at 21:26

## 2023-11-18 RX ADMIN — FUROSEMIDE 80 MG: 10 INJECTION, SOLUTION INTRAMUSCULAR; INTRAVENOUS at 13:28

## 2023-11-18 NOTE — PROGRESS NOTES
Pharmacy COPD Maintenance Inhaler Assessment     Pharmacy was consulted for dosing and coverage assessment of COPD maintenance medications for discharge. The patient is currently hospitalized and being treated for Acute Respiratory Failure.     The patient's prior to admission medication list includes: Trelegy and Combivent Respimat (both have been filled on a monthly basis since December 2022).        Based on the most recent [facility] literature, the patient should be using an inhaled medication(s) containing the following drug classes: ICS/LABA/LAMA (Prior hospitalization for COPD exacerbation supports use of ICS, especially with eosinophils ~300 cells/microliter)    Since the patient is already on triple therapy at home, will not order any new medications for discharge.     Please reach out if further assistance is needed.     Thank you,    Latia Bui PharmD  Community PGY1 Pharmacy Resident    11/18/23  16:24 EST

## 2023-11-18 NOTE — H&P
[facility] Medicine Services  HISTORY & PHYSICAL    Patient Identification:  Name:  Liz Jiang  Age:  57 y.o.  Sex:  female  :  1965  MRN:  [id number]   Visit Number:  [id number]  Admit Date: 2023   Primary Care Physician:  Tabitha Ron APRN     Subjective     Chief complaint:   Chief Complaint   Patient presents with    Shortness of Breath     History of presenting illness:   Patient is a 57 y.o. 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     Patient with multiple admissions recently for COPD exacerbation and respiratory failure.  Patient most recently admitted to this facility 10/12/2023 through 10/17/2023 similar symptoms.  She was treated with IV Solu-Medrol and nebulizer treatments.  She returned back to her baseline functioning status and was titrated down to her home oxygen requirement.  Pulmonology evaluated the patient while inpatient and adjusted her home trilogy settings at that time.  Patient was to be enrolled in our COPD clinic and was discharged with a COPD rescue kit per protocol.    Patient states that her shortness of breath has progressively worsened over the last couple of days.  She reports cough, no sputum production.  She reports chest tightness but no radiating chest pain.  She reports her chest tightness is with inspiration.  She denies  any fevers or chills.  No upper respiratory complaints.  No ill contacts to her knowledge.  She reports using her home trilogy machine, however son at bedside states that the patient's daughter who stays with the patient reports that the patient has not been compliant with her BiPAP.  Patient states she believes that her trilogy settings are incorrect.  She is noted to not wear her trilogy machine with all naps, she states she wears it on average of 5 to 6 hours at night only.  She denies any abdominal pain or change in bowel movements, she does report some nausea with no vomiting.  She does report abdominal bloating.  She denies any urinary complaints.  No headache or dizziness, no LOC.  She denies any numbness tingling or paresthesias.  No other complaints.  She has been on BiPAP for some time prior to my evaluation and reports dramatic improvement.  She states she is eager to get off BiPAP and have something to drink.    Upon arrival to the ED, vitals were temperature 97.9, heart rate 128, respiratory rate 20, blood pressure 113/75, and oxygen saturation 70% on 3 L nasal cannula.  Initial ABG with pH 7.331, PCO2 90.7, PO2 36.3, HCO3 47.9, and oxygen saturation 63.4% on 3 L nasal cannula.  Patient was subsequently placed on BiPAP at 45% FiO2, repeat ABG with improvement in pH to 7.350, PCO2 89.5, P O2 63.3, HCO3 49.3, and oxygen saturation 91%.  Initial high-sensitivity troponin T 45 with repeat at 39, -6 delta.  proBNP 240.  CMP with chloride 93, CO2 40.3, BUN/creatinine ratio 36.8, and glucose 174.  TSH 5.170 with free T4 1.09.  C-reactive protein 4.51.  Lactate 1.7.  Procalcitonin negative at 0.03.  D-dimer 0.49.  CBC with white blood cell count 11.04, MCV 99.1, neutrophil percentage 80.7%, and absolute neutrophil count 8.92.  Sedimentation rate 74.  Urinalysis unremarkable.  COVID-19/influenza screening negative.  Chest x-ray with unchanged appearance of the chest when compared to 10/28/2023 with coarse  interstitial markings.  No acute findings appreciated per radiology read.  Known ED medication administration: 80 mg IV Lasix x1, DuoNeb nebulizer treatment x1, and 80 mg IV Solu-Medrol x1.    Patient has been admitted to the progressive care unit for further evaluation and treatment.     Present during exam: Patient's son  ---------------------------------------------------------------------------------------------------------------------   Review of Systems   Constitutional:  Positive for fatigue. Negative for appetite change, chills, diaphoresis and fever.   HENT:  Negative for congestion and sore throat.    Eyes:  Negative for discharge and visual disturbance.   Respiratory:  Positive for cough, chest tightness and shortness of breath. Negative for wheezing.    Cardiovascular:  Negative for palpitations and leg swelling.   Gastrointestinal:  Positive for abdominal distention (Bloating) and nausea. Negative for abdominal pain, constipation, diarrhea and vomiting.   Endocrine: Negative for cold intolerance and heat intolerance.   Genitourinary:  Negative for decreased urine volume, dysuria, frequency and urgency.   Musculoskeletal:  Negative for arthralgias and myalgias.   Skin:  Negative for color change and wound.   Allergic/Immunologic: Negative for environmental allergies and immunocompromised state.   Neurological:  Negative for dizziness, syncope, weakness, light-headedness and headaches.   Hematological:  Negative for adenopathy. Does not bruise/bleed easily.   Psychiatric/Behavioral:  Positive for confusion (Earlier prior to being placed on BiPAP, she states that she felt confused and did not even know who she was). Negative for decreased concentration. The patient is not nervous/anxious.       ---------------------------------------------------------------------------------------------------------------------   Past Medical History:   Diagnosis Date    Acid reflux disease     Anal cancer 12/05/2019     Arthritis     Asthma, extrinsic     Cholelithiasis     Chronic headaches     COPD (chronic obstructive pulmonary disease)     CVA (cerebral vascular accident)     w/ right sided deficit    CVA (cerebral vascular accident)     Diabetes mellitus     only has high blood sugar when on steriods    Dyslipidemia 2018    History of radiation therapy 2020    Whole pelvis/anal mass    Nausea and vomiting 2016    Obstructive sleep apnea treated with BiPAP     On home oxygen therapy     2L     Sleep apnea, obstructive      Past Surgical History:   Procedure Laterality Date    ABDOMINAL SURGERY      CARDIAC CATHETERIZATION      CAROTID ENDARTERECTOMY Left 2018    CHOLECYSTECTOMY WITH INTRAOPERATIVE CHOLANGIOGRAM N/A 2017    Procedure: CHOLECYSTECTOMY LAPAROSCOPIC INTRAOPERATIVE CHOLANGIOGRAM;  Surgeon: Carolin Marie MD;  Location: [facility];  Service:     COLONOSCOPY      HYSTERECTOMY      for heavy bleeding/ complete    KIDNEY STONE SURGERY      TUBAL ABDOMINAL LIGATION      VENOUS ACCESS DEVICE (PORT) INSERTION Left 2019    Procedure: INSERTION VENOUS ACCESS DEVICE;  Surgeon: Carolin Marie MD;  Location: [facility];  Service: General     Family History   Problem Relation Age of Onset    Diabetes Mother     Hypertension Mother     Arrhythmia Mother     Pneumonia Father     Coronary artery disease Other     Arrhythmia Sister     Heart attack Brother     Lymphoma Brother         hodgkin's     Other Brother         CABG    Breast cancer Neg Hx      Social History     Socioeconomic History    Marital status:    Tobacco Use    Smoking status: Former     Packs/day: 1.50     Years: 30.00     Additional pack years: 0.00     Total pack years: 45.00     Types: Electronic Cigarette, Cigarettes     Quit date:      Years since quittin.8    Smokeless tobacco: Never   Vaping Use    Vaping Use: Unknown   Substance and Sexual Activity    Alcohol use: No    Drug use: Defer    Sexual activity:  Defer     ---------------------------------------------------------------------------------------------------------------------   Allergies:  Morphine and Roflumilast  ---------------------------------------------------------------------------------------------------------------------   Medications below are reported home medications pulling from within the system; at this time, these medications have not been reconciled unless otherwise specified and are in the verification process for further verifcation as current home medications.    Prior to Admission Medications       Prescriptions Last Dose Informant Patient Reported? Taking?    albuterol sulfate  (90 Base) MCG/ACT inhaler  Self No No    Inhale 2 puffs Every 6 (Six) Hours As Needed for Wheezing or Shortness of Air.    ALPRAZolam (XANAX) 1 MG tablet  Self No No    Take ½ tablet by mouth 2 (Two) Times a Day As Needed for Anxiety or Sleep.    ASPIRIN LOW DOSE 81 MG EC tablet  Self Yes No    Take 1 tablet by mouth Daily.    clopidogrel (PLAVIX) 75 MG tablet  Self Yes No    Take 1 tablet by mouth Daily.    doxycycline (MONODOX) 100 MG capsule   No No    Take 1 capsule by mouth every 12 hours for 5 days as part of COPD Rescue Kit. (Only Start if in YELLOW ZONE.)    Fluticasone-Umeclidin-Vilant 200-62.5-25 MCG/ACT aerosol powder   Self Yes No    Inhale 1 puff Daily.    furosemide (LASIX) 20 MG tablet  Self Yes No    Take 1 tablet by mouth Daily.    guaiFENesin (MUCINEX) 600 MG 12 hr tablet  Self Yes No    Take 2 tablets by mouth 2 (Two) Times a Day As Needed for Cough or Congestion.    HYDROcodone-acetaminophen (NORCO)  MG per tablet  Self Yes No    Take 1 tablet by mouth Every 6 (Six) Hours.    ipratropium-albuterol (COMBIVENT RESPIMAT)  MCG/ACT inhaler  Self Yes No    Inhale 1 puff 4 (Four) Times a Day As Needed for Wheezing or Shortness of Air.    levothyroxine (SYNTHROID, LEVOTHROID) 25 MCG tablet  Self Yes No    Take 1 tablet by mouth Every  Morning.    meloxicam (MOBIC) 7.5 MG tablet   No No    Take 1 tablet by mouth Daily.    metoprolol tartrate (LOPRESSOR) 25 MG tablet  Self Yes No    Take 1 tablet by mouth 2 (Two) Times a Day.    naloxone (NARCAN) 4 MG/0.1ML nasal spray  Self No No    Call 911. Don't prime. [place] in 1 nostril for overdose. Repeat in 2-3 minutes in other nostril if no or minimal breathing/responsiveness.    polyethylene glycol (MIRALAX) 17 g packet  Self Yes No    Take 17 g by mouth Daily As Needed (constipation).    potassium chloride (MICRO-K) 10 MEQ CR capsule  Self Yes No    Take 1 capsule by mouth Daily.    predniSONE (DELTASONE) 20 MG tablet   No No    Take 2 tablets by mouth Daily. Take 2 tablets daily for 5 days as part of COPD Rescue Kit. (Only Start if in YELLOW ZONE.)          ---------------------------------------------------------------------------------------------------------------------    Objective     Hospital Scheduled Meds:          Current listed hospital scheduled medications may not yet reflect those currently placed in orders that are signed and held, awaiting patient's arrival to floor/unit.    ---------------------------------------------------------------------------------------------------------------------   Vital Signs:  Temp:  [97.9 °F (36.6 °C)] 97.9 °F (36.6 °C)  Heart Rate:  [117-140] 117  Resp:  [20-24] 22  BP: (110-113)/(75-78) 113/75  Mean Arterial Pressure (Non-Invasive) for the past 24 hrs (Last 3 readings):   Noninvasive MAP (mmHg)   11/18/23 1300 86   11/18/23 1258 89     SpO2 Percentage    11/18/23 1425 11/18/23 1518 11/18/23 1526   SpO2: 92% 90% 90%     SpO2:  [69 %-92 %] 90 %  on  Flow (L/min):  [3] 3;   Device (Oxygen Therapy): NPPV/NIV    Body mass index is 30.9 kg/m².  Wt Readings from Last 3 Encounters:   11/18/23 81.6 kg (180 lb)   11/08/23 81.6 kg (180 lb)   10/28/23 81.6 kg (180 lb)        ---------------------------------------------------------------------------------------------------------------------   Physical Exam:  Physical Exam  Nursing note reviewed.   Constitutional:       General: She is awake. She is not in acute distress.     Appearance: She is well-developed. She is obese. She is ill-appearing. She is not toxic-appearing.      Comments: Lying in bed.  BiPAP in place.  Son present at bedside.   HENT:      Head: Normocephalic and atraumatic.      Mouth/Throat:      Mouth: Mucous membranes are moist.   Eyes:      Conjunctiva/sclera: Conjunctivae normal.      Pupils: Pupils are equal, round, and reactive to light.   Cardiovascular:      Rate and Rhythm: Regular rhythm. Tachycardia present.      Pulses:           Dorsalis pedis pulses are 2+ on the right side and 2+ on the left side.      Heart sounds: Normal heart sounds. No murmur heard.     No friction rub. No gallop.   Pulmonary:      Effort: Pulmonary effort is normal.      Breath sounds: Normal air entry. Decreased breath sounds and wheezing (Expiratory) present. No rhonchi or rales.      Comments: BiPAP in place.  Abdominal:      General: Bowel sounds are normal.      Palpations: Abdomen is soft.      Tenderness: There is no abdominal tenderness.   Genitourinary:     Comments: No tijerina catheter in place.  Musculoskeletal:      Cervical back: Neck supple.      Right lower leg: Edema present.      Left lower leg: Edema present.   Skin:     General: Skin is warm and dry.      Capillary Refill: Capillary refill takes less than 2 seconds.   Neurological:      General: No focal deficit present.      Mental Status: She is alert and oriented to person, place, and time.      Sensory: Sensation is intact.      Motor: Motor function is intact.      Comments: Awake and alert. Follows commands. Answers questions appropriately. Moves all extremities equally. Strength and sensation intact. No focal neuro deficit on exam.   Psychiatric:          Attention and Perception: Attention normal.         Mood and Affect: Mood and affect normal.         Speech: Speech normal.         Behavior: Behavior is cooperative.       ---------------------------------------------------------------------------------------------------------------------  EKG:  Pending theology read.  Per my interpretation: Sinus tachycardia with heart rate 135 bpm, QTc 468 ms, RBBB noted, no ST elevation appreciated.  Questionable ST depression in the lateral leads, difficult to discern secondary to tachycardia.    Telemetry:    Sinus tachycardia 100s    I have personally reviewed the EKG/Telemetry strip  ---------------------------------------------------------------------------------------------------------------------   Results from last 7 days   Lab Units 11/18/23  1447 11/18/23  1307   HSTROP T ng/L 39* 45*     Results from last 7 days   Lab Units 11/18/23  1307   PROBNP pg/mL 240.0       Results from last 7 days   Lab Units 11/18/23  1429   PH, ARTERIAL pH units 7.350   PO2 ART mm Hg 63.3*   PCO2, ARTERIAL mm Hg 89.5*   HCO3 ART mmol/L 49.3*     Results from last 7 days   Lab Units 11/18/23  1307   CRP mg/dL 4.51*   LACTATE mmol/L 1.7   WBC 10*3/mm3 11.04*   HEMOGLOBIN g/dL 12.9   HEMATOCRIT % 44.8   MCV fL 99.1*   MCHC g/dL 28.8*   PLATELETS 10*3/mm3 154   INR  0.94     Results from last 7 days   Lab Units 11/18/23  1307   SODIUM mmol/L 141   POTASSIUM mmol/L 3.6   MAGNESIUM mg/dL 1.9   CHLORIDE mmol/L 93*   CO2 mmol/L 40.3*   BUN mg/dL 14   CREATININE mg/dL 0.38*   CALCIUM mg/dL 9.2   GLUCOSE mg/dL 174*   ALBUMIN g/dL 3.7   BILIRUBIN mg/dL 0.5   ALK PHOS U/L 72   AST (SGOT) U/L 16   ALT (SGPT) U/L 13   Estimated Creatinine Clearance: 166.9 mL/min (A) (by C-G formula based on SCr of 0.38 mg/dL (L)).    Lab Results   Component Value Date    HGBA1C 6.80 (H) 09/29/2023     Lab Results   Component Value Date    TSH 5.170 (H) 11/18/2023    FREET4 1.09 11/18/2023     Microbiology Results (last  10 days)       Procedure Component Value - Date/Time    COVID-19 and FLU A/B PCR, 1 HR TAT - Swab, Nasopharynx [[id number]]  (Normal) Collected: 11/18/23 1311    Lab Status: Final result Specimen: Swab from Nasopharynx Updated: 11/18/23 1340     COVID19 Not Detected     Influenza A PCR Not Detected     Influenza B PCR Not Detected    Narrative:      Fact sheet for providers: https://www.fda.gov/media/228052/download    Fact sheet for patients: https://www.fda.gov/media/203137/download    Test performed by PCR.           I have personally reviewed the above laboratory results.   ---------------------------------------------------------------------------------------------------------------------  Imaging Results (Last 7 Days)       Procedure Component Value Units Date/Time    XR Chest 1 View [[id number]] Collected: 11/18/23 1507     Updated: 11/18/23 1512    Narrative:      Comparison: Upright AP view chest     Comparison: 10/28/2023     Findings: Left-sided Mediport catheter noted with distal tip in the  expected position of the SVC, unchanged.     Diffuse bilateral interstitial prominence noted, unchanged.  No  confluent opacification.  No pneumothorax.  No pleural effusion  identified.  Cardiomediastinal silhouette within normal limits.       Impression:         Unchanged appearance of the chest when compared to 10/28/2023 with  coarsened interstitial markings, Mediport catheter and no new acute  finding.        This report was finalized on 11/18/2023 3:10 PM by Divine Gonzalez MD.             I have personally reviewed the above radiology results.     Last Echocardiogram:  Results for orders placed during the hospital encounter of 10/12/23    Adult Transthoracic Echo Complete W/ Cont if Necessary Per Protocol    Interpretation Summary    Left ventricular systolic function is normal. Left ventricular ejection fraction appears to be 66 - 70%.    Left ventricular diastolic function is consistent with (grade I)  impaired relaxation.    Estimated right ventricular systolic pressure from tricuspid regurgitation is normal (<35 mmHg).    ---------------------------------------------------------------------------------------------------------------------    Assessment & Plan      ACUTE HOSPITAL PROBLEMS    -Sepsis, present on admission, secondary to acute COPD exacerbation  -Acute on chronic hypoxic and hypercapnic respiratory failure   -History of STEVEN, home trilogy machine  Sepsis criteria: Heart rate greater than 90, respiratory rate greater than 20  X-ray without consolidation or infiltrate  Procalcitonin negative  Hold off on antibiotics for now  Blood cultures obtained in ED, follow for final results  COVID-19/influenza screening negative  Obtain respiratory panel PCR  IV Lasix x1 given in ED for lung compliance, monitor for diuretic response.  Continue IV Solu-Medrol  Nebulizer treatments and scheduled inhalants  Continue BiPAP for now.  Patient with improvement in oxygenation, however PCO2 significantly elevated still.  pH slightly improved.  Repeat ABG.  Continue supplemental oxygen as necessary to titrate SpO2 > 90%. Continuous pulse oximetry monitoring.  Consider pulmonology consultation if available, may need Trilogy settings adjusted   Strongly encourage compliance with home Trilogy on discharge, suspect non compliance   Awaiting D-dimer, if elevated consider CT PE protocol to rule out pulmonary embolism.  Strongly encourage incentive spirometry as tolerated   COPD education per protocol  Closely monitor vitals and temperature curve  Repeat labs in AM     -Macrocytosis without anemia   Obtain vitamin B12/folate levels, supplement if patient is found to be deficient   Repeat CBC in AM     -Indeterminate high-sensitivity troponin T elevation  Initial high-sensitivity troponin T 45 with repeat at 39, -6 delta  Patient denies any crushing chest pain, she does report some pleuritic chest tightness  EKG with some  questionable ST depression in the lateral leads.  Obtain repeat EKG  Previous TTE reviewed with preserved EF and grade 1 diastolic dysfunction  Ischemic evaluation per review of chart.  However, she would not be a candidate for ischemic evaluation at this time due to significant dyspnea and hypoxia/hypercarbia  Will give aspirin x1  Obtain A1c and AM lipid panel  Closely monitor on telemetry    -F/E/N  No IV fluids. Replace electrolytes per protocol as necessary. NPO diet.     CHRONIC MEDICAL PROBLEMS    -HFpEF/grade I diastolic dysfunction: Patient does have mild pedal edema.  No significant edema noted.  No evidence of volume overload on chest x-ray.  Not felt to be in acute exacerbation.  proBNP not elevated.  Obtains Reds vest.  Previous TTE from 10/2023 reviewed.  Monitor volume status closely.  Continue home medication regimen monitor consult per pharmacy.  IV Lasix given in ED, monitor for diuretic response.  -Essential hypertension  BP appears well controlled   Plan to resume home antihypertensive regimen once reconciled per pharmacy with appropriate holding parameters to prevent hypotension and/or bradycardia   Closely monitor BP per hospital protocol, titrate medications as necessary  -Hyperlipidemia: Continue statin. AM lipid panel   -Type II diabetes mellitus, non insulin dependent  Obtain HgbA1C  Review home medication regimen once reconciled per pharmacy   Hold home oral DM medications for now.  Start low dose SSI, titrate dosage as necessary   Closely monitor blood glucose levels with AccuCheks before meals and at bedtime.  Hypoglycemia protocol in place should it be necessary  -History of CVA: Details unknown.  Continue home medication and monitor consult per pharmacy  -History of carotid artery disease s/p left CEA in the past: Continue home medications once reconciled per pharmacy  -Hypothyroidism: TSH WNL. Continue home levothyroxine.   -Anxiety: Supportive care, continue home medication regimen  once reconciled per pharmacy.  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  Affecting all aspects of care  -Former smoker   ---------------------------------------------------  DVT Prophylaxis: SQ Heparin   Activity: Up with assistance as tolerated   ---------------------------------------------------  INPATIENT status due to the need for care which can only be reasonably provided in an hospital setting such as aggressive/expedited ancillary services and/or consultation services, the necessity for IV medications, close physician monitoring and/or the possible need for procedures.  In such, I feel patient’s risk for adverse outcomes and need for care warrant INPATIENT evaluation and predict the patient’s care encounter to likely last beyond 2 midnights.     Code Status: FULL CODE   ---------------------------------------------------  Disposition/Discharge planning: Pending clinical course   ---------------------------------------------------  I have discussed the patient's assessment and plan with the patient, son at bedside, nursing staff, and attending physician MD Veronica Rey PA-C  Hospitalist Service -- [facility]   Pager: [phone]    11/18/23  15:46 EST    Attending Physician: Cliff Castaneda MD      ---------------------------------------------------------------------------------------------------------------------

## 2023-11-18 NOTE — CASE MANAGEMENT/SOCIAL WORK
Discharge Planning Assessment   [place]     Patient Name: Liz Jiang  MRN: [id number]  Today's Date: 11/18/2023    Admit Date: 11/18/2023    Plan: Pt lives at home and plans to return home at discharge family to provide transportation. Pcp is Tabitha Ron, she uses [facility] and has [place] Medicaid and Medicare a+b. Pt has [facility]. Pt uses hospital bed, pulse ox, bp cuff,bsc,nebs, rollator from [employer],  o2 3 liters and bipap from [facility]. Pt was recently changed from trilogy to bipap.   Discharge Needs Assessment       Row Name 11/18/23 1632       Living Environment    People in Home alone    Current Living Arrangements home    Potentially Unsafe Housing Conditions none    Primary Care Provided by self    Quality of Family Relationships supportive;helpful    Able to Return to Prior Arrangements yes       Resource/Environmental Concerns    Resource/Environmental Concerns none       Transition Planning    Patient/Family Anticipates Transition to home with help/services    Patient/Family Anticipated Services at Transition home health care    Transportation Anticipated family or friend will provide       Discharge Needs Assessment    Readmission Within the Last 30 Days no previous admission in last 30 days    Current Outpatient/Agency/Support Group homecare agency    Equipment Currently Used at Home hospital bed;rollator;bipap;commode;nebulizer    Concerns to be Addressed discharge planning    Anticipated Changes Related to Illness none    Equipment Needed After Discharge none                   Discharge Plan       Row Name 11/18/23 9950       Plan    Plan Pt lives at home and plans to return home at discharge family to provide transportation. Pcp is Tabitha Ron, she uses [facility] and has [place] Medicaid and Medicare a+b. Pt has [facility]. Pt uses hospital bed, pulse ox, bp cuff,bsc,nebs, rollator from [employer],  o2 3 liters and bipap from [facility]. Pt was  recently changed from trilogy to bipap.    Patient/Family in Agreement with Plan yes                  Continued Care and Services - Admitted Since 11/18/2023    Coordination has not been started for this encounter.       Selected Continued Care - Prior Encounters Includes continued care and service providers with selected services from prior encounters from 8/20/2023 to 11/18/2023      Discharged on 9/27/2023 Admission date: 9/14/2023 - Discharge disposition: Home-Health Care Svc      Home Medical Care       Service Provider Selected Services Address Phone Fax Patient Preferred    [facility] ,  Home Nursing [address] [place] [phone]                           Expected Discharge Date and Time       Expected Discharge Date Expected Discharge Time    Nov 22, 2023            Demographic Summary       Row Name 11/18/23 8706       General Information    Admission Type inpatient    Arrived From home    Referral Source emergency department    Reason for Consult discharge planning                  Billie Soliman RN

## 2023-11-18 NOTE — ED PROVIDER NOTES
"Subjective   History of Present Illness  57-year-old female who presents to the ED today for shortness of breath.  She reports that she has a history of COPD and wears 3 L of oxygen at all times.  She states she is also been treated for CHF 1 time in the past.  She has noticed that her feet have been swollen for the last couple days and she also reports shortness of breath and chest pressure for the last couple days.  She denies any cough or fever.  Nuys any abdominal pain.  She denies any nausea, vomiting or diarrhea.  She states she does have a trilogy machine at home and told nursing staff that it was recently converted to a BiPAP but it has not been helping.    History provided by:  Patient  Shortness of Breath  Severity:  Severe  Onset quality:  Gradual  Duration: \"a couple days\"  Timing:  Constant  Progression:  Worsening  Chronicity:  New  Relieved by:  Nothing  Worsened by:  Exertion, movement and activity  Associated symptoms: chest pain    Associated symptoms: no abdominal pain, no cough, no diaphoresis, no ear pain, no fever, no headaches, no hemoptysis, no neck pain, no rash, no sore throat, no sputum production, no syncope, no swollen glands, no vomiting and no wheezing        Review of Systems   Constitutional:  Positive for fatigue. Negative for diaphoresis and fever.   HENT: Negative.  Negative for ear pain and sore throat.    Eyes: Negative.    Respiratory:  Positive for chest tightness and shortness of breath. Negative for cough, hemoptysis, sputum production and wheezing.    Cardiovascular:  Positive for chest pain and leg swelling. Negative for palpitations and syncope.   Gastrointestinal: Negative.  Negative for abdominal pain and vomiting.   Genitourinary: Negative.    Musculoskeletal: Negative.  Negative for neck pain.   Skin:  Negative for rash.   Neurological:  Negative for headaches.   Psychiatric/Behavioral: Negative.     All other systems reviewed and are negative.      Past Medical " History:   Diagnosis Date    Acid reflux disease     Anal cancer 12/05/2019    Arthritis     Asthma, extrinsic     Cholelithiasis     Chronic headaches     COPD (chronic obstructive pulmonary disease)     CVA (cerebral vascular accident)     w/ right sided deficit    CVA (cerebral vascular accident)     Diabetes mellitus     only has high blood sugar when on steriods    Dyslipidemia 09/04/2018    History of radiation therapy 02/27/2020    Whole pelvis/anal mass    Nausea and vomiting 12/25/2016    Obstructive sleep apnea treated with BiPAP     On home oxygen therapy     2L     Sleep apnea, obstructive        Allergies   Allergen Reactions    Morphine Other (See Comments)     Cause low O2    Roflumilast Diarrhea     Significant diarrhea.        Past Surgical History:   Procedure Laterality Date    ABDOMINAL SURGERY      CARDIAC CATHETERIZATION      CAROTID ENDARTERECTOMY Left 2018    CHOLECYSTECTOMY WITH INTRAOPERATIVE CHOLANGIOGRAM N/A 1/30/2017    Procedure: CHOLECYSTECTOMY LAPAROSCOPIC INTRAOPERATIVE CHOLANGIOGRAM;  Surgeon: Carolin Marie MD;  Location: [facility];  Service:     COLONOSCOPY      HYSTERECTOMY      for heavy bleeding/ complete    KIDNEY STONE SURGERY      TUBAL ABDOMINAL LIGATION      VENOUS ACCESS DEVICE (PORT) INSERTION Left 12/17/2019    Procedure: INSERTION VENOUS ACCESS DEVICE;  Surgeon: Carolin Marie MD;  Location: [facility];  Service: General       Family History   Problem Relation Age of Onset    Diabetes Mother     Hypertension Mother     Arrhythmia Mother     Pneumonia Father     Coronary artery disease Other     Arrhythmia Sister     Heart attack Brother     Lymphoma Brother         hodgkin's     Other Brother         CABG    Breast cancer Neg Hx        Social History     Socioeconomic History    Marital status:    Tobacco Use    Smoking status: Former     Packs/day: 1.50     Years: 30.00     Additional pack years: 0.00     Total pack years: 45.00     Types: Electronic  Cigarette, Cigarettes     Quit date:      Years since quittin.8    Smokeless tobacco: Never   Vaping Use    Vaping Use: Unknown   Substance and Sexual Activity    Alcohol use: No    Drug use: Defer    Sexual activity: Defer           Objective   Physical Exam  Vitals and nursing note reviewed.   Constitutional:       General: She is in acute distress.      Appearance: She is well-developed. She is ill-appearing. She is not diaphoretic.   HENT:      Head: Normocephalic and atraumatic.   Eyes:      Extraocular Movements: Extraocular movements intact.      Pupils: Pupils are equal, round, and reactive to light.   Neck:      Vascular: No JVD.      Trachea: No tracheal deviation.   Cardiovascular:      Rate and Rhythm: Regular rhythm. Tachycardia present.      Pulses: Normal pulses.      Heart sounds: Normal heart sounds.   Pulmonary:      Effort: Tachypnea and accessory muscle usage present.      Breath sounds: Decreased breath sounds (in all lung fields) present. No wheezing or rhonchi.   Chest:      Chest wall: No tenderness or crepitus.   Abdominal:      General: Bowel sounds are normal.      Palpations: Abdomen is soft.      Tenderness: There is no abdominal tenderness.   Musculoskeletal:         General: Normal range of motion.      Cervical back: Normal range of motion and neck supple.      Right lower leg: Edema present.      Left lower leg: Edema present.      Comments: 1+ pitting edema to bilateral feet and ankles   Lymphadenopathy:      Cervical: No cervical adenopathy.   Skin:     General: Skin is warm and dry.      Capillary Refill: Capillary refill takes less than 2 seconds.   Neurological:      General: No focal deficit present.      Mental Status: She is alert and oriented to person, place, and time.   Psychiatric:         Mood and Affect: Mood normal.         Procedures    Results for orders placed or performed during the hospital encounter of 23   COVID-19 and FLU A/B PCR, 1 HR TAT - Swab,  Nasopharynx    Specimen: Nasopharynx; Swab   Result Value Ref Range    COVID19 Not Detected Not Detected - Ref. Range    Influenza A PCR Not Detected Not Detected    Influenza B PCR Not Detected Not Detected   Blood Culture - Blood, Arm, Right    Specimen: Arm, Right; Blood   Result Value Ref Range    Blood Culture No growth at 24 hours    Blood Culture - Blood, Arm, Left    Specimen: Arm, Left; Blood   Result Value Ref Range    Blood Culture No growth at 24 hours    Respiratory Panel PCR w/COVID-19(SARS-CoV-2) ANNABEL/MAURY/JULES/PAD/COR/MELA In-House, NP Swab in UTM/VTM, 2 HR TAT - Swab, Nasopharynx    Specimen: Nasopharynx; Swab   Result Value Ref Range    ADENOVIRUS, PCR Not Detected Not Detected    Coronavirus 229E Not Detected Not Detected    Coronavirus HKU1 Not Detected Not Detected    Coronavirus NL63 Not Detected Not Detected    Coronavirus OC43 Not Detected Not Detected    COVID19 Not Detected Not Detected - Ref. Range    Human Metapneumovirus Not Detected Not Detected    Human Rhinovirus/Enterovirus Not Detected Not Detected    Influenza A PCR Not Detected Not Detected    Influenza B PCR Not Detected Not Detected    Parainfluenza Virus 1 Not Detected Not Detected    Parainfluenza Virus 2 Not Detected Not Detected    Parainfluenza Virus 3 Not Detected Not Detected    Parainfluenza Virus 4 Not Detected Not Detected    RSV, PCR Not Detected Not Detected    Bordetella pertussis pcr Not Detected Not Detected    Bordetella parapertussis PCR Not Detected Not Detected    Chlamydophila pneumoniae PCR Not Detected Not Detected    Mycoplasma pneumo by PCR Not Detected Not Detected   High Sensitivity Troponin T    Specimen: Arm, Left; Blood   Result Value Ref Range    HS Troponin T 45 (H) <14 ng/L   Comprehensive Metabolic Panel    Specimen: Arm, Left; Blood   Result Value Ref Range    Glucose 174 (H) 65 - 99 mg/dL    BUN 14 6 - 20 mg/dL    Creatinine 0.38 (L) 0.57 - 1.00 mg/dL    Sodium 141 136 - 145 mmol/L    Potassium 3.6  3.5 - 5.2 mmol/L    Chloride 93 (L) 98 - 107 mmol/L    CO2 40.3 (H) 22.0 - 29.0 mmol/L    Calcium 9.2 8.6 - 10.5 mg/dL    Total Protein 6.6 6.0 - 8.5 g/dL    Albumin 3.7 3.5 - 5.2 g/dL    ALT (SGPT) 13 1 - 33 U/L    AST (SGOT) 16 1 - 32 U/L    Alkaline Phosphatase 72 39 - 117 U/L    Total Bilirubin 0.5 0.0 - 1.2 mg/dL    Globulin 2.9 gm/dL    A/G Ratio 1.3 g/dL    BUN/Creatinine Ratio 36.8 (H) 7.0 - 25.0    Anion Gap 7.7 5.0 - 15.0 mmol/L    eGFR 116.3 >60.0 mL/min/1.73   Protime-INR    Specimen: Arm, Left; Blood   Result Value Ref Range    Protime 13.1 12.1 - 14.7 Seconds    INR 0.94 0.90 - 1.10   aPTT    Specimen: Arm, Left; Blood   Result Value Ref Range    PTT 26.0 (L) 26.5 - 34.5 seconds   Urinalysis With Microscopic If Indicated (No Culture) - Urine, Clean Catch    Specimen: Urine, Clean Catch   Result Value Ref Range    Color, UA Yellow Yellow, Straw    Appearance, UA Clear Clear    pH, UA 6.5 5.0 - 8.0    Specific Gravity, UA 1.007 1.005 - 1.030    Glucose, UA Negative Negative    Ketones, UA Negative Negative    Bilirubin, UA Negative Negative    Blood, UA Negative Negative    Protein, UA Negative Negative    Leuk Esterase, UA Negative Negative    Nitrite, UA Negative Negative    Urobilinogen, UA 0.2 E.U./dL 0.2 - 1.0 E.U./dL   BNP    Specimen: Arm, Left; Blood   Result Value Ref Range    proBNP 240.0 0.0 - 900.0 pg/mL   Lactic Acid, Plasma    Specimen: Arm, Left; Blood   Result Value Ref Range    Lactate 1.7 0.5 - 2.0 mmol/L   Procalcitonin    Specimen: Arm, Left; Blood   Result Value Ref Range    Procalcitonin 0.03 0.00 - 0.25 ng/mL   Sedimentation Rate    Specimen: Arm, Left; Blood   Result Value Ref Range    Sed Rate 74 (H) 0 - 30 mm/hr   C-reactive Protein    Specimen: Arm, Left; Blood   Result Value Ref Range    C-Reactive Protein 4.51 (H) 0.00 - 0.50 mg/dL   Magnesium    Specimen: Arm, Left; Blood   Result Value Ref Range    Magnesium 1.9 1.6 - 2.6 mg/dL   TSH    Specimen: Arm, Left; Blood   Result  Value Ref Range    TSH 5.170 (H) 0.270 - 4.200 uIU/mL   T4, Free    Specimen: Arm, Left; Blood   Result Value Ref Range    Free T4 1.09 0.93 - 1.70 ng/dL   CBC Auto Differential    Specimen: Arm, Left; Blood   Result Value Ref Range    WBC 11.04 (H) 3.40 - 10.80 10*3/mm3    RBC 4.52 3.77 - 5.28 10*6/mm3    Hemoglobin 12.9 12.0 - 15.9 g/dL    Hematocrit 44.8 34.0 - 46.6 %    MCV 99.1 (H) 79.0 - 97.0 fL    MCH 28.5 26.6 - 33.0 pg    MCHC 28.8 (L) 31.5 - 35.7 g/dL    RDW 15.0 12.3 - 15.4 %    RDW-SD 54.8 (H) 37.0 - 54.0 fl    MPV 11.4 6.0 - 12.0 fL    Platelets 154 140 - 450 10*3/mm3    Neutrophil % 80.7 (H) 42.7 - 76.0 %    Lymphocyte % 12.1 (L) 19.6 - 45.3 %    Monocyte % 6.1 5.0 - 12.0 %    Eosinophil % 0.3 0.3 - 6.2 %    Basophil % 0.5 0.0 - 1.5 %    Immature Grans % 0.3 0.0 - 0.5 %    Neutrophils, Absolute 8.92 (H) 1.70 - 7.00 10*3/mm3    Lymphocytes, Absolute 1.34 0.70 - 3.10 10*3/mm3    Monocytes, Absolute 0.67 0.10 - 0.90 10*3/mm3    Eosinophils, Absolute 0.03 0.00 - 0.40 10*3/mm3    Basophils, Absolute 0.05 0.00 - 0.20 10*3/mm3    Immature Grans, Absolute 0.03 0.00 - 0.05 10*3/mm3    nRBC 0.0 0.0 - 0.2 /100 WBC   Blood Gas, Arterial With Co-Ox    Specimen: Arterial Blood   Result Value Ref Range    Site Right Radial     William's Test N/A     pH, Arterial 7.331 (L) 7.350 - 7.450 pH units    pCO2, Arterial 90.7 (C) 35.0 - 45.0 mm Hg    pO2, Arterial 36.3 (C) 83.0 - 108.0 mm Hg    HCO3, Arterial 47.9 (H) 20.0 - 26.0 mmol/L    Base Excess, Arterial 17.3 (H) 0.0 - 2.0 mmol/L    O2 Saturation, Arterial 63.4 (L) 94.0 - 99.0 %    Hemoglobin, Blood Gas 13.4 (L) 13.5 - 17.5 g/dL    Hematocrit, Blood Gas 41.0 38.0 - 51.0 %    Oxyhemoglobin 62.2 (L) 94 - 99 %    Methemoglobin <-0.10 (L) 0.00 - 3.00 %    Carboxyhemoglobin 2.4 0 - 5 %    A-a DO2 80.3 0.0 - 300.0 mmHg    CO2 Content 50.7 (H) 22 - 33 mmol/L    Barometric Pressure for Blood Gas 726 mmHg    Modality Nasal Cannula     FIO2 32 %    Flow Rate 3.0 lpm    Ventilator  Mode NA     Note Read back and acknowledge     Notified Who ER  AND RN     Notified By [id number]     Notified Time 11/18/2023 13:19     Collected by [id number]     pH, Temp Corrected      pCO2, Temperature Corrected      pO2, Temperature Corrected     Scan Slide    Specimen: Arm, Left; Blood   Result Value Ref Range    Hypochromia Slight/1+ None Seen    Macrocytes Slight/1+ None Seen    Stomatocytes Slight/1+ None Seen    Platelet Morphology Normal Normal   High Sensitivity Troponin T 2Hr    Specimen: Arm, Left; Blood   Result Value Ref Range    HS Troponin T 39 (H) <14 ng/L    Troponin T Delta -6 (L) >=-4 - <+4 ng/L   Blood Gas, Arterial With Co-Ox    Specimen: Arterial Blood   Result Value Ref Range    Site Right Radial     William's Test Positive     pH, Arterial 7.350 7.350 - 7.450 pH units    pCO2, Arterial 89.5 (C) 35.0 - 45.0 mm Hg    pO2, Arterial 63.3 (L) 83.0 - 108.0 mm Hg    HCO3, Arterial 49.3 (H) 20.0 - 26.0 mmol/L    Base Excess, Arterial 18.5 (H) 0.0 - 2.0 mmol/L    O2 Saturation, Arterial 91.0 (L) 94.0 - 99.0 %    Hemoglobin, Blood Gas 14.3 13.5 - 17.5 g/dL    Hematocrit, Blood Gas 43.9 38.0 - 51.0 %    Oxyhemoglobin 89.0 (L) 94 - 99 %    Methemoglobin <-0.10 (L) 0.00 - 3.00 %    Carboxyhemoglobin 2.5 0 - 5 %    A-a DO2 144.6 0.0 - 300.0 mmHg    CO2 Content 52.1 (H) 22 - 33 mmol/L    Barometric Pressure for Blood Gas 726 mmHg    Modality BiPap     FIO2 45 %    Ventilator Mode NA     Set [facility] Resp Rate 22.0     IPAP 22     EPAP 6     Note Read back and acknowledge     Notified Who ER  AND RN     Notified By [id number]     Notified Time 11/18/2023 14:31     Collected by [id number]     pH, Temp Corrected      pCO2, Temperature Corrected      pO2, Temperature Corrected     D-dimer, Quantitative    Specimen: Arm, Left; Blood   Result Value Ref Range    D-Dimer, Quantitative 0.49 0.00 - 0.58 MCGFEU/mL   Hemoglobin A1c    Specimen: Arm, Left; Blood   Result Value Ref Range    Hemoglobin A1C 6.60 (H) 4.80 - 5.60 %    Blood Gas, Arterial With Co-Ox    Specimen: Arterial Blood   Result Value Ref Range    Site Right Radial     William's Test Positive     pH, Arterial 7.360 7.350 - 7.450 pH units    pCO2, Arterial 91.3 (C) 35.0 - 45.0 mm Hg    pO2, Arterial 61.7 (L) 83.0 - 108.0 mm Hg    HCO3, Arterial 51.5 (H) 20.0 - 26.0 mmol/L    Base Excess, Arterial 20.7 (H) 0.0 - 2.0 mmol/L    O2 Saturation, Arterial 90.7 (L) 94.0 - 99.0 %    Hemoglobin, Blood Gas 13.8 13.5 - 17.5 g/dL    Hematocrit, Blood Gas 42.3 38.0 - 51.0 %    Oxyhemoglobin 88.5 (L) 94 - 99 %    Methemoglobin <-0.10 (L) 0.00 - 3.00 %    Carboxyhemoglobin 2.6 0 - 5 %    A-a DO2 109.2 0.0 - 300.0 mmHg    CO2 Content 54.3 (H) 22 - 33 mmol/L    Barometric Pressure for Blood Gas 725 mmHg    Modality BiPap     FIO2 40 %    Ventilator Mode NA     Set [facility] Resp Rate 24.0     IPAP 22     EPAP 6     Notified Who DENICE     Notified By [id number]     Notified Time 11/18/2023 18:43     Collected by [id number]     pH, Temp Corrected      pCO2, Temperature Corrected      pO2, Temperature Corrected     Comprehensive Metabolic Panel    Specimen: Blood   Result Value Ref Range    Glucose 236 (H) 65 - 99 mg/dL    BUN 15 6 - 20 mg/dL    Creatinine 0.44 (L) 0.57 - 1.00 mg/dL    Sodium 136 136 - 145 mmol/L    Potassium 3.9 3.5 - 5.2 mmol/L    Chloride 87 (L) 98 - 107 mmol/L    CO2 42.7 (H) 22.0 - 29.0 mmol/L    Calcium 9.2 8.6 - 10.5 mg/dL    Total Protein 7.0 6.0 - 8.5 g/dL    Albumin 3.5 3.5 - 5.2 g/dL    ALT (SGPT) 13 1 - 33 U/L    AST (SGOT) 16 1 - 32 U/L    Alkaline Phosphatase 71 39 - 117 U/L    Total Bilirubin 0.5 0.0 - 1.2 mg/dL    Globulin 3.5 gm/dL    A/G Ratio 1.0 g/dL    BUN/Creatinine Ratio 34.1 (H) 7.0 - 25.0    Anion Gap 6.3 5.0 - 15.0 mmol/L    eGFR 112.3 >60.0 mL/min/1.73   CBC Auto Differential    Specimen: Blood   Result Value Ref Range    WBC 5.03 3.40 - 10.80 10*3/mm3    RBC 4.60 3.77 - 5.28 10*6/mm3    Hemoglobin 12.9 12.0 - 15.9 g/dL    Hematocrit 44.5 34.0 - 46.6 %    MCV 96.7  79.0 - 97.0 fL    MCH 28.0 26.6 - 33.0 pg    MCHC 29.0 (L) 31.5 - 35.7 g/dL    RDW 14.6 12.3 - 15.4 %    RDW-SD 51.8 37.0 - 54.0 fl    MPV 12.0 6.0 - 12.0 fL    Platelets 144 140 - 450 10*3/mm3    Neutrophil % 89.2 (H) 42.7 - 76.0 %    Lymphocyte % 8.0 (L) 19.6 - 45.3 %    Monocyte % 1.8 (L) 5.0 - 12.0 %    Eosinophil % 0.0 (L) 0.3 - 6.2 %    Basophil % 0.2 0.0 - 1.5 %    Immature Grans % 0.8 (H) 0.0 - 0.5 %    Neutrophils, Absolute 4.49 1.70 - 7.00 10*3/mm3    Lymphocytes, Absolute 0.40 (L) 0.70 - 3.10 10*3/mm3    Monocytes, Absolute 0.09 (L) 0.10 - 0.90 10*3/mm3    Eosinophils, Absolute 0.00 0.00 - 0.40 10*3/mm3    Basophils, Absolute 0.01 0.00 - 0.20 10*3/mm3    Immature Grans, Absolute 0.04 0.00 - 0.05 10*3/mm3    nRBC 0.0 0.0 - 0.2 /100 WBC   Urine Drug Screen - Urine, Clean Catch    Specimen: Urine, Clean Catch   Result Value Ref Range    THC, Screen, Urine Negative Negative    Phencyclidine (PCP), Urine Negative Negative    Cocaine Screen, Urine Negative Negative    Methamphetamine, Ur Negative Negative    Opiate Screen Positive (A) Negative    Amphetamine Screen, Urine Negative Negative    Benzodiazepine Screen, Urine Positive (A) Negative    Tricyclic Antidepressants Screen Negative Negative    Methadone Screen, Urine Negative Negative    Barbiturates Screen, Urine Negative Negative    Oxycodone Screen, Urine Negative Negative    Buprenorphine, Screen, Urine Negative Negative   Magnesium    Specimen: Blood   Result Value Ref Range    Magnesium 1.9 1.6 - 2.6 mg/dL   C-reactive Protein    Specimen: Blood   Result Value Ref Range    C-Reactive Protein 4.54 (H) 0.00 - 0.50 mg/dL   Vitamin B12    Specimen: Blood   Result Value Ref Range    Vitamin B-12 355 211 - 946 pg/mL   Folate    Specimen: Blood   Result Value Ref Range    Folate 16.00 4.78 - 24.20 ng/mL   Lipid Panel    Specimen: Blood   Result Value Ref Range    Total Cholesterol 231 (H) 0 - 200 mg/dL    Triglycerides 148 0 - 150 mg/dL    HDL Cholesterol  42 40 - 60 mg/dL    LDL Cholesterol  162 (H) 0 - 100 mg/dL    VLDL Cholesterol 27 5 - 40 mg/dL    LDL/HDL Ratio 3.80    High Sensitivity Troponin T    Specimen: Blood   Result Value Ref Range    HS Troponin T 29 (H) <14 ng/L   Blood Gas, Arterial With Co-Ox    Specimen: Arterial Blood   Result Value Ref Range    Site Left Radial     William's Test Positive     pH, Arterial 7.381 7.350 - 7.450 pH units    pCO2, Arterial 86.7 (C) 35.0 - 45.0 mm Hg    pO2, Arterial 60.8 (L) 83.0 - 108.0 mm Hg    HCO3, Arterial 51.4 (H) 20.0 - 26.0 mmol/L    Base Excess, Arterial 21.3 (H) 0.0 - 2.0 mmol/L    O2 Saturation, Arterial 90.4 (L) 94.0 - 99.0 %    Hemoglobin, Blood Gas 13.1 (L) 13.5 - 17.5 g/dL    Hematocrit, Blood Gas 40.1 38.0 - 51.0 %    Oxyhemoglobin 88.5 (L) 94 - 99 %    Methemoglobin <-0.10 (L) 0.00 - 3.00 %    Carboxyhemoglobin 2.5 0 - 5 %    A-a DO2 88.2 0.0 - 300.0 mmHg    CO2 Content 54.1 (H) 22 - 33 mmol/L    Barometric Pressure for Blood Gas 727 mmHg    Modality Nasal Cannula     FIO2 36 %    Ventilator Mode NA     Notified Who DR CALLAWAY     Notified By [id number]     Notified Time 11/19/2023 08:40     Collected by [id number]     pH, Temp Corrected      pCO2, Temperature Corrected      pO2, Temperature Corrected     High Sensitivity Troponin T    Specimen: Blood   Result Value Ref Range    HS Troponin T 30 (H) <14 ng/L   High Sensitivity Troponin T 2Hr    Specimen: Blood   Result Value Ref Range    HS Troponin T 36 (H) <14 ng/L    Troponin T Delta 6 (C) >=-4 - <+4 ng/L   Comprehensive Metabolic Panel    Specimen: Blood   Result Value Ref Range    Glucose 297 (H) 65 - 99 mg/dL    BUN 24 (H) 6 - 20 mg/dL    Creatinine 0.47 (L) 0.57 - 1.00 mg/dL    Sodium 135 (L) 136 - 145 mmol/L    Potassium 4.3 3.5 - 5.2 mmol/L    Chloride 87 (L) 98 - 107 mmol/L    CO2 40.5 (H) 22.0 - 29.0 mmol/L    Calcium 9.1 8.6 - 10.5 mg/dL    Total Protein 6.6 6.0 - 8.5 g/dL    Albumin 3.6 3.5 - 5.2 g/dL    ALT (SGPT) 12 1 - 33 U/L    AST (SGOT) 16 1 -  32 U/L    Alkaline Phosphatase 63 39 - 117 U/L    Total Bilirubin 0.2 0.0 - 1.2 mg/dL    Globulin 3.0 gm/dL    A/G Ratio 1.2 g/dL    BUN/Creatinine Ratio 51.1 (H) 7.0 - 25.0    Anion Gap 7.5 5.0 - 15.0 mmol/L    eGFR 110.5 >60.0 mL/min/1.73   CBC Auto Differential    Specimen: Blood   Result Value Ref Range    WBC 10.73 3.40 - 10.80 10*3/mm3    RBC 4.46 3.77 - 5.28 10*6/mm3    Hemoglobin 12.6 12.0 - 15.9 g/dL    Hematocrit 43.6 34.0 - 46.6 %    MCV 97.8 (H) 79.0 - 97.0 fL    MCH 28.3 26.6 - 33.0 pg    MCHC 28.9 (L) 31.5 - 35.7 g/dL    RDW 14.4 12.3 - 15.4 %    RDW-SD 51.6 37.0 - 54.0 fl    MPV 11.7 6.0 - 12.0 fL    Platelets 178 140 - 450 10*3/mm3    Neutrophil % 89.1 (H) 42.7 - 76.0 %    Lymphocyte % 5.1 (L) 19.6 - 45.3 %    Monocyte % 4.9 (L) 5.0 - 12.0 %    Eosinophil % 0.0 (L) 0.3 - 6.2 %    Basophil % 0.2 0.0 - 1.5 %    Immature Grans % 0.7 (H) 0.0 - 0.5 %    Neutrophils, Absolute 9.56 (H) 1.70 - 7.00 10*3/mm3    Lymphocytes, Absolute 0.55 (L) 0.70 - 3.10 10*3/mm3    Monocytes, Absolute 0.53 0.10 - 0.90 10*3/mm3    Eosinophils, Absolute 0.00 0.00 - 0.40 10*3/mm3    Basophils, Absolute 0.02 0.00 - 0.20 10*3/mm3    Immature Grans, Absolute 0.07 (H) 0.00 - 0.05 10*3/mm3    nRBC 0.0 0.0 - 0.2 /100 WBC   Fentanyl, Urine - Urine, Clean Catch    Specimen: Urine, Clean Catch   Result Value Ref Range    Fentanyl, Urine Negative Negative   Scan Slide    Specimen: Blood   Result Value Ref Range    Hypochromia Slight/1+ None Seen    Macrocytes Slight/1+ None Seen    Platelet Morphology Normal Normal   ReDs Vest   Result Value Ref Range    Absolute Lung Fluid Content 15 (A) 20 - 35 %   ECG 12 Lead Dyspnea   Result Value Ref Range    QT Interval 312 ms    QTC Interval 468 ms   ECG 12 Lead Rhythm Change   Result Value Ref Range    QT Interval 360 ms    QTC Interval 498 ms   ECG 12 Lead Rhythm Change   Result Value Ref Range    QT Interval 352 ms    QTC Interval 508 ms   ECG 12 Lead Chest Pain   Result Value Ref Range    QT  Interval 354 ms    QTC Interval 497 ms   ECG 12 Lead Chest Pain   Result Value Ref Range    QT Interval 382 ms    QTC Interval 469 ms   ECG 12 Lead Rhythm Change   Result Value Ref Range    QT Interval 356 ms    QTC Interval 484 ms   Green Top (Gel)   Result Value Ref Range    Extra Tube Hold for add-ons.    Lavender Top   Result Value Ref Range    Extra Tube hold for add-on    Gold Top - SST   Result Value Ref Range    Extra Tube Hold for add-ons.    Light Blue Top   Result Value Ref Range    Extra Tube Hold for add-ons.           ED Course  ED Course as of 11/20/23 0808   Sat Nov 18, 2023   1311 Patient is noted to have decreased breath sounds bibasilar patient is tachycardic appears to be a sinus tachycardia however official EKG is pending.  Patient be given IV Lasix she does appear to be volume overloaded with pitting edema of her lower extremities. [BB]   1315 EKG sinus tachycardia ventricular 135 parable 126  QTc 468 no ST elevation. [BB]   1318 Patient is noted to be hypercapnic we will place patient on BiPAP. [BB]   1330 Patient started on BIPAP - settings are 22/6, rate of 22, 45% FiO2 - will repeat ABG in 1 hour [AH]   1416 Endorsed to Dr. Webster [AH]   1450 Repeat ABG after 1 hr Bipap with some improvement. Will continue current settings [KH]      ED Course User Index  [AH] Ruby Dia PA  [BB] Carloz Langston MD  [KH] Zena Webster MD                                           Medical Decision Making  Problems Addressed:  Acute on chronic respiratory failure with hypoxia and hypercapnia: complicated acute illness or injury  COPD exacerbation: complicated acute illness or injury    Amount and/or Complexity of Data Reviewed  Labs: ordered.  Radiology: ordered.  ECG/medicine tests: ordered.    Risk  Prescription drug management.  Decision regarding hospitalization.        Final diagnoses:   COPD exacerbation   Acute on chronic respiratory failure with hypoxia and hypercapnia   Electronically  signed by VANESSA Armendariz, 11/18/23, 2:21 PM EST.     ED Disposition  ED Disposition       ED Disposition   Decision to Admit    Condition   --    Comment   Level of Care: Progressive Care [20]   Diagnosis: Acute respiratory failure with hypoxia [704797]   Certification: I Certify That Inpatient Hospital Services Are Medically Necessary For Greater Than 2 Midnights                 No follow-up provider specified.       Medication List        ASK your doctor about these medications      ALPRAZolam 1 MG tablet  Commonly known as: XANAX  Ask about: Which instructions should I use?                 Ruby Dia PA  11/20/23 0808

## 2023-11-18 NOTE — PLAN OF CARE
Goal Outcome Evaluation:  Plan of Care Reviewed With: patient           Outcome Evaluation: Pt admited to PCU this shift. Oriented patient to room and call light. Pt is currently on continuous bipap. Heel boots in place for blanhcbale redness on bilateral outer ankles. Safety maintaiend, call light in reach.

## 2023-11-18 NOTE — Clinical Note
Hemostasis started on the right radial artery. R-Band was used in achieving hemostasis. Radial compression device applied to vessel. Hemostasis achieved successfully. Closure device additional comment: 12ML OF AIR

## 2023-11-19 LAB
A-A DO2: 88.2 MMHG (ref 0–300)
ALBUMIN SERPL-MCNC: 3.5 G/DL (ref 3.5–5.2)
ALBUMIN/GLOB SERPL: 1 G/DL
ALP SERPL-CCNC: 71 U/L (ref 39–117)
ALT SERPL W P-5'-P-CCNC: 13 U/L (ref 1–33)
AMPHET+METHAMPHET UR QL: NEGATIVE
AMPHETAMINES UR QL: NEGATIVE
ANION GAP SERPL CALCULATED.3IONS-SCNC: 6.3 MMOL/L (ref 5–15)
ARTERIAL PATENCY WRIST A: POSITIVE
AST SERPL-CCNC: 16 U/L (ref 1–32)
ATMOSPHERIC PRESS: 727 MMHG
BARBITURATES UR QL SCN: NEGATIVE
BASE EXCESS BLDA CALC-SCNC: 21.3 MMOL/L (ref 0–2)
BASOPHILS # BLD AUTO: 0.01 10*3/MM3 (ref 0–0.2)
BASOPHILS NFR BLD AUTO: 0.2 % (ref 0–1.5)
BDY SITE: ABNORMAL
BENZODIAZ UR QL SCN: POSITIVE
BILIRUB SERPL-MCNC: 0.5 MG/DL (ref 0–1.2)
BUN SERPL-MCNC: 15 MG/DL (ref 6–20)
BUN/CREAT SERPL: 34.1 (ref 7–25)
BUPRENORPHINE SERPL-MCNC: NEGATIVE NG/ML
CALCIUM SPEC-SCNC: 9.2 MG/DL (ref 8.6–10.5)
CANNABINOIDS SERPL QL: NEGATIVE
CHLORIDE SERPL-SCNC: 87 MMOL/L (ref 98–107)
CHOLEST SERPL-MCNC: 231 MG/DL (ref 0–200)
CO2 BLDA-SCNC: 54.1 MMOL/L (ref 22–33)
CO2 SERPL-SCNC: 42.7 MMOL/L (ref 22–29)
COCAINE UR QL: NEGATIVE
COHGB MFR BLD: 2.5 % (ref 0–5)
CREAT SERPL-MCNC: 0.44 MG/DL (ref 0.57–1)
CRP SERPL-MCNC: 4.54 MG/DL (ref 0–0.5)
DEPRECATED RDW RBC AUTO: 51.8 FL (ref 37–54)
EGFRCR SERPLBLD CKD-EPI 2021: 112.3 ML/MIN/1.73
EOSINOPHIL # BLD AUTO: 0 10*3/MM3 (ref 0–0.4)
EOSINOPHIL NFR BLD AUTO: 0 % (ref 0.3–6.2)
ERYTHROCYTE [DISTWIDTH] IN BLOOD BY AUTOMATED COUNT: 14.6 % (ref 12.3–15.4)
FENTANYL UR-MCNC: NEGATIVE NG/ML
FOLATE SERPL-MCNC: 16 NG/ML (ref 4.78–24.2)
GEN 5 2HR TROPONIN T REFLEX: 36 NG/L
GLOBULIN UR ELPH-MCNC: 3.5 GM/DL
GLUCOSE SERPL-MCNC: 236 MG/DL (ref 65–99)
HCO3 BLDA-SCNC: 51.4 MMOL/L (ref 20–26)
HCT VFR BLD AUTO: 44.5 % (ref 34–46.6)
HCT VFR BLD CALC: 40.1 % (ref 38–51)
HDLC SERPL-MCNC: 42 MG/DL (ref 40–60)
HGB BLD-MCNC: 12.9 G/DL (ref 12–15.9)
HGB BLDA-MCNC: 13.1 G/DL (ref 13.5–17.5)
IMM GRANULOCYTES # BLD AUTO: 0.04 10*3/MM3 (ref 0–0.05)
IMM GRANULOCYTES NFR BLD AUTO: 0.8 % (ref 0–0.5)
INHALED O2 CONCENTRATION: 36 %
LDLC SERPL CALC-MCNC: 162 MG/DL (ref 0–100)
LDLC/HDLC SERPL: 3.8 {RATIO}
LYMPHOCYTES # BLD AUTO: 0.4 10*3/MM3 (ref 0.7–3.1)
LYMPHOCYTES NFR BLD AUTO: 8 % (ref 19.6–45.3)
Lab: ABNORMAL
Lab: ABNORMAL
MAGNESIUM SERPL-MCNC: 1.9 MG/DL (ref 1.6–2.6)
MCH RBC QN AUTO: 28 PG (ref 26.6–33)
MCHC RBC AUTO-ENTMCNC: 29 G/DL (ref 31.5–35.7)
MCV RBC AUTO: 96.7 FL (ref 79–97)
METHADONE UR QL SCN: NEGATIVE
METHGB BLD QL: <-0.1 % (ref 0–3)
MODALITY: ABNORMAL
MONOCYTES # BLD AUTO: 0.09 10*3/MM3 (ref 0.1–0.9)
MONOCYTES NFR BLD AUTO: 1.8 % (ref 5–12)
NEUTROPHILS NFR BLD AUTO: 4.49 10*3/MM3 (ref 1.7–7)
NEUTROPHILS NFR BLD AUTO: 89.2 % (ref 42.7–76)
NOTIFIED BY: ABNORMAL
NOTIFIED WHO: ABNORMAL
NRBC BLD AUTO-RTO: 0 /100 WBC (ref 0–0.2)
OPIATES UR QL: POSITIVE
OXYCODONE UR QL SCN: NEGATIVE
OXYHGB MFR BLDV: 88.5 % (ref 94–99)
PCO2 BLDA: 86.7 MM HG (ref 35–45)
PCO2 TEMP ADJ BLD: ABNORMAL MM[HG]
PCP UR QL SCN: NEGATIVE
PH BLDA: 7.38 PH UNITS (ref 7.35–7.45)
PH, TEMP CORRECTED: ABNORMAL
PLATELET # BLD AUTO: 144 10*3/MM3 (ref 140–450)
PMV BLD AUTO: 12 FL (ref 6–12)
PO2 BLDA: 60.8 MM HG (ref 83–108)
PO2 TEMP ADJ BLD: ABNORMAL MM[HG]
POTASSIUM SERPL-SCNC: 3.9 MMOL/L (ref 3.5–5.2)
PROT SERPL-MCNC: 7 G/DL (ref 6–8.5)
QT INTERVAL: 312 MS
QT INTERVAL: 352 MS
QT INTERVAL: 354 MS
QT INTERVAL: 360 MS
QT INTERVAL: 382 MS
QTC INTERVAL: 468 MS
QTC INTERVAL: 469 MS
QTC INTERVAL: 497 MS
QTC INTERVAL: 498 MS
QTC INTERVAL: 508 MS
RBC # BLD AUTO: 4.6 10*6/MM3 (ref 3.77–5.28)
SAO2 % BLDCOA: 90.4 % (ref 94–99)
SODIUM SERPL-SCNC: 136 MMOL/L (ref 136–145)
TRICYCLICS UR QL SCN: NEGATIVE
TRIGL SERPL-MCNC: 148 MG/DL (ref 0–150)
TROPONIN T DELTA: 6 NG/L
TROPONIN T SERPL HS-MCNC: 30 NG/L
VENTILATOR MODE: ABNORMAL
VIT B12 BLD-MCNC: 355 PG/ML (ref 211–946)
VLDLC SERPL-MCNC: 27 MG/DL (ref 5–40)
WBC NRBC COR # BLD AUTO: 5.03 10*3/MM3 (ref 3.4–10.8)

## 2023-11-19 PROCEDURE — 94799 UNLISTED PULMONARY SVC/PX: CPT

## 2023-11-19 PROCEDURE — 93010 ELECTROCARDIOGRAM REPORT: CPT | Performed by: INTERNAL MEDICINE

## 2023-11-19 PROCEDURE — 94664 DEMO&/EVAL PT USE INHALER: CPT

## 2023-11-19 PROCEDURE — 82375 ASSAY CARBOXYHB QUANT: CPT

## 2023-11-19 PROCEDURE — 93005 ELECTROCARDIOGRAM TRACING: CPT | Performed by: INTERNAL MEDICINE

## 2023-11-19 PROCEDURE — 80307 DRUG TEST PRSMV CHEM ANLYZR: CPT | Performed by: INTERNAL MEDICINE

## 2023-11-19 PROCEDURE — 80053 COMPREHEN METABOLIC PANEL: CPT | Performed by: INTERNAL MEDICINE

## 2023-11-19 PROCEDURE — 82805 BLOOD GASES W/O2 SATURATION: CPT

## 2023-11-19 PROCEDURE — 84484 ASSAY OF TROPONIN QUANT: CPT | Performed by: INTERNAL MEDICINE

## 2023-11-19 PROCEDURE — 94660 CPAP INITIATION&MGMT: CPT

## 2023-11-19 PROCEDURE — 85025 COMPLETE CBC W/AUTO DIFF WBC: CPT | Performed by: INTERNAL MEDICINE

## 2023-11-19 PROCEDURE — 83050 HGB METHEMOGLOBIN QUAN: CPT

## 2023-11-19 PROCEDURE — 86140 C-REACTIVE PROTEIN: CPT | Performed by: PHYSICIAN ASSISTANT

## 2023-11-19 PROCEDURE — 83735 ASSAY OF MAGNESIUM: CPT | Performed by: PHYSICIAN ASSISTANT

## 2023-11-19 PROCEDURE — 99233 SBSQ HOSP IP/OBS HIGH 50: CPT | Performed by: INTERNAL MEDICINE

## 2023-11-19 PROCEDURE — 25010000002 HEPARIN (PORCINE) PER 1000 UNITS: Performed by: INTERNAL MEDICINE

## 2023-11-19 PROCEDURE — 82607 VITAMIN B-12: CPT | Performed by: PHYSICIAN ASSISTANT

## 2023-11-19 PROCEDURE — 80061 LIPID PANEL: CPT | Performed by: PHYSICIAN ASSISTANT

## 2023-11-19 PROCEDURE — 25010000002 METHYLPREDNISOLONE PER 40 MG: Performed by: INTERNAL MEDICINE

## 2023-11-19 PROCEDURE — 82746 ASSAY OF FOLIC ACID SERUM: CPT | Performed by: PHYSICIAN ASSISTANT

## 2023-11-19 PROCEDURE — 36600 WITHDRAWAL OF ARTERIAL BLOOD: CPT

## 2023-11-19 PROCEDURE — 94761 N-INVAS EAR/PLS OXIMETRY MLT: CPT

## 2023-11-19 PROCEDURE — 99232 SBSQ HOSP IP/OBS MODERATE 35: CPT | Performed by: INTERNAL MEDICINE

## 2023-11-19 RX ORDER — CLOPIDOGREL BISULFATE 75 MG/1
75 TABLET ORAL DAILY
Status: DISCONTINUED | OUTPATIENT
Start: 2023-11-19 | End: 2023-12-13 | Stop reason: HOSPADM

## 2023-11-19 RX ORDER — LEVOTHYROXINE SODIUM 0.03 MG/1
25 TABLET ORAL
Status: DISCONTINUED | OUTPATIENT
Start: 2023-11-19 | End: 2023-12-13 | Stop reason: HOSPADM

## 2023-11-19 RX ORDER — FUROSEMIDE 20 MG/1
20 TABLET ORAL DAILY
Status: DISCONTINUED | OUTPATIENT
Start: 2023-11-19 | End: 2023-12-02

## 2023-11-19 RX ORDER — HYDROCODONE BITARTRATE AND ACETAMINOPHEN 10; 325 MG/1; MG/1
1 TABLET ORAL EVERY 6 HOURS PRN
Status: DISCONTINUED | OUTPATIENT
Start: 2023-11-19 | End: 2023-12-13 | Stop reason: HOSPADM

## 2023-11-19 RX ORDER — FLUTICASONE PROPIONATE 50 MCG
2 SPRAY, SUSPENSION (ML) NASAL DAILY
Status: DISCONTINUED | OUTPATIENT
Start: 2023-11-19 | End: 2023-12-13 | Stop reason: HOSPADM

## 2023-11-19 RX ADMIN — HEPARIN SODIUM 5000 UNITS: 5000 INJECTION INTRAVENOUS; SUBCUTANEOUS at 05:55

## 2023-11-19 RX ADMIN — IPRATROPIUM BROMIDE 0.5 MG: 0.5 SOLUTION RESPIRATORY (INHALATION) at 18:37

## 2023-11-19 RX ADMIN — HEPARIN SODIUM 5000 UNITS: 5000 INJECTION INTRAVENOUS; SUBCUTANEOUS at 13:24

## 2023-11-19 RX ADMIN — FUROSEMIDE 20 MG: 20 TABLET ORAL at 10:44

## 2023-11-19 RX ADMIN — FLUTICASONE PROPIONATE 2 SPRAY: 50 SPRAY, METERED NASAL at 10:44

## 2023-11-19 RX ADMIN — IPRATROPIUM BROMIDE 0.5 MG: 0.5 SOLUTION RESPIRATORY (INHALATION) at 13:19

## 2023-11-19 RX ADMIN — IPRATROPIUM BROMIDE 0.5 MG: 0.5 SOLUTION RESPIRATORY (INHALATION) at 00:31

## 2023-11-19 RX ADMIN — HEPARIN SODIUM 5000 UNITS: 5000 INJECTION INTRAVENOUS; SUBCUTANEOUS at 22:00

## 2023-11-19 RX ADMIN — METHYLPREDNISOLONE SODIUM SUCCINATE 40 MG: 40 INJECTION, POWDER, FOR SOLUTION INTRAMUSCULAR; INTRAVENOUS at 05:55

## 2023-11-19 RX ADMIN — METHYLPREDNISOLONE SODIUM SUCCINATE 40 MG: 40 INJECTION, POWDER, FOR SOLUTION INTRAMUSCULAR; INTRAVENOUS at 16:56

## 2023-11-19 RX ADMIN — CLOPIDOGREL BISULFATE 75 MG: 75 TABLET, FILM COATED ORAL at 10:46

## 2023-11-19 RX ADMIN — Medication 10 ML: at 08:04

## 2023-11-19 RX ADMIN — HYDROCODONE BITARTRATE AND ACETAMINOPHEN 1 TABLET: 10; 325 TABLET ORAL at 16:58

## 2023-11-19 RX ADMIN — BUDESONIDE AND FORMOTEROL FUMARATE DIHYDRATE 2 PUFF: 160; 4.5 AEROSOL RESPIRATORY (INHALATION) at 06:43

## 2023-11-19 RX ADMIN — LEVOTHYROXINE SODIUM 25 MCG: 25 TABLET ORAL at 10:44

## 2023-11-19 RX ADMIN — Medication 10 ML: at 22:03

## 2023-11-19 RX ADMIN — METOPROLOL TARTRATE 25 MG: 25 TABLET, FILM COATED ORAL at 10:44

## 2023-11-19 RX ADMIN — HYDROCODONE BITARTRATE AND ACETAMINOPHEN 1 TABLET: 10; 325 TABLET ORAL at 10:48

## 2023-11-19 RX ADMIN — BUDESONIDE AND FORMOTEROL FUMARATE DIHYDRATE 2 PUFF: 160; 4.5 AEROSOL RESPIRATORY (INHALATION) at 18:37

## 2023-11-19 RX ADMIN — IPRATROPIUM BROMIDE 0.5 MG: 0.5 SOLUTION RESPIRATORY (INHALATION) at 06:43

## 2023-11-19 NOTE — PLAN OF CARE
Goal Outcome Evaluation:           Problem: Adult Inpatient Plan of Care  Goal: Plan of Care Review  Outcome: Ongoing, Progressing  Flowsheets  Taken 11/19/2023 0507 by Joyce Mejia RN  Progress: no change  Outcome Evaluation: Pt is alert and oriented x4. VSS on BIPAP. Pt has no requests at this time. Bed in lowest position, call light in reach, and bed alarm on.  Taken 11/18/2023 1742 by Nanci Baldwin RN  Plan of Care Reviewed With: patient  Goal: Patient-Specific Goal (Individualized)  Outcome: Ongoing, Progressing  Goal: Absence of Hospital-Acquired Illness or Injury  Outcome: Ongoing, Progressing  Intervention: Identify and Manage Fall Risk  Recent Flowsheet Documentation  Taken 11/19/2023 0300 by Joyce Mejia RN  Safety Promotion/Fall Prevention:   activity supervised   assistive device/personal items within reach   clutter free environment maintained   fall prevention program maintained   safety round/check completed  Taken 11/19/2023 0100 by Joyce Mejia RN  Safety Promotion/Fall Prevention:   activity supervised   assistive device/personal items within reach   clutter free environment maintained   fall prevention program maintained   safety round/check completed  Taken 11/18/2023 2300 by Joyce Mejia RN  Safety Promotion/Fall Prevention:   activity supervised   assistive device/personal items within reach   clutter free environment maintained   fall prevention program maintained   safety round/check completed  Taken 11/18/2023 2100 by Joyce Mejia RN  Safety Promotion/Fall Prevention:   activity supervised   assistive device/personal items within reach   clutter free environment maintained   fall prevention program maintained   safety round/check completed  Taken 11/18/2023 1900 by Joyce Mejia RN  Safety Promotion/Fall Prevention:   activity supervised   assistive device/personal items within reach   clutter free environment maintained   fall prevention program maintained   safety  round/check completed  Intervention: Prevent Skin Injury  Recent Flowsheet Documentation  Taken 11/18/2023 2000 by Joyce Mejia RN  Skin Protection:   adhesive use limited   incontinence pads utilized   transparent dressing maintained   tubing/devices free from skin contact  Intervention: Prevent and Manage VTE (Venous Thromboembolism) Risk  Recent Flowsheet Documentation  Taken 11/19/2023 0200 by Joyce Mejia RN  VTE Prevention/Management: (see mar) other (see comments)  Range of Motion: active ROM (range of motion) encouraged  Taken 11/18/2023 2000 by Joyce Mejia RN  VTE Prevention/Management: (see mar) other (see comments)  Range of Motion: active ROM (range of motion) encouraged  Intervention: Prevent Infection  Recent Flowsheet Documentation  Taken 11/19/2023 0300 by Joyce Mejia RN  Infection Prevention:   single patient room provided   rest/sleep promoted  Taken 11/19/2023 0100 by Joyce Mejia RN  Infection Prevention:   single patient room provided   rest/sleep promoted  Taken 11/18/2023 2300 by Joyce Mejia RN  Infection Prevention:   single patient room provided   rest/sleep promoted  Taken 11/18/2023 2100 by Joyce Mejia RN  Infection Prevention:   single patient room provided   rest/sleep promoted  Taken 11/18/2023 1900 by Joyce Mejia RN  Infection Prevention:   single patient room provided   rest/sleep promoted  Goal: Optimal Comfort and Wellbeing  Outcome: Ongoing, Progressing  Intervention: Provide Person-Centered Care  Recent Flowsheet Documentation  Taken 11/19/2023 0200 by Joyce Mejia RN  Trust Relationship/Rapport:   care explained   choices provided   emotional support provided   questions answered   thoughts/feelings acknowledged  Taken 11/18/2023 2000 by Joyce Mejia RN  Trust Relationship/Rapport:   care explained   choices provided   emotional support provided   questions answered   thoughts/feelings acknowledged  Goal: Readiness for Transition of  Care  Outcome: Ongoing, Progressing     Problem: Asthma Comorbidity  Goal: Maintenance of Asthma Control  Outcome: Ongoing, Progressing  Intervention: Maintain Asthma Symptom Control  Recent Flowsheet Documentation  Taken 11/19/2023 0300 by Joyce Mejia RN  Medication Review/Management: medications reviewed  Taken 11/19/2023 0100 by Joyce Mejia RN  Medication Review/Management: medications reviewed  Taken 11/18/2023 2300 by Joyce Mejia RN  Medication Review/Management: medications reviewed  Taken 11/18/2023 2100 by Joyce Mejia RN  Medication Review/Management: medications reviewed  Taken 11/18/2023 1900 by Joyce Mejia RN  Medication Review/Management: medications reviewed     Problem: Behavioral Health Comorbidity  Goal: Maintenance of Behavioral Health Symptom Control  Outcome: Ongoing, Progressing  Intervention: Maintain Behavioral Health Symptom Control  Recent Flowsheet Documentation  Taken 11/19/2023 0300 by Joyce Mejia RN  Medication Review/Management: medications reviewed  Taken 11/19/2023 0100 by Joyce Mejia RN  Medication Review/Management: medications reviewed  Taken 11/18/2023 2300 by Joyce Mejia RN  Medication Review/Management: medications reviewed  Taken 11/18/2023 2100 by Joyce Mejia RN  Medication Review/Management: medications reviewed  Taken 11/18/2023 1900 by Joyce Mejia RN  Medication Review/Management: medications reviewed     Problem: COPD (Chronic Obstructive Pulmonary Disease) Comorbidity  Goal: Maintenance of COPD Symptom Control  Outcome: Ongoing, Progressing  Intervention: Maintain COPD-Symptom Control  Recent Flowsheet Documentation  Taken 11/19/2023 0300 by Joyce Mejia RN  Medication Review/Management: medications reviewed  Taken 11/19/2023 0100 by Joyce Mejia RN  Medication Review/Management: medications reviewed  Taken 11/18/2023 2300 by Joyce Mejia RN  Medication Review/Management: medications reviewed  Taken 11/18/2023 2100 by Roberto  Joyce, RN  Medication Review/Management: medications reviewed  Taken 11/18/2023 1900 by Joyce Mejia RN  Medication Review/Management: medications reviewed     Problem: Diabetes Comorbidity  Goal: Blood Glucose Level Within Targeted Range  Outcome: Ongoing, Progressing     Problem: Heart Failure Comorbidity  Goal: Maintenance of Heart Failure Symptom Control  Outcome: Ongoing, Progressing  Intervention: Maintain Heart Failure-Management  Recent Flowsheet Documentation  Taken 11/19/2023 0300 by Joyec Mejia RN  Medication Review/Management: medications reviewed  Taken 11/19/2023 0100 by Joyce Mejia RN  Medication Review/Management: medications reviewed  Taken 11/18/2023 2300 by Joyce Mejia RN  Medication Review/Management: medications reviewed  Taken 11/18/2023 2100 by Joyce Mejia RN  Medication Review/Management: medications reviewed  Taken 11/18/2023 1900 by Joyce Mejia RN  Medication Review/Management: medications reviewed     Problem: Hypertension Comorbidity  Goal: Blood Pressure in Desired Range  Outcome: Ongoing, Progressing  Intervention: Maintain Blood Pressure Management  Recent Flowsheet Documentation  Taken 11/19/2023 0300 by Joyce Mejia RN  Medication Review/Management: medications reviewed  Taken 11/19/2023 0100 by Joyce Mejia RN  Medication Review/Management: medications reviewed  Taken 11/18/2023 2300 by Joyce Mejia RN  Medication Review/Management: medications reviewed  Taken 11/18/2023 2100 by Joyce Mejia RN  Medication Review/Management: medications reviewed  Taken 11/18/2023 1900 by Joyce Mejia RN  Medication Review/Management: medications reviewed     Problem: Obstructive Sleep Apnea Risk or Actual Comorbidity Management  Goal: Unobstructed Breathing During Sleep  Outcome: Ongoing, Progressing     Problem: Osteoarthritis Comorbidity  Goal: Maintenance of Osteoarthritis Symptom Control  Outcome: Ongoing, Progressing  Intervention: Maintain Osteoarthritis  Symptom Control  Recent Flowsheet Documentation  Taken 11/19/2023 0300 by Joyce Mejia RN  Medication Review/Management: medications reviewed  Taken 11/19/2023 0100 by Joyce Mejia RN  Medication Review/Management: medications reviewed  Taken 11/18/2023 2300 by Joyce Mejia RN  Medication Review/Management: medications reviewed  Taken 11/18/2023 2100 by Joyce Mejia RN  Medication Review/Management: medications reviewed  Taken 11/18/2023 1900 by Joyce Mejia RN  Medication Review/Management: medications reviewed     Problem: Pain Chronic (Persistent) (Comorbidity Management)  Goal: Acceptable Pain Control and Functional Ability  Outcome: Ongoing, Progressing  Intervention: Manage Persistent Pain  Recent Flowsheet Documentation  Taken 11/19/2023 0300 by Joyce Mejia RN  Medication Review/Management: medications reviewed  Taken 11/19/2023 0100 by Joyce Mejia RN  Medication Review/Management: medications reviewed  Taken 11/18/2023 2300 by Joyce Mejia RN  Medication Review/Management: medications reviewed  Taken 11/18/2023 2100 by Joyce Mejia RN  Medication Review/Management: medications reviewed  Taken 11/18/2023 1900 by Joyce Mejia RN  Medication Review/Management: medications reviewed  Intervention: Optimize Psychosocial Wellbeing  Recent Flowsheet Documentation  Taken 11/19/2023 0200 by Joyce Mejia RN  Diversional Activities: television  Family/Support System Care:   self-care encouraged   support provided  Taken 11/18/2023 2000 by Joyce Mejia RN  Diversional Activities: television  Family/Support System Care:   self-care encouraged   support provided     Problem: Seizure Disorder Comorbidity  Goal: Maintenance of Seizure Control  Outcome: Ongoing, Progressing     Problem: Skin Injury Risk Increased  Goal: Skin Health and Integrity  Outcome: Ongoing, Progressing  Intervention: Optimize Skin Protection  Recent Flowsheet Documentation  Taken 11/18/2023 2000 by Joyce Mejia  RN  Pressure Reduction Techniques:   frequent weight shift encouraged   weight shift assistance provided  Pressure Reduction Devices:   positioning supports utilized   pressure-redistributing mattress utilized  Skin Protection:   adhesive use limited   incontinence pads utilized   transparent dressing maintained   tubing/devices free from skin contact     Progress: no change  Outcome Evaluation: Pt is alert and oriented x4. VSS on BIPAP. Pt has no requests at this time. Bed in lowest position, call light in reach, and bed alarm on.

## 2023-11-19 NOTE — CONSULTS
Consult Note Pulmonary     Referring Provider:    Reason for Consultation: COPD exacerbation, chronic respiratory failure.          Chief complaint   Worsening shortness of breath and wheezing    History of present illness:    Ms. Marcie Jiang is a 57-year-old female with past medical history significant for chronic hypoxic, hypercapnic respiratory failure due to severe COPD.  She was admitted with complaints of worsening shortness of breath and wheezing.    She has had trouble tolerating previous setting of Trilogy.  According to her, Dr. Hope has asked DME to change it to BiPAP which has not been done yet.    Patient denies fever, sputum production, chest pain, lightheadedness.    Patient denies rash, joint stiffness, photosensitivity, recent sick contact, exposure to birds and recent travel.    Denies unintentional weight loss, night sweats.      Review of Systems  As described above      History  Past Medical History:   Diagnosis Date    Acid reflux disease     Anal cancer 12/05/2019    Arthritis     Asthma, extrinsic     Cholelithiasis     Chronic headaches     COPD (chronic obstructive pulmonary disease)     CVA (cerebral vascular accident)     w/ right sided deficit    CVA (cerebral vascular accident)     Diabetes mellitus     only has high blood sugar when on steriods    Dyslipidemia 09/04/2018    History of radiation therapy 02/27/2020    Whole pelvis/anal mass    Nausea and vomiting 12/25/2016    Obstructive sleep apnea treated with BiPAP     On home oxygen therapy     2L     Sleep apnea, obstructive    ,   Past Surgical History:   Procedure Laterality Date    ABDOMINAL SURGERY      CARDIAC CATHETERIZATION      CAROTID ENDARTERECTOMY Left 2018    CHOLECYSTECTOMY WITH INTRAOPERATIVE CHOLANGIOGRAM N/A 1/30/2017    Procedure: CHOLECYSTECTOMY LAPAROSCOPIC INTRAOPERATIVE CHOLANGIOGRAM;  Surgeon: Carolin Marie MD;  Location: [facility];  Service:     COLONOSCOPY      HYSTERECTOMY      for heavy  bleeding/ complete    KIDNEY STONE SURGERY      TUBAL ABDOMINAL LIGATION      VENOUS ACCESS DEVICE (PORT) INSERTION Left 2019    Procedure: INSERTION VENOUS ACCESS DEVICE;  Surgeon: Carolin Marie MD;  Location: [facility];  Service: General   ,   Family History   Problem Relation Age of Onset    Diabetes Mother     Hypertension Mother     Arrhythmia Mother     Pneumonia Father     Coronary artery disease Other     Arrhythmia Sister     Heart attack Brother     Lymphoma Brother         hodgkin's     Other Brother         CABG    Breast cancer Neg Hx    ,   Social History     Tobacco Use    Smoking status: Former     Packs/day: 1.50     Years: 30.00     Additional pack years: 0.00     Total pack years: 45.00     Types: Electronic Cigarette, Cigarettes     Quit date:      Years since quittin.8    Smokeless tobacco: Never   Vaping Use    Vaping Use: Unknown   Substance Use Topics    Alcohol use: No    Drug use: Defer   ,   Medications Prior to Admission   Medication Sig Dispense Refill Last Dose    clopidogrel (PLAVIX) 75 MG tablet Take 1 tablet by mouth Daily.  3 2023    fluticasone (FLONASE) 50 MCG/ACT nasal spray 2 sprays into the nostril(s) as directed by provider Daily.   2023    Fluticasone-Umeclidin-Vilant 200-62.5-25 MCG/ACT aerosol powder  Inhale 1 puff Daily.   2023    furosemide (LASIX) 20 MG tablet Take 1 tablet by mouth Daily.   2023    HYDROcodone-acetaminophen (NORCO)  MG per tablet Take 1 tablet by mouth Every 6 (Six) Hours As Needed for Moderate Pain.   Past Week    levothyroxine (SYNTHROID, LEVOTHROID) 25 MCG tablet Take 1 tablet by mouth Every Morning.   2023    metoprolol tartrate (LOPRESSOR) 25 MG tablet Take 1 tablet by mouth 2 (Two) Times a Day.   2023    potassium chloride (MICRO-K) 10 MEQ CR capsule Take 1 capsule by mouth Daily.   2023    albuterol sulfate  (90 Base) MCG/ACT inhaler Inhale 2 puffs Every 6 (Six) Hours As  Needed for Wheezing or Shortness of Air. 18 g 0 Unknown    ALPRAZolam (XANAX) 1 MG tablet Take 1 tablet by mouth Daily As Needed for Anxiety.   Unknown    ipratropium-albuterol (COMBIVENT RESPIMAT)  MCG/ACT inhaler Inhale 1 puff 4 (Four) Times a Day As Needed for Wheezing or Shortness of Air.   Unknown    ipratropium-albuterol (DUO-NEB) 0.5-2.5 mg/3 ml nebulizer Take 3 mL by nebulization Every 4 (Four) Hours As Needed for Wheezing or Shortness of Air.   Unknown   , Scheduled Meds:  aspirin, 324 mg, Oral, Once  budesonide-formoterol, 2 puff, Inhalation, BID - RT  clopidogrel, 75 mg, Oral, Daily  fluticasone, 2 spray, Nasal, Daily  furosemide, 20 mg, Oral, Daily  heparin (porcine), 5,000 Units, Subcutaneous, Q8H  ipratropium, 0.5 mg, Nebulization, 4x Daily - RT  levothyroxine, 25 mcg, Oral, Q AM  methylPREDNISolone sodium succinate, 40 mg, Intravenous, Q12H  metoprolol tartrate, 25 mg, Oral, BID  senna-docusate sodium, 2 tablet, Oral, BID  sodium chloride, 10 mL, Intravenous, Q12H    , Continuous Infusions:  Pharmacy Consult,      and Allergies:  Morphine and Roflumilast    Objective     Vital Signs   Temp:  [97.1 °F (36.2 °C)-98.4 °F (36.9 °C)] 97.1 °F (36.2 °C)  Heart Rate:  [] 107  Resp:  [13-26] 16  BP: (104-155)/() 129/103    Physical Exam:          General- normal in appearance, not in any acute distress    HEENT- pupils equally reactive to light, normal in size, no scleral icterus    Neck-supple, No JVD, no carotid bruit  Respiratory-bilateral air entry, but distant breath sound and occasional wheezing.    Cardiovascular-  Normal S1 and S2. No S3, S4 or murmurs.      GI-nontender nondistended bowel sounds positive    CNS-alert oriented x3, grossly nonfocal      Extremities-no clubbing and edema    Psychiatric-mood good, good eye contact,     Skin- no visible rash             Results Review:    LABS:    Lab Results   Component Value Date    GLUCOSE 236 (H) 11/19/2023    BUN 15 11/19/2023     CREATININE 0.44 (L) 11/19/2023    EGFRIFNONA >150 02/23/2022    BCR 34.1 (H) 11/19/2023    CO2 42.7 (H) 11/19/2023    CALCIUM 9.2 11/19/2023    ALBUMIN 3.5 11/19/2023    LABIL2 1.5 02/11/2014    AST 16 11/19/2023    ALT 13 11/19/2023    WBC 5.03 11/19/2023    HGB 12.9 11/19/2023    HCT 44.5 11/19/2023    MCV 96.7 11/19/2023     11/19/2023     11/19/2023    K 3.9 11/19/2023    CL 87 (L) 11/19/2023    ANIONGAP 6.3 11/19/2023       Lab Results   Component Value Date    INR 0.94 11/18/2023    INR 0.95 10/12/2023    INR 0.90 03/27/2017    PROTIME 13.1 11/18/2023    PROTIME 13.1 10/12/2023    PROTIME 9.9 03/27/2017       Results from last 7 days   Lab Units 11/18/23  1307   INR  0.94   APTT seconds 26.0*          I reviewed the patient's new clinical results.  I reviewed the patient's new imaging results and agree with the interpretation.      Assessment & Plan     #1 acute exacerbation of COPD.  #2 chronic hypercapnic hypoxic respiratory failure.    3.  X-ray chest revealed diffuse bilateral interstitial prominence which could be due to atypical pneumonia or fluid overload.    I have requested  to get in touch with DME to adjust the BiPAP setting to 22/6 with a backup rate of 14 and titrate oxygen to a saturation of 92%.  Need to adjust the pressure setting based on patient's comfort.    Continue systemic steroid, Symbicort, ipratropium every 6 hour.    Continue supportive care.  Patient to use BiPAP 4-hour on, 4-hour off and continuous during sleep    Incentive spirometry.    D-dimer is within normal limit.  Thromboembolic event is highly unlikely.        #4: Troponin leak probably due to work of breathing and hypoxia.            GI- PPI prophylaxis      Endrocrinology- Maintain Blood sugar 140 -180        DVT prophylaxis-  I have personally reviewed x-rays, medication list, and latest lab result.  Total time spent 30 minutes.  I am really thankful to Dr. BELINDA Dickerson for having me participate in the  care of this patient.      Chris Shi MD     11/19/23 11:58 EST

## 2023-11-19 NOTE — PROGRESS NOTES
[facility] HOSPITALIST PROGRESS NOTE     Patient Identification:  Name:  Liz Jiang  Age:  57 y.o.  Sex:  female  :  1965  MRN:  [id number]  Visit Number:  [id number]  ROOM: [place] [address]     Primary Care Provider:  Tabitha Ron APRN    Length of stay in inpatient status:  1    Subjective     Chief Compliant:    Chief Complaint   Patient presents with    Shortness of Breath       History of Presenting Illness:    Patient reports feeling better this AM. ABG improved, she was able to eat breakfast on NC but placed back on BiPAP due to hypoxic right after. Patient lasted for around 6 hours later in the day. Was titrated down to 4L NC while off BiPAP.     ROS:  Otherwise 10 point ROS negative other than documented above in HPI.     Objective     Current Hospital Meds:aspirin, 324 mg, Oral, Once  budesonide-formoterol, 2 puff, Inhalation, BID - RT  clopidogrel, 75 mg, Oral, Daily  fluticasone, 2 spray, Nasal, Daily  furosemide, 20 mg, Oral, Daily  heparin (porcine), 5,000 Units, Subcutaneous, Q8H  ipratropium, 0.5 mg, Nebulization, 4x Daily - RT  levothyroxine, 25 mcg, Oral, Q AM  methylPREDNISolone sodium succinate, 40 mg, Intravenous, Q12H  metoprolol tartrate, 25 mg, Oral, BID  senna-docusate sodium, 2 tablet, Oral, BID  sodium chloride, 10 mL, Intravenous, Q12H    Pharmacy Consult,         Current Antimicrobial Therapy:  Anti-Infectives (From admission, onward)      None          Current Diuretic Therapy:  Diuretics (From admission, onward)      Ordered     Dose/Rate Route Frequency Start Stop    23 0916  furosemide (LASIX) tablet 20 mg        Ordering Provider: Cliff Castaneda MD    20 mg Oral Daily 23 1015      23 1306  furosemide (LASIX) injection 80 mg        Ordering Provider: Ruby Dia PA    80 mg Intravenous Once 23 1322 23 1328           ----------------------------------------------------------------------------------------------------------------------  Vital Signs:  Temp:  [97.1 °F (36.2 °C)-98.4 °F (36.9 °C)] 97.6 °F (36.4 °C)  Heart Rate:  [] 92  Resp:  [13-25] 22  BP: (104-155)/() 134/89  SpO2:  [82 %-98 %] 95 %  on  Flow (L/min):  [4-6] 4;   Device (Oxygen Therapy): nasal cannula  Body mass index is 31.03 kg/m².    Wt Readings from Last 3 Encounters:   11/19/23 82 kg (180 lb 12.4 oz)   11/08/23 81.6 kg (180 lb)   10/28/23 81.6 kg (180 lb)     Intake & Output (last 3 days)         11/16 0701 11/17 0700 11/17 0701 11/18 0700 11/18 0701  11/19 0700 11/19 0701  11/20 0700    P.O.   0 360    I.V. (mL/kg)   0 (0)     Total Intake(mL/kg)   0 (0) 360 (4.4)    Urine (mL/kg/hr)   1500 400 (0.5)    Total Output   1500 400    Net   -1500 -40                  Diet: Cardiac Diets, Diabetic Diets; Healthy Heart (2-3 Na+); Consistent Carbohydrate; Texture: Regular Texture (IDDSI 7); Fluid Consistency: Thin (IDDSI 0)  ----------------------------------------------------------------------------------------------------------------------  Physical exam:  Constitutional:  Chronically ill apeparing.   HENT:  Head:  Normocephalic and atraumatic.  Mouth:  Moist mucous membranes.    Eyes:  Conjunctivae and EOM are normal. No scleral icterus.    Neck:  Neck supple.  No JVD present.    Cardiovascular:  Normal rate, regular rhythm and normal heart sounds with no murmur.  Pulmonary/Chest:  No respiratory distress, Bilateral wheezing improved   Abdominal:  Soft.  Bowel sounds are normal.  No distension and no tenderness.   Musculoskeletal:  No edema, no tenderness, and no deformity.  No red or swollen joints anywhere.    Neurological:  Alert and oriented to person, place, and time.  No cranial nerve deficit.  No tongue deviation.  No facial droop.  No slurred speech.   Skin:  Skin is warm and dry. No rash noted. No pallor.   Peripheral vascular:  Pulses  "in all 4 extremities with no clubbing, no cyanosis, no edema.  ----------------------------------------------------------------------------------------------------------------------  Tele:    ----------------------------------------------------------------------------------------------------------------------  Results from last 7 days   Lab Units 11/19/23 0032 11/18/23  1307   CRP mg/dL 4.54* 4.51*   LACTATE mmol/L  --  1.7   WBC 10*3/mm3 5.03 11.04*   HEMOGLOBIN g/dL 12.9 12.9   HEMATOCRIT % 44.5 44.8   MCV fL 96.7 99.1*   MCHC g/dL 29.0* 28.8*   PLATELETS 10*3/mm3 144 154   INR   --  0.94     Results from last 7 days   Lab Units 11/19/23  0823   PH, ARTERIAL pH units 7.381   PO2 ART mm Hg 60.8*   PCO2, ARTERIAL mm Hg 86.7*   HCO3 ART mmol/L 51.4*     Results from last 7 days   Lab Units 11/19/23 0032 11/18/23  1307   SODIUM mmol/L 136 141   POTASSIUM mmol/L 3.9 3.6   MAGNESIUM mg/dL 1.9 1.9   CHLORIDE mmol/L 87* 93*   CO2 mmol/L 42.7* 40.3*   BUN mg/dL 15 14   CREATININE mg/dL 0.44* 0.38*   CALCIUM mg/dL 9.2 9.2   GLUCOSE mg/dL 236* 174*   ALBUMIN g/dL 3.5 3.7   BILIRUBIN mg/dL 0.5 0.5   ALK PHOS U/L 71 72   AST (SGOT) U/L 16 16   ALT (SGPT) U/L 13 13   Estimated Creatinine Clearance: 144.3 mL/min (A) (by C-G formula based on SCr of 0.44 mg/dL (L)).  No results found for: \"AMMONIA\"  Results from last 7 days   Lab Units 11/19/23  1640 11/18/23  2215 11/18/23  1447   HSTROP T ng/L 30* 29* 39*     Results from last 7 days   Lab Units 11/18/23  1307   PROBNP pg/mL 240.0     Results from last 7 days   Lab Units 11/19/23  0032   CHOLESTEROL mg/dL 231*   TRIGLYCERIDES mg/dL 148   HDL CHOL mg/dL 42   LDL CHOL mg/dL 162*     Hemoglobin A1C   Date/Time Value Ref Range Status   11/18/2023 1307 6.60 (H) 4.80 - 5.60 % Final     Lab Results   Component Value Date    TSH 5.170 (H) 11/18/2023    FREET4 1.09 11/18/2023     No results found for: \"PREGTESTUR\", \"PREGSERUM\", \"HCG\", \"HCGQUANT\"  Pain Management Panel          Latest " "Ref Rng & Units 2/23/2022   Pain Management Panel   Amphetamine, Urine Qual Negative Negative    Barbiturates Screen, Urine Negative Negative    Benzodiazepine Screen, Urine Negative Positive    Buprenorphine, Screen, Urine Negative Negative    Cocaine Screen, Urine Negative Negative    Methadone Screen , Urine Negative Negative    Methamphetamine, Ur Negative Negative      Brief Urine Lab Results  (Last result in the past 365 days)        Color   Clarity   Blood   Leuk Est   Nitrite   Protein   CREAT   Urine HCG        11/18/23 1447 Yellow   Clear   Negative   Negative   Negative   Negative                 Blood Culture   Date Value Ref Range Status   11/18/2023 No growth at 24 hours  Preliminary   11/18/2023 No growth at 24 hours  Preliminary     No results found for: \"URINECX\"  No results found for: \"WOUNDCX\"  No results found for: \"STOOLCX\"  No results found for: \"RESPCX\"  No results found for: \"AFBCX\"  Results from last 7 days   Lab Units 11/19/23  0032 11/18/23  1307   PROCALCITONIN ng/mL  --  0.03   LACTATE mmol/L  --  1.7   SED RATE mm/hr  --  74*   CRP mg/dL 4.54* 4.51*       I have personally looked at the labs and they are summarized above.  ----------------------------------------------------------------------------------------------------------------------  Detailed radiology reports for the last 24 hours:    Imaging Results (Last 24 Hours)       ** No results found for the last 24 hours. **          Assessment & Plan      #Acute on chronic hypoxic respiratory failure   #Acute on chronic hypercapnic respiratory failure   #Acute COPD exacerbation   #Hx of STEVEN  #Noncompliance   - Viral panel negative, D-dimer wnl. No new consolidations.   - Patient with severe COPD that requires home NIPPV but she has been noncompliant.   - ABG slowly improved. BiPAP now PRN and QHS. Other times she can be on nC with goal 88-92%   - Continue steroid, Symbicort, ipratropium   - Pulmonology consulted. Appreciate BiPAP " setting recs     #Chronically elevated troponin   - Similar to prior levels. Denies chest pain. EKG without new ischemic finding.     CHRONIC MEDICAL PROBLEMS     -HFpEF/grade I diastolic dysfunction: Patient does have mild pedal edema.  No significant edema noted.  No evidence of volume overload on chest x-ray.  Not felt to be in acute exacerbation.  proBNP not elevated.  Obtains Reds vest.  Previous TTE from 10/2023 reviewed.  Monitor volume status closely.  Continue home medication regimen monitor consult per pharmacy.  IV Lasix given in ED, monitor for diuretic response.    -Essential hypertension  Continue home regimen     -Hyperlipidemia: Continue statin. AM lipid panel     -Type II diabetes mellitus, non insulin dependent  A1C 6.6%. SSI.     -History of CVA: Details unknown.  Continue home medication and monitor consult per pharmacy. Does not appear to have residual deficits     -History of carotid artery disease s/p left CEA in the past: Continue home medications once reconciled per pharmacy    -Hypothyroidism: TSH WNL. Continue home levothyroxine.     -Anxiety: Supportive care, continue home medication regimen once reconciled per pharmacy.    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  -Former smoker       Code status: Full     Dispo: Pending clinical improvement. Family requesting we have home health change settings on home machine and try here prior to discharge which is reasonable.     VTE Prophylaxis:   Mechanical Order History:       None          Pharmalogical Order History:        Ordered     Dose Route Frequency Stop    11/18/23 1605  heparin (porcine) 5000 UNIT/ML injection 5,000 Units         5,000 Units SC Every 8 Hours Scheduled --                    Cliff Castaneda MD  [facility]  Hospitalist  11/19/23  17:21 EST

## 2023-11-19 NOTE — PLAN OF CARE
Goal Outcome Evaluation:  Plan of Care Reviewed With: patient           Outcome Evaluation: Pt currently on 4L NC. No new needs noted at this time. Safety maintained, call light in reach.

## 2023-11-20 LAB
ALBUMIN SERPL-MCNC: 3.6 G/DL (ref 3.5–5.2)
ALBUMIN/GLOB SERPL: 1.2 G/DL
ALP SERPL-CCNC: 63 U/L (ref 39–117)
ALT SERPL W P-5'-P-CCNC: 12 U/L (ref 1–33)
ANION GAP SERPL CALCULATED.3IONS-SCNC: 7.5 MMOL/L (ref 5–15)
AST SERPL-CCNC: 16 U/L (ref 1–32)
BASOPHILS # BLD AUTO: 0.02 10*3/MM3 (ref 0–0.2)
BASOPHILS NFR BLD AUTO: 0.2 % (ref 0–1.5)
BILIRUB SERPL-MCNC: 0.2 MG/DL (ref 0–1.2)
BUN SERPL-MCNC: 24 MG/DL (ref 6–20)
BUN/CREAT SERPL: 51.1 (ref 7–25)
CALCIUM SPEC-SCNC: 9.1 MG/DL (ref 8.6–10.5)
CHLORIDE SERPL-SCNC: 87 MMOL/L (ref 98–107)
CO2 SERPL-SCNC: 40.5 MMOL/L (ref 22–29)
CREAT SERPL-MCNC: 0.47 MG/DL (ref 0.57–1)
DEPRECATED RDW RBC AUTO: 51.6 FL (ref 37–54)
EGFRCR SERPLBLD CKD-EPI 2021: 110.5 ML/MIN/1.73
EOSINOPHIL # BLD AUTO: 0 10*3/MM3 (ref 0–0.4)
EOSINOPHIL NFR BLD AUTO: 0 % (ref 0.3–6.2)
ERYTHROCYTE [DISTWIDTH] IN BLOOD BY AUTOMATED COUNT: 14.4 % (ref 12.3–15.4)
GLOBULIN UR ELPH-MCNC: 3 GM/DL
GLUCOSE SERPL-MCNC: 297 MG/DL (ref 65–99)
HCT VFR BLD AUTO: 43.6 % (ref 34–46.6)
HGB BLD-MCNC: 12.6 G/DL (ref 12–15.9)
HYPOCHROMIA BLD QL: NORMAL
IMM GRANULOCYTES # BLD AUTO: 0.07 10*3/MM3 (ref 0–0.05)
IMM GRANULOCYTES NFR BLD AUTO: 0.7 % (ref 0–0.5)
LYMPHOCYTES # BLD AUTO: 0.55 10*3/MM3 (ref 0.7–3.1)
LYMPHOCYTES NFR BLD AUTO: 5.1 % (ref 19.6–45.3)
MACROCYTES BLD QL SMEAR: NORMAL
MCH RBC QN AUTO: 28.3 PG (ref 26.6–33)
MCHC RBC AUTO-ENTMCNC: 28.9 G/DL (ref 31.5–35.7)
MCV RBC AUTO: 97.8 FL (ref 79–97)
MONOCYTES # BLD AUTO: 0.53 10*3/MM3 (ref 0.1–0.9)
MONOCYTES NFR BLD AUTO: 4.9 % (ref 5–12)
NEUTROPHILS NFR BLD AUTO: 89.1 % (ref 42.7–76)
NEUTROPHILS NFR BLD AUTO: 9.56 10*3/MM3 (ref 1.7–7)
NRBC BLD AUTO-RTO: 0 /100 WBC (ref 0–0.2)
PLAT MORPH BLD: NORMAL
PLATELET # BLD AUTO: 178 10*3/MM3 (ref 140–450)
PMV BLD AUTO: 11.7 FL (ref 6–12)
POTASSIUM SERPL-SCNC: 4.3 MMOL/L (ref 3.5–5.2)
PROT SERPL-MCNC: 6.6 G/DL (ref 6–8.5)
RBC # BLD AUTO: 4.46 10*6/MM3 (ref 3.77–5.28)
SODIUM SERPL-SCNC: 135 MMOL/L (ref 136–145)
WBC NRBC COR # BLD AUTO: 10.73 10*3/MM3 (ref 3.4–10.8)

## 2023-11-20 PROCEDURE — 94799 UNLISTED PULMONARY SVC/PX: CPT

## 2023-11-20 PROCEDURE — 94761 N-INVAS EAR/PLS OXIMETRY MLT: CPT

## 2023-11-20 PROCEDURE — 25010000002 FUROSEMIDE PER 20 MG: Performed by: INTERNAL MEDICINE

## 2023-11-20 PROCEDURE — 80053 COMPREHEN METABOLIC PANEL: CPT | Performed by: INTERNAL MEDICINE

## 2023-11-20 PROCEDURE — 99232 SBSQ HOSP IP/OBS MODERATE 35: CPT | Performed by: INTERNAL MEDICINE

## 2023-11-20 PROCEDURE — 85007 BL SMEAR W/DIFF WBC COUNT: CPT | Performed by: INTERNAL MEDICINE

## 2023-11-20 PROCEDURE — 25010000002 METHYLPREDNISOLONE PER 40 MG: Performed by: INTERNAL MEDICINE

## 2023-11-20 PROCEDURE — 94660 CPAP INITIATION&MGMT: CPT

## 2023-11-20 PROCEDURE — 99231 SBSQ HOSP IP/OBS SF/LOW 25: CPT | Performed by: STUDENT IN AN ORGANIZED HEALTH CARE EDUCATION/TRAINING PROGRAM

## 2023-11-20 PROCEDURE — 85025 COMPLETE CBC W/AUTO DIFF WBC: CPT | Performed by: INTERNAL MEDICINE

## 2023-11-20 PROCEDURE — 25010000002 HEPARIN (PORCINE) PER 1000 UNITS: Performed by: INTERNAL MEDICINE

## 2023-11-20 PROCEDURE — 94664 DEMO&/EVAL PT USE INHALER: CPT

## 2023-11-20 RX ORDER — FUROSEMIDE 10 MG/ML
40 INJECTION INTRAMUSCULAR; INTRAVENOUS ONCE
Qty: 4 ML | Refills: 0 | Status: COMPLETED | OUTPATIENT
Start: 2023-11-20 | End: 2023-11-20

## 2023-11-20 RX ADMIN — METHYLPREDNISOLONE SODIUM SUCCINATE 40 MG: 40 INJECTION, POWDER, FOR SOLUTION INTRAMUSCULAR; INTRAVENOUS at 17:11

## 2023-11-20 RX ADMIN — FLUTICASONE PROPIONATE 2 SPRAY: 50 SPRAY, METERED NASAL at 08:10

## 2023-11-20 RX ADMIN — LEVOTHYROXINE SODIUM 25 MCG: 25 TABLET ORAL at 06:39

## 2023-11-20 RX ADMIN — FUROSEMIDE 20 MG: 20 TABLET ORAL at 08:10

## 2023-11-20 RX ADMIN — Medication 10 ML: at 08:10

## 2023-11-20 RX ADMIN — METHYLPREDNISOLONE SODIUM SUCCINATE 40 MG: 40 INJECTION, POWDER, FOR SOLUTION INTRAMUSCULAR; INTRAVENOUS at 06:39

## 2023-11-20 RX ADMIN — HYDROCODONE BITARTRATE AND ACETAMINOPHEN 1 TABLET: 10; 325 TABLET ORAL at 07:45

## 2023-11-20 RX ADMIN — BUDESONIDE AND FORMOTEROL FUMARATE DIHYDRATE 2 PUFF: 160; 4.5 AEROSOL RESPIRATORY (INHALATION) at 07:15

## 2023-11-20 RX ADMIN — IPRATROPIUM BROMIDE 0.5 MG: 0.5 SOLUTION RESPIRATORY (INHALATION) at 18:21

## 2023-11-20 RX ADMIN — BUDESONIDE AND FORMOTEROL FUMARATE DIHYDRATE 2 PUFF: 160; 4.5 AEROSOL RESPIRATORY (INHALATION) at 18:21

## 2023-11-20 RX ADMIN — METOPROLOL TARTRATE 25 MG: 25 TABLET, FILM COATED ORAL at 08:09

## 2023-11-20 RX ADMIN — HEPARIN SODIUM 5000 UNITS: 5000 INJECTION INTRAVENOUS; SUBCUTANEOUS at 14:02

## 2023-11-20 RX ADMIN — Medication 10 ML: at 21:25

## 2023-11-20 RX ADMIN — HEPARIN SODIUM 5000 UNITS: 5000 INJECTION INTRAVENOUS; SUBCUTANEOUS at 21:24

## 2023-11-20 RX ADMIN — HEPARIN SODIUM 5000 UNITS: 5000 INJECTION INTRAVENOUS; SUBCUTANEOUS at 06:39

## 2023-11-20 RX ADMIN — METOPROLOL TARTRATE 25 MG: 25 TABLET, FILM COATED ORAL at 21:24

## 2023-11-20 RX ADMIN — HYDROCODONE BITARTRATE AND ACETAMINOPHEN 1 TABLET: 10; 325 TABLET ORAL at 00:05

## 2023-11-20 RX ADMIN — IPRATROPIUM BROMIDE 0.5 MG: 0.5 SOLUTION RESPIRATORY (INHALATION) at 07:15

## 2023-11-20 RX ADMIN — IPRATROPIUM BROMIDE 0.5 MG: 0.5 SOLUTION RESPIRATORY (INHALATION) at 00:10

## 2023-11-20 RX ADMIN — FUROSEMIDE 40 MG: 10 INJECTION, SOLUTION INTRAMUSCULAR; INTRAVENOUS at 15:31

## 2023-11-20 RX ADMIN — HYDROCODONE BITARTRATE AND ACETAMINOPHEN 1 TABLET: 10; 325 TABLET ORAL at 21:24

## 2023-11-20 RX ADMIN — CLOPIDOGREL BISULFATE 75 MG: 75 TABLET, FILM COATED ORAL at 08:10

## 2023-11-20 NOTE — CASE MANAGEMENT/SOCIAL WORK
Continued Stay Note  PROSPER Altman     Patient Name: Liz Jiang  MRN: [id number]  Today's Date: 11/20/2023    Admit Date: 11/18/2023     Discharge Plan       Row Name 11/20/23 1451       Plan    Plan MERLIN spoke with Rachel at [facility] concerning adjustments to Pt's home Bipap. Rachel stated [facility]  Respiratory Therapist Ramila was at [facility] adjusting Pt's bipap machine. MERLIN spoke with Lesly Roblero whom stated [facility] was adjusting Pt's bipap per request.             Janel Meza RN

## 2023-11-20 NOTE — CASE MANAGEMENT/SOCIAL WORK
Discharge Planning Assessment   Seth     Patient Name: Liz Jiang  MRN: [id number]  Today's Date: 11/20/2023    Admit Date: 11/18/2023     Discharge Plan       Row Name 11/20/23 1514       Plan    Plan  in the ED completed initial consult. SS spoke to pt at bedside. Pt lives alone and she plans to return home at discharge. PCP is Tabitha Ron. Pt utilized [facility] for skilled nursing and therapy services prior to admit. SS contacted [facility] per Yi who states pt was discharged in October. Pt request home health services to be arranged via [facility] at discharge. Pt has applied for home health waiver program. Pt was recently at [facility] inpatient rehab. Pt has a hospital bed, bedside commode, nebulizer machine, and rollator via [facility], pulse ox, and bp cuff via unknown provider, O2 @ 3 liters and Bi-Pap machine via [facility]. Pt was recently changed from Trilogy machine to Bi-Pap machine. SS to follow and assist as needed with discharge planning.    Patient/Family in Agreement with Plan yes                 Selected Continued Care - Prior Encounters Includes continued care and service providers with selected services from prior encounters from 8/20/2023 to 11/20/2023      Discharged on 9/27/2023 Admission date: 9/14/2023 - Discharge disposition: [place] [facility]      Home Medical Care       Service Provider Selected Services Address Phone Fax Patient Preferred    [facility] ,  Home Nursing [address] [place] [address] [phone]                           Expected Discharge Date and Time       Expected Discharge Date Expected Discharge Time    Nov 22, 2023         WELLINGTON Morales

## 2023-11-20 NOTE — PLAN OF CARE
Goal Outcome Evaluation:              Outcome Evaluation: Pt remains A+Ox4. VSS, afebrile on 4L via humidified NC. Pt's tried to get home trelegy settings adjusted today, still having issues with it. Pt stated she would ambulate today, but ended up refusing each time I asked. Pt refuses to let us turn her, educated on the risks, encouraged her to turn herself and move every 2 hours. Bed in lowest position, alarm off per pt request. Educated on the risks. Call light within reach. Pt denies any requests at this time.          Yes

## 2023-11-20 NOTE — PROGRESS NOTES
Pulmonary and critical care following the patient for hypoxic respiratory failure and COPD exacerbation  Chief Complaint:  sob    Subjective     Resting in bed comfortably.  Not in any distress.  On nasal cannula oxygen.  Used hospital BiPAP overnight.      Review of Systems:    Positive for shortness of breath otherwise negative.      Vital Signs  Temp:  [97.6 °F (36.4 °C)-98.2 °F (36.8 °C)] 98.1 °F (36.7 °C)  Heart Rate:  [] 96  Resp:  [11-25] 21  BP: ()/(66-95) 134/93  Body mass index is 31.11 kg/m².    Intake/Output Summary (Last 24 hours) at 11/20/2023 1446  Last data filed at 11/20/2023 1430  Gross per 24 hour   Intake 476 ml   Output 900 ml   Net -424 ml     I/O this shift:  In: 476 [P.O.:476]  Out: -     Physical Exam:  General-not in any respiratory distress  HEENT- pupils equally reactive to light, normal in size, no scleral icterus    Neck-supple    Respiratory-respirations normal-on auscultation no wheezing no crackles, decreased breath sounds    Cardiovascular-  Normal S1 and S2. No S3, S4 or murmurs. No JVD, no carotid bruit and no edema, pulses normal bilaterally     GI-NTND    CNS-nonfocal    Musculoskeletal -no edema  Extremities- no obvious deformity noticed     Psychiatric-mood good, good eye contact, alert awake oriented  Skin- no visible rash         Results Review:     I reviewed the patient's new clinical results.  Results from last 7 days   Lab Units 11/20/23  0117 11/19/23 0032 11/18/23  1307   WBC 10*3/mm3 10.73 5.03 11.04*   HEMOGLOBIN g/dL 12.6 12.9 12.9   PLATELETS 10*3/mm3 178 144 154     Results from last 7 days   Lab Units 11/20/23  0117 11/19/23 0032 11/18/23  1307   SODIUM mmol/L 135* 136 141   POTASSIUM mmol/L 4.3 3.9 3.6   CHLORIDE mmol/L 87* 87* 93*   CO2 mmol/L 40.5* 42.7* 40.3*   BUN mg/dL 24* 15 14   CREATININE mg/dL 0.47* 0.44* 0.38*   CALCIUM mg/dL 9.1 9.2 9.2   GLUCOSE mg/dL 297* 236* 174*   MAGNESIUM mg/dL  --  1.9 1.9     Lab Results   Component Value  Date    INR 0.94 11/18/2023    INR 0.95 10/12/2023    INR 0.90 03/27/2017    PROTIME 13.1 11/18/2023    PROTIME 13.1 10/12/2023    PROTIME 9.9 03/27/2017     Results from last 7 days   Lab Units 11/20/23  0117 11/19/23  0032 11/18/23  1307   ALK PHOS U/L 63 71 72   BILIRUBIN mg/dL 0.2 0.5 0.5   ALT (SGPT) U/L 12 13 13   AST (SGOT) U/L 16 16 16     Results from last 7 days   Lab Units 11/19/23  0823   PH, ARTERIAL pH units 7.381   PO2 ART mm Hg 60.8*   PCO2, ARTERIAL mm Hg 86.7*   HCO3 ART mmol/L 51.4*     Imaging Results (Last 24 Hours)       ** No results found for the last 24 hours. **                 aspirin, 324 mg, Oral, Once  budesonide-formoterol, 2 puff, Inhalation, BID - RT  clopidogrel, 75 mg, Oral, Daily  fluticasone, 2 spray, Nasal, Daily  furosemide, 20 mg, Oral, Daily  heparin (porcine), 5,000 Units, Subcutaneous, Q8H  ipratropium, 0.5 mg, Nebulization, 4x Daily - RT  levothyroxine, 25 mcg, Oral, Q AM  methylPREDNISolone sodium succinate, 40 mg, Intravenous, Q12H  metoprolol tartrate, 25 mg, Oral, BID  senna-docusate sodium, 2 tablet, Oral, BID  sodium chloride, 10 mL, Intravenous, Q12H      Pharmacy Consult,         Medication Review:     Assessment & Plan   Acute exacerbation of COPD-continue nebs continue steroids  Continue oxygen to maintain saturation 88 to 92%.  Patient tolerated BiPAP very well.  Adjusted treology settings -he is going to try and see if she will tolerate that or not.      We will give diuretics to improve lung compliance and diffusion capacity.  We will give extra dose now.    Latest chest x-ray reviewed  Latest ABG reviewed.    Continue appropriate prophylaxis                 Acute respiratory failure with hypoxia               Elijah Hope MD  11/20/23  14:46 EST

## 2023-11-20 NOTE — NURSING NOTE
Patient with small fissure to her inner gluteal fold at coccyx level.  This was documented as a PI on 11/19/23.  LDA corrected.  Will treat with Z-Guard BID and leave open to air.    Raul score 16.  PI prevention orders initiated.

## 2023-11-20 NOTE — PLAN OF CARE
Goal Outcome Evaluation:  Plan of Care Reviewed With: patient        Progress: no change  Outcome Evaluation: Pt AOx4, has been very pleasant, patient has wore Bipap tonight and remained sinus tach, VSS. Patent currently resting in bed at this time with no complaints. Bed in lowest position and call light within reach.      Problem: Adult Inpatient Plan of Care  Goal: Plan of Care Review  Outcome: Ongoing, Progressing  Flowsheets (Taken 11/20/2023 0422)  Progress: no change  Plan of Care Reviewed With: patient  Outcome Evaluation: Pt AOx4, has been very pleasant, patient has wore Bipap tonight and remained sinus tach, VSS. Patent currently resting in bed at this time with no complaints. Bed in lowest position and call light within reach.  Goal: Patient-Specific Goal (Individualized)  Outcome: Ongoing, Progressing  Goal: Absence of Hospital-Acquired Illness or Injury  Outcome: Ongoing, Progressing  Intervention: Identify and Manage Fall Risk  Recent Flowsheet Documentation  Taken 11/20/2023 0300 by Conrado Beck RN  Safety Promotion/Fall Prevention: safety round/check completed  Taken 11/20/2023 0100 by Conrado Beck RN  Safety Promotion/Fall Prevention: safety round/check completed  Taken 11/19/2023 2300 by Conrado Beck RN  Safety Promotion/Fall Prevention: safety round/check completed  Taken 11/19/2023 2200 by Conrado Beck RN  Safety Promotion/Fall Prevention: safety round/check completed  Taken 11/19/2023 2100 by Conrado Beck RN  Safety Promotion/Fall Prevention: safety round/check completed  Taken 11/19/2023 1900 by Conrado Beck RN  Safety Promotion/Fall Prevention: safety round/check completed  Intervention: Prevent Skin Injury  Recent Flowsheet Documentation  Taken 11/20/2023 0200 by Conrado Beck RN  Skin Protection:   adhesive use limited   incontinence pads utilized   protective footwear used   pulse oximeter probe site changed   skin-to-device areas padded   skin-to-skin  areas padded   tubing/devices free from skin contact  Taken 11/19/2023 2200 by Conrado Beck RN  Skin Protection:   adhesive use limited   incontinence pads utilized   protective footwear used   pulse oximeter probe site changed   transparent dressing maintained   skin-to-skin areas padded   skin-to-device areas padded   tubing/devices free from skin contact  Intervention: Prevent and Manage VTE (Venous Thromboembolism) Risk  Recent Flowsheet Documentation  Taken 11/20/2023 0200 by Conrado Beck RN  VTE Prevention/Management: (Heparin) other (see comments)  Range of Motion: active ROM (range of motion) encouraged  Taken 11/19/2023 2200 by Conrado Beck RN  VTE Prevention/Management: (Heparin) other (see comments)  Range of Motion: active ROM (range of motion) encouraged  Intervention: Prevent Infection  Recent Flowsheet Documentation  Taken 11/20/2023 0300 by Conrado Beck RN  Infection Prevention:   single patient room provided   rest/sleep promoted  Taken 11/20/2023 0100 by Conrado Beck RN  Infection Prevention:   rest/sleep promoted   single patient room provided  Taken 11/19/2023 2300 by Conrado Beck RN  Infection Prevention:   single patient room provided   rest/sleep promoted  Taken 11/19/2023 2200 by Conrado Beck RN  Infection Prevention:   rest/sleep promoted   single patient room provided  Taken 11/19/2023 2100 by Conrado Beck RN  Infection Prevention:   single patient room provided   rest/sleep promoted  Taken 11/19/2023 1900 by Conrado Beck RN  Infection Prevention:   rest/sleep promoted   single patient room provided  Goal: Optimal Comfort and Wellbeing  Outcome: Ongoing, Progressing  Intervention: Provide Person-Centered Care  Recent Flowsheet Documentation  Taken 11/20/2023 0200 by Conrado Beck RN  Trust Relationship/Rapport:   care explained   choices provided   thoughts/feelings acknowledged  Taken 11/19/2023 2200 by Conrado Beck RN  Trust  Relationship/Rapport:   care explained   choices provided   emotional support provided   thoughts/feelings acknowledged  Goal: Readiness for Transition of Care  Outcome: Ongoing, Progressing     Problem: Asthma Comorbidity  Goal: Maintenance of Asthma Control  Outcome: Ongoing, Progressing  Intervention: Maintain Asthma Symptom Control  Recent Flowsheet Documentation  Taken 11/19/2023 1900 by Conrado Beck RN  Medication Review/Management: medications reviewed     Problem: Behavioral Health Comorbidity  Goal: Maintenance of Behavioral Health Symptom Control  Outcome: Ongoing, Progressing  Intervention: Maintain Behavioral Health Symptom Control  Recent Flowsheet Documentation  Taken 11/19/2023 1900 by Conrado Beck RN  Medication Review/Management: medications reviewed     Problem: COPD (Chronic Obstructive Pulmonary Disease) Comorbidity  Goal: Maintenance of COPD Symptom Control  Outcome: Ongoing, Progressing  Intervention: Maintain COPD-Symptom Control  Recent Flowsheet Documentation  Taken 11/19/2023 2200 by Conrado Beck RN  Supportive Measures: active listening utilized  Taken 11/19/2023 1900 by Conrado Beck RN  Medication Review/Management: medications reviewed     Problem: Diabetes Comorbidity  Goal: Blood Glucose Level Within Targeted Range  Outcome: Ongoing, Progressing     Problem: Heart Failure Comorbidity  Goal: Maintenance of Heart Failure Symptom Control  Outcome: Ongoing, Progressing  Intervention: Maintain Heart Failure-Management  Recent Flowsheet Documentation  Taken 11/19/2023 1900 by Conrado Beck RN  Medication Review/Management: medications reviewed     Problem: Hypertension Comorbidity  Goal: Blood Pressure in Desired Range  Outcome: Ongoing, Progressing  Intervention: Maintain Blood Pressure Management  Recent Flowsheet Documentation  Taken 11/19/2023 1900 by Conrado Beck RN  Medication Review/Management: medications reviewed     Problem: Obstructive Sleep Apnea Risk  or Actual Comorbidity Management  Goal: Unobstructed Breathing During Sleep  Outcome: Ongoing, Progressing     Problem: Osteoarthritis Comorbidity  Goal: Maintenance of Osteoarthritis Symptom Control  Outcome: Ongoing, Progressing  Intervention: Maintain Osteoarthritis Symptom Control  Recent Flowsheet Documentation  Taken 11/19/2023 1900 by Conrado Beck RN  Medication Review/Management: medications reviewed     Problem: Pain Chronic (Persistent) (Comorbidity Management)  Goal: Acceptable Pain Control and Functional Ability  Outcome: Ongoing, Progressing  Intervention: Manage Persistent Pain  Recent Flowsheet Documentation  Taken 11/20/2023 0200 by Conrado Beck RN  Sleep/Rest Enhancement: awakenings minimized  Taken 11/19/2023 2200 by Conrado Beck RN  Sleep/Rest Enhancement:   awakenings minimized   relaxation techniques promoted   regular sleep/rest pattern promoted  Taken 11/19/2023 1900 by Conrado Beck RN  Medication Review/Management: medications reviewed  Intervention: Optimize Psychosocial Wellbeing  Recent Flowsheet Documentation  Taken 11/20/2023 0200 by Conrado Beck RN  Diversional Activities:   television   smartphone  Family/Support System Care: support provided  Taken 11/19/2023 2200 by Conrado Beck RN  Supportive Measures: active listening utilized  Diversional Activities:   television   smartphone  Family/Support System Care: support provided     Problem: Seizure Disorder Comorbidity  Goal: Maintenance of Seizure Control  Outcome: Ongoing, Progressing     Problem: Skin Injury Risk Increased  Goal: Skin Health and Integrity  Outcome: Ongoing, Progressing  Intervention: Optimize Skin Protection  Recent Flowsheet Documentation  Taken 11/20/2023 0200 by Conrado Beck RN  Pressure Reduction Techniques:   frequent weight shift encouraged   weight shift assistance provided  Pressure Reduction Devices:   heel offloading device utilized   pressure-redistributing mattress  utilized  Skin Protection:   adhesive use limited   incontinence pads utilized   protective footwear used   pulse oximeter probe site changed   skin-to-device areas padded   skin-to-skin areas padded   tubing/devices free from skin contact  Taken 11/19/2023 2200 by Conrado Beck RN  Pressure Reduction Techniques:   frequent weight shift encouraged   rest period provided between sit times   weight shift assistance provided  Pressure Reduction Devices: pressure-redistributing mattress utilized  Skin Protection:   adhesive use limited   incontinence pads utilized   protective footwear used   pulse oximeter probe site changed   transparent dressing maintained   skin-to-skin areas padded   skin-to-device areas padded   tubing/devices free from skin contact     Problem: Fall Injury Risk  Goal: Absence of Fall and Fall-Related Injury  Outcome: Ongoing, Progressing  Intervention: Identify and Manage Contributors  Recent Flowsheet Documentation  Taken 11/19/2023 1900 by Conrado Beck RN  Medication Review/Management: medications reviewed  Intervention: Promote Injury-Free Environment  Recent Flowsheet Documentation  Taken 11/20/2023 0300 by Conrado Beck RN  Safety Promotion/Fall Prevention: safety round/check completed  Taken 11/20/2023 0100 by Conrado Beck RN  Safety Promotion/Fall Prevention: safety round/check completed  Taken 11/19/2023 2300 by Conrado Beck RN  Safety Promotion/Fall Prevention: safety round/check completed  Taken 11/19/2023 2200 by Conrado Beck RN  Safety Promotion/Fall Prevention: safety round/check completed  Taken 11/19/2023 2100 by Conrado Beck RN  Safety Promotion/Fall Prevention: safety round/check completed  Taken 11/19/2023 1900 by Conrado Beck RN  Safety Promotion/Fall Prevention: safety round/check completed

## 2023-11-21 LAB
QT INTERVAL: 356 MS
QTC INTERVAL: 484 MS

## 2023-11-21 PROCEDURE — 99232 SBSQ HOSP IP/OBS MODERATE 35: CPT | Performed by: INTERNAL MEDICINE

## 2023-11-21 PROCEDURE — 99231 SBSQ HOSP IP/OBS SF/LOW 25: CPT | Performed by: STUDENT IN AN ORGANIZED HEALTH CARE EDUCATION/TRAINING PROGRAM

## 2023-11-21 PROCEDURE — 25010000002 HEPARIN (PORCINE) PER 1000 UNITS: Performed by: INTERNAL MEDICINE

## 2023-11-21 PROCEDURE — 94799 UNLISTED PULMONARY SVC/PX: CPT

## 2023-11-21 PROCEDURE — 25010000002 METHYLPREDNISOLONE PER 40 MG: Performed by: INTERNAL MEDICINE

## 2023-11-21 PROCEDURE — 94660 CPAP INITIATION&MGMT: CPT

## 2023-11-21 PROCEDURE — 94761 N-INVAS EAR/PLS OXIMETRY MLT: CPT

## 2023-11-21 PROCEDURE — 94664 DEMO&/EVAL PT USE INHALER: CPT

## 2023-11-21 RX ADMIN — IPRATROPIUM BROMIDE 0.5 MG: 0.5 SOLUTION RESPIRATORY (INHALATION) at 13:14

## 2023-11-21 RX ADMIN — FUROSEMIDE 20 MG: 20 TABLET ORAL at 09:25

## 2023-11-21 RX ADMIN — Medication 10 ML: at 09:26

## 2023-11-21 RX ADMIN — HEPARIN SODIUM 5000 UNITS: 5000 INJECTION INTRAVENOUS; SUBCUTANEOUS at 13:52

## 2023-11-21 RX ADMIN — HEPARIN SODIUM 5000 UNITS: 5000 INJECTION INTRAVENOUS; SUBCUTANEOUS at 06:04

## 2023-11-21 RX ADMIN — LEVOTHYROXINE SODIUM 25 MCG: 25 TABLET ORAL at 06:04

## 2023-11-21 RX ADMIN — Medication 10 ML: at 20:15

## 2023-11-21 RX ADMIN — BUDESONIDE AND FORMOTEROL FUMARATE DIHYDRATE 2 PUFF: 160; 4.5 AEROSOL RESPIRATORY (INHALATION) at 07:04

## 2023-11-21 RX ADMIN — IPRATROPIUM BROMIDE 0.5 MG: 0.5 SOLUTION RESPIRATORY (INHALATION) at 19:24

## 2023-11-21 RX ADMIN — CLOPIDOGREL BISULFATE 75 MG: 75 TABLET, FILM COATED ORAL at 09:25

## 2023-11-21 RX ADMIN — IPRATROPIUM BROMIDE 0.5 MG: 0.5 SOLUTION RESPIRATORY (INHALATION) at 00:20

## 2023-11-21 RX ADMIN — METHYLPREDNISOLONE SODIUM SUCCINATE 40 MG: 40 INJECTION, POWDER, FOR SOLUTION INTRAMUSCULAR; INTRAVENOUS at 17:44

## 2023-11-21 RX ADMIN — BUDESONIDE AND FORMOTEROL FUMARATE DIHYDRATE 2 PUFF: 160; 4.5 AEROSOL RESPIRATORY (INHALATION) at 19:24

## 2023-11-21 RX ADMIN — METOPROLOL TARTRATE 25 MG: 25 TABLET, FILM COATED ORAL at 20:15

## 2023-11-21 RX ADMIN — METOPROLOL TARTRATE 25 MG: 25 TABLET, FILM COATED ORAL at 09:25

## 2023-11-21 RX ADMIN — HYDROCODONE BITARTRATE AND ACETAMINOPHEN 1 TABLET: 10; 325 TABLET ORAL at 15:39

## 2023-11-21 RX ADMIN — FLUTICASONE PROPIONATE 2 SPRAY: 50 SPRAY, METERED NASAL at 09:26

## 2023-11-21 RX ADMIN — ALPRAZOLAM 1 MG: 1 TABLET ORAL at 01:36

## 2023-11-21 RX ADMIN — IPRATROPIUM BROMIDE 0.5 MG: 0.5 SOLUTION RESPIRATORY (INHALATION) at 06:36

## 2023-11-21 RX ADMIN — METHYLPREDNISOLONE SODIUM SUCCINATE 40 MG: 40 INJECTION, POWDER, FOR SOLUTION INTRAMUSCULAR; INTRAVENOUS at 06:04

## 2023-11-21 RX ADMIN — HEPARIN SODIUM 5000 UNITS: 5000 INJECTION INTRAVENOUS; SUBCUTANEOUS at 22:21

## 2023-11-21 RX ADMIN — HYDROCODONE BITARTRATE AND ACETAMINOPHEN 1 TABLET: 10; 325 TABLET ORAL at 09:32

## 2023-11-21 NOTE — PROGRESS NOTES
[facility] HOSPITALIST PROGRESS NOTE     Patient Identification:  Name:  Liz Jiang  Age:  57 y.o.  Sex:  female  :  1965  MRN:  [id number]  Visit Number:  [id number]  ROOM: [place] [id number]     Primary Care Provider:  Tabitha Ron APRN    Length of stay in inpatient status:  2    Subjective     Chief Compliant:    Chief Complaint   Patient presents with    Shortness of Breath       History of Presenting Illness: Patient seen and examined today, no family present at bedside.  She reports that her shortness of breath is still worse than usual, but it has improved from the time of admission.  She reports having chest pressure, bloating, and feeling of smothering with her home trilogy machine that has kept her from being able to use it for more than 30 minutes.  She says that she had some slight chest pressure when using the BiPAP overnight, but nothing like using her machine at home.  She denies any chest pressure or pain with any other activities.    Objective     Current Hospital Meds:aspirin, 324 mg, Oral, Once  budesonide-formoterol, 2 puff, Inhalation, BID - RT  clopidogrel, 75 mg, Oral, Daily  fluticasone, 2 spray, Nasal, Daily  furosemide, 20 mg, Oral, Daily  heparin (porcine), 5,000 Units, Subcutaneous, Q8H  ipratropium, 0.5 mg, Nebulization, 4x Daily - RT  levothyroxine, 25 mcg, Oral, Q AM  methylPREDNISolone sodium succinate, 40 mg, Intravenous, Q12H  metoprolol tartrate, 25 mg, Oral, BID  senna-docusate sodium, 2 tablet, Oral, BID  sodium chloride, 10 mL, Intravenous, Q12H    Pharmacy Consult,         Current Antimicrobial Therapy:  Anti-Infectives (From admission, onward)      None          Current Diuretic Therapy:  Diuretics (From admission, onward)      Ordered     Dose/Rate Route Frequency Start Stop    23 1450  furosemide (LASIX) injection 40 mg        Ordering Provider: Elijah Hope MD    40 mg Intravenous Once 23 1500 23 1531    23 0916  furosemide  (LASIX) tablet 20 mg        Ordering Provider: Cliff Castaneda MD    20 mg Oral Daily 11/19/23 1015      11/18/23 1306  furosemide (LASIX) injection 80 mg        Ordering Provider: Ruby Dia PA    80 mg Intravenous Once 11/18/23 1322 11/18/23 1328          ----------------------------------------------------------------------------------------------------------------------  Vital Signs:  Temp:  [97.1 °F (36.2 °C)-98.1 °F (36.7 °C)] 97.1 °F (36.2 °C)  Heart Rate:  [] 83  Resp:  [11-25] 13  BP: ()/(66-99) 126/92  SpO2:  [91 %-100 %] 97 %  on  Flow (L/min):  [4] 4;   Device (Oxygen Therapy): humidified;nasal cannula  Body mass index is 31.11 kg/m².    Wt Readings from Last 3 Encounters:   11/20/23 82.2 kg (181 lb 3.5 oz)   11/08/23 81.6 kg (180 lb)   10/28/23 81.6 kg (180 lb)     Intake & Output (last 3 days)         11/18 0701  11/19 0700 11/19 0701  11/20 0700 11/20 0701  11/21 0700    P.O. 0 360 716    I.V. (mL/kg) 0 (0)  0 (0)    Total Intake(mL/kg) 0 (0) 360 (4.4) 716 (8.7)    Urine (mL/kg/hr) 1500 900 (0.5) 1950 (1.7)    Total Output [phone]    Net -5133 -[phone] -[phone]                 Diet: Cardiac Diets, Diabetic Diets; Healthy Heart (2-3 Na+); Consistent Carbohydrate; Texture: Regular Texture (IDDSI 7); Fluid Consistency: Thin (IDDSI 0)  ----------------------------------------------------------------------------------------------------------------------  Physical exam:   Constitutional:  Well-developed and well-nourished.  No acute distress.      HENT:  Head:  Normocephalic and atraumatic.   Cardiovascular:  Normal rate, regular rhythm   Pulmonary/Chest:  No respiratory distress, breath sounds markedly diminished bilaterally but no wheezing, crackles, or rhonchi  Musculoskeletal:  No deformity.    Neurological: Awake, alert, no focal deficit on gross examination. No slurred speech or facial droop.   Skin:  Skin is warm and dry.   Peripheral vascular:  No cyanosis  Psychiatric: Slightly  "anxious  Edited by: Faviola Mejia DO at 11/20/2023 2058  ----------------------------------------------------------------------------------------------------------------------  Results from last 7 days   Lab Units 11/20/23 0117 11/19/23 0032 11/18/23  1307   CRP mg/dL  --  4.54* 4.51*   LACTATE mmol/L  --   --  1.7   WBC 10*3/mm3 10.73 5.03 11.04*   HEMOGLOBIN g/dL 12.6 12.9 12.9   HEMATOCRIT % 43.6 44.5 44.8   MCV fL 97.8* 96.7 99.1*   MCHC g/dL 28.9* 29.0* 28.8*   PLATELETS 10*3/mm3 178 144 154   INR   --   --  0.94     Results from last 7 days   Lab Units 11/19/23  0823   PH, ARTERIAL pH units 7.381   PO2 ART mm Hg 60.8*   PCO2, ARTERIAL mm Hg 86.7*   HCO3 ART mmol/L 51.4*     Results from last 7 days   Lab Units 11/20/23 0117 11/19/23 0032 11/18/23  1307   SODIUM mmol/L 135* 136 141   POTASSIUM mmol/L 4.3 3.9 3.6   MAGNESIUM mg/dL  --  1.9 1.9   CHLORIDE mmol/L 87* 87* 93*   CO2 mmol/L 40.5* 42.7* 40.3*   BUN mg/dL 24* 15 14   CREATININE mg/dL 0.47* 0.44* 0.38*   CALCIUM mg/dL 9.1 9.2 9.2   GLUCOSE mg/dL 297* 236* 174*   ALBUMIN g/dL 3.6 3.5 3.7   BILIRUBIN mg/dL 0.2 0.5 0.5   ALK PHOS U/L 63 71 72   AST (SGOT) U/L 16 16 16   ALT (SGPT) U/L 12 13 13   Estimated Creatinine Clearance: 135.3 mL/min (A) (by C-G formula based on SCr of 0.47 mg/dL (L)).  No results found for: \"AMMONIA\"  Results from last 7 days   Lab Units 11/19/23  1825 11/19/23  1640 11/18/23  2215   HSTROP T ng/L 36* 30* 29*     Results from last 7 days   Lab Units 11/18/23  1307   PROBNP pg/mL 240.0     Results from last 7 days   Lab Units 11/19/23  0032   CHOLESTEROL mg/dL 231*   TRIGLYCERIDES mg/dL 148   HDL CHOL mg/dL 42   LDL CHOL mg/dL 162*     Hemoglobin A1C   Date/Time Value Ref Range Status   11/18/2023 1307 6.60 (H) 4.80 - 5.60 % Final     Lab Results   Component Value Date    TSH 5.170 (H) 11/18/2023    FREET4 1.09 11/18/2023     No results found for: \"PREGTESTUR\", \"PREGSERUM\", \"HCG\", \"HCGQUANT\"  Pain Management Panel  More data " "may exist         Latest Ref Rng & Units 11/19/2023 2/23/2022   Pain Management Panel   Amphetamine, Urine Qual Negative Negative  Negative    Barbiturates Screen, Urine Negative Negative  Negative    Benzodiazepine Screen, Urine Negative Positive  Positive    Buprenorphine, Screen, Urine Negative Negative  Negative    Cocaine Screen, Urine Negative Negative  Negative    Fentanyl, Urine Negative Negative  -   Methadone Screen , Urine Negative Negative  Negative    Methamphetamine, Ur Negative Negative  Negative      Brief Urine Lab Results  (Last result in the past 365 days)        Color   Clarity   Blood   Leuk Est   Nitrite   Protein   CREAT   Urine HCG        11/18/23 1447 Yellow   Clear   Negative   Negative   Negative   Negative                 Blood Culture   Date Value Ref Range Status   11/18/2023 No growth at 2 days  Preliminary   11/18/2023 No growth at 2 days  Preliminary     No results found for: \"URINECX\"  No results found for: \"WOUNDCX\"  No results found for: \"STOOLCX\"  No results found for: \"RESPCX\"  No results found for: \"AFBCX\"  Results from last 7 days   Lab Units 11/19/23  0032 11/18/23  1307   PROCALCITONIN ng/mL  --  0.03   LACTATE mmol/L  --  1.7   SED RATE mm/hr  --  74*   CRP mg/dL 4.54* 4.51*       I have personally looked at the labs and they are summarized above.  ----------------------------------------------------------------------------------------------------------------------  Detailed radiology reports for the last 24 hours:  Imaging Results (Last 24 Hours)       ** No results found for the last 24 hours. **          Assessment & Plan      #Acute on chronic hypoxic respiratory failure   #Acute on chronic hypercapnic respiratory failure   #Acute COPD exacerbation   #Hx of STEVEN  #Medical noncompliance  -Viral panel negative, D-dimer within normal limits, and no new consolidations seen on imaging at admission.  -Patient has severe COPD that requires home NIPPV but she has been unable to " "tolerate wearing it regularly.  -ABG slightly improved.  Continue BiPAP nightly and as needed.  Continue supplemental oxygen with goal oxygen saturation 88 to 92%  -Pulmonology recommending BiPAP settings of 22/6 with a backup rate of 14 and supplemental oxygen titrated to oxygen saturation of 92%.  She says that she tolerated BiPAP well last night.   -Pulmonology following, appreciate assistance.  -Continue steroids, Symbicort, ipratropium    #Chronically elevated troponin  -Similar to previous labs.  No new ischemic changes noted on EKG at admission.  -Patient reported to me that she was having \"chest pressure\" with using the trilogy machine at home, but at no other times, and she says that it has not been nearly as bad when using the BiPAP here. She denies any chest pressure/pain with exertion or at any time other than when using NIPPV.   -Consider Cardiology consult. I doubt that she would be able to tolerate ischemic workup with stress test at this time due to her chronic respiratory failure.    CHRONIC MEDICAL PROBLEMS     #HFpEF/grade I diastolic dysfunction  - Not felt to be in acute exacerbation clinically. Obtains Reds vest.    - Previous TTE from 10/2023 reviewed.  Monitor volume status with I&Os, daily weights.  Continue home medication regimen as appropriate.  IV Lasix ordered by Pulmonology today to improve lung compliance.      #Essential hypertension  - Well controlled, continue home regimen      #Hyperlipidemia  -Continue statin.      #Type II diabetes mellitus, non insulin dependent  - A1C 6.6%. Continue SSI.      #History of CVA  - Details unknown.  Continue home medication as appropriate. Does not appear to have residual deficits      #History of carotid artery disease s/p left CEA in the past  - Continue home medications      #Hypothyroidism  - TSH WNL. Continue home levothyroxine.      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  #Former smoker   Edited by: Faviola Mejia DO at 11/20/2023 2058    VTE Prophylaxis:   Mechanical Order History:       None          Pharmalogical Order History:        Ordered     Dose Route Frequency Stop    11/18/23 1605  heparin (porcine) 5000 UNIT/ML injection 5,000 Units         5,000 Units SC Every 8 Hours Scheduled --                    Dispo:  likely home at discharge pending clinical improvement     Faviola Mejia DO  [facility]  11/20/23  20:59 EST

## 2023-11-21 NOTE — PLAN OF CARE
Goal Outcome Evaluation:  Plan of Care Reviewed With: patient        Progress: no change  Outcome Evaluation: Pt AOx4, VSS patient tried home cpap and 02 sat wouldnt stay within normal ranges so hospital BIPAP was put back on. Patient became very anxious at beginning of shift due to when she tried her home cpap earlier in the day she stated she dropped into the 70's and her chest got tight, patient was reassured if she wanted to try it again with O2 at 5L nurse would stay at bedside. So upon assessment patient wanted to give home cpap another shot but due to her O2 status not going above 86 it couldnt be continued nurse stayed at bedside. Patient has refused turns educated on risk, patient has no complaints at this time bed in lowest position and call light within reach.         Problem: Adult Inpatient Plan of Care  Goal: Plan of Care Review  Outcome: Ongoing, Progressing  Flowsheets (Taken 11/21/2023 0330)  Progress: no change  Plan of Care Reviewed With: patient  Outcome Evaluation: Pt AOx4, VSS patient tried home cpap and 02 sat wouldnt stay within normal ranges so hospital BIPAP was put back on. Patient became very anxious at beginning of shift due to when she tried her home cpap earlier in the day she stated she dropped into the 70's and her chest got tight, patient was reassured if she wanted to try it again with O2 at 5L nurse would stay at bedside. So upon assessment patient wanted to give home cpap another shot but due to her O2 status not going above 86 it couldnt be continued nurse stayed at bedside. Patient has refused turns educated on risk, patient has no complaints at this time bed in lowest position and call light within reach.  Goal: Patient-Specific Goal (Individualized)  Outcome: Ongoing, Progressing  Goal: Absence of Hospital-Acquired Illness or Injury  Outcome: Ongoing, Progressing  Intervention: Identify and Manage Fall Risk  Recent Flowsheet Documentation  Taken 11/21/2023 0300 by Kiran  CHELSEA Camp  Safety Promotion/Fall Prevention: safety round/check completed  Taken 11/21/2023 0100 by Conrado Beck RN  Safety Promotion/Fall Prevention: safety round/check completed  Taken 11/20/2023 2300 by Conrado Beck RN  Safety Promotion/Fall Prevention: safety round/check completed  Taken 11/20/2023 2100 by Conrado Beck RN  Safety Promotion/Fall Prevention: safety round/check completed  Taken 11/20/2023 1900 by Conrado Beck RN  Safety Promotion/Fall Prevention: safety round/check completed  Intervention: Prevent Skin Injury  Recent Flowsheet Documentation  Taken 11/21/2023 0200 by Conrado Beck RN  Skin Protection:   adhesive use limited   incontinence pads utilized   protective footwear used   pulse oximeter probe site changed   skin-to-skin areas padded   tubing/devices free from skin contact   transparent dressing maintained  Taken 11/20/2023 2000 by Conrado Beck RN  Skin Protection:   adhesive use limited   incontinence pads utilized   protective footwear used   pulse oximeter probe site changed   skin-to-device areas padded   skin-to-skin areas padded   transparent dressing maintained   tubing/devices free from skin contact  Intervention: Prevent and Manage VTE (Venous Thromboembolism) Risk  Recent Flowsheet Documentation  Taken 11/21/2023 0200 by Conrado Beck RN  VTE Prevention/Management: (See MAR) other (see comments)  Range of Motion: active ROM (range of motion) encouraged  Taken 11/20/2023 2000 by Conrado Beck RN  VTE Prevention/Management: (See MAR) other (see comments)  Range of Motion: active ROM (range of motion) encouraged  Intervention: Prevent Infection  Recent Flowsheet Documentation  Taken 11/21/2023 0300 by Conrado Beck RN  Infection Prevention:   rest/sleep promoted   single patient room provided  Taken 11/21/2023 0100 by Conrado Beck RN  Infection Prevention:   single patient room provided   rest/sleep promoted  Taken 11/20/2023 2300 by  Conrado Beck, RN  Infection Prevention:   single patient room provided   rest/sleep promoted  Taken 11/20/2023 2100 by Conrado Beck RN  Infection Prevention:   single patient room provided   rest/sleep promoted  Taken 11/20/2023 1900 by Conrado eBck RN  Infection Prevention:   rest/sleep promoted   single patient room provided  Goal: Optimal Comfort and Wellbeing  Outcome: Ongoing, Progressing  Intervention: Provide Person-Centered Care  Recent Flowsheet Documentation  Taken 11/21/2023 0200 by Conrado Beck RN  Trust Relationship/Rapport:   care explained   choices provided   emotional support provided  Taken 11/20/2023 2000 by Conrado Beck RN  Trust Relationship/Rapport:   care explained   choices provided   emotional support provided   empathic listening provided   thoughts/feelings acknowledged  Goal: Readiness for Transition of Care  Outcome: Ongoing, Progressing     Problem: Asthma Comorbidity  Goal: Maintenance of Asthma Control  Outcome: Ongoing, Progressing     Problem: Behavioral Health Comorbidity  Goal: Maintenance of Behavioral Health Symptom Control  Outcome: Ongoing, Progressing     Problem: COPD (Chronic Obstructive Pulmonary Disease) Comorbidity  Goal: Maintenance of COPD Symptom Control  Outcome: Ongoing, Progressing  Intervention: Maintain COPD-Symptom Control  Recent Flowsheet Documentation  Taken 11/21/2023 0200 by Conrado Beck RN  Supportive Measures: verbalization of feelings encouraged  Taken 11/20/2023 2000 by Conrado Beck RN  Supportive Measures: active listening utilized     Problem: Diabetes Comorbidity  Goal: Blood Glucose Level Within Targeted Range  Outcome: Ongoing, Progressing     Problem: Heart Failure Comorbidity  Goal: Maintenance of Heart Failure Symptom Control  Outcome: Ongoing, Progressing     Problem: Hypertension Comorbidity  Goal: Blood Pressure in Desired Range  Outcome: Ongoing, Progressing     Problem: Obstructive Sleep Apnea Risk or  Actual Comorbidity Management  Goal: Unobstructed Breathing During Sleep  Outcome: Ongoing, Progressing     Problem: Osteoarthritis Comorbidity  Goal: Maintenance of Osteoarthritis Symptom Control  Outcome: Ongoing, Progressing     Problem: Pain Chronic (Persistent) (Comorbidity Management)  Goal: Acceptable Pain Control and Functional Ability  Outcome: Ongoing, Progressing  Intervention: Manage Persistent Pain  Recent Flowsheet Documentation  Taken 11/21/2023 0200 by Conrado Beck RN  Sleep/Rest Enhancement:   relaxation techniques promoted   regular sleep/rest pattern promoted  Taken 11/20/2023 2000 by Conrado Beck RN  Sleep/Rest Enhancement:   regular sleep/rest pattern promoted   relaxation techniques promoted  Intervention: Optimize Psychosocial Wellbeing  Recent Flowsheet Documentation  Taken 11/21/2023 0200 by Conrado Beck RN  Supportive Measures: verbalization of feelings encouraged  Diversional Activities:   television   smartphone  Family/Support System Care: support provided  Taken 11/20/2023 2000 by Conrado Beck RN  Supportive Measures: active listening utilized  Diversional Activities:   [place]  Family/Support System Care: support provided     Problem: Seizure Disorder Comorbidity  Goal: Maintenance of Seizure Control  Outcome: Ongoing, Progressing     Problem: Skin Injury Risk Increased  Goal: Skin Health and Integrity  Outcome: Ongoing, Progressing  Intervention: Optimize Skin Protection  Recent Flowsheet Documentation  Taken 11/21/2023 0200 by Conrado Beck RN  Pressure Reduction Techniques:   frequent weight shift encouraged   heels elevated off bed   pressure points protected   weight shift assistance provided  Pressure Reduction Devices:   heel offloading device utilized   pressure-redistributing mattress utilized  Skin Protection:   adhesive use limited   incontinence pads utilized   protective footwear used   pulse oximeter probe site changed    skin-to-skin areas padded   tubing/devices free from skin contact   transparent dressing maintained  Taken 11/20/2023 2000 by Conrado Beck RN  Pressure Reduction Techniques:   frequent weight shift encouraged   weight shift assistance provided  Pressure Reduction Devices:   heel offloading device utilized   pressure-redistributing mattress utilized  Skin Protection:   adhesive use limited   incontinence pads utilized   protective footwear used   pulse oximeter probe site changed   skin-to-device areas padded   skin-to-skin areas padded   transparent dressing maintained   tubing/devices free from skin contact     Problem: Fall Injury Risk  Goal: Absence of Fall and Fall-Related Injury  Outcome: Ongoing, Progressing  Intervention: Promote Injury-Free Environment  Recent Flowsheet Documentation  Taken 11/21/2023 0300 by Conrado Beck RN  Safety Promotion/Fall Prevention: safety round/check completed  Taken 11/21/2023 0100 by Conrado Beck RN  Safety Promotion/Fall Prevention: safety round/check completed  Taken 11/20/2023 2300 by Conrado Beck RN  Safety Promotion/Fall Prevention: safety round/check completed  Taken 11/20/2023 2100 by Conrado Beck RN  Safety Promotion/Fall Prevention: safety round/check completed  Taken 11/20/2023 1900 by Conrado Beck RN  Safety Promotion/Fall Prevention: safety round/check completed

## 2023-11-21 NOTE — PLAN OF CARE
Problem: Adult Inpatient Plan of Care  Goal: Plan of Care Review  Outcome: Ongoing, Progressing  Flowsheets (Taken 11/21/2023 1721)  Outcome Evaluation: pt vss at this time on 4l. bed alarm refused call light in reach.  Goal: Patient-Specific Goal (Individualized)  Outcome: Ongoing, Progressing  Goal: Absence of Hospital-Acquired Illness or Injury  Outcome: Ongoing, Progressing  Intervention: Identify and Manage Fall Risk  Recent Flowsheet Documentation  Taken 11/21/2023 1700 by Matt Powers, CHELSEA  Safety Promotion/Fall Prevention:   safety round/check completed   room organization consistent   nonskid shoes/slippers when out of bed   lighting adjusted   fall prevention program maintained   clutter free environment maintained   assistive device/personal items within reach   activity supervised  Taken 11/21/2023 1500 by Matt Powers, CHELSEA  Safety Promotion/Fall Prevention:   safety round/check completed   room organization consistent   nonskid shoes/slippers when out of bed   lighting adjusted   fall prevention program maintained   clutter free environment maintained   assistive device/personal items within reach   activity supervised  Taken 11/21/2023 1409 by Matt Powers, CHELSEA  Safety Promotion/Fall Prevention:   safety round/check completed   room organization consistent   nonskid shoes/slippers when out of bed   lighting adjusted   fall prevention program maintained   clutter free environment maintained   assistive device/personal items within reach   activity supervised  Taken 11/21/2023 1300 by Matt Powers, RN  Safety Promotion/Fall Prevention:   safety round/check completed   room organization consistent   nonskid shoes/slippers when out of bed   lighting adjusted   fall prevention program maintained   clutter free environment maintained   assistive device/personal items within reach   activity supervised  Taken 11/21/2023 1100 by Matt Powers, RN  Safety Promotion/Fall Prevention:   safety round/check  completed   room organization consistent   nonskid shoes/slippers when out of bed   lighting adjusted   fall prevention program maintained   clutter free environment maintained   assistive device/personal items within reach   activity supervised  Taken 11/21/2023 0932 by Matt Powers RN  Safety Promotion/Fall Prevention:   safety round/check completed   room organization consistent   nonskid shoes/slippers when out of bed   lighting adjusted   fall prevention program maintained   clutter free environment maintained   assistive device/personal items within reach   activity supervised  Taken 11/21/2023 0900 by Matt Powers RN  Safety Promotion/Fall Prevention:   safety round/check completed   room organization consistent   nonskid shoes/slippers when out of bed   lighting adjusted   fall prevention program maintained   clutter free environment maintained   assistive device/personal items within reach   activity supervised  Taken 11/21/2023 0700 by Matt Powers RN  Safety Promotion/Fall Prevention:   safety round/check completed   room organization consistent   nonskid shoes/slippers when out of bed   lighting adjusted   fall prevention program maintained   clutter free environment maintained   assistive device/personal items within reach   activity supervised  Intervention: Prevent Skin Injury  Recent Flowsheet Documentation  Taken 11/21/2023 1409 by Matt Powers RN  Skin Protection: adhesive use limited  Intervention: Prevent and Manage VTE (Venous Thromboembolism) Risk  Recent Flowsheet Documentation  Taken 11/21/2023 1409 by Matt Powers RN  Activity Management: ambulated in room  Range of Motion: active ROM (range of motion) encouraged  Taken 11/21/2023 0932 by Matt Powers RN  Activity Management: patient refuses activity  VTE Prevention/Management: (see mar) other (see comments)  Range of Motion: active ROM (range of motion) encouraged  Intervention: Prevent Infection  Recent Flowsheet Documentation  Taken  11/21/2023 1700 by Matt Powers RN  Infection Prevention:   single patient room provided   rest/sleep promoted   hand hygiene promoted   environmental surveillance performed  Taken 11/21/2023 1500 by Matt Powers RN  Infection Prevention:   single patient room provided   rest/sleep promoted   hand hygiene promoted   environmental surveillance performed  Taken 11/21/2023 1409 by Matt Powers RN  Infection Prevention:   single patient room provided   rest/sleep promoted   hand hygiene promoted   environmental surveillance performed  Taken 11/21/2023 1300 by Matt Powers RN  Infection Prevention:   single patient room provided   rest/sleep promoted   hand hygiene promoted   environmental surveillance performed  Taken 11/21/2023 1100 by Matt Powers RN  Infection Prevention:   single patient room provided   rest/sleep promoted   hand hygiene promoted   environmental surveillance performed  Taken 11/21/2023 0932 by Matt Powers RN  Infection Prevention:   single patient room provided   rest/sleep promoted   hand hygiene promoted   environmental surveillance performed  Taken 11/21/2023 0900 by Matt Powers RN  Infection Prevention:   single patient room provided   rest/sleep promoted   hand hygiene promoted   environmental surveillance performed  Taken 11/21/2023 0700 by Matt Powers RN  Infection Prevention:   single patient room provided   rest/sleep promoted   hand hygiene promoted   environmental surveillance performed  Goal: Optimal Comfort and Wellbeing  Outcome: Ongoing, Progressing  Intervention: Monitor Pain and Promote Comfort  Recent Flowsheet Documentation  Taken 11/21/2023 0932 by Matt Powers RN  Pain Management Interventions: see MAR  Intervention: Provide Person-Centered Care  Recent Flowsheet Documentation  Taken 11/21/2023 1409 by Matt Powers RN  Trust Relationship/Rapport:   care explained   choices provided   thoughts/feelings acknowledged  Taken 11/21/2023 0932 by Matt Powers RN  Trust  Relationship/Rapport:   care explained   thoughts/feelings acknowledged  Goal: Readiness for Transition of Care  Outcome: Ongoing, Progressing     Problem: Asthma Comorbidity  Goal: Maintenance of Asthma Control  Outcome: Ongoing, Progressing     Problem: Behavioral Health Comorbidity  Goal: Maintenance of Behavioral Health Symptom Control  Outcome: Ongoing, Progressing     Problem: COPD (Chronic Obstructive Pulmonary Disease) Comorbidity  Goal: Maintenance of COPD Symptom Control  Outcome: Ongoing, Progressing  Intervention: Maintain COPD-Symptom Control  Recent Flowsheet Documentation  Taken 11/21/2023 1409 by Matt Powers RN  Supportive Measures: active listening utilized  Taken 11/21/2023 0932 by Matt Powers RN  Supportive Measures: active listening utilized     Problem: Diabetes Comorbidity  Goal: Blood Glucose Level Within Targeted Range  Outcome: Ongoing, Progressing     Problem: Heart Failure Comorbidity  Goal: Maintenance of Heart Failure Symptom Control  Outcome: Ongoing, Progressing     Problem: Hypertension Comorbidity  Goal: Blood Pressure in Desired Range  Outcome: Ongoing, Progressing     Problem: Obstructive Sleep Apnea Risk or Actual Comorbidity Management  Goal: Unobstructed Breathing During Sleep  Outcome: Ongoing, Progressing     Problem: Osteoarthritis Comorbidity  Goal: Maintenance of Osteoarthritis Symptom Control  Outcome: Ongoing, Progressing  Intervention: Maintain Osteoarthritis Symptom Control  Recent Flowsheet Documentation  Taken 11/21/2023 1409 by Matt Powers, RN  Activity Management: ambulated in room  Taken 11/21/2023 0932 by Matt Powers, CHELSEA  Activity Management: patient refuses activity     Problem: Pain Chronic (Persistent) (Comorbidity Management)  Goal: Acceptable Pain Control and Functional Ability  Outcome: Ongoing, Progressing  Intervention: Develop Pain Management Plan  Recent Flowsheet Documentation  Taken 11/21/2023 0932 by Matt Powers RN  Pain Management  Interventions: see MAR  Intervention: Optimize Psychosocial Wellbeing  Recent Flowsheet Documentation  Taken 11/21/2023 1409 by Matt Powers RN  Supportive Measures: active listening utilized  Diversional Activities:   television   smartphone  Family/Support System Care: support provided  Taken 11/21/2023 0932 by Matt Powers RN  Supportive Measures: active listening utilized  Diversional Activities:   television   smartphone  Family/Support System Care: support provided     Problem: Seizure Disorder Comorbidity  Goal: Maintenance of Seizure Control  Outcome: Ongoing, Progressing     Problem: Skin Injury Risk Increased  Goal: Skin Health and Integrity  Outcome: Ongoing, Progressing  Intervention: Optimize Skin Protection  Recent Flowsheet Documentation  Taken 11/21/2023 1409 by Matt Powers RN  Pressure Reduction Techniques: frequent weight shift encouraged  Pressure Reduction Devices: pressure-redistributing mattress utilized  Skin Protection: adhesive use limited     Problem: Fall Injury Risk  Goal: Absence of Fall and Fall-Related Injury  Outcome: Ongoing, Progressing  Intervention: Promote Injury-Free Environment  Recent Flowsheet Documentation  Taken 11/21/2023 1700 by Matt Powers RN  Safety Promotion/Fall Prevention:   safety round/check completed   room organization consistent   nonskid shoes/slippers when out of bed   lighting adjusted   fall prevention program maintained   clutter free environment maintained   assistive device/personal items within reach   activity supervised  Taken 11/21/2023 1500 by Matt Powers RN  Safety Promotion/Fall Prevention:   safety round/check completed   room organization consistent   nonskid shoes/slippers when out of bed   lighting adjusted   fall prevention program maintained   clutter free environment maintained   assistive device/personal items within reach   activity supervised  Taken 11/21/2023 1409 by Matt Powers RN  Safety Promotion/Fall Prevention:   safety  round/check completed   room organization consistent   nonskid shoes/slippers when out of bed   lighting adjusted   fall prevention program maintained   clutter free environment maintained   assistive device/personal items within reach   activity supervised  Taken 11/21/2023 1300 by Matt Powers RN  Safety Promotion/Fall Prevention:   safety round/check completed   room organization consistent   nonskid shoes/slippers when out of bed   lighting adjusted   fall prevention program maintained   clutter free environment maintained   assistive device/personal items within reach   activity supervised  Taken 11/21/2023 1100 by Matt Powers RN  Safety Promotion/Fall Prevention:   safety round/check completed   room organization consistent   nonskid shoes/slippers when out of bed   lighting adjusted   fall prevention program maintained   clutter free environment maintained   assistive device/personal items within reach   activity supervised  Taken 11/21/2023 0932 by Matt Powers RN  Safety Promotion/Fall Prevention:   safety round/check completed   room organization consistent   nonskid shoes/slippers when out of bed   lighting adjusted   fall prevention program maintained   clutter free environment maintained   assistive device/personal items within reach   activity supervised  Taken 11/21/2023 0900 by Matt Powers, CHELSEA  Safety Promotion/Fall Prevention:   safety round/check completed   room organization consistent   nonskid shoes/slippers when out of bed   lighting adjusted   fall prevention program maintained   clutter free environment maintained   assistive device/personal items within reach   activity supervised  Taken 11/21/2023 0700 by Matt Powers, CHELSEA  Safety Promotion/Fall Prevention:   safety round/check completed   room organization consistent   nonskid shoes/slippers when out of bed   lighting adjusted   fall prevention program maintained   clutter free environment maintained   assistive device/personal items  within reach   activity supervised   Goal Outcome Evaluation:              Outcome Evaluation: pt vss at this time on 4l. bed alarm refused call light in reach.

## 2023-11-21 NOTE — PROGRESS NOTES
Pulmonary and critical care following the patient for hypoxic respiratory failure and COPD exacerbation  Chief Complaint:  sob    Subjective     Sting in bed comfortably did not tolerate radiology.  Will try BiPAP machine from the [employer] today.  Ins and outs net negative   responded well to Lasix.    Review of Systems: No changes   Positive for shortness of breath otherwise negative.      Vital Signs  Temp:  [96.2 °F (35.7 °C)-98.2 °F (36.8 °C)] 98 °F (36.7 °C)  Heart Rate:  [] 73  Resp:  [10-26] 17  BP: ()/() 125/88  Body mass index is 32.24 kg/m².    Intake/Output Summary (Last 24 hours) at 11/21/2023 1240  Last data filed at 11/21/2023 0900  Gross per 24 hour   Intake 600 ml   Output 2650 ml   Net -2050 ml     I/O this shift:  In: 120 [P.O.:120]  Out: -     Physical Exam: No changes  General-not in any respiratory distress  HEENT- pupils equally reactive to light, normal in size, no scleral icterus    Neck-supple    Respiratory-respirations normal-on auscultation no wheezing no crackles, decreased breath sounds    Cardiovascular-  Normal S1 and S2. No S3, S4 or murmurs.   GI-NTND    CNS-nonfocal    Musculoskeletal -no edema  Extremities- no obvious deformity noticed     Psychiatric-mood good, good eye contact, alert awake oriented  Skin- no visible rash         Results Review:     I reviewed the patient's new clinical results.  Results from last 7 days   Lab Units 11/20/23  0117 11/19/23 0032 11/18/23  1307   WBC 10*3/mm3 10.73 5.03 11.04*   HEMOGLOBIN g/dL 12.6 12.9 12.9   PLATELETS 10*3/mm3 178 144 154     Results from last 7 days   Lab Units 11/20/23  0117 11/19/23  0032 11/18/23  1307   SODIUM mmol/L 135* 136 141   POTASSIUM mmol/L 4.3 3.9 3.6   CHLORIDE mmol/L 87* 87* 93*   CO2 mmol/L 40.5* 42.7* 40.3*   BUN mg/dL 24* 15 14   CREATININE mg/dL 0.47* 0.44* 0.38*   CALCIUM mg/dL 9.1 9.2 9.2   GLUCOSE mg/dL 297* 236* 174*   MAGNESIUM mg/dL  --  1.9 1.9     Lab Results   Component  Value Date    INR 0.94 11/18/2023    INR 0.95 10/12/2023    INR 0.90 03/27/2017    PROTIME 13.1 11/18/2023    PROTIME 13.1 10/12/2023    PROTIME 9.9 03/27/2017     Results from last 7 days   Lab Units 11/20/23  0117 11/19/23  0032 11/18/23  1307   ALK PHOS U/L 63 71 72   BILIRUBIN mg/dL 0.2 0.5 0.5   ALT (SGPT) U/L 12 13 13   AST (SGOT) U/L 16 16 16     Results from last 7 days   Lab Units 11/19/23  0823   PH, ARTERIAL pH units 7.381   PO2 ART mm Hg 60.8*   PCO2, ARTERIAL mm Hg 86.7*   HCO3 ART mmol/L 51.4*     Imaging Results (Last 24 Hours)       ** No results found for the last 24 hours. **                 aspirin, 324 mg, Oral, Once  budesonide-formoterol, 2 puff, Inhalation, BID - RT  clopidogrel, 75 mg, Oral, Daily  fluticasone, 2 spray, Nasal, Daily  furosemide, 20 mg, Oral, Daily  heparin (porcine), 5,000 Units, Subcutaneous, Q8H  ipratropium, 0.5 mg, Nebulization, 4x Daily - RT  levothyroxine, 25 mcg, Oral, Q AM  methylPREDNISolone sodium succinate, 40 mg, Intravenous, Q12H  metoprolol tartrate, 25 mg, Oral, BID  senna-docusate sodium, 2 tablet, Oral, BID  sodium chloride, 10 mL, Intravenous, Q12H      Pharmacy Consult,         Medication Review:     Assessment & Plan   Acute exacerbation of COPD-continue nebs continue steroids  We will decrease the dose of steroids   continue oxygen  To maintain saturation 88 to 92%.  Did not tolerate radiology will try BiPAP today.  Received Lasix yesterday responded well ins and outs net negative.    Chloride is on the lower side.  Likely intravascularly depleted.  Will hold onto any extra diuretics today.      Latest chest x-ray reviewed  Latest ABG reviewed.    Continue appropriate prophylaxis                 Acute respiratory failure with hypoxia               Elijah Hope MD  11/21/23  12:40 EST

## 2023-11-22 LAB
BASOPHILS # BLD AUTO: 0.06 10*3/MM3 (ref 0–0.2)
BASOPHILS NFR BLD AUTO: 0.5 % (ref 0–1.5)
DEPRECATED RDW RBC AUTO: 51 FL (ref 37–54)
EOSINOPHIL # BLD AUTO: 0 10*3/MM3 (ref 0–0.4)
EOSINOPHIL NFR BLD AUTO: 0 % (ref 0.3–6.2)
ERYTHROCYTE [DISTWIDTH] IN BLOOD BY AUTOMATED COUNT: 14.4 % (ref 12.3–15.4)
HCT VFR BLD AUTO: 46.3 % (ref 34–46.6)
HGB BLD-MCNC: 13.6 G/DL (ref 12–15.9)
IMM GRANULOCYTES # BLD AUTO: 0.36 10*3/MM3 (ref 0–0.05)
IMM GRANULOCYTES NFR BLD AUTO: 2.8 % (ref 0–0.5)
LYMPHOCYTES # BLD AUTO: 0.66 10*3/MM3 (ref 0.7–3.1)
LYMPHOCYTES NFR BLD AUTO: 5.2 % (ref 19.6–45.3)
MCH RBC QN AUTO: 28.5 PG (ref 26.6–33)
MCHC RBC AUTO-ENTMCNC: 29.4 G/DL (ref 31.5–35.7)
MCV RBC AUTO: 96.9 FL (ref 79–97)
MONOCYTES # BLD AUTO: 0.62 10*3/MM3 (ref 0.1–0.9)
MONOCYTES NFR BLD AUTO: 4.9 % (ref 5–12)
NEUTROPHILS NFR BLD AUTO: 11.06 10*3/MM3 (ref 1.7–7)
NEUTROPHILS NFR BLD AUTO: 86.6 % (ref 42.7–76)
NRBC BLD AUTO-RTO: 0 /100 WBC (ref 0–0.2)
PLATELET # BLD AUTO: 213 10*3/MM3 (ref 140–450)
PMV BLD AUTO: 11.4 FL (ref 6–12)
RBC # BLD AUTO: 4.78 10*6/MM3 (ref 3.77–5.28)
WBC NRBC COR # BLD AUTO: 12.76 10*3/MM3 (ref 3.4–10.8)

## 2023-11-22 PROCEDURE — 94799 UNLISTED PULMONARY SVC/PX: CPT

## 2023-11-22 PROCEDURE — 94660 CPAP INITIATION&MGMT: CPT

## 2023-11-22 PROCEDURE — 99232 SBSQ HOSP IP/OBS MODERATE 35: CPT | Performed by: INTERNAL MEDICINE

## 2023-11-22 PROCEDURE — 99231 SBSQ HOSP IP/OBS SF/LOW 25: CPT | Performed by: STUDENT IN AN ORGANIZED HEALTH CARE EDUCATION/TRAINING PROGRAM

## 2023-11-22 PROCEDURE — 94664 DEMO&/EVAL PT USE INHALER: CPT

## 2023-11-22 PROCEDURE — 94761 N-INVAS EAR/PLS OXIMETRY MLT: CPT

## 2023-11-22 PROCEDURE — 97162 PT EVAL MOD COMPLEX 30 MIN: CPT

## 2023-11-22 PROCEDURE — 25010000002 METHYLPREDNISOLONE PER 40 MG: Performed by: INTERNAL MEDICINE

## 2023-11-22 PROCEDURE — 85025 COMPLETE CBC W/AUTO DIFF WBC: CPT | Performed by: STUDENT IN AN ORGANIZED HEALTH CARE EDUCATION/TRAINING PROGRAM

## 2023-11-22 PROCEDURE — 25010000002 HEPARIN (PORCINE) PER 1000 UNITS: Performed by: INTERNAL MEDICINE

## 2023-11-22 RX ORDER — SODIUM CHLORIDE 0.9 % (FLUSH) 0.9 %
10 SYRINGE (ML) INJECTION EVERY 12 HOURS SCHEDULED
Status: DISCONTINUED | OUTPATIENT
Start: 2023-11-22 | End: 2023-12-13 | Stop reason: HOSPADM

## 2023-11-22 RX ORDER — SODIUM CHLORIDE 9 MG/ML
40 INJECTION, SOLUTION INTRAVENOUS AS NEEDED
Status: DISCONTINUED | OUTPATIENT
Start: 2023-11-22 | End: 2023-12-13 | Stop reason: HOSPADM

## 2023-11-22 RX ORDER — METHYLPREDNISOLONE SODIUM SUCCINATE 40 MG/ML
40 INJECTION, POWDER, LYOPHILIZED, FOR SOLUTION INTRAMUSCULAR; INTRAVENOUS DAILY
Status: DISCONTINUED | OUTPATIENT
Start: 2023-11-23 | End: 2023-11-24

## 2023-11-22 RX ORDER — SODIUM CHLORIDE 0.9 % (FLUSH) 0.9 %
10 SYRINGE (ML) INJECTION AS NEEDED
Status: DISCONTINUED | OUTPATIENT
Start: 2023-11-22 | End: 2023-12-13 | Stop reason: HOSPADM

## 2023-11-22 RX ADMIN — HYDROCODONE BITARTRATE AND ACETAMINOPHEN 1 TABLET: 10; 325 TABLET ORAL at 00:19

## 2023-11-22 RX ADMIN — BUDESONIDE AND FORMOTEROL FUMARATE DIHYDRATE 2 PUFF: 160; 4.5 AEROSOL RESPIRATORY (INHALATION) at 06:48

## 2023-11-22 RX ADMIN — METHYLPREDNISOLONE SODIUM SUCCINATE 40 MG: 40 INJECTION, POWDER, FOR SOLUTION INTRAMUSCULAR; INTRAVENOUS at 04:11

## 2023-11-22 RX ADMIN — IPRATROPIUM BROMIDE 0.5 MG: 0.5 SOLUTION RESPIRATORY (INHALATION) at 18:43

## 2023-11-22 RX ADMIN — HEPARIN SODIUM 5000 UNITS: 5000 INJECTION INTRAVENOUS; SUBCUTANEOUS at 06:15

## 2023-11-22 RX ADMIN — Medication 10 ML: at 20:41

## 2023-11-22 RX ADMIN — HEPARIN SODIUM 5000 UNITS: 5000 INJECTION INTRAVENOUS; SUBCUTANEOUS at 22:02

## 2023-11-22 RX ADMIN — METHYLPREDNISOLONE SODIUM SUCCINATE 40 MG: 40 INJECTION, POWDER, FOR SOLUTION INTRAMUSCULAR; INTRAVENOUS at 16:28

## 2023-11-22 RX ADMIN — LEVOTHYROXINE SODIUM 25 MCG: 25 TABLET ORAL at 06:15

## 2023-11-22 RX ADMIN — METOPROLOL TARTRATE 25 MG: 25 TABLET, FILM COATED ORAL at 08:32

## 2023-11-22 RX ADMIN — ALPRAZOLAM 1 MG: 1 TABLET ORAL at 00:19

## 2023-11-22 RX ADMIN — Medication 10 ML: at 08:34

## 2023-11-22 RX ADMIN — FUROSEMIDE 20 MG: 20 TABLET ORAL at 08:33

## 2023-11-22 RX ADMIN — BUDESONIDE AND FORMOTEROL FUMARATE DIHYDRATE 2 PUFF: 160; 4.5 AEROSOL RESPIRATORY (INHALATION) at 18:43

## 2023-11-22 RX ADMIN — METOPROLOL TARTRATE 25 MG: 25 TABLET, FILM COATED ORAL at 20:40

## 2023-11-22 RX ADMIN — HYDROCODONE BITARTRATE AND ACETAMINOPHEN 1 TABLET: 10; 325 TABLET ORAL at 22:02

## 2023-11-22 RX ADMIN — CLOPIDOGREL BISULFATE 75 MG: 75 TABLET, FILM COATED ORAL at 08:33

## 2023-11-22 RX ADMIN — IPRATROPIUM BROMIDE 0.5 MG: 0.5 SOLUTION RESPIRATORY (INHALATION) at 06:48

## 2023-11-22 RX ADMIN — HYDROCODONE BITARTRATE AND ACETAMINOPHEN 1 TABLET: 10; 325 TABLET ORAL at 07:37

## 2023-11-22 RX ADMIN — ALPRAZOLAM 1 MG: 1 TABLET ORAL at 22:09

## 2023-11-22 RX ADMIN — HEPARIN SODIUM 5000 UNITS: 5000 INJECTION INTRAVENOUS; SUBCUTANEOUS at 13:26

## 2023-11-22 NOTE — PROGRESS NOTES
Pulmonary and critical care following the patient for hypoxic respiratory failure and COPD exacerbation  Chief Complaint:  sob    Subjective   All the labs medications and the notes and vitals reviewed.  Patient resting in bed comfortably.  Used BiPAP overnight and feels better.  Her oxygen level stayed good    Review of Systems:    No changes      Vital Signs  Temp:  [96 °F (35.6 °C)-98.2 °F (36.8 °C)] 96 °F (35.6 °C)  Heart Rate:  [] 85  Resp:  [11-24] 23  BP: (117-152)/() 152/99  Body mass index is 33 kg/m².    Intake/Output Summary (Last 24 hours) at 11/22/2023 1527  Last data filed at 11/22/2023 1400  Gross per 24 hour   Intake 900 ml   Output 1200 ml   Net -300 ml     I/O this shift:  In: 660 [P.O.:660]  Out: 300 [Urine:300]    Physical Exam:  General-not in any respiratory distress  HEENT-PERRLA  Neck-supple    Respiratory-respirations normal-on auscultation no wheezing no crackles, generalized decreased breath sounds    Cardiovascular-NSR  GI-NTND    CNS-nonfocal    Musculoskeletal -no edema  Extremities- no obvious deformity noticed     Psychiatric-alert awake oriented  Skin- no visible rash         Results Review:     I reviewed the patient's new clinical results.  Results from last 7 days   Lab Units 11/22/23  0027 11/20/23 0117 11/19/23  0032   WBC 10*3/mm3 12.76* 10.73 5.03   HEMOGLOBIN g/dL 13.6 12.6 12.9   PLATELETS 10*3/mm3 213 178 144     Results from last 7 days   Lab Units 11/20/23  0117 11/19/23  0032 11/18/23  1307   SODIUM mmol/L 135* 136 141   POTASSIUM mmol/L 4.3 3.9 3.6   CHLORIDE mmol/L 87* 87* 93*   CO2 mmol/L 40.5* 42.7* 40.3*   BUN mg/dL 24* 15 14   CREATININE mg/dL 0.47* 0.44* 0.38*   CALCIUM mg/dL 9.1 9.2 9.2   GLUCOSE mg/dL 297* 236* 174*   MAGNESIUM mg/dL  --  1.9 1.9     Lab Results   Component Value Date    INR 0.94 11/18/2023    INR 0.95 10/12/2023    INR 0.90 03/27/2017    PROTIME 13.1 11/18/2023    PROTIME 13.1 10/12/2023    PROTIME 9.9 03/27/2017     Results  from last 7 days   Lab Units 11/20/23  0117 11/19/23  0032 11/18/23  1307   ALK PHOS U/L 63 71 72   BILIRUBIN mg/dL 0.2 0.5 0.5   ALT (SGPT) U/L 12 13 13   AST (SGOT) U/L 16 16 16     Results from last 7 days   Lab Units 11/19/23  0823   PH, ARTERIAL pH units 7.381   PO2 ART mm Hg 60.8*   PCO2, ARTERIAL mm Hg 86.7*   HCO3 ART mmol/L 51.4*     Imaging Results (Last 24 Hours)       ** No results found for the last 24 hours. **                 aspirin, 324 mg, Oral, Once  budesonide-formoterol, 2 puff, Inhalation, BID - RT  clopidogrel, 75 mg, Oral, Daily  fluticasone, 2 spray, Nasal, Daily  furosemide, 20 mg, Oral, Daily  heparin (porcine), 5,000 Units, Subcutaneous, Q8H  ipratropium, 0.5 mg, Nebulization, 4x Daily - RT  levothyroxine, 25 mcg, Oral, Q AM  methylPREDNISolone sodium succinate, 40 mg, Intravenous, Q12H  metoprolol tartrate, 25 mg, Oral, BID  senna-docusate sodium, 2 tablet, Oral, BID  sodium chloride, 10 mL, Intravenous, Q12H      Pharmacy Consult,         Medication Review:     Assessment & Plan   Acute exacerbation of COPD-continue nebs continue steroids  Oxygen levels reviewed and adjusted to maintain saturation 88 to 92%.  Patient tolerated BiPAP very well after increasing the EPAP.  Does not like triology -as she desaturated easily.  Continue diuretics to improve lung compliance and diffusion capacity.        Latest chest x-ray reviewed  Latest ABG reviewed.    Continue appropriate prophylaxis                 Acute respiratory failure with hypoxia               Elijah Hope MD  11/22/23  15:27 EST

## 2023-11-22 NOTE — PROGRESS NOTES
[facility] HOSPITALIST PROGRESS NOTE     Patient Identification:  Name:  Liz Jiang  Age:  57 y.o.  Sex:  female  :  1965  MRN:  [id number]  Visit Number:  [id number]  ROOM: [place] [address]     Primary Care Provider:  Tabitha Ron APRN    Length of stay in inpatient status:  3    Subjective     Chief Compliant:    Chief Complaint   Patient presents with    Shortness of Breath       History of Presenting Illness: Patient seen and examined today, no family present at bedside.  She reports that she was not able to tolerate wearing her own machine last night as her oxygen saturation would not stay above 80% with it.  She had to be switched back to the hospital BiPAP.  She reports having a chest pressure sensation with using her home machine again, and says it improved after switching to the hospital machine.  She denies any current chest pressure and says that her shortness of breath is overall improved.    Objective     Current Hospital Meds:aspirin, 324 mg, Oral, Once  budesonide-formoterol, 2 puff, Inhalation, BID - RT  clopidogrel, 75 mg, Oral, Daily  fluticasone, 2 spray, Nasal, Daily  furosemide, 20 mg, Oral, Daily  heparin (porcine), 5,000 Units, Subcutaneous, Q8H  ipratropium, 0.5 mg, Nebulization, 4x Daily - RT  levothyroxine, 25 mcg, Oral, Q AM  methylPREDNISolone sodium succinate, 40 mg, Intravenous, Q12H  metoprolol tartrate, 25 mg, Oral, BID  senna-docusate sodium, 2 tablet, Oral, BID  sodium chloride, 10 mL, Intravenous, Q12H    Pharmacy Consult,         Current Antimicrobial Therapy:  Anti-Infectives (From admission, onward)      None          Current Diuretic Therapy:  Diuretics (From admission, onward)      Ordered     Dose/Rate Route Frequency Start Stop    23 1450  furosemide (LASIX) injection 40 mg        Ordering Provider: Elijah Hope MD    40 mg Intravenous Once 23 1500 23 1531    23 0916  furosemide (LASIX) tablet 20 mg        Ordering  Provider: Cliff Castaneda MD    20 mg Oral Daily 11/19/23 1015      11/18/23 1306  furosemide (LASIX) injection 80 mg        Ordering Provider: Ruby Dia PA    80 mg Intravenous Once 11/18/23 1322 11/18/23 1328          ----------------------------------------------------------------------------------------------------------------------  Vital Signs:  Temp:  [96.2 °F (35.7 °C)-98.2 °F (36.8 °C)] 98.2 °F (36.8 °C)  Heart Rate:  [] 108  Resp:  [10-26] 24  BP: ()/() 125/88  SpO2:  [90 %-100 %] 95 %  on  Flow (L/min):  [4] 4;   Device (Oxygen Therapy): nasal cannula;humidified  Body mass index is 32.24 kg/m².    Wt Readings from Last 3 Encounters:   11/21/23 85.2 kg (187 lb 13.3 oz)   11/08/23 81.6 kg (180 lb)   10/28/23 81.6 kg (180 lb)     Intake & Output (last 3 days)         11/19 0701 11/20 0700 11/20 0701  11/21 0700 11/21 0701  11/22 0700    P.O. 360 716 600    I.V. (mL/kg)  0 (0)     Total Intake(mL/kg) 360 (4.4) 716 (8.4) 600 (7)    Urine (mL/kg/hr) 900 (0.5) 2650 (1.3) 1050 (0.9)    Stool   0    Total Output 900 2650 1050    Net -560 -0942 -450           Stool Unmeasured Occurrence   1 x          Diet: Cardiac Diets, Diabetic Diets; Healthy Heart (2-3 Na+); Consistent Carbohydrate; Texture: Regular Texture (IDDSI 7); Fluid Consistency: Thin (IDDSI 0)  ----------------------------------------------------------------------------------------------------------------------  Physical exam:   Constitutional:  Well-developed and well-nourished.  Chronically ill-appearing.  No acute distress.      HENT:  Head:  Normocephalic and atraumatic.   Cardiovascular:  Normal rate, regular rhythm   Pulmonary/Chest:  No respiratory distress, breath sounds markedly diminished bilaterally but no wheezing, crackles, or rhonchi  Musculoskeletal:  No deformity.    Neurological: Awake, alert, no focal deficit on gross examination. No slurred speech or facial droop.   Skin:  Skin is warm and dry.   Peripheral  "vascular:  No cyanosis  Psychiatric: Appropriate mood and affect  Edited by: Faviola Mejia DO at 11/21/2023 2044  ----------------------------------------------------------------------------------------------------------------------  Results from last 7 days   Lab Units 11/20/23 0117 11/19/23 0032 11/18/23  1307   CRP mg/dL  --  4.54* 4.51*   LACTATE mmol/L  --   --  1.7   WBC 10*3/mm3 10.73 5.03 11.04*   HEMOGLOBIN g/dL 12.6 12.9 12.9   HEMATOCRIT % 43.6 44.5 44.8   MCV fL 97.8* 96.7 99.1*   MCHC g/dL 28.9* 29.0* 28.8*   PLATELETS 10*3/mm3 178 144 154   INR   --   --  0.94     Results from last 7 days   Lab Units 11/19/23  0823   PH, ARTERIAL pH units 7.381   PO2 ART mm Hg 60.8*   PCO2, ARTERIAL mm Hg 86.7*   HCO3 ART mmol/L 51.4*     Results from last 7 days   Lab Units 11/20/23 0117 11/19/23 0032 11/18/23  1307   SODIUM mmol/L 135* 136 141   POTASSIUM mmol/L 4.3 3.9 3.6   MAGNESIUM mg/dL  --  1.9 1.9   CHLORIDE mmol/L 87* 87* 93*   CO2 mmol/L 40.5* 42.7* 40.3*   BUN mg/dL 24* 15 14   CREATININE mg/dL 0.47* 0.44* 0.38*   CALCIUM mg/dL 9.1 9.2 9.2   GLUCOSE mg/dL 297* 236* 174*   ALBUMIN g/dL 3.6 3.5 3.7   BILIRUBIN mg/dL 0.2 0.5 0.5   ALK PHOS U/L 63 71 72   AST (SGOT) U/L 16 16 16   ALT (SGPT) U/L 12 13 13   Estimated Creatinine Clearance: 137.8 mL/min (A) (by C-G formula based on SCr of 0.47 mg/dL (L)).  No results found for: \"AMMONIA\"  Results from last 7 days   Lab Units 11/19/23  1825 11/19/23  1640 11/18/23  2215   HSTROP T ng/L 36* 30* 29*     Results from last 7 days   Lab Units 11/18/23  1307   PROBNP pg/mL 240.0     Results from last 7 days   Lab Units 11/19/23  0032   CHOLESTEROL mg/dL 231*   TRIGLYCERIDES mg/dL 148   HDL CHOL mg/dL 42   LDL CHOL mg/dL 162*     No results found for: \"HGBA1C\", \"POCGLU\"  Lab Results   Component Value Date    TSH 5.170 (H) 11/18/2023    FREET4 1.09 11/18/2023     No results found for: \"PREGTESTUR\", \"PREGSERUM\", \"HCG\", \"HCGQUANT\"  Pain Management Panel  More data may " "exist         Latest Ref Rng & Units 11/19/2023 2/23/2022   Pain Management Panel   Amphetamine, Urine Qual Negative Negative  Negative    Barbiturates Screen, Urine Negative Negative  Negative    Benzodiazepine Screen, Urine Negative Positive  Positive    Buprenorphine, Screen, Urine Negative Negative  Negative    Cocaine Screen, Urine Negative Negative  Negative    Fentanyl, Urine Negative Negative  -   Methadone Screen , Urine Negative Negative  Negative    Methamphetamine, Ur Negative Negative  Negative      Brief Urine Lab Results  (Last result in the past 365 days)        Color   Clarity   Blood   Leuk Est   Nitrite   Protein   CREAT   Urine HCG        11/18/23 1447 Yellow   Clear   Negative   Negative   Negative   Negative                 Blood Culture   Date Value Ref Range Status   11/18/2023 No growth at 3 days  Preliminary   11/18/2023 No growth at 3 days  Preliminary     No results found for: \"URINECX\"  No results found for: \"WOUNDCX\"  No results found for: \"STOOLCX\"  No results found for: \"RESPCX\"  No results found for: \"AFBCX\"  Results from last 7 days   Lab Units 11/19/23  0032 11/18/23  1307   PROCALCITONIN ng/mL  --  0.03   LACTATE mmol/L  --  1.7   SED RATE mm/hr  --  74*   CRP mg/dL 4.54* 4.51*       I have personally looked at the labs and they are summarized above.  ----------------------------------------------------------------------------------------------------------------------  Detailed radiology reports for the last 24 hours:  Imaging Results (Last 24 Hours)       ** No results found for the last 24 hours. **          Assessment & Plan      #Acute on chronic hypoxic respiratory failure   #Acute on chronic hypercapnic respiratory failure   #Acute COPD exacerbation   #Hx of STEVEN  #Medical noncompliance  -Viral panel negative, D-dimer within normal limits, and no new consolidations seen on imaging at admission.  -Patient has severe COPD that requires home NIPPV but she has been unable to " "tolerate wearing it regularly.  -ABG slightly improved.  Continue BiPAP nightly and as needed.  Continue supplemental oxygen with goal oxygen saturation 88 to 92%  -Pulmonology recommending BiPAP settings of 22/6 with a backup rate of 14 and supplemental oxygen titrated to oxygen saturation of 92%.  Patient's home machine was adjusted for BiPAP settings but she was not able to tolerate it last night, and had to be placed back on hospital BiPAP.  -Pulmonology following, appreciate assistance.  -Continue steroids, Symbicort, ipratropium    #Chronically elevated troponin  -Similar to previous labs.  No new ischemic changes noted on EKG at admission.  -Patient reported to me that she was having \"chest pressure\" with using the trilogy machine at home, but at no other times, and she says that it has not been nearly as bad when using the BiPAP here. She denies any chest pressure/pain with exertion or at any time other than when using NIPPV.   -Consider Cardiology consult if symptoms worsen, but I suspect patient's pain is due to her severe COPD and use of noninvasive positive pressure ventilation rather than a cardiac source. I doubt that she would be able to tolerate ischemic workup with stress test at this time due to her chronic respiratory failure.    CHRONIC MEDICAL PROBLEMS     #HFpEF/grade I diastolic dysfunction  - Not felt to be in acute exacerbation clinically. Obtains Reds vest.    - Previous TTE from 10/2023 reviewed.  Monitor volume status with I&Os, daily weights.  Continue home medication regimen as appropriate.  IV Lasix ordered by Pulmonology today to improve lung compliance.      #Essential hypertension  - Well controlled, continue home regimen      #Hyperlipidemia  -Continue statin.      #Type II diabetes mellitus, non insulin dependent  - A1C 6.6%. Continue SSI.      #History of CVA  - Details unknown.  Continue home medication as appropriate. Does not appear to have residual deficits      #History of " carotid artery disease s/p left CEA in the past  - Continue home medications      #Hypothyroidism  - TSH WNL. Continue home levothyroxine.      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  #Former smoker   Edited by: Faviola Mejia DO at 11/21/2023 2044    VTE Prophylaxis:   Mechanical Order History:       None          Pharmalogical Order History:        Ordered     Dose Route Frequency Stop    11/18/23 1605  heparin (porcine) 5000 UNIT/ML injection 5,000 Units         5,000 Units SC Every 8 Hours Scheduled --                    Dispo:  likely home at discharge pending clinical improvement     Faviola Mejia DO  [facility] Hospitalist  11/21/23  20:44 EST

## 2023-11-22 NOTE — PLAN OF CARE
Problem: Adult Inpatient Plan of Care  Goal: Plan of Care Review  Outcome: Ongoing, Progressing  Flowsheets (Taken 11/22/2023 1514)  Outcome Evaluation: pt A&O X4 vss at this time on 4l. bed alarm refused call light in reach   Goal Outcome Evaluation:              Outcome Evaluation: pt A&O X4 vss at this time on 4l. bed alarm refused call light in reach

## 2023-11-22 NOTE — CONSULTS
"COPD Education  2nd Encounter: last COPD Education encoutner was 10/13/23    NAME:___Liz Jiang  :_1965_  APPOINTMENT DATE/TIME:___________23___14:20 EST___________    COPD Education Evaluation            COPD Education Completed (See Note) Yes     Reason for Consultation:  COPD Exacerbation     Objective:    Home Medications:  Medications Prior to Admission   Medication Sig Dispense Refill Last Dose    clopidogrel (PLAVIX) 75 MG tablet Take 1 tablet by mouth Daily.  3 2023    fluticasone (FLONASE) 50 MCG/ACT nasal spray 2 sprays into the nostril(s) as directed by provider Daily.   2023    Fluticasone-Umeclidin-Vilant 200-62.5-25 MCG/ACT aerosol powder  Inhale 1 puff Daily.   2023    furosemide (LASIX) 20 MG tablet Take 1 tablet by mouth Daily.   2023    HYDROcodone-acetaminophen (NORCO)  MG per tablet Take 1 tablet by mouth Every 6 (Six) Hours As Needed for Moderate Pain.   Past Week    levothyroxine (SYNTHROID, LEVOTHROID) 25 MCG tablet Take 1 tablet by mouth Every Morning.   2023    metoprolol tartrate (LOPRESSOR) 25 MG tablet Take 1 tablet by mouth 2 (Two) Times a Day.   2023    potassium chloride (MICRO-K) 10 MEQ CR capsule Take 1 capsule by mouth Daily.   2023    albuterol sulfate  (90 Base) MCG/ACT inhaler Inhale 2 puffs Every 6 (Six) Hours As Needed for Wheezing or Shortness of Air. 18 g 0 Unknown    ALPRAZolam (XANAX) 1 MG tablet Take 1 tablet by mouth Daily As Needed for Anxiety.   Unknown    ipratropium-albuterol (COMBIVENT RESPIMAT)  MCG/ACT inhaler Inhale 1 puff 4 (Four) Times a Day As Needed for Wheezing or Shortness of Air.   Unknown    ipratropium-albuterol (DUO-NEB) 0.5-2.5 mg/3 ml nebulizer Take 3 mL by nebulization Every 4 (Four) Hours As Needed for Wheezing or Shortness of Air.   Unknown     Barriers to Learning? No    COPD education given via the booklet \"A Patient's Guide to COPD\".     COPD Zones: " (Green/yellow/Red): YES     Exacerbation or Flare up signs and symptoms: YES     Causes of COPD Exacerbation:      Lung Infection YES     COPD Medication Non-Compliance YES     Healthy eating and drinking habbits: YES     Exercise and Activity: YES     Managing Medications: YES     Patient understands use of Rescue Medications: YES       Patient understands use of Maintenance Medications: YES       Proper MDI technique (w/wo Spacer): YES       How to use a nebulizer: YES     How to clean a nebulizer: YES     Breathing Techniques:       Purse-lipped breathing YES     Oxygen therapy SAFETY:  YES       Action Plan            Education Minutes with patient: YES and 20       Goals Discussed with patient: Keep home smoke free., Take medications as ordered., GO to Dr. kim., Increase use of pursed lip breathing., and Decrease flare-ups.            SANGEETHA Spencer, RRT  COPD Navigator

## 2023-11-22 NOTE — PROGRESS NOTES
Pulmonary and critical care following the patient for hypoxic respiratory failure and COPD exacerbation  Chief Complaint:  sob    Subjective resting in bed comfortably not in any distress.  Wearing nasal cannula oxygen.  We will try BiPAP from the outside company-which she will continue at home as she did not tolerate the Triology.     All the labs medications ins and outs and vitals reviewed.    Review of Systems:   Positive for generalized fatigue and shortness of breath otherwise negative      Vital Signs  Temp:  [96 °F (35.6 °C)-98.2 °F (36.8 °C)] 96 °F (35.6 °C)  Heart Rate:  [] 85  Resp:  [11-24] 23  BP: (117-152)/() 152/99  Body mass index is 33 kg/m².    Intake/Output Summary (Last 24 hours) at 11/22/2023 1527  Last data filed at 11/22/2023 1400  Gross per 24 hour   Intake 900 ml   Output 1200 ml   Net -300 ml     I/O this shift:  In: 660 [P.O.:660]  Out: 300 [Urine:300]    Physical Exam:  General-not in any respiratory distress  HEENT-PERRLA  Neck-supple    Respiratory-respirations normal-on auscultation no wheezing no crackles, decreased breath sounds  Wearing nasal cannula oxygen  Cardiovascular-NSR    GI-NTND    CNS-nonfocal    Musculoskeletal -no edema  Extremities- no obvious deformity noticed     Psychiatric-alert awake oriented   Skin- no visible rash         Results Review:     I reviewed the patient's new clinical results.  Results from last 7 days   Lab Units 11/22/23  0027 11/20/23 0117 11/19/23  0032   WBC 10*3/mm3 12.76* 10.73 5.03   HEMOGLOBIN g/dL 13.6 12.6 12.9   PLATELETS 10*3/mm3 213 178 144     Results from last 7 days   Lab Units 11/20/23  0117 11/19/23  0032 11/18/23  1307   SODIUM mmol/L 135* 136 141   POTASSIUM mmol/L 4.3 3.9 3.6   CHLORIDE mmol/L 87* 87* 93*   CO2 mmol/L 40.5* 42.7* 40.3*   BUN mg/dL 24* 15 14   CREATININE mg/dL 0.47* 0.44* 0.38*   CALCIUM mg/dL 9.1 9.2 9.2   GLUCOSE mg/dL 297* 236* 174*   MAGNESIUM mg/dL  --  1.9 1.9     Lab Results   Component  Value Date    INR 0.94 11/18/2023    INR 0.95 10/12/2023    INR 0.90 03/27/2017    PROTIME 13.1 11/18/2023    PROTIME 13.1 10/12/2023    PROTIME 9.9 03/27/2017     Results from last 7 days   Lab Units 11/20/23  0117 11/19/23  0032 11/18/23  1307   ALK PHOS U/L 63 71 72   BILIRUBIN mg/dL 0.2 0.5 0.5   ALT (SGPT) U/L 12 13 13   AST (SGOT) U/L 16 16 16     Results from last 7 days   Lab Units 11/19/23  0823   PH, ARTERIAL pH units 7.381   PO2 ART mm Hg 60.8*   PCO2, ARTERIAL mm Hg 86.7*   HCO3 ART mmol/L 51.4*     Imaging Results (Last 24 Hours)       ** No results found for the last 24 hours. **                 aspirin, 324 mg, Oral, Once  budesonide-formoterol, 2 puff, Inhalation, BID - RT  clopidogrel, 75 mg, Oral, Daily  fluticasone, 2 spray, Nasal, Daily  furosemide, 20 mg, Oral, Daily  heparin (porcine), 5,000 Units, Subcutaneous, Q8H  ipratropium, 0.5 mg, Nebulization, 4x Daily - RT  levothyroxine, 25 mcg, Oral, Q AM  methylPREDNISolone sodium succinate, 40 mg, Intravenous, Q12H  metoprolol tartrate, 25 mg, Oral, BID  senna-docusate sodium, 2 tablet, Oral, BID  sodium chloride, 10 mL, Intravenous, Q12H      Pharmacy Consult,         Medication Review:     Assessment & Plan   Acute exacerbation of COPD-continue nebs continue steroids  Continue oxygen to maintain saturation 88 to 92%.    Adjusted the BiPAP settings increase the EPAP and therapy oxygen around 5 L.  Patient will try the BiPAP overnight.      Diuretics to improve lung compliance and diffusion capacity.    Latest ABG reviewed.  Continue appropriate prophylaxis                 Acute respiratory failure with hypoxia               Elijah Hope MD  11/22/23  15:27 EST

## 2023-11-22 NOTE — CASE MANAGEMENT/SOCIAL WORK
Discharge Planning Assessment   [place]     Patient Name: Liz Jiang  MRN: [id number]  Today's Date: 11/22/2023    Admit Date: 11/18/2023     Discharge Plan       Row Name 11/22/23 1314       Plan    Plan SS spoke to pt at bedside on this date and pt states she plans to return back home at discharge. Pt lives alone. Pt does not utilize  services at this time. Pt request home health services to be arranged via [facility] at discharge. Pt was discharged from [facility] in October per Yi. Pt has applied for the home health waiver program. Pt has a hospital bed, bedside commode, nebulizer machine, and rollator via [facility], pulse ox, and bp cuff via unknown provider, O2 @ 3 liters and Bi-Pap machine via [place]. Pt was recently changed from Trilogy machine to Bi-Pap machine. PT consulted on 11/21. SS to follow and assist as needed with discharge planning.             TONY MarieeW

## 2023-11-22 NOTE — PLAN OF CARE
Goal Outcome Evaluation:   VSS at this time. A&Ox4, 4L NC. Home bipap worn throughout night on 5, tolerated well, O2 remained 88-92%. Patient resting at this time. Call light in reach, bed alarm refused, bed in lowest position. Plan of care ongoing 7p-7a.

## 2023-11-22 NOTE — THERAPY EVALUATION
Acute Care - Physical Therapy Initial Evaluation   [place]     Patient Name: Liz Jiang  : 1965  MRN: [id number]  Today's Date: 2023   Onset of Illness/Injury or Date of Surgery: 23  Visit Dx:     ICD-10-CM ICD-9-CM   1. COPD exacerbation  J44.1 491.21   2. Acute on chronic respiratory failure with hypoxia and hypercapnia  J96.21 518.84    J96.22 786.09     799.02     Patient Active Problem List   Diagnosis    Chest pain    COPD (chronic obstructive pulmonary disease)    Tobacco abuse    GERD (gastroesophageal reflux disease)    Anxiety    Arthritis    Nausea and vomiting    Generalized abdominal pain    Right upper quadrant pain    Gallbladder disease    Abnormal biliary HIDA scan    Dyslipidemia    Tachycardia    Anal cancer    Port-A-Cath in place    Debility    Chronic respiratory failure with hypoxia    Acute respiratory failure with hypoxia     Past Medical History:   Diagnosis Date    Acid reflux disease     Anal cancer 2019    Arthritis     Asthma, extrinsic     Cholelithiasis     Chronic headaches     COPD (chronic obstructive pulmonary disease)     CVA (cerebral vascular accident)     w/ right sided deficit    CVA (cerebral vascular accident)     Diabetes mellitus     only has high blood sugar when on steriods    Dyslipidemia 2018    History of radiation therapy 2020    Whole pelvis/anal mass    Nausea and vomiting 2016    Obstructive sleep apnea treated with BiPAP     On home oxygen therapy     2L     Sleep apnea, obstructive      Past Surgical History:   Procedure Laterality Date    ABDOMINAL SURGERY      CARDIAC CATHETERIZATION      CAROTID ENDARTERECTOMY Left 2018    CHOLECYSTECTOMY WITH INTRAOPERATIVE CHOLANGIOGRAM N/A 2017    Procedure: CHOLECYSTECTOMY LAPAROSCOPIC INTRAOPERATIVE CHOLANGIOGRAM;  Surgeon: Carolin Marie MD;  Location: [facility];  Service:     COLONOSCOPY      HYSTERECTOMY      for heavy bleeding/ complete    KIDNEY STONE  SURGERY      TUBAL ABDOMINAL LIGATION      VENOUS ACCESS DEVICE (PORT) INSERTION Left 12/17/2019    Procedure: INSERTION VENOUS ACCESS DEVICE;  Surgeon: Carolin Marie MD;  Location: [facility] OR;  Service: General     PT Assessment (last 12 hours)       PT Evaluation and Treatment       Row Name 11/22/23 4524          Physical Therapy Time and Intention    Subjective Information complains of;weakness  -     Document Type evaluation  -     Mode of Treatment physical therapy  -     Patient Effort other (see comments)  Pt had little interest in therapy today and her emphasis was on her new Bi-Pap machine.  She reported wanting that to be started and working prior to performing further mobility.  -       Row Name 11/22/23 1336          General Information    Patient Profile Reviewed yes  -     Onset of Illness/Injury or Date of Surgery 11/18/23  -     Referring Physician Roberto  -     Patient Observations alert;agree to therapy  -     Patient/Family/Caregiver Comments/Observations Pt is familiar with therapy.  She has been in and out of the hospital for months and was recently in inpatient rehab.  -     Prior Level of Function independent:;transfer;bed mobility  Pt reports not ambulating much at home prior to this hospitalization  -     Equipment Currently Used at Home commode, bedside;hospital bed;walker, rolling;rollator;wheelchair;ramp  -     Existing Precautions/Restrictions fall  -       Row Name 11/22/23 1334          Previous Level of Function/Home Environm    Bed Mobility, Premorbid Functional Level independent  -     Transfers, Premorbid Functional Level independent;uses device or equipment  -     Previous Level of Function, Premorbid Pt reports variable levels of assistance needed the last few months as she has been in and out of the hospital and rehab.  -       Row Name 11/22/23 1339          Living Environment    Current Living Arrangements home  -     Home Accessibility  wheelchair accessible  -     People in Home alone  -     Primary Care Provided by self  Daughter helps at times  -       Row Name 11/22/23 1331          Cognition    Affect/Mental Status (Cognition) L  -     Follows Commands (Cognition) WFL  -       Row Name 11/22/23 1331          Range of Motion (ROM)    Range of Motion ROM is [facility]  -       Row Name 11/22/23 1331          Strength Comprehensive (MMT)    Comment, General Manual Muscle Testing (MMT) Assessment Strength 3-/5 in LE  -       Row Name 11/22/23 1331          Bed Mobility    Comment, (Bed Mobility) Pt declined all mobility with therapy but has been getting to the [facility] and bedside chair with nursing staff with one assist  -       Row Name 11/22/23 1331          Gait/Stairs (Locomotion)    Patient was able to Ambulate no, other medical factors prevent ambulation  -     Reason Patient was unable to Ambulate Other (Comment)  Pt refused attempted ambulation with therapy today  -       Row Name             Wound 11/20/23 1443 medial coccyx Fissure    Wound - Properties Group Placement Date: 11/20/23  -TW Placement Time: 1443  -TW Orientation: medial  -TW Location: coccyx  -TW Primary Wound Type: Fissure  -TW    Retired Wound - Properties Group Placement Date: 11/20/23  -TW Placement Time: 1443  -TW Orientation: medial  -TW Location: coccyx  -TW Primary Wound Type: Fissure  -TW    Retired Wound - Properties Group Date first assessed: 11/20/23  -TW Time first assessed: 1443  -TW Location: coccyx  -TW Primary Wound Type: Fissure  -TW      Row Name 11/22/23 1331          Plan of Care Review    Plan of Care Reviewed With patient  -     Outcome Evaluation PT evaluation completed.  Patient declined all mobility and attempted ambulation with therapy today.  She does present with decreased strength in LE and below her baseline function due to multiple recent hospital stays.  She will benefit from continued skilled PT services this admission to  improve strength, endurance, balance and progress towards increased independence.  -       Row Name 11/22/23 1331          Positioning and Restraints    Pre-Treatment Position in bed  -     Post Treatment Position bed  -KP     In Bed notified nsg;supine;call light within reach;encouraged to call for assist;exit alarm on;side rails up x3  -       Row Name 11/22/23 1331          Therapy Assessment/Plan (PT)    Patient/Family Therapy Goals Statement (PT) Patient wants to return home  -     Functional Level at Time of Evaluation (PT) One assist per nursing staff - pt refused mobility today  -     PT Diagnosis (PT) Decreased strength  -     Rehab Potential (PT) good, to achieve stated therapy goals  -     Criteria for Skilled Interventions Met (PT) yes;meets criteria;skilled treatment is necessary  -     Therapy Frequency (PT) 2 times/wk  -     Predicted Duration of Therapy Intervention (PT) 2 wks  -     Problem List (PT) problems related to;balance;coordination;mobility;strength;postural control  -     Activity Limitations Related to Problem List (PT) unable to ambulate safely;unable to transfer safely  -       Row Name 11/22/23 1331          PT Evaluation Complexity    History, PT Evaluation Complexity 3 or more personal factors and/or comorbidities  -     Examination of Body Systems (PT Eval Complexity) total of 4 or more elements  -     Clinical Presentation (PT Evaluation Complexity) evolving  -     Clinical Decision Making (PT Evaluation Complexity) moderate complexity  -     Overall Complexity (PT Evaluation Complexity) moderate complexity  -       Row Name 11/22/23 1331          Physical Therapy Goals    Bed Mobility Goal Selection (PT) bed mobility, PT goal 1  -     Transfer Goal Selection (PT) transfer, PT goal 1  -     Gait Training Goal Selection (PT) gait training, PT goal 1  -       Row Name 11/22/23 1331          Bed Mobility Goal 1 (PT)    Activity/Assistive Device  (Bed Mobility Goal 1, PT) sit to supine/supine to sit  -KP     [place] Level/Cues Needed (Bed Mobility Goal 1, PT) modified independence  -KP     Time Frame (Bed Mobility Goal 1, PT) long term goal (LTG);by discharge  -       Row Name 11/22/23 1331          Transfer Goal 1 (PT)    Activity/Assistive Device (Transfer Goal 1, PT) transfers, all;bed-to-chair/chair-to-bed;walker, rolling  -KP     [place] Level/Cues Needed (Transfer Goal 1, PT) modified independence  -KP     Time Frame (Transfer Goal 1, PT) long term goal (LTG);by discharge  -       Row Name 11/22/23 1331          Gait Training Goal 1 (PT)    Activity/Assistive Device (Gait Training Goal 1, PT) gait (walking locomotion);assistive device use;walker, rolling  -KP     [place] Level (Gait Training Goal 1, PT) standby assist  -     Distance (Gait Training Goal 1, PT) 50ft  -KP     Time Frame (Gait Training Goal 1, PT) long term goal (LTG);by discharge  -               User Key  (r) = Recorded By, (t) = Taken By, (c) = Cosigned By      Initials Name Provider Type    Gricel Packer, RN Registered Nurse    Courtney Ariza, PT Physical Therapist                      PT Recommendation and Plan  Planned Therapy Interventions (PT): balance training, bed mobility training, gait training, home exercise program, motor coordination training, neuromuscular re-education, patient/family education, postural re-education, strengthening, stretching, transfer training  Therapy Frequency (PT): 2 times/wk  Plan of Care Reviewed With: patient  Outcome Evaluation: PT evaluation completed.  Patient declined all mobility and attempted ambulation with therapy today.  She does present with decreased strength in LE and below her baseline function due to multiple recent hospital stays.  She will benefit from continued skilled PT services this admission to improve strength, endurance, balance and progress towards increased independence.       Time  Calculation:    PT Charges       Row Name 11/22/23 1344             Time Calculation    PT Received On 11/22/23  -      PT Goal Re-Cert Due Date 12/06/23  -                User Key  (r) = Recorded By, (t) = Taken By, (c) = Cosigned By      Initials Name Provider Type    Courtney Ariza, PT Physical Therapist                  Therapy Charges for Today       Code Description Service Date Service Provider Modifiers Qty    [id number] HC PT EVAL MOD COMPLEXITY 4 11/22/2023 Courtney Brody, PT GP 1            PT G-Codes  AM-PAC 6 Clicks Score (PT): 15    Courtney Brody, PT  11/22/2023

## 2023-11-23 LAB
BACTERIA SPEC AEROBE CULT: NORMAL
BACTERIA SPEC AEROBE CULT: NORMAL

## 2023-11-23 PROCEDURE — 94664 DEMO&/EVAL PT USE INHALER: CPT

## 2023-11-23 PROCEDURE — 99231 SBSQ HOSP IP/OBS SF/LOW 25: CPT | Performed by: STUDENT IN AN ORGANIZED HEALTH CARE EDUCATION/TRAINING PROGRAM

## 2023-11-23 PROCEDURE — 25010000002 METHYLPREDNISOLONE PER 40 MG: Performed by: STUDENT IN AN ORGANIZED HEALTH CARE EDUCATION/TRAINING PROGRAM

## 2023-11-23 PROCEDURE — 94761 N-INVAS EAR/PLS OXIMETRY MLT: CPT

## 2023-11-23 PROCEDURE — 94799 UNLISTED PULMONARY SVC/PX: CPT

## 2023-11-23 PROCEDURE — 99232 SBSQ HOSP IP/OBS MODERATE 35: CPT | Performed by: INTERNAL MEDICINE

## 2023-11-23 PROCEDURE — 25010000002 HEPARIN (PORCINE) PER 1000 UNITS: Performed by: INTERNAL MEDICINE

## 2023-11-23 RX ADMIN — METHYLPREDNISOLONE SODIUM SUCCINATE 40 MG: 40 INJECTION, POWDER, FOR SOLUTION INTRAMUSCULAR; INTRAVENOUS at 08:37

## 2023-11-23 RX ADMIN — IPRATROPIUM BROMIDE 0.5 MG: 0.5 SOLUTION RESPIRATORY (INHALATION) at 18:39

## 2023-11-23 RX ADMIN — BUDESONIDE AND FORMOTEROL FUMARATE DIHYDRATE 2 PUFF: 160; 4.5 AEROSOL RESPIRATORY (INHALATION) at 18:39

## 2023-11-23 RX ADMIN — Medication 10 ML: at 08:37

## 2023-11-23 RX ADMIN — HYDROCODONE BITARTRATE AND ACETAMINOPHEN 1 TABLET: 10; 325 TABLET ORAL at 21:54

## 2023-11-23 RX ADMIN — HEPARIN SODIUM 5000 UNITS: 5000 INJECTION INTRAVENOUS; SUBCUTANEOUS at 05:15

## 2023-11-23 RX ADMIN — HEPARIN SODIUM 5000 UNITS: 5000 INJECTION INTRAVENOUS; SUBCUTANEOUS at 14:12

## 2023-11-23 RX ADMIN — FUROSEMIDE 20 MG: 20 TABLET ORAL at 08:37

## 2023-11-23 RX ADMIN — HYDROCODONE BITARTRATE AND ACETAMINOPHEN 1 TABLET: 10; 325 TABLET ORAL at 06:57

## 2023-11-23 RX ADMIN — FLUTICASONE PROPIONATE 2 SPRAY: 50 SPRAY, METERED NASAL at 08:37

## 2023-11-23 RX ADMIN — METOPROLOL TARTRATE 25 MG: 25 TABLET, FILM COATED ORAL at 08:36

## 2023-11-23 RX ADMIN — IPRATROPIUM BROMIDE 0.5 MG: 0.5 SOLUTION RESPIRATORY (INHALATION) at 13:20

## 2023-11-23 RX ADMIN — Medication 10 ML: at 21:54

## 2023-11-23 RX ADMIN — HEPARIN SODIUM 5000 UNITS: 5000 INJECTION INTRAVENOUS; SUBCUTANEOUS at 21:54

## 2023-11-23 RX ADMIN — IPRATROPIUM BROMIDE 0.5 MG: 0.5 SOLUTION RESPIRATORY (INHALATION) at 06:52

## 2023-11-23 RX ADMIN — BUDESONIDE AND FORMOTEROL FUMARATE DIHYDRATE 2 PUFF: 160; 4.5 AEROSOL RESPIRATORY (INHALATION) at 06:52

## 2023-11-23 RX ADMIN — CLOPIDOGREL BISULFATE 75 MG: 75 TABLET, FILM COATED ORAL at 08:36

## 2023-11-23 RX ADMIN — LEVOTHYROXINE SODIUM 25 MCG: 25 TABLET ORAL at 05:15

## 2023-11-23 RX ADMIN — ALPRAZOLAM 1 MG: 1 TABLET ORAL at 21:54

## 2023-11-23 RX ADMIN — METOPROLOL TARTRATE 25 MG: 25 TABLET, FILM COATED ORAL at 20:00

## 2023-11-23 NOTE — PROGRESS NOTES
Pulmonary and critical care following the patient for hypoxic respiratory failure and COPD exacerbation  Chief Complaint:  sob    Subjective   All the labs medications and the notes and vitals reviewed.  Patient resting in bed comfortably.  Used BiPAP overnight.  None of the family members at bedside.  Ins and outs net negative.  Review of Systems:    No changes      Vital Signs  Temp:  [96 °F (35.6 °C)-98.3 °F (36.8 °C)] 98.3 °F (36.8 °C)  Heart Rate:  [] 113  Resp:  [11-23] 19  BP: (107-152)/() 107/82  Body mass index is 33.49 kg/m².    Intake/Output Summary (Last 24 hours) at 11/23/2023 0739  Last data filed at 11/23/2023 0400  Gross per 24 hour   Intake 660 ml   Output 1500 ml   Net -840 ml     No intake/output data recorded.    Physical Exam: No major changes  General-not in any respiratory distress  HEENT-PERRLA  Neck-supple    Respiratory-resting in bed comfortably wearing nasal cannula oxygen  Cardiovascular-NSR  GI-NTND    CNS-nonfocal    Musculoskeletal -no edema  Extremities- no obvious deformity noticed     Psychiatric-alert awake oriented  Skin- no visible rash         Results Review:     I reviewed the patient's new clinical results.  Results from last 7 days   Lab Units 11/22/23  0027 11/20/23 0117 11/19/23  0032   WBC 10*3/mm3 12.76* 10.73 5.03   HEMOGLOBIN g/dL 13.6 12.6 12.9   PLATELETS 10*3/mm3 213 178 144     Results from last 7 days   Lab Units 11/20/23  0117 11/19/23  0032 11/18/23  1307   SODIUM mmol/L 135* 136 141   POTASSIUM mmol/L 4.3 3.9 3.6   CHLORIDE mmol/L 87* 87* 93*   CO2 mmol/L 40.5* 42.7* 40.3*   BUN mg/dL 24* 15 14   CREATININE mg/dL 0.47* 0.44* 0.38*   CALCIUM mg/dL 9.1 9.2 9.2   GLUCOSE mg/dL 297* 236* 174*   MAGNESIUM mg/dL  --  1.9 1.9     Lab Results   Component Value Date    INR 0.94 11/18/2023    INR 0.95 10/12/2023    INR 0.90 03/27/2017    PROTIME 13.1 11/18/2023    PROTIME 13.1 10/12/2023    PROTIME 9.9 03/27/2017     Results from last 7 days   Lab Units  11/20/23  0117 11/19/23  0032 11/18/23  1307   ALK PHOS U/L 63 71 72   BILIRUBIN mg/dL 0.2 0.5 0.5   ALT (SGPT) U/L 12 13 13   AST (SGOT) U/L 16 16 16     Results from last 7 days   Lab Units 11/19/23  0823   PH, ARTERIAL pH units 7.381   PO2 ART mm Hg 60.8*   PCO2, ARTERIAL mm Hg 86.7*   HCO3 ART mmol/L 51.4*     Imaging Results (Last 24 Hours)       ** No results found for the last 24 hours. **                 aspirin, 324 mg, Oral, Once  budesonide-formoterol, 2 puff, Inhalation, BID - RT  clopidogrel, 75 mg, Oral, Daily  fluticasone, 2 spray, Nasal, Daily  furosemide, 20 mg, Oral, Daily  heparin (porcine), 5,000 Units, Subcutaneous, Q8H  ipratropium, 0.5 mg, Nebulization, 4x Daily - RT  levothyroxine, 25 mcg, Oral, Q AM  methylPREDNISolone sodium succinate, 40 mg, Intravenous, Daily  metoprolol tartrate, 25 mg, Oral, BID  senna-docusate sodium, 2 tablet, Oral, BID  sodium chloride, 10 mL, Intravenous, Q12H  sodium chloride, 10 mL, Intravenous, Q12H      Pharmacy Consult,         Medication Review:     Assessment & Plan   Acute exacerbation of COPD-continue nebs  Continue steroids  We will decrease the dose from tomorrow  Oxygen levels reviewed and adjusted for a saturation of 88 to 92%.  Diuretics to improve lung compliance and diffusion capacity  Ins and outs net negative  Continue BiPAP.  Desaturated into the low 80s overnight.  We will give additional dose of diuretics tomorrow and see if that makes a difference.  Keep the flow of oxygen around 5 L when using BiPAP.  If still desaturates will adjust settings further.        Latest chest x-ray reviewed  Latest ABG reviewed.    Continue appropriate prophylaxis  Case d/w nurse and team   This service is provided via audio video tele medicine   I am in [place] and patient is in [facility]               Acute respiratory failure with hypoxia               Elijah Hope MD  11/23/23  07:39 EST

## 2023-11-23 NOTE — PROGRESS NOTES
[facility] HOSPITALIST PROGRESS NOTE     Patient Identification:  Name:  Liz Jiang  Age:  57 y.o.  Sex:  female  :  1965  MRN:  [id number]  Visit Number:  [id number]  ROOM: [place] [address]     Primary Care Provider:  Tabitha Ron APRN    Length of stay in inpatient status:  4    Subjective     Chief Compliant:    Chief Complaint   Patient presents with    Shortness of Breath       History of Presenting Illness:    Patient reported today that her shortness of breath is improving and that she felt better using the BiPAP machine after settings were adjusted last night. She denied any current chest pain. No acute events noted overnight.     Objective     Current Hospital Meds:aspirin, 324 mg, Oral, Once  budesonide-formoterol, 2 puff, Inhalation, BID - RT  clopidogrel, 75 mg, Oral, Daily  fluticasone, 2 spray, Nasal, Daily  furosemide, 20 mg, Oral, Daily  heparin (porcine), 5,000 Units, Subcutaneous, Q8H  ipratropium, 0.5 mg, Nebulization, 4x Daily - RT  levothyroxine, 25 mcg, Oral, Q AM  [START ON 2023] methylPREDNISolone sodium succinate, 40 mg, Intravenous, Daily  metoprolol tartrate, 25 mg, Oral, BID  senna-docusate sodium, 2 tablet, Oral, BID  sodium chloride, 10 mL, Intravenous, Q12H  sodium chloride, 10 mL, Intravenous, Q12H    Pharmacy Consult,         Current Antimicrobial Therapy:  Anti-Infectives (From admission, onward)      None          Current Diuretic Therapy:  Diuretics (From admission, onward)      Ordered     Dose/Rate Route Frequency Start Stop    23 1450  furosemide (LASIX) injection 40 mg        Ordering Provider: Elijah Hope MD    40 mg Intravenous Once 23 1500 23 1531    23 0916  furosemide (LASIX) tablet 20 mg        Ordering Provider: Cliff Castaneda MD    20 mg Oral Daily 23 1015      23 1306  furosemide (LASIX) injection 80 mg        Ordering Provider: Ruby Dia PA    80 mg Intravenous Once 23 1322 23  1328          ----------------------------------------------------------------------------------------------------------------------  Vital Signs:  Temp:  [96 °F (35.6 °C)-98.2 °F (36.8 °C)] 97.8 °F (36.6 °C)  Heart Rate:  [] 106  Resp:  [11-23] 17  BP: (118-152)/() 125/85  SpO2:  [85 %-97 %] 95 %  on  Flow (L/min):  [4-5] 5;   Device (Oxygen Therapy): nasal cannula  Body mass index is 33 kg/m².    Wt Readings from Last 3 Encounters:   11/22/23 87.2 kg (192 lb 3.9 oz)   11/08/23 81.6 kg (180 lb)   10/28/23 81.6 kg (180 lb)     Intake & Output (last 3 days)         11/20 0701 11/21 0700 11/21 0701  11/22 0700 11/22 0701  11/23 0700    P.O. 716 600 660    I.V. (mL/kg) 0 (0) 0 (0)     Total Intake(mL/kg) 716 (8.4) 600 (6.9) 660 (7.6)    Urine (mL/kg/hr) 2650 (1.3) 1750 (0.8) 600 (0.4)    Stool  0 0    Total Output 2650 1750 600    Net -1934 -1150 +60           Urine Unmeasured Occurrence  5 x     Stool Unmeasured Occurrence  1 x 1 x          Diet: Cardiac Diets, Diabetic Diets; Healthy Heart (2-3 Na+); Consistent Carbohydrate; Texture: Regular Texture (IDDSI 7); Fluid Consistency: Thin (IDDSI 0)  ----------------------------------------------------------------------------------------------------------------------  Physical exam:   Constitutional:  Well-developed and well-nourished.  Chronically ill-appearing.  No acute distress.      HENT:  Head:  Normocephalic and atraumatic.   Cardiovascular:  Normal rate, regular rhythm   Pulmonary/Chest:  No respiratory distress, breath sounds diminished bilaterally with very mild end expiratory wheezing  Musculoskeletal:  No deformity.    Neurological: Awake, alert, no focal deficit on gross examination. No slurred speech or facial droop.   Skin:  Skin is warm and dry.   Peripheral vascular:  No cyanosis  Psychiatric: Appropriate mood and affect  Edited by: Faviola Mejia DO at 11/22/2023  "2220  ----------------------------------------------------------------------------------------------------------------------  Results from last 7 days   Lab Units 11/22/23 0027 11/20/23 0117 11/19/23 0032 11/18/23  1307   CRP mg/dL  --   --  4.54* 4.51*   LACTATE mmol/L  --   --   --  1.7   WBC 10*3/mm3 12.76* 10.73 5.03 11.04*   HEMOGLOBIN g/dL 13.6 12.6 12.9 12.9   HEMATOCRIT % 46.3 43.6 44.5 44.8   MCV fL 96.9 97.8* 96.7 99.1*   MCHC g/dL 29.4* 28.9* 29.0* 28.8*   PLATELETS 10*3/mm3 213 178 144 154   INR   --   --   --  0.94     Results from last 7 days   Lab Units 11/19/23  0823   PH, ARTERIAL pH units 7.381   PO2 ART mm Hg 60.8*   PCO2, ARTERIAL mm Hg 86.7*   HCO3 ART mmol/L 51.4*     Results from last 7 days   Lab Units 11/20/23 0117 11/19/23 0032 11/18/23  1307   SODIUM mmol/L 135* 136 141   POTASSIUM mmol/L 4.3 3.9 3.6   MAGNESIUM mg/dL  --  1.9 1.9   CHLORIDE mmol/L 87* 87* 93*   CO2 mmol/L 40.5* 42.7* 40.3*   BUN mg/dL 24* 15 14   CREATININE mg/dL 0.47* 0.44* 0.38*   CALCIUM mg/dL 9.1 9.2 9.2   GLUCOSE mg/dL 297* 236* 174*   ALBUMIN g/dL 3.6 3.5 3.7   BILIRUBIN mg/dL 0.2 0.5 0.5   ALK PHOS U/L 63 71 72   AST (SGOT) U/L 16 16 16   ALT (SGPT) U/L 12 13 13   Estimated Creatinine Clearance: 139.4 mL/min (A) (by C-G formula based on SCr of 0.47 mg/dL (L)).  No results found for: \"AMMONIA\"  Results from last 7 days   Lab Units 11/19/23  1825 11/19/23  1640 11/18/23  2215   HSTROP T ng/L 36* 30* 29*     Results from last 7 days   Lab Units 11/18/23  1307   PROBNP pg/mL 240.0     Results from last 7 days   Lab Units 11/19/23  0032   CHOLESTEROL mg/dL 231*   TRIGLYCERIDES mg/dL 148   HDL CHOL mg/dL 42   LDL CHOL mg/dL 162*     No results found for: \"HGBA1C\", \"POCGLU\"  Lab Results   Component Value Date    TSH 5.170 (H) 11/18/2023    FREET4 1.09 11/18/2023     No results found for: \"PREGTESTUR\", \"PREGSERUM\", \"HCG\", \"HCGQUANT\"  Pain Management Panel  More data may exist         Latest Ref Rng & Units 11/19/2023 " "2/23/2022   Pain Management Panel   Amphetamine, Urine Qual Negative Negative  Negative    Barbiturates Screen, Urine Negative Negative  Negative    Benzodiazepine Screen, Urine Negative Positive  Positive    Buprenorphine, Screen, Urine Negative Negative  Negative    Cocaine Screen, Urine Negative Negative  Negative    Fentanyl, Urine Negative Negative  -   Methadone Screen , Urine Negative Negative  Negative    Methamphetamine, Ur Negative Negative  Negative      Brief Urine Lab Results  (Last result in the past 365 days)        Color   Clarity   Blood   Leuk Est   Nitrite   Protein   CREAT   Urine HCG        11/18/23 1447 Yellow   Clear   Negative   Negative   Negative   Negative                 Blood Culture   Date Value Ref Range Status   11/18/2023 No growth at 4 days  Preliminary   11/18/2023 No growth at 4 days  Preliminary     No results found for: \"URINECX\"  No results found for: \"WOUNDCX\"  No results found for: \"STOOLCX\"  No results found for: \"RESPCX\"  No results found for: \"AFBCX\"  Results from last 7 days   Lab Units 11/19/23  0032 11/18/23  1307   PROCALCITONIN ng/mL  --  0.03   LACTATE mmol/L  --  1.7   SED RATE mm/hr  --  74*   CRP mg/dL 4.54* 4.51*       I have personally looked at the labs and they are summarized above.  ----------------------------------------------------------------------------------------------------------------------  Detailed radiology reports for the last 24 hours:  Imaging Results (Last 24 Hours)       ** No results found for the last 24 hours. **          Assessment & Plan      #Acute on chronic hypoxic respiratory failure   #Acute on chronic hypercapnic respiratory failure   #Acute COPD exacerbation   #Hx of STEVEN  #Medical noncompliance  -Viral panel negative, D-dimer within normal limits, and no new consolidations seen on imaging at admission.  -Patient has severe COPD that requires home NIPPV but she has been unable to tolerate wearing it regularly.  -ABG slightly " "improved.  Continue BiPAP nightly and as needed.  Continue supplemental oxygen with goal oxygen saturation 88 to 92%  -Pulmonology recommending BiPAP settings of 22/6 with a backup rate of 14 and supplemental oxygen titrated to oxygen saturation of 92%.  Patient's home machine was adjusted for BiPAP settings and she is starting to tolerate it better. Settings still being adjusted by Pulmonology and DME representative.  -Pulmonology following, appreciate assistance.  -Continue steroids, Symbicort, ipratropium. Wean steroid dose as respiratory status is improving.    #Chronically elevated troponin  -Similar to previous labs.  No new ischemic changes noted on EKG at admission.  -Patient reported to me that she was having \"chest pressure\" with using the trilogy machine at home, but at no other times, and she says that it has not been nearly as bad when using the BiPAP here. She denies any chest pressure/pain with exertion or at any time other than when using NIPPV. This has improved.  -Consider Cardiology consult if symptoms worsen, but I suspect patient's pain is due to her severe COPD and use of noninvasive positive pressure ventilation rather than a cardiac source. I doubt that she would be able to tolerate ischemic workup with stress test at this time due to her chronic respiratory failure.    CHRONIC MEDICAL PROBLEMS     #HFpEF/grade I diastolic dysfunction  - Not felt to be in acute exacerbation clinically. Obtains Reds vest.    - Previous TTE from 10/2023 reviewed.  Monitor volume status with I&Os, daily weights.  Continue home medication regimen as appropriate.  IV Lasix ordered by Pulmonology today to improve lung compliance.      #Essential hypertension  - Well controlled, continue home regimen      #Hyperlipidemia  -Continue statin.      #Type II diabetes mellitus, non insulin dependent  - A1C 6.6%. Continue SSI.      #History of CVA  - Details unknown.  Continue home medication as appropriate. Does not " appear to have residual deficits      #History of carotid artery disease s/p left CEA in the past  - Continue home medications      #Hypothyroidism  - TSH WNL. Continue home levothyroxine.      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  #Former smoker   Edited by: Faviola Mejia DO at 11/22/2023 2220    VTE Prophylaxis:   Mechanical Order History:       None          Pharmalogical Order History:        Ordered     Dose Route Frequency Stop    11/18/23 1605  heparin (porcine) 5000 UNIT/ML injection 5,000 Units         5,000 Units SC Every 8 Hours Scheduled --                    Dispo:  likely home at discharge pending clinical improvement     Faviola Mejia DO  [facility]  11/22/23  22:21 EST

## 2023-11-23 NOTE — PLAN OF CARE
Goal Outcome Evaluation:  Plan of Care Reviewed With: patient        Progress: improving  Outcome Evaluation: VSS; afebrile; pt up to BSC with assist; pt has had multiple bowel movements today; no c/o pain; pt refused bipap during the day; Pt's 02 decreased to 4.5L/nc by respiratory; no s/s of distress; will continue to monitor.

## 2023-11-23 NOTE — NURSING NOTE
"Pt refuses bipap while napping.   States \"I'm having to get up to the bathroom frequently and you all are too slow in responding that I'm afraid I may have an accident\".   I reassured patient that we will be here asa and informed her that her bipap battery was fully charged and ready.    "

## 2023-11-23 NOTE — PLAN OF CARE
Goal Outcome Evaluation:   VSS at this time. Patient remains A&Ox4, 5L NC/home bipap use on 5L. Maintained O2 sat 88-92% on home bipap unit. Patient received xanax and norco by pt. request, see mar. Patient is currently resting with no complaints at this time. Call light in reach, bed in lowest position, bed alarm refused. Plan of care ongoing 7p-7a.

## 2023-11-24 LAB
ANION GAP SERPL CALCULATED.3IONS-SCNC: 7.8 MMOL/L (ref 5–15)
BUN SERPL-MCNC: 30 MG/DL (ref 6–20)
BUN/CREAT SERPL: 61.2 (ref 7–25)
CALCIUM SPEC-SCNC: 9.6 MG/DL (ref 8.6–10.5)
CHLORIDE SERPL-SCNC: 92 MMOL/L (ref 98–107)
CO2 SERPL-SCNC: 39.2 MMOL/L (ref 22–29)
CREAT SERPL-MCNC: 0.49 MG/DL (ref 0.57–1)
EGFRCR SERPLBLD CKD-EPI 2021: 109.4 ML/MIN/1.73
GLUCOSE SERPL-MCNC: 324 MG/DL (ref 65–99)
MAGNESIUM SERPL-MCNC: 2.2 MG/DL (ref 1.6–2.6)
POTASSIUM SERPL-SCNC: 5.1 MMOL/L (ref 3.5–5.2)
SODIUM SERPL-SCNC: 139 MMOL/L (ref 136–145)

## 2023-11-24 PROCEDURE — 94664 DEMO&/EVAL PT USE INHALER: CPT

## 2023-11-24 PROCEDURE — 94660 CPAP INITIATION&MGMT: CPT

## 2023-11-24 PROCEDURE — 25010000002 HEPARIN (PORCINE) PER 1000 UNITS: Performed by: INTERNAL MEDICINE

## 2023-11-24 PROCEDURE — 80048 BASIC METABOLIC PNL TOTAL CA: CPT | Performed by: HOSPITALIST

## 2023-11-24 PROCEDURE — 99232 SBSQ HOSP IP/OBS MODERATE 35: CPT | Performed by: INTERNAL MEDICINE

## 2023-11-24 PROCEDURE — 25010000002 FUROSEMIDE PER 20 MG: Performed by: INTERNAL MEDICINE

## 2023-11-24 PROCEDURE — 94799 UNLISTED PULMONARY SVC/PX: CPT

## 2023-11-24 PROCEDURE — 83735 ASSAY OF MAGNESIUM: CPT | Performed by: HOSPITALIST

## 2023-11-24 PROCEDURE — 99231 SBSQ HOSP IP/OBS SF/LOW 25: CPT | Performed by: STUDENT IN AN ORGANIZED HEALTH CARE EDUCATION/TRAINING PROGRAM

## 2023-11-24 PROCEDURE — 25010000002 HEPARIN (PORCINE) PER 1000 UNITS: Performed by: STUDENT IN AN ORGANIZED HEALTH CARE EDUCATION/TRAINING PROGRAM

## 2023-11-24 PROCEDURE — 25010000002 METHYLPREDNISOLONE PER 40 MG: Performed by: INTERNAL MEDICINE

## 2023-11-24 PROCEDURE — 94761 N-INVAS EAR/PLS OXIMETRY MLT: CPT

## 2023-11-24 RX ORDER — METHYLPREDNISOLONE SODIUM SUCCINATE 40 MG/ML
20 INJECTION, POWDER, LYOPHILIZED, FOR SOLUTION INTRAMUSCULAR; INTRAVENOUS DAILY
Status: DISCONTINUED | OUTPATIENT
Start: 2023-11-24 | End: 2023-11-28

## 2023-11-24 RX ORDER — FUROSEMIDE 10 MG/ML
40 INJECTION INTRAMUSCULAR; INTRAVENOUS ONCE
Qty: 4 ML | Refills: 0 | Status: COMPLETED | OUTPATIENT
Start: 2023-11-24 | End: 2023-11-24

## 2023-11-24 RX ADMIN — CLOPIDOGREL BISULFATE 75 MG: 75 TABLET, FILM COATED ORAL at 08:54

## 2023-11-24 RX ADMIN — IPRATROPIUM BROMIDE 0.5 MG: 0.5 SOLUTION RESPIRATORY (INHALATION) at 12:27

## 2023-11-24 RX ADMIN — Medication 10 ML: at 09:01

## 2023-11-24 RX ADMIN — IPRATROPIUM BROMIDE 0.5 MG: 0.5 SOLUTION RESPIRATORY (INHALATION) at 18:35

## 2023-11-24 RX ADMIN — ALPRAZOLAM 1 MG: 1 TABLET ORAL at 22:14

## 2023-11-24 RX ADMIN — METOPROLOL TARTRATE 25 MG: 25 TABLET, FILM COATED ORAL at 21:58

## 2023-11-24 RX ADMIN — HEPARIN SODIUM 5000 UNITS: 5000 INJECTION INTRAVENOUS; SUBCUTANEOUS at 21:58

## 2023-11-24 RX ADMIN — Medication 10 ML: at 21:59

## 2023-11-24 RX ADMIN — HEPARIN SODIUM 5000 UNITS: 5000 INJECTION INTRAVENOUS; SUBCUTANEOUS at 14:40

## 2023-11-24 RX ADMIN — FLUTICASONE PROPIONATE 2 SPRAY: 50 SPRAY, METERED NASAL at 09:02

## 2023-11-24 RX ADMIN — HYDROCODONE BITARTRATE AND ACETAMINOPHEN 1 TABLET: 10; 325 TABLET ORAL at 05:26

## 2023-11-24 RX ADMIN — METHYLPREDNISOLONE SODIUM SUCCINATE 20 MG: 40 INJECTION, POWDER, FOR SOLUTION INTRAMUSCULAR; INTRAVENOUS at 08:53

## 2023-11-24 RX ADMIN — BUDESONIDE AND FORMOTEROL FUMARATE DIHYDRATE 2 PUFF: 160; 4.5 AEROSOL RESPIRATORY (INHALATION) at 18:35

## 2023-11-24 RX ADMIN — METOPROLOL TARTRATE 25 MG: 25 TABLET, FILM COATED ORAL at 08:53

## 2023-11-24 RX ADMIN — IPRATROPIUM BROMIDE 0.5 MG: 0.5 SOLUTION RESPIRATORY (INHALATION) at 07:04

## 2023-11-24 RX ADMIN — FUROSEMIDE 40 MG: 10 INJECTION, SOLUTION INTRAMUSCULAR; INTRAVENOUS at 08:53

## 2023-11-24 RX ADMIN — LEVOTHYROXINE SODIUM 25 MCG: 25 TABLET ORAL at 05:29

## 2023-11-24 RX ADMIN — BUDESONIDE AND FORMOTEROL FUMARATE DIHYDRATE 2 PUFF: 160; 4.5 AEROSOL RESPIRATORY (INHALATION) at 09:35

## 2023-11-24 RX ADMIN — HEPARIN SODIUM 5000 UNITS: 5000 INJECTION INTRAVENOUS; SUBCUTANEOUS at 05:26

## 2023-11-24 RX ADMIN — HYDROCODONE BITARTRATE AND ACETAMINOPHEN 1 TABLET: 10; 325 TABLET ORAL at 14:42

## 2023-11-24 RX ADMIN — HYDROCODONE BITARTRATE AND ACETAMINOPHEN 1 TABLET: 10; 325 TABLET ORAL at 22:14

## 2023-11-24 NOTE — PLAN OF CARE
Goal Outcome Evaluation:  Plan of Care Reviewed With: (P) patient        Progress: (P) improving  Outcome Evaluation: (P) Pt. resting calmly in bed. Refused bed alarm. VSS. Transferring pt to 3S. Report has been called. No new needs noted at this time.         Problem: Adult Inpatient Plan of Care  Goal: Plan of Care Review  Outcome: Ongoing, Progressing  Flowsheets (Taken 11/24/2023 1519)  Progress: improving (Pended)  Plan of Care Reviewed With: patient (Pended)  Outcome Evaluation: Pt. resting calmly in bed. Refused bed alarm. VSS. Transferring pt to 3S. Report has been called. No new needs noted at this time. (Pended)  Goal: Patient-Specific Goal (Individualized)  Outcome: Ongoing, Progressing  Goal: Absence of Hospital-Acquired Illness or Injury  Outcome: Ongoing, Progressing  Intervention: Identify and Manage Fall Risk  Recent Flowsheet Documentation  Taken 11/24/2023 1300 by Natalie Steiner RN Extern  Safety Promotion/Fall Prevention:   safety round/check completed   room organization consistent   nonskid shoes/slippers when out of bed   lighting adjusted   fall prevention program maintained   clutter free environment maintained   assistive device/personal items within reach   activity supervised  Taken 11/24/2023 1100 by Natalie Steiner RN Extern  Safety Promotion/Fall Prevention:   safety round/check completed   room organization consistent   nonskid shoes/slippers when out of bed   lighting adjusted   fall prevention program maintained   clutter free environment maintained   assistive device/personal items within reach   activity supervised  Taken 11/24/2023 0900 by Natalie Steiner, RN Extern  Safety Promotion/Fall Prevention:   safety round/check completed   room organization consistent   nonskid shoes/slippers when out of bed   lighting adjusted   fall prevention program maintained   clutter free environment maintained   assistive device/personal items within reach   activity supervised  Taken  11/24/2023 0700 by Natalie Steiner RN Extern  Safety Promotion/Fall Prevention:   safety round/check completed   room organization consistent   nonskid shoes/slippers when out of bed   lighting adjusted   fall prevention program maintained   clutter free environment maintained   assistive device/personal items within reach   activity supervised  Intervention: Prevent Skin Injury  Recent Flowsheet Documentation  Taken 11/24/2023 0854 by Natalie Steiner RN Extern  Skin Protection:   tubing/devices free from skin contact   adhesive use limited   transparent dressing maintained  Intervention: Prevent and Manage VTE (Venous Thromboembolism) Risk  Recent Flowsheet Documentation  Taken 11/24/2023 0854 by [place], Natalie, RN Extern  VTE Prevention/Management: (see MAR) other (see comments)  Range of Motion: active ROM (range of motion) encouraged  Intervention: Prevent Infection  Recent Flowsheet Documentation  Taken 11/24/2023 1300 by Natalie Steiner RN Extern  Infection Prevention:   hand hygiene promoted   rest/sleep promoted   single patient room provided  Taken 11/24/2023 1100 by Natalie Steiner RN Extern  Infection Prevention:   hand hygiene promoted   rest/sleep promoted   single patient room provided  Taken 11/24/2023 0900 by Natalie Steiner RN Extern  Infection Prevention:   hand hygiene promoted   rest/sleep promoted   single patient room provided  Taken 11/24/2023 0700 by Natalie Steiner RN Extern  Infection Prevention:   hand hygiene promoted   rest/sleep promoted   single patient room provided  Goal: Optimal Comfort and Wellbeing  Outcome: Ongoing, Progressing  Intervention: Provide Person-Centered Care  Recent Flowsheet Documentation  Taken 11/24/2023 0854 by Natalie Steiner RN Extern  Trust Relationship/Rapport:   care explained   choices provided   thoughts/feelings acknowledged   reassurance provided  Goal: Readiness for Transition of Care  Outcome: Ongoing, Progressing     Problem: Asthma  Comorbidity  Goal: Maintenance of Asthma Control  Outcome: Ongoing, Progressing     Problem: Behavioral Health Comorbidity  Goal: Maintenance of Behavioral Health Symptom Control  Outcome: Ongoing, Progressing     Problem: COPD (Chronic Obstructive Pulmonary Disease) Comorbidity  Goal: Maintenance of COPD Symptom Control  Outcome: Ongoing, Progressing     Problem: Diabetes Comorbidity  Goal: Blood Glucose Level Within Targeted Range  Outcome: Ongoing, Progressing     Problem: Heart Failure Comorbidity  Goal: Maintenance of Heart Failure Symptom Control  Outcome: Ongoing, Progressing     Problem: Hypertension Comorbidity  Goal: Blood Pressure in Desired Range  Outcome: Ongoing, Progressing     Problem: Obstructive Sleep Apnea Risk or Actual Comorbidity Management  Goal: Unobstructed Breathing During Sleep  Outcome: Ongoing, Progressing     Problem: Osteoarthritis Comorbidity  Goal: Maintenance of Osteoarthritis Symptom Control  Outcome: Ongoing, Progressing     Problem: Pain Chronic (Persistent) (Comorbidity Management)  Goal: Acceptable Pain Control and Functional Ability  Outcome: Ongoing, Progressing  Intervention: Optimize Psychosocial Wellbeing  Recent Flowsheet Documentation  Taken 11/24/2023 0854 by Natalie Steiner, RN Extern  Diversional Activities:   television   smartphone  Family/Support System Care:   support provided   self-care encouraged     Problem: Seizure Disorder Comorbidity  Goal: Maintenance of Seizure Control  Outcome: Ongoing, Progressing     Problem: Skin Injury Risk Increased  Goal: Skin Health and Integrity  Outcome: Ongoing, Progressing  Intervention: Optimize Skin Protection  Recent Flowsheet Documentation  Taken 11/24/2023 0854 by Natalie Steiner, RN Extern  Pressure Reduction Techniques:   frequent weight shift encouraged   heels elevated off bed  Pressure Reduction Devices: pressure-redistributing mattress utilized  Skin Protection:   tubing/devices free from skin contact   adhesive  use limited   transparent dressing maintained     Problem: Fall Injury Risk  Goal: Absence of Fall and Fall-Related Injury  Outcome: Ongoing, Progressing  Intervention: Promote Injury-Free Environment  Recent Flowsheet Documentation  Taken 11/24/2023 1300 by Natalie Steiner RN Extern  Safety Promotion/Fall Prevention:   safety round/check completed   room organization consistent   nonskid shoes/slippers when out of bed   lighting adjusted   fall prevention program maintained   clutter free environment maintained   assistive device/personal items within reach   activity supervised  Taken 11/24/2023 1100 by Natalie Steiner RN Extern  Safety Promotion/Fall Prevention:   safety round/check completed   room organization consistent   nonskid shoes/slippers when out of bed   lighting adjusted   fall prevention program maintained   clutter free environment maintained   assistive device/personal items within reach   activity supervised  Taken 11/24/2023 0900 by Natalie Steiner RN Extern  Safety Promotion/Fall Prevention:   safety round/check completed   room organization consistent   nonskid shoes/slippers when out of bed   lighting adjusted   fall prevention program maintained   clutter free environment maintained   assistive device/personal items within reach   activity supervised  Taken 11/24/2023 0700 by Natalie Steiner RN Extern  Safety Promotion/Fall Prevention:   safety round/check completed   room organization consistent   nonskid shoes/slippers when out of bed   lighting adjusted   fall prevention program maintained   clutter free environment maintained   assistive device/personal items within reach   activity supervised

## 2023-11-24 NOTE — CASE MANAGEMENT/SOCIAL WORK
Discharge Planning Assessment   [place]     Patient Name: Liz Jiang  MRN: [id number]  Today's Date: 11/24/2023    Admit Date: 11/18/2023     Discharge Plan       Row Name 11/24/23 1633       Plan    Plan Pt was transferred from [facility] to [facility] on this date. SS spoke with pt at bedside. Pt lives alone. Pt does not utilize  services at this time. Pt request home health services to be arranged via [facility] at discharge. Pt was discharged from [facility] in October per Yi. Pt has applied for the home health waiver program. Pt has a hospital bed, bedside commode, nebulizer machine, and rollator via [facility], pulse ox, and bp cuff via unknown provider, O2 @ 3 liters and Bi-Pap machine via [facility]. Pt was recently changed from Trilogy machine to Bi-Pap machine. SS to follow and assist as needed with discharge planning.               TONY MarieeW

## 2023-11-24 NOTE — PLAN OF CARE
Goal Outcome Evaluation:  Plan of Care Reviewed With: patient           Outcome Evaluation: VSS on 4L of O2 per NC at this time.  Wore home bipap for several hours this shift.  No distress noted.  Bed in low and locked position.  Call light within reach.  Care ongoing.         Problem: Adult Inpatient Plan of Care  Goal: Plan of Care Review  Outcome: Ongoing, Progressing  Flowsheets (Taken 11/24/2023 0542)  Plan of Care Reviewed With: patient  Outcome Evaluation: VSS on 4L of O2 per NC at this time.  Wore home bipap for several hours this shift.  No distress noted.  Bed in low and locked position.  Call light within reach.  Care ongoing.  Goal: Patient-Specific Goal (Individualized)  Outcome: Ongoing, Progressing  Goal: Absence of Hospital-Acquired Illness or Injury  Outcome: Ongoing, Progressing  Intervention: Identify and Manage Fall Risk  Description: Perform standard risk assessment on admission using a validated tool or comprehensive approach appropriate to the patient; reassess fall risk frequently, with change in status or transfer to another level of care.  Communicate fall injury risk to interprofessional healthcare team.  Determine need for increased observation, equipment and environmental modification, such as low bed, signage and supportive, nonskid footwear.  Adjust safety measures to individual developmental age, stage and identified risk factors.  Reinforce the importance of safety and physical activity with patient and family.  Perform regular intentional rounding to assess need for position change, pain assessment and personal needs, including assistance with toileting.  Recent Flowsheet Documentation  Taken 11/23/2023 2200 by Monisha Greenberg RN  Safety Promotion/Fall Prevention: safety round/check completed  Intervention: Prevent and Manage VTE (Venous Thromboembolism) Risk  Description: Assess for VTE (venous thromboembolism) risk.  Encourage and assist with early ambulation.  Initiate and  maintain compression or other therapy, as indicated, based on identified risk in accordance with organizational protocol and provider order.  Encourage both active and passive leg exercises while in bed, if unable to ambulate.  Recent Flowsheet Documentation  Taken 11/24/2023 0200 by Monisha Greenberg RN  VTE Prevention/Management: (See MAR) other (see comments)  Taken 11/23/2023 2000 by Monisha Greenberg RN  VTE Prevention/Management: (See MAR) other (see comments)  Goal: Optimal Comfort and Wellbeing  Outcome: Ongoing, Progressing  Intervention: Monitor Pain and Promote Comfort  Description: Assess pain level, treatment efficacy and patient response at regular intervals using a consistent pain scale.  Consider the presence and impact of preexisting chronic pain.  Encourage patient and caregiver involvement in pain assessment, interventions and safety measures.  Recent Flowsheet Documentation  Taken 11/24/2023 0526 by Monisha Greenberg RN  Pain Management Interventions: see MAR  Taken 11/23/2023 2000 by Monisha Greenberg RN  Pain Management Interventions: medication offered but refused  Goal: Readiness for Transition of Care  Outcome: Ongoing, Progressing     Problem: Asthma Comorbidity  Goal: Maintenance of Asthma Control  Outcome: Ongoing, Progressing  Intervention: Maintain Asthma Symptom Control  Description: Evaluate adherence to self-management (asthma action plan), such as medication, symptom-control, trigger-avoidance and self-monitoring.  Advocate for continuation of home regimen, including medication, method of delivery, schedule and symptom monitoring; acknowledge preferred modality and routine.  Minimize exposure to potential triggers, such as perfume, cleaning chemicals and all types of smoke.  Assess for proper use of inhaled medication and delivery technique; assist or reinstruct if needed.  Evaluate effectiveness of coping skills; encourage expression of feelings, expectations and concerns related to  disease management and quality of life; reinforce education to enhance management plan and wellbeing.  Recent Flowsheet Documentation  Taken 11/23/2023 2200 by Monisha Greenberg RN  Medication Review/Management: medications reviewed     Problem: Behavioral Health Comorbidity  Goal: Maintenance of Behavioral Health Symptom Control  Outcome: Ongoing, Progressing  Intervention: Maintain Behavioral Health Symptom Control  Description: Confirm mental health diagnosis and current treatment.  Evaluate adherence to previously identified self-management plan.  Advocate continuation of home strategies, including medication.  Evaluate effectiveness of self-management strategies and coping skills.  Communicate with providers to ensure continuity and follow-up at transition.  Recent Flowsheet Documentation  Taken 11/23/2023 2200 by Monisha Greenberg RN  Medication Review/Management: medications reviewed     Problem: COPD (Chronic Obstructive Pulmonary Disease) Comorbidity  Goal: Maintenance of COPD Symptom Control  Outcome: Ongoing, Progressing  Intervention: Maintain COPD-Symptom Control  Description: Evaluate adherence to management plan (e.g., medication, trigger avoidance, infection prevention, self-monitoring).  Advocate for continuation of home regimen, including medication, method of delivery, schedule and symptom monitoring.  Anticipate the need for breathing techniques and activity pacing to minimize fatigue and breathlessness.  Assess for proper use of inhaled medication and delivery technique; assist or reinstruct if needed.  Evaluate effectiveness of coping skills; encourage expression of feelings, expectations and concerns related to disease management and quality of life; reinforce education to enhance management plan and wellbeing.  Recent Flowsheet Documentation  Taken 11/24/2023 0200 by Monisha Greenberg RN  Supportive Measures: active listening utilized  Taken 11/23/2023 2200 by Monisha Greenberg RN  Medication  Review/Management: medications reviewed  Taken 11/23/2023 2000 by Monisha Greenberg RN  Supportive Measures: active listening utilized     Problem: Diabetes Comorbidity  Goal: Blood Glucose Level Within Targeted Range  Outcome: Ongoing, Progressing     Problem: Heart Failure Comorbidity  Goal: Maintenance of Heart Failure Symptom Control  Outcome: Ongoing, Progressing  Intervention: Maintain Heart Failure-Management  Description: Evaluate adherence to home heart failure self-care regimen (e.g., medication, fluid balance, sodium intake, daily weight, physical activity, telemonitoring, support).  Advocate continuation of home medication and schedule.  Consider pharmacologic therapy administration time and effects (e.g., avoid giving diuretic prior to bedtime or nitrates on empty stomach).  Monitor response to pharmacologic therapy, including weight fluctuations, blood pressure and electrolyte levels.  Monitor for signs and symptoms of anxiety and depression, including severity and duration; if present, provide psychosocial support.  Consider need for heart failure clinic or palliative care consult.  Recent Flowsheet Documentation  Taken 11/23/2023 2200 by Monisha Greenberg RN  Medication Review/Management: medications reviewed     Problem: Hypertension Comorbidity  Goal: Blood Pressure in Desired Range  Outcome: Ongoing, Progressing  Intervention: Maintain Blood Pressure Management  Description: Evaluate adherence to home antihypertensive regimen (e.g., exercise and activity, diet modification, medication).  Provide scheduled antihypertensive medication; consider administration time and effects (e.g., avoid giving diuretic prior to bedtime).  Monitor response to antihypertensive medication therapy (e.g., blood pressure, electrolyte levels, medication effects).  Minimize risk of orthostatic hypotension; encourage caution with position changes, particularly if elderly.  Recent Flowsheet Documentation  Taken 11/23/2023  2200 by Monisha Greenberg RN  Medication Review/Management: medications reviewed     Problem: Obstructive Sleep Apnea Risk or Actual Comorbidity Management  Goal: Unobstructed Breathing During Sleep  Outcome: Ongoing, Progressing     Problem: Osteoarthritis Comorbidity  Goal: Maintenance of Osteoarthritis Symptom Control  Outcome: Ongoing, Progressing  Intervention: Maintain Osteoarthritis Symptom Control  Description: Evaluate adherence to self-management plan, such as medication, exercise and weight management.  Advocate for continuation of home regimen, such as medication, physical activity and thermal agents; monitor response.  Encourage participation in functional activities, such as mobility and ADLs (activities of daily living) to minimize decline associated with inactivity.  Facilitate use of patient-specific assistive devices, equipment or orthoses.  Evaluate effectiveness of coping skills; encourage expression of feelings, expectations and concerns related to disease management and quality of life; reinforce education to enhance management plan and wellbeing.  Recent Flowsheet Documentation  Taken 11/23/2023 2200 by Monisha Greenberg RN  Medication Review/Management: medications reviewed     Problem: Pain Chronic (Persistent) (Comorbidity Management)  Goal: Acceptable Pain Control and Functional Ability  Outcome: Ongoing, Progressing  Intervention: Manage Persistent Pain  Description: Evaluate pain level, effect of treatment and patient response at regular intervals.  Minimize pain stimuli; coordinate care and adjust environment (e.g., light, noise, unnecessary movement); promote sleep/rest.  Match pharmacologic analgesia to severity and type of pain mechanism (e.g., neuropathic, muscle, inflammatory); consider multimodal approach (e.g., nonopioid, opioid, adjuvant).  Provide medication at regular intervals; titrate to patient response.  Manage breakthrough pain with additional doses; consider rotation or  switching medication.  Monitor for signs of substance tolerance (increased dose to reach desired effect, decreased effect with same dose).  Avoid abrupt withdrawal of medication, especially agents capable of causing physical dependence.  Manage medication-induced effects, such as constipation, nausea, pruritus, urinary retention, somnolence and dizziness.  Provide multimodal treatment interventions, such as physical activity, therapeutic exercise, yoga, TENS (transcutaneous electrical nerve stimulation) and manual therapy.  Train in functional activity modifications, such as body mechanics, posture, ergonomics, energy conservation and activity pacing.  Consider addition of complementary or alternative therapy, such as acupuncture, hypnosis or therapeutic touch.  Recent Flowsheet Documentation  Taken 11/23/2023 2200 by Monisha Greenberg RN  Medication Review/Management: medications reviewed  Intervention: Develop Pain Management Plan  Description: Acknowledge patient as the expert in pain self-management.  Use a consistent, validated tool for pain assessment; include function and quality of life.  Evaluate risk for opioid use and dependence.  Set pain management goals; determine acceptable level of discomfort to allow for maximal functioning and quality of life.  Determine [id number] pain management plan, including both pharmacologic and nonpharmacologic measures.  Identify and integrate past successful treatment measures, if able.  Encourage patient and caregiver involvement in pain assessment, interventions and safety measures.  Re-evaluate plan regularly.  Recent Flowsheet Documentation  Taken 11/24/2023 0526 by Monisha Greenberg RN  Pain Management Interventions: see MAR  Taken 11/23/2023 2000 by Monisha Greenberg RN  Pain Management Interventions: medication offered but refused  Intervention: Optimize Psychosocial Wellbeing  Description: Facilitate patient’s self-control over pain by providing pain  information and allowing choices in treatment.  Consider and address emotional response to pain.  Explore and promote use of coping strategies; address barriers to successful coping.  Evaluate and assist with psychosocial, cultural and spiritual factors impacting pain.  Modify pain perception by using techniques, such as distraction, mindfulness, guided imagery, meditation or music.  Assess and monitor for signs and symptoms of behavioral health concerns, such as unhealthy substance use, depression and suicidal ideation.  Consider referral for ongoing coping support, such as cognitive behavioral therapy and mindfulness-based stress reduction.  Recent Flowsheet Documentation  Taken 11/24/2023 0200 by Monisha Greenberg RN  Supportive Measures: active listening utilized  Taken 11/23/2023 2000 by Monisha Greenberg RN  Supportive Measures: active listening utilized  Diversional Activities:   television   smartphone  Family/Support System Care:   support provided   self-care encouraged     Problem: Seizure Disorder Comorbidity  Goal: Maintenance of Seizure Control  Outcome: Ongoing, Progressing     Problem: Skin Injury Risk Increased  Goal: Skin Health and Integrity  Outcome: Ongoing, Progressing  Intervention: Optimize Skin Protection  Description: Perform a full pressure injury risk assessment, as indicated by screening, upon admission to care unit.  Reassess skin (injury risk, full inspection) frequently (e.g., scheduled interval, with change in condition) to provide optimal early detection and prevention.  Maintain adequate tissue perfusion (e.g., encourage fluid balance; avoid crossing legs, constrictive clothing or devices) to promote tissue oxygenation.  Maintain head of bed at lowest degree of elevation tolerated, considering medical condition and other restrictions.  Avoid positioning onto an area that remains reddened.  Minimize incontinence and moisture (e.g., toileting schedule; moisture-wicking pad, diaper or  incontinence collection device; skin moisture barrier).  Cleanse skin promptly and gently when soiled utilizing a pH-balanced cleanser.  Relieve and redistribute pressure (e.g., scheduled position changes, weight shifts, use of support surface, medical device repositioning, protective dressing application, use of positioning device, microclimate control, use of pressure-injury-monitor  Encourage increased activity, such as sitting in a chair at the bedside or early mobilization, when able to tolerate.  Recent Flowsheet Documentation  Taken 11/24/2023 0200 by Monisha Greenberg, RN  Pressure Reduction Techniques:   weight shift assistance provided   frequent weight shift encouraged  Pressure Reduction Devices: pressure-redistributing mattress utilized  Taken 11/23/2023 2000 by Monisha Greenberg RN  Pressure Reduction Devices: pressure-redistributing mattress utilized     Problem: Fall Injury Risk  Goal: Absence of Fall and Fall-Related Injury  Outcome: Ongoing, Progressing  Intervention: Identify and Manage Contributors  Description: Develop a fall prevention plan with the patient and caregiver/family.  Provide reorientation, appropriate sensory stimulation and routines with changes in mental status to decrease risk of fall.  Promote use of personal vision and auditory aids.  Assess assistance level required for safe and effective self-care; provide support as needed, such as toileting, mobilization. For age 65 and older, implement timed toileting with assistance.  Encourage physical activity, such as performance of mobility and self-care at highest level of patient ability, multicomponent exercise program and provision of appropriate assistive devices.  If fall occurs, assess the severity of injury; implement fall injury protocol. Determine the cause and revise fall injury prevention plan.  Regularly review medication contribution to fall risk; adjust medication administration times to minimize risk of  falling.  Consider risk related to polypharmacy and age.  Balance adequate pain management with potential for oversedation.  Recent Flowsheet Documentation  Taken 11/23/2023 2200 by Monisha Greenberg RN  Medication Review/Management: medications reviewed  Intervention: Promote Injury-Free Environment  Description: Provide a safe, barrier-free environment that encourages independent activity.  Keep care area uncluttered and well-lighted.  Determine need for increased observation or monitoring.  Avoid use of devices that minimize mobility, such as restraints or indwelling urinary catheter.  Recent Flowsheet Documentation  Taken 11/23/2023 2200 by Monisha Greenberg, RN  Safety Promotion/Fall Prevention: safety round/check completed

## 2023-11-24 NOTE — PROGRESS NOTES
"    [facility] HOSPITALIST PROGRESS NOTE     Patient Identification:  Name:  Liz Jiang  Age:  57 y.o.  Sex:  female  :  1965  MRN:  [id number]  Visit Number:  [id number]  ROOM: [place] [address]     Primary Care Provider:  Tabitha Ron APRN    Length of stay in inpatient status:  5    Subjective     Chief Compliant:    Chief Complaint   Patient presents with    Shortness of Breath       History of Presenting Illness:    Patient reported that her shortness of breath felt \"about the same today\" but overall has improved from earlier in admission. She denied any chest pain. There was a problem with her home machine's battery this morning that patient had called the DME representative about.     Objective     Current Hospital Meds:aspirin, 324 mg, Oral, Once  budesonide-formoterol, 2 puff, Inhalation, BID - RT  clopidogrel, 75 mg, Oral, Daily  fluticasone, 2 spray, Nasal, Daily  furosemide, 20 mg, Oral, Daily  heparin (porcine), 5,000 Units, Subcutaneous, Q8H  ipratropium, 0.5 mg, Nebulization, 4x Daily - RT  levothyroxine, 25 mcg, Oral, Q AM  methylPREDNISolone sodium succinate, 40 mg, Intravenous, Daily  metoprolol tartrate, 25 mg, Oral, BID  senna-docusate sodium, 2 tablet, Oral, BID  sodium chloride, 10 mL, Intravenous, Q12H  sodium chloride, 10 mL, Intravenous, Q12H    Pharmacy Consult,         Current Antimicrobial Therapy:  Anti-Infectives (From admission, onward)      None          Current Diuretic Therapy:  Diuretics (From admission, onward)      Ordered     Dose/Rate Route Frequency Start Stop    23 1450  furosemide (LASIX) injection 40 mg        Ordering Provider: Elijah Hope MD    40 mg Intravenous Once 23 1500 23 1531    23 0916  furosemide (LASIX) tablet 20 mg        Ordering Provider: Cliff Castaneda MD    20 mg Oral Daily 23 1015      23 1306  furosemide (LASIX) injection 80 mg        Ordering Provider: Ruby Dia PA    80 mg Intravenous Once " 11/18/23 1322 11/18/23 1328          ----------------------------------------------------------------------------------------------------------------------  Vital Signs:  Temp:  [97.8 °F (36.6 °C)-98.3 °F (36.8 °C)] 97.9 °F (36.6 °C)  Heart Rate:  [] 97  Resp:  [16-21] 18  BP: (103-162)/() 162/101  SpO2:  [83 %-98 %] 96 %  on  Flow (L/min):  [4-5] 4;   Device (Oxygen Therapy): nasal cannula  Body mass index is 33.49 kg/m².    Wt Readings from Last 3 Encounters:   11/23/23 88.5 kg (195 lb 1.7 oz)   11/08/23 81.6 kg (180 lb)   10/28/23 81.6 kg (180 lb)     Intake & Output (last 3 days)         11/21 0701  11/22 0700 11/22 0701  11/23 0700 11/23 0701  11/24 0700    P.O. 600 660 420    I.V. (mL/kg) 0 (0) 0 (0)     Total Intake(mL/kg) 600 (6.9) 660 (7.5) 420 (4.7)    Urine (mL/kg/hr) 1750 (0.8) 1500 (0.7)     Stool 0 0     Total Output 1750 1500     Net -1150 -840 +420           Urine Unmeasured Occurrence 5 x      Stool Unmeasured Occurrence 1 x 1 x 2 x          Diet: Cardiac Diets, Diabetic Diets; Healthy Heart (2-3 Na+); Consistent Carbohydrate; Texture: Regular Texture (IDDSI 7); Fluid Consistency: Thin (IDDSI 0)  ----------------------------------------------------------------------------------------------------------------------  Physical exam:   Constitutional:  Well-developed and well-nourished.  Chronically ill-appearing.  No acute distress.      HENT:  Head:  Normocephalic and atraumatic.   Cardiovascular:  Normal rate, regular rhythm   Pulmonary/Chest:  No respiratory distress, breath sounds diminished bilaterally with but no wheezing, crackles, or rhonchi noted  Musculoskeletal:  No deformity.    Neurological: Sleeping but woke easily, no focal deficit on gross examination. No slurred speech or facial droop.   Skin:  Skin is warm and dry.   Peripheral vascular:  No cyanosis  Psychiatric: Appropriate mood and affect  Edited by: Faviola Mejia DO at 11/23/2023  "2026  ----------------------------------------------------------------------------------------------------------------------  Results from last 7 days   Lab Units 11/22/23 0027 11/20/23 0117 11/19/23 0032 11/18/23  1307   CRP mg/dL  --   --  4.54* 4.51*   LACTATE mmol/L  --   --   --  1.7   WBC 10*3/mm3 12.76* 10.73 5.03 11.04*   HEMOGLOBIN g/dL 13.6 12.6 12.9 12.9   HEMATOCRIT % 46.3 43.6 44.5 44.8   MCV fL 96.9 97.8* 96.7 99.1*   MCHC g/dL 29.4* 28.9* 29.0* 28.8*   PLATELETS 10*3/mm3 213 178 144 154   INR   --   --   --  0.94     Results from last 7 days   Lab Units 11/19/23  0823   PH, ARTERIAL pH units 7.381   PO2 ART mm Hg 60.8*   PCO2, ARTERIAL mm Hg 86.7*   HCO3 ART mmol/L 51.4*     Results from last 7 days   Lab Units 11/20/23 0117 11/19/23 0032 11/18/23  1307   SODIUM mmol/L 135* 136 141   POTASSIUM mmol/L 4.3 3.9 3.6   MAGNESIUM mg/dL  --  1.9 1.9   CHLORIDE mmol/L 87* 87* 93*   CO2 mmol/L 40.5* 42.7* 40.3*   BUN mg/dL 24* 15 14   CREATININE mg/dL 0.47* 0.44* 0.38*   CALCIUM mg/dL 9.1 9.2 9.2   GLUCOSE mg/dL 297* 236* 174*   ALBUMIN g/dL 3.6 3.5 3.7   BILIRUBIN mg/dL 0.2 0.5 0.5   ALK PHOS U/L 63 71 72   AST (SGOT) U/L 16 16 16   ALT (SGPT) U/L 12 13 13   Estimated Creatinine Clearance: 140.5 mL/min (A) (by C-G formula based on SCr of 0.47 mg/dL (L)).  No results found for: \"AMMONIA\"  Results from last 7 days   Lab Units 11/19/23  1825 11/19/23  1640 11/18/23  2215   HSTROP T ng/L 36* 30* 29*     Results from last 7 days   Lab Units 11/18/23  1307   PROBNP pg/mL 240.0     Results from last 7 days   Lab Units 11/19/23  0032   CHOLESTEROL mg/dL 231*   TRIGLYCERIDES mg/dL 148   HDL CHOL mg/dL 42   LDL CHOL mg/dL 162*     No results found for: \"HGBA1C\", \"POCGLU\"  Lab Results   Component Value Date    TSH 5.170 (H) 11/18/2023    FREET4 1.09 11/18/2023     No results found for: \"PREGTESTUR\", \"PREGSERUM\", \"HCG\", \"HCGQUANT\"  Pain Management Panel  More data may exist         Latest Ref Rng & Units 11/19/2023 " "2/23/2022   Pain Management Panel   Amphetamine, Urine Qual Negative Negative  Negative    Barbiturates Screen, Urine Negative Negative  Negative    Benzodiazepine Screen, Urine Negative Positive  Positive    Buprenorphine, Screen, Urine Negative Negative  Negative    Cocaine Screen, Urine Negative Negative  Negative    Fentanyl, Urine Negative Negative  -   Methadone Screen , Urine Negative Negative  Negative    Methamphetamine, Ur Negative Negative  Negative      Brief Urine Lab Results  (Last result in the past 365 days)        Color   Clarity   Blood   Leuk Est   Nitrite   Protein   CREAT   Urine HCG        11/18/23 1447 Yellow   Clear   Negative   Negative   Negative   Negative                 Blood Culture   Date Value Ref Range Status   11/18/2023 No growth at 5 days  Final   11/18/2023 No growth at 5 days  Final     No results found for: \"URINECX\"  No results found for: \"WOUNDCX\"  No results found for: \"STOOLCX\"  No results found for: \"RESPCX\"  No results found for: \"AFBCX\"  Results from last 7 days   Lab Units 11/19/23  0032 11/18/23  1307   PROCALCITONIN ng/mL  --  0.03   LACTATE mmol/L  --  1.7   SED RATE mm/hr  --  74*   CRP mg/dL 4.54* 4.51*       I have personally looked at the labs and they are summarized above.  ----------------------------------------------------------------------------------------------------------------------  Detailed radiology reports for the last 24 hours:  Imaging Results (Last 24 Hours)       ** No results found for the last 24 hours. **          Assessment & Plan      #Acute on chronic hypoxic respiratory failure   #Acute on chronic hypercapnic respiratory failure   #Acute COPD exacerbation   #Hx of STEVEN  #Medical noncompliance  -Viral panel negative, D-dimer within normal limits, and no new consolidations seen on imaging at admission.  -Patient has severe COPD that requires home NIPPV but she has been unable to tolerate wearing it regularly.  -ABG slightly improved.  " "Continue BiPAP nightly and as needed.  Continue supplemental oxygen with goal oxygen saturation 88 to 92%  -Pulmonology recommending BiPAP settings of 22/6 with a backup rate of 14 and supplemental oxygen titrated to oxygen saturation of 92%.  Patient is still intermittently having trouble with her home machine. Settings still being adjusted by Pulmonology and DME representative.  -Pulmonology following, appreciate assistance.  -Continue steroids, Symbicort, ipratropium. Continue to wean steroid dose as respiratory status is improving.    #Chronically elevated troponin  -Similar to previous labs.  No new ischemic changes noted on EKG at admission.  -Patient reported to me that she was having \"chest pressure\" with using the trilogy machine at home, but at no other times, and she says that it has not been nearly as bad when using the BiPAP here. She denies any chest pressure/pain with exertion or at any time other than when using NIPPV. This has improved.  -Consider Cardiology consult if symptoms worsen, but I suspect patient's pain is due to her severe COPD and use of noninvasive positive pressure ventilation rather than a cardiac source. I doubt that she would be able to tolerate ischemic workup with stress test at this time due to her chronic respiratory failure.    CHRONIC MEDICAL PROBLEMS     #HFpEF/grade I diastolic dysfunction  - Not felt to be in acute exacerbation clinically. Obtains Reds vest.    - Previous TTE from 10/2023 reviewed.  Monitor volume status with I&Os, daily weights.  Continue home medication regimen as appropriate.  IV Lasix ordered by Pulmonology today to improve lung compliance.      #Essential hypertension  - Well controlled, continue home regimen      #Hyperlipidemia  -Continue statin.      #Type II diabetes mellitus, non insulin dependent  - A1C 6.6%. Continue SSI.      #History of CVA  - Details unknown.  Continue home medication as appropriate. Does not appear to have residual deficits "      #History of carotid artery disease s/p left CEA in the past  - Continue home medications      #Hypothyroidism  - TSH WNL. Continue home levothyroxine.      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  #Former smoker   Edited by: Faviola Mejia DO at 11/23/2023 2026    VTE Prophylaxis:   Mechanical Order History:       None          Pharmalogical Order History:        Ordered     Dose Route Frequency Stop    11/18/23 1605  heparin (porcine) 5000 UNIT/ML injection 5,000 Units         5,000 Units SC Every 8 Hours Scheduled --                    Dispo:  likely home at discharge pending medical stability     Faviola Mejia DO  [facility]  11/23/23  20:26 EST

## 2023-11-24 NOTE — PROGRESS NOTES
Pulmonary and critical care following the patient for hypoxic respiratory failure and COPD exacerbation  Chief Complaint:  sob    Subjective   All the lab medications and the notes and vitals reviewed.  Patient resting in bed comfortably.  Used BiPAP overnight.  Desaturated overnight.  But overall feels better.  Review of Systems:    No major changes.  Shortness of breath on exertion otherwise negative      Vital Signs  Temp:  [97.9 °F (36.6 °C)-98.3 °F (36.8 °C)] 98.1 °F (36.7 °C)  Heart Rate:  [] 110  Resp:  [14-20] 18  BP: (103-162)/() 127/84  Body mass index is 33.64 kg/m².    Intake/Output Summary (Last 24 hours) at 11/24/2023 0743  Last data filed at 11/24/2023 0600  Gross per 24 hour   Intake 420 ml   Output 750 ml   Net -330 ml     No intake/output data recorded.    Physical Exam: General-not in any respiratory distress, wearing nasal cannula oxygen  HEENT-PERRLA  Neck-atraumatic    Respiratory-resting in bed comfortably wearing nasal cannula oxygen not in any respiratory distress  Cardiovascular-NSR  GI-NTND    CNS-nonfocal    Musculoskeletal -no edema  Extremities- no obvious deformity noticed     Psychiatric-alert awake oriented  Skin- no visible rash         Results Review:     I reviewed the patient's new clinical results.  Results from last 7 days   Lab Units 11/22/23  0027 11/20/23  0117 11/19/23  0032   WBC 10*3/mm3 12.76* 10.73 5.03   HEMOGLOBIN g/dL 13.6 12.6 12.9   PLATELETS 10*3/mm3 213 178 144     Results from last 7 days   Lab Units 11/20/23  0117 11/19/23  0032 11/18/23  1307   SODIUM mmol/L 135* 136 141   POTASSIUM mmol/L 4.3 3.9 3.6   CHLORIDE mmol/L 87* 87* 93*   CO2 mmol/L 40.5* 42.7* 40.3*   BUN mg/dL 24* 15 14   CREATININE mg/dL 0.47* 0.44* 0.38*   CALCIUM mg/dL 9.1 9.2 9.2   GLUCOSE mg/dL 297* 236* 174*   MAGNESIUM mg/dL  --  1.9 1.9     Lab Results   Component Value Date    INR 0.94 11/18/2023    INR 0.95 10/12/2023    INR 0.90 03/27/2017    PROTIME 13.1 11/18/2023     PROTIME 13.1 10/12/2023    PROTIME 9.9 03/27/2017     Results from last 7 days   Lab Units 11/20/23  0117 11/19/23  0032 11/18/23  1307   ALK PHOS U/L 63 71 72   BILIRUBIN mg/dL 0.2 0.5 0.5   ALT (SGPT) U/L 12 13 13   AST (SGOT) U/L 16 16 16     Results from last 7 days   Lab Units 11/19/23  0823   PH, ARTERIAL pH units 7.381   PO2 ART mm Hg 60.8*   PCO2, ARTERIAL mm Hg 86.7*   HCO3 ART mmol/L 51.4*     Imaging Results (Last 24 Hours)       ** No results found for the last 24 hours. **                 aspirin, 324 mg, Oral, Once  budesonide-formoterol, 2 puff, Inhalation, BID - RT  clopidogrel, 75 mg, Oral, Daily  fluticasone, 2 spray, Nasal, Daily  furosemide, 40 mg, Intravenous, Once  furosemide, 20 mg, Oral, Daily  heparin (porcine), 5,000 Units, Subcutaneous, Q8H  ipratropium, 0.5 mg, Nebulization, 4x Daily - RT  levothyroxine, 25 mcg, Oral, Q AM  methylPREDNISolone sodium succinate, 20 mg, Intravenous, Daily  metoprolol tartrate, 25 mg, Oral, BID  senna-docusate sodium, 2 tablet, Oral, BID  sodium chloride, 10 mL, Intravenous, Q12H  sodium chloride, 10 mL, Intravenous, Q12H      Pharmacy Consult,         Medication Review:     Assessment & Plan   Acute exacerbation of COPD-continue nebs  Continue current dose of steroids..   FiO2 requirement reviewed and adjusted to maintain saturation 88 to 92%    Diuretics to improve diffusion capacity and lung compliance.   Ins and outs net negative      Continue BiPAP overnight and as needed basis in the daytime.  Desaturated overnight.  We will likely have to increase CPAP as patient likely has severe sleep apnea  Tolerating well.    Latest chest x-ray reviewed  Latest ABG reviewed.    Continue appropriate prophylaxis  Case d/w nurse and team   This service is provided via audio video tele medicine   I am in [place] and patient is in [facility]               Acute respiratory failure with hypoxia               Elijha Hope MD  11/24/23  07:43 EST

## 2023-11-25 LAB — ABSOLUTE LUNG FLUID CONTENT: 15 % (ref 20–35)

## 2023-11-25 PROCEDURE — 94664 DEMO&/EVAL PT USE INHALER: CPT

## 2023-11-25 PROCEDURE — 94799 UNLISTED PULMONARY SVC/PX: CPT

## 2023-11-25 PROCEDURE — 94761 N-INVAS EAR/PLS OXIMETRY MLT: CPT

## 2023-11-25 PROCEDURE — 94726 PLETHYSMOGRAPHY LUNG VOLUMES: CPT

## 2023-11-25 PROCEDURE — 25010000002 METHYLPREDNISOLONE PER 40 MG: Performed by: STUDENT IN AN ORGANIZED HEALTH CARE EDUCATION/TRAINING PROGRAM

## 2023-11-25 PROCEDURE — 25010000002 HEPARIN (PORCINE) PER 1000 UNITS: Performed by: STUDENT IN AN ORGANIZED HEALTH CARE EDUCATION/TRAINING PROGRAM

## 2023-11-25 PROCEDURE — 93010 ELECTROCARDIOGRAM REPORT: CPT | Performed by: INTERNAL MEDICINE

## 2023-11-25 PROCEDURE — 93005 ELECTROCARDIOGRAM TRACING: CPT | Performed by: STUDENT IN AN ORGANIZED HEALTH CARE EDUCATION/TRAINING PROGRAM

## 2023-11-25 PROCEDURE — 99222 1ST HOSP IP/OBS MODERATE 55: CPT | Performed by: INTERNAL MEDICINE

## 2023-11-25 PROCEDURE — 99232 SBSQ HOSP IP/OBS MODERATE 35: CPT | Performed by: INTERNAL MEDICINE

## 2023-11-25 PROCEDURE — 99231 SBSQ HOSP IP/OBS SF/LOW 25: CPT | Performed by: STUDENT IN AN ORGANIZED HEALTH CARE EDUCATION/TRAINING PROGRAM

## 2023-11-25 RX ORDER — ATORVASTATIN CALCIUM 40 MG/1
40 TABLET, FILM COATED ORAL NIGHTLY
Status: DISCONTINUED | OUTPATIENT
Start: 2023-11-25 | End: 2023-12-13 | Stop reason: HOSPADM

## 2023-11-25 RX ORDER — METOPROLOL TARTRATE 50 MG/1
50 TABLET, FILM COATED ORAL 2 TIMES DAILY
Status: DISCONTINUED | OUTPATIENT
Start: 2023-11-25 | End: 2023-12-13 | Stop reason: HOSPADM

## 2023-11-25 RX ORDER — LOPERAMIDE HYDROCHLORIDE 2 MG/1
2 CAPSULE ORAL 4 TIMES DAILY PRN
Status: DISCONTINUED | OUTPATIENT
Start: 2023-11-25 | End: 2023-12-13 | Stop reason: HOSPADM

## 2023-11-25 RX ADMIN — METOPROLOL TARTRATE 50 MG: 50 TABLET, FILM COATED ORAL at 20:46

## 2023-11-25 RX ADMIN — HYDROCODONE BITARTRATE AND ACETAMINOPHEN 1 TABLET: 10; 325 TABLET ORAL at 13:23

## 2023-11-25 RX ADMIN — LEVOTHYROXINE SODIUM 25 MCG: 25 TABLET ORAL at 06:51

## 2023-11-25 RX ADMIN — Medication 10 ML: at 20:47

## 2023-11-25 RX ADMIN — Medication 10 ML: at 20:48

## 2023-11-25 RX ADMIN — ALPRAZOLAM 1 MG: 1 TABLET ORAL at 19:40

## 2023-11-25 RX ADMIN — Medication 10 ML: at 09:03

## 2023-11-25 RX ADMIN — HYDROCODONE BITARTRATE AND ACETAMINOPHEN 1 TABLET: 10; 325 TABLET ORAL at 06:34

## 2023-11-25 RX ADMIN — METHYLPREDNISOLONE SODIUM SUCCINATE 20 MG: 40 INJECTION, POWDER, FOR SOLUTION INTRAMUSCULAR; INTRAVENOUS at 09:03

## 2023-11-25 RX ADMIN — CLOPIDOGREL BISULFATE 75 MG: 75 TABLET, FILM COATED ORAL at 09:02

## 2023-11-25 RX ADMIN — METOPROLOL TARTRATE 25 MG: 25 TABLET, FILM COATED ORAL at 09:02

## 2023-11-25 RX ADMIN — HEPARIN SODIUM 5000 UNITS: 5000 INJECTION INTRAVENOUS; SUBCUTANEOUS at 21:02

## 2023-11-25 RX ADMIN — HYDROCODONE BITARTRATE AND ACETAMINOPHEN 1 TABLET: 10; 325 TABLET ORAL at 19:30

## 2023-11-25 RX ADMIN — ATORVASTATIN CALCIUM 40 MG: 40 TABLET, FILM COATED ORAL at 20:46

## 2023-11-25 RX ADMIN — IPRATROPIUM BROMIDE 0.5 MG: 0.5 SOLUTION RESPIRATORY (INHALATION) at 06:06

## 2023-11-25 RX ADMIN — HEPARIN SODIUM 5000 UNITS: 5000 INJECTION INTRAVENOUS; SUBCUTANEOUS at 13:26

## 2023-11-25 RX ADMIN — IPRATROPIUM BROMIDE 0.5 MG: 0.5 SOLUTION RESPIRATORY (INHALATION) at 12:45

## 2023-11-25 RX ADMIN — FUROSEMIDE 20 MG: 20 TABLET ORAL at 09:02

## 2023-11-25 RX ADMIN — LOPERAMIDE HYDROCHLORIDE 2 MG: 2 CAPSULE ORAL at 16:43

## 2023-11-25 RX ADMIN — IPRATROPIUM BROMIDE 0.5 MG: 0.5 SOLUTION RESPIRATORY (INHALATION) at 18:47

## 2023-11-25 RX ADMIN — HEPARIN SODIUM 5000 UNITS: 5000 INJECTION INTRAVENOUS; SUBCUTANEOUS at 06:30

## 2023-11-25 NOTE — CONSULTS
Consults  Date of Admit: 11/18/2023  Date of Consult: 11/25/23  Provider, No Known  Liz Jiang  1965  Consulting Physician: Case Pizaon MD    Cardiology consultation    Reason for consultation: SVT  Assessment:  Paroxysmal SVT  Chest pain associated with tachycardia   Chronic HFpEF, appears compensated  Essential hypertension  Dyslipidemia  Carotid artery disease status post left CEA in the past data deficient  History of CVA      Recommendations:  Will increase metoprolol to 50 mg twice daily and monitor blood pressure closely.  Patient with history of carotid artery disease do not see current statin will start Lipitor 40 mg  Will need ischemic workup currently asymptomatic during my exam.  Will try to plan for stress test on Monday hopefully her COPD symptoms improves prior to stress MPI      History of Present Illness    Subjective     Chief Complaint   Patient presents with    Shortness of Breath       Liz Jiang is a 57 y.o. female with past medical history significant for chronic HFpEF, history of CVA, carotid artery disease status post left CEA, diabetes mellitus, essential hypertension, obstructive sleep apnea compliant with CPAP, history of tobacco abuse reportedly in remission, and COPD.  Liz originally presented [facility] emergency department with progressively worsening shortness of breath she was originally admitted on 11/18/2023 for sepsis present on admission with acute COPD exacerbation pulmonology has been following and assistance in management.  Cardiology services was consulted today after telemetry revealed short runs of SVT with peak heart rate of 180 bpm per my review on the telemetry strips these typically are only lasting 4-5 beats at a time and resuming normal sinus rhythm thereafter.  Upon further questioning with liz she denies any history of significant dysrhythmias but does endorse feeling palpitations and feeling her heart race occasionally  but denies any significant associated symptoms with these such as syncope, near syncope.  She does also endorse some chest pains that she reports as an ache in the center of her chest typically last around 1 minute in duration but has had episodes lasting as long as 10 to 15 minutes associated with shortness of breath.  She admits she has not been following with cardiology in several years.               Past Medical History:   Diagnosis Date    Acid reflux disease     Anal cancer 12/05/2019    Arthritis     Asthma, extrinsic     Cholelithiasis     Chronic headaches     COPD (chronic obstructive pulmonary disease)     CVA (cerebral vascular accident)     w/ right sided deficit    CVA (cerebral vascular accident)     Diabetes mellitus     only has high blood sugar when on steriods    Dyslipidemia 09/04/2018    History of radiation therapy 02/27/2020    Whole pelvis/anal mass    Nausea and vomiting 12/25/2016    Obstructive sleep apnea treated with BiPAP     On home oxygen therapy     2L     Sleep apnea, obstructive      Past Surgical History:   Procedure Laterality Date    ABDOMINAL SURGERY      CARDIAC CATHETERIZATION      CAROTID ENDARTERECTOMY Left 2018    CHOLECYSTECTOMY WITH INTRAOPERATIVE CHOLANGIOGRAM N/A 1/30/2017    Procedure: CHOLECYSTECTOMY LAPAROSCOPIC INTRAOPERATIVE CHOLANGIOGRAM;  Surgeon: Carolin Marie MD;  Location: [facility] OR;  Service:     COLONOSCOPY      HYSTERECTOMY      for heavy bleeding/ complete    KIDNEY STONE SURGERY      TUBAL ABDOMINAL LIGATION      VENOUS ACCESS DEVICE (PORT) INSERTION Left 12/17/2019    Procedure: INSERTION VENOUS ACCESS DEVICE;  Surgeon: Carolin Marie MD;  Location: [facility] OR;  Service: General     Family History   Problem Relation Age of Onset    Diabetes Mother     Hypertension Mother     Arrhythmia Mother     Pneumonia Father     Coronary artery disease Other     Arrhythmia Sister     Heart attack Brother     Lymphoma Brother         hodgkin's     Other  Brother         CABG    Breast cancer Neg Hx      Social History     Tobacco Use    Smoking status: Former     Packs/day: 1.50     Years: 30.00     Additional pack years: 0.00     Total pack years: 45.00     Types: Electronic Cigarette, Cigarettes     Quit date:      Years since quittin.9    Smokeless tobacco: Never   Vaping Use    Vaping Use: Never used   Substance Use Topics    Alcohol use: No    Drug use: Defer     Medications Prior to Admission   Medication Sig Dispense Refill Last Dose    clopidogrel (PLAVIX) 75 MG tablet Take 1 tablet by mouth Daily.  3 2023    fluticasone (FLONASE) 50 MCG/ACT nasal spray 2 sprays into the nostril(s) as directed by provider Daily.   2023    Fluticasone-Umeclidin-Vilant 200-62.5-25 MCG/ACT aerosol powder  Inhale 1 puff Daily.   2023    furosemide (LASIX) 20 MG tablet Take 1 tablet by mouth Daily.   2023    HYDROcodone-acetaminophen (NORCO)  MG per tablet Take 1 tablet by mouth Every 6 (Six) Hours As Needed for Moderate Pain.   Past Week    levothyroxine (SYNTHROID, LEVOTHROID) 25 MCG tablet Take 1 tablet by mouth Every Morning.   2023    metoprolol tartrate (LOPRESSOR) 25 MG tablet Take 1 tablet by mouth 2 (Two) Times a Day.   2023    potassium chloride (MICRO-K) 10 MEQ CR capsule Take 1 capsule by mouth Daily.   2023    albuterol sulfate  (90 Base) MCG/ACT inhaler Inhale 2 puffs Every 6 (Six) Hours As Needed for Wheezing or Shortness of Air. 18 g 0 Unknown    ALPRAZolam (XANAX) 1 MG tablet Take 1 tablet by mouth Daily As Needed for Anxiety.   Unknown    ipratropium-albuterol (COMBIVENT RESPIMAT)  MCG/ACT inhaler Inhale 1 puff 4 (Four) Times a Day As Needed for Wheezing or Shortness of Air.   Unknown    ipratropium-albuterol (DUO-NEB) 0.5-2.5 mg/3 ml nebulizer Take 3 mL by nebulization Every 4 (Four) Hours As Needed for Wheezing or Shortness of Air.   Unknown     Allergies:  Morphine and  Roflumilast      Current Facility-Administered Medications:     acetaminophen (TYLENOL) tablet 650 mg, 650 mg, Oral, Q6H PRN, Faviola Mejia DO    ALPRAZolam (XANAX) tablet 1 mg, 1 mg, Oral, Daily PRN, Faviola Mejia DO, 1 mg at 11/24/23 2214    aspirin chewable tablet 324 mg, 324 mg, Oral, Once, Faviola Mejia DO    sennosides-docusate (PERICOLACE) 8.6-50 MG per tablet 2 tablet, 2 tablet, Oral, BID **AND** polyethylene glycol (MIRALAX) packet 17 g, 17 g, Oral, Daily PRN **AND** bisacodyl (DULCOLAX) EC tablet 5 mg, 5 mg, Oral, Daily PRN **AND** bisacodyl (DULCOLAX) suppository 10 mg, 10 mg, Rectal, Daily PRN, Faviola Mejia DO    budesonide-formoterol (SYMBICORT) 160-4.5 MCG/ACT inhaler 2 puff, 2 puff, Inhalation, BID - RT, Faviola Mejia DO, 2 puff at 11/24/23 1835    clopidogrel (PLAVIX) tablet 75 mg, 75 mg, Oral, Daily, Faviola Mejia DO, 75 mg at 11/25/23 0902    fluticasone (FLONASE) 50 MCG/ACT nasal spray 2 spray, 2 spray, Nasal, Daily, Faviola Mejia DO, 2 spray at 11/24/23 0902    furosemide (LASIX) tablet 20 mg, 20 mg, Oral, Daily, Faviola Mejia DO, 20 mg at 11/25/23 0902    heparin (porcine) 5000 UNIT/ML injection 5,000 Units, 5,000 Units, Subcutaneous, Q8H, Faviola Mejia DO, 5,000 Units at 11/25/23 1326    HYDROcodone-acetaminophen (NORCO)  MG per tablet 1 tablet, 1 tablet, Oral, Q6H PRN, Faviola Mejia DO, 1 tablet at 11/25/23 1323    ipratropium (ATROVENT) nebulizer solution 0.5 mg, 0.5 mg, Nebulization, 4x Daily - RT, Faviola Mejia DO, 0.5 mg at 11/25/23 1245    levothyroxine (SYNTHROID, LEVOTHROID) tablet 25 mcg, 25 mcg, Oral, Q AM, Faviola Mejia DO, 25 mcg at 11/25/23 0651    methylPREDNISolone sodium succinate (SOLU-Medrol) injection 20 mg, 20 mg, Intravenous, Daily, Faviola Mejia DO, 20 mg at 11/25/23 0903    metoprolol tartrate (LOPRESSOR) tablet 25 mg, 25 mg, Oral, BID, Faviola Mejia DO, 25 mg at 11/25/23 0902    nitroglycerin (NITROSTAT) SL tablet 0.4 mg, 0.4 mg,  Sublingual, Q5 Min PRN, Mejia, Faviola, DO    Pharmacy Consult, , Does not apply, Continuous PRN, Mejia, Faviola, DO    [COMPLETED] Insert Peripheral IV, , , Once **AND** sodium chloride 0.9 % flush 10 mL, 10 mL, Intravenous, PRN, Mejia, Faviola, DO    sodium chloride 0.9 % flush 10 mL, 10 mL, Intravenous, Q12H, Mejia, Faviola, DO, 10 mL at 11/25/23 0903    sodium chloride 0.9 % flush 10 mL, 10 mL, Intravenous, PRN, Mejia, Faviola, DO    sodium chloride 0.9 % flush 10 mL, 10 mL, Intravenous, Q12H, Mejia, Faviola, DO, 10 mL at 11/25/23 0903    sodium chloride 0.9 % flush 10 mL, 10 mL, Intravenous, PRN, Mejia, Faviola, DO    sodium chloride 0.9 % infusion 40 mL, 40 mL, Intravenous, PRN, Mejia, Faviola, DO    sodium chloride 0.9 % infusion 40 mL, 40 mL, Intravenous, PRN, Mejia, Faviola, DO    Review of Systems   Constitutional:  Negative for diaphoresis and fatigue.   HENT:  Negative for congestion and nosebleeds.    Respiratory:  Positive for chest tightness and shortness of breath.    Cardiovascular:  Positive for chest pain and palpitations.   Gastrointestinal:  Negative for abdominal pain and blood in stool.   Genitourinary:  Negative for dysuria and hematuria.   Musculoskeletal:  Positive for back pain. Negative for arthralgias.   Neurological:  Negative for dizziness and light-headedness.   Hematological:  Negative for adenopathy. Does not bruise/bleed easily.   Psychiatric/Behavioral:  Negative for agitation and behavioral problems.          Objective      Vital Signs  Temp:  [98 °F (36.7 °C)-98.6 °F (37 °C)] 98.5 °F (36.9 °C)  Heart Rate:  [] 77  Resp:  [16-20] 20  BP: (110-141)/(70-88) 119/82  Body mass index is 33.68 kg/m².    Intake/Output Summary (Last 24 hours) at 11/25/2023 1438  Last data filed at 11/25/2023 1323  Gross per 24 hour   Intake 840 ml   Output 1800 ml   Net -960 ml       Physical Exam  Constitutional:       Appearance: Normal appearance.   HENT:      Head: Normocephalic and atraumatic.    Eyes:      General:         Right eye: No discharge.         Left eye: No discharge.      Pupils: Pupils are equal, round, and reactive to light.   Cardiovascular:      Rate and Rhythm: Normal rate and regular rhythm.   Pulmonary:      Effort: Pulmonary effort is normal.      Breath sounds: Normal breath sounds.   Abdominal:      General: Abdomen is flat.      Palpations: Abdomen is soft.   Musculoskeletal:         General: No swelling or tenderness.   Skin:     General: Skin is warm and dry.   Neurological:      General: No focal deficit present.      Mental Status: She is alert and oriented to person, place, and time.   Psychiatric:         Mood and Affect: Mood normal.         Behavior: Behavior normal.           Results Review:   I reviewed the patient's new clinical results.  Results from last 7 days   Lab Units 11/19/23  1825 11/19/23  1640 11/18/23  2215 11/18/23  1447   HSTROP T ng/L 36* 30* 29* 39*     Results from last 7 days   Lab Units 11/22/23  0027 11/20/23  0117 11/19/23  0032   WBC 10*3/mm3 12.76* 10.73 5.03   HEMOGLOBIN g/dL 13.6 12.6 12.9   PLATELETS 10*3/mm3 213 178 144     Results from last 7 days   Lab Units 11/24/23  1924 11/20/23  0117 11/19/23  0032   SODIUM mmol/L 139 135* 136   POTASSIUM mmol/L 5.1 4.3 3.9   CHLORIDE mmol/L 92* 87* 87*   CO2 mmol/L 39.2* 40.5* 42.7*   BUN mg/dL 30* 24* 15   CREATININE mg/dL 0.49* 0.47* 0.44*   CALCIUM mg/dL 9.6 9.1 9.2   GLUCOSE mg/dL 324* 297* 236*   ALT (SGPT) U/L  --  12 13   AST (SGOT) U/L  --  16 16     Lab Results   Component Value Date    INR 0.94 11/18/2023    INR 0.95 10/12/2023    INR 0.90 03/27/2017     Lab Results   Component Value Date    MG 2.2 11/24/2023    MG 1.9 11/19/2023    MG 1.9 11/18/2023     Lab Results   Component Value Date    TSH 5.170 (H) 11/18/2023    CHLPL 218 (H) 02/11/2014    TRIG 148 11/19/2023    HDL 42 11/19/2023     (H) 11/19/2023      Lab Results   Component Value Date    BNP 12.0 03/27/2017       EKG:      Imaging Results (Last 72 Hours)       ** No results found for the last 72 hours. **             Thank you very much for asking us to be involved in this patient's care.  We will follow along with you.    James Hameed PA-C   11/25/23  14:38 EST    Electronically signed by James Hameed PA-C, 11/25/23, 2:38 PM EST.

## 2023-11-25 NOTE — PLAN OF CARE
Goal Outcome Evaluation:      Patient has been resting in bed this shift. Patient had a 8 beat run of PSVT at 2306 and a 7 beat run of PSVT at 2312, heart rate in 160s for both events, VANESSA Crook and Dr. Thorpe aware. No signs or symptoms of acute distress noted. Will continue with plan of care.

## 2023-11-25 NOTE — PROGRESS NOTES
[facility] HOSPITALIST PROGRESS NOTE     Patient Identification:  Name:  Liz Jiang  Age:  57 y.o.  Sex:  female  :  1965  MRN:  [id number]  Visit Number:  [id number]  ROOM: [address] [place] [address]     Primary Care Provider:  Tabitha Ron APRN    Length of stay in inpatient status:  6    Subjective     Chief Compliant:    Chief Complaint   Patient presents with    Shortness of Breath       History of Presenting Illness:  Patient seen and examined this morning. She reported her shortness of breath was slowly improving with current steroid/neb/BiPAP treatments. She reported feeling generally weak. No acute events noted overnight.     Objective     Current Hospital Meds:aspirin, 324 mg, Oral, Once  budesonide-formoterol, 2 puff, Inhalation, BID - RT  clopidogrel, 75 mg, Oral, Daily  fluticasone, 2 spray, Nasal, Daily  furosemide, 20 mg, Oral, Daily  heparin (porcine), 5,000 Units, Subcutaneous, Q8H  ipratropium, 0.5 mg, Nebulization, 4x Daily - RT  levothyroxine, 25 mcg, Oral, Q AM  methylPREDNISolone sodium succinate, 20 mg, Intravenous, Daily  metoprolol tartrate, 25 mg, Oral, BID  senna-docusate sodium, 2 tablet, Oral, BID  sodium chloride, 10 mL, Intravenous, Q12H  sodium chloride, 10 mL, Intravenous, Q12H    Pharmacy Consult,         Current Antimicrobial Therapy:  Anti-Infectives (From admission, onward)      None          Current Diuretic Therapy:  Diuretics (From admission, onward)      Ordered     Dose/Rate Route Frequency Start Stop    23 0734  furosemide (LASIX) injection 40 mg        Ordering Provider: Elijah Hope MD    40 mg Intravenous Once 23 0900 23 0853    23 1450  furosemide (LASIX) injection 40 mg        Ordering Provider: Elijah Hope MD    40 mg Intravenous Once 23 1500 23 1531    23 0916  furosemide (LASIX) tablet 20 mg        Ordering Provider: Faviola Mejia DO    20 mg Oral Daily 23 1015      23 1306  furosemide  (LASIX) injection 80 mg        Ordering Provider: Ruby Dia PA    80 mg Intravenous Once 11/18/23 1322 11/18/23 1328          ----------------------------------------------------------------------------------------------------------------------  Vital Signs:  Temp:  [97.6 °F (36.4 °C)-98.3 °F (36.8 °C)] 98.3 °F (36.8 °C)  Heart Rate:  [] 97  Resp:  [14-21] 19  BP: (114-141)/() 140/88  SpO2:  [88 %-96 %] 90 %  on  Flow (L/min):  [4] 4;   Device (Oxygen Therapy): humidified  Body mass index is 33.64 kg/m².    Wt Readings from Last 3 Encounters:   11/24/23 88.9 kg (195 lb 15.8 oz)   11/08/23 81.6 kg (180 lb)   10/28/23 81.6 kg (180 lb)     Intake & Output (last 3 days)         11/22 0701  11/23 0700 11/23 0701  11/24 0700 11/24 0701  11/25 0700    P.O. 660 420     I.V. (mL/kg) 0 (0)      Total Intake(mL/kg) 660 (7.5) 420 (4.7)     Urine (mL/kg/hr) 1500 (0.7) 750 (0.4) 1300 (1.1)    Stool 0 0 0    Total Output [id number]    Net -840 -330 -1300           Urine Unmeasured Occurrence   1 x    Stool Unmeasured Occurrence 1 x 2 x 1 x          Diet: Cardiac Diets, Diabetic Diets; Healthy Heart (2-3 Na+); Consistent Carbohydrate; Texture: Regular Texture (IDDSI 7); Fluid Consistency: Thin (IDDSI 0)  ----------------------------------------------------------------------------------------------------------------------  Physical exam:   Constitutional:  Well-developed and well-nourished.  Chronically ill-appearing.  No acute distress.      HENT:  Head:  Normocephalic and atraumatic.   Cardiovascular:  Normal rate, regular rhythm   Pulmonary/Chest:  No respiratory distress, diminished air movement bilaterally with minimal expiratory wheezing heard  Musculoskeletal:  No deformity.    Neurological: Awake, alert, no focal deficit on gross examination. No slurred speech or facial droop.   Skin:  Skin is warm and dry.   Peripheral vascular:  No cyanosis, no edema  Psychiatric: Appropriate mood and affect  Edited by:  "Faviola Mejia DO at 11/24/2023 2023  ----------------------------------------------------------------------------------------------------------------------  Results from last 7 days   Lab Units 11/22/23 0027 11/20/23 0117 11/19/23 0032 11/18/23  1307   CRP mg/dL  --   --  4.54* 4.51*   LACTATE mmol/L  --   --   --  1.7   WBC 10*3/mm3 12.76* 10.73 5.03 11.04*   HEMOGLOBIN g/dL 13.6 12.6 12.9 12.9   HEMATOCRIT % 46.3 43.6 44.5 44.8   MCV fL 96.9 97.8* 96.7 99.1*   MCHC g/dL 29.4* 28.9* 29.0* 28.8*   PLATELETS 10*3/mm3 213 178 144 154   INR   --   --   --  0.94     Results from last 7 days   Lab Units 11/19/23  0823   PH, ARTERIAL pH units 7.381   PO2 ART mm Hg 60.8*   PCO2, ARTERIAL mm Hg 86.7*   HCO3 ART mmol/L 51.4*     Results from last 7 days   Lab Units 11/24/23 1924 11/20/23 0117 11/19/23 0032 11/18/23  1307   SODIUM mmol/L 139 135* 136 141   POTASSIUM mmol/L 5.1 4.3 3.9 3.6   MAGNESIUM mg/dL 2.2  --  1.9 1.9   CHLORIDE mmol/L 92* 87* 87* 93*   CO2 mmol/L 39.2* 40.5* 42.7* 40.3*   BUN mg/dL 30* 24* 15 14   CREATININE mg/dL 0.49* 0.47* 0.44* 0.38*   CALCIUM mg/dL 9.6 9.1 9.2 9.2   GLUCOSE mg/dL 324* 297* 236* 174*   ALBUMIN g/dL  --  3.6 3.5 3.7   BILIRUBIN mg/dL  --  0.2 0.5 0.5   ALK PHOS U/L  --  63 71 72   AST (SGOT) U/L  --  16 16 16   ALT (SGPT) U/L  --  12 13 13   Estimated Creatinine Clearance: 135.1 mL/min (A) (by C-G formula based on SCr of 0.49 mg/dL (L)).  No results found for: \"AMMONIA\"  Results from last 7 days   Lab Units 11/19/23  1825 11/19/23  1640 11/18/23  2215   HSTROP T ng/L 36* 30* 29*     Results from last 7 days   Lab Units 11/18/23  1307   PROBNP pg/mL 240.0     Results from last 7 days   Lab Units 11/19/23  0032   CHOLESTEROL mg/dL 231*   TRIGLYCERIDES mg/dL 148   HDL CHOL mg/dL 42   LDL CHOL mg/dL 162*     No results found for: \"HGBA1C\", \"POCGLU\"  Lab Results   Component Value Date    TSH 5.170 (H) 11/18/2023    FREET4 1.09 11/18/2023     No results found for: \"PREGTESTUR\", " "\"PREGSERUM\", \"HCG\", \"HCGQUANT\"  Pain Management Panel  More data may exist         Latest Ref Rng & Units 11/19/2023 2/23/2022   Pain Management Panel   Amphetamine, Urine Qual Negative Negative  Negative    Barbiturates Screen, Urine Negative Negative  Negative    Benzodiazepine Screen, Urine Negative Positive  Positive    Buprenorphine, Screen, Urine Negative Negative  Negative    Cocaine Screen, Urine Negative Negative  Negative    Fentanyl, Urine Negative Negative  -   Methadone Screen , Urine Negative Negative  Negative    Methamphetamine, Ur Negative Negative  Negative      Brief Urine Lab Results  (Last result in the past 365 days)        Color   Clarity   Blood   Leuk Est   Nitrite   Protein   CREAT   Urine HCG        11/18/23 1447 Yellow   Clear   Negative   Negative   Negative   Negative                 Blood Culture   Date Value Ref Range Status   11/18/2023 No growth at 5 days  Final   11/18/2023 No growth at 5 days  Final     No results found for: \"URINECX\"  No results found for: \"WOUNDCX\"  No results found for: \"STOOLCX\"  No results found for: \"RESPCX\"  No results found for: \"AFBCX\"  Results from last 7 days   Lab Units 11/19/23  0032 11/18/23  1307   PROCALCITONIN ng/mL  --  0.03   LACTATE mmol/L  --  1.7   SED RATE mm/hr  --  74*   CRP mg/dL 4.54* 4.51*       I have personally looked at the labs and they are summarized above.  ----------------------------------------------------------------------------------------------------------------------  Detailed radiology reports for the last 24 hours:  Imaging Results (Last 24 Hours)       ** No results found for the last 24 hours. **          Assessment & Plan      #Acute on chronic hypoxic respiratory failure   #Acute on chronic hypercapnic respiratory failure   #Acute COPD exacerbation   #Hx of STEVEN  #Medical noncompliance  -Viral panel negative, D-dimer within normal limits, and no new consolidations seen on imaging at admission.  -Patient has severe COPD " "that requires home NIPPV but she has been unable to tolerate wearing it regularly.  -ABG slightly improved.  Continue BiPAP nightly and as needed.  Continue supplemental oxygen with goal oxygen saturation 88 to 92%  -Pulmonology recommending BiPAP settings of 22/6 with a backup rate of 14 and supplemental oxygen titrated to oxygen saturation of 92%.  Patient seems to be tolerating new machine and BiPAP settings.   -Pulmonology following, appreciate assistance.  -Continue steroids, Symbicort, ipratropium. Continue to wean steroid dose as respiratory status is improving. Encourage aggressive pulmonary toilet, out of bed as much as possible, incentive spirometry, cough/deep breathing. She reports feeling very weak. PT consult requested, appreciate assistance. May need short term rehab vs home health services.    #Chronically elevated troponin  -Similar to previous labs.  No new ischemic changes noted on EKG at admission.  -Patient reported to me that she was having \"chest pressure\" with using the trilogy machine at home, but at no other times, and she says that it has not been nearly as bad when using the BiPAP here. She denies any chest pressure/pain with exertion or at any time other than when using NIPPV. This has improved.  -Patient's pain appears to be due to her severe COPD and use of noninvasive positive pressure ventilation rather than a cardiac source. I doubt that she would be able to tolerate ischemic workup with stress test at this time due to her chronic respiratory failure.    CHRONIC MEDICAL PROBLEMS     #HFpEF/grade I diastolic dysfunction  - Not felt to be in acute exacerbation clinically.   - Previous TTE from 10/2023 reviewed.  Monitor volume status with I&Os, daily weights.  Continue home medication regimen as appropriate. Additional IV lasix given today at Pulmonology direction to help with lung compliance.     #Essential hypertension  - Well controlled, continue home regimen    "   #Hyperlipidemia  -Continue statin.      #Type II diabetes mellitus, non insulin dependent  - A1C 6.6%. Continue SSI.      #History of CVA  - Details unknown.  Continue home medication as appropriate. Does not appear to have residual deficits      #History of carotid artery disease s/p left CEA in the past  - Continue home medications      #Hypothyroidism  - TSH WNL. Continue home levothyroxine.      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  #Former smoker   Edited by: Faviola Mejia DO at 11/24/2023 2023    VTE Prophylaxis:   Mechanical Order History:       None          Pharmalogical Order History:        Ordered     Dose Route Frequency Stop    11/18/23 1605  heparin (porcine) 5000 UNIT/ML injection 5,000 Units         5,000 Units SC Every 8 Hours Scheduled --                    Dispo: possibly home at discharge pending PT evaluation     Faviola Mejia DO  [facility]  11/24/23  20:23 EST

## 2023-11-25 NOTE — PLAN OF CARE
Pt rested in bed this shift. Pt complained of loose stool; MD notified; see orders. No s/s of acute distress. VSS

## 2023-11-25 NOTE — PROGRESS NOTES
Problem: Adult Inpatient Plan of Care  Goal: Plan of Care Review  Outcome: Improving  Flowsheets (Taken 12/8/2021 1740)  Outcome Summary: Nimbex off. Following commands, but got quite agitated and restless. Restraints on, propofol added. On and off levo and nicardipine today. Updated pt contact, Jama.  Goal: Optimal Comfort and Wellbeing  Outcome: Improving  Goal: Readiness for Transition of Care  Outcome: Improving     Problem: Communication Impairment (Mechanical Ventilation, Invasive)  Goal: Effective Communication  Outcome: Improving  Intervention: Ensure Effective Communication  Recent Flowsheet Documentation  Taken 12/8/2021 1600 by Carmelina Morelos RN  Communication Enhancement Strategies:   extra time allowed for response   healthcare instructions adapted   one-step directions provided   repetition utilized     Problem: Device-Related Complication Risk (Mechanical Ventilation, Invasive)  Goal: Optimal Device Function  Outcome: Improving  Intervention: Optimize Device Care and Function  Recent Flowsheet Documentation  Taken 12/8/2021 1600 by Carmelina Morelos RN  Airway Safety Measures:   all equipment/monitors on and audible   manual resuscitator/mask/valve in room   suction at bedside  Taken 12/8/2021 1200 by Carmelina Morelos RN  Airway Safety Measures:   all equipment/monitors on and audible   manual resuscitator/mask/valve in room   suction at bedside  Taken 12/8/2021 0800 by Carmelina Morelos RN  Airway Safety Measures:   all equipment/monitors on and audible   manual resuscitator/mask/valve in room   suction at bedside     Problem: Inability to Wean (Mechanical Ventilation, Invasive)  Goal: Mechanical Ventilation Liberation  Outcome: Improving     Problem: Fluid Imbalance (Heart Failure)  Goal: Fluid Balance  Outcome: Improving     Problem: Skin and Tissue Injury (Mechanical Ventilation, Invasive)  Goal: Absence of Device-Related Skin and Tissue Injury  Outcome: Declining   Worsening breakdown on face when supined.  Quita Portillo Completed   Meplilex on upper lip under ETT martinez.

## 2023-11-26 LAB
ANION GAP SERPL CALCULATED.3IONS-SCNC: 8.8 MMOL/L (ref 5–15)
BUN SERPL-MCNC: 25 MG/DL (ref 6–20)
BUN/CREAT SERPL: 50 (ref 7–25)
CALCIUM SPEC-SCNC: 9.2 MG/DL (ref 8.6–10.5)
CHLORIDE SERPL-SCNC: 90 MMOL/L (ref 98–107)
CO2 SERPL-SCNC: 38.2 MMOL/L (ref 22–29)
CREAT SERPL-MCNC: 0.5 MG/DL (ref 0.57–1)
DEPRECATED RDW RBC AUTO: 54.2 FL (ref 37–54)
EGFRCR SERPLBLD CKD-EPI 2021: 108.9 ML/MIN/1.73
ERYTHROCYTE [DISTWIDTH] IN BLOOD BY AUTOMATED COUNT: 14.7 % (ref 12.3–15.4)
GLUCOSE BLDC GLUCOMTR-MCNC: 137 MG/DL (ref 70–130)
GLUCOSE BLDC GLUCOMTR-MCNC: 190 MG/DL (ref 70–130)
GLUCOSE BLDC GLUCOMTR-MCNC: 269 MG/DL (ref 70–130)
GLUCOSE SERPL-MCNC: 229 MG/DL (ref 65–99)
HCT VFR BLD AUTO: 47.4 % (ref 34–46.6)
HGB BLD-MCNC: 13.8 G/DL (ref 12–15.9)
MAGNESIUM SERPL-MCNC: 2.5 MG/DL (ref 1.6–2.6)
MCH RBC QN AUTO: 29 PG (ref 26.6–33)
MCHC RBC AUTO-ENTMCNC: 29.1 G/DL (ref 31.5–35.7)
MCV RBC AUTO: 99.6 FL (ref 79–97)
PLATELET # BLD AUTO: 180 10*3/MM3 (ref 140–450)
PMV BLD AUTO: 11.1 FL (ref 6–12)
POTASSIUM SERPL-SCNC: 4.6 MMOL/L (ref 3.5–5.2)
QT INTERVAL: 378 MS
QTC INTERVAL: 459 MS
RBC # BLD AUTO: 4.76 10*6/MM3 (ref 3.77–5.28)
SODIUM SERPL-SCNC: 137 MMOL/L (ref 136–145)
WBC NRBC COR # BLD AUTO: 17.95 10*3/MM3 (ref 3.4–10.8)

## 2023-11-26 PROCEDURE — 94664 DEMO&/EVAL PT USE INHALER: CPT

## 2023-11-26 PROCEDURE — 63710000001 INSULIN LISPRO (HUMAN) PER 5 UNITS: Performed by: STUDENT IN AN ORGANIZED HEALTH CARE EDUCATION/TRAINING PROGRAM

## 2023-11-26 PROCEDURE — 80048 BASIC METABOLIC PNL TOTAL CA: CPT | Performed by: STUDENT IN AN ORGANIZED HEALTH CARE EDUCATION/TRAINING PROGRAM

## 2023-11-26 PROCEDURE — 99231 SBSQ HOSP IP/OBS SF/LOW 25: CPT | Performed by: STUDENT IN AN ORGANIZED HEALTH CARE EDUCATION/TRAINING PROGRAM

## 2023-11-26 PROCEDURE — 25010000002 HEPARIN (PORCINE) PER 1000 UNITS: Performed by: STUDENT IN AN ORGANIZED HEALTH CARE EDUCATION/TRAINING PROGRAM

## 2023-11-26 PROCEDURE — 99232 SBSQ HOSP IP/OBS MODERATE 35: CPT | Performed by: PHYSICIAN ASSISTANT

## 2023-11-26 PROCEDURE — 99232 SBSQ HOSP IP/OBS MODERATE 35: CPT | Performed by: INTERNAL MEDICINE

## 2023-11-26 PROCEDURE — 82948 REAGENT STRIP/BLOOD GLUCOSE: CPT

## 2023-11-26 PROCEDURE — 25010000002 METHYLPREDNISOLONE PER 40 MG: Performed by: STUDENT IN AN ORGANIZED HEALTH CARE EDUCATION/TRAINING PROGRAM

## 2023-11-26 PROCEDURE — 94799 UNLISTED PULMONARY SVC/PX: CPT

## 2023-11-26 PROCEDURE — 94761 N-INVAS EAR/PLS OXIMETRY MLT: CPT

## 2023-11-26 PROCEDURE — 85027 COMPLETE CBC AUTOMATED: CPT | Performed by: STUDENT IN AN ORGANIZED HEALTH CARE EDUCATION/TRAINING PROGRAM

## 2023-11-26 PROCEDURE — 83735 ASSAY OF MAGNESIUM: CPT | Performed by: STUDENT IN AN ORGANIZED HEALTH CARE EDUCATION/TRAINING PROGRAM

## 2023-11-26 RX ORDER — DEXTROSE MONOHYDRATE 25 G/50ML
25 INJECTION, SOLUTION INTRAVENOUS
Status: DISCONTINUED | OUTPATIENT
Start: 2023-11-26 | End: 2023-12-13 | Stop reason: HOSPADM

## 2023-11-26 RX ORDER — NICOTINE POLACRILEX 4 MG
15 LOZENGE BUCCAL
Status: DISCONTINUED | OUTPATIENT
Start: 2023-11-26 | End: 2023-12-13 | Stop reason: HOSPADM

## 2023-11-26 RX ORDER — INSULIN LISPRO 100 [IU]/ML
2-7 INJECTION, SOLUTION INTRAVENOUS; SUBCUTANEOUS
Status: DISCONTINUED | OUTPATIENT
Start: 2023-11-26 | End: 2023-11-27

## 2023-11-26 RX ORDER — GLUCAGON 1 MG/ML
1 KIT INJECTION
Status: DISCONTINUED | OUTPATIENT
Start: 2023-11-26 | End: 2023-12-13 | Stop reason: HOSPADM

## 2023-11-26 RX ORDER — ISOSORBIDE MONONITRATE 30 MG/1
30 TABLET, EXTENDED RELEASE ORAL
Status: DISCONTINUED | OUTPATIENT
Start: 2023-11-26 | End: 2023-12-13 | Stop reason: HOSPADM

## 2023-11-26 RX ADMIN — HEPARIN SODIUM 5000 UNITS: 5000 INJECTION INTRAVENOUS; SUBCUTANEOUS at 14:27

## 2023-11-26 RX ADMIN — BUDESONIDE AND FORMOTEROL FUMARATE DIHYDRATE 2 PUFF: 160; 4.5 AEROSOL RESPIRATORY (INHALATION) at 19:02

## 2023-11-26 RX ADMIN — IPRATROPIUM BROMIDE 0.5 MG: 0.5 SOLUTION RESPIRATORY (INHALATION) at 01:23

## 2023-11-26 RX ADMIN — HYDROCODONE BITARTRATE AND ACETAMINOPHEN 1 TABLET: 10; 325 TABLET ORAL at 16:32

## 2023-11-26 RX ADMIN — HYDROCODONE BITARTRATE AND ACETAMINOPHEN 1 TABLET: 10; 325 TABLET ORAL at 09:44

## 2023-11-26 RX ADMIN — Medication 10 ML: at 21:11

## 2023-11-26 RX ADMIN — INSULIN LISPRO 4 UNITS: 100 INJECTION, SOLUTION INTRAVENOUS; SUBCUTANEOUS at 17:14

## 2023-11-26 RX ADMIN — ISOSORBIDE MONONITRATE 30 MG: 30 TABLET, EXTENDED RELEASE ORAL at 14:27

## 2023-11-26 RX ADMIN — METOPROLOL TARTRATE 50 MG: 50 TABLET, FILM COATED ORAL at 09:44

## 2023-11-26 RX ADMIN — HEPARIN SODIUM 5000 UNITS: 5000 INJECTION INTRAVENOUS; SUBCUTANEOUS at 06:28

## 2023-11-26 RX ADMIN — HYDROCODONE BITARTRATE AND ACETAMINOPHEN 1 TABLET: 10; 325 TABLET ORAL at 03:38

## 2023-11-26 RX ADMIN — CLOPIDOGREL BISULFATE 75 MG: 75 TABLET, FILM COATED ORAL at 09:44

## 2023-11-26 RX ADMIN — IPRATROPIUM BROMIDE 0.5 MG: 0.5 SOLUTION RESPIRATORY (INHALATION) at 12:36

## 2023-11-26 RX ADMIN — LEVOTHYROXINE SODIUM 25 MCG: 25 TABLET ORAL at 06:28

## 2023-11-26 RX ADMIN — HEPARIN SODIUM 5000 UNITS: 5000 INJECTION INTRAVENOUS; SUBCUTANEOUS at 21:07

## 2023-11-26 RX ADMIN — Medication 10 ML: at 21:08

## 2023-11-26 RX ADMIN — ATORVASTATIN CALCIUM 40 MG: 40 TABLET, FILM COATED ORAL at 21:07

## 2023-11-26 RX ADMIN — METOPROLOL TARTRATE 50 MG: 50 TABLET, FILM COATED ORAL at 21:07

## 2023-11-26 RX ADMIN — Medication 10 ML: at 09:44

## 2023-11-26 RX ADMIN — Medication 10 ML: at 09:46

## 2023-11-26 RX ADMIN — INSULIN LISPRO 2 UNITS: 100 INJECTION, SOLUTION INTRAVENOUS; SUBCUTANEOUS at 21:18

## 2023-11-26 RX ADMIN — IPRATROPIUM BROMIDE 0.5 MG: 0.5 SOLUTION RESPIRATORY (INHALATION) at 19:02

## 2023-11-26 RX ADMIN — LOPERAMIDE HYDROCHLORIDE 2 MG: 2 CAPSULE ORAL at 23:01

## 2023-11-26 RX ADMIN — METHYLPREDNISOLONE SODIUM SUCCINATE 20 MG: 40 INJECTION, POWDER, FOR SOLUTION INTRAMUSCULAR; INTRAVENOUS at 09:44

## 2023-11-26 RX ADMIN — FUROSEMIDE 20 MG: 20 TABLET ORAL at 09:44

## 2023-11-26 RX ADMIN — IPRATROPIUM BROMIDE 0.5 MG: 0.5 SOLUTION RESPIRATORY (INHALATION) at 06:09

## 2023-11-26 RX ADMIN — HYDROCODONE BITARTRATE AND ACETAMINOPHEN 1 TABLET: 10; 325 TABLET ORAL at 23:01

## 2023-11-26 NOTE — PLAN OF CARE
Pt  rested in bed this shift. Pt complained of pain; PRN medication given; see MAR. No s/s of acute distress. VSS

## 2023-11-26 NOTE — PROGRESS NOTES
[facility] HOSPITALIST PROGRESS NOTE     Patient Identification:  Name:  Liz Jiang  Age:  57 y.o.  Sex:  female  :  1965  MRN:  [id number]  Visit Number:  [id number]  ROOM: [address] [place] [address]     Primary Care Provider:  Tabitha Ron APRN    Length of stay in inpatient status:  7    Subjective     Chief Compliant:    Chief Complaint   Patient presents with    Shortness of Breath       History of Presenting Illness:    Patient seen and examined today with Dr. Hope also seeing her via telehealth. She reports slow improvement of her shortness of breath. She says that she was woken up frequently last night due to her O2 saturation dropping into the mid 80s when sleeping despite being on BiPAP (although this is much better than O2 saturation dropping into the 70s as it previously was).    Objective     Current Hospital Meds:aspirin, 324 mg, Oral, Once  atorvastatin, 40 mg, Oral, Nightly  budesonide-formoterol, 2 puff, Inhalation, BID - RT  clopidogrel, 75 mg, Oral, Daily  fluticasone, 2 spray, Nasal, Daily  furosemide, 20 mg, Oral, Daily  heparin (porcine), 5,000 Units, Subcutaneous, Q8H  ipratropium, 0.5 mg, Nebulization, 4x Daily - RT  levothyroxine, 25 mcg, Oral, Q AM  methylPREDNISolone sodium succinate, 20 mg, Intravenous, Daily  metoprolol tartrate, 50 mg, Oral, BID  senna-docusate sodium, 2 tablet, Oral, BID  sodium chloride, 10 mL, Intravenous, Q12H  sodium chloride, 10 mL, Intravenous, Q12H    Pharmacy Consult,         Current Antimicrobial Therapy:  Anti-Infectives (From admission, onward)      None          Current Diuretic Therapy:  Diuretics (From admission, onward)      Ordered     Dose/Rate Route Frequency Start Stop    23 0734  furosemide (LASIX) injection 40 mg        Ordering Provider: Elijah Hope MD    40 mg Intravenous Once 23 0900 23 0853    23 1450  furosemide (LASIX) injection 40 mg        Ordering Provider: Elijah Hope MD    40 mg  Intravenous Once 11/20/23 1500 11/20/23 1531    11/19/23 0916  furosemide (LASIX) tablet 20 mg        Ordering Provider: Faviola Mejia DO    20 mg Oral Daily 11/19/23 1015      11/18/23 1306  furosemide (LASIX) injection 80 mg        Ordering Provider: Ruby Dia PA    80 mg Intravenous Once 11/18/23 1322 11/18/23 1328          ----------------------------------------------------------------------------------------------------------------------  Vital Signs:  Temp:  [98.1 °F (36.7 °C)-98.6 °F (37 °C)] 98.5 °F (36.9 °C)  Heart Rate:  [] 111  Resp:  [16-20] 18  BP: (110-138)/(70-83) 112/74  SpO2:  [86 %-99 %] 95 %  on  Flow (L/min):  [4-5] 4;   Device (Oxygen Therapy): nasal cannula  Body mass index is 33.68 kg/m².    Wt Readings from Last 3 Encounters:   11/25/23 89 kg (196 lb 3.4 oz)   11/08/23 81.6 kg (180 lb)   10/28/23 81.6 kg (180 lb)     Intake & Output (last 3 days)         11/23 0701  11/24 0700 11/24 0701  11/25 0700 11/25 0701  11/26 0700    P.O. 420 360 840    I.V. (mL/kg)       Total Intake(mL/kg) 420 (4.7) 360 (4) 840 (9.4)    Urine (mL/kg/hr) 750 (0.4) 3100 (1.5) 1800 (1.6)    Stool 0 0     Total Output 750 3100 1800    Net -330 -2740 -960           Urine Unmeasured Occurrence  1 x     Stool Unmeasured Occurrence 2 x 1 x           Diet: Cardiac Diets, Diabetic Diets; Healthy Heart (2-3 Na+); Consistent Carbohydrate; Texture: Regular Texture (IDDSI 7); Fluid Consistency: Thin (IDDSI 0)  ----------------------------------------------------------------------------------------------------------------------  Physical exam:   Constitutional:  Well-developed and well-nourished.  Chronically ill-appearing.  No acute distress.      HENT:  Head:  Normocephalic and atraumatic.   Cardiovascular:  Normal rate, regular rhythm   Pulmonary/Chest:  Mild dyspnea with conversation, diminished air movement bilaterally with minimal expiratory wheezing heard  Musculoskeletal:  No deformity.    Neurological: Awake,  "alert, no focal deficit on gross examination. No slurred speech or facial droop.   Skin:  Skin is warm and dry.   Peripheral vascular:  No cyanosis, no edema  Psychiatric: Appropriate mood and affect  Edited by: Faviola Mejia DO at 11/25/2023 2000  ----------------------------------------------------------------------------------------------------------------------  Results from last 7 days   Lab Units 11/22/23  0027 11/20/23 0117 11/19/23  0032   CRP mg/dL  --   --  4.54*   WBC 10*3/mm3 12.76* 10.73 5.03   HEMOGLOBIN g/dL 13.6 12.6 12.9   HEMATOCRIT % 46.3 43.6 44.5   MCV fL 96.9 97.8* 96.7   MCHC g/dL 29.4* 28.9* 29.0*   PLATELETS 10*3/mm3 213 178 144     Results from last 7 days   Lab Units 11/19/23  0823   PH, ARTERIAL pH units 7.381   PO2 ART mm Hg 60.8*   PCO2, ARTERIAL mm Hg 86.7*   HCO3 ART mmol/L 51.4*     Results from last 7 days   Lab Units 11/24/23 1924 11/20/23 0117 11/19/23  0032   SODIUM mmol/L 139 135* 136   POTASSIUM mmol/L 5.1 4.3 3.9   MAGNESIUM mg/dL 2.2  --  1.9   CHLORIDE mmol/L 92* 87* 87*   CO2 mmol/L 39.2* 40.5* 42.7*   BUN mg/dL 30* 24* 15   CREATININE mg/dL 0.49* 0.47* 0.44*   CALCIUM mg/dL 9.6 9.1 9.2   GLUCOSE mg/dL 324* 297* 236*   ALBUMIN g/dL  --  3.6 3.5   BILIRUBIN mg/dL  --  0.2 0.5   ALK PHOS U/L  --  63 71   AST (SGOT) U/L  --  16 16   ALT (SGPT) U/L  --  12 13   Estimated Creatinine Clearance: 135.1 mL/min (A) (by C-G formula based on SCr of 0.49 mg/dL (L)).  No results found for: \"AMMONIA\"  Results from last 7 days   Lab Units 11/19/23  1825 11/19/23  1640 11/18/23  2215   HSTROP T ng/L 36* 30* 29*         Results from last 7 days   Lab Units 11/19/23  0032   CHOLESTEROL mg/dL 231*   TRIGLYCERIDES mg/dL 148   HDL CHOL mg/dL 42   LDL CHOL mg/dL 162*     No results found for: \"HGBA1C\", \"POCGLU\"  Lab Results   Component Value Date    TSH 5.170 (H) 11/18/2023    FREET4 1.09 11/18/2023     No results found for: \"PREGTESTUR\", \"PREGSERUM\", \"HCG\", \"HCGQUANT\"  Pain Management Panel " " More data may exist         Latest Ref Rng & Units 11/19/2023 2/23/2022   Pain Management Panel   Amphetamine, Urine Qual Negative Negative  Negative    Barbiturates Screen, Urine Negative Negative  Negative    Benzodiazepine Screen, Urine Negative Positive  Positive    Buprenorphine, Screen, Urine Negative Negative  Negative    Cocaine Screen, Urine Negative Negative  Negative    Fentanyl, Urine Negative Negative  -   Methadone Screen , Urine Negative Negative  Negative    Methamphetamine, Ur Negative Negative  Negative      Brief Urine Lab Results  (Last result in the past 365 days)        Color   Clarity   Blood   Leuk Est   Nitrite   Protein   CREAT   Urine HCG        11/18/23 1447 Yellow   Clear   Negative   Negative   Negative   Negative                 No results found for: \"BLOODCX\"  No results found for: \"URINECX\"  No results found for: \"WOUNDCX\"  No results found for: \"STOOLCX\"  No results found for: \"RESPCX\"  No results found for: \"AFBCX\"  Results from last 7 days   Lab Units 11/19/23  0032   CRP mg/dL 4.54*       I have personally looked at the labs and they are summarized above.  ----------------------------------------------------------------------------------------------------------------------  Detailed radiology reports for the last 24 hours:  Imaging Results (Last 24 Hours)       ** No results found for the last 24 hours. **          Assessment & Plan      #Acute on chronic hypoxic respiratory failure   #Acute on chronic hypercapnic respiratory failure   #Acute COPD exacerbation   #Hx of STEVEN  #Medical noncompliance  -Viral panel negative, D-dimer within normal limits, and no new consolidations seen on imaging at admission.  -Patient has severe COPD that requires home NIPPV but she has been unable to tolerate wearing it regularly.  -ABG slightly improved.  Continue BiPAP nightly and as needed.  Continue supplemental oxygen with goal oxygen saturation 88 to 92%  -Pulmonology recommending BiPAP " "settings of 22/6 with a backup rate of 14 and supplemental oxygen titrated to oxygen saturation of 92%.  Patient is tolerating new machine and BiPAP settings better but has desaturation to mid 80s frequently while sleeping. Will await further adjustments to settings per Pulmonology and DME representative. Likely will increase EPAP when representative is available to do so (they are managing the device rather than hospital staff).   -Pulmonology following, appreciate assistance.  -Continue steroids, Symbicort, ipratropium. Continue to wean steroid dose as respiratory status is improving. Currently receiving solu-medrol 20mg IV daily per Pulmonology. Encourage aggressive pulmonary toilet, out of bed as much as possible, incentive spirometry, cough/deep breathing.   - PT consult requested due to generalized weakness and poor exercise tolerance, appreciate assistance. May need short term rehab vs home health services.    #Chronically elevated troponin  #Frequent pSVT  -Similar to previous labs.  No new ischemic changes noted on EKG at admission.  -Patient reported to me that she was having \"chest pressure\" with using the trilogy machine at home, but at no other times, and she says that it has not been nearly as bad when using the BiPAP here. She denies any chest pressure/pain with exertion or at any time other than when using NIPPV. This has improved.  -Based on description of symptoms I suspect that patient's pain is due to her severe COPD and use of noninvasive positive pressure ventilation rather than a cardiac source, but she will need ischemic evaluation with stress test when able to tolerate it.  - Cardiology planning for stress test Monday if possible  - Metoprolol tartrate increased to 50mg BID    CHRONIC MEDICAL PROBLEMS     #HFpEF/grade I diastolic dysfunction  - Not felt to be in acute exacerbation clinically.   - Previous TTE from 10/2023 reviewed.  Monitor volume status with I&Os, daily weights.  Continue " home lasix 20mg PO daily.     #Essential hypertension  - Well controlled, continue home regimen      #Hyperlipidemia  -Continue statin.      #Type II diabetes mellitus, non insulin dependent  - A1C 6.6%. Continue SSI.      #History of CVA  - Details unknown.  Continue home medication as appropriate. Does not appear to have residual deficits      #History of carotid artery disease s/p left CEA in the past  - Continue home medications      #Hypothyroidism  - TSH WNL. Continue home levothyroxine.      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  #Former smoker   Edited by: Faviola Mejia DO at 11/25/2023 2000    VTE Prophylaxis:   Mechanical Order History:       None          Pharmalogical Order History:        Ordered     Dose Route Frequency Stop    11/18/23 1605  heparin (porcine) 5000 UNIT/ML injection 5,000 Units         5,000 Units SC Every 8 Hours Scheduled --                    Dispo: pending clinical course and PT/OT recommendations     Faviola Mejia DO  [facility]  11/25/23  20:00 EST

## 2023-11-26 NOTE — PROGRESS NOTES
LOS: 8 days     Name: Liz Jiang  Age/Sex: 57 y.o. female  :  1965        PCP: Tabitha Ron APRN    Principal Problem:    Acute respiratory failure with hypoxia          Following for: PSVT    Telemetry Monitoring: Sinus rhythm with occasional short runs of SVT of 4-5 beat duration    Interval history: Resting in bed comfortably.  Upon further questioning she does admit that she is still been having some chest pains that she describes as pressure in the center of her chest lasting for few minutes at a time.  She has not informed nursing staff 27 occurred she reports that this has been a chronic ongoing issue.    Subjective     ROS    Vital Signs  Vitals:    23 0256 23 0500 23 0609 23 0632   BP: 161/84  138/81    BP Location: Right arm  Right arm    Patient Position: Lying  Lying    Pulse: 82  96 84   Resp: 18  20 16   Temp: 98.2 °F (36.8 °C)  98.6 °F (37 °C)    TempSrc: Axillary  Oral    SpO2: 95%  (!) 82% (!) 80%   Weight:  88.7 kg (195 lb 8 oz)     Height:         Body mass index is 33.56 kg/m².      Intake/Output Summary (Last 24 hours) at 2023 1156  Last data filed at 2023 2335  Gross per 24 hour   Intake 750 ml   Output 1800 ml   Net -1050 ml       Constitutional:       General: Not in acute distress.     Appearance: Healthy appearance. Well-developed and not in distress. Not diaphoretic.   Eyes:      Conjunctiva/sclera: Conjunctivae normal.      Pupils: Pupils are equal, round, and reactive to light.   HENT:      Head: Normocephalic and atraumatic.   Neck:      Vascular: No carotid bruit or JVD.   Pulmonary:      Effort: Pulmonary effort is normal. No respiratory distress.      Breath sounds: Normal breath sounds.   Cardiovascular:      Normal rate. Regular rhythm.   Edema:     Peripheral edema absent.   Skin:     General: Skin is cool.   Neurological:      Mental Status: Alert, oriented to person, place, and time and oriented to person, place and time.          Results Review:     Results from last 7 days   Lab Units 11/26/23  0107 11/22/23  0027 11/20/23  0117   WBC 10*3/mm3 17.95* 12.76* 10.73   HEMOGLOBIN g/dL 13.8 13.6 12.6   PLATELETS 10*3/mm3 180 213 178     Results from last 7 days   Lab Units 11/26/23  0107 11/24/23  1924 11/20/23  0117   SODIUM mmol/L 137 139 135*   POTASSIUM mmol/L 4.6 5.1 4.3   CHLORIDE mmol/L 90* 92* 87*   CO2 mmol/L 38.2* 39.2* 40.5*   BUN mg/dL 25* 30* 24*   CREATININE mg/dL 0.50* 0.49* 0.47*   CALCIUM mg/dL 9.2 9.6 9.1   GLUCOSE mg/dL 229* 324* 297*   ALT (SGPT) U/L  --   --  12   AST (SGOT) U/L  --   --  16     Results from last 7 days   Lab Units 11/19/23  1825 11/19/23  1640   HSTROP T ng/L 36* 30*             I reviewed the patient's new clinical results.  I reviewed the patient's new imaging results and agree with the interpretation.  I personally viewed and interpreted the patient's EKG/Telemetry data    Medication Review:   aspirin, 324 mg, Oral, Once  atorvastatin, 40 mg, Oral, Nightly  budesonide-formoterol, 2 puff, Inhalation, BID - RT  clopidogrel, 75 mg, Oral, Daily  fluticasone, 2 spray, Nasal, Daily  furosemide, 20 mg, Oral, Daily  heparin (porcine), 5,000 Units, Subcutaneous, Q8H  insulin lispro, 2-7 Units, Subcutaneous, 4x Daily AC & at Bedtime  ipratropium, 0.5 mg, Nebulization, 4x Daily - RT  levothyroxine, 25 mcg, Oral, Q AM  methylPREDNISolone sodium succinate, 20 mg, Intravenous, Daily  metoprolol tartrate, 50 mg, Oral, BID  senna-docusate sodium, 2 tablet, Oral, BID  sodium chloride, 10 mL, Intravenous, Q12H  sodium chloride, 10 mL, Intravenous, Q12H      Pharmacy Consult,         Assessment:  Chest pain, atypical  PSVT, stable  Dyslipidemia  Essential hypertension      Recommendations:  Order stress test to be done in the morning.  If unremarkable may consider antiarrhythmic agent or recurrent short runs of SVT.  Once sleep apnea is better controlled I expect SVT to improve  I will start Imdur 30 mg for chest  pain symptoms     I discussed the patients findings and my recommendations with patient and family    James Hameed PA-C  11/26/23  11:56 EST  Electronically signed by James Hameed PA-C, 11/26/23, 11:59 AM EST.

## 2023-11-26 NOTE — PROGRESS NOTES
[facility] HOSPITALIST PROGRESS NOTE     Patient Identification:  Name:  Liz Jiang  Age:  57 y.o.  Sex:  female  :  1965  MRN:  [id number]  Visit Number:  [id number]  ROOM: [address] [place] [address]     Primary Care Provider:  Tabitha Ron APRN    Length of stay in inpatient status:  8    Subjective     Chief Compliant:    Chief Complaint   Patient presents with    Shortness of Breath       History of Presenting Illness:    Patient seen and examined this morning and she reported that her shortness of breath was nearing baseline. She says she feels very weak when getting out of bed. She denied any chest pain or palpitations today.    Objective     Current Hospital Meds:aspirin, 324 mg, Oral, Once  atorvastatin, 40 mg, Oral, Nightly  budesonide-formoterol, 2 puff, Inhalation, BID - RT  clopidogrel, 75 mg, Oral, Daily  fluticasone, 2 spray, Nasal, Daily  furosemide, 20 mg, Oral, Daily  heparin (porcine), 5,000 Units, Subcutaneous, Q8H  insulin lispro, 2-7 Units, Subcutaneous, 4x Daily AC & at Bedtime  ipratropium, 0.5 mg, Nebulization, 4x Daily - RT  isosorbide mononitrate, 30 mg, Oral, Q24H  levothyroxine, 25 mcg, Oral, Q AM  methylPREDNISolone sodium succinate, 20 mg, Intravenous, Daily  metoprolol tartrate, 50 mg, Oral, BID  senna-docusate sodium, 2 tablet, Oral, BID  sodium chloride, 10 mL, Intravenous, Q12H  sodium chloride, 10 mL, Intravenous, Q12H    Pharmacy Consult,         Current Antimicrobial Therapy:  Anti-Infectives (From admission, onward)      None          Current Diuretic Therapy:  Diuretics (From admission, onward)      Ordered     Dose/Rate Route Frequency Start Stop    23 0734  furosemide (LASIX) injection 40 mg        Ordering Provider: Elijah Hope MD    40 mg Intravenous Once 23 0900 23 0853    23 1450  furosemide (LASIX) injection 40 mg        Ordering Provider: Elijah Hope MD    40 mg Intravenous Once 23 1500 23 1531    23 0916   furosemide (LASIX) tablet 20 mg        Ordering Provider: Faviola Mejia DO    20 mg Oral Daily 11/19/23 1015      11/18/23 1306  furosemide (LASIX) injection 80 mg        Ordering Provider: Ruby Dia PA    80 mg Intravenous Once 11/18/23 1322 11/18/23 1328          ----------------------------------------------------------------------------------------------------------------------  Vital Signs:  Temp:  [98.2 °F (36.8 °C)-98.6 °F (37 °C)] 98.6 °F (37 °C)  Heart Rate:  [] 84  Resp:  [12-20] 18  BP: (112-161)/(70-93) 116/70  SpO2:  [79 %-97 %] 97 %  on  Flow (L/min):  [4] 4;   Device (Oxygen Therapy): nasal cannula  Body mass index is 33.56 kg/m².    Wt Readings from Last 3 Encounters:   11/26/23 88.7 kg (195 lb 8 oz)   11/08/23 81.6 kg (180 lb)   10/28/23 81.6 kg (180 lb)     Intake & Output (last 3 days)         11/23 0701 11/24 0700 11/24 0701 11/25 0700 11/25 0701 11/26 0700 11/26 0701  11/27 0700    P.O. 420 360 990 480    I.V. (mL/kg)        Total Intake(mL/kg) 420 (4.7) 360 (4) 990 (11.2) 480 (5.4)    Urine (mL/kg/hr) 750 (0.4) 3100 (1.5) 1800 (0.8)     Stool 0 0      Total Output 750 3100 1800     Net -330 -2740 -810 +480            Urine Unmeasured Occurrence  1 x 2 x     Stool Unmeasured Occurrence 2 x 1 x 0 x           Diet: Cardiac Diets, Diabetic Diets; Healthy Heart (2-3 Na+); Consistent Carbohydrate; Texture: Regular Texture (IDDSI 7); Fluid Consistency: Thin (IDDSI 0)  NPO Diet NPO Type: Strict NPO, Sips with Meds  ----------------------------------------------------------------------------------------------------------------------  Physical exam:   Constitutional:  Well-developed and well-nourished.  Chronically ill-appearing.  No acute distress.      HENT:  Head:  Normocephalic and atraumatic.   Cardiovascular:  Normal rate, regular rhythm   Pulmonary/Chest:  Mild dyspnea with conversation, diminished air movement bilaterally but no significant wheezing, crackles, or  "rhonchi  Musculoskeletal:  No deformity.    Neurological: Awake, alert, no focal deficit on gross examination. No slurred speech or facial droop.   Skin:  Skin is warm and dry.   Peripheral vascular:  No cyanosis, no edema  Psychiatric: Appropriate mood and affect  Edited by: Faviola Mejia DO at 11/26/2023 8200  ----------------------------------------------------------------------------------------------------------------------  Results from last 7 days   Lab Units 11/26/23 0107 11/22/23  0027 11/20/23  0117   WBC 10*3/mm3 17.95* 12.76* 10.73   HEMOGLOBIN g/dL 13.8 13.6 12.6   HEMATOCRIT % 47.4* 46.3 43.6   MCV fL 99.6* 96.9 97.8*   MCHC g/dL 29.1* 29.4* 28.9*   PLATELETS 10*3/mm3 180 213 178         Results from last 7 days   Lab Units 11/26/23 0107 11/24/23  1924 11/20/23  0117   SODIUM mmol/L 137 139 135*   POTASSIUM mmol/L 4.6 5.1 4.3   MAGNESIUM mg/dL 2.5 2.2  --    CHLORIDE mmol/L 90* 92* 87*   CO2 mmol/L 38.2* 39.2* 40.5*   BUN mg/dL 25* 30* 24*   CREATININE mg/dL 0.50* 0.49* 0.47*   CALCIUM mg/dL 9.2 9.6 9.1   GLUCOSE mg/dL 229* 324* 297*   ALBUMIN g/dL  --   --  3.6   BILIRUBIN mg/dL  --   --  0.2   ALK PHOS U/L  --   --  63   AST (SGOT) U/L  --   --  16   ALT (SGPT) U/L  --   --  12   Estimated Creatinine Clearance: 132.2 mL/min (A) (by C-G formula based on SCr of 0.5 mg/dL (L)).  No results found for: \"AMMONIA\"  Results from last 7 days   Lab Units 11/19/23  1825   HSTROP T ng/L 36*             Glucose   Date/Time Value Ref Range Status   11/26/2023 1653 269 (H) 70 - 130 mg/dL Final   11/26/2023 1142 137 (H) 70 - 130 mg/dL Final     Lab Results   Component Value Date    TSH 5.170 (H) 11/18/2023    FREET4 1.09 11/18/2023     No results found for: \"PREGTESTUR\", \"PREGSERUM\", \"HCG\", \"HCGQUANT\"  Pain Management Panel  More data may exist         Latest Ref Rng & Units 11/19/2023 2/23/2022   Pain Management Panel   Amphetamine, Urine Qual Negative Negative  Negative    Barbiturates Screen, Urine Negative " "Negative  Negative    Benzodiazepine Screen, Urine Negative Positive  Positive    Buprenorphine, Screen, Urine Negative Negative  Negative    Cocaine Screen, Urine Negative Negative  Negative    Fentanyl, Urine Negative Negative  -   Methadone Screen , Urine Negative Negative  Negative    Methamphetamine, Ur Negative Negative  Negative      Brief Urine Lab Results  (Last result in the past 365 days)        Color   Clarity   Blood   Leuk Est   Nitrite   Protein   CREAT   Urine HCG        11/18/23 1447 Yellow   Clear   Negative   Negative   Negative   Negative                 No results found for: \"BLOODCX\"  No results found for: \"URINECX\"  No results found for: \"WOUNDCX\"  No results found for: \"STOOLCX\"  No results found for: \"RESPCX\"  No results found for: \"AFBCX\"        I have personally looked at the labs and they are summarized above.  ----------------------------------------------------------------------------------------------------------------------  Detailed radiology reports for the last 24 hours:  Imaging Results (Last 24 Hours)       ** No results found for the last 24 hours. **          Assessment & Plan      #Acute on chronic hypoxic respiratory failure   #Acute on chronic hypercapnic respiratory failure   #Acute COPD exacerbation   #Hx of STEVEN  #Medical noncompliance  -At admission viral panel was negative, D-dimer within normal limits, and no new consolidations seen on imaging at admission.  -Patient has severe COPD that requires home NIPPV but she has been unable to tolerate wearing it regularly.  -ABG slightly improved.  Continue BiPAP nightly and as needed.  Continue supplemental oxygen with goal oxygen saturation 88 to 92%  -Pulmonology recommended initial BiPAP settings of 22/6 with a backup rate of 14 and supplemental oxygen titrated to oxygen saturation of 92%.  Patient is tolerating new machine and BiPAP settings better but has desaturation to mid 80s while sleeping. Will await further " "adjustments to settings per Pulmonology and DME representative. Likely will increase EPAP when representative is available to do so (they are managing the device rather than hospital staff and will not be back until tomorrow).  -Pulmonology following, appreciate assistance.  -Continue steroids, Symbicort, ipratropium. Continue to wean steroid dose as respiratory status is improving. Currently receiving solu-medrol 20mg IV daily per Pulmonology. Encourage aggressive pulmonary toilet, out of bed as much as possible, incentive spirometry, cough/deep breathing.   - PT consult requested due to generalized weakness and poor exercise tolerance, appreciate assistance. May need short term rehab vs home health services.    #Chronically elevated troponin  #Frequent pSVT  -Similar to previous labs.  No new ischemic changes noted on EKG at admission.  -Patient reported to me that she was having \"chest pressure\" with using the trilogy machine at home, but at no other times, and she says that it has not been nearly as bad when using the BiPAP here. She denies any chest pressure/pain with exertion or at any time other than when using NIPPV. This has improved.  -Based on description of symptoms I suspect that patient's pain is due to her severe COPD and use of noninvasive positive pressure ventilation rather than a cardiac source, but she will need ischemic evaluation with stress test when able to tolerate it.  - Cardiology planning for stress test Monday  - Metoprolol tartrate increased to 50mg BID  - Continue telemetry monitoring     CHRONIC MEDICAL PROBLEMS     #HFpEF/grade I diastolic dysfunction  - Not felt to be in acute exacerbation clinically.   - Previous TTE from 10/2023 reviewed.  Monitor volume status with I&Os, daily weights.  Continue home lasix 20mg PO daily.     #Essential hypertension  - Well controlled, continue home regimen      #Hyperlipidemia  -Continue statin.      #Type II diabetes mellitus, non insulin " dependent  - A1C 6.6%.   - Glucose has been elevated in the 200-300 range. Monitor with accuchecks and cover with sliding scale insulin.   - Continue consistent carbohydrate diet      #History of CVA  - Details unknown.  Continue home medication as appropriate. Does not appear to have residual deficits      #History of carotid artery disease s/p left CEA in the past  - Continue home medications      #Hypothyroidism  - TSH WNL. Continue home levothyroxine.      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  #Former smoker   Edited by: Faviola Mejia DO at 11/26/2023 6920    VTE Prophylaxis:   Mechanical Order History:       None          Pharmalogical Order History:        Ordered     Dose Route Frequency Stop    11/18/23 1605  heparin (porcine) 5000 UNIT/ML injection 5,000 Units         5,000 Units SC Every 8 Hours Scheduled --                    Dispo: may need short term rehab at discharge when medically stable     Faviola Mejia DO  [facility]  11/26/23  17:56 EST

## 2023-11-26 NOTE — PLAN OF CARE
Goal Outcome Evaluation:      Patient has been resting in bed this shift. No signs or symptoms of acute distress noted. Will continue with plan of care.

## 2023-11-26 NOTE — PROGRESS NOTES
Pulmonary and critical care following the patient for hypoxic respiratory failure and COPD exacerbation  Chief Complaint:  sob    Subjective   All the lab medications and the notes and vitals reviewed.  Patient resting in bed comfortably.  Used BiPAP overnight.  Desaturated overnight.-Went into low 80s was woken up by the nurses and the pulse ox improved.  Patient likely has severe sleep apnea.  Ins and outs net negative.  Reviewed.  CBC in a.m.  Review of Systems:    No changes      Vital Signs  Temp:  [98.2 °F (36.8 °C)-98.6 °F (37 °C)] 98.2 °F (36.8 °C)  Heart Rate:  [] 82  Resp:  [16-20] 18  BP: (110-161)/(72-93) 161/84  Body mass index is 33.68 kg/m².    Intake/Output Summary (Last 24 hours) at 11/26/2023 0440  Last data filed at 11/25/2023 2335  Gross per 24 hour   Intake 990 ml   Output 2800 ml   Net -1810 ml     I/O this shift:  In: 150 [P.O.:150]  Out: -     Physical Exam: No major changes  General-not in any respiratory distress, wearing nasal cannula oxygen  HEENT-PERRLA  Neck-atraumatic    Respiratory-resting in bed comfortably wearing nasal cannula oxygen not in any respiratory distress  Cardiovascular-NSR  GI-NTND    CNS-nonfocal    Musculoskeletal -no edema  Extremities- no obvious deformity noticed     Psychiatric-alert awake oriented  Skin- no visible rash         Results Review:     I reviewed the patient's new clinical results.  Results from last 7 days   Lab Units 11/26/23  0107 11/22/23  0027 11/20/23  0117   WBC 10*3/mm3 17.95* 12.76* 10.73   HEMOGLOBIN g/dL 13.8 13.6 12.6   PLATELETS 10*3/mm3 180 213 178     Results from last 7 days   Lab Units 11/26/23 0107 11/24/23  1924 11/20/23  0117   SODIUM mmol/L 137 139 135*   POTASSIUM mmol/L 4.6 5.1 4.3   CHLORIDE mmol/L 90* 92* 87*   CO2 mmol/L 38.2* 39.2* 40.5*   BUN mg/dL 25* 30* 24*   CREATININE mg/dL 0.50* 0.49* 0.47*   CALCIUM mg/dL 9.2 9.6 9.1   GLUCOSE mg/dL 229* 324* 297*   MAGNESIUM mg/dL 2.5 2.2  --      Lab Results   Component  Value Date    INR 0.94 11/18/2023    INR 0.95 10/12/2023    INR 0.90 03/27/2017    PROTIME 13.1 11/18/2023    PROTIME 13.1 10/12/2023    PROTIME 9.9 03/27/2017     Results from last 7 days   Lab Units 11/20/23  0117   ALK PHOS U/L 63   BILIRUBIN mg/dL 0.2   ALT (SGPT) U/L 12   AST (SGOT) U/L 16     Results from last 7 days   Lab Units 11/19/23  0823   PH, ARTERIAL pH units 7.381   PO2 ART mm Hg 60.8*   PCO2, ARTERIAL mm Hg 86.7*   HCO3 ART mmol/L 51.4*     Imaging Results (Last 24 Hours)       ** No results found for the last 24 hours. **                 aspirin, 324 mg, Oral, Once  atorvastatin, 40 mg, Oral, Nightly  budesonide-formoterol, 2 puff, Inhalation, BID - RT  clopidogrel, 75 mg, Oral, Daily  fluticasone, 2 spray, Nasal, Daily  furosemide, 20 mg, Oral, Daily  heparin (porcine), 5,000 Units, Subcutaneous, Q8H  ipratropium, 0.5 mg, Nebulization, 4x Daily - RT  levothyroxine, 25 mcg, Oral, Q AM  methylPREDNISolone sodium succinate, 20 mg, Intravenous, Daily  metoprolol tartrate, 50 mg, Oral, BID  senna-docusate sodium, 2 tablet, Oral, BID  sodium chloride, 10 mL, Intravenous, Q12H  sodium chloride, 10 mL, Intravenous, Q12H      Pharmacy Consult,         Medication Review:     Assessment & Plan   Acute exacerbation of COPD-continue nebs  Continue current dose of steroids..   FiO2 requirement reviewed and adjusted to maintain saturation 88 to 92%    Diuretics to improve diffusion capacity and lung compliance.   Ins and outs net negative-1.8 L negative      Continue BiPAP overnight and as needed basis in the daytime.  Desaturated overnight.  Also to contact me once the respiratory therapist the will need to adjust her IPAP and EPAP on the BiPAP.  Latest chest x-ray reviewed  Latest ABG reviewed.  Case discussed with Dr. Mejia and patient's nurse.  Continue appropriate prophylaxis  Case d/w nurse and team   This service is provided via audio video tele medicine   I am in [place] and patient is in [facility]                Acute respiratory failure with hypoxia               Elijah Hope MD  11/26/23  04:40 EST

## 2023-11-27 ENCOUNTER — APPOINTMENT (OUTPATIENT)
Dept: CARDIOLOGY | Facility: HOSPITAL | Age: 58
End: 2023-11-27
Payer: MEDICARE

## 2023-11-27 ENCOUNTER — APPOINTMENT (OUTPATIENT)
Dept: GENERAL RADIOLOGY | Facility: HOSPITAL | Age: 58
End: 2023-11-27
Payer: MEDICARE

## 2023-11-27 ENCOUNTER — APPOINTMENT (OUTPATIENT)
Dept: NUCLEAR MEDICINE | Facility: HOSPITAL | Age: 58
End: 2023-11-27
Payer: MEDICARE

## 2023-11-27 LAB
ANISOCYTOSIS BLD QL: ABNORMAL
BH CV REST NUCLEAR ISOTOPE DOSE: 10.6 MCI
BH CV STRESS COMMENTS STAGE 1: NORMAL
BH CV STRESS DOSE REGADENOSON STAGE 1: 0.4
BH CV STRESS DURATION MIN STAGE 1: 0
BH CV STRESS DURATION SEC STAGE 1: 10
BH CV STRESS NUCLEAR ISOTOPE DOSE: 30.1 MCI
BH CV STRESS PROTOCOL 1: NORMAL
BH CV STRESS RECOVERY BP: NORMAL MMHG
BH CV STRESS RECOVERY HR: 86 BPM
BH CV STRESS STAGE 1: 1
DEPRECATED RDW RBC AUTO: 53.5 FL (ref 37–54)
EOSINOPHIL # BLD MANUAL: 0.22 10*3/MM3 (ref 0–0.4)
EOSINOPHIL NFR BLD MANUAL: 1 % (ref 0.3–6.2)
ERYTHROCYTE [DISTWIDTH] IN BLOOD BY AUTOMATED COUNT: 14.6 % (ref 12.3–15.4)
GLUCOSE BLDC GLUCOMTR-MCNC: 106 MG/DL (ref 70–130)
GLUCOSE BLDC GLUCOMTR-MCNC: 191 MG/DL (ref 70–130)
GLUCOSE BLDC GLUCOMTR-MCNC: 219 MG/DL (ref 70–130)
GLUCOSE BLDC GLUCOMTR-MCNC: 247 MG/DL (ref 70–130)
HCT VFR BLD AUTO: 46.9 % (ref 34–46.6)
HGB BLD-MCNC: 13.7 G/DL (ref 12–15.9)
LARGE PLATELETS: ABNORMAL
LYMPHOCYTES # BLD MANUAL: 4.4 10*3/MM3 (ref 0.7–3.1)
LYMPHOCYTES NFR BLD MANUAL: 3 % (ref 5–12)
MACROCYTES BLD QL SMEAR: ABNORMAL
MAXIMAL PREDICTED HEART RATE: 162 BPM
MCH RBC QN AUTO: 28.8 PG (ref 26.6–33)
MCHC RBC AUTO-ENTMCNC: 29.2 G/DL (ref 31.5–35.7)
MCV RBC AUTO: 98.5 FL (ref 79–97)
MONOCYTES # BLD: 0.66 10*3/MM3 (ref 0.1–0.9)
NEUTROPHILS # BLD AUTO: 16.74 10*3/MM3 (ref 1.7–7)
NEUTROPHILS NFR BLD MANUAL: 76 % (ref 42.7–76)
PERCENT MAX PREDICTED HR: 61.73 %
PLATELET # BLD AUTO: 160 10*3/MM3 (ref 140–450)
PMV BLD AUTO: 11.3 FL (ref 6–12)
RBC # BLD AUTO: 4.76 10*6/MM3 (ref 3.77–5.28)
STRESS BASELINE BP: NORMAL MMHG
STRESS BASELINE HR: 81 BPM
STRESS PERCENT HR: 73 %
STRESS POST PEAK BP: NORMAL MMHG
STRESS POST PEAK HR: 100 BPM
STRESS TARGET HR: 138 BPM
VARIANT LYMPHS NFR BLD MANUAL: 20 % (ref 19.6–45.3)
WBC NRBC COR # BLD AUTO: 22.02 10*3/MM3 (ref 3.4–10.8)

## 2023-11-27 PROCEDURE — 78452 HT MUSCLE IMAGE SPECT MULT: CPT | Performed by: INTERNAL MEDICINE

## 2023-11-27 PROCEDURE — 85025 COMPLETE CBC W/AUTO DIFF WBC: CPT | Performed by: INTERNAL MEDICINE

## 2023-11-27 PROCEDURE — 99232 SBSQ HOSP IP/OBS MODERATE 35: CPT | Performed by: INTERNAL MEDICINE

## 2023-11-27 PROCEDURE — 97166 OT EVAL MOD COMPLEX 45 MIN: CPT

## 2023-11-27 PROCEDURE — 94799 UNLISTED PULMONARY SVC/PX: CPT

## 2023-11-27 PROCEDURE — 71045 X-RAY EXAM CHEST 1 VIEW: CPT

## 2023-11-27 PROCEDURE — 63710000001 INSULIN LISPRO (HUMAN) PER 5 UNITS: Performed by: STUDENT IN AN ORGANIZED HEALTH CARE EDUCATION/TRAINING PROGRAM

## 2023-11-27 PROCEDURE — 78452 HT MUSCLE IMAGE SPECT MULT: CPT

## 2023-11-27 PROCEDURE — 94761 N-INVAS EAR/PLS OXIMETRY MLT: CPT

## 2023-11-27 PROCEDURE — 25010000002 REGADENOSON 0.4 MG/5ML SOLUTION: Performed by: INTERNAL MEDICINE

## 2023-11-27 PROCEDURE — 93018 CV STRESS TEST I&R ONLY: CPT | Performed by: INTERNAL MEDICINE

## 2023-11-27 PROCEDURE — A9500 TC99M SESTAMIBI: HCPCS | Performed by: INTERNAL MEDICINE

## 2023-11-27 PROCEDURE — 63710000001 INSULIN LISPRO (HUMAN) PER 5 UNITS: Performed by: INTERNAL MEDICINE

## 2023-11-27 PROCEDURE — 85007 BL SMEAR W/DIFF WBC COUNT: CPT | Performed by: INTERNAL MEDICINE

## 2023-11-27 PROCEDURE — 0 TECHNETIUM SESTAMIBI: Performed by: INTERNAL MEDICINE

## 2023-11-27 PROCEDURE — 82948 REAGENT STRIP/BLOOD GLUCOSE: CPT

## 2023-11-27 PROCEDURE — 93017 CV STRESS TEST TRACING ONLY: CPT

## 2023-11-27 PROCEDURE — 94664 DEMO&/EVAL PT USE INHALER: CPT

## 2023-11-27 PROCEDURE — 25010000002 METHYLPREDNISOLONE PER 40 MG: Performed by: STUDENT IN AN ORGANIZED HEALTH CARE EDUCATION/TRAINING PROGRAM

## 2023-11-27 PROCEDURE — 25010000002 HEPARIN (PORCINE) PER 1000 UNITS: Performed by: STUDENT IN AN ORGANIZED HEALTH CARE EDUCATION/TRAINING PROGRAM

## 2023-11-27 PROCEDURE — 71045 X-RAY EXAM CHEST 1 VIEW: CPT | Performed by: RADIOLOGY

## 2023-11-27 PROCEDURE — 99233 SBSQ HOSP IP/OBS HIGH 50: CPT | Performed by: INTERNAL MEDICINE

## 2023-11-27 RX ORDER — REGADENOSON 0.08 MG/ML
0.4 INJECTION, SOLUTION INTRAVENOUS
Status: COMPLETED | OUTPATIENT
Start: 2023-11-27 | End: 2023-11-27

## 2023-11-27 RX ORDER — GLUCAGON 1 MG/ML
1 KIT INJECTION
Status: DISCONTINUED | OUTPATIENT
Start: 2023-11-27 | End: 2023-11-27 | Stop reason: SDUPTHER

## 2023-11-27 RX ORDER — INSULIN LISPRO 100 [IU]/ML
2-9 INJECTION, SOLUTION INTRAVENOUS; SUBCUTANEOUS
Status: DISCONTINUED | OUTPATIENT
Start: 2023-11-27 | End: 2023-12-13 | Stop reason: HOSPADM

## 2023-11-27 RX ORDER — NICOTINE POLACRILEX 4 MG
15 LOZENGE BUCCAL
Status: DISCONTINUED | OUTPATIENT
Start: 2023-11-27 | End: 2023-11-27 | Stop reason: SDUPTHER

## 2023-11-27 RX ORDER — DEXTROSE MONOHYDRATE 25 G/50ML
25 INJECTION, SOLUTION INTRAVENOUS
Status: DISCONTINUED | OUTPATIENT
Start: 2023-11-27 | End: 2023-11-27 | Stop reason: SDUPTHER

## 2023-11-27 RX ADMIN — TECHNETIUM TC 99M SESTAMIBI 1 DOSE: 1 INJECTION INTRAVENOUS at 09:02

## 2023-11-27 RX ADMIN — CLOPIDOGREL BISULFATE 75 MG: 75 TABLET, FILM COATED ORAL at 10:02

## 2023-11-27 RX ADMIN — HYDROCODONE BITARTRATE AND ACETAMINOPHEN 1 TABLET: 10; 325 TABLET ORAL at 23:44

## 2023-11-27 RX ADMIN — METOPROLOL TARTRATE 50 MG: 50 TABLET, FILM COATED ORAL at 10:03

## 2023-11-27 RX ADMIN — TECHNETIUM TC 99M SESTAMIBI 1 DOSE: 1 INJECTION INTRAVENOUS at 07:40

## 2023-11-27 RX ADMIN — INSULIN LISPRO 2 UNITS: 100 INJECTION, SOLUTION INTRAVENOUS; SUBCUTANEOUS at 12:21

## 2023-11-27 RX ADMIN — METOPROLOL TARTRATE 50 MG: 50 TABLET, FILM COATED ORAL at 21:03

## 2023-11-27 RX ADMIN — ISOSORBIDE MONONITRATE 30 MG: 30 TABLET, EXTENDED RELEASE ORAL at 10:03

## 2023-11-27 RX ADMIN — Medication 10 ML: at 21:04

## 2023-11-27 RX ADMIN — LOPERAMIDE HYDROCHLORIDE 2 MG: 2 CAPSULE ORAL at 23:47

## 2023-11-27 RX ADMIN — LEVOTHYROXINE SODIUM 25 MCG: 25 TABLET ORAL at 05:17

## 2023-11-27 RX ADMIN — HYDROCODONE BITARTRATE AND ACETAMINOPHEN 1 TABLET: 10; 325 TABLET ORAL at 05:47

## 2023-11-27 RX ADMIN — IPRATROPIUM BROMIDE 0.5 MG: 0.5 SOLUTION RESPIRATORY (INHALATION) at 18:31

## 2023-11-27 RX ADMIN — Medication 10 ML: at 10:03

## 2023-11-27 RX ADMIN — METHYLPREDNISOLONE SODIUM SUCCINATE 20 MG: 40 INJECTION, POWDER, FOR SOLUTION INTRAMUSCULAR; INTRAVENOUS at 10:03

## 2023-11-27 RX ADMIN — BUDESONIDE AND FORMOTEROL FUMARATE DIHYDRATE 2 PUFF: 160; 4.5 AEROSOL RESPIRATORY (INHALATION) at 18:31

## 2023-11-27 RX ADMIN — FUROSEMIDE 20 MG: 20 TABLET ORAL at 10:03

## 2023-11-27 RX ADMIN — HEPARIN SODIUM 5000 UNITS: 5000 INJECTION INTRAVENOUS; SUBCUTANEOUS at 15:09

## 2023-11-27 RX ADMIN — FLUTICASONE PROPIONATE 2 SPRAY: 50 SPRAY, METERED NASAL at 10:02

## 2023-11-27 RX ADMIN — IPRATROPIUM BROMIDE 0.5 MG: 0.5 SOLUTION RESPIRATORY (INHALATION) at 13:19

## 2023-11-27 RX ADMIN — BUDESONIDE AND FORMOTEROL FUMARATE DIHYDRATE 2 PUFF: 160; 4.5 AEROSOL RESPIRATORY (INHALATION) at 06:42

## 2023-11-27 RX ADMIN — LOPERAMIDE HYDROCHLORIDE 2 MG: 2 CAPSULE ORAL at 13:44

## 2023-11-27 RX ADMIN — INSULIN LISPRO 4 UNITS: 100 INJECTION, SOLUTION INTRAVENOUS; SUBCUTANEOUS at 21:03

## 2023-11-27 RX ADMIN — ATORVASTATIN CALCIUM 40 MG: 40 TABLET, FILM COATED ORAL at 21:03

## 2023-11-27 RX ADMIN — HEPARIN SODIUM 5000 UNITS: 5000 INJECTION INTRAVENOUS; SUBCUTANEOUS at 05:17

## 2023-11-27 RX ADMIN — REGADENOSON 0.4 MG: 0.08 INJECTION, SOLUTION INTRAVENOUS at 09:02

## 2023-11-27 RX ADMIN — IPRATROPIUM BROMIDE 0.5 MG: 0.5 SOLUTION RESPIRATORY (INHALATION) at 06:42

## 2023-11-27 RX ADMIN — HEPARIN SODIUM 5000 UNITS: 5000 INJECTION INTRAVENOUS; SUBCUTANEOUS at 21:03

## 2023-11-27 RX ADMIN — HYDROCODONE BITARTRATE AND ACETAMINOPHEN 1 TABLET: 10; 325 TABLET ORAL at 17:48

## 2023-11-27 RX ADMIN — INSULIN LISPRO 3 UNITS: 100 INJECTION, SOLUTION INTRAVENOUS; SUBCUTANEOUS at 17:47

## 2023-11-27 NOTE — THERAPY EVALUATION
Patient Name: Liz Jiang  : 1965    MRN: [id number]                              Today's Date: 2023       Admit Date: 2023    Visit Dx:     ICD-10-CM ICD-9-CM   1. COPD exacerbation  J44.1 491.21   2. Acute on chronic respiratory failure with hypoxia and hypercapnia  J96.21 518.84    J96.22 786.09     799.02     Patient Active Problem List   Diagnosis    Chest pain    COPD (chronic obstructive pulmonary disease)    Tobacco abuse    GERD (gastroesophageal reflux disease)    Anxiety    Arthritis    Nausea and vomiting    Generalized abdominal pain    Right upper quadrant pain    Gallbladder disease    Abnormal biliary HIDA scan    Dyslipidemia    Tachycardia    Anal cancer    Port-A-Cath in place    Debility    Chronic respiratory failure with hypoxia    Acute respiratory failure with hypoxia     Past Medical History:   Diagnosis Date    Acid reflux disease     Anal cancer 2019    Arthritis     Asthma, extrinsic     Cholelithiasis     Chronic headaches     COPD (chronic obstructive pulmonary disease)     CVA (cerebral vascular accident)     w/ right sided deficit    CVA (cerebral vascular accident)     Diabetes mellitus     only has high blood sugar when on steriods    Dyslipidemia 2018    History of radiation therapy 2020    Whole pelvis/anal mass    Nausea and vomiting 2016    Obstructive sleep apnea treated with BiPAP     On home oxygen therapy     2L     Sleep apnea, obstructive      Past Surgical History:   Procedure Laterality Date    ABDOMINAL SURGERY      CARDIAC CATHETERIZATION      CAROTID ENDARTERECTOMY Left 2018    CHOLECYSTECTOMY WITH INTRAOPERATIVE CHOLANGIOGRAM N/A 2017    Procedure: CHOLECYSTECTOMY LAPAROSCOPIC INTRAOPERATIVE CHOLANGIOGRAM;  Surgeon: Carolin Marie MD;  Location: [facility];  Service:     COLONOSCOPY      HYSTERECTOMY      for heavy bleeding/ complete    KIDNEY STONE SURGERY      TUBAL ABDOMINAL LIGATION      VENOUS ACCESS DEVICE  (PORT) INSERTION Left 12/17/2019    Procedure: INSERTION VENOUS ACCESS DEVICE;  Surgeon: Carolin Marie MD;  [place] [facility];  Service: General      General Information       Row Name 11/27/23 1526          OT Time and Intention    Document Type evaluation  -     Mode of Treatment individual therapy;occupational therapy  -       Row Name 11/27/23 1526          General Information    Patient Profile Reviewed yes  -     Prior Level of Function independent:;ADL's;all household mobility  reports intermittent assist required recently due to hospitalizations; recent IPR and home health services  -     Existing Precautions/Restrictions fall  -     Barriers to Rehab previous functional deficit  -       Row Name 11/27/23 1526          Occupational Profile    Reason for Services/Referral (Occupational Profile) Patient admitted to [facility] on 11/18/2023. She was referred for OT evaluation due to change in functional performance with ADLs, functional mobility, and/or transfers.  -       Row Name 11/27/23 1526          Living Environment    People in Home alone  -[facility] Name 11/27/23 1526          Cognition    Orientation Status (Cognition) oriented x 3  -       Row Name 11/27/23 1526          Safety Issues, Functional Mobility    Impairments Affecting Function (Mobility) endurance/activity tolerance;shortness of breath;strength  -               User Key  (r) = Recorded By, (t) = Taken By, (c) = Cosigned By      Initials Name Provider Type     Angela Kruger, MICHAEL Occupational Therapist                     Mobility/ADL's       Row Name 11/27/23 1528          Bed Mobility    Comment, (Bed Mobility) Patient declined stating she was too tired and had not has sleep, but up to BSC with staff per patient  -[facility] Name 11/27/23 1528          Transfers    Comment, (Transfers) unable to tolerate  -       Row Name 11/27/23 1528          Activities of Daily Living    BADL  Assessment/Intervention bathing;upper body dressing;lower body dressing;grooming;toileting  -[facility] Name 11/27/23 1528          Bathing Assessment/Intervention    [place] Level (Bathing) bathing skills;maximum assist (25% patient effort)  -KP       Row Name 11/27/23 1528          Upper Body Dressing Assessment/Training    [place] Level (Upper Body Dressing) upper body dressing skills;maximum assist (25% patient effort)  -KP       Row Name 11/27/23 1528          Lower Body Dressing Assessment/Training    [place] Level (Lower Body Dressing) lower body dressing skills;maximum assist (25% patient effort);dependent (less than 25% patient effort)  -[facility] Name 11/27/23 1528          Grooming Assessment/Training    [place] Level (Grooming) grooming skills;maximum assist (25% patient effort)  -KP       Row Name 11/27/23 1528          Toileting Assessment/Training    [place] Level (Toileting) toileting skills;maximum assist (25% patient effort)  [facility]               User Key  (r) = Recorded By, (t) = Taken By, (c) = Cosigned By      Initials Name Provider Type     Angela Kruger OT Occupational Therapist                   Obj/Interventions       [facility] Name 11/27/23 1529          Sensory Assessment (Somatosensory)    Sensory Assessment (Somatosensory) UE sensation intact  -KP       Row Name 11/27/23 1529          Vision Assessment/Intervention    Visual Impairment/Limitations WFL  -KP       Row Name 11/27/23 1529          Range of Motion Comprehensive    General Range of Motion bilateral upper extremity ROM WFL  -KP       Row Name 11/27/23 1529          Strength Comprehensive (MMT)    Comment, General Manual Muscle Testing (MMT) Assessment 3-/5 in BUEs  -KP       Row Name 11/27/23 1529          Motor Skills    Motor Skills coordination;functional endurance  -     Coordination WFL  -     Functional Endurance poor  -               User Key  (r) = Recorded By, (t) = Taken By, (c) =  Cosigned By      Initials Name Provider Type    Angela Lange OT Occupational Therapist                   Goals/Plan       Row Name 11/27/23 1540          Transfer Goal 1 (OT)    Activity/Assistive Device (Transfer Goal 1, OT) toilet  -     [place] Level/Cues Needed (Transfer Goal 1, OT) modified independence  -     Time Frame (Transfer Goal 1, OT) by discharge  -       Row Name 11/27/23 1541          Dressing Goal 1 (OT)    Activity/Device (Dressing Goal 1, OT) dressing skills, all  -     [place]/Cues Needed (Dressing Goal 1, OT) modified independence  -     Time Frame (Dressing Goal 1, OT) by discharge  -       Row Name 11/27/23 1541          Problem Specific Goal 1 (OT)    Problem Specific Goal 1 (OT) Patient will perform sustained activity X15 minutes to promote functional endurance/activity tolernace needed for daily routine.  -     Time Frame (Problem Specific Goal 1, OT) by discharge  -       Row Name 11/27/23 1549          Therapy Assessment/Plan (OT)    Planned Therapy Interventions (OT) activity tolerance training;BADL retraining;adaptive equipment training;transfer/mobility retraining;occupation/activity based interventions;strengthening exercise;ROM/therapeutic exercise;patient/caregiver education/training  -               User Key  (r) = Recorded By, (t) = Taken By, (c) = Cosigned By      Initials Name Provider Type    Angela Lange OT Occupational Therapist                   Clinical Impression       Row Name 11/27/23 1135          Pain Assessment    Pretreatment Pain Rating 0/10 - no pain  -     Posttreatment Pain Rating 0/10 - no pain  -       Row Name 11/27/23 153          Plan of Care Review    Plan of Care Reviewed With patient  -     Progress no change  -     Outcome Evaluation Patient seen for OT evaluation. She presents with functional limitations including generalized weakness, impaired activity tolerance/functional endurance, and shortness of  breath. She would benfeit from ongoing OT services by a licensed therapist to promote highest level of independence and safety with daily occupations.  -       Row Name 11/27/23 1530          Therapy Assessment/Plan (OT)    Patient/Family Therapy Goal Statement (OT) return home  -     Criteria for Skilled Therapeutic Interventions Met (OT) yes;meets criteria;skilled treatment is necessary  -     Therapy Frequency (OT) 3 times/wk  3-5x/week as able and available to promote functional progress  -     Predicted Duration of Therapy Intervention (OT) discharge  -       Row Name 11/27/23 1530          Therapy Plan Review/Discharge Plan (OT)    Anticipated Discharge Disposition (OT) --  TBD  -       Row Name 11/27/23 1530          Positioning and Restraints    Pre-Treatment Position in bed  -     Post Treatment Position bed  -     In Bed fowlers;call light within reach;encouraged to call for assist  -               User Key  (r) = Recorded By, (t) = Taken By, (c) = Cosigned By      Initials Name Provider Type    Angela Lange, OT Occupational Therapist                   Outcome Measures    No documentation.                     OT Recommendation and Plan  Planned Therapy Interventions (OT): activity tolerance training, BADL retraining, adaptive equipment training, transfer/mobility retraining, occupation/activity based interventions, strengthening exercise, ROM/therapeutic exercise, patient/caregiver education/training  Therapy Frequency (OT): 3 times/wk (3-5x/week as able and available to promote functional progress)  Plan of Care Review  Plan of Care Reviewed With: patient  Progress: no change  Outcome Evaluation: Patient seen for OT evaluation. She presents with functional limitations including generalized weakness, impaired activity tolerance/functional endurance, and shortness of breath. She would benfeit from ongoing OT services by a licensed therapist to promote highest level of independence and  safety with daily occupations.     Time Calculation:         Time Calculation- OT       Row Name 11/27/23 1542             Time Calculation- OT    OT Received On 11/27/23  -                User Key  (r) = Recorded By, (t) = Taken By, (c) = Cosigned By      Initials Name Provider Type    Angela Lange OT Occupational Therapist                  Therapy Charges for Today       Code Description Service Date Service Provider Modifiers Qty    58290475815 HC OT EVAL MOD COMPLEXITY 4 11/27/2023 Angela Kruger OT GO 1                 Angela Kruger OT  11/27/2023

## 2023-11-27 NOTE — PLAN OF CARE
Goal Outcome Evaluation:   Patient has been compliant with care and medications as ordered. Patient is currently resting in bed. No S&S of distress noted at this time. Patient has complained on pain, PRN medication given as ordered. VSS. Wound care done this shift. Will continue the plan of care.

## 2023-11-27 NOTE — PROGRESS NOTES
[facility] HOSPITALIST PROGRESS NOTE     Patient Identification:  Name:  Liz Jiang  Age:  57 y.o.  Sex:  female  :  1965  MRN:  [id number]  Visit Number:  [id number]  ROOM: [facility]/     Primary Care Provider:  Tabitha Ron APRN    Length of stay in inpatient status:  9    Subjective     Chief Compliant:    Chief Complaint   Patient presents with    Shortness of Breath       History of Presenting Illness:    Patient notes device rep has given her new BiPAP with recommended settings. She reports she has not been out of bed in last couple of days and is not comfortable going home just yet. She would like to work another day with therapy and decide.     ROS:  Otherwise 10 point ROS negative other than documented above in HPI.     Objective     Current Hospital Meds:aspirin, 324 mg, Oral, Once  atorvastatin, 40 mg, Oral, Nightly  budesonide-formoterol, 2 puff, Inhalation, BID - RT  clopidogrel, 75 mg, Oral, Daily  fluticasone, 2 spray, Nasal, Daily  furosemide, 20 mg, Oral, Daily  heparin (porcine), 5,000 Units, Subcutaneous, Q8H  insulin lispro, 2-7 Units, Subcutaneous, 4x Daily AC & at Bedtime  ipratropium, 0.5 mg, Nebulization, 4x Daily - RT  isosorbide mononitrate, 30 mg, Oral, Q24H  levothyroxine, 25 mcg, Oral, Q AM  methylPREDNISolone sodium succinate, 20 mg, Intravenous, Daily  metoprolol tartrate, 50 mg, Oral, BID  senna-docusate sodium, 2 tablet, Oral, BID  sodium chloride, 10 mL, Intravenous, Q12H  sodium chloride, 10 mL, Intravenous, Q12H    Pharmacy Consult,         Current Antimicrobial Therapy:  Anti-Infectives (From admission, onward)      None          Current Diuretic Therapy:  Diuretics (From admission, onward)      Ordered     Dose/Rate Route Frequency Start Stop    23 0734  furosemide (LASIX) injection 40 mg        Ordering Provider: Elijah Hope MD    40 mg Intravenous Once 23 0900 23 0853    23 1450  furosemide (LASIX) injection 40 mg         Ordering Provider: Elijah Hope MD    40 mg Intravenous Once 11/20/23 1500 11/20/23 1531    11/19/23 0916  furosemide (LASIX) tablet 20 mg        Ordering Provider: Faviola Mejia DO    20 mg Oral Daily 11/19/23 1015      11/18/23 1306  furosemide (LASIX) injection 80 mg        Ordering Provider: Ruby Dia PA    80 mg Intravenous Once 11/18/23 1322 11/18/23 1328          ----------------------------------------------------------------------------------------------------------------------  Vital Signs:  Temp:  [97.5 °F (36.4 °C)-98.4 °F (36.9 °C)] 98.2 °F (36.8 °C)  Heart Rate:  [] 75  Resp:  [18-22] 20  BP: (104-151)/(66-83) 105/66  SpO2:  [93 %-96 %] 96 %  on  Flow (L/min):  [4] 4;   Device (Oxygen Therapy): nasal cannula  Body mass index is 33.39 kg/m².    Wt Readings from Last 3 Encounters:   11/27/23 88.2 kg (194 lb 8 oz)   11/08/23 81.6 kg (180 lb)   10/28/23 81.6 kg (180 lb)     Intake & Output (last 3 days)         11/24 0701 11/25 0700 11/25 0701 11/26 0700 11/26 0701 11/27 0700 11/27 0701 11/28 0700    P.O. 360 990 480 840    Total Intake(mL/kg) 360 (4) 990 (11.2) 480 (5.4) 840 (9.5)    Urine (mL/kg/hr) 3100 (1.5) 1800 (0.8) 900 (0.4)     Stool 0  0     Total Output 3100 1800 900     Net -2740 -810 -420 +840            Urine Unmeasured Occurrence 1 x 2 x      Stool Unmeasured Occurrence 1 x 0 x  2 x          Diet: Cardiac Diets, Diabetic Diets; Healthy Heart (2-3 Na+); Consistent Carbohydrate; Texture: Regular Texture (IDDSI 7); Fluid Consistency: Thin (IDDSI 0)  ----------------------------------------------------------------------------------------------------------------------  Physical exam:  Constitutional:  Well-developed and well-nourished.  No respiratory distress. Obese.       HENT:  Head:  Normocephalic and atraumatic.  Mouth:  Moist mucous membranes.    Eyes:  Conjunctivae and EOM are normal. No scleral icterus.    Neck:  Neck supple.  No JVD present.    Cardiovascular:   "Normal rate, regular rhythm and normal heart sounds with no murmur.  Pulmonary/Chest:  No respiratory distress, no wheezes, no crackles, with normal breath sounds and good air movement.  Abdominal:  Soft.  Bowel sounds are normal.  No distension and no tenderness.   Musculoskeletal:  No edema, no tenderness, and no deformity.  No red or swollen joints anywhere.    Neurological:  Alert and oriented to person, place, and time.  No cranial nerve deficit.  No tongue deviation.  No facial droop.  No slurred speech.   Skin:  Skin is warm and dry. No rash noted. No pallor.   Peripheral vascular:  Pulses in all 4 extremities with no clubbing, no cyanosis, no edema.  ----------------------------------------------------------------------------------------------------------------------  Tele:    ----------------------------------------------------------------------------------------------------------------------  Results from last 7 days   Lab Units 11/27/23  0632 11/26/23 0107 11/22/23  0027   WBC 10*3/mm3 22.02* 17.95* 12.76*   HEMOGLOBIN g/dL 13.7 13.8 13.6   HEMATOCRIT % 46.9* 47.4* 46.3   MCV fL 98.5* 99.6* 96.9   MCHC g/dL 29.2* 29.1* 29.4*   PLATELETS 10*3/mm3 160 180 213         Results from last 7 days   Lab Units 11/26/23 0107 11/24/23  1924   SODIUM mmol/L 137 139   POTASSIUM mmol/L 4.6 5.1   MAGNESIUM mg/dL 2.5 2.2   CHLORIDE mmol/L 90* 92*   CO2 mmol/L 38.2* 39.2*   BUN mg/dL 25* 30*   CREATININE mg/dL 0.50* 0.49*   CALCIUM mg/dL 9.2 9.6   GLUCOSE mg/dL 229* 324*   Estimated Creatinine Clearance: 131.8 mL/min (A) (by C-G formula based on SCr of 0.5 mg/dL (L)).  No results found for: \"AMMONIA\"              Glucose   Date/Time Value Ref Range Status   11/27/2023 1626 247 (H) 70 - 130 mg/dL Final   11/27/2023 1030 191 (H) 70 - 130 mg/dL Final   11/27/2023 0639 106 70 - 130 mg/dL Final   11/26/2023 2115 190 (H) 70 - 130 mg/dL Final   11/26/2023 1653 269 (H) 70 - 130 mg/dL Final   11/26/2023 1142 137 (H) 70 - 130 " "mg/dL Final     Lab Results   Component Value Date    TSH 5.170 (H) 11/18/2023    FREET4 1.09 11/18/2023     No results found for: \"PREGTESTUR\", \"PREGSERUM\", \"HCG\", \"HCGQUANT\"  Pain Management Panel  More data may exist         Latest Ref Rng & Units 11/19/2023 2/23/2022   Pain Management Panel   Amphetamine, Urine Qual Negative Negative  Negative    Barbiturates Screen, Urine Negative Negative  Negative    Benzodiazepine Screen, Urine Negative Positive  Positive    Buprenorphine, Screen, Urine Negative Negative  Negative    Cocaine Screen, Urine Negative Negative  Negative    Fentanyl, Urine Negative Negative  -   Methadone Screen , Urine Negative Negative  Negative    Methamphetamine, Ur Negative Negative  Negative      Brief Urine Lab Results  (Last result in the past 365 days)        Color   Clarity   Blood   Leuk Est   Nitrite   Protein   CREAT   Urine HCG        11/18/23 1447 Yellow   Clear   Negative   Negative   Negative   Negative                 No results found for: \"BLOODCX\"  No results found for: \"URINECX\"  No results found for: \"WOUNDCX\"  No results found for: \"STOOLCX\"  No results found for: \"RESPCX\"  No results found for: \"AFBCX\"        I have personally looked at the labs and they are summarized above.  ----------------------------------------------------------------------------------------------------------------------  Detailed radiology reports for the last 24 hours:    Imaging Results (Last 24 Hours)       Procedure Component Value Units Date/Time    XR Chest 1 View [[id number]] Collected: 11/27/23 0836     Updated: 11/27/23 0839    Narrative:      XR CHEST 1 VW-     CLINICAL INDICATION: sob; J44.1-Chronic obstructive pulmonary disease  with (acute) exacerbation; J96.21-Acute and chronic respiratory failure  with hypoxia; J96.22-Acute and chronic respiratory failure with  hypercapnia        COMPARISON: 11/18/2023     TECHNIQUE: Single frontal view of the chest.     FINDINGS:     LUNGS: Lungs are " adequately aerated.      HEART AND MEDIASTINUM: Heart and mediastinal contours are unremarkable        SKELETON: Bony and soft tissue structures are unremarkable.             Impression:      No radiographic evidence of acute cardiac or pulmonary disease.           This report was finalized on 11/27/2023 8:37 AM by Dr. Dagoberto Hennessy MD.             Assessment & Plan    #Acute on chronic hypoxic respiratory failure   #Acute on chronic hypercapnic respiratory failure   #Acute COPD exacerbation   #Hx of STEVEN  #Medical noncompliance  -At admission viral panel was negative, D-dimer within normal limits, and no new consolidations seen on imaging at admission.  -Patient has severe COPD that requires home NIPPV but she has been unable to tolerate wearing it regularly.  -ABG slightly improved.  Continue BiPAP nightly and as needed.  Continue supplemental oxygen with goal oxygen saturation 88 to 92%  -Pulmonology recommended initial BiPAP settings of 22/6 with a backup rate of 14 and supplemental oxygen titrated to oxygen saturation of 92%.  Patient is tolerating new machine and BiPAP settings better but has desaturation to mid 80s while sleeping. Will await further adjustments to settings per Pulmonology and DME representative. Likely will increase EPAP when representative is available to do so (they are managing the device rather than hospital staff and have provided patient new device with recommended BiPAP settings)  -Pulmonology following, appreciate assistance.  -Continue steroids, Symbicort, ipratropium. Continue to wean steroid dose as respiratory status is improving. Currently receiving solu-medrol 20mg IV daily per Pulmonology. Encourage aggressive pulmonary toilet, out of bed as much as possible, incentive spirometry, cough/deep breathing. Will likely need taper at discharge.   - PT consult requested due to generalized weakness and poor exercise tolerance, appreciate assistance. May need short term rehab vs home  "health services.     #Chronically elevated troponin  #Frequent pSVT  -Similar to previous labs.  No new ischemic changes noted on EKG at admission.  -Patient reported to me that she was having \"chest pressure\" with using the trilogy machine at home, but at no other times, and she says that it has not been nearly as bad when using the BiPAP here. She denies any chest pressure/pain with exertion or at any time other than when using NIPPV. This has improved.  -Based on description of symptoms I suspect that patient's pain is due to her severe COPD and use of noninvasive positive pressure ventilation rather than a cardiac source, but she will need ischemic evaluation with stress test when able to tolerate it.  - Cardiology planning for stress test Monday  - Metoprolol tartrate increased to 50mg BID  - Continue telemetry monitoring      CHRONIC MEDICAL PROBLEMS     #HFpEF/grade I diastolic dysfunction  - Not felt to be in acute exacerbation clinically.   - Previous TTE from 10/2023 reviewed.  Monitor volume status with I&Os, daily weights.  Continue home lasix 20mg PO daily.     #Essential hypertension  - Well controlled, continue home regimen      #Hyperlipidemia  -Continue statin.      #Type II diabetes mellitus, non insulin dependent  - A1C 6.6%.   - Glucose has been elevated in the 200-300 range. Monitor with accuchecks and cover with sliding scale insulin. Increase SSI.      #History of CVA  - Details unknown.  Continue home medication as appropriate. Does not appear to have residual deficits      #History of carotid artery disease s/p left CEA in the past  - Continue home medications      #Hypothyroidism  - TSH WNL. Continue home levothyroxine.      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  #Former smoker     Code status: Full     Dispo: Pending clinical improvement. Now that home machine has been optimized, await PT to reevaluate patient.     VTE Prophylaxis:   Mechanical Order History:       None          Pharmalogical Order History:        Ordered     Dose Route Frequency Stop    11/18/23 4952  heparin (porcine) 5000 UNIT/ML injection 5,000 Units         5,000 Units SC Every 8 Hours Scheduled --                    Cliff Castaneda MD  [facility]  11/27/23  18:34 EST

## 2023-11-27 NOTE — CASE MANAGEMENT/SOCIAL WORK
Continued Stay Note  PROSPER Altman     Patient Name: Liz Jiang  MRN: [id number]  Today's Date: 11/27/2023    Admit Date: 11/18/2023    Plan: Order noted for Bi-PAP adjustment; this CM notified Xiomara at [facility] whom relayed they will obtian order from [facility] and set-up an appointment at pt's home with RT to adjust once pt is discharged.  Vanda Gifford RN

## 2023-11-27 NOTE — CASE MANAGEMENT/SOCIAL WORK
Discharge Planning Assessment   [place]     Patient Name: Liz Jiang  MRN: [id number]  Today's Date: 11/27/2023    Admit Date: 11/18/2023    Plan: SS spoke with pt at bedside.  Pt lives at home alone at [address] and plans to return home at discharge.  Pt has utilized [facility] in past and will need home health referral at discharge. SS will continue to follow and assist with discharge needs.       Discharge Plan       Row Name 11/27/23 1730       Plan    Plan SS spoke with pt at bedside.  Pt lives at home alone at [address] and plans to return home at discharge.  Pt has utilized [facility] in past and will need home health referral at discharge. SS will continue to follow and assist with discharge needs.                  Continued Care and Services - Admitted Since 11/18/2023    Coordination has not been started for this encounter.       Selected Continued Care - Prior Encounters Includes continued care and service providers with selected services from prior encounters from 8/20/2023 to 11/27/2023      Discharged on 9/27/2023 Admission date: 9/14/2023 - Discharge disposition: Home-Health Care [facility]      Home Medical Care       Service Provider Selected Services Address Phone Fax Patient Preferred    [facility] ,  Home Nursing [address] [place] [id number] [phone]                           Expected Discharge Date and Time       Expected Discharge Date Expected Discharge Time    Nov 28, 2023           TONY MyersW

## 2023-11-27 NOTE — PROGRESS NOTES
"Progress Note Pulmonary      Subjective no new complaints.  Reported that she desaturated at night while on present home BiPAP setting    Interval History:   As above      Review of Systems:    Reviewed ; unchanged       Vital Signs  Temp:  [97.5 °F (36.4 °C)-98.6 °F (37 °C)] 98 °F (36.7 °C)  Heart Rate:  [] 102  Resp:  [18-20] 20  BP: (104-151)/(70-83) 151/83  Body mass index is 33.39 kg/m².    Intake/Output Summary (Last 24 hours) at 11/27/2023 1247  Last data filed at 11/27/2023 1236  Gross per 24 hour   Intake 480 ml   Output 900 ml   Net -420 ml     I/O this shift:  In: 240 [P.O.:240]  Out: -     Physical Exam:  General- normal in appearance, not in any acute distress    HEENT- pupils equally reactive to light, normal in size, no scleral icterus    Neck- supple    No JVD, no carotid bruit    Respiratory-bilateral air entry, occasional wheezing only on forced exhalation otherwise clear to auscultation    Cardiovascular-  Normal S1 and S2. No S3, S4 or murmurs.    GI-nontender nondistended bowel sounds positive    CNS-alert oriented x3, grossly nonfocal    Extremities- pulses normal bilaterally , no clubbing and edema        Results Review:      Results from last 7 days   Lab Units 11/27/23  0632 11/26/23  0107 11/22/23  0027   WBC 10*3/mm3 22.02* 17.95* 12.76*   HEMOGLOBIN g/dL 13.7 13.8 13.6   PLATELETS 10*3/mm3 160 180 213     Results from last 7 days   Lab Units 11/26/23  0107 11/24/23  1924   SODIUM mmol/L 137 139   POTASSIUM mmol/L 4.6 5.1   CHLORIDE mmol/L 90* 92*   CO2 mmol/L 38.2* 39.2*   BUN mg/dL 25* 30*   CREATININE mg/dL 0.50* 0.49*   CALCIUM mg/dL 9.2 9.6   GLUCOSE mg/dL 229* 324*   MAGNESIUM mg/dL 2.5 2.2     Lab Results   Component Value Date    INR 0.94 11/18/2023    INR 0.95 10/12/2023    INR 0.90 03/27/2017    PROTIME 13.1 11/18/2023    PROTIME 13.1 10/12/2023    PROTIME 9.9 03/27/2017           Invalid input(s): \"PROT\", \"LABALBU\"      Imaging Results (Last 24 Hours)       Procedure " Component Value Units Date/Time    XR Chest 1 View [[id number]] Collected: 11/27/23 0836     Updated: 11/27/23 0839    Narrative:      XR CHEST 1 VW-     CLINICAL INDICATION: sob; J44.1-Chronic obstructive pulmonary disease  with (acute) exacerbation; J96.21-Acute and chronic respiratory failure  with hypoxia; J96.22-Acute and chronic respiratory failure with  hypercapnia        COMPARISON: 11/18/2023     TECHNIQUE: Single frontal view of the chest.     FINDINGS:     LUNGS: Lungs are adequately aerated.      HEART AND MEDIASTINUM: Heart and mediastinal contours are unremarkable        SKELETON: Bony and soft tissue structures are unremarkable.             Impression:      No radiographic evidence of acute cardiac or pulmonary disease.           This report was finalized on 11/27/2023 8:37 AM by Dr. Dagoberto Hennessy MD.                    aspirin, 324 mg, Oral, Once  atorvastatin, 40 mg, Oral, Nightly  budesonide-formoterol, 2 puff, Inhalation, BID - RT  clopidogrel, 75 mg, Oral, Daily  fluticasone, 2 spray, Nasal, Daily  furosemide, 20 mg, Oral, Daily  heparin (porcine), 5,000 Units, Subcutaneous, Q8H  insulin lispro, 2-7 Units, Subcutaneous, 4x Daily AC & at Bedtime  ipratropium, 0.5 mg, Nebulization, 4x Daily - RT  isosorbide mononitrate, 30 mg, Oral, Q24H  levothyroxine, 25 mcg, Oral, Q AM  methylPREDNISolone sodium succinate, 20 mg, Intravenous, Daily  metoprolol tartrate, 50 mg, Oral, BID  senna-docusate sodium, 2 tablet, Oral, BID  sodium chloride, 10 mL, Intravenous, Q12H  sodium chloride, 10 mL, Intravenous, Q12H      Pharmacy Consult,         Medication Review:     Assessment & Plan   #1 initial hospitalization for acute exacerbation of COPD-much improved.  #2: #2: OBstructive sleep apnea,  #3 chronic hypercapnic hypoxic respiratory failure.        Need to adjust the dose of BiPAP.  Try increasing EPAP by 2 cm to recruit alveoli.    Case management consulted.    continue nebs  Continue current doses of  steroids.  Continue diuretics to improve lung compliance and diffusion capacity.    X-ray chest from today reviewed.  No evolving airspace disease.      FiO2 requirement reviewed and adjusted to maintain saturation 88 to 92%.  Continue BiPAP overnight and as needed in the daytime for shortness of breath.  Continue appropriate prophylaxis                   Chris Shi MD  11/27/23  12:47 EST

## 2023-11-27 NOTE — PROGRESS NOTES
"   LOS: 9 days     Name: Liz Jiang  Age/Sex: 57 y.o. female  :  1965        PCP: Tabitha Ron APRN    Principal Problem:    Acute respiratory failure with hypoxia      Admission Information: Liz Jiang is a 57 y.o. female with chronic HFpEF, carotid artery disease status post left CEA, history of CVA, hypertension, diabetes mellitus, COPD, STEVEN on CPAP, and obesity.    Chief Complaint: Shortness of breath    Interval history: Patient stable overnight.  Underwent stress test today.  Results pending.    Subjective   Patient reports that she occasionally has chest pressure/ache in the center of her chest.  It lasts anywhere from 1 to 15 minutes.  She cannot give me any exacerbating or alleviating symptoms.  She cannot quantify the frequency of her symptoms, \"but is as common as clipping my nails.\"  She always feels short of breath and so cannot tell me if her chest pressure makes her more so.      Vital Signs  Vital Signs (last 72 hrs)          0700   0659  0700   0659  0700   0659  0700   1538   Most Recent      Temp (°F) 97.6 -  98.6    98.2 -  98.6    97.5 -  98.6      98     98 (36.7)  1031    Heart Rate 73 -  118    73 -  111    62 -  110    83 -  102     85  1329    Resp 15 -  21    16 -  20    12 -  20    20 -  22     20  1329    /72 -  141/87    112/74 -  161/84    104/70 -  129/82      151/83     151/83  1031    SpO2 (%) 88 -  99    80 -  98    79 -  97    94 -  95     95  1319    Flow (L/min) 4 -  5      4      4      4     4  1319          Temp:  [97.5 °F (36.4 °C)-98.6 °F (37 °C)] 98 °F (36.7 °C)  Heart Rate:  [] 85  Resp:  [18-22] 20  BP: (104-151)/(70-83) 151/83  Body mass index is 33.39 kg/m².      Intake/Output Summary (Last 24 hours) at 2023 1538  Last data filed at 2023 1236  Gross per 24 hour   Intake 240 ml   Output 900 ml   Net -660 ml       Vitals and nursing note reviewed. "   Constitutional:       Appearance: Not in distress. Chronically ill-appearing.      Interventions: Nasal cannula in place.      Comments: Appears older than stated age   Pulmonary:      Effort: Pulmonary effort is normal.      Breath sounds: Normal breath sounds.   Cardiovascular:      Normal rate. Regular rhythm.      Murmurs: There is no murmur.   Edema:     Peripheral edema absent.   Skin:     General: Skin is warm and dry.   Neurological:      Mental Status: Alert.         Telemetry: Sinus rhythm 90s to 100s with PACs and PVCs and a right-sided bundle branch block     Results Review:   Stress test 11/27/2023  Interpretation Summary    A pharmacological stress test was performed using regadenoson.    Findings consistent with a normal ECG stress test.    Myocardial perfusion imaging indicates a small-sized, mild degree of ischemia located in the inferior wall.    Normal LV cavity size. Normal LV wall motion noted.    Left ventricular ejection fraction is hyperdynamic (Calculated EF > 70%).    Impressions are consistent with a low to intermediate risk study.  Results from last 7 days   Lab Units 11/27/23  0632 11/26/23  0107 11/22/23  0027   WBC 10*3/mm3 22.02* 17.95* 12.76*   HEMOGLOBIN g/dL 13.7 13.8 13.6   PLATELETS 10*3/mm3 160 180 213     Results from last 7 days   Lab Units 11/26/23  0107 11/24/23  1924   SODIUM mmol/L 137 139   POTASSIUM mmol/L 4.6 5.1   CHLORIDE mmol/L 90* 92*   CO2 mmol/L 38.2* 39.2*   BUN mg/dL 25* 30*   CREATININE mg/dL 0.50* 0.49*   CALCIUM mg/dL 9.2 9.6   GLUCOSE mg/dL 229* 324*                 I reviewed the patient's new clinical results.  I reviewed the patient's new imaging results and agree with the interpretation.  I personally viewed and interpreted the patient's EKG/Telemetry data      Medication Review:   aspirin, 324 mg, Oral, Once  atorvastatin, 40 mg, Oral, Nightly  budesonide-formoterol, 2 puff, Inhalation, BID - RT  clopidogrel, 75 mg, Oral, Daily  fluticasone, 2 spray,  Nasal, Daily  furosemide, 20 mg, Oral, Daily  heparin (porcine), 5,000 Units, Subcutaneous, Q8H  insulin lispro, 2-7 Units, Subcutaneous, 4x Daily AC & at Bedtime  ipratropium, 0.5 mg, Nebulization, 4x Daily - RT  isosorbide mononitrate, 30 mg, Oral, Q24H  levothyroxine, 25 mcg, Oral, Q AM  methylPREDNISolone sodium succinate, 20 mg, Intravenous, Daily  metoprolol tartrate, 50 mg, Oral, BID  senna-docusate sodium, 2 tablet, Oral, BID  sodium chloride, 10 mL, Intravenous, Q12H  sodium chloride, 10 mL, Intravenous, Q12H      Pharmacy Consult,         Assessment:  Paroxysmal SVT  Chest pain, atypical  Chronic HFpEF, appears compensated  Essential hypertension  Dyslipidemia  Carotid artery disease status post left CEA in the past data deficient  History of CVA      Recommendations:  Tolerating increased dose of metoprolol.  Continues to have PACs and PVCs.  Stress test showed mild inferior wall ischemia,  Risks and benefits of the procedure discussed with the patient   Risks specifically mentioned included bruising/hematoma, bleeding, infection, allergic reaction to medications, renal injury, dysrhythmia, blood clot, heart attack, and stroke.  She is agreeable to the procedure if Dr. Buckner decides it is the best course of action.  proBNP normal, echo with hyperdynamic EF  Blood pressure fluctuant ranging from systolic of 104-151 today  Statin therapy has been initiated    Stress test mildly abnormal.  Will discuss with Dr. Buckner.  Further decision making based on his assessment and recommendations.    I discussed the patients findings and my recommendations with patient.      Electronically signed by HESHAM Gordillo, 11/27/23, 4:23 PM EST.

## 2023-11-27 NOTE — PROGRESS NOTES
"    Pulmonary and critical care following the patient for hypoxic respiratory failure and COPD exacerbation  Chief Complaint:  sob    Subjective   All the labs medications and the notes and vitals reviewed.  Patient resting in bed comfortably.  Responded well to diuretics.  Review of Systems:    No changes      Vital Signs  Temp:  [98.3 °F (36.8 °C)-98.6 °F (37 °C)] 98.3 °F (36.8 °C)  Heart Rate:  [] 104  Resp:  [12-20] 18  BP: (104-129)/(70-82) 110/75  Body mass index is 33.56 kg/m².    Intake/Output Summary (Last 24 hours) at 11/27/2023 0614  Last data filed at 11/26/2023 1330  Gross per 24 hour   Intake 480 ml   Output --   Net 480 ml     No intake/output data recorded.    Physical Exam:   General-not in any respiratory distress, wearing nasal cannula oxygen HEENT-PERRLA  Neck-atraumatic    Respiratory-resting in bed comfortably wearing nasal cannula oxygen not in any respiratory distress  Cardiovascular-NSR  GI-nondistended    CNS-nonfocal    Musculoskeletal -no edema  Extremities- no obvious deformity noticed     Psychiatric-alert awake oriented  Skin- no visible rash         Results Review:     I reviewed the patient's new clinical results.  Results from last 7 days   Lab Units 11/26/23  0107 11/22/23  0027   WBC 10*3/mm3 17.95* 12.76*   HEMOGLOBIN g/dL 13.8 13.6   PLATELETS 10*3/mm3 180 213     Results from last 7 days   Lab Units 11/26/23  0107 11/24/23  1924   SODIUM mmol/L 137 139   POTASSIUM mmol/L 4.6 5.1   CHLORIDE mmol/L 90* 92*   CO2 mmol/L 38.2* 39.2*   BUN mg/dL 25* 30*   CREATININE mg/dL 0.50* 0.49*   CALCIUM mg/dL 9.2 9.6   GLUCOSE mg/dL 229* 324*   MAGNESIUM mg/dL 2.5 2.2     Lab Results   Component Value Date    INR 0.94 11/18/2023    INR 0.95 10/12/2023    INR 0.90 03/27/2017    PROTIME 13.1 11/18/2023    PROTIME 13.1 10/12/2023    PROTIME 9.9 03/27/2017           Invalid input(s): \"PROT\", \"LABALBU\"          Imaging Results (Last 24 Hours)       ** No results found for the last 24 hours. " **                 aspirin, 324 mg, Oral, Once  atorvastatin, 40 mg, Oral, Nightly  budesonide-formoterol, 2 puff, Inhalation, BID - RT  clopidogrel, 75 mg, Oral, Daily  fluticasone, 2 spray, Nasal, Daily  furosemide, 20 mg, Oral, Daily  heparin (porcine), 5,000 Units, Subcutaneous, Q8H  insulin lispro, 2-7 Units, Subcutaneous, 4x Daily AC & at Bedtime  ipratropium, 0.5 mg, Nebulization, 4x Daily - RT  isosorbide mononitrate, 30 mg, Oral, Q24H  levothyroxine, 25 mcg, Oral, Q AM  methylPREDNISolone sodium succinate, 20 mg, Intravenous, Daily  metoprolol tartrate, 50 mg, Oral, BID  senna-docusate sodium, 2 tablet, Oral, BID  sodium chloride, 10 mL, Intravenous, Q12H  sodium chloride, 10 mL, Intravenous, Q12H      Pharmacy Consult,         Medication Review:     Assessment & Plan   Acute exacerbation of COPD-continue nebs  Continue current doses of steroids.  Diuretics to improve lung compliance and diffusion capacity.  FiO2 requirement reviewed and adjusted to maintain saturation 88 to 92%.  Continue BiPAP overnight and as needed in the daytime for shortness of breath.    Desaturated overnight.  Using external BiPAP.  Will need to adjust EPAP to a higher level as patient still desaturating especially in the nighttime when she is asleep.  Leukocytosis-patient is clinically stable and improving.  This could be related to steroids.  We will repeat CBC in the morning.  Repeat chest x-ray in the morning.    Latest chest x-ray reviewed  Latest ABG reviewed.  Case discussed with Dr. Mejia and patient's nurse.  Continue appropriate prophylaxis  Case d/w nurse and team   This service is provided via audio video tele medicine   I am in [place] and patient is in [facility]               Acute respiratory failure with hypoxia               Elijah Hope MD  11/27/23  06:14 EST

## 2023-11-28 LAB
ANION GAP SERPL CALCULATED.3IONS-SCNC: 6.5 MMOL/L (ref 5–15)
BUN SERPL-MCNC: 22 MG/DL (ref 6–20)
BUN/CREAT SERPL: 71 (ref 7–25)
CALCIUM SPEC-SCNC: 9.4 MG/DL (ref 8.6–10.5)
CHLORIDE SERPL-SCNC: 92 MMOL/L (ref 98–107)
CO2 SERPL-SCNC: 36.5 MMOL/L (ref 22–29)
CREAT SERPL-MCNC: 0.31 MG/DL (ref 0.57–1)
DEPRECATED RDW RBC AUTO: 52.1 FL (ref 37–54)
EGFRCR SERPLBLD CKD-EPI 2021: 122.2 ML/MIN/1.73
ERYTHROCYTE [DISTWIDTH] IN BLOOD BY AUTOMATED COUNT: 14.6 % (ref 12.3–15.4)
GLUCOSE BLDC GLUCOMTR-MCNC: 103 MG/DL (ref 70–130)
GLUCOSE BLDC GLUCOMTR-MCNC: 137 MG/DL (ref 70–130)
GLUCOSE BLDC GLUCOMTR-MCNC: 303 MG/DL (ref 70–130)
GLUCOSE SERPL-MCNC: 157 MG/DL (ref 65–99)
HCT VFR BLD AUTO: 44.6 % (ref 34–46.6)
HGB BLD-MCNC: 13.5 G/DL (ref 12–15.9)
HYPOCHROMIA BLD QL: ABNORMAL
LYMPHOCYTES # BLD MANUAL: 1.54 10*3/MM3 (ref 0.7–3.1)
LYMPHOCYTES NFR BLD MANUAL: 5 % (ref 5–12)
MAGNESIUM SERPL-MCNC: 2.7 MG/DL (ref 1.6–2.6)
MCH RBC QN AUTO: 29.2 PG (ref 26.6–33)
MCHC RBC AUTO-ENTMCNC: 30.3 G/DL (ref 31.5–35.7)
MCV RBC AUTO: 96.3 FL (ref 79–97)
METAMYELOCYTES NFR BLD MANUAL: 4 % (ref 0–0)
MONOCYTES # BLD: 0.96 10*3/MM3 (ref 0.1–0.9)
MYELOCYTES NFR BLD MANUAL: 1 % (ref 0–0)
NEUTROPHILS # BLD AUTO: 15.81 10*3/MM3 (ref 1.7–7)
NEUTROPHILS NFR BLD MANUAL: 81 % (ref 42.7–76)
NEUTS BAND NFR BLD MANUAL: 1 % (ref 0–5)
PLAT MORPH BLD: NORMAL
PLATELET # BLD AUTO: 119 10*3/MM3 (ref 140–450)
PMV BLD AUTO: 11.1 FL (ref 6–12)
POTASSIUM SERPL-SCNC: 5.2 MMOL/L (ref 3.5–5.2)
RBC # BLD AUTO: 4.63 10*6/MM3 (ref 3.77–5.28)
SCAN SLIDE: NORMAL
SODIUM SERPL-SCNC: 135 MMOL/L (ref 136–145)
VARIANT LYMPHS NFR BLD MANUAL: 8 % (ref 19.6–45.3)
WBC NRBC COR # BLD AUTO: 19.28 10*3/MM3 (ref 3.4–10.8)

## 2023-11-28 PROCEDURE — 82948 REAGENT STRIP/BLOOD GLUCOSE: CPT

## 2023-11-28 PROCEDURE — 25010000002 HEPARIN (PORCINE) PER 1000 UNITS: Performed by: STUDENT IN AN ORGANIZED HEALTH CARE EDUCATION/TRAINING PROGRAM

## 2023-11-28 PROCEDURE — 99232 SBSQ HOSP IP/OBS MODERATE 35: CPT | Performed by: INTERNAL MEDICINE

## 2023-11-28 PROCEDURE — 99231 SBSQ HOSP IP/OBS SF/LOW 25: CPT | Performed by: INTERNAL MEDICINE

## 2023-11-28 PROCEDURE — 85025 COMPLETE CBC W/AUTO DIFF WBC: CPT | Performed by: INTERNAL MEDICINE

## 2023-11-28 PROCEDURE — 94761 N-INVAS EAR/PLS OXIMETRY MLT: CPT

## 2023-11-28 PROCEDURE — 85007 BL SMEAR W/DIFF WBC COUNT: CPT | Performed by: INTERNAL MEDICINE

## 2023-11-28 PROCEDURE — 94664 DEMO&/EVAL PT USE INHALER: CPT

## 2023-11-28 PROCEDURE — 80048 BASIC METABOLIC PNL TOTAL CA: CPT | Performed by: INTERNAL MEDICINE

## 2023-11-28 PROCEDURE — 63710000001 INSULIN LISPRO (HUMAN) PER 5 UNITS: Performed by: INTERNAL MEDICINE

## 2023-11-28 PROCEDURE — 25010000002 METHYLPREDNISOLONE PER 40 MG: Performed by: STUDENT IN AN ORGANIZED HEALTH CARE EDUCATION/TRAINING PROGRAM

## 2023-11-28 PROCEDURE — 83735 ASSAY OF MAGNESIUM: CPT | Performed by: INTERNAL MEDICINE

## 2023-11-28 PROCEDURE — 94799 UNLISTED PULMONARY SVC/PX: CPT

## 2023-11-28 RX ADMIN — LEVOTHYROXINE SODIUM 25 MCG: 25 TABLET ORAL at 05:54

## 2023-11-28 RX ADMIN — FUROSEMIDE 20 MG: 20 TABLET ORAL at 10:28

## 2023-11-28 RX ADMIN — METOPROLOL TARTRATE 50 MG: 50 TABLET, FILM COATED ORAL at 10:28

## 2023-11-28 RX ADMIN — FLUTICASONE PROPIONATE 2 SPRAY: 50 SPRAY, METERED NASAL at 10:29

## 2023-11-28 RX ADMIN — METHYLPREDNISOLONE SODIUM SUCCINATE 20 MG: 40 INJECTION, POWDER, FOR SOLUTION INTRAMUSCULAR; INTRAVENOUS at 10:53

## 2023-11-28 RX ADMIN — INSULIN LISPRO 6 UNITS: 100 INJECTION, SOLUTION INTRAVENOUS; SUBCUTANEOUS at 20:27

## 2023-11-28 RX ADMIN — ATORVASTATIN CALCIUM 40 MG: 40 TABLET, FILM COATED ORAL at 20:26

## 2023-11-28 RX ADMIN — HYDROCODONE BITARTRATE AND ACETAMINOPHEN 1 TABLET: 10; 325 TABLET ORAL at 14:34

## 2023-11-28 RX ADMIN — INSULIN LISPRO 7 UNITS: 100 INJECTION, SOLUTION INTRAVENOUS; SUBCUTANEOUS at 17:27

## 2023-11-28 RX ADMIN — Medication 10 ML: at 20:27

## 2023-11-28 RX ADMIN — IPRATROPIUM BROMIDE 0.5 MG: 0.5 SOLUTION RESPIRATORY (INHALATION) at 18:25

## 2023-11-28 RX ADMIN — IPRATROPIUM BROMIDE 0.5 MG: 0.5 SOLUTION RESPIRATORY (INHALATION) at 06:22

## 2023-11-28 RX ADMIN — LOPERAMIDE HYDROCHLORIDE 2 MG: 2 CAPSULE ORAL at 10:29

## 2023-11-28 RX ADMIN — BUDESONIDE AND FORMOTEROL FUMARATE DIHYDRATE 2 PUFF: 160; 4.5 AEROSOL RESPIRATORY (INHALATION) at 06:22

## 2023-11-28 RX ADMIN — IPRATROPIUM BROMIDE 0.5 MG: 0.5 SOLUTION RESPIRATORY (INHALATION) at 12:59

## 2023-11-28 RX ADMIN — Medication 10 ML: at 10:29

## 2023-11-28 RX ADMIN — ISOSORBIDE MONONITRATE 30 MG: 30 TABLET, EXTENDED RELEASE ORAL at 10:29

## 2023-11-28 RX ADMIN — BUDESONIDE AND FORMOTEROL FUMARATE DIHYDRATE 2 PUFF: 160; 4.5 AEROSOL RESPIRATORY (INHALATION) at 18:25

## 2023-11-28 RX ADMIN — HEPARIN SODIUM 5000 UNITS: 5000 INJECTION INTRAVENOUS; SUBCUTANEOUS at 14:17

## 2023-11-28 RX ADMIN — METOPROLOL TARTRATE 50 MG: 50 TABLET, FILM COATED ORAL at 20:26

## 2023-11-28 RX ADMIN — HEPARIN SODIUM 5000 UNITS: 5000 INJECTION INTRAVENOUS; SUBCUTANEOUS at 21:22

## 2023-11-28 RX ADMIN — HEPARIN SODIUM 5000 UNITS: 5000 INJECTION INTRAVENOUS; SUBCUTANEOUS at 05:54

## 2023-11-28 RX ADMIN — IPRATROPIUM BROMIDE 0.5 MG: 0.5 SOLUTION RESPIRATORY (INHALATION) at 00:12

## 2023-11-28 RX ADMIN — HYDROCODONE BITARTRATE AND ACETAMINOPHEN 1 TABLET: 10; 325 TABLET ORAL at 05:54

## 2023-11-28 RX ADMIN — CLOPIDOGREL BISULFATE 75 MG: 75 TABLET, FILM COATED ORAL at 10:29

## 2023-11-28 RX ADMIN — HYDROCODONE BITARTRATE AND ACETAMINOPHEN 1 TABLET: 10; 325 TABLET ORAL at 20:26

## 2023-11-28 NOTE — PROGRESS NOTES
[facility] HOSPITALIST PROGRESS NOTE     Patient Identification:  Name:  Liz Jiang  Age:  57 y.o.  Sex:  female  :  1965  MRN:  [id number]  Visit Number:  [id number]  ROOM: [address] [place] [address]     Primary Care Provider:  Tabitha Ron APRN    Length of stay in inpatient status:  10    Subjective     Chief Compliant:    Chief Complaint   Patient presents with    Shortness of Breath       History of Presenting Illness:    Patient reports tolerating BiPAP 3 hours last night. She was able to tolerate without difficulty but noted she just was not able to sleep with distractions in hospital. She noted she would like ot try longer tonight before going home. Cardiology initially planned C for today but now scheduled for tomorrow.     ROS:  Otherwise 10 point ROS negative other than documented above in HPI.     Objective     Current Hospital Meds:aspirin, 324 mg, Oral, Once  atorvastatin, 40 mg, Oral, Nightly  budesonide-formoterol, 2 puff, Inhalation, BID - RT  clopidogrel, 75 mg, Oral, Daily  fluticasone, 2 spray, Nasal, Daily  furosemide, 20 mg, Oral, Daily  heparin (porcine), 5,000 Units, Subcutaneous, Q8H  insulin lispro, 2-9 Units, Subcutaneous, 4x Daily AC & at Bedtime  ipratropium, 0.5 mg, Nebulization, 4x Daily - RT  isosorbide mononitrate, 30 mg, Oral, Q24H  levothyroxine, 25 mcg, Oral, Q AM  metoprolol tartrate, 50 mg, Oral, BID  predniSONE, 15 mg, Oral, Daily With Breakfast  senna-docusate sodium, 2 tablet, Oral, BID  sodium chloride, 10 mL, Intravenous, Q12H  sodium chloride, 10 mL, Intravenous, Q12H    Pharmacy Consult,         Current Antimicrobial Therapy:  Anti-Infectives (From admission, onward)      None          Current Diuretic Therapy:  Diuretics (From admission, onward)      Ordered     Dose/Rate Route Frequency Start Stop    23 0734  furosemide (LASIX) injection 40 mg        Ordering Provider: Elijah Hope MD    40 mg Intravenous Once 23 0900 23 [id number]     11/20/23 1450  furosemide (LASIX) injection 40 mg        Ordering Provider: Elijah Hope MD    40 mg Intravenous Once 11/20/23 1500 11/20/23 1531    11/19/23 0916  furosemide (LASIX) tablet 20 mg        Ordering Provider: Faviola Mejia DO    20 mg Oral Daily 11/19/23 1015      11/18/23 1306  furosemide (LASIX) injection 80 mg        Ordering Provider: Ruby Dia PA    80 mg Intravenous Once 11/18/23 1322 11/18/23 1328          ----------------------------------------------------------------------------------------------------------------------  Vital Signs:  Temp:  [97.6 °F (36.4 °C)-98.5 °F (36.9 °C)] 98.5 °F (36.9 °C)  Heart Rate:  [] 74  Resp:  [18-22] 20  BP: ()/(55-82) 97/55  SpO2:  [90 %-97 %] 92 %  on  Flow (L/min):  [4] 4;   Device (Oxygen Therapy): nasal cannula  Body mass index is 32.37 kg/m².    Wt Readings from Last 3 Encounters:   11/28/23 85.5 kg (188 lb 9.6 oz)   11/08/23 81.6 kg (180 lb)   10/28/23 81.6 kg (180 lb)     Intake & Output (last 3 days)         11/25 0701 11/26 0700 11/26 0701 11/27 0700 11/27 0701 11/28 0700 11/28 0701 11/29 0700    P.O. 990 480 840 360    Total Intake(mL/kg) 990 (11.2) 480 (5.4) 840 (9.8) 360 (4.2)    Urine (mL/kg/hr) 1800 (0.8) 900 (0.4) 1425 (0.7)     Stool  0      Total Output 0293 693 9190     Net -810 420 -585 +360            Urine Unmeasured Occurrence 2 x   1 x    Stool Unmeasured Occurrence 0 x  2 x 1 x          Diet: Cardiac Diets, Diabetic Diets; Healthy Heart (2-3 Na+); Consistent Carbohydrate; Texture: Regular Texture (IDDSI 7); Fluid Consistency: Thin (IDDSI 0)  NPO Diet NPO Type: Sips with Meds  ----------------------------------------------------------------------------------------------------------------------  Physical exam:  Constitutional:  Well-developed and well-nourished.  No respiratory distress. Obese.       HENT:  Head:  Normocephalic and atraumatic.  Mouth:  Moist mucous membranes.    Eyes:  Conjunctivae and EOM are  "normal. No scleral icterus.    Neck:  Neck supple.  No JVD present.    Cardiovascular:  Normal rate, regular rhythm and normal heart sounds with no murmur.  Pulmonary/Chest:  No respiratory distress, no wheezes, no crackles, with normal breath sounds and good air movement.  Abdominal:  Soft.  Bowel sounds are normal.  No distension and no tenderness.   Musculoskeletal:  No edema, no tenderness, and no deformity.  No red or swollen joints anywhere.    Neurological:  Alert and oriented to person, place, and time.  No cranial nerve deficit.  No tongue deviation.  No facial droop.  No slurred speech.   Skin:  Skin is warm and dry. No rash noted. No pallor.   Peripheral vascular:  Pulses in all 4 extremities with no clubbing, no cyanosis, no edema.  ----------------------------------------------------------------------------------------------------------------------  Tele:    ----------------------------------------------------------------------------------------------------------------------  Results from last 7 days   Lab Units 11/28/23  0033 11/27/23  0632 11/26/23  0107   WBC 10*3/mm3 19.28* 22.02* 17.95*   HEMOGLOBIN g/dL 13.5 13.7 13.8   HEMATOCRIT % 44.6 46.9* 47.4*   MCV fL 96.3 98.5* 99.6*   MCHC g/dL 30.3* 29.2* 29.1*   PLATELETS 10*3/mm3 119* 160 180         Results from last 7 days   Lab Units 11/28/23  0309 11/26/23  0107 11/24/23  1924   SODIUM mmol/L 135* 137 139   POTASSIUM mmol/L 5.2 4.6 5.1   MAGNESIUM mg/dL 2.7* 2.5 2.2   CHLORIDE mmol/L 92* 90* 92*   CO2 mmol/L 36.5* 38.2* 39.2*   BUN mg/dL 22* 25* 30*   CREATININE mg/dL 0.31* 0.50* 0.49*   CALCIUM mg/dL 9.4 9.2 9.6   GLUCOSE mg/dL 157* 229* 324*   Estimated Creatinine Clearance: 209.2 mL/min (A) (by C-G formula based on SCr of 0.31 mg/dL (L)).  No results found for: \"AMMONIA\"              Glucose   Date/Time Value Ref Range Status   11/28/2023 1622 303 (H) 70 - 130 mg/dL Final   11/28/2023 1047 137 (H) 70 - 130 mg/dL Final   11/28/2023 0641 103 70 - " "130 mg/dL Final   11/27/2023 1950 219 (H) 70 - 130 mg/dL Final   11/27/2023 1626 247 (H) 70 - 130 mg/dL Final   11/27/2023 1030 191 (H) 70 - 130 mg/dL Final   11/27/2023 0639 106 70 - 130 mg/dL Final   11/26/2023 2115 190 (H) 70 - 130 mg/dL Final     Lab Results   Component Value Date    TSH 5.170 (H) 11/18/2023    FREET4 1.09 11/18/2023     No results found for: \"PREGTESTUR\", \"PREGSERUM\", \"HCG\", \"HCGQUANT\"  Pain Management Panel  More data may exist         Latest Ref Rng & Units 11/19/2023 2/23/2022   Pain Management Panel   Amphetamine, Urine Qual Negative Negative  Negative    Barbiturates Screen, Urine Negative Negative  Negative    Benzodiazepine Screen, Urine Negative Positive  Positive    Buprenorphine, Screen, Urine Negative Negative  Negative    Cocaine Screen, Urine Negative Negative  Negative    Fentanyl, Urine Negative Negative  -   Methadone Screen , Urine Negative Negative  Negative    Methamphetamine, Ur Negative Negative  Negative      Brief Urine Lab Results  (Last result in the past 365 days)        Color   Clarity   Blood   Leuk Est   Nitrite   Protein   CREAT   Urine HCG        11/18/23 1447 Yellow   Clear   Negative   Negative   Negative   Negative                 No results found for: \"BLOODCX\"  No results found for: \"URINECX\"  No results found for: \"WOUNDCX\"  No results found for: \"STOOLCX\"  No results found for: \"RESPCX\"  No results found for: \"AFBCX\"        I have personally looked at the labs and they are summarized above.  ----------------------------------------------------------------------------------------------------------------------  Detailed radiology reports for the last 24 hours:    Imaging Results (Last 24 Hours)       ** No results found for the last 24 hours. **          Assessment & Plan    #Acute on chronic hypoxic respiratory failure   #Acute on chronic hypercapnic respiratory failure   #Acute COPD exacerbation   #Hx of STEVEN  #Medical noncompliance  -At admission viral " "panel was negative, D-dimer within normal limits, and no new consolidations seen on imaging at admission.  -Patient has severe COPD that requires home NIPPV but she has been unable to tolerate wearing it regularly.  -ABG slightly improved.  Continue BiPAP nightly and as needed.  Continue supplemental oxygen with goal oxygen saturation 88 to 92%  -Pulmonology have been recommending BiPAP settings. (Home health are managing the device rather than hospital staff and have provided patient new device with recommended BiPAP settings, patient to place on tonight.)  -Pulmonology following, appreciate assistance.  -Continue steroids, Symbicort, ipratropium. Continue to wean steroid dose as respiratory status is improving. 30 mg PO prednisone currently. Will need taper at discharge.   - PT consult requested due to generalized weakness and poor exercise tolerance, appreciate assistance. May need short term rehab vs home health services.     #Chronically elevated troponin  #Frequent pSVT  -Similar to previous labs.  No new ischemic changes noted on EKG at admission.  -Patient reported to me that she was having \"chest pressure\" with using the trilogy machine at home, but at no other times, and she says that it has not been nearly as bad when using the BiPAP here. She denies any chest pressure/pain with exertion or at any time other than when using NIPPV. This has improved.  -Based on description of symptoms I suspect that patient's pain is due to her severe COPD and use of noninvasive positive pressure ventilation rather than a cardiac source, but she will need ischemic evaluation with stress test when able to tolerate it.  - Cardiology planning for stress test Monday  - Metoprolol tartrate increased to 50mg BID  - Continue telemetry monitoring      CHRONIC MEDICAL PROBLEMS     #HFpEF/grade I diastolic dysfunction  - Not felt to be in acute exacerbation clinically.   - Previous TTE from 10/2023 reviewed.  Monitor volume status " with I&Os, daily weights.  Continue home lasix 20mg PO daily.     #Essential hypertension  - Well controlled, continue home regimen      #Hyperlipidemia  -Continue statin.      #Type II diabetes mellitus, non insulin dependent  - A1C 6.6%.   - Glucose overall better controlled. Continue current regimen.      #History of CVA  - Details unknown.  Continue home medication as appropriate. Does not appear to have residual deficits      #History of carotid artery disease s/p left CEA in the past  - Continue home medications      #Hypothyroidism  - TSH WNL. Continue home levothyroxine.      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  #Former smoker      Code status: Full      Dispo: Pending clinical improvement. Now that home machine has been optimized, await PT to reevaluate patient.     VTE Prophylaxis:   Mechanical Order History:       None          Pharmalogical Order History:        Ordered     Dose Route Frequency Stop    11/18/23 1605  heparin (porcine) 5000 UNIT/ML injection 5,000 Units         5,000 Units SC Every 8 Hours Scheduled --                        Cliff Castaneda MD  [facility] Hospitalist  11/28/23  16:49 EST

## 2023-11-28 NOTE — PROGRESS NOTES
LOS: 10 days     Name: Liz Jiang  Age/Sex: 57 y.o. female  :  1965        PCP: Tabitha Ron APRN    Principal Problem:    Acute respiratory failure with hypoxia      Admission Information: Liz Jiang is a 57 y.o. female with chronic HFpEF, carotid artery disease status post left CEA, history of CVA, hypertension, diabetes mellitus, COPD, STEVEN on CPAP, and obesity.     Chief Complaint: Shortness of breath    Interval history: Patient's stress test was positive for small sized moderate degree of ischemia on the inferior wall.    Subjective   Patient awake in bed.  She reports that she has fleeting chest pressure almost all the time, and just does not pay any attention to it.      Vital Signs  Vital Signs (last 72 hrs)          0700   0659  07 0659  0700   0659  0700   1023   Most Recent      Temp (°F) 98.2 -  98.6    97.5 -  98.6    97.6 -  98.4       98.4 (36.9)  0641    Heart Rate 73 -  111    62 -  110    71 -  102       78  0641    Resp 16 -  20    12 -  20    18 -  22       20  0641    /74 -  161/84    104/70 -  129/82    103/62 -  151/83       139/82  0641    SpO2 (%) 80 -  98    79 -  97    90 -  97       94  0641    Flow (L/min)   4      4      4       4  06          Temp:  [97.6 °F (36.4 °C)-98.4 °F (36.9 °C)] 98.4 °F (36.9 °C)  Heart Rate:  [] 78  Resp:  [18-22] 20  BP: (103-151)/(62-83) 139/82  Body mass index is 32.37 kg/m².      Intake/Output Summary (Last 24 hours) at 2023 1023  Last data filed at 2023 0846  Gross per 24 hour   Intake 1200 ml   Output 1425 ml   Net -225 ml       Vitals and nursing note reviewed.   Constitutional:       Appearance: Not in distress. Chronically ill-appearing.      Interventions: Nasal cannula in place.      Comments: Appears older than stated age   Pulmonary:      Effort: Pulmonary effort is normal.      Breath sounds: Normal breath sounds.    Cardiovascular:      Normal rate. Regular rhythm.      Murmurs: There is no murmur.   Edema:     Peripheral edema absent.   Skin:     General: Skin is warm and dry.   Neurological:      Mental Status: Alert.         Telemetry: Sinus 80s with PACs and PVCs       Results Review:     Results from last 7 days   Lab Units 11/28/23  0033 11/27/23  0632 11/26/23  0107 11/22/23  0027   WBC 10*3/mm3 19.28* 22.02* 17.95* 12.76*   HEMOGLOBIN g/dL 13.5 13.7 13.8 13.6   PLATELETS 10*3/mm3 119* 160 180 213     Results from last 7 days   Lab Units 11/28/23  0309 11/26/23  0107 11/24/23  1924   SODIUM mmol/L 135* 137 139   POTASSIUM mmol/L 5.2 4.6 5.1   CHLORIDE mmol/L 92* 90* 92*   CO2 mmol/L 36.5* 38.2* 39.2*   BUN mg/dL 22* 25* 30*   CREATININE mg/dL 0.31* 0.50* 0.49*   CALCIUM mg/dL 9.4 9.2 9.6   GLUCOSE mg/dL 157* 229* 324*                 I reviewed the patient's new clinical results.  I reviewed the patient's new imaging results and agree with the interpretation.  I personally viewed and interpreted the patient's EKG/Telemetry data      Medication Review:   aspirin, 324 mg, Oral, Once  atorvastatin, 40 mg, Oral, Nightly  budesonide-formoterol, 2 puff, Inhalation, BID - RT  clopidogrel, 75 mg, Oral, Daily  fluticasone, 2 spray, Nasal, Daily  furosemide, 20 mg, Oral, Daily  heparin (porcine), 5,000 Units, Subcutaneous, Q8H  insulin lispro, 2-9 Units, Subcutaneous, 4x Daily AC & at Bedtime  ipratropium, 0.5 mg, Nebulization, 4x Daily - RT  isosorbide mononitrate, 30 mg, Oral, Q24H  levothyroxine, 25 mcg, Oral, Q AM  methylPREDNISolone sodium succinate, 20 mg, Intravenous, Daily  metoprolol tartrate, 50 mg, Oral, BID  senna-docusate sodium, 2 tablet, Oral, BID  sodium chloride, 10 mL, Intravenous, Q12H  sodium chloride, 10 mL, Intravenous, Q12H      Pharmacy Consult,         Assessment:  Paroxysmal SVT  Chronic HFpEF, appears compensated  Chest pain, atypical with abnormal stress test  Essential  hypertension  Dyslipidemia  Carotid artery disease status post left CEA in the past data deficient  History of CVA      Recommendations:  Plan for invasive coronary angiogram tomorrow, NPO after midnight today  Blood pressure stable on current medications  LDL far above goal.  Statin initiated this hospitalization.    I discussed the patients findings and my recommendations with patient.        Electronically signed by HESHAM Gordillo, 11/28/23, 10:52 AM EST.

## 2023-11-28 NOTE — PLAN OF CARE
Goal Outcome Evaluation:  Patient has been resting well in bed this shift. A&Ox4. VSS. Patient has remained NPO after midnight. No visible signs or symptoms of acute distress noted at this time. No requests or complaints at this time. Will continue to follow the plan of care.

## 2023-11-28 NOTE — PROGRESS NOTES
"Progress Note Pulmonary      Subjective no new complaints.  BiPAP adjusted.  EPAP was increased by 2.  Patient reported that she felt better and able to use the equipment without desaturation  Interval History:   As above      Review of Systems:    Reviewed ; unchanged       Vital Signs  Temp:  [97.6 °F (36.4 °C)-98.4 °F (36.9 °C)] 97.9 °F (36.6 °C)  Heart Rate:  [] 101  Resp:  [18-22] 20  BP: (103-139)/(62-82) 127/72  Body mass index is 32.37 kg/m².    Intake/Output Summary (Last 24 hours) at 11/28/2023 1101  Last data filed at 11/28/2023 0846  Gross per 24 hour   Intake 1200 ml   Output 1425 ml   Net -225 ml     I/O this shift:  In: 360 [P.O.:360]  Out: -     Physical Exam:  General- normal in appearance, not in any acute distress    HEENT- pupils equally reactive to light, normal in size, no scleral icterus    Neck- supple    No JVD, no carotid bruit    Respiratory-bilateral air entry, clear to auscultation  Cardiovascular-  Normal S1 and S2. No S3, S4 or murmurs.    GI-nontender nondistended bowel sounds positive    CNS-alert oriented x3, grossly nonfocal    Extremities- pulses normal bilaterally , no clubbing and edema        Results Review:      Results from last 7 days   Lab Units 11/28/23  0033 11/27/23  0632 11/26/23  0107   WBC 10*3/mm3 19.28* 22.02* 17.95*   HEMOGLOBIN g/dL 13.5 13.7 13.8   PLATELETS 10*3/mm3 119* 160 180     Results from last 7 days   Lab Units 11/28/23  0309 11/26/23  0107 11/24/23  1924   SODIUM mmol/L 135* 137 139   POTASSIUM mmol/L 5.2 4.6 5.1   CHLORIDE mmol/L 92* 90* 92*   CO2 mmol/L 36.5* 38.2* 39.2*   BUN mg/dL 22* 25* 30*   CREATININE mg/dL 0.31* 0.50* 0.49*   CALCIUM mg/dL 9.4 9.2 9.6   GLUCOSE mg/dL 157* 229* 324*   MAGNESIUM mg/dL  --  2.5 2.2     Lab Results   Component Value Date    INR 0.94 11/18/2023    INR 0.95 10/12/2023    INR 0.90 03/27/2017    PROTIME 13.1 11/18/2023    PROTIME 13.1 10/12/2023    PROTIME 9.9 03/27/2017           Invalid input(s): \"PROT\", " "\"LABALBU\"      Imaging Results (Last 24 Hours)       ** No results found for the last 24 hours. **                 aspirin, 324 mg, Oral, Once  atorvastatin, 40 mg, Oral, Nightly  budesonide-formoterol, 2 puff, Inhalation, BID - RT  clopidogrel, 75 mg, Oral, Daily  fluticasone, 2 spray, Nasal, Daily  furosemide, 20 mg, Oral, Daily  heparin (porcine), 5,000 Units, Subcutaneous, Q8H  insulin lispro, 2-9 Units, Subcutaneous, 4x Daily AC & at Bedtime  ipratropium, 0.5 mg, Nebulization, 4x Daily - RT  isosorbide mononitrate, 30 mg, Oral, Q24H  levothyroxine, 25 mcg, Oral, Q AM  methylPREDNISolone sodium succinate, 20 mg, Intravenous, Daily  metoprolol tartrate, 50 mg, Oral, BID  senna-docusate sodium, 2 tablet, Oral, BID  sodium chloride, 10 mL, Intravenous, Q12H  sodium chloride, 10 mL, Intravenous, Q12H      Pharmacy Consult,         Medication Review:     Assessment & Plan   #1 initial hospitalization for acute exacerbation of COPD-much improved.  No more wheezing     #2: OBstructive sleep apnea,  #3 chronic hypercapnic hypoxic respiratory failure.      Able to tolerate BiPAP when EPAP was increased by 2 cm.    continue nebs  Taper steroid to prednisone 15 mg daily.      FiO2 requirement reviewed and adjusted to maintain saturation 88 to 92%.  Continue BiPAP overnight and as needed in the daytime for shortness of breath.  Continue appropriate prophylaxis      #4: Coronary artery disease positive stress test.  Cardiologist planning to do a cardiac cath.    We will continue to monitor closely.          Chris Shi MD  11/28/23  11:01 EST    "

## 2023-11-28 NOTE — PLAN OF CARE
Goal Outcome Evaluation:   Patient has been pleasant. Compliant with care and medications as ordered. VSS. No S&S of distress noted at this time. Patient is currently resting in bed. Patient remains on 4L NC with O2 saturations maintaining above 90%. Wound care done this shift as ordered. Will continue the plan of care.

## 2023-11-28 NOTE — SIGNIFICANT NOTE
11/28/23 1117   OTHER   Discipline physical therapist   Rehab Time/Intention   Session Not Performed patient/family declined treatment  (Pt anticipating heart cath this day and declined treatment. Will continue to follow and progress w/i tolerance.)

## 2023-11-29 LAB
GLUCOSE BLDC GLUCOMTR-MCNC: 117 MG/DL (ref 70–130)
GLUCOSE BLDC GLUCOMTR-MCNC: 121 MG/DL (ref 70–130)
GLUCOSE BLDC GLUCOMTR-MCNC: 142 MG/DL (ref 70–130)
GLUCOSE BLDC GLUCOMTR-MCNC: 166 MG/DL (ref 70–130)
GLUCOSE BLDC GLUCOMTR-MCNC: 266 MG/DL (ref 70–130)

## 2023-11-29 PROCEDURE — 63710000001 INSULIN LISPRO (HUMAN) PER 5 UNITS: Performed by: INTERNAL MEDICINE

## 2023-11-29 PROCEDURE — B2111ZZ FLUOROSCOPY OF MULTIPLE CORONARY ARTERIES USING LOW OSMOLAR CONTRAST: ICD-10-PCS | Performed by: INTERNAL MEDICINE

## 2023-11-29 PROCEDURE — 25010000002 HEPARIN (PORCINE) PER 1000 UNITS: Performed by: INTERNAL MEDICINE

## 2023-11-29 PROCEDURE — 94761 N-INVAS EAR/PLS OXIMETRY MLT: CPT

## 2023-11-29 PROCEDURE — 82948 REAGENT STRIP/BLOOD GLUCOSE: CPT

## 2023-11-29 PROCEDURE — 94799 UNLISTED PULMONARY SVC/PX: CPT

## 2023-11-29 PROCEDURE — 25810000003 SODIUM CHLORIDE 0.9 % SOLUTION: Performed by: INTERNAL MEDICINE

## 2023-11-29 PROCEDURE — 99231 SBSQ HOSP IP/OBS SF/LOW 25: CPT | Performed by: INTERNAL MEDICINE

## 2023-11-29 PROCEDURE — 25010000002 FENTANYL CITRATE (PF) 50 MCG/ML SOLUTION: Performed by: INTERNAL MEDICINE

## 2023-11-29 PROCEDURE — C1769 GUIDE WIRE: HCPCS | Performed by: INTERNAL MEDICINE

## 2023-11-29 PROCEDURE — C1894 INTRO/SHEATH, NON-LASER: HCPCS | Performed by: INTERNAL MEDICINE

## 2023-11-29 PROCEDURE — B2151ZZ FLUOROSCOPY OF LEFT HEART USING LOW OSMOLAR CONTRAST: ICD-10-PCS | Performed by: INTERNAL MEDICINE

## 2023-11-29 PROCEDURE — 93458 L HRT ARTERY/VENTRICLE ANGIO: CPT | Performed by: INTERNAL MEDICINE

## 2023-11-29 PROCEDURE — 25010000002 HEPARIN (PORCINE) PER 1000 UNITS: Performed by: STUDENT IN AN ORGANIZED HEALTH CARE EDUCATION/TRAINING PROGRAM

## 2023-11-29 PROCEDURE — 94664 DEMO&/EVAL PT USE INHALER: CPT

## 2023-11-29 PROCEDURE — 25010000002 MIDAZOLAM PER 1 MG: Performed by: INTERNAL MEDICINE

## 2023-11-29 PROCEDURE — 4A023N7 MEASUREMENT OF CARDIAC SAMPLING AND PRESSURE, LEFT HEART, PERCUTANEOUS APPROACH: ICD-10-PCS | Performed by: INTERNAL MEDICINE

## 2023-11-29 PROCEDURE — 25510000001 IOPAMIDOL PER 1 ML: Performed by: INTERNAL MEDICINE

## 2023-11-29 RX ORDER — SODIUM CHLORIDE 9 MG/ML
100 INJECTION, SOLUTION INTRAVENOUS CONTINUOUS
Status: DISCONTINUED | OUTPATIENT
Start: 2023-11-29 | End: 2023-12-05

## 2023-11-29 RX ORDER — HEPARIN SODIUM 1000 [USP'U]/ML
INJECTION, SOLUTION INTRAVENOUS; SUBCUTANEOUS
Status: DISCONTINUED | OUTPATIENT
Start: 2023-11-29 | End: 2023-11-29 | Stop reason: HOSPADM

## 2023-11-29 RX ORDER — MIDAZOLAM HYDROCHLORIDE 1 MG/ML
INJECTION INTRAMUSCULAR; INTRAVENOUS
Status: DISCONTINUED | OUTPATIENT
Start: 2023-11-29 | End: 2023-11-29 | Stop reason: HOSPADM

## 2023-11-29 RX ORDER — FENTANYL CITRATE 50 UG/ML
INJECTION, SOLUTION INTRAMUSCULAR; INTRAVENOUS
Status: DISCONTINUED | OUTPATIENT
Start: 2023-11-29 | End: 2023-11-29 | Stop reason: HOSPADM

## 2023-11-29 RX ORDER — CALCIUM CARBONATE 500 MG/1
2 TABLET, CHEWABLE ORAL ONCE
Status: COMPLETED | OUTPATIENT
Start: 2023-11-29 | End: 2023-11-29

## 2023-11-29 RX ORDER — PANTOPRAZOLE SODIUM 40 MG/1
40 TABLET, DELAYED RELEASE ORAL
Status: DISCONTINUED | OUTPATIENT
Start: 2023-11-30 | End: 2023-12-13 | Stop reason: HOSPADM

## 2023-11-29 RX ORDER — VERAPAMIL HYDROCHLORIDE 2.5 MG/ML
INJECTION, SOLUTION INTRAVENOUS
Status: DISCONTINUED | OUTPATIENT
Start: 2023-11-29 | End: 2023-11-29 | Stop reason: HOSPADM

## 2023-11-29 RX ORDER — LIDOCAINE HYDROCHLORIDE 20 MG/ML
10 SOLUTION OROPHARYNGEAL EVERY 6 HOURS PRN
Status: DISCONTINUED | OUTPATIENT
Start: 2023-11-29 | End: 2023-12-13 | Stop reason: HOSPADM

## 2023-11-29 RX ORDER — SODIUM CHLORIDE 9 MG/ML
INJECTION, SOLUTION INTRAVENOUS
Status: COMPLETED | OUTPATIENT
Start: 2023-11-29 | End: 2023-11-29

## 2023-11-29 RX ORDER — LIDOCAINE HYDROCHLORIDE 20 MG/ML
INJECTION, SOLUTION INFILTRATION; PERINEURAL
Status: DISCONTINUED | OUTPATIENT
Start: 2023-11-29 | End: 2023-11-29 | Stop reason: HOSPADM

## 2023-11-29 RX ADMIN — IPRATROPIUM BROMIDE 0.5 MG: 0.5 SOLUTION RESPIRATORY (INHALATION) at 07:19

## 2023-11-29 RX ADMIN — LOPERAMIDE HYDROCHLORIDE 2 MG: 2 CAPSULE ORAL at 14:43

## 2023-11-29 RX ADMIN — INSULIN LISPRO 2 UNITS: 100 INJECTION, SOLUTION INTRAVENOUS; SUBCUTANEOUS at 21:56

## 2023-11-29 RX ADMIN — IPRATROPIUM BROMIDE 0.5 MG: 0.5 SOLUTION RESPIRATORY (INHALATION) at 18:25

## 2023-11-29 RX ADMIN — HYDROCODONE BITARTRATE AND ACETAMINOPHEN 1 TABLET: 10; 325 TABLET ORAL at 14:39

## 2023-11-29 RX ADMIN — HEPARIN SODIUM 5000 UNITS: 5000 INJECTION INTRAVENOUS; SUBCUTANEOUS at 20:33

## 2023-11-29 RX ADMIN — Medication 10 ML: at 09:35

## 2023-11-29 RX ADMIN — ATORVASTATIN CALCIUM 40 MG: 40 TABLET, FILM COATED ORAL at 20:36

## 2023-11-29 RX ADMIN — FLUTICASONE PROPIONATE 2 SPRAY: 50 SPRAY, METERED NASAL at 09:34

## 2023-11-29 RX ADMIN — LEVOTHYROXINE SODIUM 25 MCG: 25 TABLET ORAL at 05:15

## 2023-11-29 RX ADMIN — HYDROCODONE BITARTRATE AND ACETAMINOPHEN 1 TABLET: 10; 325 TABLET ORAL at 20:36

## 2023-11-29 RX ADMIN — HEPARIN SODIUM 5000 UNITS: 5000 INJECTION INTRAVENOUS; SUBCUTANEOUS at 05:15

## 2023-11-29 RX ADMIN — LIDOCAINE HYDROCHLORIDE 10 ML: 20 SOLUTION ORAL; TOPICAL at 05:16

## 2023-11-29 RX ADMIN — SODIUM CHLORIDE 100 ML/HR: 9 INJECTION, SOLUTION INTRAVENOUS at 11:12

## 2023-11-29 RX ADMIN — IPRATROPIUM BROMIDE 0.5 MG: 0.5 SOLUTION RESPIRATORY (INHALATION) at 13:25

## 2023-11-29 RX ADMIN — METOPROLOL TARTRATE 50 MG: 50 TABLET, FILM COATED ORAL at 20:34

## 2023-11-29 RX ADMIN — BUDESONIDE AND FORMOTEROL FUMARATE DIHYDRATE 2 PUFF: 160; 4.5 AEROSOL RESPIRATORY (INHALATION) at 07:19

## 2023-11-29 RX ADMIN — Medication 2 TABLET: at 14:19

## 2023-11-29 RX ADMIN — HEPARIN SODIUM 5000 UNITS: 5000 INJECTION INTRAVENOUS; SUBCUTANEOUS at 14:19

## 2023-11-29 RX ADMIN — BUDESONIDE AND FORMOTEROL FUMARATE DIHYDRATE 2 PUFF: 160; 4.5 AEROSOL RESPIRATORY (INHALATION) at 18:25

## 2023-11-29 RX ADMIN — HYDROCODONE BITARTRATE AND ACETAMINOPHEN 1 TABLET: 10; 325 TABLET ORAL at 05:20

## 2023-11-29 NOTE — NURSING NOTE
Cardiac Rehab referral received on patient. After reviewing patient information there is no qualifying diagnosis noted at this time .Qualifications for Cardiac Rehab include Coronary Stenting, AMI (NSTEMI Type 1, STEMI), Stable Angina, CABG, Heart Valve Repair/Replacement, Heart Transplant or Stable Chronic Heart Failure (with these specific qualifications, Left Ventricular Ejection Fraction of 35% or less, NYHA class II to IV symptoms despite optimal heart failure therapy for at least 6 weeks and has not had a recent (less than or equal to 6 weeks) or planned (less than or equal to 6 months) major cardiovascular hospitalizations or procedures. Due to patient being in the hospital, does not meet criteria at this time) Please contact us at [phone] with questions.    If the diagnosis is CHF when the patient meets the CHF criteria for Cardiac Rehab with a new order we will gladly schedule them.

## 2023-11-29 NOTE — CASE MANAGEMENT/SOCIAL WORK
Discharge Planning Assessment  [facility]     Patient Name: Liz Jiang  MRN: [id number]  Today's Date: 11/29/2023    Admit Date: 11/18/2023    Plan: SS spoke with pt at bedside.  Pt lives at home alone at [address] and plans to return home at discharge. Pt stated she does not want rehab prior to discharge home.  Pt has utilized [facility] in past.  Pt will need new home health referral at discharge.  SS will follow and assist with discharge needs.     Discharge Plan       Row Name 11/29/23 1530       Plan    Plan SS spoke with pt at bedside.  Pt lives at home alone at [address] and plans to return home at discharge. Pt stated she does not want rehab prior to discharge home.  Pt has utilized [facility] in past.  Pt will need new home health referral at discharge.  SS will follow and assist with discharge needs.                  Continued Care and Services - Admitted Since 11/18/2023    Coordination has not been started for this encounter.         Lluvia Cagle, BSW

## 2023-11-29 NOTE — PLAN OF CARE
Goal Outcome Evaluation:                      Patient resting in bed post cath. TR band has been removed per protocol without incident. VSS. Medicated per MAR for back pain and pain was resolved. Will continue to follow plan of care.

## 2023-11-29 NOTE — PLAN OF CARE
Goal Outcome Evaluation:  Patient has rested intermittently this shift. A&Ox4. VSS. Patient has remained NPO past midnight. No visible signs or symptoms of acute distress noted at this time. No requests or complaints at this time. Will continue to follow the plan of care.

## 2023-11-29 NOTE — PROGRESS NOTES
[facility] HOSPITALIST PROGRESS NOTE     Patient Identification:  Name:  Liz Jiang  Age:  57 y.o.  Sex:  female  :  1965  MRN:  [id number]  Visit Number:  [id number]  ROOM: [address] [place] [address]     Primary Care Provider:  Tabitha oRn APRN    Length of stay in inpatient status:  11    Subjective     Chief Compliant:    Chief Complaint   Patient presents with    Shortness of Breath       History of Presenting Illness:    Patient reports being very fatigued after LHC. She was saturating 87-90% on the 4L NC after shortly coming back up after procedure. HR in low 100's. Improving but patient noted preference to try BiPAP again tonight and possibly discharge home tomorrow.     ROS:  Otherwise 10 point ROS negative other than documented above in HPI.     Objective     Current Hospital Meds:aspirin, 324 mg, Oral, Once  atorvastatin, 40 mg, Oral, Nightly  budesonide-formoterol, 2 puff, Inhalation, BID - RT  clopidogrel, 75 mg, Oral, Daily  fluticasone, 2 spray, Nasal, Daily  furosemide, 20 mg, Oral, Daily  heparin (porcine), 5,000 Units, Subcutaneous, Q8H  insulin lispro, 2-9 Units, Subcutaneous, 4x Daily AC & at Bedtime  ipratropium, 0.5 mg, Nebulization, 4x Daily - RT  isosorbide mononitrate, 30 mg, Oral, Q24H  levothyroxine, 25 mcg, Oral, Q AM  metoprolol tartrate, 50 mg, Oral, BID  [START ON 2023] pantoprazole, 40 mg, Oral, Q AM  predniSONE, 15 mg, Oral, Daily With Breakfast  senna-docusate sodium, 2 tablet, Oral, BID  sodium chloride, 10 mL, Intravenous, Q12H  sodium chloride, 10 mL, Intravenous, Q12H    Pharmacy Consult,   sodium chloride, 100 mL/hr, Last Rate: 100 mL/hr (23 1112)        Current Antimicrobial Therapy:  Anti-Infectives (From admission, onward)      None          Current Diuretic Therapy:  Diuretics (From admission, onward)      Ordered     Dose/Rate Route Frequency Start Stop    23 2499  furosemide (LASIX) injection 40 mg        Ordering Provider: Elijah Hope  MD    40 mg Intravenous Once 11/24/23 0900 11/24/23 0853    11/20/23 1450  furosemide (LASIX) injection 40 mg        Ordering Provider: Elijah Hope MD    40 mg Intravenous Once 11/20/23 1500 11/20/23 1531    11/19/23 0916  furosemide (LASIX) tablet 20 mg        Ordering Provider: Case Irizarry MD    20 mg Oral Daily 11/19/23 1015      11/18/23 1306  furosemide (LASIX) injection 80 mg        Ordering Provider: Ruby Dia PA    80 mg Intravenous Once 11/18/23 1322 11/18/23 1328          ----------------------------------------------------------------------------------------------------------------------  Vital Signs:  Temp:  [98 °F (36.7 °C)-98.4 °F (36.9 °C)] 98 °F (36.7 °C)  Heart Rate:  [] 92  Resp:  [18] 18  BP: (102-155)/(67-88) 155/84  SpO2:  [90 %-95 %] 93 %  on  Flow (L/min):  [4] 4;   Device (Oxygen Therapy): nasal cannula  Body mass index is 32.25 kg/m².    Wt Readings from Last 3 Encounters:   11/29/23 85.2 kg (187 lb 14.4 oz)   11/08/23 81.6 kg (180 lb)   10/28/23 81.6 kg (180 lb)     Intake & Output (last 3 days)         11/26 0701 11/27 0700 11/27 0701 11/28 0700 11/28 0701 11/29 0700 11/29 0701 11/30 0700    P.O. 480 840 840     I.V. (mL/kg)    35.9 (0.4)    Total Intake(mL/kg) 480 (5.4) 840 (9.8) 840 (9.9) 35.9 (0.4)    Urine (mL/kg/hr) 900 (0.4) 1425 (0.7) 1450 (0.7)     Stool 0       Total Output 900 1425 1450     Net -420 -585 -610 +35.9            Urine Unmeasured Occurrence   1 x     Stool Unmeasured Occurrence  2 x 1 x           Diet: Cardiac Diets; Healthy Heart (2-3 Na+); Texture: Regular Texture (IDDSI 7); Fluid Consistency: Thin (IDDSI 0)  ----------------------------------------------------------------------------------------------------------------------  Physical exam:  Constitutional:  Chronically ill appearing.   HENT:  Head:  Normocephalic and atraumatic.  Mouth:  Moist mucous membranes.    Eyes:  Conjunctivae and EOM are normal. No scleral icterus.   "  Neck:  Neck supple.  No JVD present.    Cardiovascular:  Normal rate, regular rhythm and normal heart sounds with no murmur.  Pulmonary/Chest:  No respiratory distress, no wheezes, no crackles, with normal breath sounds and good air movement.  Abdominal:  Soft.  Bowel sounds are normal.  No distension and no tenderness.   Musculoskeletal:  No edema, no tenderness, and no deformity.  No red or swollen joints anywhere.    Neurological:  Alert and oriented to person, place, and time.  No cranial nerve deficit.  No tongue deviation.  No facial droop.  No slurred speech.   Skin:  Skin is warm and dry. No rash noted. No pallor.   Peripheral vascular:  Pulses in all 4 extremities with no clubbing, no cyanosis, no edema.  ----------------------------------------------------------------------------------------------------------------------  Tele:    ----------------------------------------------------------------------------------------------------------------------  Results from last 7 days   Lab Units 11/28/23  0033 11/27/23  0632 11/26/23  0107   WBC 10*3/mm3 19.28* 22.02* 17.95*   HEMOGLOBIN g/dL 13.5 13.7 13.8   HEMATOCRIT % 44.6 46.9* 47.4*   MCV fL 96.3 98.5* 99.6*   MCHC g/dL 30.3* 29.2* 29.1*   PLATELETS 10*3/mm3 119* 160 180         Results from last 7 days   Lab Units 11/28/23  0309 11/26/23  0107 11/24/23  1924   SODIUM mmol/L 135* 137 139   POTASSIUM mmol/L 5.2 4.6 5.1   MAGNESIUM mg/dL 2.7* 2.5 2.2   CHLORIDE mmol/L 92* 90* 92*   CO2 mmol/L 36.5* 38.2* 39.2*   BUN mg/dL 22* 25* 30*   CREATININE mg/dL 0.31* 0.50* 0.49*   CALCIUM mg/dL 9.4 9.2 9.6   GLUCOSE mg/dL 157* 229* 324*   Estimated Creatinine Clearance: 208.9 mL/min (A) (by C-G formula based on SCr of 0.31 mg/dL (L)).  No results found for: \"AMMONIA\"              Glucose   Date/Time Value Ref Range Status   11/29/2023 1633 121 70 - 130 mg/dL Final   11/29/2023 1136 142 (H) 70 - 130 mg/dL Final   11/29/2023 0706 117 70 - 130 mg/dL Final   11/28/2023 2003 " "266 (H) 70 - 130 mg/dL Final   11/28/2023 1622 303 (H) 70 - 130 mg/dL Final   11/28/2023 1047 137 (H) 70 - 130 mg/dL Final   11/28/2023 0641 103 70 - 130 mg/dL Final   11/27/2023 1950 219 (H) 70 - 130 mg/dL Final     Lab Results   Component Value Date    TSH 5.170 (H) 11/18/2023    FREET4 1.09 11/18/2023     No results found for: \"PREGTESTUR\", \"PREGSERUM\", \"HCG\", \"HCGQUANT\"  Pain Management Panel  More data may exist         Latest Ref Rng & Units 11/19/2023 2/23/2022   Pain Management Panel   Amphetamine, Urine Qual Negative Negative  Negative    Barbiturates Screen, Urine Negative Negative  Negative    Benzodiazepine Screen, Urine Negative Positive  Positive    Buprenorphine, Screen, Urine Negative Negative  Negative    Cocaine Screen, Urine Negative Negative  Negative    Fentanyl, Urine Negative Negative  -   Methadone Screen , Urine Negative Negative  Negative    Methamphetamine, Ur Negative Negative  Negative      Brief Urine Lab Results  (Last result in the past 365 days)        Color   Clarity   Blood   Leuk Est   Nitrite   Protein   CREAT   Urine HCG        11/18/23 1447 Yellow   Clear   Negative   Negative   Negative   Negative                 No results found for: \"BLOODCX\"  No results found for: \"URINECX\"  No results found for: \"WOUNDCX\"  No results found for: \"STOOLCX\"  No results found for: \"RESPCX\"  No results found for: \"AFBCX\"        I have personally looked at the labs and they are summarized above.  ----------------------------------------------------------------------------------------------------------------------  Detailed radiology reports for the last 24 hours:    Imaging Results (Last 24 Hours)       ** No results found for the last 24 hours. **          Assessment & Plan      #Acute on chronic hypoxic respiratory failure   #Acute on chronic hypercapnic respiratory failure   #Acute COPD exacerbation   #Hx of STEVEN  #Medical noncompliance  -At admission viral panel was negative, D-dimer within " "normal limits, and no new consolidations seen on imaging at admission.  -Patient has severe COPD that requires home NIPPV but she has been unable to tolerate wearing it regularly.  -ABG slightly improved.  Continue BiPAP nightly and as needed.  Continue supplemental oxygen with goal oxygen saturation 88 to 92%  -Pulmonology have been recommending BiPAP settings. (Home health are managing the device rather than hospital staff and have provided patient new device with recommended BiPAP settings, patient to place on tonight.)  -Pulmonology following, appreciate assistance.  -Continue steroids, Symbicort, ipratropium. Continue to wean steroid dose as respiratory status is improving. 30 mg PO prednisone currently. Will need taper at discharge.   - PT consult requested due to generalized weakness and poor exercise tolerance, appreciate assistance. May need short term rehab vs home health services.     #Chronically elevated troponin  #Frequent pSVT  -Similar to previous labs.  No new ischemic changes noted on EKG at admission.  -Patient reported to me that she was having \"chest pressure\" with using the trilogy machine at home, but at no other times, and she says that it has not been nearly as bad when using the BiPAP here. She denies any chest pressure/pain with exertion or at any time other than when using NIPPV. This has improved.  -Based on description of symptoms I suspect that patient's pain is due to her severe COPD and use of noninvasive positive pressure ventilation rather than a cardiac source, but she will need ischemic evaluation with stress test when able to tolerate it.  - Cardiology performed abnormal stress test followed with [facility] that revealed mild CAD. Continue ASA, statin. Repeat renal function in AM.   - Metoprolol tartrate increased to 50mg BID  - Continue telemetry monitoring      CHRONIC MEDICAL PROBLEMS     #HFpEF/grade I diastolic dysfunction  - Not felt to be in acute exacerbation clinically.   - " Previous TTE from 10/2023 reviewed.  Monitor volume status with I&Os, daily weights.  Continue home lasix 20mg PO daily.     #Essential hypertension  - Well controlled, continue home regimen      #Hyperlipidemia  -Continue statin.      #Type II diabetes mellitus, non insulin dependent  - A1C 6.6%.   - Glucose overall better controlled. Continue current regimen.      #History of CVA  - Details unknown.  Continue home medication as appropriate. Does not appear to have residual deficits      #History of carotid artery disease s/p left CEA in the past  - Continue home medications      #Hypothyroidism  - TSH WNL. Continue home levothyroxine.      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  #Former smoker      Code status: Full      Dispo: Pending clinical improvement. Now that home machine has been optimized, will continue nightly trials. Plan on possible discharge in AM.       VTE Prophylaxis:   Mechanical Order History:       None          Pharmalogical Order History:        Ordered     Dose Route Frequency Stop    11/29/23 1011  heparin (porcine) injection  Status:  Discontinued         -- -- Code / Trauma / Sedation Medication 11/29/23 1034    11/18/23 1605  heparin (porcine) 5000 UNIT/ML injection 5,000 Units         5,000 Units SC Every 8 Hours Scheduled --                    Cliff Castaneda MD  [facility]  11/29/23  17:28 EST

## 2023-11-30 LAB
ANION GAP SERPL CALCULATED.3IONS-SCNC: 6.8 MMOL/L (ref 5–15)
BUN SERPL-MCNC: 25 MG/DL (ref 6–20)
BUN/CREAT SERPL: 45.5 (ref 7–25)
CALCIUM SPEC-SCNC: 9.6 MG/DL (ref 8.6–10.5)
CHLORIDE SERPL-SCNC: 94 MMOL/L (ref 98–107)
CHOLEST SERPL-MCNC: 175 MG/DL (ref 0–200)
CO2 SERPL-SCNC: 35.2 MMOL/L (ref 22–29)
CREAT SERPL-MCNC: 0.55 MG/DL (ref 0.57–1)
DEPRECATED RDW RBC AUTO: 56.7 FL (ref 37–54)
EGFRCR SERPLBLD CKD-EPI 2021: 106.4 ML/MIN/1.73
ERYTHROCYTE [DISTWIDTH] IN BLOOD BY AUTOMATED COUNT: 15.1 % (ref 12.3–15.4)
GLUCOSE BLDC GLUCOMTR-MCNC: 188 MG/DL (ref 70–130)
GLUCOSE BLDC GLUCOMTR-MCNC: 192 MG/DL (ref 70–130)
GLUCOSE BLDC GLUCOMTR-MCNC: 214 MG/DL (ref 70–130)
GLUCOSE BLDC GLUCOMTR-MCNC: 93 MG/DL (ref 70–130)
GLUCOSE SERPL-MCNC: 132 MG/DL (ref 65–99)
HBA1C MFR BLD: 7.1 % (ref 4.8–5.6)
HCT VFR BLD AUTO: 49.6 % (ref 34–46.6)
HDLC SERPL-MCNC: 55 MG/DL (ref 40–60)
HGB BLD-MCNC: 14.2 G/DL (ref 12–15.9)
LDLC SERPL CALC-MCNC: 54 MG/DL (ref 0–100)
LDLC/HDLC SERPL: 0.56 {RATIO}
MCH RBC QN AUTO: 29.3 PG (ref 26.6–33)
MCHC RBC AUTO-ENTMCNC: 28.6 G/DL (ref 31.5–35.7)
MCV RBC AUTO: 102.5 FL (ref 79–97)
PLATELET # BLD AUTO: 83 10*3/MM3 (ref 140–450)
PMV BLD AUTO: 12.5 FL (ref 6–12)
POTASSIUM SERPL-SCNC: 4.9 MMOL/L (ref 3.5–5.2)
RBC # BLD AUTO: 4.84 10*6/MM3 (ref 3.77–5.28)
SODIUM SERPL-SCNC: 136 MMOL/L (ref 136–145)
TRIGL SERPL-MCNC: 445 MG/DL (ref 0–150)
VLDLC SERPL-MCNC: 66 MG/DL (ref 5–40)
WBC NRBC COR # BLD AUTO: 14 10*3/MM3 (ref 3.4–10.8)

## 2023-11-30 PROCEDURE — 86022 PLATELET ANTIBODIES: CPT | Performed by: INTERNAL MEDICINE

## 2023-11-30 PROCEDURE — 94664 DEMO&/EVAL PT USE INHALER: CPT

## 2023-11-30 PROCEDURE — 63710000001 INSULIN LISPRO (HUMAN) PER 5 UNITS: Performed by: INTERNAL MEDICINE

## 2023-11-30 PROCEDURE — 80061 LIPID PANEL: CPT | Performed by: INTERNAL MEDICINE

## 2023-11-30 PROCEDURE — 25010000002 ONDANSETRON PER 1 MG

## 2023-11-30 PROCEDURE — 94799 UNLISTED PULMONARY SVC/PX: CPT

## 2023-11-30 PROCEDURE — 94761 N-INVAS EAR/PLS OXIMETRY MLT: CPT

## 2023-11-30 PROCEDURE — 80048 BASIC METABOLIC PNL TOTAL CA: CPT | Performed by: INTERNAL MEDICINE

## 2023-11-30 PROCEDURE — 97535 SELF CARE MNGMENT TRAINING: CPT

## 2023-11-30 PROCEDURE — 82948 REAGENT STRIP/BLOOD GLUCOSE: CPT

## 2023-11-30 PROCEDURE — 99231 SBSQ HOSP IP/OBS SF/LOW 25: CPT | Performed by: INTERNAL MEDICINE

## 2023-11-30 PROCEDURE — 25010000002 HEPARIN (PORCINE) PER 1000 UNITS: Performed by: INTERNAL MEDICINE

## 2023-11-30 PROCEDURE — 63710000001 PREDNISONE PER 5 MG: Performed by: INTERNAL MEDICINE

## 2023-11-30 PROCEDURE — 99232 SBSQ HOSP IP/OBS MODERATE 35: CPT | Performed by: INTERNAL MEDICINE

## 2023-11-30 PROCEDURE — 83036 HEMOGLOBIN GLYCOSYLATED A1C: CPT | Performed by: INTERNAL MEDICINE

## 2023-11-30 PROCEDURE — 85027 COMPLETE CBC AUTOMATED: CPT | Performed by: INTERNAL MEDICINE

## 2023-11-30 RX ORDER — PREDNISONE 5 MG/1
TABLET ORAL
Qty: 18 TABLET | Refills: 0 | Status: SHIPPED | OUTPATIENT
Start: 2023-12-01 | End: 2023-12-13 | Stop reason: SDUPTHER

## 2023-11-30 RX ORDER — METOPROLOL TARTRATE 50 MG/1
50 TABLET, FILM COATED ORAL 2 TIMES DAILY
Qty: 60 TABLET | Refills: 0 | Status: SHIPPED | OUTPATIENT
Start: 2023-11-30

## 2023-11-30 RX ORDER — ASPIRIN 81 MG/1
81 TABLET ORAL DAILY
Qty: 30 TABLET | Refills: 0 | Status: SHIPPED | OUTPATIENT
Start: 2023-11-30 | End: 2023-12-30

## 2023-11-30 RX ORDER — ISOSORBIDE MONONITRATE 30 MG/1
30 TABLET, EXTENDED RELEASE ORAL
Qty: 30 TABLET | Refills: 0 | Status: SHIPPED | OUTPATIENT
Start: 2023-12-01 | End: 2023-12-31

## 2023-11-30 RX ORDER — ATORVASTATIN CALCIUM 40 MG/1
40 TABLET, FILM COATED ORAL NIGHTLY
Qty: 30 TABLET | Refills: 0 | Status: SHIPPED | OUTPATIENT
Start: 2023-11-30 | End: 2023-12-30

## 2023-11-30 RX ORDER — ONDANSETRON 2 MG/ML
4 INJECTION INTRAMUSCULAR; INTRAVENOUS EVERY 6 HOURS PRN
Status: DISCONTINUED | OUTPATIENT
Start: 2023-11-30 | End: 2023-12-13 | Stop reason: HOSPADM

## 2023-11-30 RX ORDER — PANTOPRAZOLE SODIUM 40 MG/1
40 TABLET, DELAYED RELEASE ORAL
Qty: 30 TABLET | Refills: 0 | Status: SHIPPED | OUTPATIENT
Start: 2023-12-01 | End: 2023-12-31

## 2023-11-30 RX ADMIN — FLUTICASONE PROPIONATE 2 SPRAY: 50 SPRAY, METERED NASAL at 09:08

## 2023-11-30 RX ADMIN — ATORVASTATIN CALCIUM 40 MG: 40 TABLET, FILM COATED ORAL at 20:21

## 2023-11-30 RX ADMIN — HEPARIN SODIUM 5000 UNITS: 5000 INJECTION INTRAVENOUS; SUBCUTANEOUS at 06:48

## 2023-11-30 RX ADMIN — ISOSORBIDE MONONITRATE 30 MG: 30 TABLET, EXTENDED RELEASE ORAL at 09:07

## 2023-11-30 RX ADMIN — HYDROCODONE BITARTRATE AND ACETAMINOPHEN 1 TABLET: 10; 325 TABLET ORAL at 06:48

## 2023-11-30 RX ADMIN — LEVOTHYROXINE SODIUM 25 MCG: 25 TABLET ORAL at 06:47

## 2023-11-30 RX ADMIN — LOPERAMIDE HYDROCHLORIDE 2 MG: 2 CAPSULE ORAL at 09:14

## 2023-11-30 RX ADMIN — BUDESONIDE AND FORMOTEROL FUMARATE DIHYDRATE 2 PUFF: 160; 4.5 AEROSOL RESPIRATORY (INHALATION) at 06:59

## 2023-11-30 RX ADMIN — Medication 10 ML: at 20:23

## 2023-11-30 RX ADMIN — INSULIN LISPRO 4 UNITS: 100 INJECTION, SOLUTION INTRAVENOUS; SUBCUTANEOUS at 20:22

## 2023-11-30 RX ADMIN — INSULIN LISPRO 2 UNITS: 100 INJECTION, SOLUTION INTRAVENOUS; SUBCUTANEOUS at 17:46

## 2023-11-30 RX ADMIN — IPRATROPIUM BROMIDE 0.5 MG: 0.5 SOLUTION RESPIRATORY (INHALATION) at 06:59

## 2023-11-30 RX ADMIN — IPRATROPIUM BROMIDE 0.5 MG: 0.5 SOLUTION RESPIRATORY (INHALATION) at 18:51

## 2023-11-30 RX ADMIN — METOPROLOL TARTRATE 50 MG: 50 TABLET, FILM COATED ORAL at 09:05

## 2023-11-30 RX ADMIN — HYDROCODONE BITARTRATE AND ACETAMINOPHEN 1 TABLET: 10; 325 TABLET ORAL at 13:03

## 2023-11-30 RX ADMIN — PANTOPRAZOLE SODIUM 40 MG: 40 TABLET, DELAYED RELEASE ORAL at 06:48

## 2023-11-30 RX ADMIN — ONDANSETRON 4 MG: 2 INJECTION INTRAMUSCULAR; INTRAVENOUS at 22:14

## 2023-11-30 RX ADMIN — IPRATROPIUM BROMIDE 0.5 MG: 0.5 SOLUTION RESPIRATORY (INHALATION) at 12:57

## 2023-11-30 RX ADMIN — IPRATROPIUM BROMIDE 0.5 MG: 0.5 SOLUTION RESPIRATORY (INHALATION) at 00:37

## 2023-11-30 RX ADMIN — HYDROCODONE BITARTRATE AND ACETAMINOPHEN 1 TABLET: 10; 325 TABLET ORAL at 20:22

## 2023-11-30 RX ADMIN — BUDESONIDE AND FORMOTEROL FUMARATE DIHYDRATE 2 PUFF: 160; 4.5 AEROSOL RESPIRATORY (INHALATION) at 18:52

## 2023-11-30 RX ADMIN — Medication 10 ML: at 09:08

## 2023-11-30 RX ADMIN — PREDNISONE 15 MG: 10 TABLET ORAL at 09:07

## 2023-11-30 RX ADMIN — CLOPIDOGREL BISULFATE 75 MG: 75 TABLET, FILM COATED ORAL at 09:07

## 2023-11-30 RX ADMIN — METOPROLOL TARTRATE 50 MG: 50 TABLET, FILM COATED ORAL at 20:22

## 2023-11-30 NOTE — DISCHARGE INSTR - APPOINTMENTS
You have a follow-up appointment scheduled with VANESSA Coates on 12-21-23 at 2:15, office can be reached at [phone]    You have a follow-up appointment scheduled with DR Fox on 12-28-23 at 10:00, Office can be reached at [phone]

## 2023-11-30 NOTE — PROGRESS NOTES
LOS: 12 days     Name: Liz Jiang  Age/Sex: 57 y.o. female  :  1965        PCP: Tabitha Ron APRN    Principal Problem:    Acute respiratory failure with hypoxia  Active Problems:    Chest pain      Admission Information: Liz Jiang is a 57 y.o. female with chronic HFpEF, carotid artery disease status post left CEA, history of CVA, hypertension, diabetes mellitus, COPD, STEVEN on CPAP, and obesity.     Chief Complaint: Shortness of breath    Interval history: Underwent invasive coronary angiogram yesterday which did not reveal obstructive coronary artery disease    Subjective     Patient awake in bed with RN at the bedside.  Her complaint today is of feeling tired.  She did not rest well last night.    Vital Signs  Vital Signs (last 72 hrs)          0700   0659  07 0659  0700   0659  0700   1012   Most Recent      Temp (°F) 97.6 -  98.4    97.9 -  98.5    97.9 -  98.3      98     98 (36.7)  0707    Heart Rate 71 -  102    53 -  101    73 -  112    82 -  95     95  0712    Resp 18 -  22    18 -  20    18 -  20    18 -  20     20  0712    /62 -  151/83    97/55 -  127/72    118/75 -  155/84      147/85     147/85  0707    SpO2 (%) 90 -  97    92 -  95    90 -  97    92 -  99     99  0712    Flow (L/min)   4      4      4       4  0659          Temp:  [97.9 °F (36.6 °C)-98.3 °F (36.8 °C)] 98 °F (36.7 °C)  Heart Rate:  [] 95  Resp:  [18-20] 20  BP: (118-155)/(72-89) 147/85  Body mass index is 32.6 kg/m².      Intake/Output Summary (Last 24 hours) at 2023 1012  Last data filed at 2023 0500  Gross per 24 hour   Intake 335.92 ml   Output 150 ml   Net 185.92 ml       Vitals and nursing note reviewed.   Constitutional:       Appearance: Not in distress. Chronically ill-appearing.      Interventions: Nasal cannula in place.      Comments: Appears older than stated age   Pulmonary:      Effort: Pulmonary  effort is normal.      Breath sounds: Normal breath sounds.   Cardiovascular:      Normal rate. Regular rhythm.      Murmurs: There is no murmur.   Edema:     Peripheral edema absent.   Skin:     General: Skin is warm and dry.      Comments: Right radial access site without evidence of hematoma.  There is green/purple bruising evident as multiple ABGs have been drawn from that site as well.   Neurological:      Mental Status: Alert.         Telemetry: Sinus 70s     Results Review:   Cardiac catheterization 11/29/2023  Coronary anatomy findings:     LM: Is a large calibre vessel , normal take off from left cusp, divides into LAD and Lcx. No significant stenosis noted     LAD: No significant stenosis noted     LCX: Moderate calibre vessel, mild luminal irregularities.      RCA: Large calibre, dominant artery, normal take off from right cusp.  No significant stenosis noted.      Left Ventriculography:     LV systolic function was normal with visual estimated EF of 60%. No wall motion abnormalities.   No significant mitral regurgitation noted.      LVEDP: 13 mmHg  No gradient across the aortic valve on pull back.   Results from last 7 days   Lab Units 11/30/23  0204 11/28/23  0033 11/27/23  0632 11/26/23  0107   WBC 10*3/mm3 14.00* 19.28* 22.02* 17.95*   HEMOGLOBIN g/dL 14.2 13.5 13.7 13.8   PLATELETS 10*3/mm3 83* 119* 160 180     Results from last 7 days   Lab Units 11/30/23  0204 11/28/23  0309 11/26/23  0107 11/24/23  1924   SODIUM mmol/L 136 135* 137 139   POTASSIUM mmol/L 4.9 5.2 4.6 5.1   CHLORIDE mmol/L 94* 92* 90* 92*   CO2 mmol/L 35.2* 36.5* 38.2* 39.2*   BUN mg/dL 25* 22* 25* 30*   CREATININE mg/dL 0.55* 0.31* 0.50* 0.49*   CALCIUM mg/dL 9.6 9.4 9.2 9.6   GLUCOSE mg/dL 132* 157* 229* 324*                 I reviewed the patient's new clinical results.  I reviewed the patient's new imaging results and agree with the interpretation.  I personally viewed and interpreted the patient's EKG/Telemetry  data      Medication Review:   aspirin, 324 mg, Oral, Once  atorvastatin, 40 mg, Oral, Nightly  budesonide-formoterol, 2 puff, Inhalation, BID - RT  clopidogrel, 75 mg, Oral, Daily  fluticasone, 2 spray, Nasal, Daily  furosemide, 20 mg, Oral, Daily  heparin (porcine), 5,000 Units, Subcutaneous, Q8H  insulin lispro, 2-9 Units, Subcutaneous, 4x Daily AC & at Bedtime  ipratropium, 0.5 mg, Nebulization, 4x Daily - RT  isosorbide mononitrate, 30 mg, Oral, Q24H  levothyroxine, 25 mcg, Oral, Q AM  metoprolol tartrate, 50 mg, Oral, BID  pantoprazole, 40 mg, Oral, Q AM  predniSONE, 15 mg, Oral, Daily With Breakfast  senna-docusate sodium, 2 tablet, Oral, BID  sodium chloride, 10 mL, Intravenous, Q12H  sodium chloride, 10 mL, Intravenous, Q12H      Pharmacy Consult,   sodium chloride, 100 mL/hr, Last Rate: 100 mL/hr (11/29/23 1112)        Assessment:  Paroxysmal SVT  Chronic HFpEF, appears compensated  Chest pain, atypical likely secondary to underlying pulmonary pathology  Mild nonobstructive coronary artery disease  Essential hypertension  Dyslipidemia  Carotid artery disease status post left CEA in the past data deficient  History of CVA      Recommendations:  Continue beta-blocker  Continue statin with goal of LDL less than 70  Blood pressure at goal on current medications    I discussed the patients findings and my recommendations with patient.  Stable for discharge from cardiology standpoint.  Follow-up with routine cardiologist in 1 to 2 weeks.        Electronically signed by HESHAM Gordillo, 11/30/23, 10:18 AM EST.

## 2023-11-30 NOTE — DISCHARGE PLACEMENT REQUEST
[address]  [place] [address]  Phone:  [phone]  Fax:  [phone] Date: 2023      Ambulatory Referral to Home Health     Patient:  Liz Jiang MRN:  [id number]   [address] [facility]  [place] [address] :  1965  SSN:    Phone: [phone] Sex:  F      INSURANCE PAYOR PLAN GROUP # SUBSCRIBER ID   Primary:  Secondary:    MEDICARE  [place] [id number]  [id number]      [id number]  [id number]      Referring Provider Information:  MARIAH CALLAWAY Phone: [phone] Fax: [phone]       Referral Information:   # Visits:  999 Referral Type: Home Health [42]   Urgency:  Routine Referral Reason: Specialty Services Required   Start Date: 2023 End Date:  To be determined by Insurer   Diagnosis: Acute respiratory failure with hypoxia (J96.01 [ICD-10-CM] 518.81 [ICD-9-CM])      Refer to Dept:   Refer to Provider:   Refer to Provider Phone:   Refer to Facility:       Face to Face Visit Date: 2023  Follow-up provider for Plan of Care? I treated the patient in an acute care facility and will not continue treatment after discharge.  Follow-up provider: TRINA CAMACHO [[id number]]  Reason/Clinical Findings: Severe COPD, debility  Describe mobility limitations that make leaving home difficult: Severe COPD, debility  Nursing/Therapeutic Services Requested: Skilled Nursing  Nursing/Therapeutic Services Requested: Physical Therapy  Skilled nursing orders: Medication education  PT orders: Home safety assessment  PT orders: Strengthening  Frequency: 1 Week 1     This document serves as a request of services and does not constitute Insurance authorization or approval of services.  To determine eligibility, please contact the members Insurance carrier to verify and review coverage.     If you have medical questions regarding this request for services. Please contact [address] at [phone] during normal business hours.     "    Authorizing Provider:Cliff Castaneda MD  Authorizing Provider's NPI: [id number]  Order Entered By: Cliff Castaneda MD 11/30/2023  9:46 AM     Electronically signed by: Cliff Castaneda MD 11/30/2023  9:46 AM         Liz Jiang (57 y.o. Female)       Date of Birth   1965    Social Security Number       Address   [address] [place] [address]    Home Phone   [phone]    MRN   [id number]       [facility]    Marital Status                               Admission Date   11/18/23    Admission Type   Emergency    Admitting Provider   Cliff Castaneda MD    Attending Provider   Cliff Castaneda MD    Department, Room/Bed   [address] 3303/1S       Discharge Date       Discharge Disposition   Home or Self Care    Discharge Destination                                 Attending Provider: Cliff Castaneda MD    Allergies: Morphine, Roflumilast    Isolation: None   Infection: None   Code Status: CPR    Ht: 162.6 cm (64\")   Wt: 86.1 kg (189 lb 14.4 oz)    Admission Cmt: None   Principal Problem: Acute respiratory failure with hypoxia [J96.01]                   Active Insurance as of 11/18/2023       Primary Coverage       Payor Plan Insurance Group Employer/Plan Group    MEDICARE MEDICARE A & B        Payor Plan Address Payor Plan Phone Number Payor Plan Fax Number Effective Dates    [address] [phone]  3/1/2018 - None Entered    [facility] [address]         Subscriber Name Subscriber Birth Date Member ID       ROYALLIZ 1965 [id number]               Secondary Coverage       Payor Plan Insurance Group Employer/Plan Group    [place]        Payor Plan Address Payor Plan Phone Number Payor Plan Fax Number Effective Dates    [address] [phone]  9/4/2018 - None Entered    [place] [address]         Subscriber Name Subscriber Birth Date Member ID       ROYALLIZ 1965 [id number]               "       Emergency Contacts        (Rel.) Home Phone Work Phone Mobile Phone    OLY OLIVAS (Sister) -- -- [phone]    Pradeep Peterson (Son) [phone] -- [phone]    adeel haines (Friend) -- -- [phone]              Insurance Information                  MEDICARE/MEDICARE A & B Phone: [phone]    Subscriber: Liz Jiang Subscriber#: [id number]    Group#: -- Precert#: --        [place] Phone: [phone]    Subscriber: Liz Jiang Subscriber#: [id number]    Group#: -- Precert#: --             History & Physical        Veronica Koroma PA at 11/18/23 5599       Attestation signed by Cliff Castaneda MD at 11/18/23 2786    I have reviewed this documentation and agree.    Ms. 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 Patient has been admitted for COPD exacerbations multiple times in the last few months. Patient believes it started when home BiPAP was changed to trilogy and machine has not worked. There is some concern from family regarding compliance with home machine also. Patient has only been home about 10 days since last admission. She also recently spent time in inpatient rehab. IN the ED patient found to be hypoxic and hypercapnic on home 3L NC. Patient noted feeling some better on my evaluation. Plain film compared to last on my evaluation and very similar. COVID/flu negative. Will get respiratory  panel. D-dimer negative. ABG slowly improving with repeat pending. If better will take patient off with plan to continue overnight. I turned FiO2 down to 40% with goal SpO2 88-92%. Started solumedrol, nebs. No report of increased productive cough. REDSVEST not consistent with overload.                       [facility] Medicine Services  HISTORY & PHYSICAL    Patient Identification:  Name:  Liz Jiang  Age:  57 y.o.  Sex:  female  :  1965  MRN:  [id number]   Visit Number:  [id number]  Admit Date: 2023   Primary Care Physician:  Tabitha Ron APRN     Subjective     Chief complaint:   Chief Complaint   Patient presents with    Shortness of Breath     History of presenting illness:   Patient is a 57 y.o. 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     Patient with multiple admissions recently for COPD exacerbation and respiratory failure.  Patient most recently admitted to this facility 10/12/2023 through 10/17/2023 similar symptoms.  She was treated with IV Solu-Medrol and nebulizer treatments.  She returned back to her baseline functioning status and was titrated down to her home oxygen requirement.  Pulmonology evaluated the patient while inpatient and adjusted her home trilogy settings at that time.  Patient was to be enrolled in our COPD clinic and was discharged  with a COPD rescue kit per protocol.    Patient states that her shortness of breath has progressively worsened over the last couple of days.  She reports cough, no sputum production.  She reports chest tightness but no radiating chest pain.  She reports her chest tightness is with inspiration.  She denies any fevers or chills.  No upper respiratory complaints.  No ill contacts to her knowledge.  She reports using her home trilogy machine, however son at bedside states that the patient's daughter who stays with the patient reports that the patient has not been compliant with her BiPAP.  Patient states she believes that her trilogy settings are incorrect.  She is noted to not wear her trilogy machine with all naps, she states she wears it on average of 5 to 6 hours at night only.  She denies any abdominal pain or change in bowel movements, she does report some nausea with no vomiting.  She does report abdominal bloating.  She denies any urinary complaints.  No headache or dizziness, no LOC.  She denies any numbness tingling or paresthesias.  No other complaints.  She has been on BiPAP for some time prior to my evaluation and reports dramatic improvement.  She states she is eager to get off BiPAP and have something to drink.    Upon arrival to the ED, vitals were temperature 97.9, heart rate 128, respiratory rate 20, blood pressure 113/75, and oxygen saturation 70% on 3 L nasal cannula.  Initial ABG with pH 7.331, PCO2 90.7, PO2 36.3, HCO3 47.9, and oxygen saturation 63.4% on 3 L nasal cannula.  Patient was subsequently placed on BiPAP at 45% FiO2, repeat ABG with improvement in pH to 7.350, PCO2 89.5, P O2 63.3, HCO3 49.3, and oxygen saturation 91%.  Initial high-sensitivity troponin T 45 with repeat at 39, -6 delta.  proBNP 240.  CMP with chloride 93, CO2 40.3, BUN/creatinine ratio 36.8, and glucose 174.  TSH 5.170 with free T4 1.09.  C-reactive protein 4.51.  Lactate 1.7.  Procalcitonin negative at 0.03.  D-dimer  0.49.  CBC with white blood cell count 11.04, MCV 99.1, neutrophil percentage 80.7%, and absolute neutrophil count 8.92.  Sedimentation rate 74.  Urinalysis unremarkable.  COVID-19/influenza screening negative.  Chest x-ray with unchanged appearance of the chest when compared to 10/28/2023 with coarse interstitial markings.  No acute findings appreciated per radiology read.  Known ED medication administration: 80 mg IV Lasix x1, DuoNeb nebulizer treatment x1, and 80 mg IV Solu-Medrol x1.    Patient has been admitted to the progressive care unit for further evaluation and treatment.     Present during exam: Patient's son  ---------------------------------------------------------------------------------------------------------------------   Review of Systems   Constitutional:  Positive for fatigue. Negative for appetite change, chills, diaphoresis and fever.   HENT:  Negative for congestion and sore throat.    Eyes:  Negative for discharge and visual disturbance.   Respiratory:  Positive for cough, chest tightness and shortness of breath. Negative for wheezing.    Cardiovascular:  Negative for palpitations and leg swelling.   Gastrointestinal:  Positive for abdominal distention (Bloating) and nausea. Negative for abdominal pain, constipation, diarrhea and vomiting.   Endocrine: Negative for cold intolerance and heat intolerance.   Genitourinary:  Negative for decreased urine volume, dysuria, frequency and urgency.   Musculoskeletal:  Negative for arthralgias and myalgias.   Skin:  Negative for color change and wound.   Allergic/Immunologic: Negative for environmental allergies and immunocompromised state.   Neurological:  Negative for dizziness, syncope, weakness, light-headedness and headaches.   Hematological:  Negative for adenopathy. Does not bruise/bleed easily.   Psychiatric/Behavioral:  Positive for confusion (Earlier prior to being placed on BiPAP, she states that she felt confused and did not even know who  she was). Negative for decreased concentration. The patient is not nervous/anxious.       ---------------------------------------------------------------------------------------------------------------------   Past Medical History:   Diagnosis Date    Acid reflux disease     Anal cancer 12/05/2019    Arthritis     Asthma, extrinsic     Cholelithiasis     Chronic headaches     COPD (chronic obstructive pulmonary disease)     CVA (cerebral vascular accident)     w/ right sided deficit    CVA (cerebral vascular accident)     Diabetes mellitus     only has high blood sugar when on steriods    Dyslipidemia 09/04/2018    History of radiation therapy 02/27/2020    Whole pelvis/anal mass    Nausea and vomiting 12/25/2016    Obstructive sleep apnea treated with BiPAP     On home oxygen therapy     2L     Sleep apnea, obstructive      Past Surgical History:   Procedure Laterality Date    ABDOMINAL SURGERY      CARDIAC CATHETERIZATION      CAROTID ENDARTERECTOMY Left 2018    CHOLECYSTECTOMY WITH INTRAOPERATIVE CHOLANGIOGRAM N/A 1/30/2017    Procedure: CHOLECYSTECTOMY LAPAROSCOPIC INTRAOPERATIVE CHOLANGIOGRAM;  Surgeon: Carolin Marie MD;  Location: [facility];  Service:     COLONOSCOPY      HYSTERECTOMY      for heavy bleeding/ complete    KIDNEY STONE SURGERY      TUBAL ABDOMINAL LIGATION      VENOUS ACCESS DEVICE (PORT) INSERTION Left 12/17/2019    Procedure: INSERTION VENOUS ACCESS DEVICE;  Surgeon: Carolin Marie MD;  Location: [facility];  Service: General     Family History   Problem Relation Age of Onset    Diabetes Mother     Hypertension Mother     Arrhythmia Mother     Pneumonia Father     Coronary artery disease Other     Arrhythmia Sister     Heart attack Brother     Lymphoma Brother         hodgkin's     Other Brother         CABG    Breast cancer Neg Hx      Social History     Socioeconomic History    Marital status:    Tobacco Use    Smoking status: Former     Packs/day: 1.50     Years: 30.00      Additional pack years: 0.00     Total pack years: 45.00     Types: Electronic Cigarette, Cigarettes     Quit date:      Years since quittin.8    Smokeless tobacco: Never   Vaping Use    Vaping Use: Unknown   Substance and Sexual Activity    Alcohol use: No    Drug use: Defer    Sexual activity: Defer     ---------------------------------------------------------------------------------------------------------------------   Allergies:  Morphine and Roflumilast  ---------------------------------------------------------------------------------------------------------------------   Medications below are reported home medications pulling from within the system; at this time, these medications have not been reconciled unless otherwise specified and are in the verification process for further verifcation as current home medications.    Prior to Admission Medications       Prescriptions Last Dose Informant Patient Reported? Taking?    albuterol sulfate  (90 Base) MCG/ACT inhaler  Self No No    Inhale 2 puffs Every 6 (Six) Hours As Needed for Wheezing or Shortness of Air.    ALPRAZolam (XANAX) 1 MG tablet  Self No No    Take ½ tablet by mouth 2 (Two) Times a Day As Needed for Anxiety or Sleep.    ASPIRIN LOW DOSE 81 MG EC tablet  Self Yes No    Take 1 tablet by mouth Daily.    clopidogrel (PLAVIX) 75 MG tablet  Self Yes No    Take 1 tablet by mouth Daily.    doxycycline (MONODOX) 100 MG capsule   No No    Take 1 capsule by mouth every 12 hours for 5 days as part of COPD Rescue Kit. (Only Start if in YELLOW ZONE.)    Fluticasone-Umeclidin-Vilant 200-62.5-25 MCG/ACT aerosol powder   Self Yes No    Inhale 1 puff Daily.    furosemide (LASIX) 20 MG tablet  Self Yes No    Take 1 tablet by mouth Daily.    guaiFENesin (MUCINEX) 600 MG 12 hr tablet  Self Yes No    Take 2 tablets by mouth 2 (Two) Times a Day As Needed for Cough or Congestion.    HYDROcodone-acetaminophen (NORCO)  MG per tablet  Self Yes No    Take  1 tablet by mouth Every 6 (Six) Hours.    ipratropium-albuterol (COMBIVENT RESPIMAT)  MCG/ACT inhaler  Self Yes No    Inhale 1 puff 4 (Four) Times a Day As Needed for Wheezing or Shortness of Air.    levothyroxine (SYNTHROID, LEVOTHROID) 25 MCG tablet  Self Yes No    Take 1 tablet by mouth Every Morning.    meloxicam (MOBIC) 7.5 MG tablet   No No    Take 1 tablet by mouth Daily.    metoprolol tartrate (LOPRESSOR) 25 MG tablet  Self Yes No    Take 1 tablet by mouth 2 (Two) Times a Day.    naloxone (NARCAN) 4 MG/0.1ML nasal spray  Self No No    Call 911. Don't prime. Strawberry in 1 nostril for overdose. Repeat in 2-3 minutes in other nostril if no or minimal breathing/responsiveness.    polyethylene glycol (MIRALAX) 17 g packet  Self Yes No    Take 17 g by mouth Daily As Needed (constipation).    potassium chloride (MICRO-K) 10 MEQ CR capsule  Self Yes No    Take 1 capsule by mouth Daily.    predniSONE (DELTASONE) 20 MG tablet   No No    Take 2 tablets by mouth Daily. Take 2 tablets daily for 5 days as part of COPD Rescue Kit. (Only Start if in YELLOW ZONE.)          ---------------------------------------------------------------------------------------------------------------------    Objective     Hospital Scheduled Meds:          Current listed hospital scheduled medications may not yet reflect those currently placed in orders that are signed and held, awaiting patient's arrival to floor/unit.    ---------------------------------------------------------------------------------------------------------------------   Vital Signs:  Temp:  [97.9 °F (36.6 °C)] 97.9 °F (36.6 °C)  Heart Rate:  [117-140] 117  Resp:  [20-24] 22  BP: (110-113)/(75-78) 113/75  Mean Arterial Pressure (Non-Invasive) for the past 24 hrs (Last 3 readings):   Noninvasive MAP (mmHg)   11/18/23 1300 86   11/18/23 1258 89     SpO2 Percentage    11/18/23 1425 11/18/23 1518 11/18/23 1526   SpO2: 92% 90% 90%     SpO2:  [69 %-92 %] 90 %  on  Flow  (L/min):  [3] 3;   Device (Oxygen Therapy): NPPV/NIV    Body mass index is 30.9 kg/m².  Wt Readings from Last 3 Encounters:   11/18/23 81.6 kg (180 lb)   11/08/23 81.6 kg (180 lb)   10/28/23 81.6 kg (180 lb)       ---------------------------------------------------------------------------------------------------------------------   Physical Exam:  Physical Exam  Nursing note reviewed.   Constitutional:       General: She is awake. She is not in acute distress.     Appearance: She is well-developed. She is obese. She is ill-appearing. She is not toxic-appearing.      Comments: Lying in bed.  BiPAP in place.  Son present at bedside.   HENT:      Head: Normocephalic and atraumatic.      Mouth/Throat:      Mouth: Mucous membranes are moist.   Eyes:      Conjunctiva/sclera: Conjunctivae normal.      Pupils: Pupils are equal, round, and reactive to light.   Cardiovascular:      Rate and Rhythm: Regular rhythm. Tachycardia present.      Pulses:           Dorsalis pedis pulses are 2+ on the right side and 2+ on the left side.      Heart sounds: Normal heart sounds. No murmur heard.     No friction rub. No gallop.   Pulmonary:      Effort: Pulmonary effort is normal.      Breath sounds: Normal air entry. Decreased breath sounds and wheezing (Expiratory) present. No rhonchi or rales.      Comments: BiPAP in place.  Abdominal:      General: Bowel sounds are normal.      Palpations: Abdomen is soft.      Tenderness: There is no abdominal tenderness.   Genitourinary:     Comments: No tijerina catheter in place.  Musculoskeletal:      Cervical back: Neck supple.      Right lower leg: Edema present.      Left lower leg: Edema present.   Skin:     General: Skin is warm and dry.      Capillary Refill: Capillary refill takes less than 2 seconds.   Neurological:      General: No focal deficit present.      Mental Status: She is alert and oriented to person, place, and time.      Sensory: Sensation is intact.      Motor: Motor function is  intact.      Comments: Awake and alert. Follows commands. Answers questions appropriately. Moves all extremities equally. Strength and sensation intact. No focal neuro deficit on exam.   Psychiatric:         Attention and Perception: Attention normal.         Mood and Affect: Mood and affect normal.         Speech: Speech normal.         Behavior: Behavior is cooperative.       ---------------------------------------------------------------------------------------------------------------------  EKG:  Pending theology read.  Per my interpretation: Sinus tachycardia with heart rate 135 bpm, QTc 468 ms, RBBB noted, no ST elevation appreciated.  Questionable ST depression in the lateral leads, difficult to discern secondary to tachycardia.    Telemetry:    Sinus tachycardia 100s    I have personally reviewed the EKG/Telemetry strip  ---------------------------------------------------------------------------------------------------------------------   Results from last 7 days   Lab Units 11/18/23  1447 11/18/23  1307   HSTROP T ng/L 39* 45*     Results from last 7 days   Lab Units 11/18/23  1307   PROBNP pg/mL 240.0       Results from last 7 days   Lab Units 11/18/23  1429   PH, ARTERIAL pH units 7.350   PO2 ART mm Hg 63.3*   PCO2, ARTERIAL mm Hg 89.5*   HCO3 ART mmol/L 49.3*     Results from last 7 days   Lab Units 11/18/23  1307   CRP mg/dL 4.51*   LACTATE mmol/L 1.7   WBC 10*3/mm3 11.04*   HEMOGLOBIN g/dL 12.9   HEMATOCRIT % 44.8   MCV fL 99.1*   MCHC g/dL 28.8*   PLATELETS 10*3/mm3 154   INR  0.94     Results from last 7 days   Lab Units 11/18/23  1307   SODIUM mmol/L 141   POTASSIUM mmol/L 3.6   MAGNESIUM mg/dL 1.9   CHLORIDE mmol/L 93*   CO2 mmol/L 40.3*   BUN mg/dL 14   CREATININE mg/dL 0.38*   CALCIUM mg/dL 9.2   GLUCOSE mg/dL 174*   ALBUMIN g/dL 3.7   BILIRUBIN mg/dL 0.5   ALK PHOS U/L 72   AST (SGOT) U/L 16   ALT (SGPT) U/L 13   Estimated Creatinine Clearance: 166.9 mL/min (A) (by C-G formula based on SCr of  0.38 mg/dL (L)).    Lab Results   Component Value Date    HGBA1C 6.80 (H) 09/29/2023     Lab Results   Component Value Date    TSH 5.170 (H) 11/18/2023    FREET4 1.09 11/18/2023     Microbiology Results (last 10 days)       Procedure Component Value - Date/Time    COVID-19 and FLU A/B PCR, 1 HR TAT - Swab, Nasopharynx [[id number]]  (Normal) Collected: 11/18/23 1311    Lab Status: Final result Specimen: Swab from Nasopharynx Updated: 11/18/23 1340     COVID19 Not Detected     Influenza A PCR Not Detected     Influenza B PCR Not Detected    Narrative:      Fact sheet for providers: https://www.fda.gov/media/851589/download    Fact sheet for patients: https://www.fda.gov/media/478084/download    Test performed by PCR.           I have personally reviewed the above laboratory results.   ---------------------------------------------------------------------------------------------------------------------  Imaging Results (Last 7 Days)       Procedure Component Value Units Date/Time    XR Chest 1 View [[id number]] Collected: 11/18/23 1507     Updated: 11/18/23 1512    Narrative:      Comparison: Upright AP view chest     Comparison: 10/28/2023     Findings: Left-sided Mediport catheter noted with distal tip in the  expected position of the SVC, unchanged.     Diffuse bilateral interstitial prominence noted, unchanged.  No  confluent opacification.  No pneumothorax.  No pleural effusion  identified.  Cardiomediastinal silhouette within normal limits.       Impression:         Unchanged appearance of the chest when compared to 10/28/2023 with  coarsened interstitial markings, Mediport catheter and no new acute  finding.        This report was finalized on 11/18/2023 3:10 PM by Divine Gonzalez MD.             I have personally reviewed the above radiology results.     Last Echocardiogram:  Results for orders placed during the hospital encounter of 10/12/23    Adult Transthoracic Echo Complete W/ Cont if Necessary Per  Protocol    Interpretation Summary    Left ventricular systolic function is normal. Left ventricular ejection fraction appears to be 66 - 70%.    Left ventricular diastolic function is consistent with (grade I) impaired relaxation.    Estimated right ventricular systolic pressure from tricuspid regurgitation is normal (<35 mmHg).    ---------------------------------------------------------------------------------------------------------------------    Assessment & Plan      ACUTE HOSPITAL PROBLEMS    -Sepsis, present on admission, secondary to acute COPD exacerbation  -Acute on chronic hypoxic and hypercapnic respiratory failure   -History of STEVEN, home trilogy machine  Sepsis criteria: Heart rate greater than 90, respiratory rate greater than 20  X-ray without consolidation or infiltrate  Procalcitonin negative  Hold off on antibiotics for now  Blood cultures obtained in ED, follow for final results  COVID-19/influenza screening negative  Obtain respiratory panel PCR  IV Lasix x1 given in ED for lung compliance, monitor for diuretic response.  Continue IV Solu-Medrol  Nebulizer treatments and scheduled inhalants  Continue BiPAP for now.  Patient with improvement in oxygenation, however PCO2 significantly elevated still.  pH slightly improved.  Repeat ABG.  Continue supplemental oxygen as necessary to titrate SpO2 > 90%. Continuous pulse oximetry monitoring.  Consider pulmonology consultation if available, may need Trilogy settings adjusted   Strongly encourage compliance with home Trilogy on discharge, suspect non compliance   Awaiting D-dimer, if elevated consider CT PE protocol to rule out pulmonary embolism.  Strongly encourage incentive spirometry as tolerated   COPD education per protocol  Closely monitor vitals and temperature curve  Repeat labs in AM     -Macrocytosis without anemia   Obtain vitamin B12/folate levels, supplement if patient is found to be deficient   Repeat CBC in AM     -Indeterminate  high-sensitivity troponin T elevation  Initial high-sensitivity troponin T 45 with repeat at 39, -6 delta  Patient denies any crushing chest pain, she does report some pleuritic chest tightness  EKG with some questionable ST depression in the lateral leads.  Obtain repeat EKG  Previous TTE reviewed with preserved EF and grade 1 diastolic dysfunction  Ischemic evaluation per review of chart.  However, she would not be a candidate for ischemic evaluation at this time due to significant dyspnea and hypoxia/hypercarbia  Will give aspirin x1  Obtain A1c and AM lipid panel  Closely monitor on telemetry    -F/E/N  No IV fluids. Replace electrolytes per protocol as necessary. NPO diet.     CHRONIC MEDICAL PROBLEMS    -HFpEF/grade I diastolic dysfunction: Patient does have mild pedal edema.  No significant edema noted.  No evidence of volume overload on chest x-ray.  Not felt to be in acute exacerbation.  proBNP not elevated.  Obtains Reds vest.  Previous TTE from 10/2023 reviewed.  Monitor volume status closely.  Continue home medication regimen monitor consult per pharmacy.  IV Lasix given in ED, monitor for diuretic response.  -Essential hypertension  BP appears well controlled   Plan to resume home antihypertensive regimen once reconciled per pharmacy with appropriate holding parameters to prevent hypotension and/or bradycardia   Closely monitor BP per hospital protocol, titrate medications as necessary  -Hyperlipidemia: Continue statin. AM lipid panel   -Type II diabetes mellitus, non insulin dependent  Obtain HgbA1C  Review home medication regimen once reconciled per pharmacy   Hold home oral DM medications for now.  Start low dose SSI, titrate dosage as necessary   Closely monitor blood glucose levels with AccuCheks before meals and at bedtime.  Hypoglycemia protocol in place should it be necessary  -History of CVA: Details unknown.  Continue home medication and monitor consult per pharmacy  -History of carotid artery  disease s/p left CEA in the past: Continue home medications once reconciled per pharmacy  -Hypothyroidism: TSH WNL. Continue home levothyroxine.   -Anxiety: Supportive care, continue home medication regimen once reconciled per pharmacy.  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  Affecting all aspects of care  -Former smoker   ---------------------------------------------------  DVT Prophylaxis: SQ Heparin   Activity: Up with assistance as tolerated   ---------------------------------------------------  INPATIENT status due to the need for care which can only be reasonably provided in an hospital setting such as aggressive/expedited ancillary services and/or consultation services, the necessity for IV medications, close physician monitoring and/or the possible need for procedures.  In such, I feel patient’s risk for adverse outcomes and need for care warrant INPATIENT evaluation and predict the patient’s care encounter to likely last beyond 2 midnights.     Code Status: FULL CODE   ---------------------------------------------------  Disposition/Discharge planning: Pending clinical course   ---------------------------------------------------  I have discussed the patient's assessment and plan with the patient, son at bedside, nursing staff, and attending physician MD Veronica Rey PA-C  Hospitalist Service -- [facility]   Pager: [phone]    11/18/23  15:46 EST    Attending Physician: Cliff Castaneda MD      ---------------------------------------------------------------------------------------------------------------------        Electronically signed by Cliff Castaneda MD at 11/18/23 9199       Current Facility-Administered Medications   Medication Dose Route Frequency Provider Last Rate Last Admin     acetaminophen (TYLENOL) tablet 650 mg  650 mg Oral Q6H PRN Subramaniyam, Case Ellison MD        aspirin chewable tablet 324 mg  324 mg Oral Once Subramaniyam, Case Ellison MD        atorvastatin (LIPITOR) tablet 40 mg  40 mg Oral Nightly Subramaniyam, Case Ellison MD   40 mg at 11/29/23 2036    sennosides-docusate (PERICOLACE) 8.6-50 MG per tablet 2 tablet  2 tablet Oral BID Subramaniyam, Case Ellison MD        And    polyethylene glycol (MIRALAX) packet 17 g  17 g Oral Daily PRN Subramaniyam, Case Ellison MD        And    bisacodyl (DULCOLAX) EC tablet 5 mg  5 mg Oral Daily PRN Subramaniyam, Case Ellison MD        And    bisacodyl (DULCOLAX) suppository 10 mg  10 mg Rectal Daily PRN Subramaniyam, Case Ellison MD        budesonide-formoterol (SYMBICORT) 160-4.5 MCG/ACT inhaler 2 puff  2 puff Inhalation BID - RT Subramaniyam, Case Ellison MD   2 puff at 11/30/23 0659    clopidogrel (PLAVIX) tablet 75 mg  75 mg Oral Daily Subramaniyam, Case Ellison MD   75 mg at 11/30/23 0907    dextrose (D50W) (25 g/50 mL) IV injection 25 g  25 g Intravenous Q15 Min PRN Subramaniyam, Case Ellison MD        dextrose (GLUTOSE) oral gel 15 g  15 g Oral Q15 Min PRN Subramaniyam, Case Ellison MD        fluticasone (FLONASE) 50 MCG/ACT nasal spray 2 spray  2 spray Nasal Daily Subramaniyam, Case Ellison MD   2 spray at 11/30/23 0908    furosemide (LASIX) tablet 20 mg  20 mg Oral Daily Subramaniyam, Case Ellison MD   20 mg at 11/28/23 1028    glucagon HCl (Diagnostic) injection 1 mg  1 mg Intramuscular Q15 Min PRN Subramaniyam, Case Ellison MD        heparin (porcine) 5000 UNIT/ML injection 5,000 Units  5,000 Units Subcutaneous Q8H Subrambrianm, Case Piercevas, MD   5,000 Units at 11/30/23 0648    HYDROcodone-acetaminophen (NORCO)  MG per tablet 1 tablet  1 tablet Oral Q6H PRN Case Irizarry MD   1 tablet at 11/30/23 0648    Insulin Lispro (humaLOG) injection 2-9 Units  2-9 Units Subcutaneous  4x Daily AC & at Bedtime Subramanitraciem, Case Ellison MD   2 Units at 11/29/23 2156    ipratropium (ATROVENT) nebulizer solution 0.5 mg  0.5 mg Nebulization 4x Daily - RT Case Irizarry MD   0.5 mg at 11/30/23 0659    isosorbide mononitrate (IMDUR) 24 hr tablet 30 mg  30 mg Oral Q24H Subramaniyam, Case Ellison MD   30 mg at 11/30/23 0907    levothyroxine (SYNTHROID, LEVOTHROID) tablet 25 mcg  25 mcg Oral Q AM Subramaniyam, Case Ellison MD   25 mcg at 11/30/23 0647    Lidocaine Viscous HCl (XYLOCAINE) 2 % solution 10 mL  10 mL Mouth/Throat Q6H PRN Subramaniyam, Case Ellison MD   10 mL at 11/29/23 0516    loperamide (IMODIUM) capsule 2 mg  2 mg Oral 4x Daily PRN Subvillayam, Case Ellison MD   2 mg at 11/30/23 0914    metoprolol tartrate (LOPRESSOR) tablet 50 mg  50 mg Oral BID Subramaniyam, Case Ellison MD   50 mg at 11/30/23 0905    nitroglycerin (NITROSTAT) SL tablet 0.4 mg  0.4 mg Sublingual Q5 Min PRN SubramaniyaCase preston MD        pantoprazole (PROTONIX) EC tablet 40 mg  40 mg Oral Q AM Cliff Castaneda MD   40 mg at 11/30/23 0648    Pharmacy Consult   Does not apply Continuous PRN Subramaniyam, Case Ellison MD        predniSONE (DELTASONE) tablet 15 mg  15 mg Oral Daily With Breakfast Subramaniyam, Case Ellison MD   15 mg at 11/30/23 0907    sodium chloride 0.9 % flush 10 mL  10 mL Intravenous PRN Subramaniyamohan, Case Ellison MD        sodium chloride 0.9 % flush 10 mL  10 mL Intravenous Q12H Subramanitraciem, Case Ellison MD   10 mL at 11/30/23 0908    sodium chloride 0.9 % flush 10 mL  10 mL Intravenous PRN Subramaniyam, Case Ellison MD        sodium chloride 0.9 % flush 10 mL  10 mL Intravenous Q12H Edie, Case Ellison MD   10 mL at 11/30/23 0908    sodium chloride 0.9 % flush 10 mL  10 mL Intravenous PRN Edie, Case Ellison MD        sodium chloride 0.9 % infusion 40 mL  40 mL Intravenous PRN Edie, Case Ellison MD        sodium chloride 0.9 %  infusion 40 mL  40 mL Intravenous PRN SubramaniyamCase MD        sodium chloride 0.9 % infusion  100 mL/hr Intravenous Continuous SubramaniyamCase  mL/hr at 23 1112 100 mL/hr at 23 1112        Physician Progress Notes (most recent note)        Cliff Castaneda MD at 23 1728              [facility] HOSPITALIST PROGRESS NOTE     Patient Identification:  Name:  Liz Jiang  Age:  57 y.o.  Sex:  female  :  1965  MRN:  [id number]  Visit Number:  [id number]  ROOM: [address] [place] [address]     Primary Care Provider:  Tabitha Ron APRN    Length of stay in inpatient status:  11    Subjective     Chief Compliant:    Chief Complaint   Patient presents with    Shortness of Breath       History of Presenting Illness:    Patient reports being very fatigued after LHC. She was saturating 87-90% on the 4L NC after shortly coming back up after procedure. HR in low 100's. Improving but patient noted preference to try BiPAP again tonight and possibly discharge home tomorrow.     ROS:  Otherwise 10 point ROS negative other than documented above in HPI.     Objective     Current Hospital Meds:aspirin, 324 mg, Oral, Once  atorvastatin, 40 mg, Oral, Nightly  budesonide-formoterol, 2 puff, Inhalation, BID - RT  clopidogrel, 75 mg, Oral, Daily  fluticasone, 2 spray, Nasal, Daily  furosemide, 20 mg, Oral, Daily  heparin (porcine), 5,000 Units, Subcutaneous, Q8H  insulin lispro, 2-9 Units, Subcutaneous, 4x Daily AC & at Bedtime  ipratropium, 0.5 mg, Nebulization, 4x Daily - RT  isosorbide mononitrate, 30 mg, Oral, Q24H  levothyroxine, 25 mcg, Oral, Q AM  metoprolol tartrate, 50 mg, Oral, BID  [START ON 2023] pantoprazole, 40 mg, Oral, Q AM  predniSONE, 15 mg, Oral, Daily With Breakfast  senna-docusate sodium, 2 tablet, Oral, BID  sodium chloride, 10 mL, Intravenous, Q12H  sodium chloride, 10 mL, Intravenous, Q12H    Pharmacy Consult,   sodium chloride, 100 mL/hr, Last Rate: 100  mL/hr (11/29/23 1112)        Current Antimicrobial Therapy:  Anti-Infectives (From admission, onward)      None          Current Diuretic Therapy:  Diuretics (From admission, onward)      Ordered     Dose/Rate Route Frequency Start Stop    11/24/23 0734  furosemide (LASIX) injection 40 mg        Ordering Provider: Elijah Hope MD    40 mg Intravenous Once 11/24/23 0900 11/24/23 0853    11/20/23 1450  furosemide (LASIX) injection 40 mg        Ordering Provider: Elijah Hope MD    40 mg Intravenous Once 11/20/23 1500 11/20/23 1531    11/19/23 0916  furosemide (LASIX) tablet 20 mg        Ordering Provider: Case Irizarry MD    20 mg Oral Daily 11/19/23 1015      11/18/23 1306  furosemide (LASIX) injection 80 mg        Ordering Provider: Ruby Dia PA    80 mg Intravenous Once 11/18/23 1322 11/18/23 1328          ----------------------------------------------------------------------------------------------------------------------  Vital Signs:  Temp:  [98 °F (36.7 °C)-98.4 °F (36.9 °C)] 98 °F (36.7 °C)  Heart Rate:  [] 92  Resp:  [18] 18  BP: (102-155)/(67-88) 155/84  SpO2:  [90 %-95 %] 93 %  on  Flow (L/min):  [4] 4;   Device (Oxygen Therapy): nasal cannula  Body mass index is 32.25 kg/m².    Wt Readings from Last 3 Encounters:   11/29/23 85.2 kg (187 lb 14.4 oz)   11/08/23 81.6 kg (180 lb)   10/28/23 81.6 kg (180 lb)     Intake & Output (last 3 days)         11/26 0701  11/27 0700 11/27 0701 11/28 0700 11/28 0701 11/29 0700 11/29 0701 11/30 0700    P.O. 480 840 840     I.V. (mL/kg)    35.9 (0.4)    Total Intake(mL/kg) 480 (5.4) 840 (9.8) 840 (9.9) 35.9 (0.4)    Urine (mL/kg/hr) 900 (0.4) 1425 (0.7) 1450 (0.7)     Stool 0       Total Output 900 1425 1450     Net -420 -585 -610 +35.9            Urine Unmeasured Occurrence   1 x     Stool Unmeasured Occurrence  2 x 1 x           Diet: Cardiac Diets; Healthy Heart (2-3 Na+); Texture: Regular Texture (IDDSI 7); Fluid Consistency: Thin  (IDDSI 0)  ----------------------------------------------------------------------------------------------------------------------  Physical exam:  Constitutional:  Chronically ill appearing.   HENT:  Head:  Normocephalic and atraumatic.  Mouth:  Moist mucous membranes.    Eyes:  Conjunctivae and EOM are normal. No scleral icterus.    Neck:  Neck supple.  No JVD present.    Cardiovascular:  Normal rate, regular rhythm and normal heart sounds with no murmur.  Pulmonary/Chest:  No respiratory distress, no wheezes, no crackles, with normal breath sounds and good air movement.  Abdominal:  Soft.  Bowel sounds are normal.  No distension and no tenderness.   Musculoskeletal:  No edema, no tenderness, and no deformity.  No red or swollen joints anywhere.    Neurological:  Alert and oriented to person, place, and time.  No cranial nerve deficit.  No tongue deviation.  No facial droop.  No slurred speech.   Skin:  Skin is warm and dry. No rash noted. No pallor.   Peripheral vascular:  Pulses in all 4 extremities with no clubbing, no cyanosis, no edema.  ----------------------------------------------------------------------------------------------------------------------  Tele:    ----------------------------------------------------------------------------------------------------------------------  Results from last 7 days   Lab Units 11/28/23  0033 11/27/23  0632 11/26/23  0107   WBC 10*3/mm3 19.28* 22.02* 17.95*   HEMOGLOBIN g/dL 13.5 13.7 13.8   HEMATOCRIT % 44.6 46.9* 47.4*   MCV fL 96.3 98.5* 99.6*   MCHC g/dL 30.3* 29.2* 29.1*   PLATELETS 10*3/mm3 119* 160 180         Results from last 7 days   Lab Units 11/28/23  0309 11/26/23 0107 11/24/23  1924   SODIUM mmol/L 135* 137 139   POTASSIUM mmol/L 5.2 4.6 5.1   MAGNESIUM mg/dL 2.7* 2.5 2.2   CHLORIDE mmol/L 92* 90* 92*   CO2 mmol/L 36.5* 38.2* 39.2*   BUN mg/dL 22* 25* 30*   CREATININE mg/dL 0.31* 0.50* 0.49*   CALCIUM mg/dL 9.4 9.2 9.6   GLUCOSE mg/dL 157* 229* 324*  "  Estimated Creatinine Clearance: 208.9 mL/min (A) (by C-G formula based on SCr of 0.31 mg/dL (L)).  No results found for: \"AMMONIA\"              Glucose   Date/Time Value Ref Range Status   11/29/2023 1633 121 70 - 130 mg/dL Final   11/29/2023 1136 142 (H) 70 - 130 mg/dL Final   11/29/2023 0706 117 70 - 130 mg/dL Final   11/28/2023 2003 266 (H) 70 - 130 mg/dL Final   11/28/2023 1622 303 (H) 70 - 130 mg/dL Final   11/28/2023 1047 137 (H) 70 - 130 mg/dL Final   11/28/2023 0641 103 70 - 130 mg/dL Final   11/27/2023 1950 219 (H) 70 - 130 mg/dL Final     Lab Results   Component Value Date    TSH 5.170 (H) 11/18/2023    FREET4 1.09 11/18/2023     No results found for: \"PREGTESTUR\", \"PREGSERUM\", \"HCG\", \"HCGQUANT\"  Pain Management Panel  More data may exist         Latest Ref Rng & Units 11/19/2023 2/23/2022   Pain Management Panel   Amphetamine, Urine Qual Negative Negative  Negative    Barbiturates Screen, Urine Negative Negative  Negative    Benzodiazepine Screen, Urine Negative Positive  Positive    Buprenorphine, Screen, Urine Negative Negative  Negative    Cocaine Screen, Urine Negative Negative  Negative    Fentanyl, Urine Negative Negative  -   Methadone Screen , Urine Negative Negative  Negative    Methamphetamine, Ur Negative Negative  Negative      Brief Urine Lab Results  (Last result in the past 365 days)        Color   Clarity   Blood   Leuk Est   Nitrite   Protein   CREAT   Urine HCG        11/18/23 1447 Yellow   Clear   Negative   Negative   Negative   Negative                 No results found for: \"BLOODCX\"  No results found for: \"URINECX\"  No results found for: \"WOUNDCX\"  No results found for: \"STOOLCX\"  No results found for: \"RESPCX\"  No results found for: \"AFBCX\"        I have personally looked at the labs and they are summarized above.  ----------------------------------------------------------------------------------------------------------------------  Detailed radiology reports for the last 24 " "hours:    Imaging Results (Last 24 Hours)       ** No results found for the last 24 hours. **          Assessment & Plan      #Acute on chronic hypoxic respiratory failure   #Acute on chronic hypercapnic respiratory failure   #Acute COPD exacerbation   #Hx of STEVEN  #Medical noncompliance  -At admission viral panel was negative, D-dimer within normal limits, and no new consolidations seen on imaging at admission.  -Patient has severe COPD that requires home NIPPV but she has been unable to tolerate wearing it regularly.  -ABG slightly improved.  Continue BiPAP nightly and as needed.  Continue supplemental oxygen with goal oxygen saturation 88 to 92%  -Pulmonology have been recommending BiPAP settings. (Home health are managing the device rather than hospital staff and have provided patient new device with recommended BiPAP settings, patient to place on tonight.)  -Pulmonology following, appreciate assistance.  -Continue steroids, Symbicort, ipratropium. Continue to wean steroid dose as respiratory status is improving. 30 mg PO prednisone currently. Will need taper at discharge.   - PT consult requested due to generalized weakness and poor exercise tolerance, appreciate assistance. May need short term rehab vs home health services.     #Chronically elevated troponin  #Frequent pSVT  -Similar to previous labs.  No new ischemic changes noted on EKG at admission.  -Patient reported to me that she was having \"chest pressure\" with using the trilogy machine at home, but at no other times, and she says that it has not been nearly as bad when using the BiPAP here. She denies any chest pressure/pain with exertion or at any time other than when using NIPPV. This has improved.  -Based on description of symptoms I suspect that patient's pain is due to her severe COPD and use of noninvasive positive pressure ventilation rather than a cardiac source, but she will need ischemic evaluation with stress test when able to tolerate it.  - " Cardiology performed abnormal stress test followed with [facility] that revealed mild CAD. Continue ASA, statin. Repeat renal function in AM.   - Metoprolol tartrate increased to 50mg BID  - Continue telemetry monitoring      CHRONIC MEDICAL PROBLEMS     #HFpEF/grade I diastolic dysfunction  - Not felt to be in acute exacerbation clinically.   - Previous TTE from 10/2023 reviewed.  Monitor volume status with I&Os, daily weights.  Continue home lasix 20mg PO daily.     #Essential hypertension  - Well controlled, continue home regimen      #Hyperlipidemia  -Continue statin.      #Type II diabetes mellitus, non insulin dependent  - A1C 6.6%.   - Glucose overall better controlled. Continue current regimen.      #History of CVA  - Details unknown.  Continue home medication as appropriate. Does not appear to have residual deficits      #History of carotid artery disease s/p left CEA in the past  - Continue home medications      #Hypothyroidism  - TSH WNL. Continue home levothyroxine.      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  #Former smoker      Code status: Full      Dispo: Pending clinical improvement. Now that home machine has been optimized, will continue nightly trials. Plan on possible discharge in AM.       VTE Prophylaxis:   Mechanical Order History:       None          Pharmalogical Order History:        Ordered     Dose Route Frequency Stop    11/29/23 1011  heparin (porcine) injection  Status:  Discontinued         -- -- Code / Trauma / Sedation Medication 11/29/23 1034    11/18/23 1605  heparin (porcine) 5000 UNIT/ML injection 5,000 Units         5,000 Units SC Every 8 Hours Scheduled --                    Cliff Castaneda MD  [facility]  [place] Hospitalist  23  17:28 EST    Electronically signed by Cliff Castaneda MD at 23 1734          Physical Therapy Notes (most recent note)        Tho Mancia, PT at 23 1118  Version 1 of 1            23 1117   OTHER   Discipline physical therapist   Rehab Time/Intention   Session Not Performed patient/family declined treatment  (Pt anticipating heart cath this day and declined treatment. Will continue to follow and progress w/i tolerance.)         Electronically signed by Tho Mancia, PT at 23 1118          Occupational Therapy Notes (most recent note)        Angela Kruger, OT at 23 1543          Patient Name: Liz Jiang  : 1965    MRN: [id number]                              Today's Date: 2023       Admit Date: 2023    Visit Dx:     ICD-10-CM ICD-9-CM   1. COPD exacerbation  J44.1 491.21   2. Acute on chronic respiratory failure with hypoxia and hypercapnia  J96.21 518.84    J96.22 786.09     799.02     Patient Active Problem List   Diagnosis    Chest pain    COPD (chronic obstructive pulmonary disease)    Tobacco abuse    GERD (gastroesophageal reflux disease)    Anxiety    Arthritis    Nausea and vomiting    Generalized abdominal pain    Right upper quadrant pain    Gallbladder disease    Abnormal biliary HIDA scan    Dyslipidemia    Tachycardia    Anal cancer    Port-A-Cath in place    Debility    Chronic respiratory failure with hypoxia    Acute respiratory failure with hypoxia     Past Medical History:   Diagnosis Date    Acid reflux disease     Anal cancer 2019    Arthritis     Asthma, extrinsic     Cholelithiasis     Chronic headaches     COPD (chronic obstructive pulmonary disease)     CVA (cerebral vascular accident)     w/ right sided deficit    CVA (cerebral vascular accident)     Diabetes mellitus     only has high blood sugar when on steriods    Dyslipidemia 2018    History of radiation therapy 2020    Whole  pelvis/anal mass    Nausea and vomiting 12/25/2016    Obstructive sleep apnea treated with BiPAP     On home oxygen therapy     2L     Sleep apnea, obstructive      Past Surgical History:   Procedure Laterality Date    ABDOMINAL SURGERY      CARDIAC CATHETERIZATION      CAROTID ENDARTERECTOMY Left 2018    CHOLECYSTECTOMY WITH INTRAOPERATIVE CHOLANGIOGRAM N/A 1/30/2017    Procedure: CHOLECYSTECTOMY LAPAROSCOPIC INTRAOPERATIVE CHOLANGIOGRAM;  Surgeon: Carolin Marie MD;  Location: [facility];  Service:     COLONOSCOPY      HYSTERECTOMY      for heavy bleeding/ complete    KIDNEY STONE SURGERY      TUBAL ABDOMINAL LIGATION      VENOUS ACCESS DEVICE (PORT) INSERTION Left 12/17/2019    Procedure: INSERTION VENOUS ACCESS DEVICE;  Surgeon: Carolin Marie MD;  Location: [facility];  Service: General      General Information       Row Name 11/27/23 1526          OT Time and Intention    Document Type evaluation  -     Mode of Treatment individual therapy;occupational therapy  -       Row Name 11/27/23 1526          General Information    Patient Profile Reviewed yes  -     Prior Level of Function independent:;ADL's;all household mobility  reports intermittent assist required recently due to hospitalizations; recent IPR and home health services  -     Existing Precautions/Restrictions fall  -     Barriers to Rehab previous functional deficit  -       Row Name 11/27/23 1526          Occupational Profile    Reason for Services/Referral (Occupational Profile) Patient admitted to [facility]. She was referred for OT evaluation due to change in functional performance with ADLs, functional mobility, and/or transfers.  -       Row Name 11/27/23 1526          Living Environment    People in Home alone  -       Row Name 11/27/23 1526          Cognition    Orientation Status (Cognition) oriented x 3  -       Row Name 11/27/23 1526          Safety Issues, Functional Mobility    Impairments  Affecting Function (Mobility) endurance/activity tolerance;shortness of breath;strength  -               User Key  (r) = Recorded By, (t) = Taken By, (c) = Cosigned By      Initials Name Provider Type    Angela Lange OT Occupational Therapist                     Mobility/ADL's       Row Name 11/27/23 1528          Bed Mobility    Comment, (Bed Mobility) Patient declined stating she was too tired and had not has sleep, but up to [facility] with staff per patient  -KP       Row Name 11/27/23 1528          Transfers    Comment, (Transfers) unable to tolerate  -KP       Row Name 11/27/23 1528          Activities of Daily Living    BADL Assessment/Intervention bathing;upper body dressing;lower body dressing;grooming;toileting  -KP       Row Name 11/27/23 1528          Bathing Assessment/Intervention    [place] Level (Bathing) bathing skills;maximum assist (25% patient effort)  -KP       Row Name 11/27/23 1528          Upper Body Dressing Assessment/Training    [place] Level (Upper Body Dressing) upper body dressing skills;maximum assist (25% patient effort)  -KP       Row Name 11/27/23 1528          Lower Body Dressing Assessment/Training    [place] Level (Lower Body Dressing) lower body dressing skills;maximum assist (25% patient effort);dependent (less than 25% patient effort)  -KP       Row Name 11/27/23 1528          Grooming Assessment/Training    [place] Level (Grooming) grooming skills;maximum assist (25% patient effort)  -KP       Row Name 11/27/23 1528          Toileting Assessment/Training    [place] Level (Toileting) toileting skills;maximum assist (25% patient effort)  -               User Key  (r) = Recorded By, (t) = Taken By, (c) = Cosigned By      Initials Name Provider Type    Angela Lange OT Occupational Therapist                   Obj/Interventions       Row Name 11/27/23 1529          Sensory Assessment (Somatosensory)    Sensory Assessment (Somatosensory) UE  sensation intact  -       Row Name 11/27/23 1529          Vision Assessment/Intervention    Visual Impairment/Limitations WFL  -       Row Name 11/27/23 1529          Range of Motion Comprehensive    General Range of Motion bilateral upper extremity ROM L  -[facility] Name 11/27/23 1529          Strength Comprehensive (MMT)    Comment, General Manual Muscle Testing (MMT) Assessment 3-/5 in BUEs  -       Row Name 11/27/23 1529          Motor Skills    Motor Skills coordination;functional endurance  -     Coordination WFL  -     Functional Endurance poor  -KP               User Key  (r) = Recorded By, (t) = Taken By, (c) = Cosigned By      Initials Name Provider Type     Angela Kruger, OT Occupational Therapist                   Goals/Plan       [facility] Name 11/27/23 1541          Transfer Goal 1 (OT)    Activity/Assistive Device (Transfer Goal 1, OT) toilet  -     [place] Level/Cues Needed (Transfer Goal 1, OT) modified independence  -     Time Frame (Transfer Goal 1, OT) by discharge  -[facility] Name 11/27/23 1541          Dressing Goal 1 (OT)    Activity/Device (Dressing Goal 1, OT) dressing skills, all  -KP     [place]/Cues Needed (Dressing Goal 1, OT) modified independence  -     Time Frame (Dressing Goal 1, OT) by discharge  -       Row Name 11/27/23 1541          Problem Specific Goal 1 (OT)    Problem Specific Goal 1 (OT) Patient will perform sustained activity X15 minutes to promote functional endurance/activity tolernace needed for daily routine.  -     Time Frame (Problem Specific Goal 1, OT) by discharge  -       Row Name 11/27/23 1541          Therapy Assessment/Plan (OT)    Planned Therapy Interventions (OT) activity tolerance training;BADL retraining;adaptive equipment training;transfer/mobility retraining;occupation/activity based interventions;strengthening exercise;ROM/therapeutic exercise;patient/caregiver education/training  -               User Key  (r) =  Recorded By, (t) = Taken By, (c) = Cosigned By      Initials Name Provider Type    Angela Lange OT Occupational Therapist                   Clinical Impression       Row Name 11/27/23 1530          Pain Assessment    Pretreatment Pain Rating 0/10 - no pain  -     Posttreatment Pain Rating 0/10 - no pain  -       Row Name 11/27/23 1530          Plan of Care Review    Plan of Care Reviewed With patient  -     Progress no change  -     Outcome Evaluation Patient seen for OT evaluation. She presents with functional limitations including generalized weakness, impaired activity tolerance/functional endurance, and shortness of breath. She would benfeit from ongoing OT services by a licensed therapist to promote highest level of independence and safety with daily occupations.  -       Row Name 11/27/23 1530          Therapy Assessment/Plan (OT)    Patient/Family Therapy Goal Statement (OT) return home  -     Criteria for Skilled Therapeutic Interventions Met (OT) yes;meets criteria;skilled treatment is necessary  -     Therapy Frequency (OT) 3 times/wk  3-5x/week as able and available to promote functional progress  -     Predicted Duration of Therapy Intervention (OT) discharge  -       Row Name 11/27/23 1530          Therapy Plan Review/Discharge Plan (OT)    Anticipated Discharge Disposition (OT) --  TBD  -       Row Name 11/27/23 1530          Positioning and Restraints    Pre-Treatment Position in bed  -     Post Treatment Position bed  -     In Bed fowlers;call light within reach;encouraged to call for assist  -               User Key  (r) = Recorded By, (t) = Taken By, (c) = Cosigned By      Initials Name Provider Type    Angela Lange OT Occupational Therapist                   Outcome Measures    No documentation.                     OT Recommendation and Plan  Planned Therapy Interventions (OT): activity tolerance training, BADL retraining, adaptive equipment training,  transfer/mobility retraining, occupation/activity based interventions, strengthening exercise, ROM/therapeutic exercise, patient/caregiver education/training  Therapy Frequency (OT): 3 times/wk (3-5x/week as able and available to promote functional progress)  Plan of Care Review  Plan of Care Reviewed With: patient  Progress: no change  Outcome Evaluation: Patient seen for OT evaluation. She presents with functional limitations including generalized weakness, impaired activity tolerance/functional endurance, and shortness of breath. She would benfeit from ongoing OT services by a licensed therapist to promote highest level of independence and safety with daily occupations.     Time Calculation:         Time Calculation- OT       Row Name 11/27/23 1542             Time Calculation- OT    OT Received On 11/27/23  -                User Key  (r) = Recorded By, (t) = Taken By, (c) = Cosigned By      Initials Name Provider Type    Angela Lange OT Occupational Therapist                  Therapy Charges for Today       Code Description Service Date Service Provider Modifiers Qty    [id number]  OT EVAL MOD COMPLEXITY 4 11/27/2023 Angela Kruger OT GO 1                 Angela Kruger OT  11/27/2023    Electronically signed by Anegla Kruger OT at 11/27/23 1543       Speech Language Pathology Notes (most recent note)    No notes exist for this encounter.       ADL Documentation (most recent)      Flowsheet Row Most Recent Value   Transferring 2 - assistive person   Toileting 3 - assistive equipment and person   Bathing 2 - assistive person   Dressing 2 - assistive person   Eating 0 - independent   Communication 0 - understands/communicates without difficulty   Swallowing 0 - swallows foods/liquids without difficulty   Equipment Currently Used at Home hospital bed, rollator, bipap, commode, nebulizer            Discharge Summary    No notes of this type exist for this encounter.       Discharge Order (From  admission, onward)       Start     Ordered    11/30/23 0900  Discharge patient  Once        Expected Discharge Date: 11/30/23   Expected Discharge Time: Morning   Discharge Disposition: Home or Self Care   Physician of Record for Attribution - Please select from Treatment Team: MARIAH CALLAWAY [[id number]]   Review needed by CMO to determine Physician of Record: No      Question Answer Comment   Physician of Record for Attribution - Please select from Treatment Team MARIAH CALLAWAY    Review needed by CMO to determine Physician of Record No        11/30/23 0859

## 2023-11-30 NOTE — DISCHARGE INSTRUCTIONS
Please take medications as prescribed. Please go to follow-up appointments as recommended. Please seek medical attention if you have worsening symptoms. Please continue current BiPAP settings and be compliant with device when you sleep.

## 2023-11-30 NOTE — PROGRESS NOTES
[facility] HOSPITALIST PROGRESS NOTE     Patient Identification:  Name:  Liz Jiang  Age:  57 y.o.  Sex:  female  :  1965  MRN:  [id number]  Visit Number:  [id number]  ROOM: [address] [place] [address]     Primary Care Provider:  Taibtha Ron APRN    Length of stay in inpatient status:  12    Subjective     Chief Compliant:    Chief Complaint   Patient presents with    Shortness of Breath       History of Presenting Illness:    Patient denies any new complaints. Reports feeling better. Tolerated BiPAP overnight. No signs of bleeding.     ROS:  Otherwise 10 point ROS negative other than documented above in HPI.     Objective     Current Hospital Meds:aspirin, 324 mg, Oral, Once  atorvastatin, 40 mg, Oral, Nightly  budesonide-formoterol, 2 puff, Inhalation, BID - RT  clopidogrel, 75 mg, Oral, Daily  fluticasone, 2 spray, Nasal, Daily  furosemide, 20 mg, Oral, Daily  insulin lispro, 2-9 Units, Subcutaneous, 4x Daily AC & at Bedtime  ipratropium, 0.5 mg, Nebulization, 4x Daily - RT  isosorbide mononitrate, 30 mg, Oral, Q24H  levothyroxine, 25 mcg, Oral, Q AM  metoprolol tartrate, 50 mg, Oral, BID  pantoprazole, 40 mg, Oral, Q AM  predniSONE, 15 mg, Oral, Daily With Breakfast  senna-docusate sodium, 2 tablet, Oral, BID  sodium chloride, 10 mL, Intravenous, Q12H  sodium chloride, 10 mL, Intravenous, Q12H    Pharmacy Consult,   sodium chloride, 100 mL/hr, Last Rate: 100 mL/hr (23 1112)        Current Antimicrobial Therapy:  Anti-Infectives (From admission, onward)      None          Current Diuretic Therapy:  Diuretics (From admission, onward)      Ordered     Dose/Rate Route Frequency Start Stop    23 0734  furosemide (LASIX) injection 40 mg        Ordering Provider: Elijah Hope MD    40 mg Intravenous Once 23 0900 23 0853    23 1450  furosemide (LASIX) injection 40 mg        Ordering Provider: Elijah Hope MD    40 mg Intravenous Once 23 1500 23 3481     11/19/23 0916  furosemide (LASIX) tablet 20 mg        Ordering Provider: Case Irizarry MD    20 mg Oral Daily 11/19/23 1015      11/18/23 1306  furosemide (LASIX) injection 80 mg        Ordering Provider: Ruby Dia PA    80 mg Intravenous Once 11/18/23 1322 11/18/23 1328          ----------------------------------------------------------------------------------------------------------------------  Vital Signs:  Temp:  [97.9 °F (36.6 °C)-98.3 °F (36.8 °C)] 98.3 °F (36.8 °C)  Heart Rate:  [] 90  Resp:  [18-20] 18  BP: (112-155)/(69-89) 112/69  SpO2:  [92 %-99 %] 92 %  on  Flow (L/min):  [4] 4;   Device (Oxygen Therapy): nasal cannula  Body mass index is 32.6 kg/m².    Wt Readings from Last 3 Encounters:   11/30/23 86.1 kg (189 lb 14.4 oz)   11/08/23 81.6 kg (180 lb)   10/28/23 81.6 kg (180 lb)     Intake & Output (last 3 days)         11/27 0701 11/28 0700 11/28 0701 11/29 0700 11/29 0701 11/30 0700 11/30 0701  12/01 0700    P.O. 840 840 300     I.V. (mL/kg)   35.9 (0.4)     Total Intake(mL/kg) 840 (9.8) 840 (9.9) 335.9 (3.9)     Urine (mL/kg/hr) 1425 (0.7) 1450 (0.7) 150 (0.1)     Stool   0     Total Output 1425 1450 150     Net -585 -610 +185.9             Urine Unmeasured Occurrence  1 x 1 x     Stool Unmeasured Occurrence 2 x 1 x 1 x           Diet: Cardiac Diets; Healthy Heart (2-3 Na+); Texture: Regular Texture (IDDSI 7); Fluid Consistency: Thin (IDDSI 0)  ----------------------------------------------------------------------------------------------------------------------  Physical exam:  Constitutional:  Chronically ill appearing.   HENT:  Head:  Normocephalic and atraumatic.  Mouth:  Moist mucous membranes.    Eyes:  Conjunctivae and EOM are normal. No scleral icterus.    Neck:  Neck supple.  No JVD present.    Cardiovascular:  Normal rate, regular rhythm and normal heart sounds with no murmur.  Pulmonary/Chest:  No respiratory distress, no wheezes, no crackles, with normal breath  "sounds and good air movement.  Abdominal:  Soft.  Bowel sounds are normal.  No distension and no tenderness.   Musculoskeletal:  No edema, no tenderness, and no deformity.  No red or swollen joints anywhere.    Neurological:  Alert and oriented to person, place, and time.  No cranial nerve deficit.  No tongue deviation.  No facial droop.  No slurred speech.   Skin:  Skin is warm and dry. No rash noted. No pallor.   Peripheral vascular:  Pulses in all 4 extremities with no clubbing, no cyanosis, no edema.  ----------------------------------------------------------------------------------------------------------------------  Tele:    ----------------------------------------------------------------------------------------------------------------------  Results from last 7 days   Lab Units 11/30/23  0204 11/28/23  0033 11/27/23  0632   WBC 10*3/mm3 14.00* 19.28* 22.02*   HEMOGLOBIN g/dL 14.2 13.5 13.7   HEMATOCRIT % 49.6* 44.6 46.9*   MCV fL 102.5* 96.3 98.5*   MCHC g/dL 28.6* 30.3* 29.2*   PLATELETS 10*3/mm3 83* 119* 160         Results from last 7 days   Lab Units 11/30/23  0204 11/28/23  0309 11/26/23  0107 11/24/23  1924   SODIUM mmol/L 136 135* 137 139   POTASSIUM mmol/L 4.9 5.2 4.6 5.1   MAGNESIUM mg/dL  --  2.7* 2.5 2.2   CHLORIDE mmol/L 94* 92* 90* 92*   CO2 mmol/L 35.2* 36.5* 38.2* 39.2*   BUN mg/dL 25* 22* 25* 30*   CREATININE mg/dL 0.55* 0.31* 0.50* 0.49*   CALCIUM mg/dL 9.6 9.4 9.2 9.6   GLUCOSE mg/dL 132* 157* 229* 324*   Estimated Creatinine Clearance: 118.5 mL/min (A) (by C-G formula based on SCr of 0.55 mg/dL (L)).  No results found for: \"AMMONIA\"          Results from last 7 days   Lab Units 11/30/23  0204   CHOLESTEROL mg/dL 175   TRIGLYCERIDES mg/dL 445*   HDL CHOL mg/dL 55   LDL CHOL mg/dL 54     Hemoglobin A1C   Date/Time Value Ref Range Status   11/30/2023 0204 7.10 (H) 4.80 - 5.60 % Final     Glucose   Date/Time Value Ref Range Status   11/30/2023 1114 192 (H) 70 - 130 mg/dL Final   11/30/2023 " "0709 93 70 - 130 mg/dL Final   11/29/2023 1919 166 (H) 70 - 130 mg/dL Final   11/29/2023 1633 121 70 - 130 mg/dL Final   11/29/2023 1136 142 (H) 70 - 130 mg/dL Final   11/29/2023 0706 117 70 - 130 mg/dL Final   11/28/2023 2003 266 (H) 70 - 130 mg/dL Final   11/28/2023 1622 303 (H) 70 - 130 mg/dL Final     Lab Results   Component Value Date    TSH 5.170 (H) 11/18/2023    FREET4 1.09 11/18/2023     No results found for: \"PREGTESTUR\", \"PREGSERUM\", \"HCG\", \"HCGQUANT\"  Pain Management Panel  More data may exist         Latest Ref Rng & Units 11/19/2023 2/23/2022   Pain Management Panel   Amphetamine, Urine Qual Negative Negative  Negative    Barbiturates Screen, Urine Negative Negative  Negative    Benzodiazepine Screen, Urine Negative Positive  Positive    Buprenorphine, Screen, Urine Negative Negative  Negative    Cocaine Screen, Urine Negative Negative  Negative    Fentanyl, Urine Negative Negative  -   Methadone Screen , Urine Negative Negative  Negative    Methamphetamine, Ur Negative Negative  Negative      Brief Urine Lab Results  (Last result in the past 365 days)        Color   Clarity   Blood   Leuk Est   Nitrite   Protein   CREAT   Urine HCG        11/18/23 1447 Yellow   Clear   Negative   Negative   Negative   Negative                 No results found for: \"BLOODCX\"  No results found for: \"URINECX\"  No results found for: \"WOUNDCX\"  No results found for: \"STOOLCX\"  No results found for: \"RESPCX\"  No results found for: \"AFBCX\"        I have personally looked at the labs and they are summarized above.  ----------------------------------------------------------------------------------------------------------------------  Detailed radiology reports for the last 24 hours:    Imaging Results (Last 24 Hours)       ** No results found for the last 24 hours. **          Assessment & Plan      #Acute on chronic hypoxic respiratory failure   #Acute on chronic hypercapnic respiratory failure   #Acute COPD exacerbation " "  #Hx of STEVEN  #Medical noncompliance  -At admission viral panel was negative, D-dimer within normal limits, and no new consolidations seen on imaging at admission.  -Patient has severe COPD that requires home NIPPV but she has been unable to tolerate wearing it regularly.  -ABG slightly improved.  Continue BiPAP nightly and as needed.  Continue supplemental oxygen with goal oxygen saturation 88 to 92%  -Pulmonology have been recommending BiPAP settings. (Home health are managing the device rather than hospital staff and have provided patient new device with recommended BiPAP settings, patient to place on tonight.)  -Pulmonology following, appreciate assistance.  -Continue steroids, Symbicort, ipratropium. Continue to wean steroid dose as respiratory status is improving. 30 mg PO prednisone currently. Will need taper at discharge.   - PT consult requested due to generalized weakness and poor exercise tolerance, appreciate assistance. May need short term rehab vs home health services.     #Chronically elevated troponin  #Frequent pSVT  -Similar to previous labs.  No new ischemic changes noted on EKG at admission.  -Patient reported to me that she was having \"chest pressure\" with using the trilogy machine at home, but at no other times, and she says that it has not been nearly as bad when using the BiPAP here. She denies any chest pressure/pain with exertion or at any time other than when using NIPPV. This has improved.  -Based on description of symptoms I suspect that patient's pain is due to her severe COPD and use of noninvasive positive pressure ventilation rather than a cardiac source, but she will need ischemic evaluation with stress test when able to tolerate it.  - Cardiology performed abnormal stress test followed with [facility] that revealed mild CAD. Continue ASA, statin. Repeat renal function in AM.   - Metoprolol tartrate increased to 50mg BID  - Continue telemetry monitoring     #Acute thrombocytopenia  - No " associated anemia or bleeding   - 4T score of 4. Will d/c heparin and start SCDs.   - Repeat CBC in AM.        CHRONIC MEDICAL PROBLEMS     #HFpEF/grade I diastolic dysfunction  - Not felt to be in acute exacerbation clinically.   - Previous TTE from 10/2023 reviewed.  Monitor volume status with I&Os, daily weights.  Continue home lasix 20mg PO daily.     #Essential hypertension  - Well controlled, continue home regimen      #Hyperlipidemia  -Continue statin.      #Type II diabetes mellitus, non insulin dependent  - A1C 6.6%.   - Glucose overall better controlled. Continue current regimen.      #History of CVA  - Details unknown.  Continue home medication as appropriate. Does not appear to have residual deficits      #History of carotid artery disease s/p left CEA in the past  - Continue home medications      #Hypothyroidism  - TSH WNL. Continue home levothyroxine.      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  #Former smoker      Code status: Full      Dispo: Pending clinical improvement. Now that home machine has been optimized, will continue nightly trials. Plan to discharge home once thrombocytopenia stabalizes.        VTE Prophylaxis:   Mechanical Order History:        Ordered        11/30/23 1211  Place Sequential Compression Device  Once            11/30/23 1211  Maintain Sequential Compression Device  Continuous                          Pharmalogical Order History:        Ordered     Dose Route Frequency Stop    11/29/23 1011  heparin (porcine) injection  Status:  Discontinued         -- -- Code / Trauma / Sedation Medication 11/29/23 1034    11/18/23 1605  heparin (porcine) 5000 UNIT/ML injection 5,000 Units  Status:  Discontinued          5,000 Units SC Every 8 Hours Scheduled 11/30/23 1210                      Cliff Castaneda MD  [facility] Hospitalist  11/30/23  13:20 EST

## 2023-11-30 NOTE — PLAN OF CARE
Patient resting in the bed throughout the night. No s/s of distress noted. No complaints. Will continue to monitor and follow care plan.

## 2023-11-30 NOTE — CASE MANAGEMENT/SOCIAL WORK
Discharge Planning Assessment  [facility]     Patient Name: Liz Jiang  MRN: [id number]  Today's Date: 11/30/2023    Admit Date: 11/18/2023    Plan: SS spoke with pt at bedside.  Pt lives at home alone at [address] and plans to return home at discharge. Pt stated she does not want rehab prior to discharge home.  Pt has utilized [facility] in past.  Pt will need new home health referral at discharge.  SS will follow and assist with discharge needs.     Discharge Plan       Row Name 11/30/23 1045       Plan    Final Discharge Disposition Code 06 - home with home health care    Final Note Pt is being discharged home on this date with physisican order for home health. Pt preference of [facility]. SS faxed referral to [phone]. SS spoke with [facility] per Yolanda who states they will accept pt and will not need report. SS notified Lead RN. Pt's family to provide transportation on this date. No other needs identified at this time.            Kelley Parrish, TONYW

## 2023-11-30 NOTE — THERAPY DISCHARGE NOTE
Acute Care - Physical Therapy Treatment Note/Discharge   [place]     Patient Name: Liz Jiang  : 1965  MRN: [id number]  Today's Date: 2023   Onset of Illness/Injury or Date of Surgery: 23     Referring Physician: Roberto      Admit Date: 2023    Visit Dx:    ICD-10-CM ICD-9-CM   1. Acute respiratory failure with hypoxia  J96.01 518.81   2. COPD exacerbation  J44.1 491.21   3. Acute on chronic respiratory failure with hypoxia and hypercapnia  J96.21 518.84    J96.22 786.09     799.02   4. Stable angina pectoris  I20.89 413.9     Patient Active Problem List   Diagnosis    Chest pain    COPD (chronic obstructive pulmonary disease)    Tobacco abuse    GERD (gastroesophageal reflux disease)    Anxiety    Arthritis    Nausea and vomiting    Generalized abdominal pain    Right upper quadrant pain    Gallbladder disease    Abnormal biliary HIDA scan    Dyslipidemia    Tachycardia    Anal cancer    Port-A-Cath in place    Debility    Chronic respiratory failure with hypoxia    Acute respiratory failure with hypoxia     Past Medical History:   Diagnosis Date    Acid reflux disease     Anal cancer 2019    Arthritis     Asthma, extrinsic     Cholelithiasis     Chronic headaches     COPD (chronic obstructive pulmonary disease)     CVA (cerebral vascular accident)     w/ right sided deficit    CVA (cerebral vascular accident)     Diabetes mellitus     only has high blood sugar when on steriods    Dyslipidemia 2018    History of radiation therapy 2020    Whole pelvis/anal mass    Nausea and vomiting 2016    Obstructive sleep apnea treated with BiPAP     On home oxygen therapy     2L     Sleep apnea, obstructive      Past Surgical History:   Procedure Laterality Date    ABDOMINAL SURGERY      CARDIAC CATHETERIZATION      CARDIAC CATHETERIZATION N/A 2023    Procedure: Coronary angiography;  Surgeon: Case Irizarry MD;  Location: [facility] INVASIVE LOCATION;   Service: Cardiology;  Laterality: N/A;    CAROTID ENDARTERECTOMY Left 2018    CHOLECYSTECTOMY WITH INTRAOPERATIVE CHOLANGIOGRAM N/A 1/30/2017    Procedure: CHOLECYSTECTOMY LAPAROSCOPIC INTRAOPERATIVE CHOLANGIOGRAM;  Surgeon: Carolin Marie MD;  Location: [facility];  Service:     COLONOSCOPY      HYSTERECTOMY      for heavy bleeding/ complete    KIDNEY STONE SURGERY      TUBAL ABDOMINAL LIGATION      VENOUS ACCESS DEVICE (PORT) INSERTION Left 12/17/2019    Procedure: INSERTION VENOUS ACCESS DEVICE;  Surgeon: Carolin Marie MD;  Location: [facility];  Service: General       PT Assessment (last 12 hours)       PT Evaluation and Treatment       Row Name 11/30/23 1045          Physical Therapy Time and Intention    Document Type discharge treatment  -CS     Mode of Treatment physical therapy  -CS     Patient Effort adequate  -CS     Comment Pt seen bedside this AM. Pt agreed to discharge education.  -CS       Row Name 11/30/23 1045          General Information    Patient Profile Reviewed yes  -CS     Equipment Currently Used at Home commode, bedside;hospital bed;walker, rolling;rollator;wheelchair;ramp  -CS     Existing Precautions/Restrictions fall  -CS       Row Name 11/30/23 1045          Living Environment    Primary Care Provided by self  Daughter helps at times  -CS       Row Name 11/30/23 1045          Pain    Pre/Posttreatment Pain Comment no c/o  -CS       Row Name 11/30/23 1045          Cognition    Affect/Mental Status (Cognition) WFL  -CS     Follows Commands (Cognition) WFL  -CS       Row Name 11/30/23 1045          Motor Skills    Therapeutic Exercise --  Pt education provided for energy conservation, breathing exercise resources, and progression of mobility.  -CS       Row Name             Wound 11/20/23 1443 medial coccyx Fissure    Wound - Properties Group Placement Date: 11/20/23  -TW Placement Time: 1443 -TW Orientation: medial  -TW Location: coccyx  -TW Primary Wound Type: Fissure  -TW     Retired Wound - Properties Group Placement Date: 11/20/23 -TW Placement Time: 1443 -TW Orientation: medial  -TW Location: coccyx  -TW Primary Wound Type: Fissure  -TW    Retired Wound - Properties Group Date first assessed: 11/20/23 -TW Time first assessed: 1443 -TW Location: coccyx  -TW Primary Wound Type: Fissure  -TW      Row Name 11/30/23 1045          Plan of Care Review    Plan of Care Reviewed With patient  -CS     Progress no change  -CS     Outcome Evaluation Pt demonstrated knowledge w/ education provided. Anticipate d/c home this day.  -CS       Row Name 11/30/23 1045          Positioning and Restraints    Pre-Treatment Position in bed  -CS     Post Treatment Position bed  -CS     In Bed fowlers;call light within reach;encouraged to call for assist  -CS       Row Name 11/30/23 1045          Therapy Assessment/Plan (PT)    Rehab Potential (PT) good, to achieve stated therapy goals  -CS     Criteria for Skilled Interventions Met (PT) yes;meets criteria;skilled treatment is necessary  -CS     Therapy Frequency (PT) 2 times/wk  -CS     Problem List (PT) problems related to;balance;coordination;mobility;strength;postural control  -CS     Activity Limitations Related to Problem List (PT) unable to ambulate safely;unable to transfer safely  -CS       Row Name 11/30/23 1045          Physical Therapy Goals    Bed Mobility Goal Selection (PT) bed mobility, PT goal 1  -CS     Transfer Goal Selection (PT) transfer, PT goal 1  -CS     Gait Training Goal Selection (PT) gait training, PT goal 1  -CS       Row Name 11/30/23 1045          Bed Mobility Goal 1 (PT)    Activity/Assistive Device (Bed Mobility Goal 1, PT) sit to supine/supine to sit  -CS     Bee Level/Cues Needed (Bed Mobility Goal 1, PT) modified independence  -CS     Time Frame (Bed Mobility Goal 1, PT) long term goal (LTG);by discharge  -CS     Progress/Outcomes (Bed Mobility Goal 1, PT) goal met  -CS       Row Name 11/30/23 1045           Transfer Goal 1 (PT)    Activity/Assistive Device (Transfer Goal 1, PT) transfers, all;bed-to-chair/chair-to-bed;walker, rolling  -CS     Bryant Level/Cues Needed (Transfer Goal 1, PT) modified independence  -CS     Time Frame (Transfer Goal 1, PT) long term goal (LTG);by discharge  -CS     Progress/Outcome (Transfer Goal 1, PT) goal partially met  -CS       Row Name 11/30/23 1045          Gait Training Goal 1 (PT)    Activity/Assistive Device (Gait Training Goal 1, PT) gait (walking locomotion);assistive device use;walker, rolling  -CS     Bryant Level (Gait Training Goal 1, PT) standby assist  -CS     Distance (Gait Training Goal 1, PT) 50ft  -CS     Time Frame (Gait Training Goal 1, PT) long term goal (LTG);by discharge  -CS     Progress/Outcome (Gait Training Goal 1, PT) goal not met  -CS       Row Name 11/30/23 1045          Discharge Summary (PT)    Additional Documentation Discharge Summary (PT) (Group)  -       Row Name 11/30/23 1045          Discharge Summary (PT)    Reason for Discharge (PT) patient discharged from this facility  -CS     Outcomes Achieved/Progress Made Upon Discharge (PT) goals partially achieved prior to discharge  -CS     Transfer to Another Level of Care or Facility (PT) home;home with assist recommended;home with home health recommended  -CS     Discharge Summary Statement (PT) D/C acute PT.  -               User Key  (r) = Recorded By, (t) = Taken By, (c) = Cosigned By      Initials Name Provider Type    TW Gricel Doll, RN Registered Nurse    Tho Aparicio, PT Physical Therapist                          PT Recommendation and Plan  Therapy Frequency (PT): 2 times/wk  Plan of Care Reviewed With: patient  Progress: no change  Outcome Evaluation: Pt demonstrated knowledge w/ education provided. Anticipate d/c home this day.         Time Calculation:    PT Charges       Row Name 11/30/23 9653             Time Calculation    Start Time 1045  -      PT Received On  11/30/23  -CS         Timed Charges    48258 - PT Self Care/Mgmt Minutes 23  -CS         Total Minutes    Timed Charges Total Minutes 23  -CS       Total Minutes 23  -CS                User Key  (r) = Recorded By, (t) = Taken By, (c) = Cosigned By      Initials Name Provider Type    CS Tho Mancia, PT Physical Therapist                  Therapy Charges for Today       Code Description Service Date Service Provider Modifiers Qty    00763975631 HC PT SELF CARE/MGMT/TRAIN EA 15 MIN 11/30/2023 Tho Mancia, PT GP 2            PT G-Codes  AM-PAC 6 Clicks Score (PT): 18         Tho Mancia, PT  11/30/2023

## 2023-12-01 LAB
ALBUMIN SERPL-MCNC: 3.6 G/DL (ref 3.5–5.2)
ALBUMIN/GLOB SERPL: 1.4 G/DL
ALP SERPL-CCNC: 48 U/L (ref 39–117)
ALT SERPL W P-5'-P-CCNC: 22 U/L (ref 1–33)
ANION GAP SERPL CALCULATED.3IONS-SCNC: 3.5 MMOL/L (ref 5–15)
AST SERPL-CCNC: 17 U/L (ref 1–32)
BASOPHILS # BLD AUTO: 0.07 10*3/MM3 (ref 0–0.2)
BASOPHILS NFR BLD AUTO: 0.6 % (ref 0–1.5)
BILIRUB SERPL-MCNC: 0.5 MG/DL (ref 0–1.2)
BUN SERPL-MCNC: 19 MG/DL (ref 6–20)
BUN/CREAT SERPL: 40.4 (ref 7–25)
CALCIUM SPEC-SCNC: 9.2 MG/DL (ref 8.6–10.5)
CHLORIDE SERPL-SCNC: 94 MMOL/L (ref 98–107)
CO2 SERPL-SCNC: 42.5 MMOL/L (ref 22–29)
CREAT SERPL-MCNC: 0.47 MG/DL (ref 0.57–1)
DEPRECATED RDW RBC AUTO: 54.4 FL (ref 37–54)
EGFRCR SERPLBLD CKD-EPI 2021: 110.5 ML/MIN/1.73
EOSINOPHIL # BLD AUTO: 0.03 10*3/MM3 (ref 0–0.4)
EOSINOPHIL NFR BLD AUTO: 0.2 % (ref 0.3–6.2)
ERYTHROCYTE [DISTWIDTH] IN BLOOD BY AUTOMATED COUNT: 14.6 % (ref 12.3–15.4)
GLOBULIN UR ELPH-MCNC: 2.5 GM/DL
GLUCOSE BLDC GLUCOMTR-MCNC: 123 MG/DL (ref 70–130)
GLUCOSE BLDC GLUCOMTR-MCNC: 147 MG/DL (ref 70–130)
GLUCOSE BLDC GLUCOMTR-MCNC: 156 MG/DL (ref 70–130)
GLUCOSE BLDC GLUCOMTR-MCNC: 249 MG/DL (ref 70–130)
GLUCOSE SERPL-MCNC: 143 MG/DL (ref 65–99)
HCT VFR BLD AUTO: 43.7 % (ref 34–46.6)
HGB BLD-MCNC: 12.5 G/DL (ref 12–15.9)
IMM GRANULOCYTES # BLD AUTO: 0.33 10*3/MM3 (ref 0–0.05)
IMM GRANULOCYTES NFR BLD AUTO: 2.6 % (ref 0–0.5)
LYMPHOCYTES # BLD AUTO: 1.16 10*3/MM3 (ref 0.7–3.1)
LYMPHOCYTES NFR BLD AUTO: 9.2 % (ref 19.6–45.3)
MCH RBC QN AUTO: 28.6 PG (ref 26.6–33)
MCHC RBC AUTO-ENTMCNC: 28.6 G/DL (ref 31.5–35.7)
MCV RBC AUTO: 100 FL (ref 79–97)
MONOCYTES # BLD AUTO: 0.87 10*3/MM3 (ref 0.1–0.9)
MONOCYTES NFR BLD AUTO: 6.9 % (ref 5–12)
NEUTROPHILS NFR BLD AUTO: 10.09 10*3/MM3 (ref 1.7–7)
NEUTROPHILS NFR BLD AUTO: 80.5 % (ref 42.7–76)
NRBC BLD AUTO-RTO: 0 /100 WBC (ref 0–0.2)
PLATELET # BLD AUTO: 69 10*3/MM3 (ref 140–450)
PMV BLD AUTO: 12.5 FL (ref 6–12)
POTASSIUM SERPL-SCNC: 4.6 MMOL/L (ref 3.5–5.2)
PROT SERPL-MCNC: 6.1 G/DL (ref 6–8.5)
RBC # BLD AUTO: 4.37 10*6/MM3 (ref 3.77–5.28)
SODIUM SERPL-SCNC: 140 MMOL/L (ref 136–145)
WBC NRBC COR # BLD AUTO: 12.55 10*3/MM3 (ref 3.4–10.8)

## 2023-12-01 PROCEDURE — 82948 REAGENT STRIP/BLOOD GLUCOSE: CPT

## 2023-12-01 PROCEDURE — 63710000001 PREDNISONE PER 5 MG: Performed by: INTERNAL MEDICINE

## 2023-12-01 PROCEDURE — 94664 DEMO&/EVAL PT USE INHALER: CPT

## 2023-12-01 PROCEDURE — 94799 UNLISTED PULMONARY SVC/PX: CPT

## 2023-12-01 PROCEDURE — 85025 COMPLETE CBC W/AUTO DIFF WBC: CPT | Performed by: INTERNAL MEDICINE

## 2023-12-01 PROCEDURE — 25010000002 ONDANSETRON PER 1 MG

## 2023-12-01 PROCEDURE — 99233 SBSQ HOSP IP/OBS HIGH 50: CPT | Performed by: INTERNAL MEDICINE

## 2023-12-01 PROCEDURE — 80053 COMPREHEN METABOLIC PANEL: CPT | Performed by: INTERNAL MEDICINE

## 2023-12-01 PROCEDURE — 94761 N-INVAS EAR/PLS OXIMETRY MLT: CPT

## 2023-12-01 PROCEDURE — 94660 CPAP INITIATION&MGMT: CPT

## 2023-12-01 PROCEDURE — 63710000001 INSULIN LISPRO (HUMAN) PER 5 UNITS: Performed by: INTERNAL MEDICINE

## 2023-12-01 PROCEDURE — 99231 SBSQ HOSP IP/OBS SF/LOW 25: CPT | Performed by: INTERNAL MEDICINE

## 2023-12-01 RX ORDER — ALPRAZOLAM 0.5 MG/1
0.5 TABLET ORAL 2 TIMES DAILY PRN
Status: DISCONTINUED | OUTPATIENT
Start: 2023-12-01 | End: 2023-12-05

## 2023-12-01 RX ADMIN — Medication 10 ML: at 20:56

## 2023-12-01 RX ADMIN — ATORVASTATIN CALCIUM 40 MG: 40 TABLET, FILM COATED ORAL at 20:56

## 2023-12-01 RX ADMIN — FUROSEMIDE 20 MG: 20 TABLET ORAL at 09:11

## 2023-12-01 RX ADMIN — CLOPIDOGREL BISULFATE 75 MG: 75 TABLET, FILM COATED ORAL at 09:11

## 2023-12-01 RX ADMIN — INSULIN LISPRO 4 UNITS: 100 INJECTION, SOLUTION INTRAVENOUS; SUBCUTANEOUS at 17:52

## 2023-12-01 RX ADMIN — PREDNISONE 15 MG: 10 TABLET ORAL at 09:11

## 2023-12-01 RX ADMIN — BUDESONIDE AND FORMOTEROL FUMARATE DIHYDRATE 2 PUFF: 160; 4.5 AEROSOL RESPIRATORY (INHALATION) at 18:46

## 2023-12-01 RX ADMIN — IPRATROPIUM BROMIDE 0.5 MG: 0.5 SOLUTION RESPIRATORY (INHALATION) at 12:57

## 2023-12-01 RX ADMIN — IPRATROPIUM BROMIDE 0.5 MG: 0.5 SOLUTION RESPIRATORY (INHALATION) at 18:46

## 2023-12-01 RX ADMIN — PANTOPRAZOLE SODIUM 40 MG: 40 TABLET, DELAYED RELEASE ORAL at 06:19

## 2023-12-01 RX ADMIN — LEVOTHYROXINE SODIUM 25 MCG: 25 TABLET ORAL at 06:19

## 2023-12-01 RX ADMIN — BUDESONIDE AND FORMOTEROL FUMARATE DIHYDRATE 2 PUFF: 160; 4.5 AEROSOL RESPIRATORY (INHALATION) at 06:24

## 2023-12-01 RX ADMIN — Medication 10 ML: at 09:13

## 2023-12-01 RX ADMIN — IPRATROPIUM BROMIDE 0.5 MG: 0.5 SOLUTION RESPIRATORY (INHALATION) at 06:25

## 2023-12-01 RX ADMIN — METOPROLOL TARTRATE 50 MG: 50 TABLET, FILM COATED ORAL at 09:11

## 2023-12-01 RX ADMIN — ONDANSETRON 4 MG: 2 INJECTION INTRAMUSCULAR; INTRAVENOUS at 23:11

## 2023-12-01 RX ADMIN — IPRATROPIUM BROMIDE 0.5 MG: 0.5 SOLUTION RESPIRATORY (INHALATION) at 00:10

## 2023-12-01 RX ADMIN — ISOSORBIDE MONONITRATE 30 MG: 30 TABLET, EXTENDED RELEASE ORAL at 09:11

## 2023-12-01 RX ADMIN — METOPROLOL TARTRATE 50 MG: 50 TABLET, FILM COATED ORAL at 20:56

## 2023-12-01 RX ADMIN — HYDROCODONE BITARTRATE AND ACETAMINOPHEN 1 TABLET: 10; 325 TABLET ORAL at 03:23

## 2023-12-01 RX ADMIN — ALPRAZOLAM 0.5 MG: 0.5 TABLET ORAL at 12:54

## 2023-12-01 RX ADMIN — Medication 10 ML: at 09:11

## 2023-12-01 RX ADMIN — HYDROCODONE BITARTRATE AND ACETAMINOPHEN 1 TABLET: 10; 325 TABLET ORAL at 09:24

## 2023-12-01 RX ADMIN — HYDROCODONE BITARTRATE AND ACETAMINOPHEN 1 TABLET: 10; 325 TABLET ORAL at 20:56

## 2023-12-01 NOTE — SIGNIFICANT NOTE
As patient's invasive coronary angiogram was negative for significant obstructive disease, we will sign off.  We appreciate the opportunity to participate in her care.  Please let us know if we can be of further assistance.    Electronically signed by HESHAM Gordillo, 12/01/23, 9:05 AM EST.

## 2023-12-01 NOTE — PLAN OF CARE
Pt rested in bed this shift. Pt complained of pain and anxiety; PRN medications given; see MAR.   No s/s of acute distress. VSS

## 2023-12-01 NOTE — CASE MANAGEMENT/SOCIAL WORK
Continued Stay Note  [facility]     Patient Name: Liz Jiang  MRN: [id number]  Today's Date: 12/1/2023    Admit Date: 11/18/2023    Plan: CM order for trilogy noted and faxed to [facility].  This CM spoke with Xiomara at [facility] whom reports that they have the order and should further information be required Yesi at [facility] will notify this CM via office phone.  Vanda Gifford RN

## 2023-12-01 NOTE — CASE MANAGEMENT/SOCIAL WORK
Discharge Planning Assessment  [facility]     Patient Name: Liz Jiang  MRN: [id number]  Today's Date: 12/1/2023    Admit Date: 11/18/2023    Plan: SS spoke with pt at bedside.  Pt lives at home alone at [address] and plans to return home at discharge. Pt stated she does not want rehab prior to discharge home.  Pt has utilized [facility] in past.  Pt will need new home health referral at discharge. CM assisting with DME needs. SS will follow.     Discharge Plan       Row Name 12/01/23 1337       Plan    Plan SS spoke with pt at bedside.  Pt lives at home alone at [address] and plans to return home at discharge. Pt stated she does not want rehab prior to discharge home.  Pt has utilized [facility] in past.  Pt will need new home health referral at discharge. CM assisting with DME needs. SS will follow.            LIANE Daily

## 2023-12-01 NOTE — PROGRESS NOTES
Progress Note Pulmonary      Subjective no new complaints.  Unable to maintain her O2 sat on home BiPAP.  Interval History:   As above      Review of Systems:    Reviewed ; unchanged       Vital Signs  Temp:  [97.8 °F (36.6 °C)-98.4 °F (36.9 °C)] 98 °F (36.7 °C)  Heart Rate:  [] 94  Resp:  [18-22] 18  BP: ()/(60-80) 105/70  Body mass index is 32.82 kg/m².    Intake/Output Summary (Last 24 hours) at 12/1/2023 1109  Last data filed at 12/1/2023 0500  Gross per 24 hour   Intake 480 ml   Output 1250 ml   Net -770 ml     No intake/output data recorded.    Physical Exam:  General- normal in appearance, not in any acute distress    HEENT- pupils equally reactive to light, normal in size, no scleral icterus    Neck- supple    No JVD, no carotid bruit    Respiratory-bilateral air entry, clear to auscultation  Cardiovascular-  Normal S1 and S2. No S3, S4 or murmurs.    GI-nontender nondistended bowel sounds positive    CNS-alert oriented x3, grossly nonfocal    Extremities- pulses normal bilaterally , no clubbing and edema        Results Review:      Results from last 7 days   Lab Units 12/01/23 0147 11/30/23 0204 11/28/23  0033   WBC 10*3/mm3 12.55* 14.00* 19.28*   HEMOGLOBIN g/dL 12.5 14.2 13.5   PLATELETS 10*3/mm3 69* 83* 119*     Results from last 7 days   Lab Units 12/01/23 0144 11/30/23  0204 11/28/23  0309 11/26/23  0107 11/24/23  1924   SODIUM mmol/L 140 136 135* 137 139   POTASSIUM mmol/L 4.6 4.9 5.2 4.6 5.1   CHLORIDE mmol/L 94* 94* 92* 90* 92*   CO2 mmol/L 42.5* 35.2* 36.5* 38.2* 39.2*   BUN mg/dL 19 25* 22* 25* 30*   CREATININE mg/dL 0.47* 0.55* 0.31* 0.50* 0.49*   CALCIUM mg/dL 9.2 9.6 9.4 9.2 9.6   GLUCOSE mg/dL 143* 132* 157* 229* 324*   MAGNESIUM mg/dL  --   --  2.7* 2.5 2.2     Lab Results   Component Value Date    INR 0.94 11/18/2023    INR 0.95 10/12/2023    INR 0.90 03/27/2017    PROTIME 13.1 11/18/2023    PROTIME 13.1 10/12/2023    PROTIME 9.9 03/27/2017     Results from last 7 days   Lab  Units 12/01/23  0144   ALK PHOS U/L 48   BILIRUBIN mg/dL 0.5   ALT (SGPT) U/L 22   AST (SGOT) U/L 17         Imaging Results (Last 24 Hours)       ** No results found for the last 24 hours. **                 aspirin, 324 mg, Oral, Once  atorvastatin, 40 mg, Oral, Nightly  budesonide-formoterol, 2 puff, Inhalation, BID - RT  clopidogrel, 75 mg, Oral, Daily  fluticasone, 2 spray, Nasal, Daily  furosemide, 20 mg, Oral, Daily  insulin lispro, 2-9 Units, Subcutaneous, 4x Daily AC & at Bedtime  ipratropium, 0.5 mg, Nebulization, 4x Daily - RT  isosorbide mononitrate, 30 mg, Oral, Q24H  levothyroxine, 25 mcg, Oral, Q AM  metoprolol tartrate, 50 mg, Oral, BID  pantoprazole, 40 mg, Oral, Q AM  predniSONE, 15 mg, Oral, Daily With Breakfast  senna-docusate sodium, 2 tablet, Oral, BID  sodium chloride, 10 mL, Intravenous, Q12H  sodium chloride, 10 mL, Intravenous, Q12H      Pharmacy Consult,   sodium chloride, 100 mL/hr, Last Rate: 100 mL/hr (11/29/23 1112)        Medication Review:     Assessment & Plan   #1 initial hospitalization for acute exacerbation of COPD-much improved.  No more wheezing     #2: OBstructive sleep apnea,  #3 chronic hypercapnic hypoxic respiratory failure.      Unable to maintain O2 sat on home BiPAP.  I have requested /DME to arrange trilogy for her with a setting of  AVAPS/AE. Tidal volume 6 mL/kg ideal body weight, PS max 28, PS minimum 20, EPAP max 16, EPAP minimum 6, respiratory rate 18, oxygen to a saturation of 92%         continue nebs  Taper steroid       FiO2 requirement reviewed and adjusted to maintain saturation 88 to 92%.  Continue hospital BiPAP 22/12 with a backup rate of 20 and bleed oxygen to a saturation of 92%.      Continue appropriate prophylaxis      #4: Coronary artery disease positive stress test.  Cardiologist planning to do a cardiac cath.      Chris Shi MD  12/01/23  11:09 EST

## 2023-12-01 NOTE — PLAN OF CARE
Patient resting in the bed throughout the night. No s/s of distress noted. Patients oxygen started to desat on  home trilogy, patient placed on bipap machine unable to contact medical supply company to fix trilogy at this time. Will continue to follow care  plan.

## 2023-12-01 NOTE — DISCHARGE PLACEMENT REQUEST
"Liz Jiang (57 y.o. Female)       Date of Birth   1965    Social Security Number       Address   [address] [address]    Home Phone   [phone]    MRN   [id number]       [facility]    Marital Status                               Admission Date   11/18/23    Admission Type   Emergency    Admitting Provider   Cliff Castaneda MD    Attending Provider   Cliff Castaneda MD    Department, Room/Bed   [address], 3303/1S       Discharge Date       Discharge Disposition       Discharge Destination                                 Attending Provider: Cliff Castaneda MD    Allergies: Morphine, Roflumilast    Isolation: None   Infection: None   Code Status: CPR    Ht: 162.6 cm (64\")   Wt: 86.7 kg (191 lb 3.2 oz)    Admission Cmt: None   Principal Problem: Acute respiratory failure with hypoxia [J96.01]                   Active Insurance as of 11/18/2023       Primary Coverage       Payor Plan Insurance Group Employer/Plan Group    MEDICARE MEDICARE A & B        Payor Plan Address Payor Plan Phone Number Payor Plan Fax Number Effective Dates    [address] [phone]  3/1/2018 - None Entered    [facility] [id number]         Subscriber Name Subscriber Birth Date Member ID       LIZ JIANG 1965 [id number]               Secondary Coverage       Payor Plan Insurance Group Employer/Plan Group    [place]        Payor Plan Address Payor Plan Phone Number Payor Plan Fax Number Effective Dates    [address] [phone]  9/4/2018 - None Entered    [facility] [id number]         Subscriber Name Subscriber Birth Date Member ID       LIZ JIANG 1965 [id number]                     Emergency Contacts        (Rel.) Home Phone Work Phone Mobile Phone    OLY OLIVAS (Sister) -- -- [phone]    NicholasPradeep (Son) [phone] -- [phone]    adeel haines (Friend) -- -- [phone]      "       Case Management  Consult [[id number]] (Order [id number])  Order  Date: 12/1/2023 Department: [address] Released By/Authorizing: Chris Shi MD (auto-released)     Order History  Inpatient  Date/Time Action Taken User Additional Information   12/01/23 1109 Release Chris Shi MD (auto-released) From Order:[id number]     Order Details    Frequency Duration Priority Order Class   STAT 1  occurrence STAT Hospital Performed     Order Questions    Question Answer   Reason for Consult Home BiPAP is not working.  Needs trilogy with a setting of AVAPS/AE.  Tidal volume 6 mL/kg ideal body weight, PS max 28, PS minimum 20, EPAP max 16, EPAP minimum 6, respiratory rate 18, oxygen to a saturation of 92%            Consult Order Info    ID Description Priority Start Date Start Time   [id number] Case Management  Consult STAT 12/01/2023 11:07 AM   Provider Specialty Referred to   ______________________________________ _____________________________________                           Acknowledgement Info    For At Acknowledged By Acknowledged On   Placing Order 12/01/23 1109 Yoli Rand RN 12/01/23 1119             Reprint Order Requisition    Case Management  Consult (Order #[id number]) on 12/1/23       Encounter    View Encounter                  Order Provider Info        Office phone Pager E-mail   Ordering User  Chris Shi MD  [phone] -- --   Authorizing Provider  Chris Shi MD  [phone] -- --   Attending Provider  Cliff Castaneda MD  [phone] -- --     Tracking Reports

## 2023-12-02 LAB
ALBUMIN SERPL-MCNC: 3.6 G/DL (ref 3.5–5.2)
ALP SERPL-CCNC: 49 U/L (ref 39–117)
ALT SERPL W P-5'-P-CCNC: 22 U/L (ref 1–33)
ANION GAP SERPL CALCULATED.3IONS-SCNC: 5.9 MMOL/L (ref 5–15)
AST SERPL-CCNC: 23 U/L (ref 1–32)
BILIRUB CONJ SERPL-MCNC: <0.2 MG/DL (ref 0–0.3)
BILIRUB INDIRECT SERPL-MCNC: NORMAL MG/DL
BILIRUB SERPL-MCNC: 0.5 MG/DL (ref 0–1.2)
BUN SERPL-MCNC: 18 MG/DL (ref 6–20)
BUN/CREAT SERPL: 39.1 (ref 7–25)
CALCIUM SPEC-SCNC: 9 MG/DL (ref 8.6–10.5)
CHLORIDE SERPL-SCNC: 93 MMOL/L (ref 98–107)
CO2 SERPL-SCNC: 38.1 MMOL/L (ref 22–29)
CREAT SERPL-MCNC: 0.46 MG/DL (ref 0.57–1)
DEPRECATED RDW RBC AUTO: 54.7 FL (ref 37–54)
EGFRCR SERPLBLD CKD-EPI 2021: 111.1 ML/MIN/1.73
EOSINOPHIL # BLD MANUAL: 0.14 10*3/MM3 (ref 0–0.4)
EOSINOPHIL NFR BLD MANUAL: 1 % (ref 0.3–6.2)
ERYTHROCYTE [DISTWIDTH] IN BLOOD BY AUTOMATED COUNT: 14.6 % (ref 12.3–15.4)
GLUCOSE BLDC GLUCOMTR-MCNC: 112 MG/DL (ref 70–130)
GLUCOSE BLDC GLUCOMTR-MCNC: 121 MG/DL (ref 70–130)
GLUCOSE BLDC GLUCOMTR-MCNC: 162 MG/DL (ref 70–130)
GLUCOSE BLDC GLUCOMTR-MCNC: 183 MG/DL (ref 70–130)
GLUCOSE BLDC GLUCOMTR-MCNC: 197 MG/DL (ref 70–130)
GLUCOSE SERPL-MCNC: 96 MG/DL (ref 65–99)
HCT VFR BLD AUTO: 45.6 % (ref 34–46.6)
HGB BLD-MCNC: 12.9 G/DL (ref 12–15.9)
HYPOCHROMIA BLD QL: ABNORMAL
LYMPHOCYTES # BLD MANUAL: 0.85 10*3/MM3 (ref 0.7–3.1)
LYMPHOCYTES NFR BLD MANUAL: 8 % (ref 5–12)
MCH RBC QN AUTO: 28.8 PG (ref 26.6–33)
MCHC RBC AUTO-ENTMCNC: 28.3 G/DL (ref 31.5–35.7)
MCV RBC AUTO: 101.8 FL (ref 79–97)
MONOCYTES # BLD: 1.13 10*3/MM3 (ref 0.1–0.9)
NEUTROPHILS # BLD AUTO: 11.99 10*3/MM3 (ref 1.7–7)
NEUTROPHILS NFR BLD MANUAL: 85 % (ref 42.7–76)
NRBC SPEC MANUAL: 1 /100 WBC (ref 0–0.2)
PF4 HEPARIN CMPLX IGG SERPL IA: 0.1 OD (ref 0–0.4)
PLAT MORPH BLD: NORMAL
PLATELET # BLD AUTO: 58 10*3/MM3 (ref 140–450)
PMV BLD AUTO: 12 FL (ref 6–12)
POTASSIUM SERPL-SCNC: 4.3 MMOL/L (ref 3.5–5.2)
POTASSIUM SERPL-SCNC: 5.9 MMOL/L (ref 3.5–5.2)
PROT SERPL-MCNC: 6.3 G/DL (ref 6–8.5)
QT INTERVAL: 366 MS
QTC INTERVAL: 437 MS
RBC # BLD AUTO: 4.48 10*6/MM3 (ref 3.77–5.28)
SCAN SLIDE: NORMAL
SODIUM SERPL-SCNC: 137 MMOL/L (ref 136–145)
STOMATOCYTES BLD QL SMEAR: ABNORMAL
VARIANT LYMPHS NFR BLD MANUAL: 6 % (ref 19.6–45.3)
WBC NRBC COR # BLD AUTO: 14.1 10*3/MM3 (ref 3.4–10.8)

## 2023-12-02 PROCEDURE — 80048 BASIC METABOLIC PNL TOTAL CA: CPT | Performed by: INTERNAL MEDICINE

## 2023-12-02 PROCEDURE — 94664 DEMO&/EVAL PT USE INHALER: CPT

## 2023-12-02 PROCEDURE — 99231 SBSQ HOSP IP/OBS SF/LOW 25: CPT | Performed by: INTERNAL MEDICINE

## 2023-12-02 PROCEDURE — 85025 COMPLETE CBC W/AUTO DIFF WBC: CPT | Performed by: INTERNAL MEDICINE

## 2023-12-02 PROCEDURE — 93010 ELECTROCARDIOGRAM REPORT: CPT | Performed by: INTERNAL MEDICINE

## 2023-12-02 PROCEDURE — 25010000002 CALCIUM GLUCONATE-NACL 1-0.675 GM/50ML-% SOLUTION

## 2023-12-02 PROCEDURE — 94799 UNLISTED PULMONARY SVC/PX: CPT

## 2023-12-02 PROCEDURE — 99233 SBSQ HOSP IP/OBS HIGH 50: CPT | Performed by: INTERNAL MEDICINE

## 2023-12-02 PROCEDURE — 94761 N-INVAS EAR/PLS OXIMETRY MLT: CPT

## 2023-12-02 PROCEDURE — 63710000001 INSULIN LISPRO (HUMAN) PER 5 UNITS: Performed by: INTERNAL MEDICINE

## 2023-12-02 PROCEDURE — 84132 ASSAY OF SERUM POTASSIUM: CPT | Performed by: INTERNAL MEDICINE

## 2023-12-02 PROCEDURE — 82948 REAGENT STRIP/BLOOD GLUCOSE: CPT

## 2023-12-02 PROCEDURE — 63710000001 PREDNISONE PER 5 MG: Performed by: INTERNAL MEDICINE

## 2023-12-02 PROCEDURE — 85007 BL SMEAR W/DIFF WBC COUNT: CPT | Performed by: INTERNAL MEDICINE

## 2023-12-02 PROCEDURE — 63710000001 INSULIN REGULAR HUMAN PER 5 UNITS

## 2023-12-02 PROCEDURE — 93005 ELECTROCARDIOGRAM TRACING: CPT

## 2023-12-02 PROCEDURE — 80076 HEPATIC FUNCTION PANEL: CPT | Performed by: INTERNAL MEDICINE

## 2023-12-02 RX ORDER — DEXTROSE MONOHYDRATE 25 G/50ML
25 INJECTION, SOLUTION INTRAVENOUS ONCE
Status: COMPLETED | OUTPATIENT
Start: 2023-12-02 | End: 2023-12-02

## 2023-12-02 RX ORDER — FUROSEMIDE 40 MG/1
40 TABLET ORAL DAILY
Status: DISCONTINUED | OUTPATIENT
Start: 2023-12-03 | End: 2023-12-13 | Stop reason: HOSPADM

## 2023-12-02 RX ORDER — NITROGLYCERIN 0.4 MG/1
0.4 TABLET SUBLINGUAL
Status: DISCONTINUED | OUTPATIENT
Start: 2023-12-02 | End: 2023-12-13 | Stop reason: HOSPADM

## 2023-12-02 RX ORDER — CALCIUM GLUCONATE 20 MG/ML
1000 INJECTION, SOLUTION INTRAVENOUS ONCE
Status: COMPLETED | OUTPATIENT
Start: 2023-12-02 | End: 2023-12-02

## 2023-12-02 RX ORDER — BUDESONIDE AND FORMOTEROL FUMARATE DIHYDRATE 160; 4.5 UG/1; UG/1
2 AEROSOL RESPIRATORY (INHALATION)
Status: DISCONTINUED | OUTPATIENT
Start: 2023-12-02 | End: 2023-12-13 | Stop reason: HOSPADM

## 2023-12-02 RX ADMIN — ATORVASTATIN CALCIUM 40 MG: 40 TABLET, FILM COATED ORAL at 20:15

## 2023-12-02 RX ADMIN — HYDROCODONE BITARTRATE AND ACETAMINOPHEN 1 TABLET: 10; 325 TABLET ORAL at 08:09

## 2023-12-02 RX ADMIN — LEVOTHYROXINE SODIUM 25 MCG: 25 TABLET ORAL at 05:12

## 2023-12-02 RX ADMIN — INSULIN HUMAN 10 UNITS: 100 INJECTION, SOLUTION PARENTERAL at 05:12

## 2023-12-02 RX ADMIN — BUDESONIDE AND FORMOTEROL FUMARATE DIHYDRATE 2 PUFF: 160; 4.5 AEROSOL RESPIRATORY (INHALATION) at 18:26

## 2023-12-02 RX ADMIN — METOPROLOL TARTRATE 50 MG: 50 TABLET, FILM COATED ORAL at 20:15

## 2023-12-02 RX ADMIN — VITAMINS A AND D OINTMENT 1 APPLICATION: 15.5; 53.4 OINTMENT TOPICAL at 20:15

## 2023-12-02 RX ADMIN — Medication 10 ML: at 08:09

## 2023-12-02 RX ADMIN — CLOPIDOGREL BISULFATE 75 MG: 75 TABLET, FILM COATED ORAL at 08:09

## 2023-12-02 RX ADMIN — IPRATROPIUM BROMIDE 0.5 MG: 0.5 SOLUTION RESPIRATORY (INHALATION) at 12:16

## 2023-12-02 RX ADMIN — IPRATROPIUM BROMIDE 0.5 MG: 0.5 SOLUTION RESPIRATORY (INHALATION) at 00:33

## 2023-12-02 RX ADMIN — BUDESONIDE AND FORMOTEROL FUMARATE DIHYDRATE 2 PUFF: 160; 4.5 AEROSOL RESPIRATORY (INHALATION) at 07:21

## 2023-12-02 RX ADMIN — DEXTROSE MONOHYDRATE 25 G: 25 INJECTION, SOLUTION INTRAVENOUS at 05:13

## 2023-12-02 RX ADMIN — PREDNISONE 15 MG: 10 TABLET ORAL at 08:09

## 2023-12-02 RX ADMIN — CALCIUM GLUCONATE 1000 MG: 20 INJECTION, SOLUTION INTRAVENOUS at 05:12

## 2023-12-02 RX ADMIN — IPRATROPIUM BROMIDE 0.5 MG: 0.5 SOLUTION RESPIRATORY (INHALATION) at 07:11

## 2023-12-02 RX ADMIN — INSULIN LISPRO 2 UNITS: 100 INJECTION, SOLUTION INTRAVENOUS; SUBCUTANEOUS at 20:15

## 2023-12-02 RX ADMIN — DOCUSATE SODIUM 50 MG AND SENNOSIDES 8.6 MG 2 TABLET: 8.6; 5 TABLET, FILM COATED ORAL at 08:08

## 2023-12-02 RX ADMIN — ISOSORBIDE MONONITRATE 30 MG: 30 TABLET, EXTENDED RELEASE ORAL at 08:08

## 2023-12-02 RX ADMIN — FUROSEMIDE 20 MG: 20 TABLET ORAL at 08:09

## 2023-12-02 RX ADMIN — METOPROLOL TARTRATE 50 MG: 50 TABLET, FILM COATED ORAL at 08:08

## 2023-12-02 RX ADMIN — HYDROCODONE BITARTRATE AND ACETAMINOPHEN 1 TABLET: 10; 325 TABLET ORAL at 20:15

## 2023-12-02 RX ADMIN — FLUTICASONE PROPIONATE 2 SPRAY: 50 SPRAY, METERED NASAL at 08:09

## 2023-12-02 RX ADMIN — PANTOPRAZOLE SODIUM 40 MG: 40 TABLET, DELAYED RELEASE ORAL at 05:12

## 2023-12-02 RX ADMIN — INSULIN LISPRO 2 UNITS: 100 INJECTION, SOLUTION INTRAVENOUS; SUBCUTANEOUS at 17:31

## 2023-12-02 RX ADMIN — IPRATROPIUM BROMIDE 0.5 MG: 0.5 SOLUTION RESPIRATORY (INHALATION) at 18:26

## 2023-12-02 NOTE — PLAN OF CARE
Goal Outcome Evaluation:                     Pt currently resting in bed. No s/s of acute distress noted at this time. Pt had c/o nausea, PRN medications given (see MAR). Pt's potassium was 5.9, Chakraborty APRN aware (see Orders/MAR). Plan of care ongoing.

## 2023-12-02 NOTE — PROGRESS NOTES
[facility] HOSPITALIST PROGRESS NOTE     Patient Identification:  Name:  Liz Jiang  Age:  57 y.o.  Sex:  female  :  1965  MRN:  [id number]  Visit Number:  [id number]  ROOM: [address] [place] [address]     Primary Care Provider:  Tabitha Ron APRN    Length of stay in inpatient status:  14    Subjective     Chief Compliant:    Chief Complaint   Patient presents with    Shortness of Breath       History of Presenting Illness:    Patient wore our BiPAP last night as plan has been to get her a trilogy when home health is available. No signs of bleeding. Nursing staff did note she accidentally pulled her O2 out of the wall and O2 dropped briefly until O2 replaced.     ROS:  Otherwise 10 point ROS negative other than documented above in HPI.     Objective     Current Hospital Meds:aspirin, 324 mg, Oral, Once  atorvastatin, 40 mg, Oral, Nightly  budesonide-formoterol, 2 puff, Inhalation, BID - RT  clopidogrel, 75 mg, Oral, Daily  fluticasone, 2 spray, Nasal, Daily  [START ON 12/3/2023] furosemide, 40 mg, Oral, Daily  insulin lispro, 2-9 Units, Subcutaneous, 4x Daily AC & at Bedtime  ipratropium, 0.5 mg, Nebulization, 4x Daily - RT  isosorbide mononitrate, 30 mg, Oral, Q24H  levothyroxine, 25 mcg, Oral, Q AM  metoprolol tartrate, 50 mg, Oral, BID  pantoprazole, 40 mg, Oral, Q AM  predniSONE, 15 mg, Oral, Daily With Breakfast  senna-docusate sodium, 2 tablet, Oral, BID  sodium chloride, 10 mL, Intravenous, Q12H  sodium chloride, 10 mL, Intravenous, Q12H    Pharmacy Consult,   sodium chloride, 100 mL/hr, Last Rate: 100 mL/hr (23 1112)        Current Antimicrobial Therapy:  Anti-Infectives (From admission, onward)      None          Current Diuretic Therapy:  Diuretics (From admission, onward)      Ordered     Dose/Rate Route Frequency Start Stop    23 0920  furosemide (LASIX) tablet 40 mg        Ordering Provider: Chris Shi MD    40 mg Oral Daily 23 0900      23 0734  furosemide  (LASIX) injection 40 mg        Ordering Provider: Elijah Hope MD    40 mg Intravenous Once 11/24/23 0900 11/24/23 0853    11/20/23 1450  furosemide (LASIX) injection 40 mg        Ordering Provider: Elijah Hope MD    40 mg Intravenous Once 11/20/23 1500 11/20/23 1531    11/18/23 1306  furosemide (LASIX) injection 80 mg        Ordering Provider: Ruby Dia PA    80 mg Intravenous Once 11/18/23 1322 11/18/23 1328          ----------------------------------------------------------------------------------------------------------------------  Vital Signs:  Temp:  [97.7 °F (36.5 °C)-98.6 °F (37 °C)] 98 °F (36.7 °C)  Heart Rate:  [83-98] 94  Resp:  [18-21] 21  BP: ()/(50-80) 111/70  SpO2:  [92 %-98 %] 97 %  on  Flow (L/min):  [4] 4;   Device (Oxygen Therapy): NPPV/NIV  Body mass index is 33.25 kg/m².    Wt Readings from Last 3 Encounters:   12/02/23 87.9 kg (193 lb 11.2 oz)   11/08/23 81.6 kg (180 lb)   10/28/23 81.6 kg (180 lb)     Intake & Output (last 3 days)         11/29 0701 11/30 0700 11/30 0701 12/01 0700 12/01 0701 12/02 0700 12/02 0701 12/03 0700    P.O. 300 480      I.V. (mL/kg) 35.9 (0.4)       Total Intake(mL/kg) 335.9 (3.9) 480 (5.5)      Urine (mL/kg/hr) 150 (0.1) 1250 (0.6) 350 (0.2)     Stool 0       Total Output 150 1250 350     Net +185.9 -770 -350             Urine Unmeasured Occurrence 1 x  1 x     Stool Unmeasured Occurrence 1 x             Diet: Cardiac Diets; Healthy Heart (2-3 Na+); Texture: Regular Texture (IDDSI 7); Fluid Consistency: Thin (IDDSI 0)  ----------------------------------------------------------------------------------------------------------------------  Physical exam:  Constitutional:  Chronically ill appearing.   HENT:  Head:  Normocephalic and atraumatic.  Mouth:  Moist mucous membranes.    Eyes:  Conjunctivae and EOM are normal. No scleral icterus.    Neck:  Neck supple.  No JVD present.    Cardiovascular:  Normal rate, regular rhythm and normal heart  sounds with no murmur.  Pulmonary/Chest:  No respiratory distress, no wheezes, no crackles, with normal breath sounds and good air movement.  Abdominal:  Soft.  Bowel sounds are normal.  No distension and no tenderness.   Musculoskeletal:  No edema, no tenderness, and no deformity.  No red or swollen joints anywhere.    Neurological:  Alert and oriented to person, place, and time.  No cranial nerve deficit.  No tongue deviation.  No facial droop.  No slurred speech.   Skin:  Skin is warm and dry. No rash noted. No pallor.   Peripheral vascular:  Pulses in all 4 extremities with no clubbing, no cyanosis, no edema.  ----------------------------------------------------------------------------------------------------------------------  Tele:    ----------------------------------------------------------------------------------------------------------------------  Results from last 7 days   Lab Units 12/02/23 0211 12/01/23  0147 11/30/23  0204   WBC 10*3/mm3 14.10* 12.55* 14.00*   HEMOGLOBIN g/dL 12.9 12.5 14.2   HEMATOCRIT % 45.6 43.7 49.6*   MCV fL 101.8* 100.0* 102.5*   MCHC g/dL 28.3* 28.6* 28.6*   PLATELETS 10*3/mm3 58* 69* 83*         Results from last 7 days   Lab Units 12/02/23  0835 12/02/23 0211 12/01/23  0144 11/30/23  0204 11/28/23  0309 11/26/23  0107   SODIUM mmol/L  --  137 140 136 135* 137   POTASSIUM mmol/L 4.3 5.9* 4.6 4.9 5.2 4.6   MAGNESIUM mg/dL  --   --   --   --  2.7* 2.5   CHLORIDE mmol/L  --  93* 94* 94* 92* 90*   CO2 mmol/L  --  38.1* 42.5* 35.2* 36.5* 38.2*   BUN mg/dL  --  18 19 25* 22* 25*   CREATININE mg/dL  --  0.46* 0.47* 0.55* 0.31* 0.50*   CALCIUM mg/dL  --  9.0 9.2 9.6 9.4 9.2   GLUCOSE mg/dL  --  96 143* 132* 157* 229*   ALBUMIN g/dL  --  3.6 3.6  --   --   --    BILIRUBIN mg/dL  --  0.5 0.5  --   --   --    ALK PHOS U/L  --  49 48  --   --   --    AST (SGOT) U/L  --  23 17  --   --   --    ALT (SGPT) U/L  --  22 22  --   --   --    Estimated Creatinine Clearance: 143.1 mL/min (A) (by  "C-G formula based on SCr of 0.46 mg/dL (L)).  No results found for: \"AMMONIA\"          Results from last 7 days   Lab Units 11/30/23  0204   CHOLESTEROL mg/dL 175   TRIGLYCERIDES mg/dL 445*   HDL CHOL mg/dL 55   LDL CHOL mg/dL 54     Hemoglobin A1C   Date/Time Value Ref Range Status   11/30/2023 0204 7.10 (H) 4.80 - 5.60 % Final     Glucose   Date/Time Value Ref Range Status   12/02/2023 1114 162 (H) 70 - 130 mg/dL Final   12/02/2023 0656 112 70 - 130 mg/dL Final   12/02/2023 0500 121 70 - 130 mg/dL Final   12/01/2023 1900 156 (H) 70 - 130 mg/dL Final   12/01/2023 1627 249 (H) 70 - 130 mg/dL Final   12/01/2023 1114 147 (H) 70 - 130 mg/dL Final   12/01/2023 0633 123 70 - 130 mg/dL Final   11/30/2023 1928 214 (H) 70 - 130 mg/dL Final     Lab Results   Component Value Date    TSH 5.170 (H) 11/18/2023    FREET4 1.09 11/18/2023     No results found for: \"PREGTESTUR\", \"PREGSERUM\", \"HCG\", \"HCGQUANT\"  Pain Management Panel  More data may exist         Latest Ref Rng & Units 11/19/2023 2/23/2022   Pain Management Panel   Amphetamine, Urine Qual Negative Negative  Negative    Barbiturates Screen, Urine Negative Negative  Negative    Benzodiazepine Screen, Urine Negative Positive  Positive    Buprenorphine, Screen, Urine Negative Negative  Negative    Cocaine Screen, Urine Negative Negative  Negative    Fentanyl, Urine Negative Negative  -   Methadone Screen , Urine Negative Negative  Negative    Methamphetamine, Ur Negative Negative  Negative      Brief Urine Lab Results  (Last result in the past 365 days)        Color   Clarity   Blood   Leuk Est   Nitrite   Protein   CREAT   Urine HCG        11/18/23 1447 Yellow   Clear   Negative   Negative   Negative   Negative                 No results found for: \"BLOODCX\"  No results found for: \"URINECX\"  No results found for: \"WOUNDCX\"  No results found for: \"STOOLCX\"  No results found for: \"RESPCX\"  No results found for: \"AFBCX\"        I have personally looked at the labs and they " "are summarized above.  ----------------------------------------------------------------------------------------------------------------------  Detailed radiology reports for the last 24 hours:    Imaging Results (Last 24 Hours)       ** No results found for the last 24 hours. **          Assessment & Plan    #Acute on chronic hypoxic respiratory failure   #Acute on chronic hypercapnic respiratory failure   #Acute COPD exacerbation   #Hx of STEVEN  #Medical noncompliance  -At admission viral panel was negative, D-dimer within normal limits, and no new consolidations seen on imaging at admission.  -Patient has severe COPD that requires home NIPPV but she has been unable to tolerate wearing it regularly.  -ABG slightly improved.  Continue BiPAP nightly and as needed.  Continue supplemental oxygen with goal oxygen saturation 88 to 92%  -Pulmonology have been recommending BiPAP settings. (Home health are managing the device rather than hospital staff and have provided patient new device with recommended BiPAP settings. However not not tolerating, plan to go back to [facility] with pulmonology settings when home health is able. )   -Pulmonology following, appreciate assistance.  -Continue steroids, Symbicort, ipratropium. Continue to wean steroid dose as respiratory status is improving. 15 mg PO prednisone currently and will continue to taper.   - PT consult requested due to generalized weakness and poor exercise tolerance, appreciate assistance. May need short term rehab vs home health services.     #Chronically elevated troponin  #Frequent pSVT  -Similar to previous labs.  No new ischemic changes noted on EKG at admission.  -Patient reported to me that she was having \"chest pressure\" with using the trilogy machine at home, but at no other times, and she says that it has not been nearly as bad when using the BiPAP here. She denies any chest pressure/pain with exertion or at any time other than when using NIPPV. This has " improved.  -Based on description of symptoms I suspect that patient's pain is due to her severe COPD and use of noninvasive positive pressure ventilation rather than a cardiac source, but she will need ischemic evaluation with stress test when able to tolerate it.  - Cardiology performed abnormal stress test followed with [facility] that revealed mild CAD. Continue ASA, statin. Repeat renal function in AM.   - Metoprolol tartrate increased to 50mg BID  - Continue telemetry monitoring      #Acute thrombocytopenia  - No associated anemia or bleeding   - 4T score of 4.  d/c heparin and started SCDs.   - Heparin abx ordered. PBS ordered. No clear other drug cause.   - Repeat CBC in AM. Down again today but appears to be slowing down.         CHRONIC MEDICAL PROBLEMS     #HFpEF/grade I diastolic dysfunction  - Not felt to be in acute exacerbation clinically.   - Previous TTE from 10/2023 reviewed.  Monitor volume status with I&Os, daily weights.  Continue home lasix 20mg PO daily.     #Essential hypertension  - Well controlled, continue home regimen      #Hyperlipidemia  -Continue statin.      #Type II diabetes mellitus, non insulin dependent  - A1C 6.6%.   - Glucose overall better controlled. Continue current regimen.      #History of CVA  - Details unknown.  Continue home medication as appropriate. Does not appear to have residual deficits      #History of carotid artery disease s/p left CEA in the past  - Continue home medications      #Hypothyroidism  - TSH WNL. Continue home levothyroxine.      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  #Former smoker      Code status: Full      Dispo: Pending clinical improvement. Home health unable to optimize home  machine. May need to consider [facility] or SNF if they are unable to get it working for safe dispo soon.     VTE Prophylaxis:   Mechanical Order History:        Ordered        11/30/23 1211  Place Sequential Compression Device  Once            11/30/23 1211  Maintain Sequential Compression Device  Continuous                          Pharmalogical Order History:        Ordered     Dose Route Frequency Stop    11/29/23 1011  heparin (porcine) injection  Status:  Discontinued         -- -- Code / Trauma / Sedation Medication 11/29/23 1034    11/18/23 1605  heparin (porcine) 5000 UNIT/ML injection 5,000 Units  Status:  Discontinued         5,000 Units SC Every 8 Hours Scheduled 11/30/23 1210                      Cliff Castaneda MD  [facility]  12/02/23  15:43 EST

## 2023-12-02 NOTE — PROGRESS NOTES
Progress Note Pulmonary      Subjective no new complaints.  Patient received Trilogy and able to use it last night without any problem.  She feels refreshed but not back to her baseline yet.    Interval History:   As above      Review of Systems:    Reviewed ; unchanged       Vital Signs  Temp:  [97.7 °F (36.5 °C)-98.6 °F (37 °C)] 98.6 °F (37 °C)  Heart Rate:  [83-98] 89  Resp:  [18-22] 20  BP: ()/(50-80) 113/71  Body mass index is 33.25 kg/m².    Intake/Output Summary (Last 24 hours) at 12/2/2023 0916  Last data filed at 12/2/2023 0500  Gross per 24 hour   Intake --   Output 350 ml   Net -350 ml     No intake/output data recorded.    Physical Exam:  General- normal in appearance, not in any acute distress    HEENT- pupils equally reactive to light, normal in size, no scleral icterus    Neck- supple    No JVD, no carotid bruit    Respiratory-bilateral air entry, occasional wheezing only on forced exhalation cardiovascular-  Normal S1 and S2. No S3, S4 or murmurs.    GI-nontender nondistended bowel sounds positive    CNS-alert oriented x3, grossly nonfocal    Extremities- pulses normal bilaterally , no clubbing but trace edema     Results Review:      Results from last 7 days   Lab Units 12/02/23 0211 12/01/23 0147 11/30/23  0204   WBC 10*3/mm3 14.10* 12.55* 14.00*   HEMOGLOBIN g/dL 12.9 12.5 14.2   PLATELETS 10*3/mm3 58* 69* 83*     Results from last 7 days   Lab Units 12/02/23 0211 12/01/23 0144 11/30/23  0204 11/28/23  0309 11/26/23  0107   SODIUM mmol/L 137 140 136 135* 137   POTASSIUM mmol/L 5.9* 4.6 4.9 5.2 4.6   CHLORIDE mmol/L 93* 94* 94* 92* 90*   CO2 mmol/L 38.1* 42.5* 35.2* 36.5* 38.2*   BUN mg/dL 18 19 25* 22* 25*   CREATININE mg/dL 0.46* 0.47* 0.55* 0.31* 0.50*   CALCIUM mg/dL 9.0 9.2 9.6 9.4 9.2   GLUCOSE mg/dL 96 143* 132* 157* 229*   MAGNESIUM mg/dL  --   --   --  2.7* 2.5     Lab Results   Component Value Date    INR 0.94 11/18/2023    INR 0.95 10/12/2023    INR 0.90 03/27/2017    PROTIME  13.1 11/18/2023    PROTIME 13.1 10/12/2023    PROTIME 9.9 03/27/2017     Results from last 7 days   Lab Units 12/02/23  0211 12/01/23  0144   ALK PHOS U/L 49 48   BILIRUBIN mg/dL 0.5 0.5   BILIRUBIN DIRECT mg/dL <0.2  --    ALT (SGPT) U/L 22 22   AST (SGOT) U/L 23 17         Imaging Results (Last 24 Hours)       ** No results found for the last 24 hours. **                 aspirin, 324 mg, Oral, Once  atorvastatin, 40 mg, Oral, Nightly  budesonide-formoterol, 2 puff, Inhalation, BID - RT  clopidogrel, 75 mg, Oral, Daily  fluticasone, 2 spray, Nasal, Daily  furosemide, 20 mg, Oral, Daily  insulin lispro, 2-9 Units, Subcutaneous, 4x Daily AC & at Bedtime  ipratropium, 0.5 mg, Nebulization, 4x Daily - RT  isosorbide mononitrate, 30 mg, Oral, Q24H  levothyroxine, 25 mcg, Oral, Q AM  metoprolol tartrate, 50 mg, Oral, BID  pantoprazole, 40 mg, Oral, Q AM  predniSONE, 15 mg, Oral, Daily With Breakfast  senna-docusate sodium, 2 tablet, Oral, BID  sodium chloride, 10 mL, Intravenous, Q12H  sodium chloride, 10 mL, Intravenous, Q12H      Pharmacy Consult,   sodium chloride, 100 mL/hr, Last Rate: 100 mL/hr (11/29/23 1112)        Medication Review:     Assessment & Plan   #1 initial hospitalization for acute exacerbation of COPD-improving but not back to her baseline.  Noted about 9 kg weight gain over last 1 month.  Possible fluid retention due to patient being on systemic steroid.  I will increase the dose of Lasix to 40 mg daily.       #2: OBstructive sleep apnea,  #3 chronic hypercapnic hypoxic respiratory failure.      Patient was able to tolerate trilogy with a setting of  AVAPS/AE. Tidal volume 6 mL/kg ideal body weight, PS max 28, PS minimum 20, EPAP max 16, EPAP minimum 6, respiratory rate 18, oxygen to a saturation of 92%         continue nebs  Continue steroid       FiO2 requirement reviewed and adjusted to maintain saturation 88 to 92%.  Continue appropriate prophylaxis      #4: Coronary artery disease positive  stress test.  Cardiologist planning to do a cardiac cath.      Chris Shi MD  12/02/23  09:16 EST

## 2023-12-02 NOTE — PROGRESS NOTES
[facility] HOSPITALIST PROGRESS NOTE     Patient Identification:  Name:  Liz Jiang  Age:  57 y.o.  Sex:  female  :  1965  MRN:  [id number]  Visit Number:  [id number]  ROOM: [address] [place] [address]     Primary Care Provider:  Tabitha Ron APRN    Length of stay in inpatient status:  13    Subjective     Chief Compliant:    Chief Complaint   Patient presents with    Shortness of Breath       History of Presenting Illness:    Patient was initially drowsy on BiPAP but woke up to her baseline mental status and doing well on home 4L NC. She did not tolerate BIPAP last night and home health agency unable to fix it to be tolerable today. No noted bleeding.     ROS:  Otherwise 10 point ROS negative other than documented above in HPI.     Objective     Current Hospital Meds:aspirin, 324 mg, Oral, Once  atorvastatin, 40 mg, Oral, Nightly  budesonide-formoterol, 2 puff, Inhalation, BID - RT  clopidogrel, 75 mg, Oral, Daily  fluticasone, 2 spray, Nasal, Daily  furosemide, 20 mg, Oral, Daily  insulin lispro, 2-9 Units, Subcutaneous, 4x Daily AC & at Bedtime  ipratropium, 0.5 mg, Nebulization, 4x Daily - RT  isosorbide mononitrate, 30 mg, Oral, Q24H  levothyroxine, 25 mcg, Oral, Q AM  metoprolol tartrate, 50 mg, Oral, BID  pantoprazole, 40 mg, Oral, Q AM  predniSONE, 15 mg, Oral, Daily With Breakfast  senna-docusate sodium, 2 tablet, Oral, BID  sodium chloride, 10 mL, Intravenous, Q12H  sodium chloride, 10 mL, Intravenous, Q12H    Pharmacy Consult,   sodium chloride, 100 mL/hr, Last Rate: 100 mL/hr (23 1112)        Current Antimicrobial Therapy:  Anti-Infectives (From admission, onward)      None          Current Diuretic Therapy:  Diuretics (From admission, onward)      Ordered     Dose/Rate Route Frequency Start Stop    23 0734  furosemide (LASIX) injection 40 mg        Ordering Provider: Elijah Hope MD    40 mg Intravenous Once 23 0900 23 0853    23 1450  furosemide (LASIX)  injection 40 mg        Ordering Provider: Elijah Hope MD    40 mg Intravenous Once 11/20/23 1500 11/20/23 1531    11/19/23 0916  furosemide (LASIX) tablet 20 mg        Ordering Provider: Case Irizarry MD    20 mg Oral Daily 11/19/23 1015      11/18/23 1306  furosemide (LASIX) injection 80 mg        Ordering Provider: Ruby Dia PA    80 mg Intravenous Once 11/18/23 1322 11/18/23 1328          ----------------------------------------------------------------------------------------------------------------------  Vital Signs:  Temp:  [97.9 °F (36.6 °C)-98.4 °F (36.9 °C)] 97.9 °F (36.6 °C)  Heart Rate:  [] 88  Resp:  [18-22] 20  BP: ()/(50-80) 98/66  SpO2:  [88 %-96 %] 92 %  on  Flow (L/min):  [4] 4;   Device (Oxygen Therapy): nasal cannula  Body mass index is 32.82 kg/m².    Wt Readings from Last 3 Encounters:   12/01/23 86.7 kg (191 lb 3.2 oz)   11/08/23 81.6 kg (180 lb)   10/28/23 81.6 kg (180 lb)     Intake & Output (last 3 days)         11/29 0701 11/30 0700 11/30 0701 12/01 0700 12/01 0701 12/02 0700    P.O. 300 480     I.V. (mL/kg) 35.9 (0.4)      Total Intake(mL/kg) 335.9 (3.9) 480 (5.5)     Urine (mL/kg/hr) 150 (0.1) 1250 (0.6)     Stool 0      Total Output 150 1250     Net +185.9 -770            Urine Unmeasured Occurrence 1 x      Stool Unmeasured Occurrence 1 x            Diet: Cardiac Diets; Healthy Heart (2-3 Na+); Texture: Regular Texture (IDDSI 7); Fluid Consistency: Thin (IDDSI 0)  ----------------------------------------------------------------------------------------------------------------------  Physical exam:  Constitutional:  Chorniclaly ill appearing.    HENT:  Head:  Normocephalic and atraumatic.  Mouth:  Moist mucous membranes.    Eyes:  Conjunctivae and EOM are normal. No scleral icterus.    Neck:  Neck supple.  No JVD present.    Cardiovascular:  Normal rate, regular rhythm and normal heart sounds with no murmur.  Pulmonary/Chest:  No respiratory  "distress, no wheezes, no crackles, with normal breath sounds and good air movement.  Abdominal:  Soft.  Bowel sounds are normal.  No distension and no tenderness.   Musculoskeletal:  No edema, no tenderness, and no deformity.  No red or swollen joints anywhere.    Neurological:  Alert and oriented to person, place, and time.  No cranial nerve deficit.  No tongue deviation.  No facial droop.  No slurred speech.   Skin:  Skin is warm and dry. No rash noted. No pallor.   Peripheral vascular:  Pulses in all 4 extremities with no clubbing, no cyanosis, no edema.  ----------------------------------------------------------------------------------------------------------------------  Tele:    ----------------------------------------------------------------------------------------------------------------------  Results from last 7 days   Lab Units 12/01/23 0147 11/30/23 0204 11/28/23  0033   WBC 10*3/mm3 12.55* 14.00* 19.28*   HEMOGLOBIN g/dL 12.5 14.2 13.5   HEMATOCRIT % 43.7 49.6* 44.6   MCV fL 100.0* 102.5* 96.3   MCHC g/dL 28.6* 28.6* 30.3*   PLATELETS 10*3/mm3 69* 83* 119*         Results from last 7 days   Lab Units 12/01/23 0144 11/30/23 0204 11/28/23  0309 11/26/23  0107 11/24/23  1924   SODIUM mmol/L 140 136 135* 137 139   POTASSIUM mmol/L 4.6 4.9 5.2 4.6 5.1   MAGNESIUM mg/dL  --   --  2.7* 2.5 2.2   CHLORIDE mmol/L 94* 94* 92* 90* 92*   CO2 mmol/L 42.5* 35.2* 36.5* 38.2* 39.2*   BUN mg/dL 19 25* 22* 25* 30*   CREATININE mg/dL 0.47* 0.55* 0.31* 0.50* 0.49*   CALCIUM mg/dL 9.2 9.6 9.4 9.2 9.6   GLUCOSE mg/dL 143* 132* 157* 229* 324*   ALBUMIN g/dL 3.6  --   --   --   --    BILIRUBIN mg/dL 0.5  --   --   --   --    ALK PHOS U/L 48  --   --   --   --    AST (SGOT) U/L 17  --   --   --   --    ALT (SGPT) U/L 22  --   --   --   --    Estimated Creatinine Clearance: 139 mL/min (A) (by C-G formula based on SCr of 0.47 mg/dL (L)).  No results found for: \"AMMONIA\"          Results from last 7 days   Lab Units " "11/30/23  0204   CHOLESTEROL mg/dL 175   TRIGLYCERIDES mg/dL 445*   HDL CHOL mg/dL 55   LDL CHOL mg/dL 54     Hemoglobin A1C   Date/Time Value Ref Range Status   11/30/2023 0204 7.10 (H) 4.80 - 5.60 % Final     Glucose   Date/Time Value Ref Range Status   12/01/2023 1900 156 (H) 70 - 130 mg/dL Final   12/01/2023 1627 249 (H) 70 - 130 mg/dL Final   12/01/2023 1114 147 (H) 70 - 130 mg/dL Final   12/01/2023 0633 123 70 - 130 mg/dL Final   11/30/2023 1928 214 (H) 70 - 130 mg/dL Final   11/30/2023 1729 188 (H) 70 - 130 mg/dL Final   11/30/2023 1114 192 (H) 70 - 130 mg/dL Final   11/30/2023 0709 93 70 - 130 mg/dL Final     Lab Results   Component Value Date    TSH 5.170 (H) 11/18/2023    FREET4 1.09 11/18/2023     No results found for: \"PREGTESTUR\", \"PREGSERUM\", \"HCG\", \"HCGQUANT\"  Pain Management Panel  More data may exist         Latest Ref Rng & Units 11/19/2023 2/23/2022   Pain Management Panel   Amphetamine, Urine Qual Negative Negative  Negative    Barbiturates Screen, Urine Negative Negative  Negative    Benzodiazepine Screen, Urine Negative Positive  Positive    Buprenorphine, Screen, Urine Negative Negative  Negative    Cocaine Screen, Urine Negative Negative  Negative    Fentanyl, Urine Negative Negative  -   Methadone Screen , Urine Negative Negative  Negative    Methamphetamine, Ur Negative Negative  Negative      Brief Urine Lab Results  (Last result in the past 365 days)        Color   Clarity   Blood   Leuk Est   Nitrite   Protein   CREAT   Urine HCG        11/18/23 1447 Yellow   Clear   Negative   Negative   Negative   Negative                 No results found for: \"BLOODCX\"  No results found for: \"URINECX\"  No results found for: \"WOUNDCX\"  No results found for: \"STOOLCX\"  No results found for: \"RESPCX\"  No results found for: \"AFBCX\"        I have personally looked at the labs and they are summarized " "above.  ----------------------------------------------------------------------------------------------------------------------  Detailed radiology reports for the last 24 hours:    Imaging Results (Last 24 Hours)       ** No results found for the last 24 hours. **          Assessment & Plan    #Acute on chronic hypoxic respiratory failure   #Acute on chronic hypercapnic respiratory failure   #Acute COPD exacerbation   #Hx of STEVEN  #Medical noncompliance  -At admission viral panel was negative, D-dimer within normal limits, and no new consolidations seen on imaging at admission.  -Patient has severe COPD that requires home NIPPV but she has been unable to tolerate wearing it regularly.  -ABG slightly improved.  Continue BiPAP nightly and as needed.  Continue supplemental oxygen with goal oxygen saturation 88 to 92%  -Pulmonology have been recommending BiPAP settings. (Home health are managing the device rather than hospital staff and have provided patient new device with recommended BiPAP settings. However not not tolerating)   -Pulmonology following, appreciate assistance.  -Continue steroids, Symbicort, ipratropium. Continue to wean steroid dose as respiratory status is improving. 30 mg PO prednisone currently. Will need taper at discharge.   - PT consult requested due to generalized weakness and poor exercise tolerance, appreciate assistance. May need short term rehab vs home health services.     #Chronically elevated troponin  #Frequent pSVT  -Similar to previous labs.  No new ischemic changes noted on EKG at admission.  -Patient reported to me that she was having \"chest pressure\" with using the trilogy machine at home, but at no other times, and she says that it has not been nearly as bad when using the BiPAP here. She denies any chest pressure/pain with exertion or at any time other than when using NIPPV. This has improved.  -Based on description of symptoms I suspect that patient's pain is due to her severe " COPD and use of noninvasive positive pressure ventilation rather than a cardiac source, but she will need ischemic evaluation with stress test when able to tolerate it.  - Cardiology performed abnormal stress test followed with [facility] that revealed mild CAD. Continue ASA, statin. Repeat renal function in AM.   - Metoprolol tartrate increased to 50mg BID  - Continue telemetry monitoring      #Acute thrombocytopenia  - No associated anemia or bleeding   - 4T score of 4. Will d/c heparin and start SCDs.   - Heparin abx ordered. PBS ordered. No clear other drug cause.   - Repeat CBC in AM. Down again today but appears to be slowing down.         CHRONIC MEDICAL PROBLEMS     #HFpEF/grade I diastolic dysfunction  - Not felt to be in acute exacerbation clinically.   - Previous TTE from 10/2023 reviewed.  Monitor volume status with I&Os, daily weights.  Continue home lasix 20mg PO daily.     #Essential hypertension  - Well controlled, continue home regimen      #Hyperlipidemia  -Continue statin.      #Type II diabetes mellitus, non insulin dependent  - A1C 6.6%.   - Glucose overall better controlled. Continue current regimen.      #History of CVA  - Details unknown.  Continue home medication as appropriate. Does not appear to have residual deficits      #History of carotid artery disease s/p left CEA in the past  - Continue home medications      #Hypothyroidism  - TSH WNL. Continue home levothyroxine.      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  #Former smoker      Code status: Full      Dispo: Pending clinical improvement. Home health unable to optimize home machine. May need to consider [facility] or SNF if they are unable to get it working for safe dispo soon.         VTE Prophylaxis:   Mechanical Order History:        Ordered        11/30/23 1211  Place Sequential Compression Device  Once            11/30/23 1211  Maintain Sequential Compression Device  Continuous                          Pharmalogical Order History:        Ordered     Dose Route Frequency Stop    11/29/23 1011  heparin (porcine) injection  Status:  Discontinued         -- -- Code / Trauma / Sedation Medication 11/29/23 1034    11/18/23 1605  heparin (porcine) 5000 UNIT/ML injection 5,000 Units  Status:  Discontinued         5,000 Units SC Every 8 Hours Scheduled 11/30/23 1210                    Cliff Castaneda MD  [facility]  12/01/23  19:07 EST

## 2023-12-02 NOTE — PLAN OF CARE
Goal Outcome Evaluation:    Pt resting in bed. No signs or symptoms of distress noted. No complaint at this time. Plan of care on going.

## 2023-12-03 ENCOUNTER — APPOINTMENT (OUTPATIENT)
Dept: GENERAL RADIOLOGY | Facility: HOSPITAL | Age: 58
End: 2023-12-03
Payer: MEDICARE

## 2023-12-03 LAB
A-A DO2: 152.5 MMHG (ref 0–300)
A-A DO2: 99.7 MMHG (ref 0–300)
ALBUMIN SERPL-MCNC: 3.5 G/DL (ref 3.5–5.2)
ALBUMIN/GLOB SERPL: 1.3 G/DL
ALP SERPL-CCNC: 50 U/L (ref 39–117)
ALT SERPL W P-5'-P-CCNC: 22 U/L (ref 1–33)
ANION GAP SERPL CALCULATED.3IONS-SCNC: 4.6 MMOL/L (ref 5–15)
ARTERIAL PATENCY WRIST A: POSITIVE
ARTERIAL PATENCY WRIST A: POSITIVE
AST SERPL-CCNC: 14 U/L (ref 1–32)
ATMOSPHERIC PRESS: 723 MMHG
ATMOSPHERIC PRESS: 723 MMHG
BASE EXCESS BLDA CALC-SCNC: 21.2 MMOL/L (ref 0–2)
BASE EXCESS BLDA CALC-SCNC: 26.5 MMOL/L (ref 0–2)
BASO STIPL COARSE BLD QL SMEAR: NORMAL
BASOPHILS # BLD AUTO: 0.05 10*3/MM3 (ref 0–0.2)
BASOPHILS NFR BLD AUTO: 0.4 % (ref 0–1.5)
BDY SITE: ABNORMAL
BDY SITE: ABNORMAL
BILIRUB SERPL-MCNC: 0.7 MG/DL (ref 0–1.2)
BUN SERPL-MCNC: 22 MG/DL (ref 6–20)
BUN/CREAT SERPL: 59.5 (ref 7–25)
CALCIUM SPEC-SCNC: 9.3 MG/DL (ref 8.6–10.5)
CHLORIDE SERPL-SCNC: 91 MMOL/L (ref 98–107)
CO2 BLDA-SCNC: 55.9 MMOL/L (ref 22–33)
CO2 BLDA-SCNC: 60.2 MMOL/L (ref 22–33)
CO2 SERPL-SCNC: 46.4 MMOL/L (ref 22–29)
COHGB MFR BLD: 2.6 % (ref 0–5)
COHGB MFR BLD: 2.8 % (ref 0–5)
CREAT SERPL-MCNC: 0.37 MG/DL (ref 0.57–1)
DEPRECATED RDW RBC AUTO: 55 FL (ref 37–54)
EGFRCR SERPLBLD CKD-EPI 2021: 117.1 ML/MIN/1.73
EOSINOPHIL # BLD AUTO: 0.03 10*3/MM3 (ref 0–0.4)
EOSINOPHIL NFR BLD AUTO: 0.2 % (ref 0.3–6.2)
EPAP: 12
ERYTHROCYTE [DISTWIDTH] IN BLOOD BY AUTOMATED COUNT: 14.4 % (ref 12.3–15.4)
GAS FLOW AIRWAY: 4.5 LPM
GLOBULIN UR ELPH-MCNC: 2.6 GM/DL
GLUCOSE BLDC GLUCOMTR-MCNC: 134 MG/DL (ref 70–130)
GLUCOSE BLDC GLUCOMTR-MCNC: 207 MG/DL (ref 70–130)
GLUCOSE BLDC GLUCOMTR-MCNC: 213 MG/DL (ref 70–130)
GLUCOSE BLDC GLUCOMTR-MCNC: 223 MG/DL (ref 70–130)
GLUCOSE SERPL-MCNC: 164 MG/DL (ref 65–99)
HCO3 BLDA-SCNC: 52.7 MMOL/L (ref 20–26)
HCO3 BLDA-SCNC: 57.3 MMOL/L (ref 20–26)
HCT VFR BLD AUTO: 46.3 % (ref 34–46.6)
HCT VFR BLD CALC: 39 % (ref 38–51)
HCT VFR BLD CALC: 39.1 % (ref 38–51)
HGB BLD-MCNC: 12.9 G/DL (ref 12–15.9)
HGB BLDA-MCNC: 12.7 G/DL (ref 13.5–17.5)
HGB BLDA-MCNC: 12.7 G/DL (ref 13.5–17.5)
IMM GRANULOCYTES # BLD AUTO: 0.28 10*3/MM3 (ref 0–0.05)
IMM GRANULOCYTES NFR BLD AUTO: 2 % (ref 0–0.5)
INHALED O2 CONCENTRATION: 38 %
INHALED O2 CONCENTRATION: 45 %
IPAP: 22
LYMPHOCYTES # BLD AUTO: 0.73 10*3/MM3 (ref 0.7–3.1)
LYMPHOCYTES NFR BLD AUTO: 5.2 % (ref 19.6–45.3)
Lab: ABNORMAL
MACROCYTES BLD QL SMEAR: NORMAL
MAGNESIUM SERPL-MCNC: 2.1 MG/DL (ref 1.6–2.6)
MCH RBC QN AUTO: 28.8 PG (ref 26.6–33)
MCHC RBC AUTO-ENTMCNC: 27.9 G/DL (ref 31.5–35.7)
MCV RBC AUTO: 103.3 FL (ref 79–97)
METHGB BLD QL: 0 % (ref 0–3)
METHGB BLD QL: 0 % (ref 0–3)
MODALITY: ABNORMAL
MODALITY: ABNORMAL
MONOCYTES # BLD AUTO: 0.97 10*3/MM3 (ref 0.1–0.9)
MONOCYTES NFR BLD AUTO: 6.9 % (ref 5–12)
NEUTROPHILS NFR BLD AUTO: 11.92 10*3/MM3 (ref 1.7–7)
NEUTROPHILS NFR BLD AUTO: 85.3 % (ref 42.7–76)
NOTIFIED BY: ABNORMAL
NOTIFIED BY: ABNORMAL
NOTIFIED WHO: ABNORMAL
NOTIFIED WHO: ABNORMAL
NRBC BLD AUTO-RTO: 0 /100 WBC (ref 0–0.2)
OXYHGB MFR BLDV: 77.1 % (ref 94–99)
OXYHGB MFR BLDV: 83.4 % (ref 94–99)
PCO2 BLDA: 103 MM HG (ref 35–45)
PCO2 BLDA: 94.8 MM HG (ref 35–45)
PCO2 TEMP ADJ BLD: ABNORMAL MM[HG]
PCO2 TEMP ADJ BLD: ABNORMAL MM[HG]
PH BLDA: 7.32 PH UNITS (ref 7.35–7.45)
PH BLDA: 7.39 PH UNITS (ref 7.35–7.45)
PH, TEMP CORRECTED: ABNORMAL
PH, TEMP CORRECTED: ABNORMAL
PLATELET # BLD AUTO: 53 10*3/MM3 (ref 140–450)
PMV BLD AUTO: 12.9 FL (ref 6–12)
PO2 BLDA: 43.7 MM HG (ref 83–108)
PO2 BLDA: 48.5 MM HG (ref 83–108)
PO2 TEMP ADJ BLD: ABNORMAL MM[HG]
PO2 TEMP ADJ BLD: ABNORMAL MM[HG]
POTASSIUM SERPL-SCNC: 4.2 MMOL/L (ref 3.5–5.2)
POTASSIUM SERPL-SCNC: 4.5 MMOL/L (ref 3.5–5.2)
PROT SERPL-MCNC: 6.1 G/DL (ref 6–8.5)
RBC # BLD AUTO: 4.48 10*6/MM3 (ref 3.77–5.28)
SAO2 % BLDCOA: 79.1 % (ref 94–99)
SAO2 % BLDCOA: 85.7 % (ref 94–99)
SET MECH RESP RATE: 22
SMALL PLATELETS BLD QL SMEAR: NORMAL
SODIUM SERPL-SCNC: 142 MMOL/L (ref 136–145)
STOMATOCYTES BLD QL SMEAR: NORMAL
VENTILATOR MODE: ABNORMAL
VENTILATOR MODE: ABNORMAL
WBC NRBC COR # BLD AUTO: 13.98 10*3/MM3 (ref 3.4–10.8)

## 2023-12-03 PROCEDURE — 93010 ELECTROCARDIOGRAM REPORT: CPT | Performed by: SPECIALIST

## 2023-12-03 PROCEDURE — 71045 X-RAY EXAM CHEST 1 VIEW: CPT

## 2023-12-03 PROCEDURE — 82805 BLOOD GASES W/O2 SATURATION: CPT

## 2023-12-03 PROCEDURE — 63710000001 INSULIN LISPRO (HUMAN) PER 5 UNITS: Performed by: INTERNAL MEDICINE

## 2023-12-03 PROCEDURE — 94799 UNLISTED PULMONARY SVC/PX: CPT

## 2023-12-03 PROCEDURE — 99233 SBSQ HOSP IP/OBS HIGH 50: CPT | Performed by: INTERNAL MEDICINE

## 2023-12-03 PROCEDURE — 85025 COMPLETE CBC W/AUTO DIFF WBC: CPT | Performed by: INTERNAL MEDICINE

## 2023-12-03 PROCEDURE — 63710000001 PREDNISONE PER 5 MG: Performed by: INTERNAL MEDICINE

## 2023-12-03 PROCEDURE — 80053 COMPREHEN METABOLIC PANEL: CPT | Performed by: INTERNAL MEDICINE

## 2023-12-03 PROCEDURE — 82948 REAGENT STRIP/BLOOD GLUCOSE: CPT

## 2023-12-03 PROCEDURE — 84132 ASSAY OF SERUM POTASSIUM: CPT

## 2023-12-03 PROCEDURE — 94761 N-INVAS EAR/PLS OXIMETRY MLT: CPT

## 2023-12-03 PROCEDURE — 85007 BL SMEAR W/DIFF WBC COUNT: CPT | Performed by: INTERNAL MEDICINE

## 2023-12-03 PROCEDURE — 71045 X-RAY EXAM CHEST 1 VIEW: CPT | Performed by: RADIOLOGY

## 2023-12-03 PROCEDURE — 82375 ASSAY CARBOXYHB QUANT: CPT

## 2023-12-03 PROCEDURE — 36600 WITHDRAWAL OF ARTERIAL BLOOD: CPT

## 2023-12-03 PROCEDURE — 83735 ASSAY OF MAGNESIUM: CPT

## 2023-12-03 PROCEDURE — 83050 HGB METHEMOGLOBIN QUAN: CPT

## 2023-12-03 PROCEDURE — 94660 CPAP INITIATION&MGMT: CPT

## 2023-12-03 PROCEDURE — 93005 ELECTROCARDIOGRAM TRACING: CPT

## 2023-12-03 PROCEDURE — 63710000001 PREDNISONE PER 1 MG: Performed by: INTERNAL MEDICINE

## 2023-12-03 PROCEDURE — 94664 DEMO&/EVAL PT USE INHALER: CPT

## 2023-12-03 RX ORDER — PREDNISONE 20 MG/1
40 TABLET ORAL
Status: DISCONTINUED | OUTPATIENT
Start: 2023-12-04 | End: 2023-12-13 | Stop reason: HOSPADM

## 2023-12-03 RX ORDER — PREDNISONE 20 MG/1
40 TABLET ORAL ONCE
Status: COMPLETED | OUTPATIENT
Start: 2023-12-03 | End: 2023-12-03

## 2023-12-03 RX ADMIN — BUDESONIDE AND FORMOTEROL FUMARATE DIHYDRATE 2 PUFF: 160; 4.5 AEROSOL RESPIRATORY (INHALATION) at 06:46

## 2023-12-03 RX ADMIN — PANTOPRAZOLE SODIUM 40 MG: 40 TABLET, DELAYED RELEASE ORAL at 05:47

## 2023-12-03 RX ADMIN — ISOSORBIDE MONONITRATE 30 MG: 30 TABLET, EXTENDED RELEASE ORAL at 08:38

## 2023-12-03 RX ADMIN — HYDROCODONE BITARTRATE AND ACETAMINOPHEN 1 TABLET: 10; 325 TABLET ORAL at 08:40

## 2023-12-03 RX ADMIN — PREDNISONE 15 MG: 10 TABLET ORAL at 08:38

## 2023-12-03 RX ADMIN — PREDNISONE 40 MG: 20 TABLET ORAL at 09:56

## 2023-12-03 RX ADMIN — Medication 10 ML: at 08:39

## 2023-12-03 RX ADMIN — BUDESONIDE AND FORMOTEROL FUMARATE DIHYDRATE 2 PUFF: 160; 4.5 AEROSOL RESPIRATORY (INHALATION) at 18:35

## 2023-12-03 RX ADMIN — IPRATROPIUM BROMIDE 0.5 MG: 0.5 SOLUTION RESPIRATORY (INHALATION) at 18:35

## 2023-12-03 RX ADMIN — VITAMINS A AND D OINTMENT 1 APPLICATION: 15.5; 53.4 OINTMENT TOPICAL at 20:08

## 2023-12-03 RX ADMIN — ATORVASTATIN CALCIUM 40 MG: 40 TABLET, FILM COATED ORAL at 20:07

## 2023-12-03 RX ADMIN — CLOPIDOGREL BISULFATE 75 MG: 75 TABLET, FILM COATED ORAL at 08:38

## 2023-12-03 RX ADMIN — VITAMINS A AND D OINTMENT 1 APPLICATION: 15.5; 53.4 OINTMENT TOPICAL at 08:39

## 2023-12-03 RX ADMIN — IPRATROPIUM BROMIDE 0.5 MG: 0.5 SOLUTION RESPIRATORY (INHALATION) at 06:25

## 2023-12-03 RX ADMIN — Medication 10 ML: at 08:41

## 2023-12-03 RX ADMIN — LEVOTHYROXINE SODIUM 25 MCG: 25 TABLET ORAL at 05:47

## 2023-12-03 RX ADMIN — METOPROLOL TARTRATE 50 MG: 50 TABLET, FILM COATED ORAL at 08:38

## 2023-12-03 RX ADMIN — METOPROLOL TARTRATE 50 MG: 50 TABLET, FILM COATED ORAL at 20:07

## 2023-12-03 RX ADMIN — INSULIN LISPRO 4 UNITS: 100 INJECTION, SOLUTION INTRAVENOUS; SUBCUTANEOUS at 17:16

## 2023-12-03 RX ADMIN — DOCUSATE SODIUM 50 MG AND SENNOSIDES 8.6 MG 2 TABLET: 8.6; 5 TABLET, FILM COATED ORAL at 20:07

## 2023-12-03 RX ADMIN — FUROSEMIDE 40 MG: 40 TABLET ORAL at 08:38

## 2023-12-03 RX ADMIN — IPRATROPIUM BROMIDE 0.5 MG: 0.5 SOLUTION RESPIRATORY (INHALATION) at 12:00

## 2023-12-03 RX ADMIN — INSULIN LISPRO 4 UNITS: 100 INJECTION, SOLUTION INTRAVENOUS; SUBCUTANEOUS at 11:49

## 2023-12-03 RX ADMIN — IPRATROPIUM BROMIDE 0.5 MG: 0.5 SOLUTION RESPIRATORY (INHALATION) at 01:11

## 2023-12-03 RX ADMIN — FLUTICASONE PROPIONATE 2 SPRAY: 50 SPRAY, METERED NASAL at 08:40

## 2023-12-03 RX ADMIN — INSULIN LISPRO 4 UNITS: 100 INJECTION, SOLUTION INTRAVENOUS; SUBCUTANEOUS at 20:07

## 2023-12-03 NOTE — PLAN OF CARE
Goal Outcome Evaluation:      Pt resting bed On bipap most of day. Gave PRN pain medication. Alert and in no distress. Will continue to monitor and follow plan of care.

## 2023-12-03 NOTE — NURSING NOTE
Respiratory therapist let me know pt had some bottles of home medications in a bag. Went into the room got two bottles out took to medication room counted recorded  and sent to pharmacy for safe keeping until pt discharge. Informed pt that the medication would be in pharmacy and be returned upon discharge.

## 2023-12-03 NOTE — PROGRESS NOTES
[facility] HOSPITALIST PROGRESS NOTE     Patient Identification:  Name:  Liz Jiang  Age:  57 y.o.  Sex:  female  :  1965  MRN:  [id number]  Visit Number:  [id number]  ROOM: [address] [place] [address]     Primary Care Provider:  Tabitha Ron APRN    Length of stay in inpatient status:  15    Subjective     Chief Compliant:    Chief Complaint   Patient presents with    Shortness of Breath       History of Presenting Illness:    Patient had varying compliance overnight with BIPAP per respiratory. She was found to have two empty home pain and nerve medicine bottles in her bed with individual pills scattered in bed and floor. I discussed this with patient who noted she was just taking a couple of her home meds. Bottles taken to security and recommended she not take any meds we do not give her. Patient was awake and appeared comfortable on BiPaP.     ROS:  Otherwise 10 point ROS negative other than documented above in HPI.     Objective     Current Hospital Meds:aspirin, 324 mg, Oral, Once  atorvastatin, 40 mg, Oral, Nightly  budesonide-formoterol, 2 puff, Inhalation, BID - RT  clopidogrel, 75 mg, Oral, Daily  fluticasone, 2 spray, Nasal, Daily  furosemide, 40 mg, Oral, Daily  insulin lispro, 2-9 Units, Subcutaneous, 4x Daily AC & at Bedtime  ipratropium, 0.5 mg, Nebulization, 4x Daily - RT  isosorbide mononitrate, 30 mg, Oral, Q24H  levothyroxine, 25 mcg, Oral, Q AM  metoprolol tartrate, 50 mg, Oral, BID  pantoprazole, 40 mg, Oral, Q AM  [START ON 2023] predniSONE, 40 mg, Oral, Daily With Breakfast  senna-docusate sodium, 2 tablet, Oral, BID  sodium chloride, 10 mL, Intravenous, Q12H  sodium chloride, 10 mL, Intravenous, Q12H    Pharmacy Consult,   sodium chloride, 100 mL/hr, Last Rate: 100 mL/hr (23 1112)        Current Antimicrobial Therapy:  Anti-Infectives (From admission, onward)      None          Current Diuretic Therapy:  Diuretics (From admission, onward)      Ordered     Dose/Rate  Route Frequency Start Stop    12/02/23 0920  furosemide (LASIX) tablet 40 mg        Ordering Provider: Chris Shi MD    40 mg Oral Daily 12/03/23 0900 11/24/23 0734  furosemide (LASIX) injection 40 mg        Ordering Provider: Elijah Hope MD    40 mg Intravenous Once 11/24/23 0900 11/24/23 0853    11/20/23 1450  furosemide (LASIX) injection 40 mg        Ordering Provider: Elijah Hope MD    40 mg Intravenous Once 11/20/23 1500 11/20/23 1531    11/18/23 1306  furosemide (LASIX) injection 80 mg        Ordering Provider: Ruby Dia PA    80 mg Intravenous Once 11/18/23 1322 11/18/23 1328          ----------------------------------------------------------------------------------------------------------------------  Vital Signs:  Temp:  [97.8 °F (36.6 °C)-98.3 °F (36.8 °C)] 98 °F (36.7 °C)  Heart Rate:  [] 87  Resp:  [18-25] 24  BP: (105-135)/(67-86) 105/68  SpO2:  [91 %-98 %] 91 %  on  Flow (L/min):  [4-4.5] 4.5;   Device (Oxygen Therapy): NPPV/NIV  Body mass index is 25.82 kg/m².    Wt Readings from Last 3 Encounters:   12/03/23 68.2 kg (150 lb 6.4 oz)   11/08/23 81.6 kg (180 lb)   10/28/23 81.6 kg (180 lb)     Intake & Output (last 3 days)         11/30 0701 12/01 0700 12/01 0701  12/02 0700 12/02 0701  12/03 0700 12/03 0701  12/04 0700    P.O. 480  330 360    I.V. (mL/kg)        Total Intake(mL/kg) 480 (5.5)  330 (4.8) 360 (5.3)    Urine (mL/kg/hr) 1250 (0.6) 350 (0.2) 1250 (0.8)     Stool        Total Output [phone]     Net -770 -350 -920 +360            Urine Unmeasured Occurrence  1 x            Diet: Cardiac Diets; Healthy Heart (2-3 Na+); Texture: Regular Texture (IDDSI 7); Fluid Consistency: Thin (IDDSI 0)  ----------------------------------------------------------------------------------------------------------------------  Physical exam:  Constitutional:  Chronically ill appearing.   HENT:  Head:  Normocephalic and atraumatic.  Mouth:  Moist mucous membranes.    Eyes:   Conjunctivae and EOM are normal. No scleral icterus.    Neck:  Neck supple.  No JVD present.    Cardiovascular:  Normal rate, regular rhythm and normal heart sounds with no murmur.  Pulmonary/Chest:  No respiratory distress, no wheezes, no crackles, with normal breath sounds and good air movement.  Abdominal:  Soft.  Bowel sounds are normal.  No distension and no tenderness.   Musculoskeletal:  No edema, no tenderness, and no deformity.  No red or swollen joints anywhere.    Neurological:  Alert and oriented to person, place, and time.  No cranial nerve deficit.  No tongue deviation.  No facial droop.  No slurred speech.   Skin:  Skin is warm and dry. No rash noted. No pallor.   Peripheral vascular:  Pulses in all 4 extremities with no clubbing, no cyanosis, no edema.  ----------------------------------------------------------------------------------------------------------------------  Tele:    ----------------------------------------------------------------------------------------------------------------------  Results from last 7 days   Lab Units 12/03/23  0852 12/02/23  0211 12/01/23  0147   WBC 10*3/mm3 13.98* 14.10* 12.55*   HEMOGLOBIN g/dL 12.9 12.9 12.5   HEMATOCRIT % 46.3 45.6 43.7   MCV fL 103.3* 101.8* 100.0*   MCHC g/dL 27.9* 28.3* 28.6*   PLATELETS 10*3/mm3 53* 58* 69*     Results from last 7 days   Lab Units 12/03/23  0938   PH, ARTERIAL pH units 7.317*   PO2 ART mm Hg 43.7*   PCO2, ARTERIAL mm Hg 103.0*   HCO3 ART mmol/L 52.7*     Results from last 7 days   Lab Units 12/03/23  0852 12/02/23  0835 12/02/23  0211 12/01/23  0144 11/30/23  0204 11/28/23  0309   SODIUM mmol/L 142  --  137 140   < > 135*   POTASSIUM mmol/L 4.5 4.3 5.9* 4.6   < > 5.2   MAGNESIUM mg/dL  --   --   --   --   --  2.7*   CHLORIDE mmol/L 91*  --  93* 94*   < > 92*   CO2 mmol/L 46.4*  --  38.1* 42.5*   < > 36.5*   BUN mg/dL 22*  --  18 19   < > 22*   CREATININE mg/dL 0.37*  --  0.46* 0.47*   < > 0.31*   CALCIUM mg/dL 9.3  --  9.0  "9.2   < > 9.4   GLUCOSE mg/dL 164*  --  96 143*   < > 157*   ALBUMIN g/dL 3.5  --  3.6 3.6  --   --    BILIRUBIN mg/dL 0.7  --  0.5 0.5  --   --    ALK PHOS U/L 50  --  49 48  --   --    AST (SGOT) U/L 14  --  23 17  --   --    ALT (SGPT) U/L 22  --  22 22  --   --     < > = values in this interval not displayed.   Estimated Creatinine Clearance: 157.2 mL/min (A) (by C-G formula based on SCr of 0.37 mg/dL (L)).  No results found for: \"AMMONIA\"          Results from last 7 days   Lab Units 11/30/23  0204   CHOLESTEROL mg/dL 175   TRIGLYCERIDES mg/dL 445*   HDL CHOL mg/dL 55   LDL CHOL mg/dL 54     Glucose   Date/Time Value Ref Range Status   12/03/2023 1056 223 (H) 70 - 130 mg/dL Final   12/03/2023 0651 134 (H) 70 - 130 mg/dL Final   12/02/2023 1954 183 (H) 70 - 130 mg/dL Final   12/02/2023 1610 197 (H) 70 - 130 mg/dL Final   12/02/2023 1114 162 (H) 70 - 130 mg/dL Final   12/02/2023 0656 112 70 - 130 mg/dL Final   12/02/2023 0500 121 70 - 130 mg/dL Final   12/01/2023 1900 156 (H) 70 - 130 mg/dL Final     Lab Results   Component Value Date    TSH 5.170 (H) 11/18/2023    FREET4 1.09 11/18/2023     No results found for: \"PREGTESTUR\", \"PREGSERUM\", \"HCG\", \"HCGQUANT\"  Pain Management Panel  More data may exist         Latest Ref Rng & Units 11/19/2023 2/23/2022   Pain Management Panel   Amphetamine, Urine Qual Negative Negative  Negative    Barbiturates Screen, Urine Negative Negative  Negative    Benzodiazepine Screen, Urine Negative Positive  Positive    Buprenorphine, Screen, Urine Negative Negative  Negative    Cocaine Screen, Urine Negative Negative  Negative    Fentanyl, Urine Negative Negative  -   Methadone Screen , Urine Negative Negative  Negative    Methamphetamine, Ur Negative Negative  Negative      Brief Urine Lab Results  (Last result in the past 365 days)        Color   Clarity   Blood   Leuk Est   Nitrite   Protein   CREAT   Urine HCG        11/18/23 1447 Yellow   Clear   Negative   Negative   Negative   " "Negative                 No results found for: \"BLOODCX\"  No results found for: \"URINECX\"  No results found for: \"WOUNDCX\"  No results found for: \"STOOLCX\"  No results found for: \"RESPCX\"  No results found for: \"AFBCX\"        I have personally looked at the labs and they are summarized above.  ----------------------------------------------------------------------------------------------------------------------  Detailed radiology reports for the last 24 hours:    Imaging Results (Last 24 Hours)       Procedure Component Value Units Date/Time    XR Chest 1 View [[id number]] Collected: 12/03/23 1114     Updated: 12/03/23 1122    Narrative:      INDICATION: Cough.     TECHNIQUE: Frontal and lateral radiographs of the chest.     COMPARISON: 11/27/2023.     FINDINGS:   Chronic appearing interstitial lung markings. Cardiomegaly. No  infiltrate, pleural effusion or pneumothorax. No acute osseous  abnormality evident. Left-sided chest port again noted.       Impression:      No acute cardiopulmonary process.        This report was finalized on 12/3/2023 11:14 AM by Alex Pallas, DO.             Assessment & Plan    #Acute on chronic hypoxic respiratory failure   #Acute on chronic hypercapnic respiratory failure   #Acute COPD exacerbation   #Hx of STEVEN  #Medical noncompliance  #Opiate/benzo misuse   -At admission viral panel was negative, D-dimer within normal limits, and no new consolidations seen on imaging at admission.  -Patient has severe COPD that requires home NIPPV but she has been unable to tolerate wearing it regularly.  -ABG slightly improved.  Continue BiPAP nightly and as needed.  Continue supplemental oxygen with goal oxygen saturation 88 to 92%  -Pulmonology have been recommending BiPAP settings. (Home health are managing the device rather than hospital staff and have provided patient new device with recommended BiPAP settings. However not not tolerating, plan to go back to trilogy with pulmonology settings when " "home health is able. )   -Pulmonology following, appreciate assistance.  -Continue steroids, Symbicort, ipratropium. Continue to wean steroid dose as respiratory status is improving. 15 mg PO prednisone currently and will continue to taper.   - PT consult requested due to generalized weakness and poor exercise tolerance, appreciate assistance. May need short term rehab vs home health services.  - Paitent hypercapnic/hypoxic on home O2 today. Likely from extra home sedatives taken. Will get plain film of chest. Will plan to continue BIAP today. Repeat ABG in AM or unless needed before then. Encouraged patient to not take  home meds and let us give them.      #Chronically elevated troponin  #Frequent pSVT  -Similar to previous labs.  No new ischemic changes noted on EKG at admission.  -Patient reported to me that she was having \"chest pressure\" with using the trilogy machine at home, but at no other times, and she says that it has not been nearly as bad when using the BiPAP here. She denies any chest pressure/pain with exertion or at any time other than when using NIPPV. This has improved.  -Based on description of symptoms I suspect that patient's pain is due to her severe COPD and use of noninvasive positive pressure ventilation rather than a cardiac source, but she will need ischemic evaluation with stress test when able to tolerate it.  - Cardiology performed abnormal stress test followed with [facility] that revealed mild CAD. Continue ASA, statin. Repeat renal function in AM.   - Metoprolol tartrate increased to 50mg BID  - Continue telemetry monitoring      #Acute thrombocytopenia  - No associated anemia or bleeding   - 4T score of 4.  d/c heparin and started SCDs.   - Heparin abx ordered. PBS ordered. No clear other drug cause.   - Repeat CBC in AM. Down again today from 58 to 53 but appears to be slowing down.         CHRONIC MEDICAL PROBLEMS     #HFpEF/grade I diastolic dysfunction  - Not felt to be in acute " exacerbation clinically.   - Previous TTE from 10/2023 reviewed.  Monitor volume status with I&Os, daily weights.  Continue home lasix 20mg PO daily.     #Essential hypertension  - Well controlled, continue home regimen      #Hyperlipidemia  -Continue statin.      #Type II diabetes mellitus, non insulin dependent  - A1C 6.6%.   - Glucose overall better controlled. Continue current regimen.      #History of CVA  - Details unknown.  Continue home medication as appropriate. Does not appear to have residual deficits      #History of carotid artery disease s/p left CEA in the past  - Continue home medications      #Hypothyroidism  - TSH WNL. Continue home levothyroxine.      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  #Former smoker      Code status: Full      Dispo: Pending clinical improvement. Home health unable to optimize home machine. May need to consider [facility] or SNF if they are unable to get it working for safe dispo soon.        VTE Prophylaxis:   Mechanical Order History:        Ordered        11/30/23 1211  Place Sequential Compression Device  Once            11/30/23 1211  Maintain Sequential Compression Device  Continuous                          Pharmalogical Order History:        Ordered     Dose Route Frequency Stop    11/29/23 1011  heparin (porcine) injection  Status:  Discontinued         -- -- Code / Trauma / Sedation Medication 11/29/23 1034    11/18/23 1605  heparin (porcine) 5000 UNIT/ML injection 5,000 Units  Status:  Discontinued         5,000 Units SC Every 8 Hours Scheduled 11/30/23 1210                      Cliff Castaneda MD  [facility]  12/03/23  13:17 EST

## 2023-12-03 NOTE — PROGRESS NOTES
Progress Note Pulmonary      Subjective reported worsening shortness of breath.  She used home trilogy.  Reported feeling tired this morning  Interval History:   As above      Review of Systems:    Reviewed ; unchanged       Vital Signs  Temp:  [97.8 °F (36.6 °C)-98.4 °F (36.9 °C)] 98.3 °F (36.8 °C)  Heart Rate:  [] 96  Resp:  [20-25] 24  BP: (105-135)/(65-86) 135/86  Body mass index is 25.82 kg/m².    Intake/Output Summary (Last 24 hours) at 12/3/2023 0905  Last data filed at 12/3/2023 0500  Gross per 24 hour   Intake 90 ml   Output 1250 ml   Net -1160 ml     No intake/output data recorded.    Physical Exam:  General- normal in appearance, not in any acute distress    HEENT- pupils equally reactive to light, normal in size, no scleral icterus    Neck- supple    No JVD, no carotid bruit    Respiratory-bilateral air entry, worsening wheezing     cardiovascular-  Normal S1 and S2. No S3, S4 or murmurs.    GI-nontender nondistended bowel sounds positive    CNS-alert oriented x3, grossly nonfocal    Extremities- pulses normal bilaterally , no clubbing but trace edema     Results Review:      Results from last 7 days   Lab Units 12/02/23 0211 12/01/23 0147 11/30/23  0204   WBC 10*3/mm3 14.10* 12.55* 14.00*   HEMOGLOBIN g/dL 12.9 12.5 14.2   PLATELETS 10*3/mm3 58* 69* 83*     Results from last 7 days   Lab Units 12/02/23  0835 12/02/23 0211 12/01/23 0144 11/30/23 0204 11/28/23  0309   SODIUM mmol/L  --  137 140 136 135*   POTASSIUM mmol/L 4.3 5.9* 4.6 4.9 5.2   CHLORIDE mmol/L  --  93* 94* 94* 92*   CO2 mmol/L  --  38.1* 42.5* 35.2* 36.5*   BUN mg/dL  --  18 19 25* 22*   CREATININE mg/dL  --  0.46* 0.47* 0.55* 0.31*   CALCIUM mg/dL  --  9.0 9.2 9.6 9.4   GLUCOSE mg/dL  --  96 143* 132* 157*   MAGNESIUM mg/dL  --   --   --   --  2.7*     Lab Results   Component Value Date    INR 0.94 11/18/2023    INR 0.95 10/12/2023    INR 0.90 03/27/2017    PROTIME 13.1 11/18/2023    PROTIME 13.1 10/12/2023    PROTIME 9.9  03/27/2017     Results from last 7 days   Lab Units 12/02/23  0211 12/01/23  0144   ALK PHOS U/L 49 48   BILIRUBIN mg/dL 0.5 0.5   BILIRUBIN DIRECT mg/dL <0.2  --    ALT (SGPT) U/L 22 22   AST (SGOT) U/L 23 17         Imaging Results (Last 24 Hours)       ** No results found for the last 24 hours. **                 aspirin, 324 mg, Oral, Once  atorvastatin, 40 mg, Oral, Nightly  budesonide-formoterol, 2 puff, Inhalation, BID - RT  clopidogrel, 75 mg, Oral, Daily  fluticasone, 2 spray, Nasal, Daily  furosemide, 40 mg, Oral, Daily  insulin lispro, 2-9 Units, Subcutaneous, 4x Daily AC & at Bedtime  ipratropium, 0.5 mg, Nebulization, 4x Daily - RT  isosorbide mononitrate, 30 mg, Oral, Q24H  levothyroxine, 25 mcg, Oral, Q AM  metoprolol tartrate, 50 mg, Oral, BID  pantoprazole, 40 mg, Oral, Q AM  predniSONE, 40 mg, Oral, Once  [START ON 12/4/2023] predniSONE, 40 mg, Oral, Daily With Breakfast  senna-docusate sodium, 2 tablet, Oral, BID  sodium chloride, 10 mL, Intravenous, Q12H  sodium chloride, 10 mL, Intravenous, Q12H      Pharmacy Consult,   sodium chloride, 100 mL/hr, Last Rate: 100 mL/hr (11/29/23 1112)        Medication Review:     Assessment & Plan   #1 initial hospitalization for acute exacerbation of COPD-  Looks slightly worse today.  I am going to repeat the blood gases.  Will increase the dose of prednisone to 40 mg.  Add spacer device with Symbicort.  Continue Atrovent nebulizer every 6 hours.  And DuoNebs every 2 hours as needed.    May consider decreasing the dose of narcotic analgesics and Xanax if blood gases showed worsening CO2 retention.    Repeat electrolytes.       #2: OBstructive sleep apnea,  #3 chronic hypercapnic hypoxic respiratory failure.      Patient was able to tolerate trilogy with a setting of  AVAPS/AE. Tidal volume 6 mL/kg ideal body weight, PS max 28, PS minimum 20, EPAP max 16, EPAP minimum 6, respiratory rate 18, oxygen to a saturation of 92% however noted worsening wheezing this  morning.  Management as described above.        continue nebs    FiO2 requirement reviewed and adjusted to maintain saturation 88 to 92%.  Continue appropriate prophylaxis      #4: Coronary artery disease positive stress test.  Cardiologist planning to do a cardiac cath.    #5: Need tight glycemic control.      Chris Shi MD  12/03/23  09:05 EST

## 2023-12-04 LAB
A-A DO2: 131.6 MMHG (ref 0–300)
ALBUMIN SERPL-MCNC: 3.6 G/DL (ref 3.5–5.2)
ALBUMIN/GLOB SERPL: 1.3 G/DL
ALP SERPL-CCNC: 48 U/L (ref 39–117)
ALT SERPL W P-5'-P-CCNC: 19 U/L (ref 1–33)
ANION GAP SERPL CALCULATED.3IONS-SCNC: 6.1 MMOL/L (ref 5–15)
ARTERIAL PATENCY WRIST A: ABNORMAL
AST SERPL-CCNC: 12 U/L (ref 1–32)
ATMOSPHERIC PRESS: 724 MMHG
BASE EXCESS BLDA CALC-SCNC: 25 MMOL/L (ref 0–2)
BASOPHILS # BLD AUTO: 0.06 10*3/MM3 (ref 0–0.2)
BASOPHILS NFR BLD AUTO: 0.4 % (ref 0–1.5)
BDY SITE: ABNORMAL
BILIRUB SERPL-MCNC: 0.8 MG/DL (ref 0–1.2)
BUN SERPL-MCNC: 24 MG/DL (ref 6–20)
BUN/CREAT SERPL: 64.9 (ref 7–25)
CALCIUM SPEC-SCNC: 9.6 MG/DL (ref 8.6–10.5)
CHLORIDE SERPL-SCNC: 88 MMOL/L (ref 98–107)
CO2 BLDA-SCNC: 55 MMOL/L (ref 22–33)
CO2 SERPL-SCNC: 45.9 MMOL/L (ref 22–29)
COHGB MFR BLD: 1.8 % (ref 0–5)
CREAT SERPL-MCNC: 0.37 MG/DL (ref 0.57–1)
DEPRECATED RDW RBC AUTO: 54 FL (ref 37–54)
EGFRCR SERPLBLD CKD-EPI 2021: 117.1 ML/MIN/1.73
EOSINOPHIL # BLD AUTO: 0.01 10*3/MM3 (ref 0–0.4)
EOSINOPHIL NFR BLD AUTO: 0.1 % (ref 0.3–6.2)
EPAP: 12
ERYTHROCYTE [DISTWIDTH] IN BLOOD BY AUTOMATED COUNT: 14.4 % (ref 12.3–15.4)
GLOBULIN UR ELPH-MCNC: 2.7 GM/DL
GLUCOSE BLDC GLUCOMTR-MCNC: 133 MG/DL (ref 70–130)
GLUCOSE BLDC GLUCOMTR-MCNC: 177 MG/DL (ref 70–130)
GLUCOSE BLDC GLUCOMTR-MCNC: 197 MG/DL (ref 70–130)
GLUCOSE BLDC GLUCOMTR-MCNC: 258 MG/DL (ref 70–130)
GLUCOSE SERPL-MCNC: 170 MG/DL (ref 65–99)
HCO3 BLDA-SCNC: 52.9 MMOL/L (ref 20–26)
HCT VFR BLD AUTO: 43.6 % (ref 34–46.6)
HCT VFR BLD CALC: 39.6 % (ref 38–51)
HGB BLD-MCNC: 12.5 G/DL (ref 12–15.9)
HGB BLDA-MCNC: 12.9 G/DL (ref 13.5–17.5)
HYPOCHROMIA BLD QL: NORMAL
IMM GRANULOCYTES # BLD AUTO: 0.39 10*3/MM3 (ref 0–0.05)
IMM GRANULOCYTES NFR BLD AUTO: 2.5 % (ref 0–0.5)
INHALED O2 CONCENTRATION: 40 %
IPAP: 22
LYMPHOCYTES # BLD AUTO: 1.01 10*3/MM3 (ref 0.7–3.1)
LYMPHOCYTES NFR BLD AUTO: 6.4 % (ref 19.6–45.3)
Lab: ABNORMAL
Lab: ABNORMAL
MACROCYTES BLD QL SMEAR: NORMAL
MCH RBC QN AUTO: 28.7 PG (ref 26.6–33)
MCHC RBC AUTO-ENTMCNC: 28.7 G/DL (ref 31.5–35.7)
MCV RBC AUTO: 100.2 FL (ref 79–97)
METHGB BLD QL: <-0.1 % (ref 0–3)
MODALITY: ABNORMAL
MONOCYTES # BLD AUTO: 1.21 10*3/MM3 (ref 0.1–0.9)
MONOCYTES NFR BLD AUTO: 7.7 % (ref 5–12)
NEUTROPHILS NFR BLD AUTO: 13.08 10*3/MM3 (ref 1.7–7)
NEUTROPHILS NFR BLD AUTO: 82.9 % (ref 42.7–76)
NOTIFIED BY: ABNORMAL
NOTIFIED WHO: ABNORMAL
NRBC BLD AUTO-RTO: 0 /100 WBC (ref 0–0.2)
NT-PROBNP SERPL-MCNC: 581.8 PG/ML (ref 0–900)
OXYHGB MFR BLDV: 92 % (ref 94–99)
PAW @ PEAK INSP FLOW SETTING VENT: 22 CMH2O
PCO2 BLDA: 68.9 MM HG (ref 35–45)
PCO2 TEMP ADJ BLD: ABNORMAL MM[HG]
PH BLDA: 7.49 PH UNITS (ref 7.35–7.45)
PH, TEMP CORRECTED: ABNORMAL
PLATELET # BLD AUTO: 73 10*3/MM3 (ref 140–450)
PMV BLD AUTO: 12.1 FL (ref 6–12)
PO2 BLDA: 63.4 MM HG (ref 83–108)
PO2 TEMP ADJ BLD: ABNORMAL MM[HG]
POTASSIUM SERPL-SCNC: 4 MMOL/L (ref 3.5–5.2)
PROT SERPL-MCNC: 6.3 G/DL (ref 6–8.5)
RBC # BLD AUTO: 4.35 10*6/MM3 (ref 3.77–5.28)
REF LAB TEST METHOD: NORMAL
SAO2 % BLDCOA: 93.4 % (ref 94–99)
SET MECH RESP RATE: 22
SMALL PLATELETS BLD QL SMEAR: NORMAL
SODIUM SERPL-SCNC: 140 MMOL/L (ref 136–145)
STOMATOCYTES BLD QL SMEAR: NORMAL
TOTAL RATE: 22 BREATHS/MINUTE
VENTILATOR MODE: ABNORMAL
VT ON VENT VENT: 264 ML
WBC NRBC COR # BLD AUTO: 15.76 10*3/MM3 (ref 3.4–10.8)

## 2023-12-04 PROCEDURE — 85025 COMPLETE CBC W/AUTO DIFF WBC: CPT | Performed by: INTERNAL MEDICINE

## 2023-12-04 PROCEDURE — 94799 UNLISTED PULMONARY SVC/PX: CPT

## 2023-12-04 PROCEDURE — 99232 SBSQ HOSP IP/OBS MODERATE 35: CPT | Performed by: HOSPITALIST

## 2023-12-04 PROCEDURE — 94761 N-INVAS EAR/PLS OXIMETRY MLT: CPT

## 2023-12-04 PROCEDURE — 85007 BL SMEAR W/DIFF WBC COUNT: CPT | Performed by: INTERNAL MEDICINE

## 2023-12-04 PROCEDURE — 94664 DEMO&/EVAL PT USE INHALER: CPT

## 2023-12-04 PROCEDURE — 25010000002 FUROSEMIDE PER 20 MG: Performed by: INTERNAL MEDICINE

## 2023-12-04 PROCEDURE — 82375 ASSAY CARBOXYHB QUANT: CPT

## 2023-12-04 PROCEDURE — 80053 COMPREHEN METABOLIC PANEL: CPT | Performed by: INTERNAL MEDICINE

## 2023-12-04 PROCEDURE — 94660 CPAP INITIATION&MGMT: CPT

## 2023-12-04 PROCEDURE — 82948 REAGENT STRIP/BLOOD GLUCOSE: CPT

## 2023-12-04 PROCEDURE — 83880 ASSAY OF NATRIURETIC PEPTIDE: CPT | Performed by: INTERNAL MEDICINE

## 2023-12-04 PROCEDURE — 99232 SBSQ HOSP IP/OBS MODERATE 35: CPT | Performed by: INTERNAL MEDICINE

## 2023-12-04 PROCEDURE — 82805 BLOOD GASES W/O2 SATURATION: CPT

## 2023-12-04 PROCEDURE — 83050 HGB METHEMOGLOBIN QUAN: CPT

## 2023-12-04 PROCEDURE — 63710000001 INSULIN LISPRO (HUMAN) PER 5 UNITS: Performed by: INTERNAL MEDICINE

## 2023-12-04 PROCEDURE — 36600 WITHDRAWAL OF ARTERIAL BLOOD: CPT

## 2023-12-04 PROCEDURE — 63710000001 PREDNISONE PER 1 MG: Performed by: INTERNAL MEDICINE

## 2023-12-04 RX ORDER — FUROSEMIDE 10 MG/ML
40 INJECTION INTRAMUSCULAR; INTRAVENOUS ONCE
Qty: 4 ML | Refills: 0 | Status: COMPLETED | OUTPATIENT
Start: 2023-12-04 | End: 2023-12-04

## 2023-12-04 RX ADMIN — Medication 10 ML: at 20:09

## 2023-12-04 RX ADMIN — IPRATROPIUM BROMIDE 0.5 MG: 0.5 SOLUTION RESPIRATORY (INHALATION) at 18:27

## 2023-12-04 RX ADMIN — FUROSEMIDE 40 MG: 40 TABLET ORAL at 08:36

## 2023-12-04 RX ADMIN — CLOPIDOGREL BISULFATE 75 MG: 75 TABLET, FILM COATED ORAL at 08:37

## 2023-12-04 RX ADMIN — IPRATROPIUM BROMIDE 0.5 MG: 0.5 SOLUTION RESPIRATORY (INHALATION) at 06:30

## 2023-12-04 RX ADMIN — BUDESONIDE AND FORMOTEROL FUMARATE DIHYDRATE 2 PUFF: 160; 4.5 AEROSOL RESPIRATORY (INHALATION) at 18:27

## 2023-12-04 RX ADMIN — BUDESONIDE AND FORMOTEROL FUMARATE DIHYDRATE 2 PUFF: 160; 4.5 AEROSOL RESPIRATORY (INHALATION) at 06:30

## 2023-12-04 RX ADMIN — FLUTICASONE PROPIONATE 2 SPRAY: 50 SPRAY, METERED NASAL at 08:38

## 2023-12-04 RX ADMIN — HYDROCODONE BITARTRATE AND ACETAMINOPHEN 1 TABLET: 10; 325 TABLET ORAL at 20:06

## 2023-12-04 RX ADMIN — METOPROLOL TARTRATE 50 MG: 50 TABLET, FILM COATED ORAL at 08:37

## 2023-12-04 RX ADMIN — INSULIN LISPRO 2 UNITS: 100 INJECTION, SOLUTION INTRAVENOUS; SUBCUTANEOUS at 20:15

## 2023-12-04 RX ADMIN — FUROSEMIDE 40 MG: 10 INJECTION, SOLUTION INTRAMUSCULAR; INTRAVENOUS at 14:35

## 2023-12-04 RX ADMIN — IPRATROPIUM BROMIDE 0.5 MG: 0.5 SOLUTION RESPIRATORY (INHALATION) at 13:11

## 2023-12-04 RX ADMIN — METOPROLOL TARTRATE 50 MG: 50 TABLET, FILM COATED ORAL at 20:08

## 2023-12-04 RX ADMIN — INSULIN LISPRO 6 UNITS: 100 INJECTION, SOLUTION INTRAVENOUS; SUBCUTANEOUS at 17:09

## 2023-12-04 RX ADMIN — LEVOTHYROXINE SODIUM 25 MCG: 25 TABLET ORAL at 05:19

## 2023-12-04 RX ADMIN — ALPRAZOLAM 0.5 MG: 0.5 TABLET ORAL at 20:06

## 2023-12-04 RX ADMIN — PANTOPRAZOLE SODIUM 40 MG: 40 TABLET, DELAYED RELEASE ORAL at 05:19

## 2023-12-04 RX ADMIN — INSULIN LISPRO 2 UNITS: 100 INJECTION, SOLUTION INTRAVENOUS; SUBCUTANEOUS at 11:36

## 2023-12-04 RX ADMIN — PREDNISONE 40 MG: 20 TABLET ORAL at 08:36

## 2023-12-04 RX ADMIN — IPRATROPIUM BROMIDE 0.5 MG: 0.5 SOLUTION RESPIRATORY (INHALATION) at 00:40

## 2023-12-04 RX ADMIN — Medication 10 ML: at 08:38

## 2023-12-04 RX ADMIN — VITAMINS A AND D OINTMENT 1 APPLICATION: 15.5; 53.4 OINTMENT TOPICAL at 08:37

## 2023-12-04 RX ADMIN — HYDROCODONE BITARTRATE AND ACETAMINOPHEN 1 TABLET: 10; 325 TABLET ORAL at 08:42

## 2023-12-04 RX ADMIN — ATORVASTATIN CALCIUM 40 MG: 40 TABLET, FILM COATED ORAL at 20:08

## 2023-12-04 RX ADMIN — ISOSORBIDE MONONITRATE 30 MG: 30 TABLET, EXTENDED RELEASE ORAL at 08:37

## 2023-12-04 NOTE — PROGRESS NOTES
Pulmonary and critical care following the patient for COPD exacerbation and hypoxic respiratory failure  Chief Complaint:  sob    Subjective -resting in chair.  All the labs medications and the notes and vitals reviewed.  Ins and outs net 2 L negative          Review of Systems:    Positive for shortness of breath otherwise negative        Vital Signs  Temp:  [97.7 °F (36.5 °C)-98.1 °F (36.7 °C)] 98.1 °F (36.7 °C)  Heart Rate:  [] 89  Resp:  [20-24] 22  BP: (104-134)/(63-85) 113/66  Body mass index is 25.37 kg/m².    Intake/Output Summary (Last 24 hours) at 12/4/2023 1304  Last data filed at 12/4/2023 1017  Gross per 24 hour   Intake 360 ml   Output 2400 ml   Net -2040 ml     I/O this shift:  In: 360 [P.O.:360]  Out: 650 [Urine:650]    Physical Exam:  General-chronically ill in appearance, not in any acute distress    HEENT-PERRLA    Neck-supple    Respiratory-respirations normal-on auscultation no wheezing no crackles, generalized decreased breath sounds  Cardiovascular-NSR    GI-nontender nondistended bowel sounds positive    CNS-nonfocal    Musculoskeletal -no edema  Extremities- no obvious deformity noticed     Psychiatric-mood good, good eye contact, alert awake oriented  Skin- no visible rash         Results Review:     I reviewed the patient's new clinical results.  Results from last 7 days   Lab Units 12/04/23 0051 12/03/23 0852 12/02/23 0211   WBC 10*3/mm3 15.76* 13.98* 14.10*   HEMOGLOBIN g/dL 12.5 12.9 12.9   PLATELETS 10*3/mm3 73* 53* 58*     Results from last 7 days   Lab Units 12/04/23 0051 12/03/23 2038 12/03/23  0852 12/02/23  0835 12/02/23 0211 11/30/23  0204 11/28/23  0309   SODIUM mmol/L 140  --  142  --  137   < > 135*   POTASSIUM mmol/L 4.0 4.2 4.5   < > 5.9*   < > 5.2   CHLORIDE mmol/L 88*  --  91*  --  93*   < > 92*   CO2 mmol/L 45.9*  --  46.4*  --  38.1*   < > 36.5*   BUN mg/dL 24*  --  22*  --  18   < > 22*   CREATININE mg/dL 0.37*  --  0.37*  --  0.46*   < > 0.31*   CALCIUM  mg/dL 9.6  --  9.3  --  9.0   < > 9.4   GLUCOSE mg/dL 170*  --  164*  --  96   < > 157*   MAGNESIUM mg/dL  --  2.1  --   --   --   --  2.7*    < > = values in this interval not displayed.     Lab Results   Component Value Date    INR 0.94 11/18/2023    INR 0.95 10/12/2023    INR 0.90 03/27/2017    PROTIME 13.1 11/18/2023    PROTIME 13.1 10/12/2023    PROTIME 9.9 03/27/2017     Results from last 7 days   Lab Units 12/04/23  0051 12/03/23  0852 12/02/23  0211   ALK PHOS U/L 48 50 49   BILIRUBIN mg/dL 0.8 0.7 0.5   BILIRUBIN DIRECT mg/dL  --   --  <0.2   ALT (SGPT) U/L 19 22 22   AST (SGOT) U/L 12 14 23     Results from last 7 days   Lab Units 12/04/23  0450   PH, ARTERIAL pH units 7.494*   PO2 ART mm Hg 63.4*   PCO2, ARTERIAL mm Hg 68.9*   HCO3 ART mmol/L 52.9*     Imaging Results (Last 24 Hours)       ** No results found for the last 24 hours. **                 aspirin, 324 mg, Oral, Once  atorvastatin, 40 mg, Oral, Nightly  budesonide-formoterol, 2 puff, Inhalation, BID - RT  clopidogrel, 75 mg, Oral, Daily  fluticasone, 2 spray, Nasal, Daily  furosemide, 40 mg, Intravenous, Once  furosemide, 40 mg, Oral, Daily  insulin lispro, 2-9 Units, Subcutaneous, 4x Daily AC & at Bedtime  ipratropium, 0.5 mg, Nebulization, 4x Daily - RT  isosorbide mononitrate, 30 mg, Oral, Q24H  levothyroxine, 25 mcg, Oral, Q AM  metoprolol tartrate, 50 mg, Oral, BID  pantoprazole, 40 mg, Oral, Q AM  predniSONE, 40 mg, Oral, Daily With Breakfast  senna-docusate sodium, 2 tablet, Oral, BID  sodium chloride, 10 mL, Intravenous, Q12H  sodium chloride, 10 mL, Intravenous, Q12H      Pharmacy Consult,   sodium chloride, 100 mL/hr, Last Rate: 100 mL/hr (11/29/23 1112)        Medication Review:     Assessment & Plan   COPD exacerbation-continue steroids continue nebs  Continue inhalers  Continue oxygen  We will give diuretics to improve lung compliance and diffusion capacity  Overall 2 L negative  We will get BNP level today.  We will get  procalcitonin level tomorrow morning-to make sure patient's white count is not high due to any bacterial infection.  Chest x-ray from yesterday reviewed and does not show any major changes.  Overall prognosis with end-stage COPD seems poor.  FiO2 requirement reviewed and adjusted for saturation of 88 to 92%.        STEVEN-continue current trilogy settings.   chronic hypercapnic hypoxic respiratory failure-continue current amount of oxygen titrated to maintain saturation 88 to 92%.     Continue trilogy with a setting of  AVAPS/AE. Tidal volume 6 mL/kg ideal body weight, PS max 28, PS minimum 20, EPAP max 16, EPAP minimum 6, respiratory rate 18, oxygen to a saturation of 92% however noted worsening wheezing this morning.  Management as described above.      Results for orders placed during the hospital encounter of 10/12/23    Adult Transthoracic Echo Complete W/ Cont if Necessary Per Protocol    Interpretation Summary    Left ventricular systolic function is normal. Left ventricular ejection fraction appears to be 66 - 70%.    Left ventricular diastolic function is consistent with (grade I) impaired relaxation.    Estimated right ventricular systolic pressure from tricuspid regurgitation is normal (<35 mmHg).        FiO2 requirement reviewed and adjusted to maintain saturation 88 to 92%.  Continue appropriate prophylaxis      Positive stress test-cardiology planning for cardiac cath.      Acute respiratory failure with hypoxia    Chest pain               Elijah Hope MD  12/04/23  13:04 EST

## 2023-12-04 NOTE — PROGRESS NOTES
[facility] HOSPITALIST PROGRESS NOTE     Patient Identification:  Name:  Liz Jiang  Age:  57 y.o.  Sex:  female  :  1965  MRN:  [id number]  Visit Number:  [id number]  Primary Care Provider:  Tabitha Ron APRN    Length of stay:  16    Subjective: Patient seen and examined patient just had a bowel movement, no nausea or vomiting, no confusion, awake and alert, after discussion at length with the patient regarding the need for compliance especially with her advanced severe COPD.  She agreed not to take any medications other than was prescribed or provided here at the hospital.  She is also agreeable for trilogy tonight.    Chief Complaint: Short of breath  ----------------------------------------------------------------------------------------------------------------------  Current Hospital Meds:  aspirin, 324 mg, Oral, Once  atorvastatin, 40 mg, Oral, Nightly  budesonide-formoterol, 2 puff, Inhalation, BID - RT  clopidogrel, 75 mg, Oral, Daily  fluticasone, 2 spray, Nasal, Daily  furosemide, 40 mg, Oral, Daily  insulin lispro, 2-9 Units, Subcutaneous, 4x Daily AC & at Bedtime  ipratropium, 0.5 mg, Nebulization, 4x Daily - RT  isosorbide mononitrate, 30 mg, Oral, Q24H  levothyroxine, 25 mcg, Oral, Q AM  metoprolol tartrate, 50 mg, Oral, BID  pantoprazole, 40 mg, Oral, Q AM  predniSONE, 40 mg, Oral, Daily With Breakfast  senna-docusate sodium, 2 tablet, Oral, BID  sodium chloride, 10 mL, Intravenous, Q12H  sodium chloride, 10 mL, Intravenous, Q12H      Pharmacy Consult,   sodium chloride, 100 mL/hr, Last Rate: 100 mL/hr (23 1112)      ----------------------------------------------------------------------------------------------------------------------  Vital Signs:  Temp:  [97.7 °F (36.5 °C)-98.1 °F (36.7 °C)] 98 °F (36.7 °C)  Heart Rate:  [] 97  Resp:  [20-24] 22  BP: (104-134)/(63-85) 104/63       Tele: Sinus rhythm 97 bpm O2 saturations 97% on 4 L nasal cannula       12/02/23  0500 12/03/23  0500 12/04/23  0500   Weight: 87.9 kg (193 lb 11.2 oz) 68.2 kg (150 lb 6.4 oz) (CHELSEA amador) 67 kg (147 lb 12.8 oz)     Body mass index is 25.37 kg/m².    Intake/Output Summary (Last 24 hours) at 12/4/2023 1614  Last data filed at 12/4/2023 1521  Gross per 24 hour   Intake 960 ml   Output 2450 ml   Net -1490 ml     Diet: Cardiac Diets; Healthy Heart (2-3 Na+); Texture: Regular Texture (IDDSI 7); Fluid Consistency: Thin (IDDSI 0)  ----------------------------------------------------------------------------------------------------------------------  Physical exam:  General: Chronically ill-appearing comfortable,awake, alert, oriented to self, place, and time,   No respiratory distress.    Skin:  Skin is warm and dry. No rash noted. No pallor.    HENT:  Head:  Normocephalic and atraumatic.  Mouth:  Moist mucous membranes.    Eyes:  Conjunctivae and EOM are normal.  Pupils are equal, round, and reactive to light.  No scleral icterus.    Neck:  Neck supple.  No JVD present.  No bruit  Pulmonary/Chest: Diminished breath sounds bilateral, no wheezing, there is air movement.  Rare scattered rhonchi   Cardiovascular:  Normal rate, regular rhythm and normal heart sounds with no murmur.  Abdominal:  Soft.  Bowel sounds are normal.  No distension and no tenderness.   Extremities: Trace edema, no tenderness, and no deformity.  No red or swollen joints anywhere.  Strong pulses in all 4 extremities with no clubbing, no cyanosis, Neurological:  Motor strength equal no obvious deficit, sensory grossly intact.   No cranial nerve deficit.  No tongue deviation.  No facial droop.  No slurred speech.    Genitourinary: External urinary catheter  Back:  ----------------------------------------------------------------------------------------------------------------------  ----------------------------------------------------------------------------------------------------------------------      Results from last  "7 days   Lab Units 12/04/23 0051 12/03/23 0852 12/02/23 0211   WBC 10*3/mm3 15.76* 13.98* 14.10*   HEMOGLOBIN g/dL 12.5 12.9 12.9   HEMATOCRIT % 43.6 46.3 45.6   MCV fL 100.2* 103.3* 101.8*   MCHC g/dL 28.7* 27.9* 28.3*   PLATELETS 10*3/mm3 73* 53* 58*     Results from last 7 days   Lab Units 12/04/23  0450   PH, ARTERIAL pH units 7.494*   PO2 ART mm Hg 63.4*   PCO2, ARTERIAL mm Hg 68.9*   HCO3 ART mmol/L 52.9*     Results from last 7 days   Lab Units 12/04/23 0051 12/03/23 2038 12/03/23 0852 12/02/23 0835 12/02/23 0211 11/30/23  0204 11/28/23  0309   SODIUM mmol/L 140  --  142  --  137   < > 135*   POTASSIUM mmol/L 4.0 4.2 4.5   < > 5.9*   < > 5.2   MAGNESIUM mg/dL  --  2.1  --   --   --   --  2.7*   CHLORIDE mmol/L 88*  --  91*  --  93*   < > 92*   CO2 mmol/L 45.9*  --  46.4*  --  38.1*   < > 36.5*   BUN mg/dL 24*  --  22*  --  18   < > 22*   CREATININE mg/dL 0.37*  --  0.37*  --  0.46*   < > 0.31*   CALCIUM mg/dL 9.6  --  9.3  --  9.0   < > 9.4   GLUCOSE mg/dL 170*  --  164*  --  96   < > 157*   ALBUMIN g/dL 3.6  --  3.5  --  3.6   < >  --    BILIRUBIN mg/dL 0.8  --  0.7  --  0.5   < >  --    ALK PHOS U/L 48  --  50  --  49   < >  --    AST (SGOT) U/L 12  --  14  --  23   < >  --    ALT (SGPT) U/L 19  --  22  --  22   < >  --     < > = values in this interval not displayed.   Estimated Creatinine Clearance: 155.9 mL/min (A) (by C-G formula based on SCr of 0.37 mg/dL (L)).    No results found for: \"AMMONIA\"  Results from last 7 days   Lab Units 11/30/23  0204   CHOLESTEROL mg/dL 175   TRIGLYCERIDES mg/dL 445*   HDL CHOL mg/dL 55   LDL CHOL mg/dL 54     No results found for: \"BLOODCX\"  No results found for: \"URINECX\"  No results found for: \"WOUNDCX\"  No results found for: \"STOOLCX\"    I have personally looked at the labs and they are summarized above.  ----------------------------------------------------------------------------------------------------------------------  Imaging Results (Last 24 Hours)       " ** No results found for the last 24 hours. **          ----------------------------------------------------------------------------------------------------------------------  Assessment and Plan:  -Acute on chronic hypoxic and hypercapnic respiratory failure  -COPD exacerbation  -Acute encephalopathy secondary to hypercapnia and hypoxia + medication  -Noncompliance  -History of obstructive sleep apnea  -Opiates and benzodiazepines misuse  -Frequent SVT asymptomatic  -Chronically elevated troponin  -Acute thrombocytopenia B12 TSH and folate is normal  -Diabetes type 2  -Hypertriglyceridemia    Discussion at length with patient the importance of compliance, she agreed to try trilogy tonight, also regarding medications, she is advised that she is only going to take medication that is being prescribed or provided to nurse.  Pulmonology help appreciated.      Disposition Home pending      Katie Vasquez MD  12/04/23  16:14 EST

## 2023-12-04 NOTE — CASE MANAGEMENT/SOCIAL WORK
Discharge Planning Assessment  [facility]     Patient Name: Liz Jiang  MRN: [id number]  Today's Date: 12/4/2023    Admit Date: 11/18/2023    Plan: SS spoke with pt at bedside.  Pt continues to state that she will return home at discharge when medically stable.  Pt lives at home alone at [address].  Pt has utilized [facility] in past.  Pt will need new home health referral at discharge.  CM assisting with DME needs..  SS will follow.     Discharge Plan       Row Name 12/04/23 1412       Plan    Plan SS spoke with pt at bedside.  Pt continues to state that she will return home at discharge when medically stable.  Pt lives at home alone at [address].  Pt has utilized [facility] in past.  Pt will need new home health referral at discharge.  CM assisting with DME needs..  SS will follow.                  Continued Care and Services - Admitted Since 11/18/2023       Home Medical Care       Service Provider Request Status Selected Services Address Phone Fax Patient Preferred    [facility]ENC[facility]  Selected Home Rehabilitation ,  Home Nursing [address] [place] [id number] [phone] --                  Selected Continued Care - Prior Encounters Includes continued care and service providers with selected services from prior encounters from 8/20/2023 to 12/4/2023       LIANE Myers

## 2023-12-04 NOTE — PLAN OF CARE
Goal Outcome Evaluation:      Pt resting in bed got up in chair today and up to bedside commode several times . Gave PRN medication for pain. More alert today Will continue to monitor and follow plan of care

## 2023-12-04 NOTE — PLAN OF CARE
Goal Outcome Evaluation:                     Pt has wore BiPaP most of shift. Pt currently resting in bed. No s/s of acute distress noted at this time. Pt had a 5 beat run of VTACH & 7 beat run of PSVT, stat EKG ordered (potassium 4.2 and mag 2.1). Chakraborty APRN aware. Plan of care ongoing.

## 2023-12-05 LAB
A-A DO2: 77.1 MMHG (ref 0–300)
ANION GAP SERPL CALCULATED.3IONS-SCNC: 7.8 MMOL/L (ref 5–15)
ARTERIAL PATENCY WRIST A: POSITIVE
ATMOSPHERIC PRESS: 729 MMHG
BASE EXCESS BLDA CALC-SCNC: 23.8 MMOL/L (ref 0–2)
BASOPHILS # BLD AUTO: 0.03 10*3/MM3 (ref 0–0.2)
BASOPHILS NFR BLD AUTO: 0.2 % (ref 0–1.5)
BDY SITE: ABNORMAL
BUN SERPL-MCNC: 23 MG/DL (ref 6–20)
BUN/CREAT SERPL: 52.3 (ref 7–25)
CALCIUM SPEC-SCNC: 9.2 MG/DL (ref 8.6–10.5)
CHLORIDE SERPL-SCNC: 89 MMOL/L (ref 98–107)
CO2 BLDA-SCNC: 56.6 MMOL/L (ref 22–33)
CO2 SERPL-SCNC: 46.2 MMOL/L (ref 22–29)
COHGB MFR BLD: 1.5 % (ref 0–5)
CREAT SERPL-MCNC: 0.44 MG/DL (ref 0.57–1)
DEPRECATED RDW RBC AUTO: 51.8 FL (ref 37–54)
EGFRCR SERPLBLD CKD-EPI 2021: 112.3 ML/MIN/1.73
EOSINOPHIL # BLD AUTO: 0.01 10*3/MM3 (ref 0–0.4)
EOSINOPHIL NFR BLD AUTO: 0.1 % (ref 0.3–6.2)
ERYTHROCYTE [DISTWIDTH] IN BLOOD BY AUTOMATED COUNT: 14.6 % (ref 12.3–15.4)
GAS FLOW AIRWAY: 4 LPM
GLUCOSE BLDC GLUCOMTR-MCNC: 116 MG/DL (ref 70–130)
GLUCOSE BLDC GLUCOMTR-MCNC: 164 MG/DL (ref 70–130)
GLUCOSE BLDC GLUCOMTR-MCNC: 205 MG/DL (ref 70–130)
GLUCOSE BLDC GLUCOMTR-MCNC: 252 MG/DL (ref 70–130)
GLUCOSE SERPL-MCNC: 126 MG/DL (ref 65–99)
HCO3 BLDA-SCNC: 54 MMOL/L (ref 20–26)
HCT VFR BLD AUTO: 43.7 % (ref 34–46.6)
HCT VFR BLD CALC: 42.6 % (ref 38–51)
HGB BLD-MCNC: 13.1 G/DL (ref 12–15.9)
HGB BLDA-MCNC: 13.9 G/DL (ref 13.5–17.5)
IMM GRANULOCYTES # BLD AUTO: 0.3 10*3/MM3 (ref 0–0.05)
IMM GRANULOCYTES NFR BLD AUTO: 2.1 % (ref 0–0.5)
INHALED O2 CONCENTRATION: 36 %
LYMPHOCYTES # BLD AUTO: 1.52 10*3/MM3 (ref 0.7–3.1)
LYMPHOCYTES NFR BLD AUTO: 10.5 % (ref 19.6–45.3)
Lab: ABNORMAL
Lab: ABNORMAL
MAGNESIUM SERPL-MCNC: 2.1 MG/DL (ref 1.6–2.6)
MCH RBC QN AUTO: 28.9 PG (ref 26.6–33)
MCHC RBC AUTO-ENTMCNC: 30 G/DL (ref 31.5–35.7)
MCV RBC AUTO: 96.3 FL (ref 79–97)
METHGB BLD QL: <-0.1 % (ref 0–3)
MODALITY: ABNORMAL
MONOCYTES # BLD AUTO: 1.44 10*3/MM3 (ref 0.1–0.9)
MONOCYTES NFR BLD AUTO: 9.9 % (ref 5–12)
NEUTROPHILS NFR BLD AUTO: 11.18 10*3/MM3 (ref 1.7–7)
NEUTROPHILS NFR BLD AUTO: 77.2 % (ref 42.7–76)
NOTIFIED BY: ABNORMAL
NOTIFIED WHO: ABNORMAL
NRBC BLD AUTO-RTO: 0 /100 WBC (ref 0–0.2)
OXYHGB MFR BLDV: 93.2 % (ref 94–99)
PCO2 BLDA: 84.6 MM HG (ref 35–45)
PCO2 TEMP ADJ BLD: ABNORMAL MM[HG]
PH BLDA: 7.41 PH UNITS (ref 7.35–7.45)
PH, TEMP CORRECTED: ABNORMAL
PLATELET # BLD AUTO: 77 10*3/MM3 (ref 140–450)
PMV BLD AUTO: 12.5 FL (ref 6–12)
PO2 BLDA: 75 MM HG (ref 83–108)
PO2 TEMP ADJ BLD: ABNORMAL MM[HG]
POTASSIUM SERPL-SCNC: 4.2 MMOL/L (ref 3.5–5.2)
PROCALCITONIN SERPL-MCNC: 0.03 NG/ML (ref 0–0.25)
QT INTERVAL: 368 MS
QTC INTERVAL: 498 MS
RBC # BLD AUTO: 4.54 10*6/MM3 (ref 3.77–5.28)
SAO2 % BLDCOA: 94.3 % (ref 94–99)
SODIUM SERPL-SCNC: 143 MMOL/L (ref 136–145)
VENTILATOR MODE: ABNORMAL
WBC NRBC COR # BLD AUTO: 14.48 10*3/MM3 (ref 3.4–10.8)

## 2023-12-05 PROCEDURE — 94799 UNLISTED PULMONARY SVC/PX: CPT

## 2023-12-05 PROCEDURE — 82375 ASSAY CARBOXYHB QUANT: CPT

## 2023-12-05 PROCEDURE — 82948 REAGENT STRIP/BLOOD GLUCOSE: CPT

## 2023-12-05 PROCEDURE — 94660 CPAP INITIATION&MGMT: CPT

## 2023-12-05 PROCEDURE — 94761 N-INVAS EAR/PLS OXIMETRY MLT: CPT

## 2023-12-05 PROCEDURE — 63710000001 INSULIN LISPRO (HUMAN) PER 5 UNITS: Performed by: INTERNAL MEDICINE

## 2023-12-05 PROCEDURE — 99231 SBSQ HOSP IP/OBS SF/LOW 25: CPT | Performed by: STUDENT IN AN ORGANIZED HEALTH CARE EDUCATION/TRAINING PROGRAM

## 2023-12-05 PROCEDURE — 80048 BASIC METABOLIC PNL TOTAL CA: CPT | Performed by: HOSPITALIST

## 2023-12-05 PROCEDURE — 36600 WITHDRAWAL OF ARTERIAL BLOOD: CPT

## 2023-12-05 PROCEDURE — 82805 BLOOD GASES W/O2 SATURATION: CPT

## 2023-12-05 PROCEDURE — 94664 DEMO&/EVAL PT USE INHALER: CPT

## 2023-12-05 PROCEDURE — 85025 COMPLETE CBC W/AUTO DIFF WBC: CPT | Performed by: HOSPITALIST

## 2023-12-05 PROCEDURE — 83735 ASSAY OF MAGNESIUM: CPT | Performed by: HOSPITALIST

## 2023-12-05 PROCEDURE — 63710000001 PREDNISONE PER 1 MG: Performed by: INTERNAL MEDICINE

## 2023-12-05 PROCEDURE — 83050 HGB METHEMOGLOBIN QUAN: CPT

## 2023-12-05 PROCEDURE — 99232 SBSQ HOSP IP/OBS MODERATE 35: CPT | Performed by: INTERNAL MEDICINE

## 2023-12-05 PROCEDURE — 84145 PROCALCITONIN (PCT): CPT | Performed by: INTERNAL MEDICINE

## 2023-12-05 RX ORDER — ACETAZOLAMIDE 250 MG/1
500 TABLET ORAL ONCE
Status: COMPLETED | OUTPATIENT
Start: 2023-12-05 | End: 2023-12-05

## 2023-12-05 RX ORDER — ALPRAZOLAM 0.25 MG/1
0.25 TABLET ORAL 2 TIMES DAILY
Status: DISCONTINUED | OUTPATIENT
Start: 2023-12-05 | End: 2023-12-07

## 2023-12-05 RX ORDER — METOPROLOL TARTRATE 1 MG/ML
5 INJECTION, SOLUTION INTRAVENOUS ONCE
Status: COMPLETED | OUTPATIENT
Start: 2023-12-05 | End: 2023-12-05

## 2023-12-05 RX ADMIN — METOPROLOL TARTRATE 50 MG: 50 TABLET, FILM COATED ORAL at 21:08

## 2023-12-05 RX ADMIN — METOPROLOL TARTRATE 5 MG: 1 INJECTION, SOLUTION INTRAVENOUS at 22:22

## 2023-12-05 RX ADMIN — FLUTICASONE PROPIONATE 2 SPRAY: 50 SPRAY, METERED NASAL at 08:31

## 2023-12-05 RX ADMIN — HYDROCODONE BITARTRATE AND ACETAMINOPHEN 1 TABLET: 10; 325 TABLET ORAL at 04:51

## 2023-12-05 RX ADMIN — LEVOTHYROXINE SODIUM 25 MCG: 25 TABLET ORAL at 04:51

## 2023-12-05 RX ADMIN — Medication 10 ML: at 21:09

## 2023-12-05 RX ADMIN — ALPRAZOLAM 0.5 MG: 0.5 TABLET ORAL at 08:30

## 2023-12-05 RX ADMIN — FUROSEMIDE 40 MG: 40 TABLET ORAL at 08:30

## 2023-12-05 RX ADMIN — INSULIN LISPRO 2 UNITS: 100 INJECTION, SOLUTION INTRAVENOUS; SUBCUTANEOUS at 17:44

## 2023-12-05 RX ADMIN — Medication 10 ML: at 08:31

## 2023-12-05 RX ADMIN — IPRATROPIUM BROMIDE 0.5 MG: 0.5 SOLUTION RESPIRATORY (INHALATION) at 06:27

## 2023-12-05 RX ADMIN — INSULIN LISPRO 4 UNITS: 100 INJECTION, SOLUTION INTRAVENOUS; SUBCUTANEOUS at 11:43

## 2023-12-05 RX ADMIN — ALPRAZOLAM 0.25 MG: 0.25 TABLET ORAL at 21:08

## 2023-12-05 RX ADMIN — IPRATROPIUM BROMIDE 0.5 MG: 0.5 SOLUTION RESPIRATORY (INHALATION) at 19:02

## 2023-12-05 RX ADMIN — INSULIN LISPRO 6 UNITS: 100 INJECTION, SOLUTION INTRAVENOUS; SUBCUTANEOUS at 21:09

## 2023-12-05 RX ADMIN — ATORVASTATIN CALCIUM 40 MG: 40 TABLET, FILM COATED ORAL at 21:08

## 2023-12-05 RX ADMIN — IPRATROPIUM BROMIDE 0.5 MG: 0.5 SOLUTION RESPIRATORY (INHALATION) at 12:56

## 2023-12-05 RX ADMIN — CLOPIDOGREL BISULFATE 75 MG: 75 TABLET, FILM COATED ORAL at 08:31

## 2023-12-05 RX ADMIN — ACETAZOLAMIDE 500 MG: 250 TABLET ORAL at 11:43

## 2023-12-05 RX ADMIN — PANTOPRAZOLE SODIUM 40 MG: 40 TABLET, DELAYED RELEASE ORAL at 04:51

## 2023-12-05 RX ADMIN — PREDNISONE 40 MG: 20 TABLET ORAL at 08:30

## 2023-12-05 RX ADMIN — IPRATROPIUM BROMIDE 0.5 MG: 0.5 SOLUTION RESPIRATORY (INHALATION) at 00:25

## 2023-12-05 RX ADMIN — METOPROLOL TARTRATE 50 MG: 50 TABLET, FILM COATED ORAL at 08:30

## 2023-12-05 RX ADMIN — BUDESONIDE AND FORMOTEROL FUMARATE DIHYDRATE 2 PUFF: 160; 4.5 AEROSOL RESPIRATORY (INHALATION) at 06:28

## 2023-12-05 RX ADMIN — ISOSORBIDE MONONITRATE 30 MG: 30 TABLET, EXTENDED RELEASE ORAL at 08:31

## 2023-12-05 RX ADMIN — BUDESONIDE AND FORMOTEROL FUMARATE DIHYDRATE 2 PUFF: 160; 4.5 AEROSOL RESPIRATORY (INHALATION) at 19:04

## 2023-12-05 NOTE — PLAN OF CARE
Goal Outcome Evaluation: Patient has been pleasant. Compliant with care. Patient remains on 4L/NC or BIPAP, saturations remaining above 90%. Patient ambulated to bedside, steady gait noted. Family came to bedside, updated on plan of care. Patient resting in bed. Call light in reach.

## 2023-12-05 NOTE — PROGRESS NOTES
[facility] HOSPITALIST PROGRESS NOTE     Patient Identification:  Name:  Liz Jiang  Age:  57 y.o.  Sex:  female  :  1965  MRN:  [id number]  Visit Number:  [id number]  ROOM: [address] [place] [address]     Primary Care Provider:  Tabitha Ron APRN    Length of stay in inpatient status:  17    Subjective     Chief Compliant:    Chief Complaint   Patient presents with    Shortness of Breath       History of Presenting Illness:    Patient seen and examined this morning. She was sleeping with her trilogy machine mask lying in her lap. She woke easily and denied any shortness of breath, chest pain, or other complaints at the time.     Objective     Current Hospital Meds:ALPRAZolam, 0.25 mg, Oral, BID  aspirin, 324 mg, Oral, Once  atorvastatin, 40 mg, Oral, Nightly  budesonide-formoterol, 2 puff, Inhalation, BID - RT  clopidogrel, 75 mg, Oral, Daily  fluticasone, 2 spray, Nasal, Daily  furosemide, 40 mg, Oral, Daily  insulin lispro, 2-9 Units, Subcutaneous, 4x Daily AC & at Bedtime  ipratropium, 0.5 mg, Nebulization, 4x Daily - RT  isosorbide mononitrate, 30 mg, Oral, Q24H  levothyroxine, 25 mcg, Oral, Q AM  metoprolol tartrate, 50 mg, Oral, BID  pantoprazole, 40 mg, Oral, Q AM  predniSONE, 40 mg, Oral, Daily With Breakfast  senna-docusate sodium, 2 tablet, Oral, BID  sodium chloride, 10 mL, Intravenous, Q12H  sodium chloride, 10 mL, Intravenous, Q12H    Pharmacy Consult,         Current Antimicrobial Therapy:  Anti-Infectives (From admission, onward)      None          Current Diuretic Therapy:  Diuretics (From admission, onward)      Ordered     Dose/Rate Route Frequency Start Stop    23 0827  acetaZOLAMIDE (DIAMOX) tablet 500 mg        Ordering Provider: Elijah Hope MD    500 mg Oral Once 23 0915 23 1143    23 1302  furosemide (LASIX) injection 40 mg        Ordering Provider: Elijah Hope MD    40 mg Intravenous Once 23 1315 23 1435    23 0920  furosemide  (LASIX) tablet 40 mg        Ordering Provider: Chris Shi MD    40 mg Oral Daily 12/03/23 0900      11/24/23 0734  furosemide (LASIX) injection 40 mg        Ordering Provider: Elijah Hope MD    40 mg Intravenous Once 11/24/23 0900 11/24/23 0853    11/20/23 1450  furosemide (LASIX) injection 40 mg        Ordering Provider: Elijah Hope MD    40 mg Intravenous Once 11/20/23 1500 11/20/23 1531    11/18/23 1306  furosemide (LASIX) injection 80 mg        Ordering Provider: Ruby Dia PA    80 mg Intravenous Once 11/18/23 1322 11/18/23 1328          ----------------------------------------------------------------------------------------------------------------------  Vital Signs:  Temp:  [98 °F (36.7 °C)-98.5 °F (36.9 °C)] 98.5 °F (36.9 °C)  Heart Rate:  [] 98  Resp:  [18-22] 20  BP: (105-137)/(71-85) 105/71  SpO2:  [91 %-98 %] 97 %  on  Flow (L/min):  [4-40] 4;   Device (Oxygen Therapy): nasal cannula  Body mass index is 25.44 kg/m².    Wt Readings from Last 3 Encounters:   12/05/23 67.2 kg (148 lb 3.2 oz)   11/08/23 81.6 kg (180 lb)   10/28/23 81.6 kg (180 lb)     Intake & Output (last 3 days)         12/02 0701 12/03 0700 12/03 0701 12/04 0700 12/04 0701 12/05 0700 12/05 0701 12/06 0700    P.O.  840    Total Intake(mL/kg) 330 (4.8) 360 (5.4) 1320 (19.6) 840 (12.5)    Urine (mL/kg/hr) 1250 (0.8) 1750 (1.1) 2800 (1.7) 200 (0.3)    Stool    0    Total Output 1250 1750 2800 200    Net -920 -1390 -1480 +640            Urine Unmeasured Occurrence   1 x 1 x    Stool Unmeasured Occurrence    1 x          Diet: Cardiac Diets; Healthy Heart (2-3 Na+); Texture: Regular Texture (IDDSI 7); Fluid Consistency: Thin (IDDSI 0)  ----------------------------------------------------------------------------------------------------------------------  Physical exam:   Constitutional:  Well-developed and well-nourished.  Chronically ill-appearing.  No acute distress.      HENT:  Head:  Normocephalic and  "atraumatic.   Cardiovascular:  Normal rate, regular rhythm   Pulmonary/Chest:  Normal rate and effor, diminished air movement bilaterally but no significant wheezing, crackles, or rhonchi  Musculoskeletal:  No deformity.    Neurological: Sleepy, but woke easily and had no focal deficit on gross examination. No slurred speech or facial droop.   Skin:  Skin is warm and dry.   Peripheral vascular:  No cyanosis    Edited by: Faviola Mejia DO at 12/5/2023 1803  ----------------------------------------------------------------------------------------------------------------------  Results from last 7 days   Lab Units 12/05/23 0052 12/04/23 0051 12/03/23 0852   WBC 10*3/mm3 14.48* 15.76* 13.98*   HEMOGLOBIN g/dL 13.1 12.5 12.9   HEMATOCRIT % 43.7 43.6 46.3   MCV fL 96.3 100.2* 103.3*   MCHC g/dL 30.0* 28.7* 27.9*   PLATELETS 10*3/mm3 77* 73* 53*     Results from last 7 days   Lab Units 12/05/23  0501   PH, ARTERIAL pH units 7.413   PO2 ART mm Hg 75.0*   PCO2, ARTERIAL mm Hg 84.6*   HCO3 ART mmol/L 54.0*     Results from last 7 days   Lab Units 12/05/23  0052 12/04/23 0051 12/03/23 2038 12/03/23  0852 12/02/23  0835 12/02/23  0211   SODIUM mmol/L 143 140  --  142  --  137   POTASSIUM mmol/L 4.2 4.0 4.2 4.5   < > 5.9*   MAGNESIUM mg/dL 2.1  --  2.1  --   --   --    CHLORIDE mmol/L 89* 88*  --  91*  --  93*   CO2 mmol/L 46.2* 45.9*  --  46.4*  --  38.1*   BUN mg/dL 23* 24*  --  22*  --  18   CREATININE mg/dL 0.44* 0.37*  --  0.37*  --  0.46*   CALCIUM mg/dL 9.2 9.6  --  9.3  --  9.0   GLUCOSE mg/dL 126* 170*  --  164*  --  96   ALBUMIN g/dL  --  3.6  --  3.5  --  3.6   BILIRUBIN mg/dL  --  0.8  --  0.7  --  0.5   ALK PHOS U/L  --  48  --  50  --  49   AST (SGOT) U/L  --  12  --  14  --  23   ALT (SGPT) U/L  --  19  --  22  --  22    < > = values in this interval not displayed.   Estimated Creatinine Clearance: 131.3 mL/min (A) (by C-G formula based on SCr of 0.44 mg/dL (L)).  No results found for: \"AMMONIA\"      Results " "from last 7 days   Lab Units 12/04/23  0051   PROBNP pg/mL 581.8     Results from last 7 days   Lab Units 11/30/23  0204   CHOLESTEROL mg/dL 175   TRIGLYCERIDES mg/dL 445*   HDL CHOL mg/dL 55   LDL CHOL mg/dL 54     Glucose   Date/Time Value Ref Range Status   12/05/2023 1623 164 (H) 70 - 130 mg/dL Final   12/05/2023 1112 205 (H) 70 - 130 mg/dL Final   12/05/2023 0643 116 70 - 130 mg/dL Final   12/04/2023 2003 177 (H) 70 - 130 mg/dL Final   12/04/2023 1619 258 (H) 70 - 130 mg/dL Final   12/04/2023 1037 197 (H) 70 - 130 mg/dL Final   12/04/2023 0621 133 (H) 70 - 130 mg/dL Final   12/03/2023 1930 213 (H) 70 - 130 mg/dL Final     Lab Results   Component Value Date    TSH 5.170 (H) 11/18/2023    FREET4 1.09 11/18/2023     No results found for: \"PREGTESTUR\", \"PREGSERUM\", \"HCG\", \"HCGQUANT\"  Pain Management Panel  More data may exist         Latest Ref Rng & Units 11/19/2023 2/23/2022   Pain Management Panel   Amphetamine, Urine Qual Negative Negative  Negative    Barbiturates Screen, Urine Negative Negative  Negative    Benzodiazepine Screen, Urine Negative Positive  Positive    Buprenorphine, Screen, Urine Negative Negative  Negative    Cocaine Screen, Urine Negative Negative  Negative    Fentanyl, Urine Negative Negative  -   Methadone Screen , Urine Negative Negative  Negative    Methamphetamine, Ur Negative Negative  Negative      Brief Urine Lab Results  (Last result in the past 365 days)        Color   Clarity   Blood   Leuk Est   Nitrite   Protein   CREAT   Urine HCG        11/18/23 1447 Yellow   Clear   Negative   Negative   Negative   Negative                 No results found for: \"BLOODCX\"  No results found for: \"URINECX\"  No results found for: \"WOUNDCX\"  No results found for: \"STOOLCX\"  No results found for: \"RESPCX\"  No results found for: \"AFBCX\"  Results from last 7 days   Lab Units 12/05/23  0053   PROCALCITONIN ng/mL 0.03       I have personally looked at the labs and they are summarized " above.  ----------------------------------------------------------------------------------------------------------------------  Detailed radiology reports for the last 24 hours:  Imaging Results (Last 24 Hours)       ** No results found for the last 24 hours. **          Assessment & Plan      #Acute on chronic hypoxic respiratory failure   #Acute on chronic hypercapnic respiratory failure   #Acute COPD exacerbation   #Hx of STEVEN  #Medical noncompliance  -At admission viral panel was negative, D-dimer within normal limits, and no new consolidations seen on imaging at admission.  -Patient has severe COPD that requires home NIPPV but she has been unable to tolerate wearing it regularly.  -Pulmonology is following, appreciate assistance.  -Continue steroids, Symbicort, ipratropium. Continue to wean steroid dose as respiratory status is improving. Currently receiving prednisone 40mg PO daily. Encourage aggressive pulmonary toilet, out of bed as much as possible, incentive spirometry, cough/deep breathing.   - PT consult requested due to generalized weakness and poor exercise tolerance, appreciate assistance. PT has now signed off due to patient refusing to participate and refusing any short term rehab. As she continues to have poor tolerance for minimal out of bed activities and has poor long term prognosis I will request a palliative care consult to help with ongoing discussions regarding goals of care.   - Patient continues to require adjustment to trilogy machine settings as repeat abg showed worsening hypercapnia this morning.   - Xanax 0.25mg BID added per Pulmonology recommendation as her anxiety is a limiting factor in her ability to wear the trilogy machine.     #Chronically elevated troponin  #Frequent pSVT  - Heart cath was done and showed no occlusive coronary artery disease   - Metoprolol tartrate increased to 50mg BID during this admission with improvement in episodes of pSVT  - Continue telemetry monitoring      CHRONIC MEDICAL PROBLEMS     #HFpEF/grade I diastolic dysfunction  - Not currently felt to be in acute exacerbation clinically.   - Previous TTE from 10/2023 reviewed.  Monitor volume status with I&Os, daily weights.  Continue lasix which has been increased to 40mg PO daily during this admission.     #Essential hypertension  - Well controlled, continue home regimen      #Hyperlipidemia  -Continue statin.      #Type II diabetes mellitus, non insulin dependent  - A1C 6.6%.   - Glucose has improved to the 110-200 range. Monitor with accuchecks and cover with sliding scale insulin.   - Continue consistent carbohydrate diet      #History of CVA  - Details unknown.  Continue home medication as appropriate. Does not appear to have residual deficits      #History of carotid artery disease s/p left CEA in the past  - Continue home medications      #Hypothyroidism  - TSH WNL. Continue home levothyroxine.      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   #Former smoker   Edited by: Faviola Mejia DO at 12/5/2023 1803    VTE Prophylaxis:   Mechanical Order History:        Ordered        11/30/23 1211  Place Sequential Compression Device  Once            11/30/23 1211  Maintain Sequential Compression Device  Continuous                          Pharmalogical Order History:        Ordered     Dose Route Frequency Stop    11/29/23 1011  heparin (porcine) injection  Status:  Discontinued         -- -- Code / Trauma / Sedation Medication 11/29/23 1034    11/18/23 1605  heparin (porcine) 5000 UNIT/ML injection 5,000 Units  Status:  Discontinued         5,000 Units SC Every 8 Hours Scheduled 11/30/23 1210                    Dispo: pending clinical progress and further  discussions of goals of care, likely home at discharge    Faviola Mejia DO  [facility] Hospitalist  12/05/23  18:03 EST

## 2023-12-05 NOTE — PLAN OF CARE
Goal Outcome Evaluation:            Pt refusing cpap from home, nasal cannula in place 4l, sitting up in bed with no sign of soa, assisted x 1 to bedside commode, call bell in reach

## 2023-12-05 NOTE — PROGRESS NOTES
Pulmonary and critical care following the patient for COPD exacerbation and hypoxic respiratory failure  Chief Complaint:  sob    Subjective - resting in chair.  All the labs medications and the notes and vitals reviewed.  Ins and outs net 1.1 L negative  Abg reviewed   chest xray reviewed   Will give diamox  Wearing BiPAP    Review of Systems:    Positive for shortness of breath otherwise negative        Vital Signs  Temp:  [98 °F (36.7 °C)-98.4 °F (36.9 °C)] 98.4 °F (36.9 °C)  Heart Rate:  [] 67  Resp:  [18-24] 20  BP: (104-137)/(63-85) 117/74  Body mass index is 25.44 kg/m².    Intake/Output Summary (Last 24 hours) at 12/5/2023 0829  Last data filed at 12/5/2023 0800  Gross per 24 hour   Intake 1680 ml   Output 2800 ml   Net -1120 ml     I/O this shift:  In: 360 [P.O.:360]  Out: -     Physical Exam:  General-chronically ill in appearance, not in any acute distress    HEENT-PERRLA    Neck-supple    Respiratory-respirations normal-on auscultation no wheezing no crackles, generalized decreased breath sounds.  Wearing BiPAP  Cardiovascular-NSR    GI-nontender nondistended bowel sounds positive    CNS-nonfocal    Musculoskeletal -no edema  Extremities- no obvious deformity noticed     Psychiatric- alert awake oriented  Skin- no visible rash         Results Review:     I reviewed the patient's new clinical results.  Results from last 7 days   Lab Units 12/05/23 0052 12/04/23 0051 12/03/23  0852   WBC 10*3/mm3 14.48* 15.76* 13.98*   HEMOGLOBIN g/dL 13.1 12.5 12.9   PLATELETS 10*3/mm3 77* 73* 53*     Results from last 7 days   Lab Units 12/05/23  0052 12/04/23  0051 12/03/23 2038 12/03/23  0852   SODIUM mmol/L 143 140  --  142   POTASSIUM mmol/L 4.2 4.0 4.2 4.5   CHLORIDE mmol/L 89* 88*  --  91*   CO2 mmol/L 46.2* 45.9*  --  46.4*   BUN mg/dL 23* 24*  --  22*   CREATININE mg/dL 0.44* 0.37*  --  0.37*   CALCIUM mg/dL 9.2 9.6  --  9.3   GLUCOSE mg/dL 126* 170*  --  164*   MAGNESIUM mg/dL 2.1  --  2.1  --       Lab Results   Component Value Date    INR 0.94 11/18/2023    INR 0.95 10/12/2023    INR 0.90 03/27/2017    PROTIME 13.1 11/18/2023    PROTIME 13.1 10/12/2023    PROTIME 9.9 03/27/2017     Results from last 7 days   Lab Units 12/04/23  0051 12/03/23  0852 12/02/23  0211   ALK PHOS U/L 48 50 49   BILIRUBIN mg/dL 0.8 0.7 0.5   BILIRUBIN DIRECT mg/dL  --   --  <0.2   ALT (SGPT) U/L 19 22 22   AST (SGOT) U/L 12 14 23     Results from last 7 days   Lab Units 12/05/23  0501   PH, ARTERIAL pH units 7.413   PO2 ART mm Hg 75.0*   PCO2, ARTERIAL mm Hg 84.6*   HCO3 ART mmol/L 54.0*     Imaging Results (Last 24 Hours)       ** No results found for the last 24 hours. **                 acetaZOLAMIDE, 500 mg, Oral, Once  aspirin, 324 mg, Oral, Once  atorvastatin, 40 mg, Oral, Nightly  budesonide-formoterol, 2 puff, Inhalation, BID - RT  clopidogrel, 75 mg, Oral, Daily  fluticasone, 2 spray, Nasal, Daily  furosemide, 40 mg, Oral, Daily  insulin lispro, 2-9 Units, Subcutaneous, 4x Daily AC & at Bedtime  ipratropium, 0.5 mg, Nebulization, 4x Daily - RT  isosorbide mononitrate, 30 mg, Oral, Q24H  levothyroxine, 25 mcg, Oral, Q AM  metoprolol tartrate, 50 mg, Oral, BID  pantoprazole, 40 mg, Oral, Q AM  predniSONE, 40 mg, Oral, Daily With Breakfast  senna-docusate sodium, 2 tablet, Oral, BID  sodium chloride, 10 mL, Intravenous, Q12H  sodium chloride, 10 mL, Intravenous, Q12H      Pharmacy Consult,       Site   Date Value Ref Range Status   12/05/2023 Right Radial  Final     William's Test   Date Value Ref Range Status   12/05/2023 Positive  Final     pH, Arterial   Date Value Ref Range Status   12/05/2023 7.413 7.350 - 7.450 pH units Final     pCO2, Arterial   Date Value Ref Range Status   12/05/2023 84.6 (C) 35.0 - 45.0 mm Hg Final     Comment:     86 Value above critical limit     pO2, Arterial   Date Value Ref Range Status   12/05/2023 75.0 (L) 83.0 - 108.0 mm Hg Final     Comment:     84 Value below reference range     HCO3,  Arterial   Date Value Ref Range Status   12/05/2023 54.0 (H) 20.0 - 26.0 mmol/L Final     Comment:     83 Value above reference range     Base Excess, Arterial   Date Value Ref Range Status   12/05/2023 23.8 (H) 0.0 - 2.0 mmol/L Final     O2 Saturation, Arterial   Date Value Ref Range Status   12/05/2023 94.3 94.0 - 99.0 % Final     Hemoglobin, Blood Gas   Date Value Ref Range Status   12/05/2023 13.9 13.5 - 17.5 g/dL Final     Hematocrit, Blood Gas   Date Value Ref Range Status   12/05/2023 42.6 38.0 - 51.0 % Final     Oxyhemoglobin   Date Value Ref Range Status   12/05/2023 93.2 (L) 94 - 99 % Final     Comment:     84 Value below reference range     Methemoglobin   Date Value Ref Range Status   12/05/2023 <-0.10 (L) 0.00 - 3.00 % Final     Comment:     94 Value below reportable range < _0.1     Carboxyhemoglobin   Date Value Ref Range Status   12/05/2023 1.5 0 - 5 % Final     CO2 Content   Date Value Ref Range Status   12/05/2023 56.6 (H) 22 - 33 mmol/L Final     Barometric Pressure for Blood Gas   Date Value Ref Range Status   12/05/2023 729 mmHg Final     Modality   Date Value Ref Range Status   12/05/2023 Nasal Cannula  Final     FIO2   Date Value Ref Range Status   12/05/2023 36 % Final     '  Medication Review:    Latest Reference Range & Units 12/04/23 00:51   proBNP 0.0 - 900.0 pg/mL 581.8     Assessment & Plan   COPD exacerbation-continue steroids continue nebs  Continue current inhalers.  Medication list reviewed.  Continue oxygen-to maintain saturation 88 to 92%.  Patient desaturated last night  Latest ABG reviewed  We will give 1 dose of Diamox today.  continue steroids continue nebs  Continue inhalers  Continue oxygen    Latest chest x-ray reviewed.  Patient likely has severe anxiety consider adding antianxiety medications on scheduled basis.  On my assessment patient was on BiPAP and was not able to answer my questions questions about anxiety clearly.    STEVEN-continue trilogy overnight.  We will adjust  the EPAP as patient likely has severe sleep apnea will adjust the minimum EPAP to 12 and maximum to 18.        Chronic hypercapnic hypoxic respiratory failure-continue current amount of oxygen titrated to maintain saturation 88 to 92%.     Continue trilogy with a setting of  AVAPS/AE. Tidal volume 6 mL/kg ideal body weight, PS max 28, PS minimum 20, EP AP max 18, EPAP minimum 12 , respiratory rate 18,       Results for orders placed during the hospital encounter of 10/12/23    Adult Transthoracic Echo Complete W/ Cont if Necessary Per Protocol    Interpretation Summary    Left ventricular systolic function is normal. Left ventricular ejection fraction appears to be 66 - 70%.    Left ventricular diastolic function is consistent with (grade I) impaired relaxation.    Estimated right ventricular systolic pressure from tricuspid regurgitation is normal (<35 mmHg).        FiO2 requirement reviewed and adjusted to maintain saturation 88 to 92%.  Continue appropriate prophylaxis      Positive stress test-cardiology planning for cardiac cath.      Acute respiratory failure with hypoxia    Chest pain               Elijah Hope MD  12/05/23  08:29 EST

## 2023-12-05 NOTE — NURSING NOTE
Talked to pt with RT Sridhar at bedside educating pt on importance of wearing home bpap for improvement per MD Thorpe

## 2023-12-06 LAB
A-A DO2: 88.9 MMHG (ref 0–300)
ARTERIAL PATENCY WRIST A: POSITIVE
ATMOSPHERIC PRESS: 730 MMHG
BASE EXCESS BLDA CALC-SCNC: 16.1 MMOL/L (ref 0–2)
BDY SITE: ABNORMAL
CO2 BLDA-SCNC: 48.2 MMOL/L (ref 22–33)
COHGB MFR BLD: 2.2 % (ref 0–5)
GAS FLOW AIRWAY: 4 LPM
GLUCOSE BLDC GLUCOMTR-MCNC: 123 MG/DL (ref 70–130)
GLUCOSE BLDC GLUCOMTR-MCNC: 215 MG/DL (ref 70–130)
GLUCOSE BLDC GLUCOMTR-MCNC: 253 MG/DL (ref 70–130)
GLUCOSE BLDC GLUCOMTR-MCNC: 283 MG/DL (ref 70–130)
HCO3 BLDA-SCNC: 45.8 MMOL/L (ref 20–26)
HCT VFR BLD CALC: 45.2 % (ref 38–51)
HGB BLDA-MCNC: 14.7 G/DL (ref 13.5–17.5)
INHALED O2 CONCENTRATION: 36 %
Lab: ABNORMAL
Lab: ABNORMAL
METHGB BLD QL: 0 % (ref 0–3)
MODALITY: ABNORMAL
NOTIFIED BY: ABNORMAL
NOTIFIED WHO: ABNORMAL
OXYHGB MFR BLDV: 92.2 % (ref 94–99)
PCO2 BLDA: 78.6 MM HG (ref 35–45)
PCO2 TEMP ADJ BLD: ABNORMAL MM[HG]
PH BLDA: 7.37 PH UNITS (ref 7.35–7.45)
PH, TEMP CORRECTED: ABNORMAL
PO2 BLDA: 70.3 MM HG (ref 83–108)
PO2 TEMP ADJ BLD: ABNORMAL MM[HG]
SAO2 % BLDCOA: 94.3 % (ref 94–99)
VENTILATOR MODE: ABNORMAL

## 2023-12-06 PROCEDURE — 36600 WITHDRAWAL OF ARTERIAL BLOOD: CPT

## 2023-12-06 PROCEDURE — 82948 REAGENT STRIP/BLOOD GLUCOSE: CPT

## 2023-12-06 PROCEDURE — 94660 CPAP INITIATION&MGMT: CPT

## 2023-12-06 PROCEDURE — 99231 SBSQ HOSP IP/OBS SF/LOW 25: CPT | Performed by: STUDENT IN AN ORGANIZED HEALTH CARE EDUCATION/TRAINING PROGRAM

## 2023-12-06 PROCEDURE — 63710000001 PREDNISONE PER 1 MG: Performed by: INTERNAL MEDICINE

## 2023-12-06 PROCEDURE — 83050 HGB METHEMOGLOBIN QUAN: CPT

## 2023-12-06 PROCEDURE — 94761 N-INVAS EAR/PLS OXIMETRY MLT: CPT

## 2023-12-06 PROCEDURE — 94664 DEMO&/EVAL PT USE INHALER: CPT

## 2023-12-06 PROCEDURE — 94799 UNLISTED PULMONARY SVC/PX: CPT

## 2023-12-06 PROCEDURE — 63710000001 INSULIN LISPRO (HUMAN) PER 5 UNITS: Performed by: INTERNAL MEDICINE

## 2023-12-06 PROCEDURE — 82375 ASSAY CARBOXYHB QUANT: CPT

## 2023-12-06 PROCEDURE — 99232 SBSQ HOSP IP/OBS MODERATE 35: CPT | Performed by: INTERNAL MEDICINE

## 2023-12-06 PROCEDURE — 25010000002 ACETAZOLAMIDE PER 500 MG: Performed by: INTERNAL MEDICINE

## 2023-12-06 PROCEDURE — 82805 BLOOD GASES W/O2 SATURATION: CPT

## 2023-12-06 RX ADMIN — IPRATROPIUM BROMIDE 0.5 MG: 0.5 SOLUTION RESPIRATORY (INHALATION) at 06:30

## 2023-12-06 RX ADMIN — Medication 10 ML: at 08:33

## 2023-12-06 RX ADMIN — LEVOTHYROXINE SODIUM 25 MCG: 25 TABLET ORAL at 05:43

## 2023-12-06 RX ADMIN — ALPRAZOLAM 0.25 MG: 0.25 TABLET ORAL at 08:31

## 2023-12-06 RX ADMIN — FUROSEMIDE 40 MG: 40 TABLET ORAL at 08:31

## 2023-12-06 RX ADMIN — BUDESONIDE AND FORMOTEROL FUMARATE DIHYDRATE 2 PUFF: 160; 4.5 AEROSOL RESPIRATORY (INHALATION) at 06:30

## 2023-12-06 RX ADMIN — IPRATROPIUM BROMIDE 0.5 MG: 0.5 SOLUTION RESPIRATORY (INHALATION) at 18:18

## 2023-12-06 RX ADMIN — ACETAZOLAMIDE 500 MG: 500 INJECTION, POWDER, LYOPHILIZED, FOR SOLUTION INTRAVENOUS at 15:28

## 2023-12-06 RX ADMIN — HYDROCODONE BITARTRATE AND ACETAMINOPHEN 1 TABLET: 10; 325 TABLET ORAL at 05:43

## 2023-12-06 RX ADMIN — Medication 10 ML: at 20:56

## 2023-12-06 RX ADMIN — ALPRAZOLAM 0.25 MG: 0.25 TABLET ORAL at 20:52

## 2023-12-06 RX ADMIN — ATORVASTATIN CALCIUM 40 MG: 40 TABLET, FILM COATED ORAL at 20:56

## 2023-12-06 RX ADMIN — INSULIN LISPRO 6 UNITS: 100 INJECTION, SOLUTION INTRAVENOUS; SUBCUTANEOUS at 12:57

## 2023-12-06 RX ADMIN — CLOPIDOGREL BISULFATE 75 MG: 75 TABLET, FILM COATED ORAL at 08:31

## 2023-12-06 RX ADMIN — PANTOPRAZOLE SODIUM 40 MG: 40 TABLET, DELAYED RELEASE ORAL at 05:43

## 2023-12-06 RX ADMIN — PREDNISONE 40 MG: 20 TABLET ORAL at 08:32

## 2023-12-06 RX ADMIN — IPRATROPIUM BROMIDE 0.5 MG: 0.5 SOLUTION RESPIRATORY (INHALATION) at 00:33

## 2023-12-06 RX ADMIN — HYDROCODONE BITARTRATE AND ACETAMINOPHEN 1 TABLET: 10; 325 TABLET ORAL at 20:55

## 2023-12-06 RX ADMIN — INSULIN LISPRO 6 UNITS: 100 INJECTION, SOLUTION INTRAVENOUS; SUBCUTANEOUS at 17:33

## 2023-12-06 RX ADMIN — INSULIN LISPRO 4 UNITS: 100 INJECTION, SOLUTION INTRAVENOUS; SUBCUTANEOUS at 20:56

## 2023-12-06 RX ADMIN — IPRATROPIUM BROMIDE 0.5 MG: 0.5 SOLUTION RESPIRATORY (INHALATION) at 13:25

## 2023-12-06 RX ADMIN — METOPROLOL TARTRATE 50 MG: 50 TABLET, FILM COATED ORAL at 08:31

## 2023-12-06 RX ADMIN — ISOSORBIDE MONONITRATE 30 MG: 30 TABLET, EXTENDED RELEASE ORAL at 08:31

## 2023-12-06 RX ADMIN — LOPERAMIDE HYDROCHLORIDE 2 MG: 2 CAPSULE ORAL at 13:32

## 2023-12-06 RX ADMIN — FLUTICASONE PROPIONATE 2 SPRAY: 50 SPRAY, METERED NASAL at 08:33

## 2023-12-06 RX ADMIN — HYDROCODONE BITARTRATE AND ACETAMINOPHEN 1 TABLET: 10; 325 TABLET ORAL at 14:41

## 2023-12-06 RX ADMIN — METOPROLOL TARTRATE 50 MG: 50 TABLET, FILM COATED ORAL at 20:56

## 2023-12-06 RX ADMIN — BUDESONIDE AND FORMOTEROL FUMARATE DIHYDRATE 2 PUFF: 160; 4.5 AEROSOL RESPIRATORY (INHALATION) at 18:18

## 2023-12-06 NOTE — CASE MANAGEMENT/SOCIAL WORK
Continued Stay Note  [facility]     Patient Name: Liz Jiang  MRN: [id number]  Today's Date: 12/6/2023    Admit Date: 11/18/2023    Plan: This CM spoke with RT Delaney whom is on today with request to please place pt on home trilogy so we can determine whether or not pt can tolerate it with the new settings or if pt requires further adjustments to home trilogy.  Delaney will be up to the floor shortly to assist with this.  Vanda Gifford RN

## 2023-12-06 NOTE — PLAN OF CARE
Goal Outcome Evaluation:         Patient resting in bed. Patient had several runs of PSVT, HR in the 120s, NP aware, see orders. Patient son wanted home trilogy machine applied tonight. RT stated that he was not comfortable applying the trilogy machine due to patient stat dropped to low 80s last night. Will continue with plan of care.

## 2023-12-06 NOTE — CASE MANAGEMENT/SOCIAL WORK
Discharge Planning Assessment  [facility]     Patient Name: Liz Jiang  MRN: [id number]  Today's Date: 12/6/2023    Admit Date: 11/18/2023    Plan: SS noted Palliative Care consult and progress notes.  SS noted Case Management continues to assist with home trilogy settings.  Pt plans to return home at discharge when medically stable.  Pt has utilized [facility] in past and would benefit from home health at discharge.  SS will follow and assist with discharge needs.       Discharge Plan       Row Name 12/06/23 1999       Plan    Plan SS noted Palliative Care consult and progress notes.  SS noted Case Management continues to assist with home trilogy settings.  Pt plans to return home at discharge when medically stable.  Pt has utilized [facility] in past and would benefit from home health at discharge.  SS will follow and assist with discharge needs.                         Lluvia Cagle, BSW

## 2023-12-06 NOTE — PROGRESS NOTES
Pulmonary and critical care following the patient for COPD exacerbation and hypoxic respiratory failure  Chief Complaint:  sob    Subjective -   Resting in bed comfortably.  Was anxious after wearing trilogy.  Discussed with primary team and started her on antianxiety medications yesterday.  Were hospitals BiPAP overnight.  No desaturations noticed.  Ins and outs reviewed.  Latest ABG and chest x-ray reviewed.  Received Diamox yesterday.  Bicarb levels stable.    Review of Systems:    Positive for shortness of breath otherwise negative        Vital Signs  Temp:  [98 °F (36.7 °C)-98.7 °F (37.1 °C)] 98.7 °F (37.1 °C)  Heart Rate:  [] 86  Resp:  [18-20] 20  BP: ()/(66-83) 95/66  Body mass index is 25.32 kg/m².    Intake/Output Summary (Last 24 hours) at 12/6/2023 1139  Last data filed at 12/6/2023 0900  Gross per 24 hour   Intake 720 ml   Output 350 ml   Net 370 ml     I/O this shift:  In: 120 [P.O.:120]  Out: -     Physical Exam:  General-chronically ill in appearance, not in any acute distress    HEENT-PERRLA    Neck-supple    Respiratory-respirations normal-on auscultation no wheezing no crackles, generalized decreased breath sounds.  Wearing triology  Cardiovascular-NSR    GI-nontender nondistended bowel sounds positive    CNS-nonfocal    Musculoskeletal -no edema  Extremities- no obvious deformity noticed     Psychiatric- alert awake oriented  Skin- no visible rash         Results Review:     I reviewed the patient's new clinical results.  Results from last 7 days   Lab Units 12/05/23 0052 12/04/23 0051 12/03/23  0852   WBC 10*3/mm3 14.48* 15.76* 13.98*   HEMOGLOBIN g/dL 13.1 12.5 12.9   PLATELETS 10*3/mm3 77* 73* 53*     Results from last 7 days   Lab Units 12/05/23 0052 12/04/23 0051 12/03/23 2038 12/03/23  0852   SODIUM mmol/L 143 140  --  142   POTASSIUM mmol/L 4.2 4.0 4.2 4.5   CHLORIDE mmol/L 89* 88*  --  91*   CO2 mmol/L 46.2* 45.9*  --  46.4*   BUN mg/dL 23* 24*  --  22*   CREATININE  mg/dL 0.44* 0.37*  --  0.37*   CALCIUM mg/dL 9.2 9.6  --  9.3   GLUCOSE mg/dL 126* 170*  --  164*   MAGNESIUM mg/dL 2.1  --  2.1  --      Lab Results   Component Value Date    INR 0.94 11/18/2023    INR 0.95 10/12/2023    INR 0.90 03/27/2017    PROTIME 13.1 11/18/2023    PROTIME 13.1 10/12/2023    PROTIME 9.9 03/27/2017     Results from last 7 days   Lab Units 12/04/23  0051 12/03/23  0852 12/02/23  0211   ALK PHOS U/L 48 50 49   BILIRUBIN mg/dL 0.8 0.7 0.5   BILIRUBIN DIRECT mg/dL  --   --  <0.2   ALT (SGPT) U/L 19 22 22   AST (SGOT) U/L 12 14 23     Results from last 7 days   Lab Units 12/06/23  0409   PH, ARTERIAL pH units 7.374   PO2 ART mm Hg 70.3*   PCO2, ARTERIAL mm Hg 78.6*   HCO3 ART mmol/L 45.8*     Imaging Results (Last 24 Hours)       ** No results found for the last 24 hours. **                 ALPRAZolam, 0.25 mg, Oral, BID  aspirin, 324 mg, Oral, Once  atorvastatin, 40 mg, Oral, Nightly  budesonide-formoterol, 2 puff, Inhalation, BID - RT  clopidogrel, 75 mg, Oral, Daily  fluticasone, 2 spray, Nasal, Daily  furosemide, 40 mg, Oral, Daily  insulin lispro, 2-9 Units, Subcutaneous, 4x Daily AC & at Bedtime  ipratropium, 0.5 mg, Nebulization, 4x Daily - RT  isosorbide mononitrate, 30 mg, Oral, Q24H  levothyroxine, 25 mcg, Oral, Q AM  metoprolol tartrate, 50 mg, Oral, BID  pantoprazole, 40 mg, Oral, Q AM  predniSONE, 40 mg, Oral, Daily With Breakfast  senna-docusate sodium, 2 tablet, Oral, BID  sodium chloride, 10 mL, Intravenous, Q12H  sodium chloride, 10 mL, Intravenous, Q12H      Pharmacy Consult,       Site   Date Value Ref Range Status   12/06/2023 Right Radial  Final     William's Test   Date Value Ref Range Status   12/06/2023 Positive  Final     pH, Arterial   Date Value Ref Range Status   12/06/2023 7.374 7.350 - 7.450 pH units Final     pCO2, Arterial   Date Value Ref Range Status   12/06/2023 78.6 (C) 35.0 - 45.0 mm Hg Final     Comment:     86 Value above critical limit     pO2, Arterial   Date  Value Ref Range Status   12/06/2023 70.3 (L) 83.0 - 108.0 mm Hg Final     Comment:     84 Value below reference range     HCO3, Arterial   Date Value Ref Range Status   12/06/2023 45.8 (H) 20.0 - 26.0 mmol/L Final     Comment:     83 Value above reference range     Base Excess, Arterial   Date Value Ref Range Status   12/06/2023 16.1 (H) 0.0 - 2.0 mmol/L Final     O2 Saturation, Arterial   Date Value Ref Range Status   12/06/2023 94.3 94.0 - 99.0 % Final     Hemoglobin, Blood Gas   Date Value Ref Range Status   12/06/2023 14.7 13.5 - 17.5 g/dL Final     Hematocrit, Blood Gas   Date Value Ref Range Status   12/06/2023 45.2 38.0 - 51.0 % Final     Oxyhemoglobin   Date Value Ref Range Status   12/06/2023 92.2 (L) 94 - 99 % Final     Comment:     84 Value below reference range     Methemoglobin   Date Value Ref Range Status   12/06/2023 0.00 0.00 - 3.00 % Final     Carboxyhemoglobin   Date Value Ref Range Status   12/06/2023 2.2 0 - 5 % Final     CO2 Content   Date Value Ref Range Status   12/06/2023 48.2 (H) 22 - 33 mmol/L Final     Barometric Pressure for Blood Gas   Date Value Ref Range Status   12/06/2023 730 mmHg Final     Modality   Date Value Ref Range Status   12/06/2023 Nasal Cannula  Final     FIO2   Date Value Ref Range Status   12/06/2023 36 % Final     '  Medication Review:    Latest Reference Range & Units 12/04/23 00:51   proBNP 0.0 - 900.0 pg/mL 581.8     Assessment & Plan   COPD exacerbation-continue steroids continue nebs  Continue current inhalers.  Medication list reviewed.  Continue oxygen-to maintain saturation 88 to 92%.  No desaturation noticed last night.  Latest ABG reviewed  We will repeat the dose of Diamox.  continue steroids continue nebs  Continue inhalers  Continue oxygen  Encouraged her to wear trilogy machine tonight.  Latest chest x-ray reviewed.  If tolerates the antianxiety medications will increase the dose.    STEVEN-continue trilogy overnight.  We will adjust the EPAP as patient  likely has severe sleep apnea will adjust the minimum EPAP to 12 and maximum to 18.        Chronic hypercapnic hypoxic respiratory failure-continue current amount of oxygen titrated to maintain saturation 88 to 92%.     Continue trilogy with a setting of  AVAPS/AE. Tidal volume 6 mL/kg ideal body weight, PS max 28, PS minimum 20, EP AP max 18, EPAP minimum 12 , respiratory rate 18,       Results for orders placed during the hospital encounter of 10/12/23    Adult Transthoracic Echo Complete W/ Cont if Necessary Per Protocol    Interpretation Summary    Left ventricular systolic function is normal. Left ventricular ejection fraction appears to be 66 - 70%.    Left ventricular diastolic function is consistent with (grade I) impaired relaxation.    Estimated right ventricular systolic pressure from tricuspid regurgitation is normal (<35 mmHg).        FiO2 requirement reviewed and adjusted to maintain saturation 88 to 92%.  Continue appropriate prophylaxis            Acute respiratory failure with hypoxia    Chest pain               Elijah Hope MD  12/06/23  11:39 EST

## 2023-12-06 NOTE — CONSULTS
"Palliative Care Initial Consult     Attending Physician: Faviola Mejia DO  Referring Provider: Faviola Mejia     assistance with advance directives, assistance with clarification of goals of care, and psychosocial support  Code Status:   Code Status and Medical Interventions:   Ordered at: 11/19/23 0758     Code Status (Patient has no pulse and is not breathing):    CPR (Attempt to Resuscitate)     Medical Interventions (Patient has pulse or is breathing):    Full Support      Advanced Directives: Advance Directive Status: Patient does not have advance directive   Healthcare surrogate: MARVIN Peterson and multiple other children.  Goals of Care: Liz is a full code full treat and when attempt at goals of care discussion was made, Liz shut down and only said, \" I want to go home.\"    HPI:  Liz Jiang is a 57 y.o. female admitted on 11/18/2023 due to shortness of breath. 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  Palliative was consulted to discuss GOC/ACP and support for pt and family.        ROS: Negative except as above in HPI.     Past Medical History:   Diagnosis Date    Acid reflux disease     Anal cancer 12/05/2019    Arthritis     Asthma, extrinsic     Cholelithiasis     Chronic headaches     COPD (chronic obstructive pulmonary disease)     CVA (cerebral vascular accident)     w/ right sided deficit    CVA (cerebral vascular accident)     Diabetes mellitus     only has high blood sugar when on " steriods    Dyslipidemia 2018    History of radiation therapy 2020    Whole pelvis/anal mass    Nausea and vomiting 2016    Obstructive sleep apnea treated with BiPAP     On home oxygen therapy     2L     Sleep apnea, obstructive      Past Surgical History:   Procedure Laterality Date    ABDOMINAL SURGERY      CARDIAC CATHETERIZATION      CARDIAC CATHETERIZATION N/A 2023    Procedure: Coronary angiography;  Surgeon: Case Irizarry MD;  Location: [facility] CATH INVASIVE LOCATION;  Service: Cardiology;  Laterality: N/A;    CAROTID ENDARTERECTOMY Left 2018    CHOLECYSTECTOMY WITH INTRAOPERATIVE CHOLANGIOGRAM N/A 2017    Procedure: CHOLECYSTECTOMY LAPAROSCOPIC INTRAOPERATIVE CHOLANGIOGRAM;  Surgeon: Carolin Marie MD;  Location: [facility] OR;  Service:     COLONOSCOPY      HYSTERECTOMY      for heavy bleeding/ complete    KIDNEY STONE SURGERY      TUBAL ABDOMINAL LIGATION      VENOUS ACCESS DEVICE (PORT) INSERTION Left 2019    Procedure: INSERTION VENOUS ACCESS DEVICE;  Surgeon: Carolin Marie MD;  Location: [facility];  Service: General     Social History     Socioeconomic History    Marital status:    Tobacco Use    Smoking status: Former     Packs/day: 1.50     Years: 30.00     Additional pack years: 0.00     Total pack years: 45.00     Types: Electronic Cigarette, Cigarettes     Quit date:      Years since quittin.9    Smokeless tobacco: Never   Vaping Use    Vaping Use: Never used   Substance and Sexual Activity    Alcohol use: No    Drug use: Defer    Sexual activity: Defer     Family History   Problem Relation Age of Onset    Diabetes Mother     Hypertension Mother     Arrhythmia Mother     Pneumonia Father     Coronary artery disease Other     Arrhythmia Sister     Heart attack Brother     Lymphoma Brother         hodgkin's     Other Brother         CABG    Breast cancer Neg Hx        Allergies   Allergen Reactions    Morphine Other (See Comments)      Cause low O2    Roflumilast Diarrhea     Significant diarrhea.        Current Facility-Administered Medications   Medication Dose Route Frequency Provider Last Rate Last Admin    acetaminophen (TYLENOL) tablet 650 mg  650 mg Oral Q6H PRN Case Irizarry MD        acetaZOLAMIDE (DIAMOX) 500 mg in sodium chloride 0.9 % 50 mL IVPB  500 mg Intravenous Once Elijah Hope MD        ALPRAZolam (XANAX) tablet 0.25 mg  0.25 mg Oral BID Faviola Mejia DO   0.25 mg at 12/06/23 0831    aspirin chewable tablet 324 mg  324 mg Oral Once Case Irizarry MD        atorvastatin (LIPITOR) tablet 40 mg  40 mg Oral Nightly Chris Shi MD   40 mg at 12/05/23 2108    sennosides-docusate (PERICOLACE) 8.6-50 MG per tablet 2 tablet  2 tablet Oral BID Jagjitramemmy, Case Ellison MD   2 tablet at 12/03/23 2007    And    polyethylene glycol (MIRALAX) packet 17 g  17 g Oral Daily PRN Edie, Case Ellison MD        And    bisacodyl (DULCOLAX) EC tablet 5 mg  5 mg Oral Daily PRN Subramaristeoyam, Case Ellison MD        And    bisacodyl (DULCOLAX) suppository 10 mg  10 mg Rectal Daily PRN Edie, Case Ellison MD        budesonide-formoterol (SYMBICORT) 160-4.5 MCG/ACT inhaler 2 puff  2 puff Inhalation BID - RT Chris Shi MD   2 puff at 12/06/23 0630    clopidogrel (PLAVIX) tablet 75 mg  75 mg Oral Daily Christineaniyamohan, Case Ellison MD   75 mg at 12/06/23 0831    dextrose (D50W) (25 g/50 mL) IV injection 25 g  25 g Intravenous Q15 Min PRN Case Irizarry MD        dextrose (GLUTOSE) oral gel 15 g  15 g Oral Q15 Min PRN Case Irizarry MD        fluticasone (FLONASE) 50 MCG/ACT nasal spray 2 spray  2 spray Nasal Daily Case Irizarry MD   2 spray at 12/06/23 0833    furosemide (LASIX) tablet 40 mg  40 mg Oral Daily Chris Shi MD   40 mg at 12/06/23 0831    glucagon HCl (Diagnostic) injection 1 mg  1 mg Intramuscular Q15 Min PRN Subramaniyam, Case  MD Scot        HYDROcodone-acetaminophen (NORCO)  MG per tablet 1 tablet  1 tablet Oral Q6H PRN Subramaniyam, Case Ellison MD   1 tablet at 12/06/23 0543    Insulin Lispro (humaLOG) injection 2-9 Units  2-9 Units Subcutaneous 4x Daily AC & at Bedtime Subramaniyam, Case Ellison MD   6 Units at 12/06/23 1257    ipratropium (ATROVENT) nebulizer solution 0.5 mg  0.5 mg Nebulization 4x Daily - RT Subramaniyam, Case Ellison MD   0.5 mg at 12/06/23 1325    isosorbide mononitrate (IMDUR) 24 hr tablet 30 mg  30 mg Oral Q24H Subramaniyam, Case Ellison MD   30 mg at 12/06/23 0831    levothyroxine (SYNTHROID, LEVOTHROID) tablet 25 mcg  25 mcg Oral Q AM Subramaniyam, Case Ellison MD   25 mcg at 12/06/23 0543    Lidocaine Viscous HCl (XYLOCAINE) 2 % solution 10 mL  10 mL Mouth/Throat Q6H PRN Subramaniyam, Case Ellison MD   10 mL at 11/29/23 0516    loperamide (IMODIUM) capsule 2 mg  2 mg Oral 4x Daily PRN Subramaniyam, Case Ellison MD   2 mg at 12/06/23 1332    magic barrier cream 1 application   1 application  Topical BID PRN Lionel Chakraborty APRN   1 application  at 12/04/23 0837    metoprolol tartrate (LOPRESSOR) tablet 50 mg  50 mg Oral BID Subramaniyam, Case Ellison MD   50 mg at 12/06/23 0831    nitroglycerin (NITROSTAT) SL tablet 0.4 mg  0.4 mg Sublingual Q5 Min PRN Lionel Chakraborty APRN        ondansetron (ZOFRAN) injection 4 mg  4 mg Intravenous Q6H PRN Lionel Chakraborty APRN   4 mg at 12/01/23 2311    pantoprazole (PROTONIX) EC tablet 40 mg  40 mg Oral Q AM Cliff Castaneda MD   40 mg at 12/06/23 0543    Pharmacy Consult   Does not apply Continuous PRN Subramaniyam, Case Ellison MD        predniSONE (DELTASONE) tablet 40 mg  40 mg Oral Daily With Breakfast Chris Shi MD   40 mg at 12/06/23 0832    sodium chloride 0.9 % flush 10 mL  10 mL Intravenous PRN Subramaniyam, Case Ellison MD        sodium chloride 0.9 % flush 10 mL  10 mL Intravenous Q12H Subramanitraciem, Case Ellison MD   10 mL at 12/06/23 0833  "   sodium chloride 0.9 % flush 10 mL  10 mL Intravenous PRN Subramaniyam, Case Ellison MD        sodium chloride 0.9 % flush 10 mL  10 mL Intravenous Q12H Subramaniyam, Case Ellison MD   10 mL at 12/06/23 0833    sodium chloride 0.9 % flush 10 mL  10 mL Intravenous PRN Subramaniyam, Case Ellison MD        sodium chloride 0.9 % infusion 40 mL  40 mL Intravenous PRN Subramaniyam, Case Ellison MD        sodium chloride 0.9 % infusion 40 mL  40 mL Intravenous PRN Subramaniyam, Case Ellison MD         Pharmacy Consult,         acetaminophen    senna-docusate sodium **AND** polyethylene glycol **AND** bisacodyl **AND** bisacodyl    dextrose    dextrose    glucagon (human recombinant)    HYDROcodone-acetaminophen    Lidocaine Viscous HCl    loperamide    magic barrier cream    nitroglycerin    ondansetron    Pharmacy Consult    [COMPLETED] Insert Peripheral IV **AND** sodium chloride    sodium chloride    sodium chloride    sodium chloride    sodium chloride    Current medication reviewed for route, type, dose and frequency and are current per MAR.    Palliative Performance Scale Score:     BP 95/66 (BP Location: Left arm, Patient Position: Lying)   Pulse 108   Temp 98.7 °F (37.1 °C) (Axillary)   Resp 18   Ht 162.6 cm (64\")   Wt 66.9 kg (147 lb 8 oz)   SpO2 98%   BMI 25.32 kg/m²     Intake/Output Summary (Last 24 hours) at 12/6/2023 1410  Last data filed at 12/6/2023 0900  Gross per 24 hour   Intake 240 ml   Output 350 ml   Net -110 ml       PE:  General Appearance:    Chronically ill appearing, alert, flat, depressed   HEENT:    NC/AT, without obvious abnormality, EOMI, anicteric    Neck:   supple, trachea midline, no JVD   Lungs:     Mildly labored respirations, diminished bases, had CPAP on at this time    Heart:    RRR, normal S1 and S2, no M/R/G   Abdomen:     Soft, NT, ND, NABS    Extremities:   Moves all extremities, no edema   Pulses:   Pulses palpable and equal bilaterally   Skin:   Warm, dry " "  Neurologic:   Alert to person and hospital only   Psych:   Abrupt and flat       Labs:   Results from last 7 days   Lab Units 12/05/23  0052   WBC 10*3/mm3 14.48*   HEMOGLOBIN g/dL 13.1   HEMATOCRIT % 43.7   PLATELETS 10*3/mm3 77*     Results from last 7 days   Lab Units 12/05/23  0052   SODIUM mmol/L 143   POTASSIUM mmol/L 4.2   CHLORIDE mmol/L 89*   CO2 mmol/L 46.2*   BUN mg/dL 23*   CREATININE mg/dL 0.44*   GLUCOSE mg/dL 126*   CALCIUM mg/dL 9.2     Results from last 7 days   Lab Units 12/05/23  0052 12/04/23  0051   SODIUM mmol/L 143 140   POTASSIUM mmol/L 4.2 4.0   CHLORIDE mmol/L 89* 88*   CO2 mmol/L 46.2* 45.9*   BUN mg/dL 23* 24*   CREATININE mg/dL 0.44* 0.37*   CALCIUM mg/dL 9.2 9.6   BILIRUBIN mg/dL  --  0.8   ALK PHOS U/L  --  48   ALT (SGPT) U/L  --  19   AST (SGOT) U/L  --  12   GLUCOSE mg/dL 126* 170*     Imaging Results (Last 72 Hours)       ** No results found for the last 72 hours. **            Diagnostics: Reviewed    A: Liz Jiang is a 57 y.o. female admitted on 11/18/2023 due to shortness of breath. 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  Palliative was consulted to discuss GOC/ACP and support for pt and family.         P: Palliative care was consulted to discuss goals of care and support for pt and family.   Liz is a full code full treat and when attempt at goals of care discussion was made, Liz shut down and only said, \" I want to go home.\" Pt son Pradeep and sister Bessy had some concerns " regarding communication between and with providers and nurses. I told them that I could not speak to what happened, however I could communicate with Dr Mejia as well as  and they have also asked for the patient advocate. I was able to get in touch with pt advocate and she stated she would get in touch with pt family as well as manager and Doctor Roberto spoke to pts family.      We appreciate the consult and the opportunity to participate in Liz Jiang's care. We will continue to follow along. Please do not hesitate to contact us regarding further symptom management or goals of care needs, including after hours or on weekends via our on call provider at [phone].     Time: 75 minutes spent reviewing medical and medication records, assessing and examining patient, discussing with family, answering questions, providing some guidance about a plan and documentation of care, and coordinating care with other healthcare members, with > 50% time spent face to face.     Melissa Thibodeaux, APRN    12/6/2023

## 2023-12-06 NOTE — PLAN OF CARE
Goal Outcome Evaluation: Patient has been pleasant. Compliant with care. Patient remains on 4L/NC and BIPAP, saturations remaining above 90%. Patient family at bedside, updated on plan of care. Patient resting in bed. Will continue with plan of care.

## 2023-12-07 LAB
A-A DO2: 159.2 MMHG (ref 0–300)
A-A DO2: 37.6 MMHG (ref 0–300)
ANION GAP SERPL CALCULATED.3IONS-SCNC: 5.3 MMOL/L (ref 5–15)
ARTERIAL PATENCY WRIST A: ABNORMAL
ARTERIAL PATENCY WRIST A: POSITIVE
ATMOSPHERIC PRESS: 732 MMHG
ATMOSPHERIC PRESS: 732 MMHG
BASE EXCESS BLDA CALC-SCNC: 12.4 MMOL/L (ref 0–2)
BASE EXCESS BLDA CALC-SCNC: 15.1 MMOL/L (ref 0–2)
BASOPHILS # BLD AUTO: 0.03 10*3/MM3 (ref 0–0.2)
BASOPHILS NFR BLD AUTO: 0.3 % (ref 0–1.5)
BDY SITE: ABNORMAL
BDY SITE: ABNORMAL
BUN SERPL-MCNC: 23 MG/DL (ref 6–20)
BUN/CREAT SERPL: 34.3 (ref 7–25)
CALCIUM SPEC-SCNC: 9.4 MG/DL (ref 8.6–10.5)
CHLORIDE SERPL-SCNC: 91 MMOL/L (ref 98–107)
CO2 BLDA-SCNC: 45.5 MMOL/L (ref 22–33)
CO2 BLDA-SCNC: 46 MMOL/L (ref 22–33)
CO2 SERPL-SCNC: 44.7 MMOL/L (ref 22–29)
COHGB MFR BLD: 1.9 % (ref 0–5)
COHGB MFR BLD: 2.1 % (ref 0–5)
CREAT SERPL-MCNC: 0.67 MG/DL (ref 0.57–1)
DEPRECATED RDW RBC AUTO: 53 FL (ref 37–54)
EGFRCR SERPLBLD CKD-EPI 2021: 101.5 ML/MIN/1.73
EOSINOPHIL # BLD AUTO: 0.04 10*3/MM3 (ref 0–0.4)
EOSINOPHIL NFR BLD AUTO: 0.4 % (ref 0.3–6.2)
ERYTHROCYTE [DISTWIDTH] IN BLOOD BY AUTOMATED COUNT: 14.6 % (ref 12.3–15.4)
GAS FLOW AIRWAY: 4 LPM
GLUCOSE BLDC GLUCOMTR-MCNC: 137 MG/DL (ref 70–130)
GLUCOSE BLDC GLUCOMTR-MCNC: 177 MG/DL (ref 70–130)
GLUCOSE BLDC GLUCOMTR-MCNC: 184 MG/DL (ref 70–130)
GLUCOSE BLDC GLUCOMTR-MCNC: 187 MG/DL (ref 70–130)
GLUCOSE SERPL-MCNC: 138 MG/DL (ref 65–99)
HCO3 BLDA-SCNC: 43.2 MMOL/L (ref 20–26)
HCO3 BLDA-SCNC: 43.4 MMOL/L (ref 20–26)
HCT VFR BLD AUTO: 46.4 % (ref 34–46.6)
HCT VFR BLD CALC: 41.5 % (ref 38–51)
HCT VFR BLD CALC: 43.1 % (ref 38–51)
HGB BLD-MCNC: 13.5 G/DL (ref 12–15.9)
HGB BLDA-MCNC: 13.6 G/DL (ref 13.5–17.5)
HGB BLDA-MCNC: 14.1 G/DL (ref 13.5–17.5)
IMM GRANULOCYTES # BLD AUTO: 0.19 10*3/MM3 (ref 0–0.05)
IMM GRANULOCYTES NFR BLD AUTO: 1.7 % (ref 0–0.5)
INHALED O2 CONCENTRATION: 36 %
INHALED O2 CONCENTRATION: 44 %
LYMPHOCYTES # BLD AUTO: 1.28 10*3/MM3 (ref 0.7–3.1)
LYMPHOCYTES NFR BLD AUTO: 11.2 % (ref 19.6–45.3)
Lab: ABNORMAL
MAGNESIUM SERPL-MCNC: 2.2 MG/DL (ref 1.6–2.6)
MCH RBC QN AUTO: 29 PG (ref 26.6–33)
MCHC RBC AUTO-ENTMCNC: 29.1 G/DL (ref 31.5–35.7)
MCV RBC AUTO: 99.6 FL (ref 79–97)
METHGB BLD QL: 0 % (ref 0–3)
METHGB BLD QL: <-0.1 % (ref 0–3)
MODALITY: ABNORMAL
MODALITY: ABNORMAL
MONOCYTES # BLD AUTO: 1.03 10*3/MM3 (ref 0.1–0.9)
MONOCYTES NFR BLD AUTO: 9 % (ref 5–12)
NEUTROPHILS NFR BLD AUTO: 77.4 % (ref 42.7–76)
NEUTROPHILS NFR BLD AUTO: 8.83 10*3/MM3 (ref 1.7–7)
NOTE: ABNORMAL
NOTIFIED BY: ABNORMAL
NOTIFIED BY: ABNORMAL
NOTIFIED WHO: ABNORMAL
NOTIFIED WHO: ABNORMAL
NRBC BLD AUTO-RTO: 0 /100 WBC (ref 0–0.2)
OXYHGB MFR BLDV: 92.3 % (ref 94–99)
OXYHGB MFR BLDV: 96.8 % (ref 94–99)
PCO2 BLDA: 69.3 MM HG (ref 35–45)
PCO2 BLDA: 90.4 MM HG (ref 35–45)
PCO2 TEMP ADJ BLD: ABNORMAL MM[HG]
PCO2 TEMP ADJ BLD: ABNORMAL MM[HG]
PH BLDA: 7.29 PH UNITS (ref 7.35–7.45)
PH BLDA: 7.41 PH UNITS (ref 7.35–7.45)
PH, TEMP CORRECTED: ABNORMAL
PH, TEMP CORRECTED: ABNORMAL
PLATELET # BLD AUTO: 146 10*3/MM3 (ref 140–450)
PMV BLD AUTO: 12 FL (ref 6–12)
PO2 BLDA: 109 MM HG (ref 83–108)
PO2 BLDA: 66.5 MM HG (ref 83–108)
PO2 TEMP ADJ BLD: ABNORMAL MM[HG]
PO2 TEMP ADJ BLD: ABNORMAL MM[HG]
POTASSIUM SERPL-SCNC: 2.8 MMOL/L (ref 3.5–5.2)
RBC # BLD AUTO: 4.66 10*6/MM3 (ref 3.77–5.28)
SAO2 % BLDCOA: 94.2 % (ref 94–99)
SAO2 % BLDCOA: 98.5 % (ref 94–99)
SODIUM SERPL-SCNC: 141 MMOL/L (ref 136–145)
VENTILATOR MODE: ABNORMAL
VENTILATOR MODE: ABNORMAL
WBC NRBC COR # BLD AUTO: 11.4 10*3/MM3 (ref 3.4–10.8)

## 2023-12-07 PROCEDURE — 99231 SBSQ HOSP IP/OBS SF/LOW 25: CPT | Performed by: STUDENT IN AN ORGANIZED HEALTH CARE EDUCATION/TRAINING PROGRAM

## 2023-12-07 PROCEDURE — 83050 HGB METHEMOGLOBIN QUAN: CPT

## 2023-12-07 PROCEDURE — 94761 N-INVAS EAR/PLS OXIMETRY MLT: CPT

## 2023-12-07 PROCEDURE — 63710000001 INSULIN LISPRO (HUMAN) PER 5 UNITS: Performed by: INTERNAL MEDICINE

## 2023-12-07 PROCEDURE — 94799 UNLISTED PULMONARY SVC/PX: CPT

## 2023-12-07 PROCEDURE — 25010000002 ONDANSETRON PER 1 MG

## 2023-12-07 PROCEDURE — 99232 SBSQ HOSP IP/OBS MODERATE 35: CPT | Performed by: INTERNAL MEDICINE

## 2023-12-07 PROCEDURE — 82805 BLOOD GASES W/O2 SATURATION: CPT

## 2023-12-07 PROCEDURE — 63710000001 PREDNISONE PER 1 MG: Performed by: INTERNAL MEDICINE

## 2023-12-07 PROCEDURE — 94664 DEMO&/EVAL PT USE INHALER: CPT

## 2023-12-07 PROCEDURE — 94660 CPAP INITIATION&MGMT: CPT

## 2023-12-07 PROCEDURE — 80048 BASIC METABOLIC PNL TOTAL CA: CPT | Performed by: INTERNAL MEDICINE

## 2023-12-07 PROCEDURE — 85025 COMPLETE CBC W/AUTO DIFF WBC: CPT | Performed by: STUDENT IN AN ORGANIZED HEALTH CARE EDUCATION/TRAINING PROGRAM

## 2023-12-07 PROCEDURE — 36600 WITHDRAWAL OF ARTERIAL BLOOD: CPT

## 2023-12-07 PROCEDURE — 83735 ASSAY OF MAGNESIUM: CPT | Performed by: INTERNAL MEDICINE

## 2023-12-07 PROCEDURE — 82375 ASSAY CARBOXYHB QUANT: CPT

## 2023-12-07 PROCEDURE — 82948 REAGENT STRIP/BLOOD GLUCOSE: CPT

## 2023-12-07 RX ORDER — POTASSIUM CHLORIDE 1.5 G/1.58G
40 POWDER, FOR SOLUTION ORAL 2 TIMES DAILY
Status: DISCONTINUED | OUTPATIENT
Start: 2023-12-07 | End: 2023-12-08

## 2023-12-07 RX ORDER — ALPRAZOLAM 0.25 MG/1
0.25 TABLET ORAL EVERY 8 HOURS
Status: DISCONTINUED | OUTPATIENT
Start: 2023-12-07 | End: 2023-12-08

## 2023-12-07 RX ADMIN — POTASSIUM CHLORIDE 40 MEQ: 1.5 POWDER, FOR SOLUTION ORAL at 14:54

## 2023-12-07 RX ADMIN — ALPRAZOLAM 0.25 MG: 0.25 TABLET ORAL at 17:18

## 2023-12-07 RX ADMIN — Medication 10 ML: at 20:58

## 2023-12-07 RX ADMIN — INSULIN LISPRO 2 UNITS: 100 INJECTION, SOLUTION INTRAVENOUS; SUBCUTANEOUS at 17:18

## 2023-12-07 RX ADMIN — ISOSORBIDE MONONITRATE 30 MG: 30 TABLET, EXTENDED RELEASE ORAL at 08:58

## 2023-12-07 RX ADMIN — ONDANSETRON 4 MG: 2 INJECTION INTRAMUSCULAR; INTRAVENOUS at 17:35

## 2023-12-07 RX ADMIN — FUROSEMIDE 40 MG: 40 TABLET ORAL at 08:58

## 2023-12-07 RX ADMIN — METOPROLOL TARTRATE 50 MG: 50 TABLET, FILM COATED ORAL at 20:53

## 2023-12-07 RX ADMIN — VITAMINS A AND D OINTMENT 1 APPLICATION: 15.5; 53.4 OINTMENT TOPICAL at 09:03

## 2023-12-07 RX ADMIN — Medication 10 ML: at 09:04

## 2023-12-07 RX ADMIN — HYDROCODONE BITARTRATE AND ACETAMINOPHEN 1 TABLET: 10; 325 TABLET ORAL at 13:29

## 2023-12-07 RX ADMIN — IPRATROPIUM BROMIDE 0.5 MG: 0.5 SOLUTION RESPIRATORY (INHALATION) at 13:31

## 2023-12-07 RX ADMIN — IPRATROPIUM BROMIDE 0.5 MG: 0.5 SOLUTION RESPIRATORY (INHALATION) at 00:39

## 2023-12-07 RX ADMIN — INSULIN LISPRO 2 UNITS: 100 INJECTION, SOLUTION INTRAVENOUS; SUBCUTANEOUS at 11:20

## 2023-12-07 RX ADMIN — HYDROCODONE BITARTRATE AND ACETAMINOPHEN 1 TABLET: 10; 325 TABLET ORAL at 04:58

## 2023-12-07 RX ADMIN — ALPRAZOLAM 0.25 MG: 0.25 TABLET ORAL at 08:58

## 2023-12-07 RX ADMIN — LEVOTHYROXINE SODIUM 25 MCG: 25 TABLET ORAL at 06:29

## 2023-12-07 RX ADMIN — ATORVASTATIN CALCIUM 40 MG: 40 TABLET, FILM COATED ORAL at 20:56

## 2023-12-07 RX ADMIN — METOPROLOL TARTRATE 50 MG: 50 TABLET, FILM COATED ORAL at 08:58

## 2023-12-07 RX ADMIN — INSULIN LISPRO 2 UNITS: 100 INJECTION, SOLUTION INTRAVENOUS; SUBCUTANEOUS at 20:53

## 2023-12-07 RX ADMIN — IPRATROPIUM BROMIDE 0.5 MG: 0.5 SOLUTION RESPIRATORY (INHALATION) at 06:58

## 2023-12-07 RX ADMIN — CLOPIDOGREL BISULFATE 75 MG: 75 TABLET, FILM COATED ORAL at 08:58

## 2023-12-07 RX ADMIN — PANTOPRAZOLE SODIUM 40 MG: 40 TABLET, DELAYED RELEASE ORAL at 06:29

## 2023-12-07 RX ADMIN — PREDNISONE 40 MG: 20 TABLET ORAL at 08:58

## 2023-12-07 RX ADMIN — IPRATROPIUM BROMIDE 0.5 MG: 0.5 SOLUTION RESPIRATORY (INHALATION) at 18:17

## 2023-12-07 RX ADMIN — HYDROCODONE BITARTRATE AND ACETAMINOPHEN 1 TABLET: 10; 325 TABLET ORAL at 20:53

## 2023-12-07 RX ADMIN — BUDESONIDE AND FORMOTEROL FUMARATE DIHYDRATE 2 PUFF: 160; 4.5 AEROSOL RESPIRATORY (INHALATION) at 18:18

## 2023-12-07 RX ADMIN — BUDESONIDE AND FORMOTEROL FUMARATE DIHYDRATE 2 PUFF: 160; 4.5 AEROSOL RESPIRATORY (INHALATION) at 06:58

## 2023-12-07 NOTE — PLAN OF CARE
Goal Outcome Evaluation:                 Pt transferred from 3S this shift with family at bedside. Pt on 4L humidified NC, then used trilogy on/off throughout the shift. Reminded pt it needs to stay on as much as possible and pt stated she understood. When doing safety checks, pt would have NC back on would be asleep. Wound care done as ordered. Pt ran several runs of PSVT, 7 beat with HR of 150 was called to Claude Chakraborty NP. No new complaints, VSS and will continue with POC.

## 2023-12-07 NOTE — PROGRESS NOTES
"Palliative Care Daily Progress Note     S: Medical record reviewed, followed up with Primary RN Selene and Dr Mejia regarding patient's condition. When Palliative care entered the room Liz was more awake and alert today, her CO2 was 69.3 down from 90.4, son Pradeep states her CO2 lives in the 70's. Liz states that her lower back hurts her and that this is a normal thing for her, she denies nausea at his time, and states she feels like she is breathing better this morning.  .      O:   Palliative Performance Scale Score:     /67 (BP Location: Right arm, Patient Position: Lying)   Pulse 93   Temp 98.1 °F (36.7 °C) (Oral)   Resp 18   Ht 162.6 cm (64\")   Wt 66.9 kg (147 lb 8 oz)   SpO2 95%   BMI 25.32 kg/m²     Intake/Output Summary (Last 24 hours) at 12/7/2023 1626  Last data filed at 12/7/2023 0800  Gross per 24 hour   Intake 540 ml   Output 300 ml   Net 240 ml       PE:  General Appearance:    Chronically ill appearing, alert, calm and cooperative   HEENT:    NC/AT, without obvious abnormality, EOMI, anicteric    Neck:   supple, trachea midline, no JVD   Lungs:     Mildly labored respirations, diminished bases, had CPAP on at this time    Heart:    RRR, normal S1 and S2, no M/R/G   Abdomen:     Soft, NT, ND, NABS    Extremities:   Moves all extremities, no edema   Pulses:   Pulses palpable and equal bilaterally   Skin:   Warm, dry   Neurologic:   Alert to person, place and time   Psych:   Calm more appropriate         Meds: Reviewed and changes noted    Labs:   Results from last 7 days   Lab Units 12/07/23  0255   WBC 10*3/mm3 11.40*   HEMOGLOBIN g/dL 13.5   HEMATOCRIT % 46.4   PLATELETS 10*3/mm3 146     Results from last 7 days   Lab Units 12/07/23  0255   SODIUM mmol/L 141   POTASSIUM mmol/L 2.8*   CHLORIDE mmol/L 91*   CO2 mmol/L 44.7*   BUN mg/dL 23*   CREATININE mg/dL 0.67   GLUCOSE mg/dL 138*   CALCIUM mg/dL 9.4     Results from last 7 days   Lab Units 12/07/23  0255 12/05/23  0052 " 12/04/23  0051   SODIUM mmol/L 141   < > 140   POTASSIUM mmol/L 2.8*   < > 4.0   CHLORIDE mmol/L 91*   < > 88*   CO2 mmol/L 44.7*   < > 45.9*   BUN mg/dL 23*   < > 24*   CREATININE mg/dL 0.67   < > 0.37*   CALCIUM mg/dL 9.4   < > 9.6   BILIRUBIN mg/dL  --   --  0.8   ALK PHOS U/L  --   --  48   ALT (SGPT) U/L  --   --  19   AST (SGOT) U/L  --   --  12   GLUCOSE mg/dL 138*   < > 170*    < > = values in this interval not displayed.     Imaging Results (Last 72 Hours)       ** No results found for the last 72 hours. **              Diagnostics: Reviewed    A: Liz Jiang is a 57 y.o. female  admitted on 11/18/2023 due to shortness of breath. 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  Palliative was consulted to discuss GOC/ACP and support for pt and family.       P:  I was able to have a nice conversation with patients son Pradeep to update and provide support. I went over Liz's Abg's with him and let him know that her CO2 level this am was at 69.3, he stated that he believes she lives in the 70's range. Pradeep asked if the plan was for her to be discharged today, I told him I would speak with Dr Mejia to get the plan. After speaking with Dr Mejia I called Pradeep back to let him know that Pulmonary team wanted to keep Liz for one more night so we could monitor her and ensure her levels remained stable. Pradeep stated that he agreed with this plan as he would not want to d/c to soon just to have her readmitted and stated  he told Robertgage this as well. I encourage him to encourage Liz to continue to wear her mask. I let Pradeep know that if the plan changed tomorrow I would keep him informed.    We will continue to follow along. Please do not hesitate to contact us regarding further sx mgmt or GOC needs, including after hours or on weekends via our on call provider at [phone].     Melissa Thibodeaux, APRN    12/7/2023

## 2023-12-07 NOTE — PROGRESS NOTES
Pulmonary and critical care following the patient for COPD exacerbation and hypoxic respiratory failure  Chief Complaint:  sob    Subjective -   Resting in bed comfortably.  Was anxious after wearing trilogy.  Discussed with primary team and started her on antianxiety medications yesterday.  Were hospitals BiPAP overnight.  No desaturations noticed.  Ins and outs reviewed.  Latest ABG and chest x-ray reviewed.  Received Diamox yesterday.  Bicarb levels stable.    Review of Systems:    Positive for shortness of breath otherwise negative        Vital Signs  Temp:  [96.8 °F (36 °C)-98.2 °F (36.8 °C)] 96.8 °F (36 °C)  Heart Rate:  [] 101  Resp:  [18-21] 20  BP: ()/(58-76) 120/76  Body mass index is 25.32 kg/m².    Intake/Output Summary (Last 24 hours) at 12/7/2023 1229  Last data filed at 12/7/2023 0800  Gross per 24 hour   Intake 1020 ml   Output 300 ml   Net 720 ml     I/O this shift:  In: 240 [P.O.:240]  Out: -     Physical Exam:  General-chronically ill in appearance, not in any acute distress    HEENT-PERRLA    Neck-supple    Respiratory-respirations normal-on auscultation no wheezing no crackles, generalized decreased breath sounds.  Wearing triology  Cardiovascular-NSR    GI-nontender nondistended bowel sounds positive    CNS-nonfocal    Musculoskeletal -no edema  Extremities- no obvious deformity noticed     Psychiatric- alert awake oriented  Skin- no visible rash         Results Review:     I reviewed the patient's new clinical results.  Results from last 7 days   Lab Units 12/07/23  0255 12/05/23 0052 12/04/23 0051   WBC 10*3/mm3 11.40* 14.48* 15.76*   HEMOGLOBIN g/dL 13.5 13.1 12.5   PLATELETS 10*3/mm3 146 77* 73*     Results from last 7 days   Lab Units 12/07/23  0255 12/05/23  0052 12/04/23 0051 12/03/23 2038   SODIUM mmol/L 141 143 140  --    POTASSIUM mmol/L 2.8* 4.2 4.0 4.2   CHLORIDE mmol/L 91* 89* 88*  --    CO2 mmol/L 44.7* 46.2* 45.9*  --    BUN mg/dL 23* 23* 24*  --    CREATININE  mg/dL 0.67 0.44* 0.37*  --    CALCIUM mg/dL 9.4 9.2 9.6  --    GLUCOSE mg/dL 138* 126* 170*  --    MAGNESIUM mg/dL  --  2.1  --  2.1     Lab Results   Component Value Date    INR 0.94 11/18/2023    INR 0.95 10/12/2023    INR 0.90 03/27/2017    PROTIME 13.1 11/18/2023    PROTIME 13.1 10/12/2023    PROTIME 9.9 03/27/2017     Results from last 7 days   Lab Units 12/04/23  0051 12/03/23  0852 12/02/23  0211   ALK PHOS U/L 48 50 49   BILIRUBIN mg/dL 0.8 0.7 0.5   BILIRUBIN DIRECT mg/dL  --   --  <0.2   ALT (SGPT) U/L 19 22 22   AST (SGOT) U/L 12 14 23     Results from last 7 days   Lab Units 12/07/23  0641   PH, ARTERIAL pH units 7.405   PO2 ART mm Hg 66.5*   PCO2, ARTERIAL mm Hg 69.3*   HCO3 ART mmol/L 43.4*     Imaging Results (Last 24 Hours)       ** No results found for the last 24 hours. **                 ALPRAZolam, 0.25 mg, Oral, BID  aspirin, 324 mg, Oral, Once  atorvastatin, 40 mg, Oral, Nightly  budesonide-formoterol, 2 puff, Inhalation, BID - RT  clopidogrel, 75 mg, Oral, Daily  fluticasone, 2 spray, Nasal, Daily  furosemide, 40 mg, Oral, Daily  insulin lispro, 2-9 Units, Subcutaneous, 4x Daily AC & at Bedtime  ipratropium, 0.5 mg, Nebulization, 4x Daily - RT  isosorbide mononitrate, 30 mg, Oral, Q24H  levothyroxine, 25 mcg, Oral, Q AM  metoprolol tartrate, 50 mg, Oral, BID  pantoprazole, 40 mg, Oral, Q AM  predniSONE, 40 mg, Oral, Daily With Breakfast  senna-docusate sodium, 2 tablet, Oral, BID  sodium chloride, 10 mL, Intravenous, Q12H  sodium chloride, 10 mL, Intravenous, Q12H      Pharmacy Consult,       Site   Date Value Ref Range Status   12/07/2023 Right Radial  Final     William's Test   Date Value Ref Range Status   12/07/2023 Positive  Final     pH, Arterial   Date Value Ref Range Status   12/07/2023 7.405 7.350 - 7.450 pH units Final     pCO2, Arterial   Date Value Ref Range Status   12/07/2023 69.3 (C) 35.0 - 45.0 mm Hg Final     Comment:     86 Value above critical limit     pO2, Arterial   Date  Value Ref Range Status   12/07/2023 66.5 (L) 83.0 - 108.0 mm Hg Final     Comment:     84 Value below reference range     HCO3, Arterial   Date Value Ref Range Status   12/07/2023 43.4 (H) 20.0 - 26.0 mmol/L Final     Comment:     83 Value above reference range     Base Excess, Arterial   Date Value Ref Range Status   12/07/2023 15.1 (H) 0.0 - 2.0 mmol/L Final     O2 Saturation, Arterial   Date Value Ref Range Status   12/07/2023 94.2 94.0 - 99.0 % Final     Hemoglobin, Blood Gas   Date Value Ref Range Status   12/07/2023 14.1 13.5 - 17.5 g/dL Final     Hematocrit, Blood Gas   Date Value Ref Range Status   12/07/2023 43.1 38.0 - 51.0 % Final     Oxyhemoglobin   Date Value Ref Range Status   12/07/2023 92.3 (L) 94 - 99 % Final     Comment:     84 Value below reference range     Methemoglobin   Date Value Ref Range Status   12/07/2023 0.00 0.00 - 3.00 % Final     Comment:     84 Value below reference range     Carboxyhemoglobin   Date Value Ref Range Status   12/07/2023 2.1 0 - 5 % Final     CO2 Content   Date Value Ref Range Status   12/07/2023 45.5 (H) 22 - 33 mmol/L Final     Barometric Pressure for Blood Gas   Date Value Ref Range Status   12/07/2023 732 mmHg Final     Modality   Date Value Ref Range Status   12/07/2023 BiPap  Final     FIO2   Date Value Ref Range Status   12/07/2023 44 % Final      Latest Reference Range & Units 12/07/23 04:13 12/07/23 06:41   pH, Arterial 7.350 - 7.450 pH units 7.287 (L) 7.405   pCO2, Arterial 35.0 - 45.0 mm Hg 90.4 (C) 69.3 (C)   pO2, Arterial 83.0 - 108.0 mm Hg 109.0 (H) 66.5 (L)   HCO3, Arterial 20.0 - 26.0 mmol/L 43.2 (H) 43.4 (H)   Base Excess 0.0 - 2.0 mmol/L 12.4 (H) 15.1 (H)   O2 Saturation, Arterial 94.0 - 99.0 % 98.5 94.2   CO2 Content 22 - 33 mmol/L 46.0 (H) 45.5 (H)   A-a DO2 0.0 - 300.0 mmHg 37.6 159.2   Carboxyhemoglobin 0 - 5 % 1.9 2.1   Methemoglobin 0.00 - 3.00 % <-0.10 (L) 0.00   Oxyhemoglobin 94 - 99 % 96.8 92.3 (L)   Hematocrit, Blood Gas 38.0 - 51.0 % 41.5  43.1   Hemoglobin, Blood Gas 13.5 - 17.5 g/dL 13.6 14.1   Site  Right Radial Right Radial   William's Test  N/A Positive   Modality  Nasal Cannula BiPap   FIO2 % 36 44   Flow Rate lpm 4.0    Ventilator Mode  NA NA   Barometric Pressure for Blood Gas mmHg 732 732   Notified By  [id number]   Notified Time  12/07/2023 04:19 12/07/2023 06:50   Notified Who  SAMPSON RN DR ALVES AND OLMAN RRT   Collected by  [id number]   (C): Data is critically high  (L): Data is abnormally low  (H): Data is abnormally high  '  Medication Review:    Latest Reference Range & Units 12/04/23 00:51   proBNP 0.0 - 900.0 pg/mL 581.8     Assessment & Plan   COPD exacerbation-continue steroids continue nebs  Doses reviewed.  Continue current inhalers.  Medication list reviewed.  Continue oxygen-to maintain saturation 88 to 92%.        Patient wore trilogy and blood gases are better.  Initial part of the night patient did not wear Trelegy and her blood gas showed hypercarbia.  Educated her that the settings are appropriate and she should be using her trilogy machine.  Patient is very anxious today.  Please consider psychiatry consult.  Will increase her Xanax to 3 times daily.  Continue diuretics to improve lung compliance and diffusion capacity.  Latest blood gases reviewed.    continue steroids continue nebs  Continue inhalers  Continue oxygen  Encouraged her to wear trilogy machine tonight.  Latest chest x-ray reviewed.  If tolerates the antianxiety medications will increase the dose.    STEVEN-continue trilogy overnight.  We will adjust the EPAP as patient likely has severe sleep apnea will adjust the minimum EPAP to 12 and maximum to 18.  No desaturations noticed after increasing the EPAP.  Encouraged her to use the CPAP trilogy every night.      Chronic hypercapnic hypoxic respiratory failure-continue current amount of oxygen titrated to maintain saturation 88 to 92%.     Continue trilogy with a setting of  AVAPS/AE. Tidal volume 6 mL/kg  ideal body weight, PS max 28, PS minimum 20, EP AP max 18, EPAP minimum 12 , respiratory rate 18,      hypo kalemia-will replace potassium awaiting     Results for orders placed during the hospital encounter of 10/12/23    Adult Transthoracic Echo Complete W/ Cont if Necessary Per Protocol    Interpretation Summary    Left ventricular systolic function is normal. Left ventricular ejection fraction appears to be 66 - 70%.    Left ventricular diastolic function is consistent with (grade I) impaired relaxation.    Estimated right ventricular systolic pressure from tricuspid regurgitation is normal (<35 mmHg).        FiO2 requirement reviewed and adjusted to maintain saturation 88 to 92%.  Continue appropriate prophylaxis            Acute respiratory failure with hypoxia    Chest pain               Elijah Hope MD  12/07/23  12:29 EST

## 2023-12-07 NOTE — SIGNIFICANT NOTE
Morning abg obtained. Critical results reported to [facility] and Dr. Thorpe. Pt wore home trilogy from 2114 to 0039 that I was aware of. RT not aware if she put it back on. RN states patient was on and off of it throughout the night. After abg results were discusssed with pt, she put her home trilogy back on and was instructed to wear till next ABG was able to be nikolai around 0700.

## 2023-12-07 NOTE — PROGRESS NOTES
[facility] HOSPITALIST PROGRESS NOTE     Patient Identification:  Name:  Liz Jiang  Age:  57 y.o.  Sex:  female  :  1965  MRN:  [id number]  Visit Number:  [id number]  ROOM: [address] [place] [address]     Primary Care Provider:  Tabitha Ron APRN    Length of stay in inpatient status:  19    Subjective     Chief Compliant:    Chief Complaint   Patient presents with    Shortness of Breath       History of Presenting Illness:    Patient seen and examined today, no family present at bedside. She reported her shortness of breath is unchanged today and denied cough. She reports feeling very anxious and the scheduled low dose BID xanax has not seemed to help very much.    Objective     Current Hospital Meds:ALPRAZolam, 0.25 mg, Oral, Q8H  aspirin, 324 mg, Oral, Once  atorvastatin, 40 mg, Oral, Nightly  budesonide-formoterol, 2 puff, Inhalation, BID - RT  clopidogrel, 75 mg, Oral, Daily  fluticasone, 2 spray, Nasal, Daily  furosemide, 40 mg, Oral, Daily  insulin lispro, 2-9 Units, Subcutaneous, 4x Daily AC & at Bedtime  ipratropium, 0.5 mg, Nebulization, 4x Daily - RT  isosorbide mononitrate, 30 mg, Oral, Q24H  levothyroxine, 25 mcg, Oral, Q AM  metoprolol tartrate, 50 mg, Oral, BID  pantoprazole, 40 mg, Oral, Q AM  potassium chloride, 40 mEq, Oral, BID  predniSONE, 40 mg, Oral, Daily With Breakfast  senna-docusate sodium, 2 tablet, Oral, BID  sodium chloride, 10 mL, Intravenous, Q12H  sodium chloride, 10 mL, Intravenous, Q12H    Pharmacy Consult,         Current Antimicrobial Therapy:  Anti-Infectives (From admission, onward)      None          Current Diuretic Therapy:  Diuretics (From admission, onward)      Ordered     Dose/Rate Route Frequency Start Stop    23 1143  acetaZOLAMIDE (DIAMOX) 500 mg in sodium chloride 0.9 % 50 mL IVPB        Ordering Provider: Elijah Hope MD    500 mg  200 mL/hr over 15 Minutes Intravenous Once 23 1230 23 1543    23 0827  acetaZOLAMIDE (DIAMOX)  tablet 500 mg        Ordering Provider: Elijah Hope MD    500 mg Oral Once 12/05/23 0915 12/05/23 1143    12/04/23 1302  furosemide (LASIX) injection 40 mg        Ordering Provider: Elijah Hope MD    40 mg Intravenous Once 12/04/23 1315 12/04/23 1435    12/02/23 0920  furosemide (LASIX) tablet 40 mg        Ordering Provider: Chris Shi MD    40 mg Oral Daily 12/03/23 0900      11/24/23 0734  furosemide (LASIX) injection 40 mg        Ordering Provider: Elijah Hope MD    40 mg Intravenous Once 11/24/23 0900 11/24/23 0853    11/20/23 1450  furosemide (LASIX) injection 40 mg        Ordering Provider: Elijah Hope MD    40 mg Intravenous Once 11/20/23 1500 11/20/23 1531    11/18/23 1306  furosemide (LASIX) injection 80 mg        Ordering Provider: Ruby Dia PA    80 mg Intravenous Once 11/18/23 1322 11/18/23 1328          ----------------------------------------------------------------------------------------------------------------------  Vital Signs:  Temp:  [96.8 °F (36 °C)-98.1 °F (36.7 °C)] 98.1 °F (36.7 °C)  Heart Rate:  [] 93  Resp:  [18-22] 18  BP: ()/(58-76) 112/67  SpO2:  [88 %-99 %] 95 %  on  Flow (L/min):  [4-6] 4;   Device (Oxygen Therapy): nasal cannula  Body mass index is 25.32 kg/m².    Wt Readings from Last 3 Encounters:   12/06/23 66.9 kg (147 lb 8 oz)   11/08/23 81.6 kg (180 lb)   10/28/23 81.6 kg (180 lb)     Intake & Output (last 3 days)         12/04 0701 12/05 0700 12/05 0701 12/06 0700 12/06 0701 12/07 0700 12/07 0701 12/08 0700    P.O. 9212 286 8023 240    Total Intake(mL/kg) 1320 (19.6) 960 (14.3) 1020 (15.2) 240 (3.6)    Urine (mL/kg/hr) 2800 (1.7) 350 (0.2) 300 (0.2)     Stool  0 0     Total Output 2800 350 300     Net -1480 +610 +720 +240            Urine Unmeasured Occurrence 1 x 5 x 1 x 2 x    Stool Unmeasured Occurrence  4 x 1 x 1 x          Diet: Cardiac Diets; Healthy Heart (2-3 Na+); Texture: Regular Texture (IDDSI 7); Fluid Consistency: Thin  (IDDSI 0)  ----------------------------------------------------------------------------------------------------------------------  Physical exam:   Constitutional:  Well-developed and well-nourished.  Chronically ill-appearing.  No acute distress.      HENT:  Head:  Normocephalic and atraumatic.   Cardiovascular:  Normal rate, regular rhythm   Pulmonary/Chest:  Normal rate and effort, markedly diminished air movement bilaterally but no crackles or wheezing noted in anterior or lateral lung fields  Musculoskeletal:  No deformity.    Neurological: Awake, alert, no focal deficit on gross examination, no slurred speech or facial droop.   Skin:  Skin is warm and dry.   Peripheral vascular:  No cyanosis, no extremity edema  Psychiatric: anxious    Edited by: Faviola Mejia DO at 12/7/2023 1710  ----------------------------------------------------------------------------------------------------------------------  Results from last 7 days   Lab Units 12/07/23  0255 12/05/23  0052 12/04/23  0051   WBC 10*3/mm3 11.40* 14.48* 15.76*   HEMOGLOBIN g/dL 13.5 13.1 12.5   HEMATOCRIT % 46.4 43.7 43.6   MCV fL 99.6* 96.3 100.2*   MCHC g/dL 29.1* 30.0* 28.7*   PLATELETS 10*3/mm3 146 77* 73*     Results from last 7 days   Lab Units 12/07/23  0641   PH, ARTERIAL pH units 7.405   PO2 ART mm Hg 66.5*   PCO2, ARTERIAL mm Hg 69.3*   HCO3 ART mmol/L 43.4*     Results from last 7 days   Lab Units 12/07/23  0255 12/05/23  0052 12/04/23  0051 12/03/23  2038 12/03/23  0852 12/02/23  0835 12/02/23  0211   SODIUM mmol/L 141 143 140  --  142  --  137   POTASSIUM mmol/L 2.8* 4.2 4.0 4.2 4.5   < > 5.9*   MAGNESIUM mg/dL 2.2 2.1  --  2.1  --   --   --    CHLORIDE mmol/L 91* 89* 88*  --  91*  --  93*   CO2 mmol/L 44.7* 46.2* 45.9*  --  46.4*  --  38.1*   BUN mg/dL 23* 23* 24*  --  22*  --  18   CREATININE mg/dL 0.67 0.44* 0.37*  --  0.37*  --  0.46*   CALCIUM mg/dL 9.4 9.2 9.6  --  9.3  --  9.0   GLUCOSE mg/dL 138* 126* 170*  --  164*  --  96  "  ALBUMIN g/dL  --   --  3.6  --  3.5  --  3.6   BILIRUBIN mg/dL  --   --  0.8  --  0.7  --  0.5   ALK PHOS U/L  --   --  48  --  50  --  49   AST (SGOT) U/L  --   --  12  --  14  --  23   ALT (SGPT) U/L  --   --  19  --  22  --  22    < > = values in this interval not displayed.   Estimated Creatinine Clearance: 86.1 mL/min (by C-G formula based on SCr of 0.67 mg/dL).  No results found for: \"AMMONIA\"      Results from last 7 days   Lab Units 12/04/23  0051   PROBNP pg/mL 581.8         Glucose   Date/Time Value Ref Range Status   12/07/2023 1649 187 (H) 70 - 130 mg/dL Final   12/07/2023 1040 177 (H) 70 - 130 mg/dL Final   12/07/2023 0656 137 (H) 70 - 130 mg/dL Final   12/06/2023 2042 215 (H) 70 - 130 mg/dL Final   12/06/2023 1610 283 (H) 70 - 130 mg/dL Final   12/06/2023 1051 253 (H) 70 - 130 mg/dL Final   12/06/2023 0653 123 70 - 130 mg/dL Final   12/05/2023 1945 252 (H) 70 - 130 mg/dL Final     Lab Results   Component Value Date    TSH 5.170 (H) 11/18/2023    FREET4 1.09 11/18/2023     No results found for: \"PREGTESTUR\", \"PREGSERUM\", \"HCG\", \"HCGQUANT\"  Pain Management Panel  More data may exist         Latest Ref Rng & Units 11/19/2023 2/23/2022   Pain Management Panel   Amphetamine, Urine Qual Negative Negative  Negative    Barbiturates Screen, Urine Negative Negative  Negative    Benzodiazepine Screen, Urine Negative Positive  Positive    Buprenorphine, Screen, Urine Negative Negative  Negative    Cocaine Screen, Urine Negative Negative  Negative    Fentanyl, Urine Negative Negative  -   Methadone Screen , Urine Negative Negative  Negative    Methamphetamine, Ur Negative Negative  Negative      Brief Urine Lab Results  (Last result in the past 365 days)        Color   Clarity   Blood   Leuk Est   Nitrite   Protein   CREAT   Urine HCG        11/18/23 1447 Yellow   Clear   Negative   Negative   Negative   Negative                 No results found for: \"BLOODCX\"  No results found for: \"URINECX\"  No results found " "for: \"WOUNDCX\"  No results found for: \"STOOLCX\"  No results found for: \"RESPCX\"  No results found for: \"AFBCX\"  Results from last 7 days   Lab Units 12/05/23  0053   PROCALCITONIN ng/mL 0.03       I have personally looked at the labs and they are summarized above.  ----------------------------------------------------------------------------------------------------------------------  Detailed radiology reports for the last 24 hours:  Imaging Results (Last 24 Hours)       ** No results found for the last 24 hours. **          Assessment & Plan      #Acute on chronic hypoxic respiratory failure   #Acute on chronic hypercapnic respiratory failure   #Acute COPD exacerbation   #Hx of STEVEN  #Medical noncompliance  -At admission viral panel was negative, D-dimer within normal limits, and no new consolidations seen on imaging at admission.  -Patient has severe COPD that requires home NIPPV but she has previously been unable to tolerate wearing it regularly.  -Pulmonology is following, appreciate assistance.  -Continue steroids, Symbicort, ipratropium. Continue to wean steroid dose as respiratory status is improving. Currently receiving prednisone 40mg PO daily. Encourage aggressive pulmonary toilet, out of bed as much as possible, incentive spirometry, cough/deep breathing.   - PT consult requested due to generalized weakness and poor exercise tolerance, appreciate assistance. Patient has refused to participate, so PT has now signed off.  - Patient continues to require adjustment to trilogy machine settings.    #Paranoia  #Severe anxiety  - Paranoia was possibly a result of her hypercapnia, and seems improved today, but she remains very anxious. Continue treatment of COPD and chronic respiratory failure as detailed above. Supportive care. Psychiatry consult recommended by Pulmonology, and this has been requested for today. Options for anxiety medications are limited due to prolonged QT and need to be conservative with " anything that would suppress respiratory drive.    #Chronically elevated troponin  #Frequent pSVT  - Heart cath was done and showed no occlusive coronary artery disease   - Metoprolol tartrate increased to 50mg BID during this admission with improvement in episodes of pSVT  - Continue telemetry monitoring     CHRONIC MEDICAL PROBLEMS     #HFpEF/grade I diastolic dysfunction  - Not currently felt to be in acute exacerbation clinically.   - Previous TTE from 10/2023 reviewed.  Monitor volume status with I&Os, daily weights.  Continue lasix which has been increased to 40mg PO daily during this admission.      #Essential hypertension  - Well controlled, continue home regimen      #Hyperlipidemia  -Continue statin.      #Type II diabetes mellitus, non insulin dependent  - A1C 6.6%.   - Glucose is now improved to the 130-200 range. It was likely worse earlier in admission due to high dose steroids. Monitor with accuchecks and cover with sliding scale insulin.   - Continue consistent carbohydrate diet      #History of CVA  - Details unknown.  Continue home medication as appropriate. Does not appear to have residual deficits      #History of carotid artery disease s/p left CEA in the past  - Continue home medications      #Hypothyroidism  - TSH WNL. Continue home levothyroxine.      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   #Former smoker   Edited by: Faviola Mejia DO at 12/7/2023 1710    VTE Prophylaxis:   Mechanical Order History:        Ordered        11/30/23 1211  Place Sequential Compression Device  Once            11/30/23 1211  Maintain Sequential Compression Device  Continuous                          Pharmalogical Order History:        Ordered     Dose  Route Frequency Stop    11/29/23 1011  heparin (porcine) injection  Status:  Discontinued         -- -- Code / Trauma / Sedation Medication 11/29/23 1034    11/18/23 1605  heparin (porcine) 5000 UNIT/ML injection 5,000 Units  Status:  Discontinued         5,000 Units SC Every 8 Hours Scheduled 11/30/23 1210                    Dispo: likely home at discharge per patient preference (has refused any short term rehab), possibly within 24 hours if chronic respiratory failure remains stable    Faviola Mejia DO  [facility]  12/07/23  17:10 EST

## 2023-12-07 NOTE — PROGRESS NOTES
[facility] HOSPITALIST PROGRESS NOTE     Patient Identification:  Name:  Liz Jiang  Age:  57 y.o.  Sex:  female  :  1965  MRN:  [id number]  Visit Number:  [id number]  ROOM: [address] [place] [address]     Primary Care Provider:  Tabitha Ron APRN    Length of stay in inpatient status:  18    Subjective     Chief Compliant:    Chief Complaint   Patient presents with    Shortness of Breath       History of Presenting Illness:    Patient seen and examined today with son, daughter-in-law, sister, patient advocate, and nursing supervisor at bedside. Family and patient had complaints about treatment by some of the nursing staff and concerns about prompt response to call lights, etc. Patient seemed very paranoid compared to previous interactions I have had with her, and stated she was afraid of someone harming her here, but would not give any further details. She reported shortness of breath was relatively unchanged today.    Objective     Current Hospital Meds:ALPRAZolam, 0.25 mg, Oral, BID  aspirin, 324 mg, Oral, Once  atorvastatin, 40 mg, Oral, Nightly  budesonide-formoterol, 2 puff, Inhalation, BID - RT  clopidogrel, 75 mg, Oral, Daily  fluticasone, 2 spray, Nasal, Daily  furosemide, 40 mg, Oral, Daily  insulin lispro, 2-9 Units, Subcutaneous, 4x Daily AC & at Bedtime  ipratropium, 0.5 mg, Nebulization, 4x Daily - RT  isosorbide mononitrate, 30 mg, Oral, Q24H  levothyroxine, 25 mcg, Oral, Q AM  metoprolol tartrate, 50 mg, Oral, BID  pantoprazole, 40 mg, Oral, Q AM  predniSONE, 40 mg, Oral, Daily With Breakfast  senna-docusate sodium, 2 tablet, Oral, BID  sodium chloride, 10 mL, Intravenous, Q12H  sodium chloride, 10 mL, Intravenous, Q12H    Pharmacy Consult,         Current Antimicrobial Therapy:  Anti-Infectives (From admission, onward)      None          Current Diuretic Therapy:  Diuretics (From admission, onward)      Ordered     Dose/Rate Route Frequency Start Stop    23 [id number]  acetaZOLAMIDE  (DIAMOX) 500 mg in sodium chloride 0.9 % 50 mL IVPB        Ordering Provider: Elijah Hope MD    500 mg  200 mL/hr over 15 Minutes Intravenous Once 12/06/23 1230 12/06/23 1543    12/05/23 0827  acetaZOLAMIDE (DIAMOX) tablet 500 mg        Ordering Provider: Elijah Hope MD    500 mg Oral Once 12/05/23 0915 12/05/23 1143    12/04/23 1302  furosemide (LASIX) injection 40 mg        Ordering Provider: Elijah Hope MD    40 mg Intravenous Once 12/04/23 1315 12/04/23 1435    12/02/23 0920  furosemide (LASIX) tablet 40 mg        Ordering Provider: Chris Shi MD    40 mg Oral Daily 12/03/23 0900      11/24/23 0734  furosemide (LASIX) injection 40 mg        Ordering Provider: Elijah Hope MD    40 mg Intravenous Once 11/24/23 0900 11/24/23 0853    11/20/23 1450  furosemide (LASIX) injection 40 mg        Ordering Provider: Elijah Hope MD    40 mg Intravenous Once 11/20/23 1500 11/20/23 1531    11/18/23 1306  furosemide (LASIX) injection 80 mg        Ordering Provider: Ruby Dia PA    80 mg Intravenous Once 11/18/23 1322 11/18/23 1328          ----------------------------------------------------------------------------------------------------------------------  Vital Signs:  Temp:  [97.9 °F (36.6 °C)-98.7 °F (37.1 °C)] 97.9 °F (36.6 °C)  Heart Rate:  [] 58  Resp:  [18-21] 21  BP: ()/(61-75) 111/61  SpO2:  [90 %-98 %] 92 %  on  Flow (L/min):  [4-6] 6;   Device (Oxygen Therapy): other (see comments)  Body mass index is 25.32 kg/m².    Wt Readings from Last 3 Encounters:   12/06/23 66.9 kg (147 lb 8 oz)   11/08/23 81.6 kg (180 lb)   10/28/23 81.6 kg (180 lb)     Intake & Output (last 3 days)         12/04 0701 12/05 0700 12/05 0701 12/06 0700 12/06 0701  12/07 0700    P.O. 1320 960 720    Total Intake(mL/kg) 1320 (19.6) 960 (14.3) 720 (10.8)    Urine (mL/kg/hr) 2800 (1.7) 350 (0.2) 300 (0.3)    Stool  0 0    Total Output 2800 350 300    Net -1480 +610 +420           Urine Unmeasured  Occurrence 1 x 5 x 1 x    Stool Unmeasured Occurrence  4 x 1 x          Diet: Cardiac Diets; Healthy Heart (2-3 Na+); Texture: Regular Texture (IDDSI 7); Fluid Consistency: Thin (IDDSI 0)  ----------------------------------------------------------------------------------------------------------------------  Physical exam:   Constitutional:  Well-developed and well-nourished.  Chronically ill-appearing.  No acute distress.      HENT:  Head:  Normocephalic and atraumatic.   Cardiovascular:  Normal rate, regular rhythm   Pulmonary/Chest:  Normal rate and effor, diminished air movement bilaterally and crackles diffusely bilaterally  Musculoskeletal:  No deformity.    Neurological: Awake, alert, no focal deficit on gross examination, no slurred speech or facial droop. Oriented to person, place, year, situation.  Skin:  Skin is warm and dry.   Peripheral vascular:  No cyanosis, no extremity edema  Psychiatric: paranoid thought content    Edited by: Faviola Mejia DO at 12/6/2023 2323  ----------------------------------------------------------------------------------------------------------------------  Results from last 7 days   Lab Units 12/05/23  0052 12/04/23  0051 12/03/23  0852   WBC 10*3/mm3 14.48* 15.76* 13.98*   HEMOGLOBIN g/dL 13.1 12.5 12.9   HEMATOCRIT % 43.7 43.6 46.3   MCV fL 96.3 100.2* 103.3*   MCHC g/dL 30.0* 28.7* 27.9*   PLATELETS 10*3/mm3 77* 73* 53*     Results from last 7 days   Lab Units 12/06/23  0409   PH, ARTERIAL pH units 7.374   PO2 ART mm Hg 70.3*   PCO2, ARTERIAL mm Hg 78.6*   HCO3 ART mmol/L 45.8*     Results from last 7 days   Lab Units 12/05/23  0052 12/04/23  0051 12/03/23 2038 12/03/23 0852 12/02/23  0835 12/02/23  0211   SODIUM mmol/L 143 140  --  142  --  137   POTASSIUM mmol/L 4.2 4.0 4.2 4.5   < > 5.9*   MAGNESIUM mg/dL 2.1  --  2.1  --   --   --    CHLORIDE mmol/L 89* 88*  --  91*  --  93*   CO2 mmol/L 46.2* 45.9*  --  46.4*  --  38.1*   BUN mg/dL 23* 24*  --  22*  --  18  "  CREATININE mg/dL 0.44* 0.37*  --  0.37*  --  0.46*   CALCIUM mg/dL 9.2 9.6  --  9.3  --  9.0   GLUCOSE mg/dL 126* 170*  --  164*  --  96   ALBUMIN g/dL  --  3.6  --  3.5  --  3.6   BILIRUBIN mg/dL  --  0.8  --  0.7  --  0.5   ALK PHOS U/L  --  48  --  50  --  49   AST (SGOT) U/L  --  12  --  14  --  23   ALT (SGPT) U/L  --  19  --  22  --  22    < > = values in this interval not displayed.   Estimated Creatinine Clearance: 131.1 mL/min (A) (by C-G formula based on SCr of 0.44 mg/dL (L)).  No results found for: \"AMMONIA\"      Results from last 7 days   Lab Units 12/04/23  0051   PROBNP pg/mL 581.8     Results from last 7 days   Lab Units 11/30/23  0204   CHOLESTEROL mg/dL 175   TRIGLYCERIDES mg/dL 445*   HDL CHOL mg/dL 55   LDL CHOL mg/dL 54     Glucose   Date/Time Value Ref Range Status   12/06/2023 2042 215 (H) 70 - 130 mg/dL Final   12/06/2023 1610 283 (H) 70 - 130 mg/dL Final   12/06/2023 1051 253 (H) 70 - 130 mg/dL Final   12/06/2023 0653 123 70 - 130 mg/dL Final   12/05/2023 1945 252 (H) 70 - 130 mg/dL Final   12/05/2023 1623 164 (H) 70 - 130 mg/dL Final   12/05/2023 1112 205 (H) 70 - 130 mg/dL Final   12/05/2023 0643 116 70 - 130 mg/dL Final     Lab Results   Component Value Date    TSH 5.170 (H) 11/18/2023    FREET4 1.09 11/18/2023     No results found for: \"PREGTESTUR\", \"PREGSERUM\", \"HCG\", \"HCGQUANT\"  Pain Management Panel  More data may exist         Latest Ref Rng & Units 11/19/2023 2/23/2022   Pain Management Panel   Amphetamine, Urine Qual Negative Negative  Negative    Barbiturates Screen, Urine Negative Negative  Negative    Benzodiazepine Screen, Urine Negative Positive  Positive    Buprenorphine, Screen, Urine Negative Negative  Negative    Cocaine Screen, Urine Negative Negative  Negative    Fentanyl, Urine Negative Negative  -   Methadone Screen , Urine Negative Negative  Negative    Methamphetamine, Ur Negative Negative  Negative      Brief Urine Lab Results  (Last result in the past 365 days) " "       Color   Clarity   Blood   Leuk Est   Nitrite   Protein   CREAT   Urine HCG        11/18/23 1447 Yellow   Clear   Negative   Negative   Negative   Negative                 No results found for: \"BLOODCX\"  No results found for: \"URINECX\"  No results found for: \"WOUNDCX\"  No results found for: \"STOOLCX\"  No results found for: \"RESPCX\"  No results found for: \"AFBCX\"  Results from last 7 days   Lab Units 12/05/23  0053   PROCALCITONIN ng/mL 0.03       I have personally looked at the labs and they are summarized above.  ----------------------------------------------------------------------------------------------------------------------  Detailed radiology reports for the last 24 hours:  Imaging Results (Last 24 Hours)       ** No results found for the last 24 hours. **          Assessment & Plan      #Acute on chronic hypoxic respiratory failure   #Acute on chronic hypercapnic respiratory failure   #Acute COPD exacerbation   #Hx of STEVEN  #Medical noncompliance  -At admission viral panel was negative, D-dimer within normal limits, and no new consolidations seen on imaging at admission.  -Patient has severe COPD that requires home NIPPV but she has been unable to tolerate wearing it regularly.  -Pulmonology is following, appreciate assistance.  -Continue steroids, Symbicort, ipratropium. Continue to wean steroid dose as respiratory status is improving. Currently receiving prednisone 40mg PO daily. Encourage aggressive pulmonary toilet, out of bed as much as possible, incentive spirometry, cough/deep breathing.   - PT consult requested due to generalized weakness and poor exercise tolerance, appreciate assistance. Patient has refused to participate, so PT has now signed off.  - Patient continues to require adjustment to trilogy machine settings.    #Paranoia  - Possibly a result of her hypercapnia. Continue treatment of COPD and chronic respiratory failure as detailed above. Supportive care. If symptoms worsen could " consider antipsychotic medication and/or psychiatry consult.    #Chronically elevated troponin  #Frequent pSVT  - Heart cath was done and showed no occlusive coronary artery disease   - Metoprolol tartrate increased to 50mg BID during this admission with improvement in episodes of pSVT  - Continue telemetry monitoring     CHRONIC MEDICAL PROBLEMS     #HFpEF/grade I diastolic dysfunction  - Not currently felt to be in acute exacerbation clinically.   - Previous TTE from 10/2023 reviewed.  Monitor volume status with I&Os, daily weights.  Continue lasix which has been increased to 40mg PO daily during this admission. Diamox also given by Pulmonology.     #Essential hypertension  - Well controlled, continue home regimen      #Hyperlipidemia  -Continue statin.      #Type II diabetes mellitus, non insulin dependent  - A1C 6.6%.   - Glucose has been elevated in the 200-300 range (likely worsened by steroid use). Monitor with accuchecks and cover with sliding scale insulin.   - Continue consistent carbohydrate diet      #History of CVA  - Details unknown.  Continue home medication as appropriate. Does not appear to have residual deficits      #History of carotid artery disease s/p left CEA in the past  - Continue home medications      #Hypothyroidism  - TSH WNL. Continue home levothyroxine.      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   #Former smoker   Edited by: Faviola Mejia DO at 12/6/2023 2323    VTE Prophylaxis:   Mechanical Order History:        Ordered        11/30/23 1211  Place Sequential Compression Device  Once            11/30/23 1211  Maintain Sequential Compression Device  Continuous                          Pharmalogical Order History:        Ordered      Dose Route Frequency Stop    11/29/23 1011  heparin (porcine) injection  Status:  Discontinued         -- -- Code / Trauma / Sedation Medication 11/29/23 1034    11/18/23 1605  heparin (porcine) 5000 UNIT/ML injection 5,000 Units  Status:  Discontinued         5,000 Units SC Every 8 Hours Scheduled 11/30/23 1210                    Dispo: pending clinical progress    Faviola Mejia DO  [facility]  12/06/23  23:23 EST

## 2023-12-08 ENCOUNTER — APPOINTMENT (OUTPATIENT)
Dept: GENERAL RADIOLOGY | Facility: HOSPITAL | Age: 58
End: 2023-12-08
Payer: MEDICARE

## 2023-12-08 LAB
A-A DO2: 201 MMHG (ref 0–300)
A-A DO2: 30.4 MMHG (ref 0–300)
A-A DO2: ABNORMAL
ANION GAP SERPL CALCULATED.3IONS-SCNC: 4.3 MMOL/L (ref 5–15)
ARTERIAL PATENCY WRIST A: ABNORMAL
ARTERIAL PATENCY WRIST A: POSITIVE
ARTERIAL PATENCY WRIST A: POSITIVE
ATMOSPHERIC PRESS: 727 MMHG
ATMOSPHERIC PRESS: 728 MMHG
ATMOSPHERIC PRESS: 729 MMHG
BASE EXCESS BLDA CALC-SCNC: 14.4 MMOL/L (ref 0–2)
BASE EXCESS BLDA CALC-SCNC: 17.4 MMOL/L (ref 0–2)
BASE EXCESS BLDA CALC-SCNC: 19.4 MMOL/L (ref 0–2)
BASOPHILS # BLD AUTO: 0.05 10*3/MM3 (ref 0–0.2)
BASOPHILS NFR BLD AUTO: 0.4 % (ref 0–1.5)
BDY SITE: ABNORMAL
BUN SERPL-MCNC: 25 MG/DL (ref 6–20)
BUN/CREAT SERPL: 61 (ref 7–25)
CALCIUM SPEC-SCNC: 9.1 MG/DL (ref 8.6–10.5)
CHLORIDE SERPL-SCNC: 94 MMOL/L (ref 98–107)
CO2 BLDA-SCNC: 49.4 MMOL/L (ref 22–33)
CO2 BLDA-SCNC: 50.8 MMOL/L (ref 22–33)
CO2 BLDA-SCNC: 52.5 MMOL/L (ref 22–33)
CO2 SERPL-SCNC: 42.7 MMOL/L (ref 22–29)
COHGB MFR BLD: 1.7 % (ref 0–5)
COHGB MFR BLD: 2.1 % (ref 0–5)
COHGB MFR BLD: 2.1 % (ref 0–5)
CREAT SERPL-MCNC: 0.41 MG/DL (ref 0.57–1)
DEPRECATED RDW RBC AUTO: 53 FL (ref 37–54)
EGFRCR SERPLBLD CKD-EPI 2021: 114.2 ML/MIN/1.73
EOSINOPHIL # BLD AUTO: 0.07 10*3/MM3 (ref 0–0.4)
EOSINOPHIL NFR BLD AUTO: 0.5 % (ref 0.3–6.2)
ERYTHROCYTE [DISTWIDTH] IN BLOOD BY AUTOMATED COUNT: 14.6 % (ref 12.3–15.4)
GAS FLOW AIRWAY: 2 LPM
GAS FLOW AIRWAY: 3.5 LPM
GAS FLOW AIRWAY: 8 LPM
GLUCOSE BLDC GLUCOMTR-MCNC: 131 MG/DL (ref 70–130)
GLUCOSE BLDC GLUCOMTR-MCNC: 228 MG/DL (ref 70–130)
GLUCOSE BLDC GLUCOMTR-MCNC: 268 MG/DL (ref 70–130)
GLUCOSE BLDC GLUCOMTR-MCNC: 334 MG/DL (ref 70–130)
GLUCOSE SERPL-MCNC: 133 MG/DL (ref 65–99)
HCO3 BLDA-SCNC: 46.2 MMOL/L (ref 20–26)
HCO3 BLDA-SCNC: 48.5 MMOL/L (ref 20–26)
HCO3 BLDA-SCNC: 49.4 MMOL/L (ref 20–26)
HCT VFR BLD AUTO: 45.3 % (ref 34–46.6)
HCT VFR BLD CALC: 40.8 % (ref 38–51)
HCT VFR BLD CALC: 41.2 % (ref 38–51)
HCT VFR BLD CALC: 43.4 % (ref 38–51)
HGB BLD-MCNC: 13.3 G/DL (ref 12–15.9)
HGB BLDA-MCNC: 13.3 G/DL (ref 13.5–17.5)
HGB BLDA-MCNC: 13.4 G/DL (ref 13.5–17.5)
HGB BLDA-MCNC: 14.1 G/DL (ref 13.5–17.5)
IMM GRANULOCYTES # BLD AUTO: 0.15 10*3/MM3 (ref 0–0.05)
IMM GRANULOCYTES NFR BLD AUTO: 1.1 % (ref 0–0.5)
INHALED O2 CONCENTRATION: 28 %
INHALED O2 CONCENTRATION: 34 %
INHALED O2 CONCENTRATION: 52 %
LYMPHOCYTES # BLD AUTO: 1.7 10*3/MM3 (ref 0.7–3.1)
LYMPHOCYTES NFR BLD AUTO: 12.3 % (ref 19.6–45.3)
Lab: ABNORMAL
MCH RBC QN AUTO: 28.9 PG (ref 26.6–33)
MCHC RBC AUTO-ENTMCNC: 29.4 G/DL (ref 31.5–35.7)
MCV RBC AUTO: 98.3 FL (ref 79–97)
METHGB BLD QL: 0 % (ref 0–3)
METHGB BLD QL: 0.1 % (ref 0–3)
METHGB BLD QL: <-0.1 % (ref 0–3)
MODALITY: ABNORMAL
MONOCYTES # BLD AUTO: 1.19 10*3/MM3 (ref 0.1–0.9)
MONOCYTES NFR BLD AUTO: 8.6 % (ref 5–12)
NEUTROPHILS NFR BLD AUTO: 10.67 10*3/MM3 (ref 1.7–7)
NEUTROPHILS NFR BLD AUTO: 77.1 % (ref 42.7–76)
NOTIFIED BY: ABNORMAL
NOTIFIED WHO: ABNORMAL
NRBC BLD AUTO-RTO: 0 /100 WBC (ref 0–0.2)
OXYHGB MFR BLDV: 78.5 % (ref 94–99)
OXYHGB MFR BLDV: 95.2 % (ref 94–99)
OXYHGB MFR BLDV: 97.8 % (ref 94–99)
PCO2 BLDA: 101 MM HG (ref 35–45)
PCO2 BLDA: 103 MM HG (ref 35–45)
PCO2 BLDA: 76.6 MM HG (ref 35–45)
PCO2 TEMP ADJ BLD: ABNORMAL MM[HG]
PH BLDA: 7.26 PH UNITS (ref 7.35–7.45)
PH BLDA: 7.3 PH UNITS (ref 7.35–7.45)
PH BLDA: 7.41 PH UNITS (ref 7.35–7.45)
PH, TEMP CORRECTED: ABNORMAL
PLATELET # BLD AUTO: 147 10*3/MM3 (ref 140–450)
PMV BLD AUTO: 11.6 FL (ref 6–12)
PO2 BLDA: 152 MM HG (ref 83–108)
PO2 BLDA: 47.5 MM HG (ref 83–108)
PO2 BLDA: 70.5 MM HG (ref 83–108)
PO2 TEMP ADJ BLD: ABNORMAL MM[HG]
POTASSIUM SERPL-SCNC: 3.7 MMOL/L (ref 3.5–5.2)
RBC # BLD AUTO: 4.61 10*6/MM3 (ref 3.77–5.28)
SAO2 % BLDCOA: 80.2 % (ref 94–99)
SAO2 % BLDCOA: 95.4 % (ref 94–99)
SAO2 % BLDCOA: >99.2 % (ref 94–99)
SODIUM SERPL-SCNC: 141 MMOL/L (ref 136–145)
VENTILATOR MODE: ABNORMAL
WBC NRBC COR # BLD AUTO: 13.83 10*3/MM3 (ref 3.4–10.8)

## 2023-12-08 PROCEDURE — 82805 BLOOD GASES W/O2 SATURATION: CPT

## 2023-12-08 PROCEDURE — 94799 UNLISTED PULMONARY SVC/PX: CPT

## 2023-12-08 PROCEDURE — 94664 DEMO&/EVAL PT USE INHALER: CPT

## 2023-12-08 PROCEDURE — 63710000001 PREDNISONE PER 1 MG: Performed by: INTERNAL MEDICINE

## 2023-12-08 PROCEDURE — 36600 WITHDRAWAL OF ARTERIAL BLOOD: CPT

## 2023-12-08 PROCEDURE — 85025 COMPLETE CBC W/AUTO DIFF WBC: CPT | Performed by: STUDENT IN AN ORGANIZED HEALTH CARE EDUCATION/TRAINING PROGRAM

## 2023-12-08 PROCEDURE — 71045 X-RAY EXAM CHEST 1 VIEW: CPT | Performed by: RADIOLOGY

## 2023-12-08 PROCEDURE — 99231 SBSQ HOSP IP/OBS SF/LOW 25: CPT | Performed by: STUDENT IN AN ORGANIZED HEALTH CARE EDUCATION/TRAINING PROGRAM

## 2023-12-08 PROCEDURE — 63710000001 INSULIN LISPRO (HUMAN) PER 5 UNITS: Performed by: INTERNAL MEDICINE

## 2023-12-08 PROCEDURE — 94761 N-INVAS EAR/PLS OXIMETRY MLT: CPT

## 2023-12-08 PROCEDURE — 80048 BASIC METABOLIC PNL TOTAL CA: CPT | Performed by: STUDENT IN AN ORGANIZED HEALTH CARE EDUCATION/TRAINING PROGRAM

## 2023-12-08 PROCEDURE — 71045 X-RAY EXAM CHEST 1 VIEW: CPT

## 2023-12-08 PROCEDURE — 83050 HGB METHEMOGLOBIN QUAN: CPT

## 2023-12-08 PROCEDURE — 82948 REAGENT STRIP/BLOOD GLUCOSE: CPT

## 2023-12-08 PROCEDURE — 99232 SBSQ HOSP IP/OBS MODERATE 35: CPT | Performed by: INTERNAL MEDICINE

## 2023-12-08 PROCEDURE — 25010000002 FUROSEMIDE PER 20 MG: Performed by: INTERNAL MEDICINE

## 2023-12-08 PROCEDURE — 99222 1ST HOSP IP/OBS MODERATE 55: CPT | Performed by: PSYCHIATRY & NEUROLOGY

## 2023-12-08 PROCEDURE — 82375 ASSAY CARBOXYHB QUANT: CPT

## 2023-12-08 RX ORDER — POTASSIUM CHLORIDE 1.5 G/1.58G
40 POWDER, FOR SOLUTION ORAL 2 TIMES DAILY
Qty: 4 PACKET | Refills: 0 | Status: DISCONTINUED | OUTPATIENT
Start: 2023-12-08 | End: 2023-12-08

## 2023-12-08 RX ORDER — CLONAZEPAM 0.5 MG/1
0.5 TABLET ORAL 2 TIMES DAILY
Status: DISCONTINUED | OUTPATIENT
Start: 2023-12-08 | End: 2023-12-08

## 2023-12-08 RX ORDER — CLONAZEPAM 0.5 MG/1
0.25 TABLET ORAL 2 TIMES DAILY
Status: DISCONTINUED | OUTPATIENT
Start: 2023-12-08 | End: 2023-12-13 | Stop reason: HOSPADM

## 2023-12-08 RX ORDER — FUROSEMIDE 10 MG/ML
40 INJECTION INTRAMUSCULAR; INTRAVENOUS ONCE
Status: COMPLETED | OUTPATIENT
Start: 2023-12-08 | End: 2023-12-08

## 2023-12-08 RX ORDER — POTASSIUM CHLORIDE 20 MEQ/1
40 TABLET, EXTENDED RELEASE ORAL 2 TIMES DAILY
Status: COMPLETED | OUTPATIENT
Start: 2023-12-08 | End: 2023-12-08

## 2023-12-08 RX ADMIN — INSULIN LISPRO 6 UNITS: 100 INJECTION, SOLUTION INTRAVENOUS; SUBCUTANEOUS at 17:05

## 2023-12-08 RX ADMIN — IPRATROPIUM BROMIDE 0.5 MG: 0.5 SOLUTION RESPIRATORY (INHALATION) at 13:14

## 2023-12-08 RX ADMIN — FUROSEMIDE 40 MG: 10 INJECTION, SOLUTION INTRAMUSCULAR; INTRAVENOUS at 12:17

## 2023-12-08 RX ADMIN — ALPRAZOLAM 0.25 MG: 0.25 TABLET ORAL at 01:53

## 2023-12-08 RX ADMIN — INSULIN LISPRO 7 UNITS: 100 INJECTION, SOLUTION INTRAVENOUS; SUBCUTANEOUS at 20:38

## 2023-12-08 RX ADMIN — PREDNISONE 40 MG: 20 TABLET ORAL at 08:36

## 2023-12-08 RX ADMIN — HYDROCODONE BITARTRATE AND ACETAMINOPHEN 1 TABLET: 10; 325 TABLET ORAL at 05:42

## 2023-12-08 RX ADMIN — HYDROCODONE BITARTRATE AND ACETAMINOPHEN 1 TABLET: 10; 325 TABLET ORAL at 16:07

## 2023-12-08 RX ADMIN — METOPROLOL TARTRATE 50 MG: 50 TABLET, FILM COATED ORAL at 08:36

## 2023-12-08 RX ADMIN — IPRATROPIUM BROMIDE 0.5 MG: 0.5 SOLUTION RESPIRATORY (INHALATION) at 00:28

## 2023-12-08 RX ADMIN — BUDESONIDE AND FORMOTEROL FUMARATE DIHYDRATE 2 PUFF: 160; 4.5 AEROSOL RESPIRATORY (INHALATION) at 07:26

## 2023-12-08 RX ADMIN — IPRATROPIUM BROMIDE 0.5 MG: 0.5 SOLUTION RESPIRATORY (INHALATION) at 07:26

## 2023-12-08 RX ADMIN — LEVOTHYROXINE SODIUM 25 MCG: 25 TABLET ORAL at 05:39

## 2023-12-08 RX ADMIN — PANTOPRAZOLE SODIUM 40 MG: 40 TABLET, DELAYED RELEASE ORAL at 05:39

## 2023-12-08 RX ADMIN — CLOPIDOGREL BISULFATE 75 MG: 75 TABLET, FILM COATED ORAL at 08:36

## 2023-12-08 RX ADMIN — POTASSIUM CHLORIDE 40 MEQ: 1500 TABLET, EXTENDED RELEASE ORAL at 12:18

## 2023-12-08 RX ADMIN — Medication 10 ML: at 08:37

## 2023-12-08 RX ADMIN — ALPRAZOLAM 0.25 MG: 0.25 TABLET ORAL at 08:36

## 2023-12-08 RX ADMIN — ISOSORBIDE MONONITRATE 30 MG: 30 TABLET, EXTENDED RELEASE ORAL at 08:36

## 2023-12-08 RX ADMIN — Medication 10 ML: at 20:52

## 2023-12-08 RX ADMIN — ATORVASTATIN CALCIUM 40 MG: 40 TABLET, FILM COATED ORAL at 20:37

## 2023-12-08 RX ADMIN — POTASSIUM CHLORIDE 40 MEQ: 1500 TABLET, EXTENDED RELEASE ORAL at 20:37

## 2023-12-08 RX ADMIN — FLUTICASONE PROPIONATE 2 SPRAY: 50 SPRAY, METERED NASAL at 08:37

## 2023-12-08 RX ADMIN — FUROSEMIDE 40 MG: 40 TABLET ORAL at 08:36

## 2023-12-08 RX ADMIN — IPRATROPIUM BROMIDE 0.5 MG: 0.5 SOLUTION RESPIRATORY (INHALATION) at 18:57

## 2023-12-08 RX ADMIN — BUDESONIDE AND FORMOTEROL FUMARATE DIHYDRATE 2 PUFF: 160; 4.5 AEROSOL RESPIRATORY (INHALATION) at 18:56

## 2023-12-08 RX ADMIN — INSULIN LISPRO 4 UNITS: 100 INJECTION, SOLUTION INTRAVENOUS; SUBCUTANEOUS at 11:11

## 2023-12-08 NOTE — CONSULTS
Referring Provider: Dr. Mejia  Reason for Consultation: anxiety, capacity    Chief complaint/Focus of Exam: capacity    Subjective .     History of present illness:    Pt is a 57y F with hx of COPD, O2-dep resp failure, STEVEN, HTN, NIDDM, hypothyroidism, SCC who was admitted on 11/18/23 for mgmt of resp failure. Psych consulted for anxiety & capacity.     Pt reports hx of anxiety, cannot recall medications used. Pt struggled on exam today, likely some difficulty hearing but appears to poorly comprehend questions. Pt continued to reiterate that she wants to go home, but was unable to discuss adequate details of her medical condition, treatment, risks of returning home before deemed ready by medical team. She was unable to explain what she would do if she returned home and medical status worsened.     Review of Systems  Review of systems could not be obtained due to   patient confusion.    History  Past Medical History:   Diagnosis Date    Acid reflux disease     Anal cancer 12/05/2019    Arthritis     Asthma, extrinsic     Cholelithiasis     Chronic headaches     COPD (chronic obstructive pulmonary disease)     CVA (cerebral vascular accident)     w/ right sided deficit    CVA (cerebral vascular accident)     Diabetes mellitus     only has high blood sugar when on steriods    Dyslipidemia 09/04/2018    History of radiation therapy 02/27/2020    Whole pelvis/anal mass    Nausea and vomiting 12/25/2016    Obstructive sleep apnea treated with BiPAP     On home oxygen therapy     2L     Sleep apnea, obstructive    ,   Past Surgical History:   Procedure Laterality Date    ABDOMINAL SURGERY      CARDIAC CATHETERIZATION      CARDIAC CATHETERIZATION N/A 11/29/2023    Procedure: Coronary angiography;  Surgeon: Case Irizarry MD;  Location: [facility] INVASIVE LOCATION;  Service: Cardiology;  Laterality: N/A;    CAROTID ENDARTERECTOMY Left 2018    CHOLECYSTECTOMY WITH INTRAOPERATIVE CHOLANGIOGRAM N/A 1/30/2017     Procedure: CHOLECYSTECTOMY LAPAROSCOPIC INTRAOPERATIVE CHOLANGIOGRAM;  Surgeon: Carolin Marie MD;  Location: [facility] OR;  Service:     COLONOSCOPY      HYSTERECTOMY      for heavy bleeding/ complete    KIDNEY STONE SURGERY      TUBAL ABDOMINAL LIGATION      VENOUS ACCESS DEVICE (PORT) INSERTION Left 2019    Procedure: INSERTION VENOUS ACCESS DEVICE;  Surgeon: Carolin Marie MD;  Location: [facility] OR;  Service: General   ,   Family History   Problem Relation Age of Onset    Diabetes Mother     Hypertension Mother     Arrhythmia Mother     Pneumonia Father     Coronary artery disease Other     Arrhythmia Sister     Heart attack Brother     Lymphoma Brother         hodgkin's     Other Brother         CABG    Breast cancer Neg Hx    ,   Social History     Socioeconomic History    Marital status:    Tobacco Use    Smoking status: Former     Packs/day: 1.50     Years: 30.00     Additional pack years: 0.00     Total pack years: 45.00     Types: Electronic Cigarette, Cigarettes     Quit date:      Years since quittin.9    Smokeless tobacco: Never   Vaping Use    Vaping Use: Never used   Substance and Sexual Activity    Alcohol use: No    Drug use: Defer    Sexual activity: Defer     E-cigarette/Vaping    E-cigarette/Vaping Use Never User      E-cigarette/Vaping Substances     E-cigarette/Vaping Devices         ,   Medications Prior to Admission   Medication Sig Dispense Refill Last Dose    clopidogrel (PLAVIX) 75 MG tablet Take 1 tablet by mouth Daily.  3 2023    fluticasone (FLONASE) 50 MCG/ACT nasal spray 2 sprays into the nostril(s) as directed by provider Daily.   2023    Fluticasone-Umeclidin-Vilant 200-62.5-25 MCG/ACT aerosol powder  Inhale 1 puff Daily.   2023    furosemide (LASIX) 20 MG tablet Take 1 tablet by mouth Daily.   2023    HYDROcodone-acetaminophen (NORCO)  MG per tablet Take 1 tablet by mouth Every 6 (Six) Hours As Needed for Moderate Pain.   Past  Week    levothyroxine (SYNTHROID, LEVOTHROID) 25 MCG tablet Take 1 tablet by mouth Every Morning.   11/17/2023    metoprolol tartrate (LOPRESSOR) 25 MG tablet Take 1 tablet by mouth 2 (Two) Times a Day.   11/17/2023    potassium chloride (MICRO-K) 10 MEQ CR capsule Take 1 capsule by mouth Daily.   11/17/2023    albuterol sulfate  (90 Base) MCG/ACT inhaler Inhale 2 puffs Every 6 (Six) Hours As Needed for Wheezing or Shortness of Air. 18 g 0 Unknown    ALPRAZolam (XANAX) 1 MG tablet Take 1 tablet by mouth Daily As Needed for Anxiety.   Unknown    ipratropium-albuterol (COMBIVENT RESPIMAT)  MCG/ACT inhaler Inhale 1 puff 4 (Four) Times a Day As Needed for Wheezing or Shortness of Air.   Unknown    ipratropium-albuterol (DUO-NEB) 0.5-2.5 mg/3 ml nebulizer Take 3 mL by nebulization Every 4 (Four) Hours As Needed for Wheezing or Shortness of Air.   Unknown   , Scheduled Meds:  ALPRAZolam, 0.25 mg, Oral, Q8H  aspirin, 324 mg, Oral, Once  atorvastatin, 40 mg, Oral, Nightly  budesonide-formoterol, 2 puff, Inhalation, BID - RT  clopidogrel, 75 mg, Oral, Daily  fluticasone, 2 spray, Nasal, Daily  furosemide, 40 mg, Oral, Daily  insulin lispro, 2-9 Units, Subcutaneous, 4x Daily AC & at Bedtime  ipratropium, 0.5 mg, Nebulization, 4x Daily - RT  isosorbide mononitrate, 30 mg, Oral, Q24H  levothyroxine, 25 mcg, Oral, Q AM  metoprolol tartrate, 50 mg, Oral, BID  pantoprazole, 40 mg, Oral, Q AM  potassium chloride, 40 mEq, Oral, BID  predniSONE, 40 mg, Oral, Daily With Breakfast  senna-docusate sodium, 2 tablet, Oral, BID  sodium chloride, 10 mL, Intravenous, Q12H  sodium chloride, 10 mL, Intravenous, Q12H   , Continuous Infusions:  Pharmacy Consult,    , PRN Meds:    acetaminophen    senna-docusate sodium **AND** polyethylene glycol **AND** bisacodyl **AND** bisacodyl    Calcium Replacement - Follow Nurse / BPA Driven Protocol    dextrose    dextrose    glucagon (human recombinant)    HYDROcodone-acetaminophen     Lidocaine Viscous HCl    loperamide    magic barrier cream    Magnesium Low Dose Replacement - Follow Nurse / BPA Driven Protocol    nitroglycerin    ondansetron    Pharmacy Consult    Phosphorus Replacement - Follow Nurse / BPA Driven Protocol    Potassium Replacement - Follow Nurse / BPA Driven Protocol    [COMPLETED] Insert Peripheral IV **AND** sodium chloride    sodium chloride    sodium chloride    sodium chloride    sodium chloride, and Allergies:  Morphine and Roflumilast    Objective     Vital Signs   Temp:  [97.5 °F (36.4 °C)-98.2 °F (36.8 °C)] 97.5 °F (36.4 °C)  Heart Rate:  [] 90  Resp:  [16-22] 20  BP: (103-128)/(61-76) 112/75    Mental Status Exam:  Hygiene:   good  Cooperation:  Guarded  Eye Contact:  Fair  Psychomotor Behavior:  Appropriate  Affect:  Restricted  Hopelessness: Denies  Speech:   Normal  Thought Progress:  Unable to demonstrate  Thought Content:  Unable to demonstrate  Suicidal:  None  Homicidal:  None  Hallucinations:  Not demonstrated today  Delusion:  Unable to demonstrate  Memory:  Deficits  Orientation:  Person and Place  Reliability:  poor  Insight:  Poor  Judgement:  Poor  Impulse Control:  Fair    Results Review:   I reviewed the patient's new clinical results.  I reviewed the patient's other test results and agree with the interpretation  I personally viewed and interpreted the patient's EKG/Telemetry data  Lab Results (last 24 hours)       Procedure Component Value Units Date/Time    Basic Metabolic Panel [[id number]]  (Abnormal) Collected: 12/08/23 0756    Specimen: Blood Updated: 12/08/23 0911     Glucose 133 mg/dL      BUN 25 mg/dL      Creatinine 0.41 mg/dL      Sodium 141 mmol/L      Potassium 3.7 mmol/L      Comment: Slight hemolysis detected by analyzer. Result may be falsely elevated.        Chloride 94 mmol/L      CO2 42.7 mmol/L      Calcium 9.1 mg/dL      BUN/Creatinine Ratio 61.0     Anion Gap 4.3 mmol/L      eGFR 114.2 mL/min/1.73     Narrative:      GFR  Normal >60  Chronic Kidney Disease <60  Kidney Failure <15      CBC & Differential [[id number]]  (Abnormal) Collected: 12/08/23 0756    Specimen: Blood Updated: 12/08/23 0841    Narrative:      The following orders were created for panel order CBC & Differential.  Procedure                               Abnormality         Status                     ---------                               -----------         ------                     CBC Auto Differential[[id number]]        Abnormal            Final result                 Please view results for these tests on the individual orders.    CBC Auto Differential [[id number]]  (Abnormal) Collected: 12/08/23 0756    Specimen: Blood Updated: 12/08/23 0841     WBC 13.83 10*3/mm3      RBC 4.61 10*6/mm3      Hemoglobin 13.3 g/dL      Hematocrit 45.3 %      MCV 98.3 fL      MCH 28.9 pg      MCHC 29.4 g/dL      RDW 14.6 %      RDW-SD 53.0 fl      MPV 11.6 fL      Platelets 147 10*3/mm3      Neutrophil % 77.1 %      Lymphocyte % 12.3 %      Monocyte % 8.6 %      Eosinophil % 0.5 %      Basophil % 0.4 %      Immature Grans % 1.1 %      Neutrophils, Absolute 10.67 10*3/mm3      Lymphocytes, Absolute 1.70 10*3/mm3      Monocytes, Absolute 1.19 10*3/mm3      Eosinophils, Absolute 0.07 10*3/mm3      Basophils, Absolute 0.05 10*3/mm3      Immature Grans, Absolute 0.15 10*3/mm3      nRBC 0.0 /100 WBC     POC Glucose Once [[id number]]  (Abnormal) Collected: 12/08/23 0731    Specimen: Blood Updated: 12/08/23 0736     Glucose 131 mg/dL     Blood Gas, Arterial With Co-Ox [[id number]]  (Abnormal) Collected: 12/08/23 0431    Specimen: Arterial Blood Updated: 12/08/23 0436     Site Left Radial     William's Test N/A     pH, Arterial 7.260 pH units      Comment: 84 Value below reference range        pCO2, Arterial 103.0 mm Hg      Comment: 86 Value above critical limit        pO2, Arterial 152.0 mm Hg      Comment: 83 Value above reference range        HCO3, Arterial 46.2 mmol/L      Comment: 83  Value above reference range        Base Excess, Arterial 14.4 mmol/L      O2 Saturation, Arterial >99.2 %      Hemoglobin, Blood Gas 13.3 g/dL      Comment: 84 Value below reference range        Hematocrit, Blood Gas 40.8 %      Oxyhemoglobin 97.8 %      Methemoglobin 0.10 %      Carboxyhemoglobin 1.7 %      A-a DO2 --     Comment: UNABLE TO CALCULATE        CO2 Content 49.4 mmol/L      Barometric Pressure for Blood Gas 729 mmHg      Modality Nasal Cannula     FIO2 34 %      Flow Rate 3.5 lpm      Ventilator Mode NA     Notified Who LONG     Notified By [id number]     Notified Time 12/08/2023 04:37     Collected by [id number]     Comment: Meter: [id number]     :  [id number]        pH, Temp Corrected --     pCO2, Temperature Corrected --     pO2, Temperature Corrected --    POC Glucose Once [[id number]]  (Abnormal) Collected: 12/07/23 2042    Specimen: Blood Updated: 12/07/23 2050     Glucose 184 mg/dL     POC Glucose Once [[id number]]  (Abnormal) Collected: 12/07/23 1649    Specimen: Blood Updated: 12/07/23 1655     Glucose 187 mg/dL     Magnesium [[id number]]  (Normal) Collected: 12/07/23 0255    Specimen: Blood Updated: 12/07/23 1307     Magnesium 2.2 mg/dL     POC Glucose Once [[id number]]  (Abnormal) Collected: 12/07/23 1040    Specimen: Blood Updated: 12/07/23 1047     Glucose 177 mg/dL           Imaging Results (Last 24 Hours)       ** No results found for the last 24 hours. **              Assessment & Plan     Principal Problem:    Acute respiratory failure with hypoxia  Active Problems:    Chest pain     Anxiety  -Likely worsened by medical comorbidities, prolonged hospitalization, possibly steroids.  -Clonazepam likely a safer option than alprazolam. Would use sparingly until respiratory status improves, then can discontinue    Capacity  -Pt unable to either comprehend or communicate her understanding of medical condition, treatment, risks of early discharge or refusal of recommended treatment  options.  -Pt appears to lack medical decision making at this time. It is possible this is temporary due to current clinical status and she may not need long-term guardianship, but given her desire to leave the hospital at this time, emergency guardianship appears reasonable. Will leave recommendation in next note.    I discussed the patient's findings and my recommendations with patient    Johnathan Martines MD  12/08/23  10:22 EST

## 2023-12-08 NOTE — NURSING NOTE
Pt confused and refusing to wear trilogy. Pt son at bedside and trying to get pt to put on trilogy. Son requested RN to try again after she eats the food he brought for her.

## 2023-12-08 NOTE — PROGRESS NOTES
Pulmonary and critical care following the patient for COPD exacerbation and hypoxic respiratory failure  Chief Complaint:  sob    Subjective -   .  All the labs medications ins and outs and vitals reviewed.  Patient did not use Trilogy  again last night.  ABG shows respiratory acidosis.    Review of Systems:    Positive for shortness of breath otherwise negative        Vital Signs  Temp:  [97.5 °F (36.4 °C)-98.3 °F (36.8 °C)] 98.3 °F (36.8 °C)  Heart Rate:  [] 94  Resp:  [16-22] 20  BP: (103-128)/(61-76) 113/72  Body mass index is 25.32 kg/m².    Intake/Output Summary (Last 24 hours) at 12/8/2023 1120  Last data filed at 12/8/2023 0900  Gross per 24 hour   Intake 840 ml   Output 400 ml   Net 440 ml     I/O this shift:  In: 360 [P.O.:360]  Out: -     Physical Exam:  General-chronically ill in appearance, not in any acute distress    HEENT-PERRLA    Neck-supple    Respiratory-respirations normal-on auscultation no wheezing no crackles, generalized decreased breath sounds.  Wearing triology  Cardiovascular-NSR    GI-nontender nondistended bowel sounds positive    CNS-nonfocal    Musculoskeletal -no edema  Extremities- no obvious deformity noticed     Psychiatric- alert awake oriented  Skin- no visible rash         Results Review:     I reviewed the patient's new clinical results.  Results from last 7 days   Lab Units 12/08/23  0756 12/07/23  0255 12/05/23  0052   WBC 10*3/mm3 13.83* 11.40* 14.48*   HEMOGLOBIN g/dL 13.3 13.5 13.1   PLATELETS 10*3/mm3 147 146 77*     Results from last 7 days   Lab Units 12/08/23  0756 12/07/23  0255 12/05/23  0052 12/04/23  0051 12/03/23 2038   SODIUM mmol/L 141 141 143   < >  --    POTASSIUM mmol/L 3.7 2.8* 4.2   < > 4.2   CHLORIDE mmol/L 94* 91* 89*   < >  --    CO2 mmol/L 42.7* 44.7* 46.2*   < >  --    BUN mg/dL 25* 23* 23*   < >  --    CREATININE mg/dL 0.41* 0.67 0.44*   < >  --    CALCIUM mg/dL 9.1 9.4 9.2   < >  --    GLUCOSE mg/dL 133* 138* 126*   < >  --    MAGNESIUM  mg/dL  --  2.2 2.1  --  2.1    < > = values in this interval not displayed.     Lab Results   Component Value Date    INR 0.94 11/18/2023    INR 0.95 10/12/2023    INR 0.90 03/27/2017    PROTIME 13.1 11/18/2023    PROTIME 13.1 10/12/2023    PROTIME 9.9 03/27/2017     Results from last 7 days   Lab Units 12/04/23  0051 12/03/23  0852 12/02/23  0211   ALK PHOS U/L 48 50 49   BILIRUBIN mg/dL 0.8 0.7 0.5   BILIRUBIN DIRECT mg/dL  --   --  <0.2   ALT (SGPT) U/L 19 22 22   AST (SGOT) U/L 12 14 23     Results from last 7 days   Lab Units 12/08/23  0431   PH, ARTERIAL pH units 7.260*   PO2 ART mm Hg 152.0*   PCO2, ARTERIAL mm Hg 103.0*   HCO3 ART mmol/L 46.2*     Imaging Results (Last 24 Hours)       ** No results found for the last 24 hours. **                 aspirin, 324 mg, Oral, Once  atorvastatin, 40 mg, Oral, Nightly  budesonide-formoterol, 2 puff, Inhalation, BID - RT  clonazePAM, 0.5 mg, Oral, BID  clopidogrel, 75 mg, Oral, Daily  fluticasone, 2 spray, Nasal, Daily  furosemide, 40 mg, Oral, Daily  insulin lispro, 2-9 Units, Subcutaneous, 4x Daily AC & at Bedtime  ipratropium, 0.5 mg, Nebulization, 4x Daily - RT  isosorbide mononitrate, 30 mg, Oral, Q24H  levothyroxine, 25 mcg, Oral, Q AM  metoprolol tartrate, 50 mg, Oral, BID  pantoprazole, 40 mg, Oral, Q AM  potassium chloride, 40 mEq, Oral, BID  predniSONE, 40 mg, Oral, Daily With Breakfast  senna-docusate sodium, 2 tablet, Oral, BID  sodium chloride, 10 mL, Intravenous, Q12H  sodium chloride, 10 mL, Intravenous, Q12H      Pharmacy Consult,       Site   Date Value Ref Range Status   12/08/2023 Left Radial  Final     William's Test   Date Value Ref Range Status   12/08/2023 N/A  Final     pH, Arterial   Date Value Ref Range Status   12/08/2023 7.260 (L) 7.350 - 7.450 pH units Final     Comment:     84 Value below reference range     pCO2, Arterial   Date Value Ref Range Status   12/08/2023 103.0 (C) 35.0 - 45.0 mm Hg Final     Comment:     86 Value above critical  limit     pO2, Arterial   Date Value Ref Range Status   12/08/2023 152.0 (H) 83.0 - 108.0 mm Hg Final     Comment:     83 Value above reference range     HCO3, Arterial   Date Value Ref Range Status   12/08/2023 46.2 (H) 20.0 - 26.0 mmol/L Final     Comment:     83 Value above reference range     Base Excess, Arterial   Date Value Ref Range Status   12/08/2023 14.4 (H) 0.0 - 2.0 mmol/L Final     O2 Saturation, Arterial   Date Value Ref Range Status   12/08/2023 >99.2 (H) 94.0 - 99.0 % Final     Hemoglobin, Blood Gas   Date Value Ref Range Status   12/08/2023 13.3 (L) 13.5 - 17.5 g/dL Final     Comment:     84 Value below reference range     Hematocrit, Blood Gas   Date Value Ref Range Status   12/08/2023 40.8 38.0 - 51.0 % Final     Oxyhemoglobin   Date Value Ref Range Status   12/08/2023 97.8 94 - 99 % Final     Methemoglobin   Date Value Ref Range Status   12/08/2023 0.10 0.00 - 3.00 % Final     Carboxyhemoglobin   Date Value Ref Range Status   12/08/2023 1.7 0 - 5 % Final     CO2 Content   Date Value Ref Range Status   12/08/2023 49.4 (H) 22 - 33 mmol/L Final     Barometric Pressure for Blood Gas   Date Value Ref Range Status   12/08/2023 729 mmHg Final     Modality   Date Value Ref Range Status   12/08/2023 Nasal Cannula  Final     FIO2   Date Value Ref Range Status   12/08/2023 34 % Final      Latest Reference Range & Units 12/08/23 04:31   pH, Arterial 7.350 - 7.450 pH units 7.260 (L)   pCO2, Arterial 35.0 - 45.0 mm Hg 103.0 (C)   pO2, Arterial 83.0 - 108.0 mm Hg 152.0 (H)   HCO3, Arterial 20.0 - 26.0 mmol/L 46.2 (H)   Base Excess 0.0 - 2.0 mmol/L 14.4 (H)   O2 Saturation, Arterial 94.0 - 99.0 % >99.2 (H)   CO2 Content 22 - 33 mmol/L 49.4 (H)   A-a DO2  See Comment   Carboxyhemoglobin 0 - 5 % 1.7   Methemoglobin 0.00 - 3.00 % 0.10   Oxyhemoglobin 94 - 99 % 97.8   Hematocrit, Blood Gas 38.0 - 51.0 % 40.8   Hemoglobin, Blood Gas 13.5 - 17.5 g/dL 13.3 (L)   Site  Left Radial   William's Test  N/A   Modality  Nasal  Cannula   FIO2 % 34   Flow Rate lpm 3.5   Ventilator Mode  NA   Barometric Pressure for Blood Gas mmHg 729   Notified By  [id number]   Notified Time  12/08/2023 04:37   Notified Who  LONG   Collected by  [id number]   (C): Data is critically high  (L): Data is abnormally low  (H): Data is abnormally high'  Medication Review:    Latest Reference Range & Units 12/04/23 00:51   proBNP 0.0 - 900.0 pg/mL 581.8     Assessment & Plan   COPD exacerbation-continue steroids continue nebs  Doses reviewed.  Continue current inhalers.  Medication list reviewed.  Continue oxygen-to maintain saturation 88 to 92%.        Patient did not trilogy and blood gases are worse.  This was shared with her.  Educated her and encouraged her to use the machine.      Psych will see the patient today.  Increased her Xanax to 3 times daily.    Continue diuretics to improve lung compliance and diffusion capacity.  Will give extra dose of Lasix today.  Latest blood gases reviewed.    continue steroids continue nebs  Continue inhalers  Continue oxygen  Encouraged her to wear trilogy machine tonight.  Latest chest x-ray reviewed.  If tolerates the antianxiety medications will increase the dose.    STEVEN-continue trilogy overnight.  We will adjust the EPAP as patient likely has severe sleep apnea will adjust the minimum EPAP to 12 and maximum to 18.  No desaturations noticed after increasing the EPAP.  Encouraged her to use the CPAP trilogy every night.      Chronic hypercapnic hypoxic respiratory failure-continue current amount of oxygen titrated to maintain saturation 88 to 92%.     Continue trilogy with a setting of  AVAPS/AE. Tidal volume 6 mL/kg ideal body weight, PS max 28, PS minimum 20, EP AP max 18, EPAP minimum 12 , respiratory rate 18,      hypo kalemia-will replace potassium.  Orders entered     Results for orders placed during the hospital encounter of 10/12/23    Adult Transthoracic Echo Complete W/ Cont if Necessary Per  Protocol    Interpretation Summary    Left ventricular systolic function is normal. Left ventricular ejection fraction appears to be 66 - 70%.    Left ventricular diastolic function is consistent with (grade I) impaired relaxation.    Estimated right ventricular systolic pressure from tricuspid regurgitation is normal (<35 mmHg).        FiO2 requirement reviewed and adjusted to maintain saturation 88 to 92%.  Continue appropriate prophylaxis            Acute respiratory failure with hypoxia    Chest pain               Elijah Hope MD  12/08/23  11:20 EST

## 2023-12-08 NOTE — NURSING NOTE
Pt's peripheral IV is due to be removed. Pt refused new IV stating that the one she had is fine and that she does not want another one. Educated pt on protocol for peripheral IVs and risk for infection for IV older that 7 days. Pt continued to refuse getting a new IV.

## 2023-12-08 NOTE — PLAN OF CARE
Goal Outcome Evaluation:  Pt had complaints of pain, PRN pain med given, see MAR. Pt became confused this afternoon, Dr. Christianson, see orders. Pt refuses to wear trilogy as ordered, educated pt on importance of wearing trilogy as ordered, pt continues to refuse. Pt got up to BSC this shift, tolerated well. Wound care completed per orders. Will continue to follow plan of care.

## 2023-12-08 NOTE — CASE MANAGEMENT/SOCIAL WORK
Discharge Planning Assessment   [facility]     Patient Name: Liz Jiang  MRN: [id number]  Today's Date: 12/8/2023    Admit Date: 11/18/2023         Discharge Plan       Row Name 12/08/23 1724       Plan    Plan SS noted, Psychiatry has evaluated Pt for capcaity and has recommended Pt have an emergency guardian appointed. SS attended family meeting with Pt's sons, sisters, Dr. Mejia and Palliative care HESHAM Torres on this date. Pt's family are in agreement that Pt's son Pradeep would be the family member to appoint emergency guardian. SS provided son emergency guardianship statement and instructions for son to follow up with [facility] office on 12/11/23 to begin guardianship process. Pt's family plans to have further discussions over the weekend about goals of care. Pt's family have declined SNF placement and are considering taking Pt home with hospice vs home with another family member.  Pt lives with her daughter. Pt does not have home health services. Pt has applied for a waiver program through [facility]. SS to follow up with [facility] and [facility] to check on status of waiver program on 12/11/23. Pt utilizes home oxygen at 3 liters, bipap via [employer]. Pt utilizes a hosptial bed, bedside commode, pulse ox and blood pressure cuff via [facility]. SS to follow.                  LIANE Armstrong

## 2023-12-08 NOTE — PLAN OF CARE
Goal Outcome Evaluation:  Plan of Care Reviewed With: patient        Progress: no change  Outcome Evaluation: Patient has been resting in bed throughout the night. pt had complaints of pain, see MAR. pt voiced no other complaints. there are no acute changes noted at this time. will continue with plan of care.

## 2023-12-08 NOTE — SIGNIFICANT NOTE
Explained to patient the importance of wearing her home trilogy, patient stated she would put it on later.

## 2023-12-08 NOTE — PROGRESS NOTES
Patient is unable to make appropriate medical decisions at this time and would benefit from emergency guardianship.  If there is no improvement and it is believed patient requires a permanent guardian, follow-up should be directed to an outpatient interdisciplinary team, per typical court proceedings.      Follow-up should not be redirected back to Yaneth Altman, as this evaluation is strictly for emergency guardianship while hospitalized.    Electronically signed by Johnathan Martines MD, 12/08/23, 10:40 AM EST.

## 2023-12-09 LAB
A-A DO2: 194.5 MMHG (ref 0–300)
A-A DO2: 54.8 MMHG (ref 0–300)
ARTERIAL PATENCY WRIST A: ABNORMAL
ARTERIAL PATENCY WRIST A: POSITIVE
ATMOSPHERIC PRESS: 726 MMHG
ATMOSPHERIC PRESS: 727 MMHG
BASE EXCESS BLDA CALC-SCNC: 19.5 MMOL/L (ref 0–2)
BASE EXCESS BLDA CALC-SCNC: 20.3 MMOL/L (ref 0–2)
BDY SITE: ABNORMAL
BDY SITE: ABNORMAL
CO2 BLDA-SCNC: 50.3 MMOL/L (ref 22–33)
CO2 BLDA-SCNC: 52.4 MMOL/L (ref 22–33)
COHGB MFR BLD: 1.2 % (ref 0–5)
COHGB MFR BLD: 1.4 % (ref 0–5)
GAS FLOW AIRWAY: 3 LPM
GAS FLOW AIRWAY: 8 LPM
GLUCOSE BLDC GLUCOMTR-MCNC: 104 MG/DL (ref 70–130)
GLUCOSE BLDC GLUCOMTR-MCNC: 223 MG/DL (ref 70–130)
GLUCOSE BLDC GLUCOMTR-MCNC: 228 MG/DL (ref 70–130)
GLUCOSE BLDC GLUCOMTR-MCNC: 277 MG/DL (ref 70–130)
HCO3 BLDA-SCNC: 48.2 MMOL/L (ref 20–26)
HCO3 BLDA-SCNC: 49.8 MMOL/L (ref 20–26)
HCT VFR BLD CALC: 42.2 % (ref 38–51)
HCT VFR BLD CALC: 43.1 % (ref 38–51)
HGB BLDA-MCNC: 13.8 G/DL (ref 13.5–17.5)
HGB BLDA-MCNC: 14 G/DL (ref 13.5–17.5)
INHALED O2 CONCENTRATION: 32 %
INHALED O2 CONCENTRATION: 52 %
Lab: ABNORMAL
METHGB BLD QL: 0.2 % (ref 0–3)
METHGB BLD QL: <-0.1 % (ref 0–3)
MODALITY: ABNORMAL
MODALITY: ABNORMAL
NOTIFIED BY: ABNORMAL
NOTIFIED BY: ABNORMAL
NOTIFIED WHO: ABNORMAL
NOTIFIED WHO: ABNORMAL
OXYHGB MFR BLDV: 90.3 % (ref 94–99)
OXYHGB MFR BLDV: 95.9 % (ref 94–99)
PCO2 BLDA: 67.1 MM HG (ref 35–45)
PCO2 BLDA: 86 MM HG (ref 35–45)
PCO2 TEMP ADJ BLD: ABNORMAL MM[HG]
PCO2 TEMP ADJ BLD: ABNORMAL MM[HG]
PH BLDA: 7.37 PH UNITS (ref 7.35–7.45)
PH BLDA: 7.46 PH UNITS (ref 7.35–7.45)
PH, TEMP CORRECTED: ABNORMAL
PH, TEMP CORRECTED: ABNORMAL
PO2 BLDA: 66.6 MM HG (ref 83–108)
PO2 BLDA: 87.8 MM HG (ref 83–108)
PO2 TEMP ADJ BLD: ABNORMAL MM[HG]
PO2 TEMP ADJ BLD: ABNORMAL MM[HG]
SAO2 % BLDCOA: 91.8 % (ref 94–99)
SAO2 % BLDCOA: 96.7 % (ref 94–99)
VENTILATOR MODE: ABNORMAL
VENTILATOR MODE: ABNORMAL

## 2023-12-09 PROCEDURE — 94664 DEMO&/EVAL PT USE INHALER: CPT

## 2023-12-09 PROCEDURE — 63710000001 PREDNISONE PER 1 MG: Performed by: INTERNAL MEDICINE

## 2023-12-09 PROCEDURE — 82805 BLOOD GASES W/O2 SATURATION: CPT

## 2023-12-09 PROCEDURE — 94761 N-INVAS EAR/PLS OXIMETRY MLT: CPT

## 2023-12-09 PROCEDURE — 99231 SBSQ HOSP IP/OBS SF/LOW 25: CPT | Performed by: STUDENT IN AN ORGANIZED HEALTH CARE EDUCATION/TRAINING PROGRAM

## 2023-12-09 PROCEDURE — 82375 ASSAY CARBOXYHB QUANT: CPT

## 2023-12-09 PROCEDURE — 94799 UNLISTED PULMONARY SVC/PX: CPT

## 2023-12-09 PROCEDURE — 63710000001 INSULIN LISPRO (HUMAN) PER 5 UNITS: Performed by: INTERNAL MEDICINE

## 2023-12-09 PROCEDURE — 99232 SBSQ HOSP IP/OBS MODERATE 35: CPT | Performed by: INTERNAL MEDICINE

## 2023-12-09 PROCEDURE — 82948 REAGENT STRIP/BLOOD GLUCOSE: CPT

## 2023-12-09 PROCEDURE — 36600 WITHDRAWAL OF ARTERIAL BLOOD: CPT

## 2023-12-09 PROCEDURE — 25010000002 HEPARIN (PORCINE) PER 1000 UNITS: Performed by: STUDENT IN AN ORGANIZED HEALTH CARE EDUCATION/TRAINING PROGRAM

## 2023-12-09 PROCEDURE — 83050 HGB METHEMOGLOBIN QUAN: CPT

## 2023-12-09 RX ORDER — HEPARIN SODIUM 5000 [USP'U]/ML
5000 INJECTION, SOLUTION INTRAVENOUS; SUBCUTANEOUS EVERY 8 HOURS SCHEDULED
Status: DISCONTINUED | OUTPATIENT
Start: 2023-12-09 | End: 2023-12-13 | Stop reason: HOSPADM

## 2023-12-09 RX ADMIN — Medication 10 ML: at 08:23

## 2023-12-09 RX ADMIN — METOPROLOL TARTRATE 50 MG: 50 TABLET, FILM COATED ORAL at 20:28

## 2023-12-09 RX ADMIN — ACETAMINOPHEN 650 MG: 325 TABLET ORAL at 08:21

## 2023-12-09 RX ADMIN — Medication 10 ML: at 20:31

## 2023-12-09 RX ADMIN — IPRATROPIUM BROMIDE 0.5 MG: 0.5 SOLUTION RESPIRATORY (INHALATION) at 14:38

## 2023-12-09 RX ADMIN — CLONAZEPAM 0.25 MG: 0.5 TABLET ORAL at 20:29

## 2023-12-09 RX ADMIN — Medication 10 ML: at 08:22

## 2023-12-09 RX ADMIN — INSULIN LISPRO 4 UNITS: 100 INJECTION, SOLUTION INTRAVENOUS; SUBCUTANEOUS at 20:31

## 2023-12-09 RX ADMIN — IPRATROPIUM BROMIDE 0.5 MG: 0.5 SOLUTION RESPIRATORY (INHALATION) at 19:56

## 2023-12-09 RX ADMIN — PANTOPRAZOLE SODIUM 40 MG: 40 TABLET, DELAYED RELEASE ORAL at 05:03

## 2023-12-09 RX ADMIN — FLUTICASONE PROPIONATE 2 SPRAY: 50 SPRAY, METERED NASAL at 08:23

## 2023-12-09 RX ADMIN — ISOSORBIDE MONONITRATE 30 MG: 30 TABLET, EXTENDED RELEASE ORAL at 08:21

## 2023-12-09 RX ADMIN — HEPARIN SODIUM 5000 UNITS: 5000 INJECTION INTRAVENOUS; SUBCUTANEOUS at 20:32

## 2023-12-09 RX ADMIN — FUROSEMIDE 40 MG: 40 TABLET ORAL at 08:21

## 2023-12-09 RX ADMIN — LEVOTHYROXINE SODIUM 25 MCG: 25 TABLET ORAL at 05:03

## 2023-12-09 RX ADMIN — METOPROLOL TARTRATE 50 MG: 50 TABLET, FILM COATED ORAL at 08:21

## 2023-12-09 RX ADMIN — CLOPIDOGREL BISULFATE 75 MG: 75 TABLET, FILM COATED ORAL at 08:21

## 2023-12-09 RX ADMIN — INSULIN LISPRO 6 UNITS: 100 INJECTION, SOLUTION INTRAVENOUS; SUBCUTANEOUS at 11:08

## 2023-12-09 RX ADMIN — INSULIN LISPRO 4 UNITS: 100 INJECTION, SOLUTION INTRAVENOUS; SUBCUTANEOUS at 16:48

## 2023-12-09 RX ADMIN — HYDROCODONE BITARTRATE AND ACETAMINOPHEN 1 TABLET: 10; 325 TABLET ORAL at 20:29

## 2023-12-09 RX ADMIN — BUDESONIDE AND FORMOTEROL FUMARATE DIHYDRATE 2 PUFF: 160; 4.5 AEROSOL RESPIRATORY (INHALATION) at 06:43

## 2023-12-09 RX ADMIN — IPRATROPIUM BROMIDE 0.5 MG: 0.5 SOLUTION RESPIRATORY (INHALATION) at 06:43

## 2023-12-09 RX ADMIN — PREDNISONE 40 MG: 20 TABLET ORAL at 08:21

## 2023-12-09 RX ADMIN — HYDROCODONE BITARTRATE AND ACETAMINOPHEN 1 TABLET: 10; 325 TABLET ORAL at 02:26

## 2023-12-09 RX ADMIN — ATORVASTATIN CALCIUM 40 MG: 40 TABLET, FILM COATED ORAL at 20:28

## 2023-12-09 RX ADMIN — CLONAZEPAM 0.25 MG: 0.5 TABLET ORAL at 08:55

## 2023-12-09 RX ADMIN — VITAMINS A AND D OINTMENT 1 APPLICATION: 15.5; 53.4 OINTMENT TOPICAL at 08:22

## 2023-12-09 NOTE — PROGRESS NOTES
"    [facility] HOSPITALIST PROGRESS NOTE     Patient Identification:  Name:  Liz Jiang  Age:  57 y.o.  Sex:  female  :  1965  MRN:  [id number]  Visit Number:  [id number]  ROOM: [address] [place] [address]     Primary Care Provider:  Tabitha Ron APRN    Length of stay in inpatient status:  20    Subjective     Chief Compliant:    Chief Complaint   Patient presents with    Shortness of Breath       History of Presenting Illness:    Patient seen and examined this afternoon after having a lengthy family meeting with two of patient's sons and patient's two sisters, with the assistance of palliative care and . When I entered the room patient was trying to bite an unopened butter packet from her supper tray. She was not able to really answer questions meaningfully. I asked how her breathing was today and she said \"it's there\" and then said \"I just want to know if the machine is working, that's all that matters.\" She then perseverated on that statement, and to anything else I asked would say \"that's all that matters.\"    Objective     Current Hospital Meds:aspirin, 324 mg, Oral, Once  atorvastatin, 40 mg, Oral, Nightly  budesonide-formoterol, 2 puff, Inhalation, BID - RT  clonazePAM, 0.25 mg, Oral, BID  clopidogrel, 75 mg, Oral, Daily  fluticasone, 2 spray, Nasal, Daily  furosemide, 40 mg, Oral, Daily  insulin lispro, 2-9 Units, Subcutaneous, 4x Daily AC & at Bedtime  ipratropium, 0.5 mg, Nebulization, 4x Daily - RT  isosorbide mononitrate, 30 mg, Oral, Q24H  levothyroxine, 25 mcg, Oral, Q AM  metoprolol tartrate, 50 mg, Oral, BID  pantoprazole, 40 mg, Oral, Q AM  predniSONE, 40 mg, Oral, Daily With Breakfast  senna-docusate sodium, 2 tablet, Oral, BID  sodium chloride, 10 mL, Intravenous, Q12H  sodium chloride, 10 mL, Intravenous, Q12H    Pharmacy Consult,         Current Antimicrobial Therapy:  Anti-Infectives (From admission, onward)      None          Current Diuretic Therapy:  Diuretics (From " admission, onward)      Ordered     Dose/Rate Route Frequency Start Stop    12/08/23 1121  furosemide (LASIX) injection 40 mg        Ordering Provider: Elijah Hope MD    40 mg Intravenous Once 12/08/23 1215 12/08/23 1217    12/06/23 1143  acetaZOLAMIDE (DIAMOX) 500 mg in sodium chloride 0.9 % 50 mL IVPB        Ordering Provider: Elijah Hope MD    500 mg  200 mL/hr over 15 Minutes Intravenous Once 12/06/23 1230 12/06/23 1543    12/05/23 0827  acetaZOLAMIDE (DIAMOX) tablet 500 mg        Ordering Provider: Elijah Hope MD    500 mg Oral Once 12/05/23 0915 12/05/23 1143    12/04/23 1302  furosemide (LASIX) injection 40 mg        Ordering Provider: Elijah Hope MD    40 mg Intravenous Once 12/04/23 1315 12/04/23 1435    12/02/23 0920  furosemide (LASIX) tablet 40 mg        Ordering Provider: Chris Shi MD    40 mg Oral Daily 12/03/23 0900      11/24/23 0734  furosemide (LASIX) injection 40 mg        Ordering Provider: Elijah Hope MD    40 mg Intravenous Once 11/24/23 0900 11/24/23 0853    11/20/23 1450  furosemide (LASIX) injection 40 mg        Ordering Provider: Elijah Hope MD    40 mg Intravenous Once 11/20/23 1500 11/20/23 1531    11/18/23 1306  furosemide (LASIX) injection 80 mg        Ordering Provider: Ruby Dia PA    80 mg Intravenous Once 11/18/23 1322 11/18/23 1328          ----------------------------------------------------------------------------------------------------------------------  Vital Signs:  Temp:  [97.5 °F (36.4 °C)-98.5 °F (36.9 °C)] 98.5 °F (36.9 °C)  Heart Rate:  [] 113  Resp:  [16-20] 20  BP: ()/(61-76) 95/64  SpO2:  [90 %-98 %] 92 %  on  Flow (L/min):  [4] 4;   Device (Oxygen Therapy): nasal cannula  Body mass index is 25.32 kg/m².    Wt Readings from Last 3 Encounters:   12/06/23 66.9 kg (147 lb 8 oz)   11/08/23 81.6 kg (180 lb)   10/28/23 81.6 kg (180 lb)     Intake & Output (last 3 days)         12/06 0701 12/07 0700 12/07 0701 12/08 0700  "12/08 0701  12/09 0700    P.O. 1020 720 840    Total Intake(mL/kg) 1020 (15.2) 720 (10.8) 840 (12.6)    Urine (mL/kg/hr) 300 (0.2) 400 (0.2) 1800 (1.8)    Stool 0 0 0    Total Output     Net +720 +320 -960           Urine Unmeasured Occurrence 1 x 2 x 1 x    Stool Unmeasured Occurrence 1 x 1 x 1 x          Diet: Cardiac Diets; Healthy Heart (2-3 Na+); Texture: Regular Texture (IDDSI 7); Fluid Consistency: Thin (IDDSI 0)  ----------------------------------------------------------------------------------------------------------------------  Physical exam:   Constitutional:  Well-developed and well-nourished.  Chronically ill-appearing.  No acute distress.      HENT:  Head:  Normocephalic and atraumatic.   Cardiovascular:  Normal rate, regular rhythm   Pulmonary/Chest:  Normal rate and effort, markedly diminished air movement bilaterally but no crackles or wheezing noted in anterior or lateral lung fields  Musculoskeletal:  No deformity.    Neurological: Awake, alert, no focal deficit on gross examination, facial droop. Patient was confused and after answering a couple questions perseverated on one statement, saying \"that's all that matters\" repeatedly. Poor insight.  Skin:  Skin is warm and dry.   Peripheral vascular:  No cyanosis  Psychiatric: anxious    Edited by: Faviola Mejia DO at 12/8/2023 9851  ----------------------------------------------------------------------------------------------------------------------  Results from last 7 days   Lab Units 12/08/23  0756 12/07/23  0255 12/05/23  0052   WBC 10*3/mm3 13.83* 11.40* 14.48*   HEMOGLOBIN g/dL 13.3 13.5 13.1   HEMATOCRIT % 45.3 46.4 43.7   MCV fL 98.3* 99.6* 96.3   MCHC g/dL 29.4* 29.1* 30.0*   PLATELETS 10*3/mm3 147 146 77*     Results from last 7 days   Lab Units 12/08/23  1744   PH, ARTERIAL pH units 7.299*   PO2 ART mm Hg 47.5*   PCO2, ARTERIAL mm Hg 101.0*   HCO3 ART mmol/L 49.4*     Results from last 7 days   Lab Units 12/08/23  0756 " "12/07/23  0255 12/05/23  0052 12/04/23  0051 12/03/23 2038 12/03/23  0852 12/02/23  0835 12/02/23  0211   SODIUM mmol/L 141 141 143 140  --  142  --  137   POTASSIUM mmol/L 3.7 2.8* 4.2 4.0 4.2 4.5   < > 5.9*   MAGNESIUM mg/dL  --  2.2 2.1  --  2.1  --   --   --    CHLORIDE mmol/L 94* 91* 89* 88*  --  91*  --  93*   CO2 mmol/L 42.7* 44.7* 46.2* 45.9*  --  46.4*  --  38.1*   BUN mg/dL 25* 23* 23* 24*  --  22*  --  18   CREATININE mg/dL 0.41* 0.67 0.44* 0.37*  --  0.37*  --  0.46*   CALCIUM mg/dL 9.1 9.4 9.2 9.6  --  9.3  --  9.0   GLUCOSE mg/dL 133* 138* 126* 170*  --  164*  --  96   ALBUMIN g/dL  --   --   --  3.6  --  3.5  --  3.6   BILIRUBIN mg/dL  --   --   --  0.8  --  0.7  --  0.5   ALK PHOS U/L  --   --   --  48  --  50  --  49   AST (SGOT) U/L  --   --   --  12  --  14  --  23   ALT (SGPT) U/L  --   --   --  19  --  22  --  22    < > = values in this interval not displayed.   Estimated Creatinine Clearance: 140.7 mL/min (A) (by C-G formula based on SCr of 0.41 mg/dL (L)).  No results found for: \"AMMONIA\"      Results from last 7 days   Lab Units 12/04/23 0051   PROBNP pg/mL 581.8         Glucose   Date/Time Value Ref Range Status   12/08/2023 1958 334 (H) 70 - 130 mg/dL Final   12/08/2023 1633 268 (H) 70 - 130 mg/dL Final   12/08/2023 1041 228 (H) 70 - 130 mg/dL Final   12/08/2023 0731 131 (H) 70 - 130 mg/dL Final   12/07/2023 2042 184 (H) 70 - 130 mg/dL Final   12/07/2023 1649 187 (H) 70 - 130 mg/dL Final   12/07/2023 1040 177 (H) 70 - 130 mg/dL Final   12/07/2023 0656 137 (H) 70 - 130 mg/dL Final     Lab Results   Component Value Date    TSH 5.170 (H) 11/18/2023    FREET4 1.09 11/18/2023     No results found for: \"PREGTESTUR\", \"PREGSERUM\", \"HCG\", \"HCGQUANT\"  Pain Management Panel  More data may exist         Latest Ref Rng & Units 11/19/2023 2/23/2022   Pain Management Panel   Amphetamine, Urine Qual Negative Negative  Negative    Barbiturates Screen, Urine Negative Negative  Negative    Benzodiazepine " "Screen, Urine Negative Positive  Positive    Buprenorphine, Screen, Urine Negative Negative  Negative    Cocaine Screen, Urine Negative Negative  Negative    Fentanyl, Urine Negative Negative  -   Methadone Screen , Urine Negative Negative  Negative    Methamphetamine, Ur Negative Negative  Negative      Brief Urine Lab Results  (Last result in the past 365 days)        Color   Clarity   Blood   Leuk Est   Nitrite   Protein   CREAT   Urine HCG        11/18/23 1447 Yellow   Clear   Negative   Negative   Negative   Negative                 No results found for: \"BLOODCX\"  No results found for: \"URINECX\"  No results found for: \"WOUNDCX\"  No results found for: \"STOOLCX\"  No results found for: \"RESPCX\"  No results found for: \"AFBCX\"  Results from last 7 days   Lab Units 12/05/23  0053   PROCALCITONIN ng/mL 0.03       I have personally looked at the labs and they are summarized above.  ----------------------------------------------------------------------------------------------------------------------  Detailed radiology reports for the last 24 hours:  Imaging Results (Last 24 Hours)       Procedure Component Value Units Date/Time    XR Chest 1 View [[id number]] Collected: 12/08/23 2131     Updated: 12/08/23 2137    Narrative:      EXAMINATION: AP portable chest     HISTORY: Shortness of breath.     COMPARISON: 12/3/2023     FINDINGS: A left sided chest port is noted with tip projecting over the  expected location of the mid SVC. There is elevation of the right  hemidiaphragm. Right basilar opacity is noted. This is likely in part  related to scarring or atelectasis adjacent to the elevated  hemidiaphragm. However, patchy airspace opacity is noted elsewhere  within both lungs suspicious for pneumonia in the correct clinical  setting. No pneumothorax or pleural effusion. The cardiac silhouette is  normal in size. There is prominence of the central pulmonary vasculature  with mild interstitial prominence. This may be " "related to chronic  pulmonary venous congestion although edema could result in a similar  appearance.       Impression:      1. Hazy airspace opacity within both lungs suspicious for pneumonia in  the correct clinical setting.  2. Prominence of the central pulmonary vasculature with interstitial  prominence raising the possibility of borderline to mild edema.     This report was finalized on 12/8/2023 9:35 PM by Abiel Tena MD.             Assessment & Plan      #Acute on chronic hypoxic respiratory failure   #Acute on chronic hypercapnic respiratory failure   #Acute COPD exacerbation   #Hx of STEVEN  #Medical noncompliance  -At admission viral panel was negative, D-dimer within normal limits, and no new consolidations seen on imaging at admission.  -Patient has severe COPD that requires home NIPPV but she has previously been unable to tolerate wearing it regularly.  -Pulmonology is following, appreciate assistance.  -Continue steroids, Symbicort, ipratropium. Continue to wean steroid dose. Currently receiving prednisone 40mg PO daily. Encourage aggressive pulmonary toilet, out of bed as much as possible, incentive spirometry, cough/deep breathing.   - PT consult requested due to generalized weakness and poor exercise tolerance, appreciate assistance. Patient has refused to participate, so PT has now signed off.  - Lengthy discussion with patient's family, Palliative Care, and  today regarding goals of care. Patient is chronically very ill and has poor quality of life. She has been in a continuous cycle of refusing to wear the BiPAP, rising CO2 levels, finally wearing the BiPAP for a few hours, improvement of CO2, then repeating. With the severity of her lung disease patient is essentially BiPAP dependent. Patient lacks medical decision making capacity and son is obtaining emergency guardianship. Family is considering hospice at home, but do not want patient to hear the word \"hospice\" because of " negative connotations (they are afraid she will become upset) and her inability to fully understand her current medical situation and treatment decisions.    #Paranoia  #Severe anxiety  - Continue treatment of COPD and chronic respiratory failure as detailed above. Supportive care. Psychiatry evaluated, appreciate assistance. Options for anxiety medications are limited due to prolonged QT and need to be conservative with anything that would suppress respiratory drive.    #Chronically elevated troponin  #Frequent pSVT  - Heart cath was done and showed no occlusive coronary artery disease   - Metoprolol tartrate increased to 50mg BID during this admission with improvement in episodes of pSVT  - Continue telemetry monitoring     CHRONIC MEDICAL PROBLEMS     #HFpEF/grade I diastolic dysfunction  - Not currently felt to be in acute exacerbation clinically.   - Previous TTE from 10/2023 reviewed.  Monitor volume status with I&Os, daily weights.  Continue lasix which has been increased to 40mg PO daily during this admission.      #Essential hypertension  - Well controlled, continue home regimen      #Hyperlipidemia  -Continue statin.      #Type II diabetes mellitus, non insulin dependent  - A1C 6.6%.   - Glucose is now improved to the 130-200 range. It was likely worse earlier in admission due to high dose steroids. Monitor with accuchecks and cover with sliding scale insulin.   - Continue consistent carbohydrate diet      #History of CVA  - Details unknown.  Continue home medication as appropriate. Does not appear to have residual deficits      #History of carotid artery disease s/p left CEA in the past  - Continue home medications      #Hypothyroidism  - TSH WNL. Continue home levothyroxine.      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   #Former smoker       Edited by: Faviola Mejia DO at 12/8/2023 8289    VTE Prophylaxis:   Mechanical Order History:        Ordered        11/30/23 1211  Place Sequential Compression Device  Once            11/30/23 1211  Maintain Sequential Compression Device  Continuous                          Pharmalogical Order History:        Ordered     Dose Route Frequency Stop    11/29/23 1011  heparin (porcine) injection  Status:  Discontinued         -- -- Code / Trauma / Sedation Medication 11/29/23 1034    11/18/23 1605  heparin (porcine) 5000 UNIT/ML injection 5,000 Units  Status:  Discontinued         5,000 Units SC Every 8 Hours Scheduled 11/30/23 1210                    Dispo: pending clinical progress and further discussions with family, possibly home with hospice after questions are answered for family on Monday about support services available at home    Faviola Mejia DO  [facility]  12/08/23  21:49 EST

## 2023-12-09 NOTE — PLAN OF CARE
Goal Outcome Evaluation:              Outcome Evaluation: Pt is resting in bed at this time. Pt has refused trilogy throughout the shift. Pt has been up to BSC. Pt refused heparin this shift, DO aware. Pt complained of pain but unable to give medication due to low BP. Pt has been lethargic this shift, DO aware. Continuing plan of care.

## 2023-12-09 NOTE — PLAN OF CARE
Goal Outcome Evaluation:  Plan of Care Reviewed With: patient        Progress: no change  Outcome Evaluation: Patient has been resting in bed throughout the night. pt had complaints of pain, see MAR. no other concerns or complaints voiced. no acute changes noted at this time. will continue with plan of care.

## 2023-12-10 LAB
A-A DO2: 38.7 MMHG (ref 0–300)
ARTERIAL PATENCY WRIST A: ABNORMAL
ATMOSPHERIC PRESS: 727 MMHG
BASE EXCESS BLDA CALC-SCNC: 22.3 MMOL/L (ref 0–2)
BDY SITE: ABNORMAL
CO2 BLDA-SCNC: 53.8 MMOL/L (ref 22–33)
COHGB MFR BLD: 1.1 % (ref 0–5)
GAS FLOW AIRWAY: 3 LPM
GLUCOSE BLDC GLUCOMTR-MCNC: 145 MG/DL (ref 70–130)
GLUCOSE BLDC GLUCOMTR-MCNC: 191 MG/DL (ref 70–130)
GLUCOSE BLDC GLUCOMTR-MCNC: 231 MG/DL (ref 70–130)
GLUCOSE BLDC GLUCOMTR-MCNC: 308 MG/DL (ref 70–130)
HCO3 BLDA-SCNC: 51.4 MMOL/L (ref 20–26)
HCT VFR BLD CALC: 41.3 % (ref 38–51)
HGB BLDA-MCNC: 13.5 G/DL (ref 13.5–17.5)
INHALED O2 CONCENTRATION: 32 %
Lab: ABNORMAL
Lab: ABNORMAL
METHGB BLD QL: <-0.1 % (ref 0–3)
MODALITY: ABNORMAL
NOTIFIED BY: ABNORMAL
NOTIFIED WHO: ABNORMAL
OXYHGB MFR BLDV: 97 % (ref 94–99)
PCO2 BLDA: 77.1 MM HG (ref 35–45)
PCO2 TEMP ADJ BLD: ABNORMAL MM[HG]
PH BLDA: 7.43 PH UNITS (ref 7.35–7.45)
PH, TEMP CORRECTED: ABNORMAL
PO2 BLDA: 93.2 MM HG (ref 83–108)
PO2 TEMP ADJ BLD: ABNORMAL MM[HG]
SAO2 % BLDCOA: 97.1 % (ref 94–99)
VENTILATOR MODE: ABNORMAL

## 2023-12-10 PROCEDURE — 36600 WITHDRAWAL OF ARTERIAL BLOOD: CPT

## 2023-12-10 PROCEDURE — 94799 UNLISTED PULMONARY SVC/PX: CPT

## 2023-12-10 PROCEDURE — 82948 REAGENT STRIP/BLOOD GLUCOSE: CPT

## 2023-12-10 PROCEDURE — 83050 HGB METHEMOGLOBIN QUAN: CPT

## 2023-12-10 PROCEDURE — 99232 SBSQ HOSP IP/OBS MODERATE 35: CPT | Performed by: INTERNAL MEDICINE

## 2023-12-10 PROCEDURE — 25010000002 HEPARIN (PORCINE) PER 1000 UNITS: Performed by: STUDENT IN AN ORGANIZED HEALTH CARE EDUCATION/TRAINING PROGRAM

## 2023-12-10 PROCEDURE — 63710000001 INSULIN LISPRO (HUMAN) PER 5 UNITS: Performed by: INTERNAL MEDICINE

## 2023-12-10 PROCEDURE — 82375 ASSAY CARBOXYHB QUANT: CPT

## 2023-12-10 PROCEDURE — 94664 DEMO&/EVAL PT USE INHALER: CPT

## 2023-12-10 PROCEDURE — 82805 BLOOD GASES W/O2 SATURATION: CPT

## 2023-12-10 PROCEDURE — 94761 N-INVAS EAR/PLS OXIMETRY MLT: CPT

## 2023-12-10 PROCEDURE — 99231 SBSQ HOSP IP/OBS SF/LOW 25: CPT | Performed by: STUDENT IN AN ORGANIZED HEALTH CARE EDUCATION/TRAINING PROGRAM

## 2023-12-10 PROCEDURE — 25010000002 ONDANSETRON PER 1 MG

## 2023-12-10 PROCEDURE — 63710000001 PREDNISONE PER 1 MG: Performed by: INTERNAL MEDICINE

## 2023-12-10 RX ADMIN — IPRATROPIUM BROMIDE 0.5 MG: 0.5 SOLUTION RESPIRATORY (INHALATION) at 21:07

## 2023-12-10 RX ADMIN — IPRATROPIUM BROMIDE 0.5 MG: 0.5 SOLUTION RESPIRATORY (INHALATION) at 07:17

## 2023-12-10 RX ADMIN — HYDROCODONE BITARTRATE AND ACETAMINOPHEN 1 TABLET: 10; 325 TABLET ORAL at 17:36

## 2023-12-10 RX ADMIN — BUDESONIDE AND FORMOTEROL FUMARATE DIHYDRATE 2 PUFF: 160; 4.5 AEROSOL RESPIRATORY (INHALATION) at 07:17

## 2023-12-10 RX ADMIN — IPRATROPIUM BROMIDE 0.5 MG: 0.5 SOLUTION RESPIRATORY (INHALATION) at 00:34

## 2023-12-10 RX ADMIN — Medication 10 ML: at 08:39

## 2023-12-10 RX ADMIN — HEPARIN SODIUM 5000 UNITS: 5000 INJECTION INTRAVENOUS; SUBCUTANEOUS at 05:05

## 2023-12-10 RX ADMIN — HYDROCODONE BITARTRATE AND ACETAMINOPHEN 1 TABLET: 10; 325 TABLET ORAL at 05:14

## 2023-12-10 RX ADMIN — HYDROCODONE BITARTRATE AND ACETAMINOPHEN 1 TABLET: 10; 325 TABLET ORAL at 11:22

## 2023-12-10 RX ADMIN — FLUTICASONE PROPIONATE 2 SPRAY: 50 SPRAY, METERED NASAL at 08:39

## 2023-12-10 RX ADMIN — INSULIN LISPRO 2 UNITS: 100 INJECTION, SOLUTION INTRAVENOUS; SUBCUTANEOUS at 11:22

## 2023-12-10 RX ADMIN — FUROSEMIDE 40 MG: 40 TABLET ORAL at 08:39

## 2023-12-10 RX ADMIN — IPRATROPIUM BROMIDE 0.5 MG: 0.5 SOLUTION RESPIRATORY (INHALATION) at 13:44

## 2023-12-10 RX ADMIN — INSULIN LISPRO 4 UNITS: 100 INJECTION, SOLUTION INTRAVENOUS; SUBCUTANEOUS at 20:45

## 2023-12-10 RX ADMIN — CLONAZEPAM 0.25 MG: 0.5 TABLET ORAL at 20:44

## 2023-12-10 RX ADMIN — LEVOTHYROXINE SODIUM 25 MCG: 25 TABLET ORAL at 05:05

## 2023-12-10 RX ADMIN — METOPROLOL TARTRATE 50 MG: 50 TABLET, FILM COATED ORAL at 08:39

## 2023-12-10 RX ADMIN — PREDNISONE 40 MG: 20 TABLET ORAL at 08:39

## 2023-12-10 RX ADMIN — CLOPIDOGREL BISULFATE 75 MG: 75 TABLET, FILM COATED ORAL at 08:39

## 2023-12-10 RX ADMIN — CLONAZEPAM 0.25 MG: 0.5 TABLET ORAL at 08:39

## 2023-12-10 RX ADMIN — INSULIN LISPRO 7 UNITS: 100 INJECTION, SOLUTION INTRAVENOUS; SUBCUTANEOUS at 17:36

## 2023-12-10 RX ADMIN — HEPARIN SODIUM 5000 UNITS: 5000 INJECTION INTRAVENOUS; SUBCUTANEOUS at 20:45

## 2023-12-10 RX ADMIN — Medication 10 ML: at 20:47

## 2023-12-10 RX ADMIN — METOPROLOL TARTRATE 50 MG: 50 TABLET, FILM COATED ORAL at 20:43

## 2023-12-10 RX ADMIN — ISOSORBIDE MONONITRATE 30 MG: 30 TABLET, EXTENDED RELEASE ORAL at 08:39

## 2023-12-10 RX ADMIN — ATORVASTATIN CALCIUM 40 MG: 40 TABLET, FILM COATED ORAL at 20:44

## 2023-12-10 RX ADMIN — BUDESONIDE AND FORMOTEROL FUMARATE DIHYDRATE 2 PUFF: 160; 4.5 AEROSOL RESPIRATORY (INHALATION) at 21:07

## 2023-12-10 RX ADMIN — ONDANSETRON 4 MG: 2 INJECTION INTRAMUSCULAR; INTRAVENOUS at 10:38

## 2023-12-10 RX ADMIN — PANTOPRAZOLE SODIUM 40 MG: 40 TABLET, DELAYED RELEASE ORAL at 05:05

## 2023-12-10 NOTE — PROGRESS NOTES
[facility] HOSPITALIST PROGRESS NOTE     Patient Identification:  Name:  Liz Jiang  Age:  57 y.o.  Sex:  female  :  1965  MRN:  [id number]  Visit Number:  [id number]  ROOM: [address] [place] [address]     Primary Care Provider:  Tabitha Ron APRN    Length of stay in inpatient status:  22    Subjective     Chief Compliant:    Chief Complaint   Patient presents with    Shortness of Breath       History of Presenting Illness:    Patient seen and examined today with her son present at bedside. She is more awake today. She reportedly wore the [employer] machine for a few hours at a time since yesterday. Encouraged her to wear it again this afternoon if she took a nap. She denies worsening shortness of breath or other new symptoms.     Objective     Current Hospital Meds:aspirin, 324 mg, Oral, Once  atorvastatin, 40 mg, Oral, Nightly  budesonide-formoterol, 2 puff, Inhalation, BID - RT  clonazePAM, 0.25 mg, Oral, BID  clopidogrel, 75 mg, Oral, Daily  fluticasone, 2 spray, Nasal, Daily  furosemide, 40 mg, Oral, Daily  heparin (porcine), 5,000 Units, Subcutaneous, Q8H  insulin lispro, 2-9 Units, Subcutaneous, 4x Daily AC & at Bedtime  ipratropium, 0.5 mg, Nebulization, 4x Daily - RT  isosorbide mononitrate, 30 mg, Oral, Q24H  levothyroxine, 25 mcg, Oral, Q AM  metoprolol tartrate, 50 mg, Oral, BID  pantoprazole, 40 mg, Oral, Q AM  predniSONE, 40 mg, Oral, Daily With Breakfast  senna-docusate sodium, 2 tablet, Oral, BID  sodium chloride, 10 mL, Intravenous, Q12H  sodium chloride, 10 mL, Intravenous, Q12H    Pharmacy Consult,         Current Antimicrobial Therapy:  Anti-Infectives (From admission, onward)      None          Current Diuretic Therapy:  Diuretics (From admission, onward)      Ordered     Dose/Rate Route Frequency Start Stop    23 1121  furosemide (LASIX) injection 40 mg        Ordering Provider: Elijah Hope MD    40 mg Intravenous Once 23 1215 23 1217    23 1143   acetaZOLAMIDE (DIAMOX) 500 mg in sodium chloride 0.9 % 50 mL IVPB        Ordering Provider: Elijah Hope MD    500 mg  200 mL/hr over 15 Minutes Intravenous Once 12/06/23 1230 12/06/23 1543    12/05/23 0827  acetaZOLAMIDE (DIAMOX) tablet 500 mg        Ordering Provider: Elijah Hope MD    500 mg Oral Once 12/05/23 0915 12/05/23 1143    12/04/23 1302  furosemide (LASIX) injection 40 mg        Ordering Provider: Elijah Hope MD    40 mg Intravenous Once 12/04/23 1315 12/04/23 1435    12/02/23 0920  furosemide (LASIX) tablet 40 mg        Ordering Provider: Chris Shi MD    40 mg Oral Daily 12/03/23 0900      11/24/23 0734  furosemide (LASIX) injection 40 mg        Ordering Provider: Elijah Hope MD    40 mg Intravenous Once 11/24/23 0900 11/24/23 0853    11/20/23 1450  furosemide (LASIX) injection 40 mg        Ordering Provider: Elijah Hope MD    40 mg Intravenous Once 11/20/23 1500 11/20/23 1531    11/18/23 1306  furosemide (LASIX) injection 80 mg        Ordering Provider: Ruby Dia PA    80 mg Intravenous Once 11/18/23 1322 11/18/23 1328          ----------------------------------------------------------------------------------------------------------------------  Vital Signs:  Temp:  [98.4 °F (36.9 °C)-98.6 °F (37 °C)] 98.6 °F (37 °C)  Heart Rate:  [] 100  Resp:  [18-22] 22  BP: (100-146)/(62-80) 132/80  SpO2:  [94 %-97 %] 94 %  on  Flow (L/min):  [3] 3;   Device (Oxygen Therapy): nasal cannula  Body mass index is 25.32 kg/m².    Wt Readings from Last 3 Encounters:   12/06/23 66.9 kg (147 lb 8 oz)   11/08/23 81.6 kg (180 lb)   10/28/23 81.6 kg (180 lb)     Intake & Output (last 3 days)         12/07 0701  12/08 0700 12/08 0701  12/09 0700 12/09 0701  12/10 0700 12/10 0701  12/11 0700    P.O. 720 7086 714 3845    I.V. (mL/kg)   0 (0) 0 (0)    Total Intake(mL/kg) 720 (10.8) 1080 (16.1) 840 (12.6) 1080 (16.1)    Urine (mL/kg/hr) 400 (0.2) 2300 (1.4) 1300 (0.8) 800 (1.1)    Stool 0 0 0  0    Total Output 400 2300 1300 800    Net +320 -1220 -460 +280            Urine Unmeasured Occurrence 2 x 1 x  2 x    Stool Unmeasured Occurrence 1 x 1 x 1 x 1 x          Diet: Cardiac Diets; Healthy Heart (2-3 Na+); Texture: Regular Texture (IDDSI 7); Fluid Consistency: Thin (IDDSI 0)  ----------------------------------------------------------------------------------------------------------------------  Physical exam:   Constitutional:  Well-developed and well-nourished.  Chronically ill-appearing.  No acute distress.      HENT:  Head:  Normocephalic and atraumatic.   Cardiovascular:  Normal rate, regular rhythm   Pulmonary/Chest:  Normal rate and effort, markedly diminished air movement bilaterally but no crackles or wheezing noted in anterior or lateral lung fields.  Musculoskeletal:  No deformity.    Neurological: Awake, alert, no focal deficit on gross examination. No slurred speech or facial droop.   Skin:  Skin is warm and dry.   Peripheral vascular:  No cyanosis, no lower extremity edema      Edited by: Faviola Mejia DO at 12/10/2023 1743  ----------------------------------------------------------------------------------------------------------------------  Results from last 7 days   Lab Units 12/08/23  0756 12/07/23  0255 12/05/23  0052   WBC 10*3/mm3 13.83* 11.40* 14.48*   HEMOGLOBIN g/dL 13.3 13.5 13.1   HEMATOCRIT % 45.3 46.4 43.7   MCV fL 98.3* 99.6* 96.3   MCHC g/dL 29.4* 29.1* 30.0*   PLATELETS 10*3/mm3 147 146 77*     Results from last 7 days   Lab Units 12/10/23  0400   PH, ARTERIAL pH units 7.433   PO2 ART mm Hg 93.2   PCO2, ARTERIAL mm Hg 77.1*   HCO3 ART mmol/L 51.4*     Results from last 7 days   Lab Units 12/08/23  0756 12/07/23  0255 12/05/23  0052 12/04/23  0051 12/03/23 2038 12/03/23 2038   SODIUM mmol/L 141 141 143 140   < >  --    POTASSIUM mmol/L 3.7 2.8* 4.2 4.0  --  4.2   MAGNESIUM mg/dL  --  2.2 2.1  --   --  2.1   CHLORIDE mmol/L 94* 91* 89* 88*   < >  --    CO2 mmol/L 42.7*  "44.7* 46.2* 45.9*   < >  --    BUN mg/dL 25* 23* 23* 24*   < >  --    CREATININE mg/dL 0.41* 0.67 0.44* 0.37*   < >  --    CALCIUM mg/dL 9.1 9.4 9.2 9.6   < >  --    GLUCOSE mg/dL 133* 138* 126* 170*   < >  --    ALBUMIN g/dL  --   --   --  3.6  --   --    BILIRUBIN mg/dL  --   --   --  0.8  --   --    ALK PHOS U/L  --   --   --  48  --   --    AST (SGOT) U/L  --   --   --  12  --   --    ALT (SGPT) U/L  --   --   --  19  --   --     < > = values in this interval not displayed.   Estimated Creatinine Clearance: 140.7 mL/min (A) (by C-G formula based on SCr of 0.41 mg/dL (L)).  No results found for: \"AMMONIA\"      Results from last 7 days   Lab Units 12/04/23  0051   PROBNP pg/mL 581.8         Glucose   Date/Time Value Ref Range Status   12/10/2023 1653 308 (H) 70 - 130 mg/dL Final   12/10/2023 1042 191 (H) 70 - 130 mg/dL Final   12/10/2023 0633 145 (H) 70 - 130 mg/dL Final   12/09/2023 2018 223 (H) 70 - 130 mg/dL Final   12/09/2023 1630 228 (H) 70 - 130 mg/dL Final   12/09/2023 1033 277 (H) 70 - 130 mg/dL Final   12/09/2023 0733 104 70 - 130 mg/dL Final   12/08/2023 1958 334 (H) 70 - 130 mg/dL Final     Lab Results   Component Value Date    TSH 5.170 (H) 11/18/2023    FREET4 1.09 11/18/2023     No results found for: \"PREGTESTUR\", \"PREGSERUM\", \"HCG\", \"HCGQUANT\"  Pain Management Panel  More data may exist         Latest Ref Rng & Units 11/19/2023 2/23/2022   Pain Management Panel   Amphetamine, Urine Qual Negative Negative  Negative    Barbiturates Screen, Urine Negative Negative  Negative    Benzodiazepine Screen, Urine Negative Positive  Positive    Buprenorphine, Screen, Urine Negative Negative  Negative    Cocaine Screen, Urine Negative Negative  Negative    Fentanyl, Urine Negative Negative  -   Methadone Screen , Urine Negative Negative  Negative    Methamphetamine, Ur Negative Negative  Negative      Brief Urine Lab Results  (Last result in the past 365 days)        Color   Clarity   Blood   Leuk Est   Nitrite  " " Protein   CREAT   Urine HCG        11/18/23 1447 Yellow   Clear   Negative   Negative   Negative   Negative                 No results found for: \"BLOODCX\"  No results found for: \"URINECX\"  No results found for: \"WOUNDCX\"  No results found for: \"STOOLCX\"  No results found for: \"RESPCX\"  No results found for: \"AFBCX\"  Results from last 7 days   Lab Units 12/05/23  0053   PROCALCITONIN ng/mL 0.03       I have personally looked at the labs and they are summarized above.  ----------------------------------------------------------------------------------------------------------------------  Detailed radiology reports for the last 24 hours:  Imaging Results (Last 24 Hours)       ** No results found for the last 24 hours. **          Assessment & Plan      #Acute on chronic hypoxic respiratory failure   #Acute on chronic hypercapnic respiratory failure   #Acute COPD exacerbation   #Hx of STEVEN  #Medical noncompliance  -At admission viral panel was negative, D-dimer within normal limits, and no new consolidations seen on imaging at admission.  -Patient has severe COPD that requires home NIPPV but she has previously been unable to tolerate wearing it regularly.  -Pulmonology is following, appreciate assistance.  -Continue steroids, Symbicort, ipratropium. Continue to wean steroid dose. Currently receiving prednisone 40mg PO daily. Encourage aggressive pulmonary toilet, out of bed as much as possible, incentive spirometry, cough/deep breathing.   - PT consult requested due to generalized weakness and poor exercise tolerance, appreciate assistance. Patient has refused to participate, so PT has now signed off.  - Lengthy discussion with patient's family, Palliative Care, and  on 12/8/23 regarding goals of care. Patient is chronically very ill and has poor quality of life. She has been in a continuous cycle of refusing to wear the BiPAP, rising CO2 levels, finally wearing the BiPAP for a few hours, improvement of " "CO2, then repeating. With the severity of her lung disease patient is essentially BiPAP dependent. Patient lacks medical decision making capacity and son is obtaining emergency guardianship. Family is considering hospice at home, but do not want patient to hear the word \"hospice\" because of negative connotations (they are afraid she will become upset) and her inability to fully understand her current medical situation and treatment decisions.  - Patient appears improved today after wearing the trilogy machine intermittently since yesterday. Plan to continue to encourage her to wear it for a few hours nightly and a few hours during the day when she returns home. Waiting on hospice referral and arrangement of support services at home. Hopefully this can happen tomorrow.     #Paranoia  #Severe anxiety  - Continue treatment of COPD and chronic respiratory failure as detailed above. Supportive care. Psychiatry evaluated, appreciate assistance. Options for anxiety medications are limited due to prolonged QT and need to be conservative with anything that would suppress respiratory drive.    #Chronically elevated troponin  #Frequent pSVT  - Heart cath was done and showed no occlusive coronary artery disease   - Metoprolol tartrate increased to 50mg BID during this admission with improvement in episodes of pSVT  - Continue telemetry monitoring     CHRONIC MEDICAL PROBLEMS     #HFpEF/grade I diastolic dysfunction  - Not currently felt to be in acute exacerbation clinically.   - Previous TTE from 10/2023 reviewed.  Monitor volume status with I&Os, daily weights.  Continue lasix which has been increased to 40mg PO daily during this admission.      #Essential hypertension  - Well controlled, continue home regimen      #Hyperlipidemia  -Continue statin.      #Type II diabetes mellitus, non insulin dependent  - A1C 6.6%.   - Monitor with accuchecks and cover with sliding scale insulin.   - Continue consistent carbohydrate diet    "   #History of CVA  - Details unknown.  Continue home medication as appropriate. Does not appear to have residual deficits      #History of carotid artery disease s/p left CEA in the past  - Continue home medications      #Hypothyroidism  - TSH WNL. Continue home levothyroxine.      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   #Former smoker       Edited by: Faviola Mejia DO at 12/10/2023 1749    VTE Prophylaxis:   Mechanical Order History:        Ordered        11/30/23 1211  Place Sequential Compression Device  Once            11/30/23 1211  Maintain Sequential Compression Device  Continuous                          Pharmalogical Order History:        Ordered     Dose Route Frequency Stop    12/09/23 0823  heparin (porcine) 5000 UNIT/ML injection 5,000 Units         5,000 Units SC Every 8 Hours Scheduled --    11/29/23 1011  heparin (porcine) injection  Status:  Discontinued         -- -- Code / Trauma / Sedation Medication 11/29/23 1034    11/18/23 1605  heparin (porcine) 5000 UNIT/ML injection 5,000 Units  Status:  Discontinued         5,000 Units SC Every 8 Hours Scheduled 11/30/23 1210                    Dispo: likely home with hospice at discharge, hopefully within 24 hours    Faviola Mejia DO  [facility]  12/10/23  17:43 EST

## 2023-12-10 NOTE — PLAN OF CARE
Goal Outcome Evaluation:           Progress: no change  Outcome Evaluation: Patient has been able to ambulate to the bedside commode with assist, family at bedside, PRN medication has been given for pain and nausea, patient has been refusing turns this shift patient has been educated on the importance of turning to prevent further skin breakdown.

## 2023-12-10 NOTE — PROGRESS NOTES
Pulmonary and critical care following the patient for COPD exacerbation and hypoxic respiratory failure  Chief Complaint:  sob    Subjective -   .  All the labs medications and the notes and vitals reviewed.  Patient resting in bed comfortably.  Not in any distress.  Used her trilogy machine.  Review of Systems:    Positive for shortness of breath otherwise negative        Vital Signs  Temp:  [98.4 °F (36.9 °C)-98.6 °F (37 °C)] 98.4 °F (36.9 °C)  Heart Rate:  [] 65  Resp:  [18-22] 18  BP: ()/(62-78) 146/78  Body mass index is 25.32 kg/m².    Intake/Output Summary (Last 24 hours) at 12/10/2023 0714  Last data filed at 12/10/2023 0511  Gross per 24 hour   Intake 840 ml   Output 1300 ml   Net -460 ml     No intake/output data recorded.    Physical Exam:  General-chronically ill in appearance, not in any acute distress    HEENT-PERRLA    Neck-supple    Respiratory-resting in bed comfortably not in any distress.    Cardiovascular-NSR    GI-nontender nondistended bowel sounds positive    CNS-nonfocal    Musculoskeletal -no edema  Extremities- no obvious deformity noticed     Psychiatric- alert awake oriented  Skin- no visible rash         Results Review:     I reviewed the patient's new clinical results.  Results from last 7 days   Lab Units 12/08/23  0756 12/07/23  0255 12/05/23 0052   WBC 10*3/mm3 13.83* 11.40* 14.48*   HEMOGLOBIN g/dL 13.3 13.5 13.1   PLATELETS 10*3/mm3 147 146 77*     Results from last 7 days   Lab Units 12/08/23  0756 12/07/23  0255 12/05/23  0052 12/04/23  0051 12/03/23  2038   SODIUM mmol/L 141 141 143   < >  --    POTASSIUM mmol/L 3.7 2.8* 4.2   < > 4.2   CHLORIDE mmol/L 94* 91* 89*   < >  --    CO2 mmol/L 42.7* 44.7* 46.2*   < >  --    BUN mg/dL 25* 23* 23*   < >  --    CREATININE mg/dL 0.41* 0.67 0.44*   < >  --    CALCIUM mg/dL 9.1 9.4 9.2   < >  --    GLUCOSE mg/dL 133* 138* 126*   < >  --    MAGNESIUM mg/dL  --  2.2 2.1  --  2.1    < > = values in this interval not displayed.      Lab Results   Component Value Date    INR 0.94 11/18/2023    INR 0.95 10/12/2023    INR 0.90 03/27/2017    PROTIME 13.1 11/18/2023    PROTIME 13.1 10/12/2023    PROTIME 9.9 03/27/2017     Results from last 7 days   Lab Units 12/04/23  0051 12/03/23  0852   ALK PHOS U/L 48 50   BILIRUBIN mg/dL 0.8 0.7   ALT (SGPT) U/L 19 22   AST (SGOT) U/L 12 14     Results from last 7 days   Lab Units 12/10/23  0400   PH, ARTERIAL pH units 7.433   PO2 ART mm Hg 93.2   PCO2, ARTERIAL mm Hg 77.1*   HCO3 ART mmol/L 51.4*     Imaging Results (Last 24 Hours)       ** No results found for the last 24 hours. **                 aspirin, 324 mg, Oral, Once  atorvastatin, 40 mg, Oral, Nightly  budesonide-formoterol, 2 puff, Inhalation, BID - RT  clonazePAM, 0.25 mg, Oral, BID  clopidogrel, 75 mg, Oral, Daily  fluticasone, 2 spray, Nasal, Daily  furosemide, 40 mg, Oral, Daily  heparin (porcine), 5,000 Units, Subcutaneous, Q8H  insulin lispro, 2-9 Units, Subcutaneous, 4x Daily AC & at Bedtime  ipratropium, 0.5 mg, Nebulization, 4x Daily - RT  isosorbide mononitrate, 30 mg, Oral, Q24H  levothyroxine, 25 mcg, Oral, Q AM  metoprolol tartrate, 50 mg, Oral, BID  pantoprazole, 40 mg, Oral, Q AM  predniSONE, 40 mg, Oral, Daily With Breakfast  senna-docusate sodium, 2 tablet, Oral, BID  sodium chloride, 10 mL, Intravenous, Q12H  sodium chloride, 10 mL, Intravenous, Q12H      Pharmacy Consult,       Site   Date Value Ref Range Status   12/10/2023 Left Brachial  Final     William's Test   Date Value Ref Range Status   12/10/2023 N/A  Final     pH, Arterial   Date Value Ref Range Status   12/10/2023 7.433 7.350 - 7.450 pH units Final     pCO2, Arterial   Date Value Ref Range Status   12/10/2023 77.1 (C) 35.0 - 45.0 mm Hg Final     Comment:     86 Value above critical limit     pO2, Arterial   Date Value Ref Range Status   12/10/2023 93.2 83.0 - 108.0 mm Hg Final     HCO3, Arterial   Date Value Ref Range Status   12/10/2023 51.4 (H) 20.0 - 26.0 mmol/L  Final     Comment:     83 Value above reference range     Base Excess, Arterial   Date Value Ref Range Status   12/10/2023 22.3 (H) 0.0 - 2.0 mmol/L Final     O2 Saturation, Arterial   Date Value Ref Range Status   12/10/2023 97.1 94.0 - 99.0 % Final     Hemoglobin, Blood Gas   Date Value Ref Range Status   12/10/2023 13.5 13.5 - 17.5 g/dL Final     Comment:     84 Value below reference range     Hematocrit, Blood Gas   Date Value Ref Range Status   12/10/2023 41.3 38.0 - 51.0 % Final     Oxyhemoglobin   Date Value Ref Range Status   12/10/2023 97.0 94 - 99 % Final     Methemoglobin   Date Value Ref Range Status   12/10/2023 <-0.10 (L) 0.00 - 3.00 % Final     Comment:     94 Value below reportable range < _0.1     Carboxyhemoglobin   Date Value Ref Range Status   12/10/2023 1.1 0 - 5 % Final     CO2 Content   Date Value Ref Range Status   12/10/2023 53.8 (H) 22 - 33 mmol/L Final     Barometric Pressure for Blood Gas   Date Value Ref Range Status   12/10/2023 727 mmHg Final     Modality   Date Value Ref Range Status   12/10/2023 Nasal Cannula  Final     FIO2   Date Value Ref Range Status   12/10/2023 32 % Final      Latest Reference Range & Units 12/08/23 04:31   pH, Arterial 7.350 - 7.450 pH units 7.260 (L)   pCO2, Arterial 35.0 - 45.0 mm Hg 103.0 (C)   pO2, Arterial 83.0 - 108.0 mm Hg 152.0 (H)   HCO3, Arterial 20.0 - 26.0 mmol/L 46.2 (H)   Base Excess 0.0 - 2.0 mmol/L 14.4 (H)   O2 Saturation, Arterial 94.0 - 99.0 % >99.2 (H)   CO2 Content 22 - 33 mmol/L 49.4 (H)   A-a DO2  See Comment   Carboxyhemoglobin 0 - 5 % 1.7   Methemoglobin 0.00 - 3.00 % 0.10   Oxyhemoglobin 94 - 99 % 97.8   Hematocrit, Blood Gas 38.0 - 51.0 % 40.8   Hemoglobin, Blood Gas 13.5 - 17.5 g/dL 13.3 (L)   Site  Left Radial   William's Test  N/A   Modality  Nasal Cannula   FIO2 % 34   Flow Rate lpm 3.5   Ventilator Mode  NA   Barometric Pressure for Blood Gas mmHg 729   Notified By  [id number]   Notified Time  12/08/2023 04:37   Notified Who  LONG    Collected by  [id number]   (C): Data is critically high  (L): Data is abnormally low  (H): Data is abnormally high'  Medication Review:    Latest Reference Range & Units 12/04/23 00:51   proBNP 0.0 - 900.0 pg/mL 581.8      Latest Reference Range & Units 12/10/23 04:00   pH, Arterial 7.350 - 7.450 pH units 7.433   pCO2, Arterial 35.0 - 45.0 mm Hg 77.1 (C)   pO2, Arterial 83.0 - 108.0 mm Hg 93.2   HCO3, Arterial 20.0 - 26.0 mmol/L 51.4 (H)   Base Excess 0.0 - 2.0 mmol/L 22.3 (H)   O2 Saturation, Arterial 94.0 - 99.0 % 97.1   CO2 Content 22 - 33 mmol/L 53.8 (H)   A-a DO2 0.0 - 300.0 mmHg 38.7   Carboxyhemoglobin 0 - 5 % 1.1   Methemoglobin 0.00 - 3.00 % <-0.10 (L)   Oxyhemoglobin 94 - 99 % 97.0   Hematocrit, Blood Gas 38.0 - 51.0 % 41.3   Hemoglobin, Blood Gas 13.5 - 17.5 g/dL 13.5   Site  Left Brachial   William's Test  N/A   Modality  Nasal Cannula   FIO2 % 32   Flow Rate lpm 3.0   Ventilator Mode  NA   Barometric Pressure for Blood Gas mmHg 727   Notified By  [id number]   Notified Time  12/10/2023 04:06   Notified Who  LONG PAGED   Collected by  [id number]   (C): Data is critically high  (H): Data is abnormally high  (L): Data is abnormally low  Assessment & Plan   COPD exacerbation-continue steroids continue nebs  Doses reviewed.  Continue current inhalers.  Medication list reviewed.  Continue oxygen-to maintain saturation 88 to 92%.  Encouraged to wear her trilogy machine again tonight.  pH is better CO2 is better.      Continue diuretics to improve lung compliance and diffusion capacity.    Latest blood gases reviewed.  STEVEN-continue trilogy overnight.  We will adjust the EPAP as patient likely has severe sleep apnea will adjust the minimum EPAP to 12 and maximum to 18.  No desaturations noticed after increasing the EPAP.  Encouraged her to use the CPAP trilogy every night.      Chronic hypercapnic hypoxic respiratory failure-continue current amount of oxygen titrated to maintain saturation 88 to 92%.     Continue trilogy  with a setting of  AVAPS/AE. Tidal volume 6 mL/kg ideal body weight, PS max 28, PS minimum 20, EP AP max 18, EPAP minimum 12 , respiratory rate 18,      hypo kalemia-will replace potassium.  Orders entered     Results for orders placed during the hospital encounter of 10/12/23    Adult Transthoracic Echo Complete W/ Cont if Necessary Per Protocol    Interpretation Summary    Left ventricular systolic function is normal. Left ventricular ejection fraction appears to be 66 - 70%.    Left ventricular diastolic function is consistent with (grade I) impaired relaxation.    Estimated right ventricular systolic pressure from tricuspid regurgitation is normal (<35 mmHg).        FiO2 requirement reviewed and adjusted to maintain saturation 88 to 92%.  Continue appropriate prophylaxis      Case d/w nurse and team   This service is provided via audio video tele medicine   I am in [place] and patient is in [facility]        Acute respiratory failure with hypoxia    Chest pain               Elijah Hope MD  12/10/23  07:14 EST

## 2023-12-10 NOTE — PROGRESS NOTES
[facility] HOSPITALIST PROGRESS NOTE     Patient Identification:  Name:  Liz Jiang  Age:  57 y.o.  Sex:  female  :  1965  MRN:  [id number]  Visit Number:  [id number]  ROOM: [address] [place] [address]     Primary Care Provider:  Tabitha Ron APRN    Length of stay in inpatient status:  21    Subjective     Chief Compliant:    Chief Complaint   Patient presents with    Shortness of Breath       History of Presenting Illness:    Patient was alert this morning but became more lethargic throughout the shift. Did not wear trilogy most of the shift, but has tolerated it being on since it was restarted this evening. Nursing staff found a box of loperamide in the floor and it has one capsule that is missing from the box. Patient has previously been found to have home medications in her possession that she was taking unknown to medical staff, but it was believed they had all been removed several days ago. Patient awakes, but is not able to give pertinent history due to confusion and trilogy mask in place.    Objective     Current Hospital Meds:aspirin, 324 mg, Oral, Once  atorvastatin, 40 mg, Oral, Nightly  budesonide-formoterol, 2 puff, Inhalation, BID - RT  clonazePAM, 0.25 mg, Oral, BID  clopidogrel, 75 mg, Oral, Daily  fluticasone, 2 spray, Nasal, Daily  furosemide, 40 mg, Oral, Daily  heparin (porcine), 5,000 Units, Subcutaneous, Q8H  insulin lispro, 2-9 Units, Subcutaneous, 4x Daily AC & at Bedtime  ipratropium, 0.5 mg, Nebulization, 4x Daily - RT  isosorbide mononitrate, 30 mg, Oral, Q24H  levothyroxine, 25 mcg, Oral, Q AM  metoprolol tartrate, 50 mg, Oral, BID  pantoprazole, 40 mg, Oral, Q AM  predniSONE, 40 mg, Oral, Daily With Breakfast  senna-docusate sodium, 2 tablet, Oral, BID  sodium chloride, 10 mL, Intravenous, Q12H  sodium chloride, 10 mL, Intravenous, Q12H    Pharmacy Consult,         Current Antimicrobial Therapy:  Anti-Infectives (From admission, onward)      None          Current Diuretic  Therapy:  Diuretics (From admission, onward)      Ordered     Dose/Rate Route Frequency Start Stop    12/08/23 1121  furosemide (LASIX) injection 40 mg        Ordering Provider: Elijah Hope MD    40 mg Intravenous Once 12/08/23 1215 12/08/23 1217    12/06/23 1143  acetaZOLAMIDE (DIAMOX) 500 mg in sodium chloride 0.9 % 50 mL IVPB        Ordering Provider: Elijah Hpoe MD    500 mg  200 mL/hr over 15 Minutes Intravenous Once 12/06/23 1230 12/06/23 1543    12/05/23 0827  acetaZOLAMIDE (DIAMOX) tablet 500 mg        Ordering Provider: Elijah Hope MD    500 mg Oral Once 12/05/23 0915 12/05/23 1143    12/04/23 1302  furosemide (LASIX) injection 40 mg        Ordering Provider: Elijah Hope MD    40 mg Intravenous Once 12/04/23 1315 12/04/23 1435    12/02/23 0920  furosemide (LASIX) tablet 40 mg        Ordering Provider: Chris Shi MD    40 mg Oral Daily 12/03/23 0900      11/24/23 0734  furosemide (LASIX) injection 40 mg        Ordering Provider: Elijah Hope MD    40 mg Intravenous Once 11/24/23 0900 11/24/23 0853    11/20/23 1450  furosemide (LASIX) injection 40 mg        Ordering Provider: Elijah Hope MD    40 mg Intravenous Once 11/20/23 1500 11/20/23 1531    11/18/23 1306  furosemide (LASIX) injection 80 mg        Ordering Provider: Ruby Dia PA    80 mg Intravenous Once 11/18/23 1322 11/18/23 1328          ----------------------------------------------------------------------------------------------------------------------  Vital Signs:  Temp:  [98.4 °F (36.9 °C)-98.6 °F (37 °C)] 98.6 °F (37 °C)  Heart Rate:  [] 110  Resp:  [20-22] 20  BP: ()/(62-86) 100/62  SpO2:  [95 %-98 %] 97 %  on  Flow (L/min):  [4-8] 6;   Device (Oxygen Therapy): nasal cannula  Body mass index is 25.32 kg/m².    Wt Readings from Last 3 Encounters:   12/06/23 66.9 kg (147 lb 8 oz)   11/08/23 81.6 kg (180 lb)   10/28/23 81.6 kg (180 lb)     Intake & Output (last 3 days)         12/07 0701  12/08 0700  12/08 0701  12/09 0700 12/09 0701  12/10 0700    P.O. 720 1080 240    I.V. (mL/kg)   0 (0)    Total Intake(mL/kg) 720 (10.8) 1080 (16.1) 240 (3.6)    Urine (mL/kg/hr) 400 (0.2) 2300 (1.4) 700 (0.8)    Stool 0 0 0    Total Output 400 2300 700    Net +320 -1220 -460           Urine Unmeasured Occurrence 2 x 1 x     Stool Unmeasured Occurrence 1 x 1 x 1 x          Diet: Cardiac Diets; Healthy Heart (2-3 Na+); Texture: Regular Texture (IDDSI 7); Fluid Consistency: Thin (IDDSI 0)  ----------------------------------------------------------------------------------------------------------------------  Physical exam:   Constitutional:  Well-developed and well-nourished.  Chronically ill-appearing.  No acute distress.      HENT:  Head:  Normocephalic and atraumatic.   Cardiovascular:  Normal rate, regular rhythm   Pulmonary/Chest:  Normal rate and effort, markedly diminished air movement bilaterally but no crackles or wheezing noted in anterior or lateral lung fields. Trilogy mask in place this evening.  Musculoskeletal:  No deformity.    Neurological: Lethargic, but would wake to tactile stimuli. Confused.   Skin:  Skin is warm and dry.   Peripheral vascular:  No cyanosis    Edited by: Faviola Mejia DO at 12/9/2023 2030 ----------------------------------------------------------------------------------------------------------------------  Results from last 7 days   Lab Units 12/08/23 0756 12/07/23 0255 12/05/23 0052   WBC 10*3/mm3 13.83* 11.40* 14.48*   HEMOGLOBIN g/dL 13.3 13.5 13.1   HEMATOCRIT % 45.3 46.4 43.7   MCV fL 98.3* 99.6* 96.3   MCHC g/dL 29.4* 29.1* 30.0*   PLATELETS 10*3/mm3 147 146 77*     Results from last 7 days   Lab Units 12/09/23 1955   PH, ARTERIAL pH units 7.465*   PO2 ART mm Hg 87.8   PCO2, ARTERIAL mm Hg 67.1*   HCO3 ART mmol/L 48.2*     Results from last 7 days   Lab Units 12/08/23 0756 12/07/23 0255 12/05/23 0052 12/04/23 0051 12/03/23 2038 12/03/23 0852   SODIUM mmol/L 141 141 143 140   "--  142   POTASSIUM mmol/L 3.7 2.8* 4.2 4.0 4.2 4.5   MAGNESIUM mg/dL  --  2.2 2.1  --  2.1  --    CHLORIDE mmol/L 94* 91* 89* 88*  --  91*   CO2 mmol/L 42.7* 44.7* 46.2* 45.9*  --  46.4*   BUN mg/dL 25* 23* 23* 24*  --  22*   CREATININE mg/dL 0.41* 0.67 0.44* 0.37*  --  0.37*   CALCIUM mg/dL 9.1 9.4 9.2 9.6  --  9.3   GLUCOSE mg/dL 133* 138* 126* 170*  --  164*   ALBUMIN g/dL  --   --   --  3.6  --  3.5   BILIRUBIN mg/dL  --   --   --  0.8  --  0.7   ALK PHOS U/L  --   --   --  48  --  50   AST (SGOT) U/L  --   --   --  12  --  14   ALT (SGPT) U/L  --   --   --  19  --  22   Estimated Creatinine Clearance: 140.7 mL/min (A) (by C-G formula based on SCr of 0.41 mg/dL (L)).  No results found for: \"AMMONIA\"      Results from last 7 days   Lab Units 12/04/23  0051   PROBNP pg/mL 581.8         Glucose   Date/Time Value Ref Range Status   12/09/2023 2018 223 (H) 70 - 130 mg/dL Final   12/09/2023 1630 228 (H) 70 - 130 mg/dL Final   12/09/2023 1033 277 (H) 70 - 130 mg/dL Final   12/09/2023 0733 104 70 - 130 mg/dL Final   12/08/2023 1958 334 (H) 70 - 130 mg/dL Final   12/08/2023 1633 268 (H) 70 - 130 mg/dL Final   12/08/2023 1041 228 (H) 70 - 130 mg/dL Final   12/08/2023 0731 131 (H) 70 - 130 mg/dL Final     Lab Results   Component Value Date    TSH 5.170 (H) 11/18/2023    FREET4 1.09 11/18/2023     No results found for: \"PREGTESTUR\", \"PREGSERUM\", \"HCG\", \"HCGQUANT\"  Pain Management Panel  More data may exist         Latest Ref Rng & Units 11/19/2023 2/23/2022   Pain Management Panel   Amphetamine, Urine Qual Negative Negative  Negative    Barbiturates Screen, Urine Negative Negative  Negative    Benzodiazepine Screen, Urine Negative Positive  Positive    Buprenorphine, Screen, Urine Negative Negative  Negative    Cocaine Screen, Urine Negative Negative  Negative    Fentanyl, Urine Negative Negative  -   Methadone Screen , Urine Negative Negative  Negative    Methamphetamine, Ur Negative Negative  Negative      Brief Urine " "Lab Results  (Last result in the past 365 days)        Color   Clarity   Blood   Leuk Est   Nitrite   Protein   CREAT   Urine HCG        11/18/23 1447 Yellow   Clear   Negative   Negative   Negative   Negative                 No results found for: \"BLOODCX\"  No results found for: \"URINECX\"  No results found for: \"WOUNDCX\"  No results found for: \"STOOLCX\"  No results found for: \"RESPCX\"  No results found for: \"AFBCX\"  Results from last 7 days   Lab Units 12/05/23  0053   PROCALCITONIN ng/mL 0.03       I have personally looked at the labs and they are summarized above.  ----------------------------------------------------------------------------------------------------------------------  Detailed radiology reports for the last 24 hours:  Imaging Results (Last 24 Hours)       Procedure Component Value Units Date/Time    XR Chest 1 View [[id number]] Collected: 12/08/23 2131     Updated: 12/08/23 2137    Narrative:      EXAMINATION: AP portable chest     HISTORY: Shortness of breath.     COMPARISON: 12/3/2023     FINDINGS: A left sided chest port is noted with tip projecting over the  expected location of the mid SVC. There is elevation of the right  hemidiaphragm. Right basilar opacity is noted. This is likely in part  related to scarring or atelectasis adjacent to the elevated  hemidiaphragm. However, patchy airspace opacity is noted elsewhere  within both lungs suspicious for pneumonia in the correct clinical  setting. No pneumothorax or pleural effusion. The cardiac silhouette is  normal in size. There is prominence of the central pulmonary vasculature  with mild interstitial prominence. This may be related to chronic  pulmonary venous congestion although edema could result in a similar  appearance.       Impression:      1. Hazy airspace opacity within both lungs suspicious for pneumonia in  the correct clinical setting.  2. Prominence of the central pulmonary vasculature with interstitial  prominence raising the " "possibility of borderline to mild edema.     This report was finalized on 12/8/2023 9:35 PM by Abiel Tena MD.             Assessment & Plan      #Acute on chronic hypoxic respiratory failure   #Acute on chronic hypercapnic respiratory failure   #Acute COPD exacerbation   #Hx of STEVEN  #Medical noncompliance  -At admission viral panel was negative, D-dimer within normal limits, and no new consolidations seen on imaging at admission.  -Patient has severe COPD that requires home NIPPV but she has previously been unable to tolerate wearing it regularly.  -Pulmonology is following, appreciate assistance.  -Continue steroids, Symbicort, ipratropium. Continue to wean steroid dose. Currently receiving prednisone 40mg PO daily. Encourage aggressive pulmonary toilet, out of bed as much as possible, incentive spirometry, cough/deep breathing.   - PT consult requested due to generalized weakness and poor exercise tolerance, appreciate assistance. Patient has refused to participate, so PT has now signed off.  - Lengthy discussion with patient's family, Palliative Care, and  on 12/8/23 regarding goals of care. Patient is chronically very ill and has poor quality of life. She has been in a continuous cycle of refusing to wear the BiPAP, rising CO2 levels, finally wearing the BiPAP for a few hours, improvement of CO2, then repeating. With the severity of her lung disease patient is essentially BiPAP dependent. Patient lacks medical decision making capacity and son is obtaining emergency guardianship. Family is considering hospice at home, but do not want patient to hear the word \"hospice\" because of negative connotations (they are afraid she will become upset) and her inability to fully understand her current medical situation and treatment decisions.  - Today patient became very lethargic, likely due to elevated CO2. Trilogy machine restarted this afternoon and after a few hours ABG obtained which shows improvement " in pCO2 and mildly alkalotic pH, bicarb level unchanged, pO2 improved    #Paranoia  #Severe anxiety  - Continue treatment of COPD and chronic respiratory failure as detailed above. Supportive care. Psychiatry evaluated, appreciate assistance. Options for anxiety medications are limited due to prolonged QT and need to be conservative with anything that would suppress respiratory drive.    #Chronically elevated troponin  #Frequent pSVT  - Heart cath was done and showed no occlusive coronary artery disease   - Metoprolol tartrate increased to 50mg BID during this admission with improvement in episodes of pSVT  - Continue telemetry monitoring     CHRONIC MEDICAL PROBLEMS     #HFpEF/grade I diastolic dysfunction  - Not currently felt to be in acute exacerbation clinically.   - Previous TTE from 10/2023 reviewed.  Monitor volume status with I&Os, daily weights.  Continue lasix which has been increased to 40mg PO daily during this admission.      #Essential hypertension  - Well controlled, continue home regimen      #Hyperlipidemia  -Continue statin.      #Type II diabetes mellitus, non insulin dependent  - A1C 6.6%.   - Monitor with accuchecks and cover with sliding scale insulin.   - Continue consistent carbohydrate diet      #History of CVA  - Details unknown.  Continue home medication as appropriate. Does not appear to have residual deficits      #History of carotid artery disease s/p left CEA in the past  - Continue home medications      #Hypothyroidism  - TSH WNL. Continue home levothyroxine.      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   #Former smoker       Edited by: Faviola Mejia DO at 12/9/2023 2030    VTE Prophylaxis:   Mechanical Order History:         Ordered        11/30/23 1211  Place Sequential Compression Device  Once            11/30/23 1211  Maintain Sequential Compression Device  Continuous                          Pharmalogical Order History:        Ordered     Dose Route Frequency Stop    12/09/23 0823  heparin (porcine) 5000 UNIT/ML injection 5,000 Units         5,000 Units SC Every 8 Hours Scheduled --    11/29/23 1011  heparin (porcine) injection  Status:  Discontinued         -- -- Code / Trauma / Sedation Medication 11/29/23 1034    11/18/23 1605  heparin (porcine) 5000 UNIT/ML injection 5,000 Units  Status:  Discontinued         5,000 Units SC Every 8 Hours Scheduled 11/30/23 1210                    Dispo: pending clinical course and further discussion with family, they are considering home with hospice    Faviola Mejia DO  [facility] Hospitalist  12/09/23  20:30 EST

## 2023-12-10 NOTE — PROGRESS NOTES
Pulmonary and critical care following the patient for COPD exacerbation and hypoxic respiratory failure  Chief Complaint:  sob    Subjective -   .  All the labs medications and the notes and vitals reviewed.  Again, the patient to wear home trilogy.  I assured her the settings are much better and she needs to use it.  Psych input appreciated.  Review of Systems:    Positive for shortness of breath especially on exertion otherwise negative.          Vital Signs  Temp:  [98.4 °F (36.9 °C)-98.6 °F (37 °C)] 98.4 °F (36.9 °C)  Heart Rate:  [] 65  Resp:  [18-22] 18  BP: ()/(62-78) 146/78  Body mass index is 25.32 kg/m².    Intake/Output Summary (Last 24 hours) at 12/10/2023 0705  Last data filed at 12/10/2023 0511  Gross per 24 hour   Intake 840 ml   Output 1300 ml   Net -460 ml     No intake/output data recorded.    Physical Exam:  General-chronically ill in appearance, not in any acute respiratory distress    HEENT-PERRLA    Neck-supple    Respiratory-resting in bed comfortably wearing nasal cannula oxygen.    Cardiovascular-NSR    GI-grossly normal    CNS-nonfocal    Musculoskeletal -no edema  Extremities- no obvious deformity noticed     Psychiatric- alert awake oriented  Skin- no visible rash         Results Review:     I reviewed the patient's new clinical results.  Results from last 7 days   Lab Units 12/08/23  0756 12/07/23  0255 12/05/23  0052   WBC 10*3/mm3 13.83* 11.40* 14.48*   HEMOGLOBIN g/dL 13.3 13.5 13.1   PLATELETS 10*3/mm3 147 146 77*     Results from last 7 days   Lab Units 12/08/23  0756 12/07/23  0255 12/05/23  0052 12/04/23  0051 12/03/23 2038   SODIUM mmol/L 141 141 143   < >  --    POTASSIUM mmol/L 3.7 2.8* 4.2   < > 4.2   CHLORIDE mmol/L 94* 91* 89*   < >  --    CO2 mmol/L 42.7* 44.7* 46.2*   < >  --    BUN mg/dL 25* 23* 23*   < >  --    CREATININE mg/dL 0.41* 0.67 0.44*   < >  --    CALCIUM mg/dL 9.1 9.4 9.2   < >  --    GLUCOSE mg/dL 133* 138* 126*   < >  --    MAGNESIUM mg/dL   --  2.2 2.1  --  2.1    < > = values in this interval not displayed.     Lab Results   Component Value Date    INR 0.94 11/18/2023    INR 0.95 10/12/2023    INR 0.90 03/27/2017    PROTIME 13.1 11/18/2023    PROTIME 13.1 10/12/2023    PROTIME 9.9 03/27/2017     Results from last 7 days   Lab Units 12/04/23  0051 12/03/23  0852   ALK PHOS U/L 48 50   BILIRUBIN mg/dL 0.8 0.7   ALT (SGPT) U/L 19 22   AST (SGOT) U/L 12 14     Results from last 7 days   Lab Units 12/10/23  0400   PH, ARTERIAL pH units 7.433   PO2 ART mm Hg 93.2   PCO2, ARTERIAL mm Hg 77.1*   HCO3 ART mmol/L 51.4*     Imaging Results (Last 24 Hours)       ** No results found for the last 24 hours. **                 aspirin, 324 mg, Oral, Once  atorvastatin, 40 mg, Oral, Nightly  budesonide-formoterol, 2 puff, Inhalation, BID - RT  clonazePAM, 0.25 mg, Oral, BID  clopidogrel, 75 mg, Oral, Daily  fluticasone, 2 spray, Nasal, Daily  furosemide, 40 mg, Oral, Daily  heparin (porcine), 5,000 Units, Subcutaneous, Q8H  insulin lispro, 2-9 Units, Subcutaneous, 4x Daily AC & at Bedtime  ipratropium, 0.5 mg, Nebulization, 4x Daily - RT  isosorbide mononitrate, 30 mg, Oral, Q24H  levothyroxine, 25 mcg, Oral, Q AM  metoprolol tartrate, 50 mg, Oral, BID  pantoprazole, 40 mg, Oral, Q AM  predniSONE, 40 mg, Oral, Daily With Breakfast  senna-docusate sodium, 2 tablet, Oral, BID  sodium chloride, 10 mL, Intravenous, Q12H  sodium chloride, 10 mL, Intravenous, Q12H      Pharmacy Consult,       Site   Date Value Ref Range Status   12/10/2023 Left Brachial  Final     William's Test   Date Value Ref Range Status   12/10/2023 N/A  Final     pH, Arterial   Date Value Ref Range Status   12/10/2023 7.433 7.350 - 7.450 pH units Final     pCO2, Arterial   Date Value Ref Range Status   12/10/2023 77.1 (C) 35.0 - 45.0 mm Hg Final     Comment:     86 Value above critical limit     pO2, Arterial   Date Value Ref Range Status   12/10/2023 93.2 83.0 - 108.0 mm Hg Final     HCO3, Arterial    Date Value Ref Range Status   12/10/2023 51.4 (H) 20.0 - 26.0 mmol/L Final     Comment:     83 Value above reference range     Base Excess, Arterial   Date Value Ref Range Status   12/10/2023 22.3 (H) 0.0 - 2.0 mmol/L Final     O2 Saturation, Arterial   Date Value Ref Range Status   12/10/2023 97.1 94.0 - 99.0 % Final     Hemoglobin, Blood Gas   Date Value Ref Range Status   12/10/2023 13.5 13.5 - 17.5 g/dL Final     Comment:     84 Value below reference range     Hematocrit, Blood Gas   Date Value Ref Range Status   12/10/2023 41.3 38.0 - 51.0 % Final     Oxyhemoglobin   Date Value Ref Range Status   12/10/2023 97.0 94 - 99 % Final     Methemoglobin   Date Value Ref Range Status   12/10/2023 <-0.10 (L) 0.00 - 3.00 % Final     Comment:     94 Value below reportable range < _0.1     Carboxyhemoglobin   Date Value Ref Range Status   12/10/2023 1.1 0 - 5 % Final     CO2 Content   Date Value Ref Range Status   12/10/2023 53.8 (H) 22 - 33 mmol/L Final     Barometric Pressure for Blood Gas   Date Value Ref Range Status   12/10/2023 727 mmHg Final     Modality   Date Value Ref Range Status   12/10/2023 Nasal Cannula  Final     FIO2   Date Value Ref Range Status   12/10/2023 32 % Final      Latest Reference Range & Units 12/08/23 04:31   pH, Arterial 7.350 - 7.450 pH units 7.260 (L)   pCO2, Arterial 35.0 - 45.0 mm Hg 103.0 (C)   pO2, Arterial 83.0 - 108.0 mm Hg 152.0 (H)   HCO3, Arterial 20.0 - 26.0 mmol/L 46.2 (H)   Base Excess 0.0 - 2.0 mmol/L 14.4 (H)   O2 Saturation, Arterial 94.0 - 99.0 % >99.2 (H)   CO2 Content 22 - 33 mmol/L 49.4 (H)   A-a DO2  See Comment   Carboxyhemoglobin 0 - 5 % 1.7   Methemoglobin 0.00 - 3.00 % 0.10   Oxyhemoglobin 94 - 99 % 97.8   Hematocrit, Blood Gas 38.0 - 51.0 % 40.8   Hemoglobin, Blood Gas 13.5 - 17.5 g/dL 13.3 (L)   Site  Left Radial   William's Test  N/A   Modality  Nasal Cannula   FIO2 % 34   Flow Rate lpm 3.5   Ventilator Mode  NA   Barometric Pressure for Blood Gas mmHg 729    Notified By  [id number]   Notified Time  12/08/2023 04:37   Notified Who  LONG   Collected by  [id number]   (C): Data is critically high  (L): Data is abnormally low  (H): Data is abnormally high'  Medication Review:    Latest Reference Range & Units 12/04/23 00:51   proBNP 0.0 - 900.0 pg/mL 581.8      Latest Reference Range & Units 12/09/23 04:12 12/09/23 19:55   pH, Arterial 7.350 - 7.450 pH units 7.371 7.465 (H)   pCO2, Arterial 35.0 - 45.0 mm Hg 86.0 (C) 67.1 (C)   pO2, Arterial 83.0 - 108.0 mm Hg 66.6 (L) 87.8   HCO3, Arterial 20.0 - 26.0 mmol/L 49.8 (H) 48.2 (H)   Base Excess 0.0 - 2.0 mmol/L 19.5 (H) 20.3 (H)   O2 Saturation, Arterial 94.0 - 99.0 % 91.8 (L) 96.7   CO2 Content 22 - 33 mmol/L 52.4 (H) 50.3 (H)   A-a DO2 0.0 - 300.0 mmHg 194.5 54.8   Carboxyhemoglobin 0 - 5 % 1.4 1.2   Methemoglobin 0.00 - 3.00 % 0.20 <-0.10 (L)   Oxyhemoglobin 94 - 99 % 90.3 (L) 95.9   Hematocrit, Blood Gas 38.0 - 51.0 % 42.2 43.1   Hemoglobin, Blood Gas 13.5 - 17.5 g/dL 13.8 14.0   Site  Right Radial Left Brachial   William's Test  Positive N/A   Modality  BiPap Nasal Cannula   FIO2 % 52 32   Flow Rate lpm 8.0 3.0   Ventilator Mode  BiPAP NA   Barometric Pressure for Blood Gas mmHg 727 726   Notified By  [id number]   Notified Time  12/09/2023 04:30 12/09/2023 20:01   Notified Who  Dr Long ALVES   Collected by  [id number]   (C): Data is critically high  (H): Data is abnormally high  (L): Data is abnormally low  Assessment & Plan   COPD exacerbation-continue steroids continue nebs  Doses reviewed.  Continue current doses.  Medication list reviewed.  Continue current inhalers.  Medication list reviewed.  Continue oxygen-to maintain saturation 88 to 92%.    Encouraged patient to wear triology    Psych input appreciated.  Continue psych medications as per them.      Continue diuretics to improve lung compliance and diffusion capacity.    Latest blood gases reviewed.    Encouraged her to wear trilogy machine tonight.  Latest  chest x-ray reviewed.      STEVEN-continue trilogy overnight.  We will adjust the EPAP as patient likely has severe sleep apnea will adjust the minimum EPAP to 12 and maximum to 18.  No desaturations noticed after increasing the EPAP.  Encouraged her to use the CPAP trilogy every night.      Chronic hypercapnic hypoxic respiratory failure-continue current amount of oxygen titrated to maintain saturation 88 to 92%.     Continue trilogy with a setting of  AVAPS/AE. Tidal volume 6 mL/kg ideal body weight, PS max 28, PS minimum 20, EP AP max 18, EPAP minimum 12 , respiratory rate 18,      hypo kalemia-will replace potassium.  Orders entered      Agree with primary team patient's overall prognosis is extremely poor with end-stage COPD.    Results for orders placed during the hospital encounter of 10/12/23    Adult Transthoracic Echo Complete W/ Cont if Necessary Per Protocol    Interpretation Summary    Left ventricular systolic function is normal. Left ventricular ejection fraction appears to be 66 - 70%.    Left ventricular diastolic function is consistent with (grade I) impaired relaxation.    Estimated right ventricular systolic pressure from tricuspid regurgitation is normal (<35 mmHg).        FiO2 requirement reviewed and adjusted to maintain saturation 88 to 92%.  Continue appropriate prophylaxis            Acute respiratory failure with hypoxia    Chest pain               Elijah Hope MD  12/10/23  07:05 EST

## 2023-12-10 NOTE — PLAN OF CARE
Goal Outcome Evaluation:              Outcome Evaluation: Patient resting in bed at this time. Patient has worn trilogy from 0050 to 0345, this shift so far. Educated patient on importance of wearing trilogy longer. Patient c/o pain, PRN medication given. Patient has no other complaints. Patient has been refusing turns this shift, educated on importance of turing to prevent further skin break down. Will continue plan of care.

## 2023-12-11 LAB
A-A DO2: 210.7 MMHG (ref 0–300)
ANION GAP SERPL CALCULATED.3IONS-SCNC: 3.2 MMOL/L (ref 5–15)
ARTERIAL PATENCY WRIST A: POSITIVE
ATMOSPHERIC PRESS: 729 MMHG
BASE EXCESS BLDA CALC-SCNC: 22.9 MMOL/L (ref 0–2)
BDY SITE: ABNORMAL
BUN SERPL-MCNC: 24 MG/DL (ref 6–20)
BUN/CREAT SERPL: 58.5 (ref 7–25)
CALCIUM SPEC-SCNC: 9.2 MG/DL (ref 8.6–10.5)
CHLORIDE SERPL-SCNC: 90 MMOL/L (ref 98–107)
CO2 BLDA-SCNC: 53.2 MMOL/L (ref 22–33)
CO2 SERPL-SCNC: 44.8 MMOL/L (ref 22–29)
COHGB MFR BLD: 1.3 % (ref 0–5)
CREAT SERPL-MCNC: 0.41 MG/DL (ref 0.57–1)
DEPRECATED RDW RBC AUTO: 53.5 FL (ref 37–54)
EGFRCR SERPLBLD CKD-EPI 2021: 114.2 ML/MIN/1.73
ERYTHROCYTE [DISTWIDTH] IN BLOOD BY AUTOMATED COUNT: 14.8 % (ref 12.3–15.4)
GLUCOSE BLDC GLUCOMTR-MCNC: 144 MG/DL (ref 70–130)
GLUCOSE BLDC GLUCOMTR-MCNC: 178 MG/DL (ref 70–130)
GLUCOSE BLDC GLUCOMTR-MCNC: 214 MG/DL (ref 70–130)
GLUCOSE BLDC GLUCOMTR-MCNC: 245 MG/DL (ref 70–130)
GLUCOSE SERPL-MCNC: 141 MG/DL (ref 65–99)
HCO3 BLDA-SCNC: 51.1 MMOL/L (ref 20–26)
HCT VFR BLD AUTO: 42.7 % (ref 34–46.6)
HCT VFR BLD CALC: 40.7 % (ref 38–51)
HGB BLD-MCNC: 12.9 G/DL (ref 12–15.9)
HGB BLDA-MCNC: 13.3 G/DL (ref 13.5–17.5)
INHALED O2 CONCENTRATION: 52 %
Lab: ABNORMAL
Lab: ABNORMAL
MCH RBC QN AUTO: 29.6 PG (ref 26.6–33)
MCHC RBC AUTO-ENTMCNC: 30.2 G/DL (ref 31.5–35.7)
MCV RBC AUTO: 97.9 FL (ref 79–97)
METHGB BLD QL: 0 % (ref 0–3)
MODALITY: ABNORMAL
NOTIFIED BY: ABNORMAL
NOTIFIED WHO: ABNORMAL
OXYHGB MFR BLDV: 92.2 % (ref 94–99)
PCO2 BLDA: 70.5 MM HG (ref 35–45)
PCO2 TEMP ADJ BLD: ABNORMAL MM[HG]
PH BLDA: 7.47 PH UNITS (ref 7.35–7.45)
PH, TEMP CORRECTED: ABNORMAL
PLATELET # BLD AUTO: 146 10*3/MM3 (ref 140–450)
PMV BLD AUTO: 11.2 FL (ref 6–12)
PO2 BLDA: 67.7 MM HG (ref 83–108)
PO2 TEMP ADJ BLD: ABNORMAL MM[HG]
POTASSIUM SERPL-SCNC: 3.9 MMOL/L (ref 3.5–5.2)
RBC # BLD AUTO: 4.36 10*6/MM3 (ref 3.77–5.28)
SAO2 % BLDCOA: 93.3 % (ref 94–99)
SODIUM SERPL-SCNC: 138 MMOL/L (ref 136–145)
VENTILATOR MODE: ABNORMAL
WBC NRBC COR # BLD AUTO: 10.92 10*3/MM3 (ref 3.4–10.8)

## 2023-12-11 PROCEDURE — 25010000002 ONDANSETRON PER 1 MG

## 2023-12-11 PROCEDURE — 80048 BASIC METABOLIC PNL TOTAL CA: CPT | Performed by: STUDENT IN AN ORGANIZED HEALTH CARE EDUCATION/TRAINING PROGRAM

## 2023-12-11 PROCEDURE — 94660 CPAP INITIATION&MGMT: CPT

## 2023-12-11 PROCEDURE — 99232 SBSQ HOSP IP/OBS MODERATE 35: CPT | Performed by: INTERNAL MEDICINE

## 2023-12-11 PROCEDURE — 94761 N-INVAS EAR/PLS OXIMETRY MLT: CPT

## 2023-12-11 PROCEDURE — 82948 REAGENT STRIP/BLOOD GLUCOSE: CPT

## 2023-12-11 PROCEDURE — 94799 UNLISTED PULMONARY SVC/PX: CPT

## 2023-12-11 PROCEDURE — 36600 WITHDRAWAL OF ARTERIAL BLOOD: CPT

## 2023-12-11 PROCEDURE — 82805 BLOOD GASES W/O2 SATURATION: CPT

## 2023-12-11 PROCEDURE — 83050 HGB METHEMOGLOBIN QUAN: CPT

## 2023-12-11 PROCEDURE — 25010000002 HEPARIN (PORCINE) PER 1000 UNITS: Performed by: STUDENT IN AN ORGANIZED HEALTH CARE EDUCATION/TRAINING PROGRAM

## 2023-12-11 PROCEDURE — 94664 DEMO&/EVAL PT USE INHALER: CPT

## 2023-12-11 PROCEDURE — 63710000001 PREDNISONE PER 1 MG: Performed by: INTERNAL MEDICINE

## 2023-12-11 PROCEDURE — 63710000001 INSULIN LISPRO (HUMAN) PER 5 UNITS: Performed by: INTERNAL MEDICINE

## 2023-12-11 PROCEDURE — 82375 ASSAY CARBOXYHB QUANT: CPT

## 2023-12-11 PROCEDURE — 85027 COMPLETE CBC AUTOMATED: CPT | Performed by: STUDENT IN AN ORGANIZED HEALTH CARE EDUCATION/TRAINING PROGRAM

## 2023-12-11 RX ADMIN — Medication 10 ML: at 21:31

## 2023-12-11 RX ADMIN — HYDROCODONE BITARTRATE AND ACETAMINOPHEN 1 TABLET: 10; 325 TABLET ORAL at 05:37

## 2023-12-11 RX ADMIN — HYDROCODONE BITARTRATE AND ACETAMINOPHEN 1 TABLET: 10; 325 TABLET ORAL at 11:18

## 2023-12-11 RX ADMIN — PREDNISONE 40 MG: 20 TABLET ORAL at 08:57

## 2023-12-11 RX ADMIN — Medication 10 ML: at 09:02

## 2023-12-11 RX ADMIN — INSULIN LISPRO 2 UNITS: 100 INJECTION, SOLUTION INTRAVENOUS; SUBCUTANEOUS at 11:18

## 2023-12-11 RX ADMIN — HYDROCODONE BITARTRATE AND ACETAMINOPHEN 1 TABLET: 10; 325 TABLET ORAL at 00:05

## 2023-12-11 RX ADMIN — FLUTICASONE PROPIONATE 2 SPRAY: 50 SPRAY, METERED NASAL at 08:57

## 2023-12-11 RX ADMIN — IPRATROPIUM BROMIDE 0.5 MG: 0.5 SOLUTION RESPIRATORY (INHALATION) at 07:08

## 2023-12-11 RX ADMIN — HEPARIN SODIUM 5000 UNITS: 5000 INJECTION INTRAVENOUS; SUBCUTANEOUS at 21:31

## 2023-12-11 RX ADMIN — INSULIN LISPRO 4 UNITS: 100 INJECTION, SOLUTION INTRAVENOUS; SUBCUTANEOUS at 21:31

## 2023-12-11 RX ADMIN — METOPROLOL TARTRATE 50 MG: 50 TABLET, FILM COATED ORAL at 21:30

## 2023-12-11 RX ADMIN — ATORVASTATIN CALCIUM 40 MG: 40 TABLET, FILM COATED ORAL at 21:30

## 2023-12-11 RX ADMIN — HYDROCODONE BITARTRATE AND ACETAMINOPHEN 1 TABLET: 10; 325 TABLET ORAL at 18:12

## 2023-12-11 RX ADMIN — IPRATROPIUM BROMIDE 0.5 MG: 0.5 SOLUTION RESPIRATORY (INHALATION) at 00:08

## 2023-12-11 RX ADMIN — CLONAZEPAM 0.25 MG: 0.5 TABLET ORAL at 09:01

## 2023-12-11 RX ADMIN — BUDESONIDE AND FORMOTEROL FUMARATE DIHYDRATE 2 PUFF: 160; 4.5 AEROSOL RESPIRATORY (INHALATION) at 07:08

## 2023-12-11 RX ADMIN — INSULIN LISPRO 4 UNITS: 100 INJECTION, SOLUTION INTRAVENOUS; SUBCUTANEOUS at 17:06

## 2023-12-11 RX ADMIN — HEPARIN SODIUM 5000 UNITS: 5000 INJECTION INTRAVENOUS; SUBCUTANEOUS at 05:37

## 2023-12-11 RX ADMIN — IPRATROPIUM BROMIDE 0.5 MG: 0.5 SOLUTION RESPIRATORY (INHALATION) at 13:10

## 2023-12-11 RX ADMIN — CLOPIDOGREL BISULFATE 75 MG: 75 TABLET, FILM COATED ORAL at 08:57

## 2023-12-11 RX ADMIN — METOPROLOL TARTRATE 50 MG: 50 TABLET, FILM COATED ORAL at 08:57

## 2023-12-11 RX ADMIN — CLONAZEPAM 0.25 MG: 0.5 TABLET ORAL at 21:30

## 2023-12-11 RX ADMIN — ISOSORBIDE MONONITRATE 30 MG: 30 TABLET, EXTENDED RELEASE ORAL at 08:57

## 2023-12-11 RX ADMIN — FUROSEMIDE 40 MG: 40 TABLET ORAL at 08:57

## 2023-12-11 RX ADMIN — PANTOPRAZOLE SODIUM 40 MG: 40 TABLET, DELAYED RELEASE ORAL at 05:37

## 2023-12-11 RX ADMIN — ONDANSETRON 4 MG: 2 INJECTION INTRAMUSCULAR; INTRAVENOUS at 00:09

## 2023-12-11 RX ADMIN — HEPARIN SODIUM 5000 UNITS: 5000 INJECTION INTRAVENOUS; SUBCUTANEOUS at 13:33

## 2023-12-11 RX ADMIN — BUDESONIDE AND FORMOTEROL FUMARATE DIHYDRATE 2 PUFF: 160; 4.5 AEROSOL RESPIRATORY (INHALATION) at 20:00

## 2023-12-11 RX ADMIN — LEVOTHYROXINE SODIUM 25 MCG: 25 TABLET ORAL at 05:37

## 2023-12-11 RX ADMIN — IPRATROPIUM BROMIDE 0.5 MG: 0.5 SOLUTION RESPIRATORY (INHALATION) at 20:00

## 2023-12-11 NOTE — PROGRESS NOTES
"Palliative Care Daily Progress Note     S: Medical record reviewed, followed up with Primary CHELSEA Martel and Dr Clemente regarding patient's condition. When palliative care entered the room Liz was awake and alert with her sister Bessy at bedside. Liz states that she is always in pain  in her back and says it is not worse but is not any better. She does say that she is having nausea at this time, RN Tahmina was in room and Ilet her know. She did not appear to be short of breath at this time.      O:   Palliative Performance Scale Score:     BP 98/47 (BP Location: Left arm, Patient Position: Lying)   Pulse 54   Temp 98.1 °F (36.7 °C) (Oral)   Resp 18   Ht 162.6 cm (64\")   Wt 66.9 kg (147 lb 8 oz)   SpO2 92%   BMI 25.32 kg/m²     Intake/Output Summary (Last 24 hours) at 12/11/2023 1650  Last data filed at 12/11/2023 0459  Gross per 24 hour   Intake 210 ml   Output 500 ml   Net -290 ml       PE:  General Appearance:    Chronically ill appearing, alert, and cooperative   HEENT:    NC/AT, without obvious abnormality, EOMI, anicteric    Neck:   supple, trachea midline, no JVD   Lungs:     Mildly labored respirations, diminished bases, had CPAP on at this time    Heart:    RRR, normal S1 and S2, no M/R/G   Abdomen:     Soft, NT, ND, NABS    Extremities:   Moves all extremities, no edema   Pulses:   Pulses palpable and equal bilaterally   Skin:   Warm, dry   Neurologic:   Alert to person, place, not time or situation   Psych:   Paranoid         Meds: Reviewed and changes noted    Labs:   Results from last 7 days   Lab Units 12/11/23  0234   WBC 10*3/mm3 10.92*   HEMOGLOBIN g/dL 12.9   HEMATOCRIT % 42.7   PLATELETS 10*3/mm3 146     Results from last 7 days   Lab Units 12/11/23  0234   SODIUM mmol/L 138   POTASSIUM mmol/L 3.9   CHLORIDE mmol/L 90*   CO2 mmol/L 44.8*   BUN mg/dL 24*   CREATININE mg/dL 0.41*   GLUCOSE mg/dL 141*   CALCIUM mg/dL 9.2     Results from last 7 days   Lab Units 12/11/23  0234   SODIUM mmol/L " 138   POTASSIUM mmol/L 3.9   CHLORIDE mmol/L 90*   CO2 mmol/L 44.8*   BUN mg/dL 24*   CREATININE mg/dL 0.41*   CALCIUM mg/dL 9.2   GLUCOSE mg/dL 141*     Imaging Results (Last 72 Hours)       Procedure Component Value Units Date/Time    XR Chest 1 View [[id number]] Collected: 12/08/23 2131     Updated: 12/08/23 2137    Narrative:      EXAMINATION: AP portable chest     HISTORY: Shortness of breath.     COMPARISON: 12/3/2023     FINDINGS: A left sided chest port is noted with tip projecting over the  expected location of the mid SVC. There is elevation of the right  hemidiaphragm. Right basilar opacity is noted. This is likely in part  related to scarring or atelectasis adjacent to the elevated  hemidiaphragm. However, patchy airspace opacity is noted elsewhere  within both lungs suspicious for pneumonia in the correct clinical  setting. No pneumothorax or pleural effusion. The cardiac silhouette is  normal in size. There is prominence of the central pulmonary vasculature  with mild interstitial prominence. This may be related to chronic  pulmonary venous congestion although edema could result in a similar  appearance.       Impression:      1. Hazy airspace opacity within both lungs suspicious for pneumonia in  the correct clinical setting.  2. Prominence of the central pulmonary vasculature with interstitial  prominence raising the possibility of borderline to mild edema.     This report was finalized on 12/8/2023 9:35 PM by Abiel Tena MD.                 Diagnostics: Reviewed    A: Liz Jiang is a 57 y.o.female  admitted on 11/18/2023 due to shortness of breath. 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  Palliative was consulted to discuss GOC/ACP and support for pt and family.        P:  I was able to have a lengthy conversation with patients son Pradeep on the phone to discuss GOC and if family had discussed whether or not they would want for Liz to go home with hospice and would she go to his home, Pradeep stated that he was still needing to talk with patients sister and his sister. Pradeep did tell Ruby in SS that he was going to obtain emergency guardianship today. I discussed this pt with Dr Clemente and discussed prior family meetings with him as well.    We will continue to follow along. Please do not hesitate to contact us regarding further sx mgmt or GOC needs, including after hours or on weekends via our on call provider at [phone].     Melissa Thibodeaux, APRN    12/11/2023

## 2023-12-11 NOTE — PROGRESS NOTES
[facility] PROGRESS NOTE     Patient Identification:  Name:  Liz Jiang  Age:  57 y.o.  Sex:  female  :  1965  MRN:  [id number]  Visit Number:  [id number]  Primary Care Provider:  Tabitha Ron APRN    Length of stay:  23    Chief complaint: None    Subjective:    Patient seen and examined at bedside with no nursing staff present.  Patient states she is feeling well today and has no complaints.  Per nursing staff, no new events overnight.  ----------------------------------------------------------------------------------------------------------------------  Current Hospital Meds:  aspirin, 324 mg, Oral, Once  atorvastatin, 40 mg, Oral, Nightly  budesonide-formoterol, 2 puff, Inhalation, BID - RT  clonazePAM, 0.25 mg, Oral, BID  clopidogrel, 75 mg, Oral, Daily  fluticasone, 2 spray, Nasal, Daily  furosemide, 40 mg, Oral, Daily  heparin (porcine), 5,000 Units, Subcutaneous, Q8H  insulin lispro, 2-9 Units, Subcutaneous, 4x Daily AC & at Bedtime  ipratropium, 0.5 mg, Nebulization, 4x Daily - RT  isosorbide mononitrate, 30 mg, Oral, Q24H  levothyroxine, 25 mcg, Oral, Q AM  metoprolol tartrate, 50 mg, Oral, BID  pantoprazole, 40 mg, Oral, Q AM  predniSONE, 40 mg, Oral, Daily With Breakfast  senna-docusate sodium, 2 tablet, Oral, BID  sodium chloride, 10 mL, Intravenous, Q12H  sodium chloride, 10 mL, Intravenous, Q12H      Pharmacy Consult,       ----------------------------------------------------------------------------------------------------------------------  Vital Signs:  Temp:  [97.8 °F (36.6 °C)-98.1 °F (36.7 °C)] 98.1 °F (36.7 °C)  Heart Rate:  [] 54  Resp:  [18-22] 18  BP: ()/(47-88) 98/47      23  0500 23  0500 23  0500   Weight: 67 kg (147 lb 12.8 oz) 67.2 kg (148 lb 3.2 oz) 66.9 kg (147 lb 8 oz)     Body mass index is 25.32 kg/m².    Intake/Output Summary (Last 24 hours) at 2023 9959  Last data filed at 2023 9626  Gross per  24 hour   Intake 210 ml   Output 500 ml   Net -290 ml     Diet: Cardiac Diets; Healthy Heart (2-3 Na+); Texture: Regular Texture (IDDSI 7); Fluid Consistency: Thin (IDDSI 0)  ----------------------------------------------------------------------------------------------------------------------  Physical exam:  Constitutional: Chronically ill-appearing  female in no apparent distress.     HENT:  Head:  Normocephalic and atraumatic.  Mouth:  Moist mucous membranes.    Eyes:  Conjunctivae and EOM are normal.  Pupils are equal, round, and reactive to light.  No scleral icterus.    Neck:  Neck supple. No thyromegaly.  No JVD present.    Cardiovascular:  Regular rate and rhythm with no murmurs, rubs, clicks or gallops appreciated.  Pulmonary/Chest:  Clear to auscultation bilaterally with no crackles, wheezes or rhonchi appreciated.  Abdominal:  Soft. Nontender. Nondistended  Bowel sounds are normal in all four quadrants. No organomegally appreciated.   Musculoskeletal:  No edema, no tenderness, and no deformity.  No red or swollen joints anywhere.    Neurological:  Alert, Cranial nerves II-XII intact with no focal deficits.  No facial droop.  No slurred speech.   Skin:  Warm and dry to palpation with no rashes or lesions appreciated.  Peripheral vascular:  2+ radial and pedal pulses in bilateral upper and lower extremities.  ----------------------------------------------------------------------------------------------------------------------  Tele:    ----------------------------------------------------------------------------------------------------------------------      Results from last 7 days   Lab Units 12/11/23  0234 12/08/23  0756 12/07/23  0255   WBC 10*3/mm3 10.92* 13.83* 11.40*   HEMOGLOBIN g/dL 12.9 13.3 13.5   HEMATOCRIT % 42.7 45.3 46.4   MCV fL 97.9* 98.3* 99.6*   MCHC g/dL 30.2* 29.4* 29.1*   PLATELETS 10*3/mm3 146 147 146     Results from last 7 days   Lab Units 12/11/23  0433   PH, ARTERIAL pH  "units 7.469*   PO2 ART mm Hg 67.7*   PCO2, ARTERIAL mm Hg 70.5*   HCO3 ART mmol/L 51.1*     Results from last 7 days   Lab Units 12/11/23  0234 12/08/23  0756 12/07/23  0255 12/05/23  0052   SODIUM mmol/L 138 141 141 143   POTASSIUM mmol/L 3.9 3.7 2.8* 4.2   MAGNESIUM mg/dL  --   --  2.2 2.1   CHLORIDE mmol/L 90* 94* 91* 89*   CO2 mmol/L 44.8* 42.7* 44.7* 46.2*   BUN mg/dL 24* 25* 23* 23*   CREATININE mg/dL 0.41* 0.41* 0.67 0.44*   CALCIUM mg/dL 9.2 9.1 9.4 9.2   GLUCOSE mg/dL 141* 133* 138* 126*   Estimated Creatinine Clearance: 140.7 mL/min (A) (by C-G formula based on SCr of 0.41 mg/dL (L)).    No results found for: \"AMMONIA\"      No results found for: \"BLOODCX\"  No results found for: \"URINECX\"  No results found for: \"WOUNDCX\"  No results found for: \"STOOLCX\"    I have personally looked at the labs and they are summarized above.  ----------------------------------------------------------------------------------------------------------------------  Imaging Results (Last 24 Hours)       ** No results found for the last 24 hours. **          ----------------------------------------------------------------------------------------------------------------------  Assessment and Plan:    Acute on chronic hypoxic/hypercapnic respiratory failure -acute phase appears resolved, continue supplemental oxygen to maintain O2 saturation greater than 90%    2.  COPD exacerbation -continue Symbicort, ipratropium and prednisone taper.    3.  Medical noncompliance -patient has been deemed medically incompetent by psychiatry services.  This is likely contributing to patient's medical noncompliance as she has a poor understanding of her overall poor health and the interventions necessary to alleviate her symptoms.  As such, patient's son is currently attempting to obtain emergency guardianship and will likely plan to return home with patient under care of hospice.    4.  Chronic HFpEF -no evidence of exacerbation at this time, continue " to monitor closely    5.  Essential hypertension -well-controlled    6.  Hyperlipidemia -statin    7.  Non-insulin-dependent type 2 diabetes mellitus -continue Accu-Cheks before every meal and nightly with sliding scale insulin    Disposition continue to await emergency guardianship for the patient's son    Alexx Clemente,    12/11/23   17:37 EST

## 2023-12-11 NOTE — PROGRESS NOTES
"Progress Note Pulmonary      Subjective no event.  Patient is in good spirit  Interval History:   As above      Review of Systems:    Reviewed ; unchanged       Vital Signs  Temp:  [97.8 °F (36.6 °C)-98.1 °F (36.7 °C)] 98.1 °F (36.7 °C)  Heart Rate:  [] 107  Resp:  [18-22] 18  BP: (140-141)/(81-88) 140/81  Body mass index is 25.32 kg/m².    Intake/Output Summary (Last 24 hours) at 12/11/2023 1318  Last data filed at 12/11/2023 0459  Gross per 24 hour   Intake 210 ml   Output 1300 ml   Net -1090 ml     No intake/output data recorded.    Physical Exam:  General- normal in appearance, not in any acute distress    HEENT- pupils equally reactive to light, normal in size, no scleral icterus    Neck- supple    No JVD, no carotid bruit    Respiratory-bilateral air entry, occasional wheezing only on forced exhalation otherwise clear to auscultation.      Cardiovascular-  Normal S1 and S2. No S3, S4 or murmurs.    GI-nontender nondistended bowel sounds positive    CNS-alert oriented x3, grossly nonfocal    Extremities- pulses normal bilaterally , no clubbing and edema        Results Review:      Results from last 7 days   Lab Units 12/11/23  0234 12/08/23  0756 12/07/23  0255   WBC 10*3/mm3 10.92* 13.83* 11.40*   HEMOGLOBIN g/dL 12.9 13.3 13.5   PLATELETS 10*3/mm3 146 147 146     Results from last 7 days   Lab Units 12/11/23  0234 12/08/23  0756 12/07/23  0255 12/05/23  0052   SODIUM mmol/L 138 141 141 143   POTASSIUM mmol/L 3.9 3.7 2.8* 4.2   CHLORIDE mmol/L 90* 94* 91* 89*   CO2 mmol/L 44.8* 42.7* 44.7* 46.2*   BUN mg/dL 24* 25* 23* 23*   CREATININE mg/dL 0.41* 0.41* 0.67 0.44*   CALCIUM mg/dL 9.2 9.1 9.4 9.2   GLUCOSE mg/dL 141* 133* 138* 126*   MAGNESIUM mg/dL  --   --  2.2 2.1     Lab Results   Component Value Date    INR 0.94 11/18/2023    INR 0.95 10/12/2023    INR 0.90 03/27/2017    PROTIME 13.1 11/18/2023    PROTIME 13.1 10/12/2023    PROTIME 9.9 03/27/2017           Invalid input(s): \"PROT\", \"LABALBU\"  Results " from last 7 days   Lab Units 12/11/23  0433   PH, ARTERIAL pH units 7.469*   PO2 ART mm Hg 67.7*   PCO2, ARTERIAL mm Hg 70.5*   HCO3 ART mmol/L 51.1*     Imaging Results (Last 24 Hours)       ** No results found for the last 24 hours. **                 aspirin, 324 mg, Oral, Once  atorvastatin, 40 mg, Oral, Nightly  budesonide-formoterol, 2 puff, Inhalation, BID - RT  clonazePAM, 0.25 mg, Oral, BID  clopidogrel, 75 mg, Oral, Daily  fluticasone, 2 spray, Nasal, Daily  furosemide, 40 mg, Oral, Daily  heparin (porcine), 5,000 Units, Subcutaneous, Q8H  insulin lispro, 2-9 Units, Subcutaneous, 4x Daily AC & at Bedtime  ipratropium, 0.5 mg, Nebulization, 4x Daily - RT  isosorbide mononitrate, 30 mg, Oral, Q24H  levothyroxine, 25 mcg, Oral, Q AM  metoprolol tartrate, 50 mg, Oral, BID  pantoprazole, 40 mg, Oral, Q AM  predniSONE, 40 mg, Oral, Daily With Breakfast  senna-docusate sodium, 2 tablet, Oral, BID  sodium chloride, 10 mL, Intravenous, Q12H  sodium chloride, 10 mL, Intravenous, Q12H      Pharmacy Consult,         Medication Review:     Assessment & Plan   #1 COPD exacerbation-improving.  Continue steroids continue nebs  Continue oxygen-to maintain saturation 88 to 92%.     Patient has been able to tolerate Trilegy.    Encouraged patient to wear triology    Latest blood gases reviewed.      STEVEN-continue trilogy overnight.       Chronic hypercapnic hypoxic respiratory failure-continue current amount of oxygen titrated to maintain saturation 88 to 92%.     Continue trilogy with a setting of  AVAPS/AE. Tidal volume 6 mL/kg ideal body weight, PS max 28, PS minimum 20, EP AP max 18, EPAP minimum 12 , respiratory rate 18,               Chris Shi MD  12/11/23  13:18 EST

## 2023-12-11 NOTE — PLAN OF CARE
Goal Outcome Evaluation:  Plan of Care Reviewed With: patient, family           Outcome Evaluation: Pt resting in bed, VSS, no s/s of distress. Pt has been refusing to turn, educated on importance to prevent skin breakdown. Will continue with poc.

## 2023-12-11 NOTE — PLAN OF CARE
Goal Outcome Evaluation:              Outcome Evaluation: Patient resting in bed at this time. VSS. PRN pain and nausea meds given. Patient has worn trilogy periodically throughout the night. Patient refusing turns, educated on importance to prevent further skin breakdown. Patient also refused a bath this shift. Educated on importance. Will continue plan of care.

## 2023-12-11 NOTE — CASE MANAGEMENT/SOCIAL WORK
Discharge Planning Assessment   [place]     Patient Name: Liz Jiang  MRN: [id number]  Today's Date: 12/11/2023    Admit Date: 11/18/2023          Discharge Plan       Row Name 12/11/23 1207       Plan    Plan SS spoke with [facility] per JARROD Ulloa who states Pt's family have applied for waiver program and it can take 3-6 months to get Pt enrolled. SS discussed with Palliative care APRN. SS and Palliative care APRN did phone conference with Pt's son. Son states he plans on going to [facility]'s office to apply for emergency  guardianship. Son states he and Pt's family have decided that Pt would go home with him at discharge. Pt's son and family continues to discuss home with hospice. Palliative continues to follow for goals of care.  Pt lives with her daughter. Pt does not have home health services.Pt has utilized [facility] in the past.  Pt utilizes home oxygen at 3 liters, bipap via [employer]. Pt utilizes a hosptial bed, bedside commode, pulse ox and blood pressure cuff via [facility]. SS to follow.                  Continued Care and Services - Admitted Since 11/18/2023       Home Medical Care       Service Provider Request Status Selected Services Address Phone Fax Patient Preferred    [facility]  Selected Home Rehabilitation ,  Home Nursing [address] [place] [id number] [phone] --                  LIANE Armstrong

## 2023-12-12 LAB
A-A DO2: 22.7 MMHG (ref 0–300)
A-A DO2: 41.4 MMHG (ref 0–300)
ARTERIAL PATENCY WRIST A: ABNORMAL
ARTERIAL PATENCY WRIST A: POSITIVE
ATMOSPHERIC PRESS: 735 MMHG
ATMOSPHERIC PRESS: 737 MMHG
BASE EXCESS BLDA CALC-SCNC: 21.9 MMOL/L (ref 0–2)
BASE EXCESS BLDA CALC-SCNC: 22.1 MMOL/L (ref 0–2)
BDY SITE: ABNORMAL
BDY SITE: ABNORMAL
CO2 BLDA-SCNC: 55.3 MMOL/L (ref 22–33)
CO2 BLDA-SCNC: 55.4 MMOL/L (ref 22–33)
COHGB MFR BLD: 1.2 % (ref 0–5)
COHGB MFR BLD: 1.3 % (ref 0–5)
GAS FLOW AIRWAY: 3 LPM
GAS FLOW AIRWAY: 3 LPM
GLUCOSE BLDC GLUCOMTR-MCNC: 132 MG/DL (ref 70–130)
GLUCOSE BLDC GLUCOMTR-MCNC: 210 MG/DL (ref 70–130)
GLUCOSE BLDC GLUCOMTR-MCNC: 216 MG/DL (ref 70–130)
GLUCOSE BLDC GLUCOMTR-MCNC: 239 MG/DL (ref 70–130)
HCO3 BLDA-SCNC: 52.5 MMOL/L (ref 20–26)
HCO3 BLDA-SCNC: 52.6 MMOL/L (ref 20–26)
HCT VFR BLD CALC: 39.8 % (ref 38–51)
HCT VFR BLD CALC: 41.8 % (ref 38–51)
HGB BLDA-MCNC: 13 G/DL (ref 13.5–17.5)
HGB BLDA-MCNC: 13.6 G/DL (ref 13.5–17.5)
INHALED O2 CONCENTRATION: 32 %
INHALED O2 CONCENTRATION: 32 %
Lab: ABNORMAL
METHGB BLD QL: <-0.1 % (ref 0–3)
METHGB BLD QL: <-0.1 % (ref 0–3)
MODALITY: ABNORMAL
MODALITY: ABNORMAL
NOTE: ABNORMAL
NOTIFIED BY: ABNORMAL
NOTIFIED BY: ABNORMAL
NOTIFIED WHO: ABNORMAL
NOTIFIED WHO: ABNORMAL
OXYHGB MFR BLDV: 94.2 % (ref 94–99)
OXYHGB MFR BLDV: 95.2 % (ref 94–99)
PCO2 BLDA: 89.2 MM HG (ref 35–45)
PCO2 BLDA: 92.3 MM HG (ref 35–45)
PCO2 TEMP ADJ BLD: ABNORMAL MM[HG]
PCO2 TEMP ADJ BLD: ABNORMAL MM[HG]
PH BLDA: 7.36 PH UNITS (ref 7.35–7.45)
PH BLDA: 7.38 PH UNITS (ref 7.35–7.45)
PH, TEMP CORRECTED: ABNORMAL
PH, TEMP CORRECTED: ABNORMAL
PO2 BLDA: 80.4 MM HG (ref 83–108)
PO2 BLDA: 95 MM HG (ref 83–108)
PO2 TEMP ADJ BLD: ABNORMAL MM[HG]
PO2 TEMP ADJ BLD: ABNORMAL MM[HG]
SAO2 % BLDCOA: 95 % (ref 94–99)
SAO2 % BLDCOA: 96.3 % (ref 94–99)
VENTILATOR MODE: ABNORMAL
VENTILATOR MODE: ABNORMAL

## 2023-12-12 PROCEDURE — 99232 SBSQ HOSP IP/OBS MODERATE 35: CPT | Performed by: INTERNAL MEDICINE

## 2023-12-12 PROCEDURE — 83050 HGB METHEMOGLOBIN QUAN: CPT

## 2023-12-12 PROCEDURE — 63710000001 PREDNISONE PER 1 MG: Performed by: INTERNAL MEDICINE

## 2023-12-12 PROCEDURE — 94761 N-INVAS EAR/PLS OXIMETRY MLT: CPT

## 2023-12-12 PROCEDURE — 94799 UNLISTED PULMONARY SVC/PX: CPT

## 2023-12-12 PROCEDURE — 36600 WITHDRAWAL OF ARTERIAL BLOOD: CPT

## 2023-12-12 PROCEDURE — 82948 REAGENT STRIP/BLOOD GLUCOSE: CPT

## 2023-12-12 PROCEDURE — 82805 BLOOD GASES W/O2 SATURATION: CPT

## 2023-12-12 PROCEDURE — 82375 ASSAY CARBOXYHB QUANT: CPT

## 2023-12-12 PROCEDURE — 94664 DEMO&/EVAL PT USE INHALER: CPT

## 2023-12-12 PROCEDURE — 25010000002 HEPARIN (PORCINE) PER 1000 UNITS: Performed by: STUDENT IN AN ORGANIZED HEALTH CARE EDUCATION/TRAINING PROGRAM

## 2023-12-12 PROCEDURE — 63710000001 INSULIN LISPRO (HUMAN) PER 5 UNITS: Performed by: INTERNAL MEDICINE

## 2023-12-12 PROCEDURE — 94660 CPAP INITIATION&MGMT: CPT

## 2023-12-12 RX ADMIN — PREDNISONE 40 MG: 20 TABLET ORAL at 08:14

## 2023-12-12 RX ADMIN — INSULIN LISPRO 4 UNITS: 100 INJECTION, SOLUTION INTRAVENOUS; SUBCUTANEOUS at 21:49

## 2023-12-12 RX ADMIN — DOCUSATE SODIUM 50 MG AND SENNOSIDES 8.6 MG 2 TABLET: 8.6; 5 TABLET, FILM COATED ORAL at 08:14

## 2023-12-12 RX ADMIN — LEVOTHYROXINE SODIUM 25 MCG: 25 TABLET ORAL at 06:02

## 2023-12-12 RX ADMIN — HEPARIN SODIUM 5000 UNITS: 5000 INJECTION INTRAVENOUS; SUBCUTANEOUS at 06:02

## 2023-12-12 RX ADMIN — DOCUSATE SODIUM 50 MG AND SENNOSIDES 8.6 MG 2 TABLET: 8.6; 5 TABLET, FILM COATED ORAL at 21:50

## 2023-12-12 RX ADMIN — ISOSORBIDE MONONITRATE 30 MG: 30 TABLET, EXTENDED RELEASE ORAL at 08:18

## 2023-12-12 RX ADMIN — ATORVASTATIN CALCIUM 40 MG: 40 TABLET, FILM COATED ORAL at 21:49

## 2023-12-12 RX ADMIN — LIDOCAINE HYDROCHLORIDE 10 ML: 20 SOLUTION ORAL; TOPICAL at 13:53

## 2023-12-12 RX ADMIN — HYDROCODONE BITARTRATE AND ACETAMINOPHEN 1 TABLET: 10; 325 TABLET ORAL at 21:50

## 2023-12-12 RX ADMIN — CLOPIDOGREL BISULFATE 75 MG: 75 TABLET, FILM COATED ORAL at 08:14

## 2023-12-12 RX ADMIN — IPRATROPIUM BROMIDE 0.5 MG: 0.5 SOLUTION RESPIRATORY (INHALATION) at 00:56

## 2023-12-12 RX ADMIN — HEPARIN SODIUM 5000 UNITS: 5000 INJECTION INTRAVENOUS; SUBCUTANEOUS at 13:53

## 2023-12-12 RX ADMIN — HYDROCODONE BITARTRATE AND ACETAMINOPHEN 1 TABLET: 10; 325 TABLET ORAL at 02:05

## 2023-12-12 RX ADMIN — FLUTICASONE PROPIONATE 2 SPRAY: 50 SPRAY, METERED NASAL at 08:17

## 2023-12-12 RX ADMIN — BUDESONIDE AND FORMOTEROL FUMARATE DIHYDRATE 2 PUFF: 160; 4.5 AEROSOL RESPIRATORY (INHALATION) at 07:36

## 2023-12-12 RX ADMIN — INSULIN LISPRO 4 UNITS: 100 INJECTION, SOLUTION INTRAVENOUS; SUBCUTANEOUS at 11:30

## 2023-12-12 RX ADMIN — IPRATROPIUM BROMIDE 0.5 MG: 0.5 SOLUTION RESPIRATORY (INHALATION) at 13:14

## 2023-12-12 RX ADMIN — HYDROCODONE BITARTRATE AND ACETAMINOPHEN 1 TABLET: 10; 325 TABLET ORAL at 13:56

## 2023-12-12 RX ADMIN — CLONAZEPAM 0.25 MG: 0.5 TABLET ORAL at 08:14

## 2023-12-12 RX ADMIN — PANTOPRAZOLE SODIUM 40 MG: 40 TABLET, DELAYED RELEASE ORAL at 06:02

## 2023-12-12 RX ADMIN — HYDROCODONE BITARTRATE AND ACETAMINOPHEN 1 TABLET: 10; 325 TABLET ORAL at 08:14

## 2023-12-12 RX ADMIN — BUDESONIDE AND FORMOTEROL FUMARATE DIHYDRATE 2 PUFF: 160; 4.5 AEROSOL RESPIRATORY (INHALATION) at 18:27

## 2023-12-12 RX ADMIN — IPRATROPIUM BROMIDE 0.5 MG: 0.5 SOLUTION RESPIRATORY (INHALATION) at 07:36

## 2023-12-12 RX ADMIN — Medication 10 ML: at 21:50

## 2023-12-12 RX ADMIN — VITAMINS A AND D OINTMENT 1 APPLICATION: 15.5; 53.4 OINTMENT TOPICAL at 21:50

## 2023-12-12 RX ADMIN — Medication 10 ML: at 21:55

## 2023-12-12 RX ADMIN — FUROSEMIDE 40 MG: 40 TABLET ORAL at 08:14

## 2023-12-12 RX ADMIN — METOPROLOL TARTRATE 50 MG: 50 TABLET, FILM COATED ORAL at 21:49

## 2023-12-12 RX ADMIN — METOPROLOL TARTRATE 50 MG: 50 TABLET, FILM COATED ORAL at 08:14

## 2023-12-12 RX ADMIN — Medication 10 ML: at 08:18

## 2023-12-12 RX ADMIN — INSULIN LISPRO 4 UNITS: 100 INJECTION, SOLUTION INTRAVENOUS; SUBCUTANEOUS at 17:01

## 2023-12-12 RX ADMIN — HEPARIN SODIUM 5000 UNITS: 5000 INJECTION INTRAVENOUS; SUBCUTANEOUS at 21:50

## 2023-12-12 RX ADMIN — CLONAZEPAM 0.25 MG: 0.5 TABLET ORAL at 21:55

## 2023-12-12 RX ADMIN — IPRATROPIUM BROMIDE 0.5 MG: 0.5 SOLUTION RESPIRATORY (INHALATION) at 18:27

## 2023-12-12 NOTE — PROGRESS NOTES
"Progress Note Pulmonary      Subjective no event.  Patient is in good spirit.  Used home equipment/NIPPV  Interval History:   As above      Review of Systems:    Reviewed ; unchanged       Vital Signs  Temp:  [97.8 °F (36.6 °C)-98.6 °F (37 °C)] 97.8 °F (36.6 °C)  Heart Rate:  [] 104  Resp:  [18-22] 20  BP: ()/(47-89) 106/73  Body mass index is 25.32 kg/m².    Intake/Output Summary (Last 24 hours) at 12/12/2023 1243  Last data filed at 12/12/2023 0900  Gross per 24 hour   Intake 720 ml   Output 400 ml   Net 320 ml     I/O this shift:  In: 120 [P.O.:120]  Out: -     Physical Exam:  General- normal in appearance, not in any acute distress    HEENT- pupils equally reactive to light, normal in size, no scleral icterus    Neck- supple    No JVD, no carotid bruit    Respiratory-bilateral air entry, clear to auscultation.      Cardiovascular-  Normal S1 and S2. No S3, S4 or murmurs.    GI-nontender nondistended bowel sounds positive    CNS-alert oriented x3, grossly nonfocal    Extremities- pulses normal bilaterally , no clubbing and edema        Results Review:      Results from last 7 days   Lab Units 12/11/23  0234 12/08/23  0756 12/07/23  0255   WBC 10*3/mm3 10.92* 13.83* 11.40*   HEMOGLOBIN g/dL 12.9 13.3 13.5   PLATELETS 10*3/mm3 146 147 146     Results from last 7 days   Lab Units 12/11/23  0234 12/08/23  0756 12/07/23  0255   SODIUM mmol/L 138 141 141   POTASSIUM mmol/L 3.9 3.7 2.8*   CHLORIDE mmol/L 90* 94* 91*   CO2 mmol/L 44.8* 42.7* 44.7*   BUN mg/dL 24* 25* 23*   CREATININE mg/dL 0.41* 0.41* 0.67   CALCIUM mg/dL 9.2 9.1 9.4   GLUCOSE mg/dL 141* 133* 138*   MAGNESIUM mg/dL  --   --  2.2     Lab Results   Component Value Date    INR 0.94 11/18/2023    INR 0.95 10/12/2023    INR 0.90 03/27/2017    PROTIME 13.1 11/18/2023    PROTIME 13.1 10/12/2023    PROTIME 9.9 03/27/2017           Invalid input(s): \"PROT\", \"LABALBU\"  Results from last 7 days   Lab Units 12/12/23  0809   PH, ARTERIAL pH units 7.380 "   PO2 ART mm Hg 80.4*   PCO2, ARTERIAL mm Hg 89.2*   HCO3 ART mmol/L 52.6*     Imaging Results (Last 24 Hours)       ** No results found for the last 24 hours. **                 aspirin, 324 mg, Oral, Once  atorvastatin, 40 mg, Oral, Nightly  budesonide-formoterol, 2 puff, Inhalation, BID - RT  clonazePAM, 0.25 mg, Oral, BID  clopidogrel, 75 mg, Oral, Daily  fluticasone, 2 spray, Nasal, Daily  furosemide, 40 mg, Oral, Daily  heparin (porcine), 5,000 Units, Subcutaneous, Q8H  insulin lispro, 2-9 Units, Subcutaneous, 4x Daily AC & at Bedtime  ipratropium, 0.5 mg, Nebulization, 4x Daily - RT  isosorbide mononitrate, 30 mg, Oral, Q24H  levothyroxine, 25 mcg, Oral, Q AM  metoprolol tartrate, 50 mg, Oral, BID  pantoprazole, 40 mg, Oral, Q AM  predniSONE, 40 mg, Oral, Daily With Breakfast  senna-docusate sodium, 2 tablet, Oral, BID  sodium chloride, 10 mL, Intravenous, Q12H  sodium chloride, 10 mL, Intravenous, Q12H      Pharmacy Consult,         Medication Review:     Assessment & Plan   #1 COPD exacerbation-improving.  Continue steroids continue nebs  Continue oxygen-to maintain saturation 88 to 92%.     Patient has been able to tolerate Trilogy.      STEVEN-continue trilogy overnight.       Chronic hypercapnic hypoxic respiratory failure-continue current amount of oxygen titrated to maintain saturation 88 to 92%.     Continue trilogy with a setting of  AVAPS/AE. Tidal volume 6 mL/kg ideal body weight, PS max 28, PS minimum 20, EP AP max 18, EPAP minimum 12 , respiratory rate 18,               Chris Shi MD  12/12/23  12:43 EST

## 2023-12-12 NOTE — PROGRESS NOTES
[facility] HOSPITALIST PROGRESS NOTE     Patient Identification:  Name:  Liz Jiang  Age:  57 y.o.  Sex:  female  :  1965  MRN:  [id number]  Visit Number:  [id number]  Primary Care Provider:  Tabitha Ron APRN    Length of stay:  24    Chief complaint: None    Subjective:    Patient doing well today with no significant new events overnight.  Patient is eager to return home and states her breathing is back to his baseline status.  However, patient was deemed medically incompetent by psychiatry services.  Patient's son is currently attempting to get emergency guardianship.  However, court houses were closed today secondary to the inauguration of the [place] Governor.   ----------------------------------------------------------------------------------------------------------------------  Current Hospital Meds:  aspirin, 324 mg, Oral, Once  atorvastatin, 40 mg, Oral, Nightly  budesonide-formoterol, 2 puff, Inhalation, BID - RT  clonazePAM, 0.25 mg, Oral, BID  clopidogrel, 75 mg, Oral, Daily  fluticasone, 2 spray, Nasal, Daily  furosemide, 40 mg, Oral, Daily  heparin (porcine), 5,000 Units, Subcutaneous, Q8H  insulin lispro, 2-9 Units, Subcutaneous, 4x Daily AC & at Bedtime  ipratropium, 0.5 mg, Nebulization, 4x Daily - RT  isosorbide mononitrate, 30 mg, Oral, Q24H  levothyroxine, 25 mcg, Oral, Q AM  metoprolol tartrate, 50 mg, Oral, BID  pantoprazole, 40 mg, Oral, Q AM  predniSONE, 40 mg, Oral, Daily With Breakfast  senna-docusate sodium, 2 tablet, Oral, BID  sodium chloride, 10 mL, Intravenous, Q12H  sodium chloride, 10 mL, Intravenous, Q12H      Pharmacy Consult,       ----------------------------------------------------------------------------------------------------------------------  Vital Signs:  Temp:  [97.8 °F (36.6 °C)-98.6 °F (37 °C)] 98 °F (36.7 °C)  Heart Rate:  [] 54  Resp:  [18-22] 18  BP: (106-151)/(69-89) 116/72      23  0500 23  0500 23  0500    Weight: 67 kg (147 lb 12.8 oz) 67.2 kg (148 lb 3.2 oz) 66.9 kg (147 lb 8 oz)     Body mass index is 25.32 kg/m².    Intake/Output Summary (Last 24 hours) at 12/12/2023 1648  Last data filed at 12/12/2023 1200  Gross per 24 hour   Intake 920 ml   Output 400 ml   Net 520 ml     Diet: Cardiac Diets; Healthy Heart (2-3 Na+); Texture: Regular Texture (IDDSI 7); Fluid Consistency: Thin (IDDSI 0)  ----------------------------------------------------------------------------------------------------------------------  Physical exam:  Constitutional: Chronically ill-appearing  female in no apparent distress.     HENT:  Head:  Normocephalic and atraumatic.  Mouth:  Moist mucous membranes.    Eyes:  Conjunctivae and EOM are normal.  Pupils are equal, round, and reactive to light.  No scleral icterus.    Neck:  Neck supple. No thyromegaly.  No JVD present.    Cardiovascular:  Regular rate and rhythm with no murmurs, rubs, clicks or gallops appreciated.  Pulmonary/Chest:  Clear to auscultation bilaterally with no crackles, wheezes or rhonchi appreciated.  Abdominal:  Soft. Nontender. Nondistended  Bowel sounds are normal in all four quadrants. No organomegally appreciated.   Musculoskeletal:  No edema, no tenderness, and no deformity.  No red or swollen joints anywhere.    Neurological:  Alert, Cranial nerves II-XII intact with no focal deficits.  No facial droop.  No slurred speech.   Skin:  Warm and dry to palpation with no rashes or lesions appreciated.  Peripheral vascular:  2+ radial and pedal pulses in bilateral upper and lower extremities.    No significant change in physical exam in comparison to 12/11/2023  ------------------------------------------------------------------------------------------------------------  ----------------------------------------------------------------------------------------------------------------------      Results from last 7 days   Lab Units 12/11/23  0234 12/08/23  0756  "12/07/23  0255   WBC 10*3/mm3 10.92* 13.83* 11.40*   HEMOGLOBIN g/dL 12.9 13.3 13.5   HEMATOCRIT % 42.7 45.3 46.4   MCV fL 97.9* 98.3* 99.6*   MCHC g/dL 30.2* 29.4* 29.1*   PLATELETS 10*3/mm3 146 147 146     Results from last 7 days   Lab Units 12/12/23  0809   PH, ARTERIAL pH units 7.380   PO2 ART mm Hg 80.4*   PCO2, ARTERIAL mm Hg 89.2*   HCO3 ART mmol/L 52.6*     Results from last 7 days   Lab Units 12/11/23  0234 12/08/23  0756 12/07/23  0255   SODIUM mmol/L 138 141 141   POTASSIUM mmol/L 3.9 3.7 2.8*   MAGNESIUM mg/dL  --   --  2.2   CHLORIDE mmol/L 90* 94* 91*   CO2 mmol/L 44.8* 42.7* 44.7*   BUN mg/dL 24* 25* 23*   CREATININE mg/dL 0.41* 0.41* 0.67   CALCIUM mg/dL 9.2 9.1 9.4   GLUCOSE mg/dL 141* 133* 138*   Estimated Creatinine Clearance: 140.7 mL/min (A) (by C-G formula based on SCr of 0.41 mg/dL (L)).    No results found for: \"AMMONIA\"      No results found for: \"BLOODCX\"  No results found for: \"URINECX\"  No results found for: \"WOUNDCX\"  No results found for: \"STOOLCX\"    I have personally looked at the labs and they are summarized above.  ----------------------------------------------------------------------------------------------------------------------  Imaging Results (Last 24 Hours)       ** No results found for the last 24 hours. **          ----------------------------------------------------------------------------------------------------------------------  Assessment and Plan:    Acute on chronic hypoxic/hypercapnic respiratory failure -acute phase appears resolved, continue supplemental oxygen to maintain O2 saturation greater than 90%    2.  COPD exacerbation -continue Symbicort, ipratropium and prednisone taper.    3.  Medical noncompliance -patient has been deemed medically incompetent by psychiatry services.  This is likely contributing to patient's medical noncompliance as she has a poor understanding of her overall poor health and the interventions necessary to alleviate her symptoms.  As " such, patient's son is currently attempting to obtain emergency guardianship and will likely plan to return home with patient under care of hospice.    4.  Chronic HFpEF -no evidence of exacerbation at this time, continue to monitor closely    5.  Essential hypertension -well-controlled    6.  Hyperlipidemia -statin    7.  Non-insulin-dependent type 2 diabetes mellitus -continue Accu-Cheks before every meal and nightly with sliding scale insulin    Disposition continue to await emergency guardianship for the patient's son    Alexx ClementeDO   12/12/23   16:48 EST

## 2023-12-12 NOTE — CASE MANAGEMENT/SOCIAL WORK
Discharge Planning Assessment  [facility]     Patient Name: Liz Jiang  MRN: [id number]  Today's Date: 12/12/2023    Admit Date: 11/18/2023            Discharge Plan       Row Name 12/12/23 0851       Plan    Plan SS notifed by RN that Pt's family are requesting to go home with home health services for SN, PT/OT in [facility] and have requested an order for chucks, diapers and wipes. Pt was discussed with Dr. Clemente; who has asked about emergency guardianship status. SS attempted to contact Pt's son Pradeep [phone] but was not successful. SS to keep attempting contact with son. SS updated Dr. Clemente. SS to follow.    10:45am: SS met with Pt's son Pradeep who states he attempted to get  to sign emergency guardianship but courthouse is closed for governor's inauguration, son plans to get emergency guardianship completed on 12/13/23. SS updated Dr. Clemente who states MEGAN will be on date emergency guardianship is completed. Son states Pt will be going home with him at [address] [place] [address]. Son does not have a preference of  agency. Son is aware that MEGAN will be on date he brings copy of emergency guardianship papers. SS to follow.                  LIANE Armstrong

## 2023-12-12 NOTE — PLAN OF CARE
Goal Outcome Evaluation:              Outcome Evaluation: Patient resting in bed at this time. VSS. Patient c/o pain, PRN medications given. Patient has worn trilogy periodically thoughout night. Patient refusing turns, educated on importance to prevent further skin breakdown. Will continue plan of care.

## 2023-12-12 NOTE — PLAN OF CARE
Goal Outcome Evaluation:  Plan of Care Reviewed With: patient           Outcome Evaluation: Pt resting in bed, family has been at beside. VSS. Pt complained of pain this shift, prn medication given per MAR. Pt is refusing turns, educated on importance to prevent skin breakdown. Pt voices no further concerns or complaints. Will continue with POC.

## 2023-12-13 ENCOUNTER — READMISSION MANAGEMENT (OUTPATIENT)
Dept: CALL CENTER | Facility: HOSPITAL | Age: 58
End: 2023-12-13
Payer: MEDICARE

## 2023-12-13 VITALS
HEIGHT: 64 IN | WEIGHT: 147.5 LBS | DIASTOLIC BLOOD PRESSURE: 69 MMHG | OXYGEN SATURATION: 93 % | TEMPERATURE: 98.5 F | RESPIRATION RATE: 18 BRPM | BODY MASS INDEX: 25.18 KG/M2 | HEART RATE: 93 BPM | SYSTOLIC BLOOD PRESSURE: 102 MMHG

## 2023-12-13 PROBLEM — J96.01 ACUTE RESPIRATORY FAILURE WITH HYPOXIA: Status: RESOLVED | Noted: 2023-01-01 | Resolved: 2023-01-01

## 2023-12-13 LAB
A-A DO2: 27.5 MMHG (ref 0–300)
ARTERIAL PATENCY WRIST A: POSITIVE
ATMOSPHERIC PRESS: 740 MMHG
BASE EXCESS BLDA CALC-SCNC: 25.5 MMOL/L (ref 0–2)
BDY SITE: ABNORMAL
CO2 BLDA-SCNC: 52.8 MMOL/L (ref 22–33)
COHGB MFR BLD: 1.1 % (ref 0–5)
GAS FLOW AIRWAY: 3 LPM
GLUCOSE BLDC GLUCOMTR-MCNC: 128 MG/DL (ref 70–130)
GLUCOSE BLDC GLUCOMTR-MCNC: 225 MG/DL (ref 70–130)
GLUCOSE BLDC GLUCOMTR-MCNC: 233 MG/DL (ref 70–130)
HCO3 BLDA-SCNC: 51.2 MMOL/L (ref 20–26)
HCT VFR BLD CALC: 40.7 % (ref 38–51)
HGB BLDA-MCNC: 13.3 G/DL (ref 13.5–17.5)
INHALED O2 CONCENTRATION: 32 %
Lab: ABNORMAL
Lab: ABNORMAL
METHGB BLD QL: <-0.1 % (ref 0–3)
MODALITY: ABNORMAL
NOTIFIED BY: ABNORMAL
NOTIFIED WHO: ABNORMAL
OXYHGB MFR BLDV: >99 % (ref 94–99)
PCO2 BLDA: 53.7 MM HG (ref 35–45)
PCO2 TEMP ADJ BLD: ABNORMAL MM[HG]
PH BLDA: 7.59 PH UNITS (ref 7.35–7.45)
PH, TEMP CORRECTED: ABNORMAL
PO2 BLDA: 135 MM HG (ref 83–108)
PO2 TEMP ADJ BLD: ABNORMAL MM[HG]
SAO2 % BLDCOA: 99.1 % (ref 94–99)
VENTILATOR MODE: ABNORMAL

## 2023-12-13 PROCEDURE — 63710000001 INSULIN LISPRO (HUMAN) PER 5 UNITS: Performed by: INTERNAL MEDICINE

## 2023-12-13 PROCEDURE — 94761 N-INVAS EAR/PLS OXIMETRY MLT: CPT

## 2023-12-13 PROCEDURE — 99239 HOSP IP/OBS DSCHRG MGMT >30: CPT | Performed by: INTERNAL MEDICINE

## 2023-12-13 PROCEDURE — 94660 CPAP INITIATION&MGMT: CPT

## 2023-12-13 PROCEDURE — 82948 REAGENT STRIP/BLOOD GLUCOSE: CPT

## 2023-12-13 PROCEDURE — 94799 UNLISTED PULMONARY SVC/PX: CPT

## 2023-12-13 PROCEDURE — 94664 DEMO&/EVAL PT USE INHALER: CPT

## 2023-12-13 PROCEDURE — 25010000002 HEPARIN (PORCINE) PER 1000 UNITS: Performed by: STUDENT IN AN ORGANIZED HEALTH CARE EDUCATION/TRAINING PROGRAM

## 2023-12-13 PROCEDURE — 63710000001 PREDNISONE PER 1 MG: Performed by: INTERNAL MEDICINE

## 2023-12-13 PROCEDURE — 82375 ASSAY CARBOXYHB QUANT: CPT

## 2023-12-13 PROCEDURE — 83050 HGB METHEMOGLOBIN QUAN: CPT

## 2023-12-13 PROCEDURE — 82805 BLOOD GASES W/O2 SATURATION: CPT

## 2023-12-13 PROCEDURE — 36600 WITHDRAWAL OF ARTERIAL BLOOD: CPT

## 2023-12-13 RX ORDER — PREDNISONE 5 MG/1
TABLET ORAL
Qty: 18 TABLET | Refills: 0 | Status: SHIPPED | OUTPATIENT
Start: 2023-12-13 | End: 2023-12-22

## 2023-12-13 RX ADMIN — HYDROCODONE BITARTRATE AND ACETAMINOPHEN 1 TABLET: 10; 325 TABLET ORAL at 14:22

## 2023-12-13 RX ADMIN — HYDROCODONE BITARTRATE AND ACETAMINOPHEN 1 TABLET: 10; 325 TABLET ORAL at 06:26

## 2023-12-13 RX ADMIN — DOCUSATE SODIUM 50 MG AND SENNOSIDES 8.6 MG 2 TABLET: 8.6; 5 TABLET, FILM COATED ORAL at 08:37

## 2023-12-13 RX ADMIN — ISOSORBIDE MONONITRATE 30 MG: 30 TABLET, EXTENDED RELEASE ORAL at 08:37

## 2023-12-13 RX ADMIN — PANTOPRAZOLE SODIUM 40 MG: 40 TABLET, DELAYED RELEASE ORAL at 05:43

## 2023-12-13 RX ADMIN — METOPROLOL TARTRATE 50 MG: 50 TABLET, FILM COATED ORAL at 08:37

## 2023-12-13 RX ADMIN — Medication 10 ML: at 08:37

## 2023-12-13 RX ADMIN — LIDOCAINE HYDROCHLORIDE 10 ML: 20 SOLUTION ORAL; TOPICAL at 04:05

## 2023-12-13 RX ADMIN — CLOPIDOGREL BISULFATE 75 MG: 75 TABLET, FILM COATED ORAL at 08:37

## 2023-12-13 RX ADMIN — IPRATROPIUM BROMIDE 0.5 MG: 0.5 SOLUTION RESPIRATORY (INHALATION) at 00:11

## 2023-12-13 RX ADMIN — INSULIN LISPRO 4 UNITS: 100 INJECTION, SOLUTION INTRAVENOUS; SUBCUTANEOUS at 17:34

## 2023-12-13 RX ADMIN — HEPARIN SODIUM 5000 UNITS: 5000 INJECTION INTRAVENOUS; SUBCUTANEOUS at 05:43

## 2023-12-13 RX ADMIN — PREDNISONE 40 MG: 20 TABLET ORAL at 08:37

## 2023-12-13 RX ADMIN — LEVOTHYROXINE SODIUM 25 MCG: 25 TABLET ORAL at 05:43

## 2023-12-13 RX ADMIN — IPRATROPIUM BROMIDE 0.5 MG: 0.5 SOLUTION RESPIRATORY (INHALATION) at 06:57

## 2023-12-13 RX ADMIN — FUROSEMIDE 40 MG: 40 TABLET ORAL at 08:37

## 2023-12-13 RX ADMIN — BUDESONIDE AND FORMOTEROL FUMARATE DIHYDRATE 2 PUFF: 160; 4.5 AEROSOL RESPIRATORY (INHALATION) at 06:58

## 2023-12-13 RX ADMIN — IPRATROPIUM BROMIDE 0.5 MG: 0.5 SOLUTION RESPIRATORY (INHALATION) at 13:54

## 2023-12-13 RX ADMIN — FLUTICASONE PROPIONATE 2 SPRAY: 50 SPRAY, METERED NASAL at 08:37

## 2023-12-13 RX ADMIN — CLONAZEPAM 0.25 MG: 0.5 TABLET ORAL at 08:37

## 2023-12-13 RX ADMIN — INSULIN LISPRO 4 UNITS: 100 INJECTION, SOLUTION INTRAVENOUS; SUBCUTANEOUS at 11:52

## 2023-12-13 NOTE — DISCHARGE SUMMARY
[facility] [place] [facility] HOSPITALIST MEDICINE DISCHARGE SUMMARY    Patient Identification:  Name:  Liz Jiang  Age:  57 y.o.  Sex:  female  :  1965  MRN:  [id number]  Visit Number:  [id number]    Date of Admission: 2023  Date of Discharge: 2023    PCP: Tabitha Ron APRN    DISCHARGE DIAGNOSIS   Acute on chronic hypoxic/hypercapnic respiratory failure  COPD exacerbation  Medical noncompliance  Chronic HFpEF  Essential hypertension  Hyperlipidemia  Non-insulin-dependent type 2 diabetes mellitus      CONSULTS  Dr. Shi, Pulmonology  Melissa Thibodeaux, BENI, Palliative Care      PROCEDURES PERFORMED   None      HOSPITAL COURSE  Ms. Jiang is a 57 y.o. female who presented to [facility] ED on 2023 with a chief complaint of shortness of breath.  Patient has a past medical history remarkable for COPD, chronic hypoxic respiratory failure, obstructive sleep apnea, chronic HFpEF, essential hypertension, hyperlipidemia, history of CVA, insulin-dependent type 2 diabetes mellitus and hypothyroidism.  It should be noted patient has had multiple admissions to our facility in the past several months for COPD exacerbation with respiratory failure.  Patient was recently hospitalized in our facility on 10/12/2023 and discharged 10/17/2023 secondary to COPD exacerbation with respiratory failure.  Patient was treated with IV Solu-Medrol and nebulizer treatments at that time and then discharged home.  Patient has been enrolled in our COPD clinic and was discharged with a COPD rescue kit.  Patient did report 2 to 3-day history of worsening shortness of breath with nonproductive cough.  She reported increasing chest tightness but denied any fevers or chills.  Patient did report using her home trilogy machine but further questioning of patient's family members reported patient had not been using her BiPAP/trilogy unit at home.  Patient did report wearing her trilogy unit approximately 5 to 6  hours nightly but after further evaluation/investigation, trilogy unit demonstrates patient uses BiPAP less than 1 hour nightly.  Secondary to shortness of breath, patient did present to the emergency department for further treatment and evaluation.  Initial evaluation in the emergency department did consist of basic laboratory work as well as physical exam and vital signs.  Initial vital signs found patient's blood pressure 113/75, respirations 20, heart rate 128, temperature 97.9 °F and oxygen saturation 70% on 3 L nasal cannula.  Initial lab work did include ABG, CBC and CMP.  ABG demonstrated pH 7.33, pCO2 90, pO2 36 and bicarb 47.9.  Patient was placed on BiPAP and repeat ABG demonstrated improvement in pH to 7.35, pCO2 89, pO2 63 and bicarb 49.  COVID-19/influenza screening was negative.  Chest x-ray appeared unchanged in comparison to 10/28/2023.  Overall, patient was diagnosed with acute on chronic hypoxic/hypercapnic respiratory failure secondary to COPD exacerbation and admitted for further treatment and evaluation.    Patient was started on nebulizer treatments every 6 hours as well as Solu-Medrol 40 mg IV twice daily.  Patient was continued on BiPAP on admission and patient was strongly encouraged to continue nightly BiPAP during the hospital stay and to resume trilogy unit at home upon discharge.  Patient was continued on standard of therapy for COPD exacerbation which did improve her overall symptoms.  However, during hospitalization the patient did intermittently decide not to wear BiPAP and was not overall compliant with the treatment regimen.  As patient did demonstrate overall poor compliance with treatment regimen, it did call into question patient's true understanding of her medical condition.  As such, psychiatry services were consulted who did thoroughly evaluate the patient.  After thorough evaluation, it was felt patient lacked informed medical decision taking capacity.  As such, case  management was consulted who assisted patient's son and attempting to obtain emergency guardianship.  Patient's son was in the process of obtaining emergency guardianship on date of discharge.  Unfortunately due to several unforeseen circumstances (for example, the [facility] is governor and inauguration closed all local court houses), patient was unable to obtain finalized written proof of guardianship.  However, it is anticipated guardianship will be obtained in the next 24 to 48 hours.  Patient is eager to return home and patient's son is eager to have patient discharged home as well.  Patient's son was strongly cautioned with the patient's overall noncompliance, she has likely to have recurrent hypoxic/hypercapnic respiratory failure requiring readmission to the hospital.  Patient's son expressed understanding and interest in returning home with emergency guardianship in place and possible enrollment into hospice as patient does not appear interested in being compliant with suggested treatment regimen.  With this in mind, it is felt patient has reached maximum medical benefit of current hospitalization and she will be discharged home with her son in stable condition today.    VITAL SIGNS:      12/04/23  0500 12/05/23  0500 12/06/23  0500   Weight: 67 kg (147 lb 12.8 oz) 67.2 kg (148 lb 3.2 oz) 66.9 kg (147 lb 8 oz)     Body mass index is 25.32 kg/m².    PHYSICAL EXAM:  Constitutional: Chronically ill-appearing  female in no apparent distress.     HENT:  Head:  Normocephalic and atraumatic.  Mouth:  Moist mucous membranes.    Eyes:  Conjunctivae and EOM are normal.  Pupils are equal, round, and reactive to light.  No scleral icterus.    Neck:  Neck supple. No thyromegaly.  No JVD present.    Cardiovascular:  Regular rate and rhythm with no murmurs, rubs, clicks or gallops appreciated.  Pulmonary/Chest:  Clear to auscultation bilaterally with no crackles, wheezes or rhonchi appreciated.  Abdominal:   Soft. Nontender. Nondistended  Bowel sounds are normal in all four quadrants. No organomegally appreciated.   Musculoskeletal:  No edema, no tenderness, and no deformity.  No red or swollen joints anywhere.    Neurological:  Alert, Cranial nerves II-XII intact with no focal deficits.  No facial droop.  No slurred speech.   Skin:  Warm and dry to palpation with no rashes or lesions appreciated.  Peripheral vascular:  2+ radial and pedal pulses in bilateral upper and lower extremities    DISCHARGE DISPOSITION   Stable    DISCHARGE MEDICATIONS:     Discharge Medications        New Medications        Instructions Start Date   aspirin 81 MG EC tablet   81 mg, Oral, Daily      atorvastatin 40 MG tablet  Commonly known as: LIPITOR   40 mg, Oral, Nightly      isosorbide mononitrate 30 MG 24 hr tablet  Commonly known as: IMDUR   30 mg, Oral, Every 24 Hours Scheduled      pantoprazole 40 MG EC tablet  Commonly known as: PROTONIX   40 mg, Oral, Every Early Morning             Changes to Medications        Instructions Start Date   ipratropium-albuterol 0.5-2.5 mg/3 ml nebulizer  Commonly known as: DUO-NEB  What changed: Another medication with the same name was removed. Continue taking this medication, and follow the directions you see here.   3 mL, Nebulization, Every 4 Hours PRN      metoprolol tartrate 50 MG tablet  Commonly known as: LOPRESSOR  What changed:   medication strength  how much to take   50 mg, Oral, 2 Times Daily             Continue These Medications        Instructions Start Date   clopidogrel 75 MG tablet  Commonly known as: PLAVIX   Take 1 tablet by mouth Daily.      fluticasone 50 MCG/ACT nasal spray  Commonly known as: FLONASE   2 sprays, Nasal, Daily      Fluticasone-Umeclidin-Vilant 200-62.5-25 MCG/ACT aerosol powder    1 puff, Inhalation, Daily      furosemide 20 MG tablet  Commonly known as: LASIX   20 mg, Oral, Daily      HYDROcodone-acetaminophen  MG per tablet  Commonly known as: NORCO   1  tablet, Oral, Every 6 Hours PRN      levothyroxine 25 MCG tablet  Commonly known as: SYNTHROID, LEVOTHROID   25 mcg, Oral, Every Early Morning      potassium chloride 10 MEQ CR capsule  Commonly known as: MICRO-K   10 mEq, Oral, Daily      Ventolin  (90 Base) MCG/ACT inhaler  Generic drug: albuterol sulfate HFA   2 puffs, Inhalation, Every 6 Hours PRN             Stop These Medications      ALPRAZolam 1 MG tablet  Commonly known as: XANAX            ASK your doctor about these medications        Instructions Start Date   predniSONE 5 MG tablet  Commonly known as: DELTASONE  Ask about: Should I take this medication?   Take 3 tablets by mouth Daily With Breakfast for 3 days, THEN 2 tablets Daily With Breakfast for 3 days, THEN 1 tablet Daily With Breakfast for 3 days.   Start Date: December 1, 2023              Diet Instructions    Resume diet as tolerated          Your Scheduled Appointments      Dec 18, 2023  2:00 PM  PFT with  COR PULM LAB ROOM  [facility] CARDIOPULMONARY ([place]) [address] [place] [id number] [phone]      Additional instructions:    You have a follow-up appointment scheduled with VANESSA Coates on 12-21-23 at 2:15, office can be reached at [phone]    You have a follow-up appointment scheduled with DR Fox on 12-28-23 at 10:00, Office can be reached at [phone]         Activity Instructions    Resume activity as tolerated          Additional Instructions for the Follow-ups that You Need to Schedule       Ambulatory Referral to Home Health   As directed      Face to Face Visit Date: 11/30/2023   Follow-up provider for Plan of Care?: I treated the patient in an acute care facility and will not continue treatment after discharge.   Follow-up provider: TRINA CAMACHO [[id number]]   Reason/Clinical Findings: Severe COPD, debility   Describe mobility limitations that make leaving home difficult: Severe COPD, debility   Nursing/Therapeutic Services Requested:  Skilled Nursing Physical Therapy   Skilled nursing orders: Medication education   PT orders: Home safety assessment Strengthening   Frequency: 1 Week 1               Follow-up Information       Benito Fox MD Follow up in 2 week(s).    Specialty: Pulmonary Disease  Contact information:  [facility] [place] [id number] [phone]               Tabitha Ron APRN Follow up in 1 week(s).    Specialty: Nurse Practitioner  Why: December 7th at 1:30  Contact information:  [address] [facility]  [facility] [id number] [phone]                             TEST  RESULTS PENDING AT DISCHARGE       Alexx Clemente DO  12/13/23  17:19 EST    Please note that this discharge summary required more than 30 minutes to complete.    Please send a copy of this dictation to the following providers:  Tabitha Ron APRN

## 2023-12-13 NOTE — PLAN OF CARE
Goal Outcome Evaluation:  Plan of Care Reviewed With: patient           Outcome Evaluation: Patient resting in bed. Patient c/o of pain, PRN meds given per orders. Patient refused bath and refused turns, educated the patient on the importance of daily bathing and turning to prevent skin breakdown. Wound care completed.  Will continue with plan of care.    IV ABX given per orders.

## 2023-12-13 NOTE — CASE MANAGEMENT/SOCIAL WORK
"Discharge Planning Assessment   [facility]     Patient Name: Liz Jiang  MRN: [id number]  Today's Date: 12/13/2023    Admit Date: 11/18/2023          Discharge Plan       Row Name 12/13/23 1102       Plan    Plan SS spoke with Pt's son Pradeep who states he has filed application for emergency guardianship but  is not on bench today to complete emeregency guardianship.Son was encouraged to bring emergency guardianship to hospital once emergency guardianship is completed. Son has provided receipt for chartlet from [facility] re:guardianship. Son requests for Pt to be discharged to his home today if medically stable. Pt discussed with Dr. Clemente and Risk Mgt, both agree to discharge today if Pt's other children are in agreement with discharge plan. SS spoke with Pt's son Tae Jiang and daughter Paloma \"Monroe\" Apolinar via phone, both are in agreement for Pt to be discharged home with son Pradeep. Pt will be going to son's home at [address] [place] [address]. If HH ordered for SN, PT, OT does not have a preference of [facility] agency. Pt utilizes home oxygen at 3 liters, bipap via [facility]. Pt utilizes a hosptial bed, bedside commode, pulse ox and blood pressure cuff via [facility]. SS updated Dr. Clemente. SS to follow.                   TONY ArmstrongW    "

## 2023-12-13 NOTE — DISCHARGE PLACEMENT REQUEST
"Liz Jiang (57 y.o. Female)       Date of Birth   1965    Social Security Number       Address   [address] [address]    Home Phone   [phone]    MRN   [id number]       [facility]    Marital Status                               Admission Date   11/18/23    Admission Type   Emergency    Admitting Provider   Cliff Castaneda MD    Attending Provider   Alexx Clemente DO    Department, Room/Bed   [address], [facility]/       Discharge Date       Discharge Disposition   Home or Self Care    Discharge Destination                                 Attending Provider: Alexx Clemente DO    Allergies: Morphine, Roflumilast    Isolation: None   Infection: None   Code Status: CPR    Ht: 162.6 cm (64\")   Wt: 66.9 kg (147 lb 8 oz)    Admission Cmt: None   Principal Problem: Acute respiratory failure with hypoxia [J96.01]                   Active Insurance as of 11/18/2023       Primary Coverage       Payor Plan Insurance Group Employer/Plan Group    MEDICARE MEDICARE A & B        Payor Plan Address Payor Plan Phone Number Payor Plan Fax Number Effective Dates    [address] [phone]  3/1/2018 - None Entered    [facility] [id number]         Subscriber Name Subscriber Birth Date Member ID       LIZ JIANG 1965 [id number]               Secondary Coverage       Payor Plan Insurance Group Employer/Plan Group    [place]        Payor Plan Address Payor Plan Phone Number Payor Plan Fax Number Effective Dates    [address] [phone]  9/4/2018 - None Entered    [facility] [id number]         Subscriber Name Subscriber Birth Date Member ID       LIZ JIANG 1965 [id number]                     Emergency Contacts        (Rel.) Home Phone Work Phone Mobile Phone    OLY OLIVAS (Sister) -- -- [phone]    Pradeep Peterson (Son) [phone] -- [phone]    adeel haines (Friend) " -- -- [phone]          [address]  [place] [address]  Phone:  [phone]  Fax:  [phone] Date: 2023      Ambulatory Referral to Home Health     Patient:  Liz Jiang MRN:  [id number]   [address] [facility]  [place] [address] :  1965  SSN:    Phone: [phone] Sex:  F      INSURANCE PAYOR PLAN GROUP # SUBSCRIBER ID   Primary:  Secondary:    MEDICARE  [place] [id number]  [id number]      [id number]  [id number]      Referring Provider Information:  MARIAH CALLAWAY Phone: [phone] Fax: [phone]       Referral Information:   # Visits:  999 Referral Type: Home Health [42]   Urgency:  Routine Referral Reason: Specialty Services Required   Start Date: 2023 End Date:  To be determined by Insurer   Diagnosis: Acute respiratory failure with hypoxia (J96.01 [ICD-10-CM] 518.81 [ICD-9-CM])      Refer to Dept:   Refer to Provider:   Refer to Provider Phone:   Refer to Facility:       Face to Face Visit Date: 2023  Follow-up provider for Plan of Care? I treated the patient in an acute care facility and will not continue treatment after discharge.  Follow-up provider: TRINA CAMACHO [[id number]]  Reason/Clinical Findings: Severe COPD, debility  Describe mobility limitations that make leaving home difficult: Severe COPD, debility  Nursing/Therapeutic Services Requested: Skilled Nursing  Nursing/Therapeutic Services Requested: Physical Therapy  Skilled nursing orders: Medication education  PT orders: Home safety assessment  PT orders: Strengthening  Frequency: 1 Week 1     This document serves as a request of services and does not constitute Insurance authorization or approval of services.  To determine eligibility, please contact the members Insurance carrier to verify and review coverage.     If you have medical questions regarding this request for services. Please contact [address] at [phone] during  normal business hours.        Authorizing Provider:Cliff Castaneda MD  Authorizing Provider's NPI: [id number]  Order Entered By: Cliff Castaneda MD 11/30/2023  9:46 AM     Electronically signed by: Cliff Castaneda MD 11/30/2023  9:46 AM          History & Physical        Veronica Koroma PA at 11/18/23 1546       Attestation signed by Cliff Castaneda MD at 11/18/23 4345    I have reviewed this documentation and agree.    Ms. 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 Patient has been admitted for COPD exacerbations multiple times in the last few months. Patient believes it started when home BiPAP was changed to trilogy and machine has not worked. There is some concern from family regarding compliance with home machine also. Patient has only been home about 10 days since last admission. She also recently spent time in inpatient rehab. IN the ED patient found to be hypoxic and hypercapnic on home 3L NC. Patient noted feeling some better on my evaluation. Plain film compared to last on my evaluation and very similar. COVID/flu negative. Will get respiratory panel. D-dimer negative. ABG slowly improving with repeat pending. If better will take patient off with plan to continue overnight. I turned FiO2 down to 40% with goal SpO2 88-92%. Started solumedrol, nebs. No report of increased productive cough. REDSVEST not consistent with overload.                       [facility]  HISTORY & PHYSICAL    Patient Identification:  Name:  Liz Jiang  Age:  57 y.o.  Sex:  female  :  1965  MRN:  [id number]   Visit Number:  [id number]  Admit Date: 2023   Primary Care Physician:  Tabitha Ron APRN     Subjective     Chief complaint:   Chief Complaint   Patient presents with    Shortness of Breath     History of presenting illness:   Patient is a 57 y.o. 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     Patient with multiple admissions recently for COPD exacerbation and respiratory failure.  Patient most recently admitted to this facility 10/12/2023 through 10/17/2023 similar symptoms.  She was treated with IV Solu-Medrol and nebulizer treatments.  She returned back to her baseline functioning status and was titrated down to her home oxygen requirement.  Pulmonology evaluated the patient while inpatient and adjusted her home trilogy settings at that time.  Patient was to be enrolled in our COPD clinic and was discharged with a COPD rescue kit per protocol.    Patient states that her shortness of breath has progressively worsened over the last couple of days.  She reports cough, no sputum production.  She reports chest tightness but no radiating chest pain.  She reports her chest tightness is with inspiration.  She denies any fevers or  chills.  No upper respiratory complaints.  No ill contacts to her knowledge.  She reports using her home trilogy machine, however son at bedside states that the patient's daughter who stays with the patient reports that the patient has not been compliant with her BiPAP.  Patient states she believes that her trilogy settings are incorrect.  She is noted to not wear her trilogy machine with all naps, she states she wears it on average of 5 to 6 hours at night only.  She denies any abdominal pain or change in bowel movements, she does report some nausea with no vomiting.  She does report abdominal bloating.  She denies any urinary complaints.  No headache or dizziness, no LOC.  She denies any numbness tingling or paresthesias.  No other complaints.  She has been on BiPAP for some time prior to my evaluation and reports dramatic improvement.  She states she is eager to get off BiPAP and have something to drink.    Upon arrival to the ED, vitals were temperature 97.9, heart rate 128, respiratory rate 20, blood pressure 113/75, and oxygen saturation 70% on 3 L nasal cannula.  Initial ABG with pH 7.331, PCO2 90.7, PO2 36.3, HCO3 47.9, and oxygen saturation 63.4% on 3 L nasal cannula.  Patient was subsequently placed on BiPAP at 45% FiO2, repeat ABG with improvement in pH to 7.350, PCO2 89.5, P O2 63.3, HCO3 49.3, and oxygen saturation 91%.  Initial high-sensitivity troponin T 45 with repeat at 39, -6 delta.  proBNP 240.  CMP with chloride 93, CO2 40.3, BUN/creatinine ratio 36.8, and glucose 174.  TSH 5.170 with free T4 1.09.  C-reactive protein 4.51.  Lactate 1.7.  Procalcitonin negative at 0.03.  D-dimer 0.49.  CBC with white blood cell count 11.04, MCV 99.1, neutrophil percentage 80.7%, and absolute neutrophil count 8.92.  Sedimentation rate 74.  Urinalysis unremarkable.  COVID-19/influenza screening negative.  Chest x-ray with unchanged appearance of the chest when compared to 10/28/2023 with coarse interstitial  markings.  No acute findings appreciated per radiology read.  Known ED medication administration: 80 mg IV Lasix x1, DuoNeb nebulizer treatment x1, and 80 mg IV Solu-Medrol x1.    Patient has been admitted to the progressive care unit for further evaluation and treatment.     Present during exam: Patient's son  ---------------------------------------------------------------------------------------------------------------------   Review of Systems   Constitutional:  Positive for fatigue. Negative for appetite change, chills, diaphoresis and fever.   HENT:  Negative for congestion and sore throat.    Eyes:  Negative for discharge and visual disturbance.   Respiratory:  Positive for cough, chest tightness and shortness of breath. Negative for wheezing.    Cardiovascular:  Negative for palpitations and leg swelling.   Gastrointestinal:  Positive for abdominal distention (Bloating) and nausea. Negative for abdominal pain, constipation, diarrhea and vomiting.   Endocrine: Negative for cold intolerance and heat intolerance.   Genitourinary:  Negative for decreased urine volume, dysuria, frequency and urgency.   Musculoskeletal:  Negative for arthralgias and myalgias.   Skin:  Negative for color change and wound.   Allergic/Immunologic: Negative for environmental allergies and immunocompromised state.   Neurological:  Negative for dizziness, syncope, weakness, light-headedness and headaches.   Hematological:  Negative for adenopathy. Does not bruise/bleed easily.   Psychiatric/Behavioral:  Positive for confusion (Earlier prior to being placed on BiPAP, she states that she felt confused and did not even know who she was). Negative for decreased concentration. The patient is not nervous/anxious.       ---------------------------------------------------------------------------------------------------------------------   Past Medical History:   Diagnosis Date    Acid reflux disease     Anal cancer 12/05/2019    Arthritis      Asthma, extrinsic     Cholelithiasis     Chronic headaches     COPD (chronic obstructive pulmonary disease)     CVA (cerebral vascular accident)     w/ right sided deficit    CVA (cerebral vascular accident)     Diabetes mellitus     only has high blood sugar when on steriods    Dyslipidemia 2018    History of radiation therapy 2020    Whole pelvis/anal mass    Nausea and vomiting 2016    Obstructive sleep apnea treated with BiPAP     On home oxygen therapy     2L     Sleep apnea, obstructive      Past Surgical History:   Procedure Laterality Date    ABDOMINAL SURGERY      CARDIAC CATHETERIZATION      CAROTID ENDARTERECTOMY Left 2018    CHOLECYSTECTOMY WITH INTRAOPERATIVE CHOLANGIOGRAM N/A 2017    Procedure: CHOLECYSTECTOMY LAPAROSCOPIC INTRAOPERATIVE CHOLANGIOGRAM;  Surgeon: Carolin Marie MD;  Location: [facility];  Service:     COLONOSCOPY      HYSTERECTOMY      for heavy bleeding/ complete    KIDNEY STONE SURGERY      TUBAL ABDOMINAL LIGATION      VENOUS ACCESS DEVICE (PORT) INSERTION Left 2019    Procedure: INSERTION VENOUS ACCESS DEVICE;  Surgeon: Carolin Marie MD;  Location: [facility];  Service: General     Family History   Problem Relation Age of Onset    Diabetes Mother     Hypertension Mother     Arrhythmia Mother     Pneumonia Father     Coronary artery disease Other     Arrhythmia Sister     Heart attack Brother     Lymphoma Brother         hodgkin's     Other Brother         CABG    Breast cancer Neg Hx      Social History     Socioeconomic History    Marital status:    Tobacco Use    Smoking status: Former     Packs/day: 1.50     Years: 30.00     Additional pack years: 0.00     Total pack years: 45.00     Types: Electronic Cigarette, Cigarettes     Quit date:      Years since quittin.8    Smokeless tobacco: Never   Vaping Use    Vaping Use: Unknown   Substance and Sexual Activity    Alcohol use: No    Drug use: Defer    Sexual activity: Defer      ---------------------------------------------------------------------------------------------------------------------   Allergies:  Morphine and Roflumilast  ---------------------------------------------------------------------------------------------------------------------   Medications below are reported home medications pulling from within the system; at this time, these medications have not been reconciled unless otherwise specified and are in the verification process for further verifcation as current home medications.    Prior to Admission Medications       Prescriptions Last Dose Informant Patient Reported? Taking?    albuterol sulfate  (90 Base) MCG/ACT inhaler  Self No No    Inhale 2 puffs Every 6 (Six) Hours As Needed for Wheezing or Shortness of Air.    ALPRAZolam (XANAX) 1 MG tablet  Self No No    Take ½ tablet by mouth 2 (Two) Times a Day As Needed for Anxiety or Sleep.    ASPIRIN LOW DOSE 81 MG EC tablet  Self Yes No    Take 1 tablet by mouth Daily.    clopidogrel (PLAVIX) 75 MG tablet  Self Yes No    Take 1 tablet by mouth Daily.    doxycycline (MONODOX) 100 MG capsule   No No    Take 1 capsule by mouth every 12 hours for 5 days as part of COPD Rescue Kit. (Only Start if in YELLOW ZONE.)    Fluticasone-Umeclidin-Vilant 200-62.5-25 MCG/ACT aerosol powder   Self Yes No    Inhale 1 puff Daily.    furosemide (LASIX) 20 MG tablet  Self Yes No    Take 1 tablet by mouth Daily.    guaiFENesin (MUCINEX) 600 MG 12 hr tablet  Self Yes No    Take 2 tablets by mouth 2 (Two) Times a Day As Needed for Cough or Congestion.    HYDROcodone-acetaminophen (NORCO)  MG per tablet  Self Yes No    Take 1 tablet by mouth Every 6 (Six) Hours.    ipratropium-albuterol (COMBIVENT RESPIMAT)  MCG/ACT inhaler  Self Yes No    Inhale 1 puff 4 (Four) Times a Day As Needed for Wheezing or Shortness of Air.    levothyroxine (SYNTHROID, LEVOTHROID) 25 MCG tablet  Self Yes No    Take 1 tablet by mouth Every  Morning.    meloxicam (MOBIC) 7.5 MG tablet   No No    Take 1 tablet by mouth Daily.    metoprolol tartrate (LOPRESSOR) 25 MG tablet  Self Yes No    Take 1 tablet by mouth 2 (Two) Times a Day.    naloxone (NARCAN) 4 MG/0.1ML nasal spray  Self No No    Call 911. Don't prime. [place] in 1 nostril for overdose. Repeat in 2-3 minutes in other nostril if no or minimal breathing/responsiveness.    polyethylene glycol (MIRALAX) 17 g packet  Self Yes No    Take 17 g by mouth Daily As Needed (constipation).    potassium chloride (MICRO-K) 10 MEQ CR capsule  Self Yes No    Take 1 capsule by mouth Daily.    predniSONE (DELTASONE) 20 MG tablet   No No    Take 2 tablets by mouth Daily. Take 2 tablets daily for 5 days as part of COPD Rescue Kit. (Only Start if in YELLOW ZONE.)          ---------------------------------------------------------------------------------------------------------------------    Objective     Hospital Scheduled Meds:          Current listed hospital scheduled medications may not yet reflect those currently placed in orders that are signed and held, awaiting patient's arrival to floor/unit.    ---------------------------------------------------------------------------------------------------------------------   Vital Signs:  Temp:  [97.9 °F (36.6 °C)] 97.9 °F (36.6 °C)  Heart Rate:  [117-140] 117  Resp:  [20-24] 22  BP: (110-113)/(75-78) 113/75  Mean Arterial Pressure (Non-Invasive) for the past 24 hrs (Last 3 readings):   Noninvasive MAP (mmHg)   11/18/23 1300 86   11/18/23 1258 89     SpO2 Percentage    11/18/23 1425 11/18/23 1518 11/18/23 1526   SpO2: 92% 90% 90%     SpO2:  [69 %-92 %] 90 %  on  Flow (L/min):  [3] 3;   Device (Oxygen Therapy): NPPV/NIV    Body mass index is 30.9 kg/m².  Wt Readings from Last 3 Encounters:   11/18/23 81.6 kg (180 lb)   11/08/23 81.6 kg (180 lb)   10/28/23 81.6 kg (180 lb)        ---------------------------------------------------------------------------------------------------------------------   Physical Exam:  Physical Exam  Nursing note reviewed.   Constitutional:       General: She is awake. She is not in acute distress.     Appearance: She is well-developed. She is obese. She is ill-appearing. She is not toxic-appearing.      Comments: Lying in bed.  BiPAP in place.  Son present at bedside.   HENT:      Head: Normocephalic and atraumatic.      Mouth/Throat:      Mouth: Mucous membranes are moist.   Eyes:      Conjunctiva/sclera: Conjunctivae normal.      Pupils: Pupils are equal, round, and reactive to light.   Cardiovascular:      Rate and Rhythm: Regular rhythm. Tachycardia present.      Pulses:           Dorsalis pedis pulses are 2+ on the right side and 2+ on the left side.      Heart sounds: Normal heart sounds. No murmur heard.     No friction rub. No gallop.   Pulmonary:      Effort: Pulmonary effort is normal.      Breath sounds: Normal air entry. Decreased breath sounds and wheezing (Expiratory) present. No rhonchi or rales.      Comments: BiPAP in place.  Abdominal:      General: Bowel sounds are normal.      Palpations: Abdomen is soft.      Tenderness: There is no abdominal tenderness.   Genitourinary:     Comments: No tijerina catheter in place.  Musculoskeletal:      Cervical back: Neck supple.      Right lower leg: Edema present.      Left lower leg: Edema present.   Skin:     General: Skin is warm and dry.      Capillary Refill: Capillary refill takes less than 2 seconds.   Neurological:      General: No focal deficit present.      Mental Status: She is alert and oriented to person, place, and time.      Sensory: Sensation is intact.      Motor: Motor function is intact.      Comments: Awake and alert. Follows commands. Answers questions appropriately. Moves all extremities equally. Strength and sensation intact. No focal neuro deficit on exam.   Psychiatric:          Attention and Perception: Attention normal.         Mood and Affect: Mood and affect normal.         Speech: Speech normal.         Behavior: Behavior is cooperative.       ---------------------------------------------------------------------------------------------------------------------  EKG:  Pending theology read.  Per my interpretation: Sinus tachycardia with heart rate 135 bpm, QTc 468 ms, RBBB noted, no ST elevation appreciated.  Questionable ST depression in the lateral leads, difficult to discern secondary to tachycardia.    Telemetry:    Sinus tachycardia 100s    I have personally reviewed the EKG/Telemetry strip  ---------------------------------------------------------------------------------------------------------------------   Results from last 7 days   Lab Units 11/18/23  1447 11/18/23  1307   HSTROP T ng/L 39* 45*     Results from last 7 days   Lab Units 11/18/23  1307   PROBNP pg/mL 240.0       Results from last 7 days   Lab Units 11/18/23  1429   PH, ARTERIAL pH units 7.350   PO2 ART mm Hg 63.3*   PCO2, ARTERIAL mm Hg 89.5*   HCO3 ART mmol/L 49.3*     Results from last 7 days   Lab Units 11/18/23  1307   CRP mg/dL 4.51*   LACTATE mmol/L 1.7   WBC 10*3/mm3 11.04*   HEMOGLOBIN g/dL 12.9   HEMATOCRIT % 44.8   MCV fL 99.1*   MCHC g/dL 28.8*   PLATELETS 10*3/mm3 154   INR  0.94     Results from last 7 days   Lab Units 11/18/23  1307   SODIUM mmol/L 141   POTASSIUM mmol/L 3.6   MAGNESIUM mg/dL 1.9   CHLORIDE mmol/L 93*   CO2 mmol/L 40.3*   BUN mg/dL 14   CREATININE mg/dL 0.38*   CALCIUM mg/dL 9.2   GLUCOSE mg/dL 174*   ALBUMIN g/dL 3.7   BILIRUBIN mg/dL 0.5   ALK PHOS U/L 72   AST (SGOT) U/L 16   ALT (SGPT) U/L 13   Estimated Creatinine Clearance: 166.9 mL/min (A) (by C-G formula based on SCr of 0.38 mg/dL (L)).    Lab Results   Component Value Date    HGBA1C 6.80 (H) 09/29/2023     Lab Results   Component Value Date    TSH 5.170 (H) 11/18/2023    FREET4 1.09 11/18/2023     Microbiology Results (last  10 days)       Procedure Component Value - Date/Time    COVID-19 and FLU A/B PCR, 1 HR TAT - Swab, Nasopharynx [[id number]]  (Normal) Collected: 11/18/23 1311    Lab Status: Final result Specimen: Swab from Nasopharynx Updated: 11/18/23 1340     COVID19 Not Detected     Influenza A PCR Not Detected     Influenza B PCR Not Detected    Narrative:      Fact sheet for providers: https://www.fda.gov/media/808805/download    Fact sheet for patients: https://www.fda.gov/media/199842/download    Test performed by PCR.           I have personally reviewed the above laboratory results.   ---------------------------------------------------------------------------------------------------------------------  Imaging Results (Last 7 Days)       Procedure Component Value Units Date/Time    XR Chest 1 View [[id number]] Collected: 11/18/23 1507     Updated: 11/18/23 1512    Narrative:      Comparison: Upright AP view chest     Comparison: 10/28/2023     Findings: Left-sided Mediport catheter noted with distal tip in the  expected position of the SVC, unchanged.     Diffuse bilateral interstitial prominence noted, unchanged.  No  confluent opacification.  No pneumothorax.  No pleural effusion  identified.  Cardiomediastinal silhouette within normal limits.       Impression:         Unchanged appearance of the chest when compared to 10/28/2023 with  coarsened interstitial markings, Mediport catheter and no new acute  finding.        This report was finalized on 11/18/2023 3:10 PM by Divine Gonzalez MD.             I have personally reviewed the above radiology results.     Last Echocardiogram:  Results for orders placed during the hospital encounter of 10/12/23    Adult Transthoracic Echo Complete W/ Cont if Necessary Per Protocol    Interpretation Summary    Left ventricular systolic function is normal. Left ventricular ejection fraction appears to be 66 - 70%.    Left ventricular diastolic function is consistent with (grade I)  impaired relaxation.    Estimated right ventricular systolic pressure from tricuspid regurgitation is normal (<35 mmHg).    ---------------------------------------------------------------------------------------------------------------------    Assessment & Plan      ACUTE HOSPITAL PROBLEMS    -Sepsis, present on admission, secondary to acute COPD exacerbation  -Acute on chronic hypoxic and hypercapnic respiratory failure   -History of STEVEN, home trilogy machine  Sepsis criteria: Heart rate greater than 90, respiratory rate greater than 20  X-ray without consolidation or infiltrate  Procalcitonin negative  Hold off on antibiotics for now  Blood cultures obtained in ED, follow for final results  COVID-19/influenza screening negative  Obtain respiratory panel PCR  IV Lasix x1 given in ED for lung compliance, monitor for diuretic response.  Continue IV Solu-Medrol  Nebulizer treatments and scheduled inhalants  Continue BiPAP for now.  Patient with improvement in oxygenation, however PCO2 significantly elevated still.  pH slightly improved.  Repeat ABG.  Continue supplemental oxygen as necessary to titrate SpO2 > 90%. Continuous pulse oximetry monitoring.  Consider pulmonology consultation if available, may need Trilogy settings adjusted   Strongly encourage compliance with home Trilogy on discharge, suspect non compliance   Awaiting D-dimer, if elevated consider CT PE protocol to rule out pulmonary embolism.  Strongly encourage incentive spirometry as tolerated   COPD education per protocol  Closely monitor vitals and temperature curve  Repeat labs in AM     -Macrocytosis without anemia   Obtain vitamin B12/folate levels, supplement if patient is found to be deficient   Repeat CBC in AM     -Indeterminate high-sensitivity troponin T elevation  Initial high-sensitivity troponin T 45 with repeat at 39, -6 delta  Patient denies any crushing chest pain, she does report some pleuritic chest tightness  EKG with some  questionable ST depression in the lateral leads.  Obtain repeat EKG  Previous TTE reviewed with preserved EF and grade 1 diastolic dysfunction  Ischemic evaluation per review of chart.  However, she would not be a candidate for ischemic evaluation at this time due to significant dyspnea and hypoxia/hypercarbia  Will give aspirin x1  Obtain A1c and AM lipid panel  Closely monitor on telemetry    -F/E/N  No IV fluids. Replace electrolytes per protocol as necessary. NPO diet.     CHRONIC MEDICAL PROBLEMS    -HFpEF/grade I diastolic dysfunction: Patient does have mild pedal edema.  No significant edema noted.  No evidence of volume overload on chest x-ray.  Not felt to be in acute exacerbation.  proBNP not elevated.  Obtains Reds vest.  Previous TTE from 10/2023 reviewed.  Monitor volume status closely.  Continue home medication regimen monitor consult per pharmacy.  IV Lasix given in ED, monitor for diuretic response.  -Essential hypertension  BP appears well controlled   Plan to resume home antihypertensive regimen once reconciled per pharmacy with appropriate holding parameters to prevent hypotension and/or bradycardia   Closely monitor BP per hospital protocol, titrate medications as necessary  -Hyperlipidemia: Continue statin. AM lipid panel   -Type II diabetes mellitus, non insulin dependent  Obtain HgbA1C  Review home medication regimen once reconciled per pharmacy   Hold home oral DM medications for now.  Start low dose SSI, titrate dosage as necessary   Closely monitor blood glucose levels with AccuCheks before meals and at bedtime.  Hypoglycemia protocol in place should it be necessary  -History of CVA: Details unknown.  Continue home medication and monitor consult per pharmacy  -History of carotid artery disease s/p left CEA in the past: Continue home medications once reconciled per pharmacy  -Hypothyroidism: TSH WNL. Continue home levothyroxine.   -Anxiety: Supportive care, continue home medication regimen  once reconciled per pharmacy.  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  Affecting all aspects of care  -Former smoker   ---------------------------------------------------  DVT Prophylaxis: SQ Heparin   Activity: Up with assistance as tolerated   ---------------------------------------------------  INPATIENT status due to the need for care which can only be reasonably provided in an hospital setting such as aggressive/expedited ancillary services and/or consultation services, the necessity for IV medications, close physician monitoring and/or the possible need for procedures.  In such, I feel patient’s risk for adverse outcomes and need for care warrant INPATIENT evaluation and predict the patient’s care encounter to likely last beyond 2 midnights.     Code Status: FULL CODE   ---------------------------------------------------  Disposition/Discharge planning: Pending clinical course   ---------------------------------------------------  I have discussed the patient's assessment and plan with the patient, son at bedside, nursing staff, and attending physician MD Veronica Rey PA-C  Hospitalist Service -- [facility]   Pager: [phone]    11/18/23  15:46 EST    Attending Physician: Cliff Castaneda MD      ---------------------------------------------------------------------------------------------------------------------        Electronically signed by Cliff Castaneda MD at 11/18/23 1839       Operative/Procedure Notes (most recent note)    No notes of this type exist for this encounter.          Physician Progress Notes (most recent note)        Alexx Clemente DO at 12/12/23 1648              [facility]     Patient Identification:  Name:  Liz  Apolinar  Age:  57 y.o.  Sex:  female  :  1965  MRN:  [id number]  Visit Number:  [id number]  Primary Care Provider:  Tabitha Ron APRN    Length of stay:  24    Chief complaint: None    Subjective:    Patient doing well today with no significant new events overnight.  Patient is eager to return home and states her breathing is back to his baseline status.  However, patient was deemed medically incompetent by psychiatry services.  Patient's son is currently attempting to get emergency guardianship.  However, court houses were closed today secondary to the inauguration of the [place] Governor.   ----------------------------------------------------------------------------------------------------------------------  Current Hospital Meds:  aspirin, 324 mg, Oral, Once  atorvastatin, 40 mg, Oral, Nightly  budesonide-formoterol, 2 puff, Inhalation, BID - RT  clonazePAM, 0.25 mg, Oral, BID  clopidogrel, 75 mg, Oral, Daily  fluticasone, 2 spray, Nasal, Daily  furosemide, 40 mg, Oral, Daily  heparin (porcine), 5,000 Units, Subcutaneous, Q8H  insulin lispro, 2-9 Units, Subcutaneous, 4x Daily AC & at Bedtime  ipratropium, 0.5 mg, Nebulization, 4x Daily - RT  isosorbide mononitrate, 30 mg, Oral, Q24H  levothyroxine, 25 mcg, Oral, Q AM  metoprolol tartrate, 50 mg, Oral, BID  pantoprazole, 40 mg, Oral, Q AM  predniSONE, 40 mg, Oral, Daily With Breakfast  senna-docusate sodium, 2 tablet, Oral, BID  sodium chloride, 10 mL, Intravenous, Q12H  sodium chloride, 10 mL, Intravenous, Q12H      Pharmacy Consult,       ----------------------------------------------------------------------------------------------------------------------  Vital Signs:  Temp:  [97.8 °F (36.6 °C)-98.6 °F (37 °C)] 98 °F (36.7 °C)  Heart Rate:  [] 54  Resp:  [18-22] 18  BP: (106-151)/(69-89) 116/72      23  0500 23  0500 23  0500   Weight: 67 kg (147 lb 12.8 oz) 67.2 kg (148 lb 3.2 oz) 66.9 kg (147 lb 8 oz)     Body mass index  is 25.32 kg/m².    Intake/Output Summary (Last 24 hours) at 12/12/2023 1648  Last data filed at 12/12/2023 1200  Gross per 24 hour   Intake 920 ml   Output 400 ml   Net 520 ml     Diet: Cardiac Diets; Healthy Heart (2-3 Na+); Texture: Regular Texture (IDDSI 7); Fluid Consistency: Thin (IDDSI 0)  ----------------------------------------------------------------------------------------------------------------------  Physical exam:  Constitutional: Chronically ill-appearing  female in no apparent distress.     HENT:  Head:  Normocephalic and atraumatic.  Mouth:  Moist mucous membranes.    Eyes:  Conjunctivae and EOM are normal.  Pupils are equal, round, and reactive to light.  No scleral icterus.    Neck:  Neck supple. No thyromegaly.  No JVD present.    Cardiovascular:  Regular rate and rhythm with no murmurs, rubs, clicks or gallops appreciated.  Pulmonary/Chest:  Clear to auscultation bilaterally with no crackles, wheezes or rhonchi appreciated.  Abdominal:  Soft. Nontender. Nondistended  Bowel sounds are normal in all four quadrants. No organomegally appreciated.   Musculoskeletal:  No edema, no tenderness, and no deformity.  No red or swollen joints anywhere.    Neurological:  Alert, Cranial nerves II-XII intact with no focal deficits.  No facial droop.  No slurred speech.   Skin:  Warm and dry to palpation with no rashes or lesions appreciated.  Peripheral vascular:  2+ radial and pedal pulses in bilateral upper and lower extremities.    No significant change in physical exam in comparison to 12/11/2023  ------------------------------------------------------------------------------------------------------------  ----------------------------------------------------------------------------------------------------------------------      Results from last 7 days   Lab Units 12/11/23  0234 12/08/23  0756 12/07/23  0255   WBC 10*3/mm3 10.92* 13.83* 11.40*   HEMOGLOBIN g/dL 12.9 13.3 13.5   HEMATOCRIT % 42.7  "45.3 46.4   MCV fL 97.9* 98.3* 99.6*   MCHC g/dL 30.2* 29.4* 29.1*   PLATELETS 10*3/mm3 146 147 146     Results from last 7 days   Lab Units 12/12/23  0809   PH, ARTERIAL pH units 7.380   PO2 ART mm Hg 80.4*   PCO2, ARTERIAL mm Hg 89.2*   HCO3 ART mmol/L 52.6*     Results from last 7 days   Lab Units 12/11/23  0234 12/08/23  0756 12/07/23  0255   SODIUM mmol/L 138 141 141   POTASSIUM mmol/L 3.9 3.7 2.8*   MAGNESIUM mg/dL  --   --  2.2   CHLORIDE mmol/L 90* 94* 91*   CO2 mmol/L 44.8* 42.7* 44.7*   BUN mg/dL 24* 25* 23*   CREATININE mg/dL 0.41* 0.41* 0.67   CALCIUM mg/dL 9.2 9.1 9.4   GLUCOSE mg/dL 141* 133* 138*   Estimated Creatinine Clearance: 140.7 mL/min (A) (by C-G formula based on SCr of 0.41 mg/dL (L)).    No results found for: \"AMMONIA\"      No results found for: \"BLOODCX\"  No results found for: \"URINECX\"  No results found for: \"WOUNDCX\"  No results found for: \"STOOLCX\"    I have personally looked at the labs and they are summarized above.  ----------------------------------------------------------------------------------------------------------------------  Imaging Results (Last 24 Hours)       ** No results found for the last 24 hours. **          ----------------------------------------------------------------------------------------------------------------------  Assessment and Plan:    Acute on chronic hypoxic/hypercapnic respiratory failure -acute phase appears resolved, continue supplemental oxygen to maintain O2 saturation greater than 90%    2.  COPD exacerbation -continue Symbicort, ipratropium and prednisone taper.    3.  Medical noncompliance -patient has been deemed medically incompetent by psychiatry services.  This is likely contributing to patient's medical noncompliance as she has a poor understanding of her overall poor health and the interventions necessary to alleviate her symptoms.  As such, patient's son is currently attempting to obtain emergency guardianship and will likely plan to " return home with patient under care of hospice.    4.  Chronic HFpEF -no evidence of exacerbation at this time, continue to monitor closely    5.  Essential hypertension -well-controlled    6.  Hyperlipidemia -statin    7.  Non-insulin-dependent type 2 diabetes mellitus -continue Accu-Cheks before every meal and nightly with sliding scale insulin    Disposition continue to await emergency guardianship for the patient's son    Alexx Bray DO Bryn   12/12/23   16:48 EST       Electronically signed by Alexx Clemente DO at 12/12/23 1653          Consult Notes (most recent note)        Johnathan Martines MD at 12/08/23 1022        Consult Orders    1. Inpatient Psychiatrist Consult [[id number]] ordered by Faviola Mejia DO at 12/08/23 0745                 Referring Provider: Dr. Mejia  Reason for Consultation: anxiety, capacity    Chief complaint/Focus of Exam: capacity    Subjective .     History of present illness:    Pt is a 57y F with hx of COPD, O2-dep resp failure, STEVEN, HTN, NIDDM, hypothyroidism, SCC who was admitted on 11/18/23 for mgmt of resp failure. Psych consulted for anxiety & capacity.     Pt reports hx of anxiety, cannot recall medications used. Pt struggled on exam today, likely some difficulty hearing but appears to poorly comprehend questions. Pt continued to reiterate that she wants to go home, but was unable to discuss adequate details of her medical condition, treatment, risks of returning home before deemed ready by medical team. She was unable to explain what she would do if she returned home and medical status worsened.     Review of Systems  Review of systems could not be obtained due to   patient confusion.    History  Past Medical History:   Diagnosis Date    Acid reflux disease     Anal cancer 12/05/2019    Arthritis     Asthma, extrinsic     Cholelithiasis     Chronic headaches     COPD (chronic obstructive pulmonary disease)     CVA (cerebral vascular accident)     w/ right  sided deficit    CVA (cerebral vascular accident)     Diabetes mellitus     only has high blood sugar when on steriods    Dyslipidemia 2018    History of radiation therapy 2020    Whole pelvis/anal mass    Nausea and vomiting 2016    Obstructive sleep apnea treated with BiPAP     On home oxygen therapy     2L     Sleep apnea, obstructive    ,   Past Surgical History:   Procedure Laterality Date    ABDOMINAL SURGERY      CARDIAC CATHETERIZATION      CARDIAC CATHETERIZATION N/A 2023    Procedure: Coronary angiography;  Surgeon: Case Irizarry MD;  Location: [facility] INVASIVE LOCATION;  Service: Cardiology;  Laterality: N/A;    CAROTID ENDARTERECTOMY Left 2018    CHOLECYSTECTOMY WITH INTRAOPERATIVE CHOLANGIOGRAM N/A 2017    Procedure: CHOLECYSTECTOMY LAPAROSCOPIC INTRAOPERATIVE CHOLANGIOGRAM;  Surgeon: Carolin Marie MD;  Location: [facility];  Service:     COLONOSCOPY      HYSTERECTOMY      for heavy bleeding/ complete    KIDNEY STONE SURGERY      TUBAL ABDOMINAL LIGATION      VENOUS ACCESS DEVICE (PORT) INSERTION Left 2019    Procedure: INSERTION VENOUS ACCESS DEVICE;  Surgeon: Carolin Marie MD;  Location: [facility];  Service: General   ,   Family History   Problem Relation Age of Onset    Diabetes Mother     Hypertension Mother     Arrhythmia Mother     Pneumonia Father     Coronary artery disease Other     Arrhythmia Sister     Heart attack Brother     Lymphoma Brother         hodgkin's     Other Brother         CABG    Breast cancer Neg Hx    ,   Social History     Socioeconomic History    Marital status:    Tobacco Use    Smoking status: Former     Packs/day: 1.50     Years: 30.00     Additional pack years: 0.00     Total pack years: 45.00     Types: Electronic Cigarette, Cigarettes     Quit date:      Years since quittin.9    Smokeless tobacco: Never   Vaping Use    Vaping Use: Never used   Substance and Sexual Activity    Alcohol use: No     Drug use: Defer    Sexual activity: Defer     E-cigarette/Vaping    E-cigarette/Vaping Use Never User      E-cigarette/Vaping Substances     E-cigarette/Vaping Devices         ,   Medications Prior to Admission   Medication Sig Dispense Refill Last Dose    clopidogrel (PLAVIX) 75 MG tablet Take 1 tablet by mouth Daily.  3 11/17/2023    fluticasone (FLONASE) 50 MCG/ACT nasal spray 2 sprays into the nostril(s) as directed by provider Daily.   11/17/2023    Fluticasone-Umeclidin-Vilant 200-62.5-25 MCG/ACT aerosol powder  Inhale 1 puff Daily.   11/17/2023    furosemide (LASIX) 20 MG tablet Take 1 tablet by mouth Daily.   11/17/2023    HYDROcodone-acetaminophen (NORCO)  MG per tablet Take 1 tablet by mouth Every 6 (Six) Hours As Needed for Moderate Pain.   Past Week    levothyroxine (SYNTHROID, LEVOTHROID) 25 MCG tablet Take 1 tablet by mouth Every Morning.   11/17/2023    metoprolol tartrate (LOPRESSOR) 25 MG tablet Take 1 tablet by mouth 2 (Two) Times a Day.   11/17/2023    potassium chloride (MICRO-K) 10 MEQ CR capsule Take 1 capsule by mouth Daily.   11/17/2023    albuterol sulfate  (90 Base) MCG/ACT inhaler Inhale 2 puffs Every 6 (Six) Hours As Needed for Wheezing or Shortness of Air. 18 g 0 Unknown    ALPRAZolam (XANAX) 1 MG tablet Take 1 tablet by mouth Daily As Needed for Anxiety.   Unknown    ipratropium-albuterol (COMBIVENT RESPIMAT)  MCG/ACT inhaler Inhale 1 puff 4 (Four) Times a Day As Needed for Wheezing or Shortness of Air.   Unknown    ipratropium-albuterol (DUO-NEB) 0.5-2.5 mg/3 ml nebulizer Take 3 mL by nebulization Every 4 (Four) Hours As Needed for Wheezing or Shortness of Air.   Unknown   , Scheduled Meds:  ALPRAZolam, 0.25 mg, Oral, Q8H  aspirin, 324 mg, Oral, Once  atorvastatin, 40 mg, Oral, Nightly  budesonide-formoterol, 2 puff, Inhalation, BID - RT  clopidogrel, 75 mg, Oral, Daily  fluticasone, 2 spray, Nasal, Daily  furosemide, 40 mg, Oral, Daily  insulin lispro, 2-9 Units,  Subcutaneous, 4x Daily AC & at Bedtime  ipratropium, 0.5 mg, Nebulization, 4x Daily - RT  isosorbide mononitrate, 30 mg, Oral, Q24H  levothyroxine, 25 mcg, Oral, Q AM  metoprolol tartrate, 50 mg, Oral, BID  pantoprazole, 40 mg, Oral, Q AM  potassium chloride, 40 mEq, Oral, BID  predniSONE, 40 mg, Oral, Daily With Breakfast  senna-docusate sodium, 2 tablet, Oral, BID  sodium chloride, 10 mL, Intravenous, Q12H  sodium chloride, 10 mL, Intravenous, Q12H   , Continuous Infusions:  Pharmacy Consult,    , PRN Meds:    acetaminophen    senna-docusate sodium **AND** polyethylene glycol **AND** bisacodyl **AND** bisacodyl    Calcium Replacement - Follow Nurse / BPA Driven Protocol    dextrose    dextrose    glucagon (human recombinant)    HYDROcodone-acetaminophen    Lidocaine Viscous HCl    loperamide    magic barrier cream    Magnesium Low Dose Replacement - Follow Nurse / BPA Driven Protocol    nitroglycerin    ondansetron    Pharmacy Consult    Phosphorus Replacement - Follow Nurse / BPA Driven Protocol    Potassium Replacement - Follow Nurse / BPA Driven Protocol    [COMPLETED] Insert Peripheral IV **AND** sodium chloride    sodium chloride    sodium chloride    sodium chloride    sodium chloride, and Allergies:  Morphine and Roflumilast    Objective     Vital Signs   Temp:  [97.5 °F (36.4 °C)-98.2 °F (36.8 °C)] 97.5 °F (36.4 °C)  Heart Rate:  [] 90  Resp:  [16-22] 20  BP: (103-128)/(61-76) 112/75    Mental Status Exam:  Hygiene:   good  Cooperation:  Guarded  Eye Contact:  Fair  Psychomotor Behavior:  Appropriate  Affect:  Restricted  Hopelessness: Denies  Speech:   Normal  Thought Progress:  Unable to demonstrate  Thought Content:  Unable to demonstrate  Suicidal:  None  Homicidal:  None  Hallucinations:  Not demonstrated today  Delusion:  Unable to demonstrate  Memory:  Deficits  Orientation:  Person and Place  Reliability:  poor  Insight:  Poor  Judgement:  Poor  Impulse Control:  Fair    Results Review:   I  reviewed the patient's new clinical results.  I reviewed the patient's other test results and agree with the interpretation  I personally viewed and interpreted the patient's EKG/Telemetry data  Lab Results (last 24 hours)       Procedure Component Value Units Date/Time    Basic Metabolic Panel [[id number]]  (Abnormal) Collected: 12/08/23 0756    Specimen: Blood Updated: 12/08/23 0911     Glucose 133 mg/dL      BUN 25 mg/dL      Creatinine 0.41 mg/dL      Sodium 141 mmol/L      Potassium 3.7 mmol/L      Comment: Slight hemolysis detected by analyzer. Result may be falsely elevated.        Chloride 94 mmol/L      CO2 42.7 mmol/L      Calcium 9.1 mg/dL      BUN/Creatinine Ratio 61.0     Anion Gap 4.3 mmol/L      eGFR 114.2 mL/min/1.73     Narrative:      GFR Normal >60  Chronic Kidney Disease <60  Kidney Failure <15      CBC & Differential [[id number]]  (Abnormal) Collected: 12/08/23 0756    Specimen: Blood Updated: 12/08/23 0841    Narrative:      The following orders were created for panel order CBC & Differential.  Procedure                               Abnormality         Status                     ---------                               -----------         ------                     CBC Auto Differential[[id number]]        Abnormal            Final result                 Please view results for these tests on the individual orders.    CBC Auto Differential [[id number]]  (Abnormal) Collected: 12/08/23 0756    Specimen: Blood Updated: 12/08/23 0841     WBC 13.83 10*3/mm3      RBC 4.61 10*6/mm3      Hemoglobin 13.3 g/dL      Hematocrit 45.3 %      MCV 98.3 fL      MCH 28.9 pg      MCHC 29.4 g/dL      RDW 14.6 %      RDW-SD 53.0 fl      MPV 11.6 fL      Platelets 147 10*3/mm3      Neutrophil % 77.1 %      Lymphocyte % 12.3 %      Monocyte % 8.6 %      Eosinophil % 0.5 %      Basophil % 0.4 %      Immature Grans % 1.1 %      Neutrophils, Absolute 10.67 10*3/mm3      Lymphocytes, Absolute 1.70 10*3/mm3      Monocytes,  Absolute 1.19 10*3/mm3      Eosinophils, Absolute 0.07 10*3/mm3      Basophils, Absolute 0.05 10*3/mm3      Immature Grans, Absolute 0.15 10*3/mm3      nRBC 0.0 /100 WBC     POC Glucose Once [[id number]]  (Abnormal) Collected: 12/08/23 0731    Specimen: Blood Updated: 12/08/23 0736     Glucose 131 mg/dL     Blood Gas, Arterial With Co-Ox [[id number]]  (Abnormal) Collected: 12/08/23 0431    Specimen: Arterial Blood Updated: 12/08/23 0436     Site Left Radial     William's Test N/A     pH, Arterial 7.260 pH units      Comment: 84 Value below reference range        pCO2, Arterial 103.0 mm Hg      Comment: 86 Value above critical limit        pO2, Arterial 152.0 mm Hg      Comment: 83 Value above reference range        HCO3, Arterial 46.2 mmol/L      Comment: 83 Value above reference range        Base Excess, Arterial 14.4 mmol/L      O2 Saturation, Arterial >99.2 %      Hemoglobin, Blood Gas 13.3 g/dL      Comment: 84 Value below reference range        Hematocrit, Blood Gas 40.8 %      Oxyhemoglobin 97.8 %      Methemoglobin 0.10 %      Carboxyhemoglobin 1.7 %      A-a DO2 --     Comment: UNABLE TO CALCULATE        CO2 Content 49.4 mmol/L      Barometric Pressure for Blood Gas 729 mmHg      Modality Nasal Cannula     FIO2 34 %      Flow Rate 3.5 lpm      Ventilator Mode NA     Notified Who LONG     Notified By [id number]     Notified Time 12/08/2023 04:37     Collected by [id number]     Comment: Meter: [id number]     :  [id number]        pH, Temp Corrected --     pCO2, Temperature Corrected --     pO2, Temperature Corrected --    POC Glucose Once [[id number]]  (Abnormal) Collected: 12/07/23 2042    Specimen: Blood Updated: 12/07/23 2050     Glucose 184 mg/dL     POC Glucose Once [[id number]]  (Abnormal) Collected: 12/07/23 1649    Specimen: Blood Updated: 12/07/23 1655     Glucose 187 mg/dL     Magnesium [[id number]]  (Normal) Collected: 12/07/23 0255    Specimen: Blood Updated: 12/07/23 1307     Magnesium 2.2 mg/dL      POC Glucose Once [[id number]]  (Abnormal) Collected: 23 1040    Specimen: Blood Updated: 23 1047     Glucose 177 mg/dL           Imaging Results (Last 24 Hours)       ** No results found for the last 24 hours. **              Assessment & Plan     Principal Problem:    Acute respiratory failure with hypoxia  Active Problems:    Chest pain     Anxiety  -Likely worsened by medical comorbidities, prolonged hospitalization, possibly steroids.  -Clonazepam likely a safer option than alprazolam. Would use sparingly until respiratory status improves, then can discontinue    Capacity  -Pt unable to either comprehend or communicate her understanding of medical condition, treatment, risks of early discharge or refusal of recommended treatment options.  -Pt appears to lack medical decision making at this time. It is possible this is temporary due to current clinical status and she may not need long-term guardianship, but given her desire to leave the hospital at this time, emergency guardianship appears reasonable. Will leave recommendation in next note.    I discussed the patient's findings and my recommendations with patient    Johnathan Martines MD  23  10:22 EST    Electronically signed by Johnathan Martines MD at 23 1039          Physical Therapy Notes (most recent note)        Tho Mancia, PT at 23 1045  Version 1 of 1         Acute Care - Physical Therapy Treatment Note/Discharge  [facility]     Patient Name: Liz Jiang  : 1965  MRN: [id number]  Today's Date: 2023   Onset of Illness/Injury or Date of Surgery: 23     Referring Physician: Roberto      Admit Date: 2023    Visit Dx:    ICD-10-CM ICD-9-CM   1. Acute respiratory failure with hypoxia  J96.01 518.81   2. COPD exacerbation  J44.1 491.21   3. Acute on chronic respiratory failure with hypoxia and hypercapnia  J96.21 518.84    J96.22 786.09     799.02   4. Stable angina pectoris  I20.89 413.9     Patient  Active Problem List   Diagnosis    Chest pain    COPD (chronic obstructive pulmonary disease)    Tobacco abuse    GERD (gastroesophageal reflux disease)    Anxiety    Arthritis    Nausea and vomiting    Generalized abdominal pain    Right upper quadrant pain    Gallbladder disease    Abnormal biliary HIDA scan    Dyslipidemia    Tachycardia    Anal cancer    Port-A-Cath in place    Debility    Chronic respiratory failure with hypoxia    Acute respiratory failure with hypoxia     Past Medical History:   Diagnosis Date    Acid reflux disease     Anal cancer 12/05/2019    Arthritis     Asthma, extrinsic     Cholelithiasis     Chronic headaches     COPD (chronic obstructive pulmonary disease)     CVA (cerebral vascular accident)     w/ right sided deficit    CVA (cerebral vascular accident)     Diabetes mellitus     only has high blood sugar when on steriods    Dyslipidemia 09/04/2018    History of radiation therapy 02/27/2020    Whole pelvis/anal mass    Nausea and vomiting 12/25/2016    Obstructive sleep apnea treated with BiPAP     On home oxygen therapy     2L     Sleep apnea, obstructive      Past Surgical History:   Procedure Laterality Date    ABDOMINAL SURGERY      CARDIAC CATHETERIZATION      CARDIAC CATHETERIZATION N/A 11/29/2023    Procedure: Coronary angiography;  Surgeon: Case Irizarry MD;  Location: [facility] INVASIVE LOCATION;  Service: Cardiology;  Laterality: N/A;    CAROTID ENDARTERECTOMY Left 2018    CHOLECYSTECTOMY WITH INTRAOPERATIVE CHOLANGIOGRAM N/A 1/30/2017    Procedure: CHOLECYSTECTOMY LAPAROSCOPIC INTRAOPERATIVE CHOLANGIOGRAM;  Surgeon: Carolin Marie MD;  Location: [facility];  Service:     COLONOSCOPY      HYSTERECTOMY      for heavy bleeding/ complete    KIDNEY STONE SURGERY      TUBAL ABDOMINAL LIGATION      VENOUS ACCESS DEVICE (PORT) INSERTION Left 12/17/2019    Procedure: INSERTION VENOUS ACCESS DEVICE;  Surgeon: Carolin Marie MD;  Location: [facility];  Service:  General       PT Assessment (last 12 hours)       PT Evaluation and Treatment       Row Name 11/30/23 1045          Physical Therapy Time and Intention    Document Type discharge treatment  -CS     Mode of Treatment physical therapy  -CS     Patient Effort adequate  -CS     Comment Pt seen bedside this AM. Pt agreed to discharge education.  -CS       Row Name 11/30/23 1045          General Information    Patient Profile Reviewed yes  -CS     Equipment Currently Used at Home commode, bedside;hospital bed;walker, rolling;rollator;wheelchair;ramp  -CS     Existing Precautions/Restrictions fall  -CS       Row Name 11/30/23 1045          Living Environment    Primary Care Provided by self  Daughter helps at times  -CS       Row Name 11/30/23 1045          Pain    Pre/Posttreatment Pain Comment no c/o  -CS       Row Name 11/30/23 1045          Cognition    Affect/Mental Status (Cognition) WFL  -CS     Follows Commands (Cognition) WFL  -CS       Row Name 11/30/23 1045          Motor Skills    Therapeutic Exercise --  Pt education provided for energy conservation, breathing exercise resources, and progression of mobility.  -CS       Row Name             Wound 11/20/23 1443 medial coccyx Fissure    Wound - Properties Group Placement Date: 11/20/23 -TW Placement Time: 1443  -TW Orientation: medial  -TW Location: coccyx  -TW Primary Wound Type: Fissure  -TW    Retired Wound - Properties Group Placement Date: 11/20/23 -TW Placement Time: 1443  -TW Orientation: medial  -TW Location: coccyx  -TW Primary Wound Type: Fissure  -TW    Retired Wound - Properties Group Date first assessed: 11/20/23  -TW Time first assessed: 1443  -TW Location: coccyx  -TW Primary Wound Type: Fissure  -TW      Row Name 11/30/23 1045          Plan of Care Review    Plan of Care Reviewed With patient  -CS     Progress no change  -CS     Outcome Evaluation Pt demonstrated knowledge w/ education provided. Anticipate d/c home this day.  -CS       Row Name  11/30/23 1045          Positioning and Restraints    Pre-Treatment Position in bed  -CS     Post Treatment Position bed  -CS     In Bed fowlers;call light within reach;encouraged to call for assist  -CS       Row Name 11/30/23 1045          Therapy Assessment/Plan (PT)    Rehab Potential (PT) good, to achieve stated therapy goals  -CS     Criteria for Skilled Interventions Met (PT) yes;meets criteria;skilled treatment is necessary  -CS     Therapy Frequency (PT) 2 times/wk  -CS     Problem List (PT) problems related to;balance;coordination;mobility;strength;postural control  -CS     Activity Limitations Related to Problem List (PT) unable to ambulate safely;unable to transfer safely  -CS       Row Name 11/30/23 1045          Physical Therapy Goals    Bed Mobility Goal Selection (PT) bed mobility, PT goal 1  -CS     Transfer Goal Selection (PT) transfer, PT goal 1  -CS     Gait Training Goal Selection (PT) gait training, PT goal 1  -       Row Name 11/30/23 1045          Bed Mobility Goal 1 (PT)    Activity/Assistive Device (Bed Mobility Goal 1, PT) sit to supine/supine to sit  -CS     [place] Level/Cues Needed (Bed Mobility Goal 1, PT) modified independence  -CS     Time Frame (Bed Mobility Goal 1, PT) long term goal (LTG);by discharge  -CS     Progress/Outcomes (Bed Mobility Goal 1, PT) goal met  -CS       Row Name 11/30/23 1045          Transfer Goal 1 (PT)    Activity/Assistive Device (Transfer Goal 1, PT) transfers, all;bed-to-chair/chair-to-bed;walker, rolling  -CS     [place] Level/Cues Needed (Transfer Goal 1, PT) modified independence  -CS     Time Frame (Transfer Goal 1, PT) long term goal (LTG);by discharge  -CS     Progress/Outcome (Transfer Goal 1, PT) goal partially met  -CS       Row Name 11/30/23 1045          Gait Training Goal 1 (PT)    Activity/Assistive Device (Gait Training Goal 1, PT) gait (walking locomotion);assistive device use;walker, rolling  -CS     [place] Level (Gait  Training Goal 1, PT) standby assist  -CS     Distance (Gait Training Goal 1, PT) 50ft  -CS     Time Frame (Gait Training Goal 1, PT) long term goal (LTG);by discharge  -CS     Progress/Outcome (Gait Training Goal 1, PT) goal not met  -CS       Row Name 11/30/23 1045          Discharge Summary (PT)    Additional Documentation Discharge Summary (PT) (Group)  -CS       Row Name 11/30/23 1045          Discharge Summary (PT)    Reason for Discharge (PT) patient discharged from this facility  -CS     Outcomes Achieved/Progress Made Upon Discharge (PT) goals partially achieved prior to discharge  -CS     Transfer to Another Level of Care or Facility (PT) home;home with assist recommended;home with home health recommended  -CS     Discharge Summary Statement (PT) D/C acute PT.  -CS               User Key  (r) = Recorded By, (t) = Taken By, (c) = Cosigned By      Initials Name Provider Type    Gricel Packer, RN Registered Nurse     Tho Mancia, PT Physical Therapist                          PT Recommendation and Plan  Therapy Frequency (PT): 2 times/wk  Plan of Care Reviewed With: patient  Progress: no change  Outcome Evaluation: Pt demonstrated knowledge w/ education provided. Anticipate d/c home this day.         Time Calculation:    PT Charges       Row Name 11/30/23 1157             Time Calculation    Start Time 1045  -CS      PT Received On 11/30/23  -CS         Timed Charges    96256 - PT Self Care/Mgmt Minutes 23  -CS         Total Minutes    Timed Charges Total Minutes 23  -CS       Total Minutes 23  -CS                User Key  (r) = Recorded By, (t) = Taken By, (c) = Cosigned By      Initials Name Provider Type     Tho Mancia, PT Physical Therapist                  Therapy Charges for Today       Code Description Service Date Service Provider Modifiers Qty    57726868034  PT SELF CARE/MGMT/TRAIN EA 15 MIN 11/30/2023 Tho Mancia, PT GP 2            PT G-Codes  AM-PAC 6 Clicks Score (PT): 18          Tho Mancia, PT  2023         Electronically signed by Tho Mancia, PT at 23 [phone]          Occupational Therapy Notes (most recent note)        Angela Kruger, OT at 23 [phone]          Patient Name: Liz Jiang  : 1965    MRN: [id number]                              Today's Date: 2023       Admit Date: 2023    Visit Dx:     ICD-10-CM ICD-9-CM   1. COPD exacerbation  J44.1 491.21   2. Acute on chronic respiratory failure with hypoxia and hypercapnia  J96.21 518.84    J96.22 786.09     799.02     Patient Active Problem List   Diagnosis    Chest pain    COPD (chronic obstructive pulmonary disease)    Tobacco abuse    GERD (gastroesophageal reflux disease)    Anxiety    Arthritis    Nausea and vomiting    Generalized abdominal pain    Right upper quadrant pain    Gallbladder disease    Abnormal biliary HIDA scan    Dyslipidemia    Tachycardia    Anal cancer    Port-A-Cath in place    Debility    Chronic respiratory failure with hypoxia    Acute respiratory failure with hypoxia     Past Medical History:   Diagnosis Date    Acid reflux disease     Anal cancer 2019    Arthritis     Asthma, extrinsic     Cholelithiasis     Chronic headaches     COPD (chronic obstructive pulmonary disease)     CVA (cerebral vascular accident)     w/ right sided deficit    CVA (cerebral vascular accident)     Diabetes mellitus     only has high blood sugar when on steriods    Dyslipidemia 2018    History of radiation therapy 2020    Whole pelvis/anal mass    Nausea and vomiting 2016    Obstructive sleep apnea treated with BiPAP     On home oxygen therapy     2L     Sleep apnea, obstructive      Past Surgical History:   Procedure Laterality Date    ABDOMINAL SURGERY      CARDIAC CATHETERIZATION      CAROTID ENDARTERECTOMY Left 2018    CHOLECYSTECTOMY WITH INTRAOPERATIVE CHOLANGIOGRAM N/A 2017    Procedure: CHOLECYSTECTOMY LAPAROSCOPIC INTRAOPERATIVE CHOLANGIOGRAM;   Surgeon: Carolin Marie MD;  Location: [facility];  Service:     COLONOSCOPY      HYSTERECTOMY      for heavy bleeding/ complete    KIDNEY STONE SURGERY      TUBAL ABDOMINAL LIGATION      VENOUS ACCESS DEVICE (PORT) INSERTION Left 12/17/2019    Procedure: INSERTION VENOUS ACCESS DEVICE;  Surgeon: Carolin Marie MD;  Location: [facility];  Service: General      General Information       Row Name 11/27/23 1526          OT Time and Intention    Document Type evaluation  -     Mode of Treatment individual therapy;occupational therapy  -       Row Name 11/27/23 1526          General Information    Patient Profile Reviewed yes  -     Prior Level of Function independent:;ADL's;all household mobility  reports intermittent assist required recently due to hospitalizations; recent IPR and home health services  -     Existing Precautions/Restrictions fall  -     Barriers to Rehab previous functional deficit  -       Row Name 11/27/23 1526          Occupational Profile    Reason for Services/Referral (Occupational Profile) Patient admitted to [facility] on [facility]18[facility]2023. She was referred for OT evaluation due to change in functional performance with ADLs, functional mobility, and/or transfers.  -       Row Name 11/27/23 1526          Living Environment    People in Home alone  -       Row Name 11/27/23 1526          Cognition    Orientation Status (Cognition) oriented x 3  -       Row Name 11/27/23 1526          Safety Issues, Functional Mobility    Impairments Affecting Function (Mobility) endurance/activity tolerance;shortness of breath;strength  -               User Key  (r) = Recorded By, (t) = Taken By, (c) = Cosigned By      Initials Name Provider Type     Angela Kruger, OT Occupational Therapist                     Mobility/ADL's       Row Name 11/27/23 1528          Bed Mobility    Comment, (Bed Mobility) Patient declined stating she was too tired and had not has sleep, but up to [facility]  with staff per patient  -KP       Row Name 11/27/23 1528          Transfers    Comment, (Transfers) unable to tolerate  -KP       Row Name 11/27/23 1528          Activities of Daily Living    BADL Assessment/Intervention bathing;upper body dressing;lower body dressing;grooming;toileting  -KP       Row Name 11/27/23 1528          Bathing Assessment/Intervention    [place] Level (Bathing) bathing skills;maximum assist (25% patient effort)  -KP       Row Name 11/27/23 1528          Upper Body Dressing Assessment/Training    [place] Level (Upper Body Dressing) upper body dressing skills;maximum assist (25% patient effort)  -KP       Row Name 11/27/23 1528          Lower Body Dressing Assessment/Training    [place] Level (Lower Body Dressing) lower body dressing skills;maximum assist (25% patient effort);dependent (less than 25% patient effort)  -KP       Row Name 11/27/23 1528          Grooming Assessment/Training    [place] Level (Grooming) grooming skills;maximum assist (25% patient effort)  -KP       Row Name 11/27/23 1528          Toileting Assessment/Training    [place] Level (Toileting) toileting skills;maximum assist (25% patient effort)  -               User Key  (r) = Recorded By, (t) = Taken By, (c) = Cosigned By      Initials Name Provider Type     Angela Kruger OT Occupational Therapist                   Obj/Interventions       Row Name 11/27/23 1529          Sensory Assessment (Somatosensory)    Sensory Assessment (Somatosensory) UE sensation intact  -KP       Row Name 11/27/23 1529          Vision Assessment/Intervention    Visual Impairment/Limitations L  -KP       Row Name 11/27/23 1529          Range of Motion Comprehensive    General Range of Motion bilateral upper extremity ROM WFL  -KP       Row Name 11/27/23 1529          Strength Comprehensive (MMT)    Comment, General Manual Muscle Testing (MMT) Assessment 3-/5 in BUEs  -[facility] Name 11/27/23 1529           Motor Skills    Motor Skills coordination;functional endurance  -     Coordination WFL  -KP     Functional Endurance poor  -KP               User Key  (r) = Recorded By, (t) = Taken By, (c) = Cosigned By      Initials Name Provider Type    Angela Lange OT Occupational Therapist                   Goals/Plan       Row Name 11/27/23 1541          Transfer Goal 1 (OT)    Activity/Assistive Device (Transfer Goal 1, OT) toilet  -     [place] Level/Cues Needed (Transfer Goal 1, OT) modified independence  -KP     Time Frame (Transfer Goal 1, OT) by discharge  -       Row Name 11/27/23 1541          Dressing Goal 1 (OT)    Activity/Device (Dressing Goal 1, OT) dressing skills, all  -KP     [place]/Cues Needed (Dressing Goal 1, OT) modified independence  -KP     Time Frame (Dressing Goal 1, OT) by discharge  -       Row Name 11/27/23 1541          Problem Specific Goal 1 (OT)    Problem Specific Goal 1 (OT) Patient will perform sustained activity X15 minutes to promote functional endurance/activity tolernace needed for daily routine.  -     Time Frame (Problem Specific Goal 1, OT) by discharge  -       Row Name 11/27/23 1541          Therapy Assessment/Plan (OT)    Planned Therapy Interventions (OT) activity tolerance training;BADL retraining;adaptive equipment training;transfer/mobility retraining;occupation/activity based interventions;strengthening exercise;ROM/therapeutic exercise;patient/caregiver education/training  -               User Key  (r) = Recorded By, (t) = Taken By, (c) = Cosigned By      Initials Name Provider Type    Angela Lange OT Occupational Therapist                   Clinical Impression       Row Name 11/27/23 1533          Pain Assessment    Pretreatment Pain Rating 0/10 - no pain  -     Posttreatment Pain Rating 0/10 - no pain  -       Row Name 11/27/23 1533          Plan of Care Review    Plan of Care Reviewed With patient  -     Progress no change  -      Outcome Evaluation Patient seen for OT evaluation. She presents with functional limitations including generalized weakness, impaired activity tolerance/functional endurance, and shortness of breath. She would benfeit from ongoing OT services by a licensed therapist to promote highest level of independence and safety with daily occupations.  -       Row Name 11/27/23 1530          Therapy Assessment/Plan (OT)    Patient/Family Therapy Goal Statement (OT) return home  -     Criteria for Skilled Therapeutic Interventions Met (OT) yes;meets criteria;skilled treatment is necessary  -     Therapy Frequency (OT) 3 times/wk  3-5x/week as able and available to promote functional progress  -     Predicted Duration of Therapy Intervention (OT) discharge  -       Row Name 11/27/23 1530          Therapy Plan Review/Discharge Plan (OT)    Anticipated Discharge Disposition (OT) --  TBD  -       Row Name 11/27/23 1530          Positioning and Restraints    Pre-Treatment Position in bed  -     Post Treatment Position bed  -     In Bed fowlers;call light within reach;encouraged to call for assist  -               User Key  (r) = Recorded By, (t) = Taken By, (c) = Cosigned By      Initials Name Provider Type    Angela Lange, OT Occupational Therapist                   Outcome Measures    No documentation.                     OT Recommendation and Plan  Planned Therapy Interventions (OT): activity tolerance training, BADL retraining, adaptive equipment training, transfer/mobility retraining, occupation/activity based interventions, strengthening exercise, ROM/therapeutic exercise, patient/caregiver education/training  Therapy Frequency (OT): 3 times/wk (3-5x/week as able and available to promote functional progress)  Plan of Care Review  Plan of Care Reviewed With: patient  Progress: no change  Outcome Evaluation: Patient seen for OT evaluation. She presents with functional limitations including generalized  weakness, impaired activity tolerance/functional endurance, and shortness of breath. She would benfeit from ongoing OT services by a licensed therapist to promote highest level of independence and safety with daily occupations.     Time Calculation:         Time Calculation- OT       Row Name 11/27/23 1542             Time Calculation- OT    OT Received On 11/27/23  -                User Key  (r) = Recorded By, (t) = Taken By, (c) = Cosigned By      Initials Name Provider Type     Angela Kruger OT Occupational Therapist                  Therapy Charges for Today       Code Description Service Date Service Provider Modifiers Qty    14566212062 HC OT EVAL MOD COMPLEXITY 4 11/27/2023 Angela Kruger OT GO 1                 Angela Kruger OT  11/27/2023    Electronically signed by Angela Kruger OT at 11/27/23 1543       Discharge Summary    No notes of this type exist for this encounter.       Discharge Order (From admission, onward)       Start     Ordered    12/13/23 1427  Discharge patient  Once        Expected Discharge Date: 12/13/23   Expected Discharge Time: Afternoon   Discharge Disposition: Home or Self Care   Physician of Record for Attribution - Please select from Treatment Team: LIVIER DAMON [[id number]]   Review needed by CMO to determine Physician of Record: No      Question Answer Comment   Physician of Record for Attribution - Please select from Treatment Team LIVIER DAMON    Review needed by CMO to determine Physician of Record No        12/13/23 1432    11/30/23 0900  Discharge patient  Once,   Status:  Canceled        Expected Discharge Date: 11/30/23   Expected Discharge Time: Morning   Discharge Disposition: Home or Self Care   Physician of Record for Attribution - Please select from Treatment Team: MARIAH CALLAWAY [[id number]]   Review needed by CMO to determine Physician of Record: No      Question Answer Comment   Physician of Record for Attribution - Please select from  Treatment Team MARIAH CALLAWAY    Review needed by CMO to determine Physician of Record No        11/30/23 0830

## 2023-12-13 NOTE — CASE MANAGEMENT/SOCIAL WORK
Discharge Planning Assessment   [place]     Patient Name: Liz Jiang  MRN: [id number]  Today's Date: 12/13/2023    Admit Date: 11/18/2023          Discharge Plan       Row Name 12/13/23 1655       Plan    Final Discharge Disposition Code 06 - home with home health care    Final Note Pt is being discharged home on this date with Physician ordered home health services. Pt's sons and dtr are aware and agreeable to discharge. Pt is going to her son Pradeep home at [address] [place] [address]. Son prefers [facility]. SS faxed HH referral to [facility]. SS spoke with [facility] per Isabel who states RN will not need to call report. [facility] will follow up with SS on 12/14/23 and verify if agency can accept Pt. Pt's family to provide transportation.                  Ruby Krueger, TONYW

## 2023-12-14 ENCOUNTER — TELEPHONE (OUTPATIENT)
Dept: SOCIAL WORK | Facility: HOSPITAL | Age: 58
End: 2023-12-14
Payer: MEDICARE

## 2023-12-14 DIAGNOSIS — J96.11 CHRONIC RESPIRATORY FAILURE WITH HYPOXIA AND HYPERCAPNIA: Primary | ICD-10-CM

## 2023-12-14 DIAGNOSIS — J96.12 CHRONIC RESPIRATORY FAILURE WITH HYPOXIA AND HYPERCAPNIA: Primary | ICD-10-CM

## 2023-12-14 NOTE — TELEPHONE ENCOUNTER
Janel Meza CM received call from patient's son requesting assistance with Trilogy & Oxygen.  Son states that patient was wearing 6-8L O2 while in hospital while wearing Trilogy and the concentrator that they have only goes up to 5L.  Son states that he had contacted [facility] who states that they will need an order to give more oxygen.  Son then called PCP office and was told PCP was out with COVID.  Son then called Pulmonology office and was told that Dr. Hope would not be back until next week.  Clarisa Schreiber CM phoned [employer] and spoke with Yesi who states that patient received oxygen approximately 1 year ago from [facility].  Clarisa Schreiber CM request SMS to contact patient's son and visit home to make sure that oxygen concentrator was working correctly.  Yesi states that she will call son and get his address and send technician to the home to check the machines.  Janel Meza CM suggest to patient's son that if oxygen saturations continue to stay low to bring patient back to ED.

## 2023-12-14 NOTE — PROGRESS NOTES
Patient's son, new legal guardian, contacted the office today.  As the patient is now staying with him following recent discharge, he is trying to monitor her health care closely.  He has been encouraging her to use her trilogy device more.  She does note some difficulty from the pressure feeling too strong at times.  Of more concern is that her saturation typically declines after short duration use of trilogy with supplemental oxygen on 4 L.  She is able to use 3 L nasal cannula at baseline, but using 4 L bled through the Trilogy device.    He does understand that over oxygenation can result in worsening hypercapnia, and thus wants to assure she is receiving the proper amount while also avoiding hypoxia. Reducing hypoxia episodes with Trilogy use will allow her to use it for extended duration of time rather than brief episodes (still beneficial but goal would be to maximize use as much as possible).   He has tried contacting the [employer] company first, and there may been some resulting confusion from this with company.    I have contacted the [employer] company several times today as well.   Final decision was that we will     Place the order for need of increased liter flow. (4-5 L)  Mr. Peterson can still titrate as appropriate (as he does display the appropriate knowledge of how to do so) to maintain saturation above 88%.   Order placed for overnight pulse oximetry test on trilogy with supplemental oxygen of the current setting (4 L, or 5 L once able to obtain the new device, son agreed), and then will adjust supplemental oxygen flow to reduce hypoxic events. May also need to make further setting adjustments as well.   RT reportedly came to assess the device within the last 1-2 weeks.   Will follow up in office on 12/29

## 2023-12-14 NOTE — DISCHARGE PLACEMENT REQUEST
"Liz Jiang (57 y.o. Female)       Date of Birth   1965    Social Security Number       Address   [address] [address]    Home Phone   [phone]    MRN   [id number]       Jehovah's witness   [facility] [place]    Marital Status                               Admission Date   11/18/23    Admission Type   Emergency    Admitting Provider   Cliff Castaneda MD    Attending Provider       Department, Room/Bed   [address], 3343/2S       Discharge Date   12/13/2023    Discharge Disposition   Home or Self Care    Discharge Destination                                 Attending Provider: (none)   Allergies: Morphine, Roflumilast    Isolation: None   Infection: None   Code Status: Prior    Ht: 162.6 cm (64\")   Wt: 66.9 kg (147 lb 8 oz)    Admission Cmt: None   Principal Problem: Acute respiratory failure with hypoxia [J96.01]                   Active Insurance as of 11/18/2023       Primary Coverage       Payor Plan Insurance Group Employer/Plan Group    MEDICARE MEDICARE A & B        Payor Plan Address Payor Plan Phone Number Payor Plan Fax Number Effective Dates    [address] [phone]  3/1/2018 - None Entered    formerly [facility] [id number]         Subscriber Name Subscriber Birth Date Member ID       LIZ JIANG 1965 [id number]               Secondary Coverage       Payor Plan Insurance Group Employer/Plan Group    [place]        Payor Plan Address Payor Plan Phone Number Payor Plan Fax Number Effective Dates    [address] [phone]  9/4/2018 - None Entered    [facility] [id number]         Subscriber Name Subscriber Birth Date Member ID       LIZ JIANG 1965 [id number]                     Emergency Contacts        (Rel.) Home Phone Work Phone Mobile Phone    OLY OLIVAS (Sister) -- -- [phone]    NicholasPradeep (Son) [phone] -- [phone]    adeel haines (Friend) -- -- [phone]               "   Discharge Summary        Alexx Clemente, DO at 23 1718              [facility] HOSPITALIST MEDICINE DISCHARGE SUMMARY    Patient Identification:  Name:  Liz Jiang  Age:  57 y.o.  Sex:  female  :  1965  MRN:  [id number]  Visit Number:  [id number]    Date of Admission: 2023  Date of Discharge: 2023    PCP: Tabitha Ron APRN    DISCHARGE DIAGNOSIS   Acute on chronic hypoxic/hypercapnic respiratory failure  COPD exacerbation  Medical noncompliance  Chronic HFpEF  Essential hypertension  Hyperlipidemia  Non-insulin-dependent type 2 diabetes mellitus      CONSULTS  Dr. Shi, Pulmonology  Melissa Thibodeaux NP, Palliative Care      PROCEDURES PERFORMED   None      HOSPITAL COURSE  Ms. Jiang is a 57 y.o. female who presented to [facility] ED on 2023 with a chief complaint of shortness of breath.  Patient has a past medical history remarkable for COPD, chronic hypoxic respiratory failure, obstructive sleep apnea, chronic HFpEF, essential hypertension, hyperlipidemia, history of CVA, insulin-dependent type 2 diabetes mellitus and hypothyroidism.  It should be noted patient has had multiple admissions to our facility in the past several months for COPD exacerbation with respiratory failure.  Patient was recently hospitalized in our facility on 10/12/2023 and discharged 10/17/2023 secondary to COPD exacerbation with respiratory failure.  Patient was treated with IV Solu-Medrol and nebulizer treatments at that time and then discharged home.  Patient has been enrolled in our COPD clinic and was discharged with a COPD rescue kit.  Patient did report 2 to 3-day history of worsening shortness of breath with nonproductive cough.  She reported increasing chest tightness but denied any fevers or chills.  Patient did report using her home trilogy machine but further questioning of patient's family members reported patient had not been using her BiPAP/trilogy  unit at home.  Patient did report wearing her trilogy unit approximately 5 to 6 hours nightly but after further evaluation/investigation, trilogy unit demonstrates patient uses BiPAP less than 1 hour nightly.  Secondary to shortness of breath, patient did present to the emergency department for further treatment and evaluation.  Initial evaluation in the emergency department did consist of basic laboratory work as well as physical exam and vital signs.  Initial vital signs found patient's blood pressure 113/75, respirations 20, heart rate 128, temperature 97.9 °F and oxygen saturation 70% on 3 L nasal cannula.  Initial lab work did include ABG, CBC and CMP.  ABG demonstrated pH 7.33, pCO2 90, pO2 36 and bicarb 47.9.  Patient was placed on BiPAP and repeat ABG demonstrated improvement in pH to 7.35, pCO2 89, pO2 63 and bicarb 49.  COVID-19/influenza screening was negative.  Chest x-ray appeared unchanged in comparison to 10/28/2023.  Overall, patient was diagnosed with acute on chronic hypoxic/hypercapnic respiratory failure secondary to COPD exacerbation and admitted for further treatment and evaluation.    Patient was started on nebulizer treatments every 6 hours as well as Solu-Medrol 40 mg IV twice daily.  Patient was continued on BiPAP on admission and patient was strongly encouraged to continue nightly BiPAP during the hospital stay and to resume trilogy unit at home upon discharge.  Patient was continued on standard of therapy for COPD exacerbation which did improve her overall symptoms.  However, during hospitalization the patient did intermittently decide not to wear BiPAP and was not overall compliant with the treatment regimen.  As patient did demonstrate overall poor compliance with treatment regimen, it did call into question patient's true understanding of her medical condition.  As such, psychiatry services were consulted who did thoroughly evaluate the patient.  After thorough evaluation, it was felt  patient lacked informed medical decision taking capacity.  As such, case management was consulted who assisted patient's son and attempting to obtain emergency guardianship.  Patient's son was in the process of obtaining emergency guardianship on date of discharge.  Unfortunately due to several unforeseen circumstances (for example, the [facility] is governor and inauguration closed all local court houses), patient was unable to obtain finalized written proof of guardianship.  However, it is anticipated guardianship will be obtained in the next 24 to 48 hours.  Patient is eager to return home and patient's son is eager to have patient discharged home as well.  Patient's son was strongly cautioned with the patient's overall noncompliance, she has likely to have recurrent hypoxic/hypercapnic respiratory failure requiring readmission to the hospital.  Patient's son expressed understanding and interest in returning home with emergency guardianship in place and possible enrollment into hospice as patient does not appear interested in being compliant with suggested treatment regimen.  With this in mind, it is felt patient has reached maximum medical benefit of current hospitalization and she will be discharged home with her son in stable condition today.    VITAL SIGNS:      12/04/23  0500 12/05/23  0500 12/06/23  0500   Weight: 67 kg (147 lb 12.8 oz) 67.2 kg (148 lb 3.2 oz) 66.9 kg (147 lb 8 oz)     Body mass index is 25.32 kg/m².    PHYSICAL EXAM:  Constitutional: Chronically ill-appearing  female in no apparent distress.     HENT:  Head:  Normocephalic and atraumatic.  Mouth:  Moist mucous membranes.    Eyes:  Conjunctivae and EOM are normal.  Pupils are equal, round, and reactive to light.  No scleral icterus.    Neck:  Neck supple. No thyromegaly.  No JVD present.    Cardiovascular:  Regular rate and rhythm with no murmurs, rubs, clicks or gallops appreciated.  Pulmonary/Chest:  Clear to auscultation  bilaterally with no crackles, wheezes or rhonchi appreciated.  Abdominal:  Soft. Nontender. Nondistended  Bowel sounds are normal in all four quadrants. No organomegally appreciated.   Musculoskeletal:  No edema, no tenderness, and no deformity.  No red or swollen joints anywhere.    Neurological:  Alert, Cranial nerves II-XII intact with no focal deficits.  No facial droop.  No slurred speech.   Skin:  Warm and dry to palpation with no rashes or lesions appreciated.  Peripheral vascular:  2+ radial and pedal pulses in bilateral upper and lower extremities    DISCHARGE DISPOSITION   Stable    DISCHARGE MEDICATIONS:     Discharge Medications        New Medications        Instructions Start Date   aspirin 81 MG EC tablet   81 mg, Oral, Daily      atorvastatin 40 MG tablet  Commonly known as: LIPITOR   40 mg, Oral, Nightly      isosorbide mononitrate 30 MG 24 hr tablet  Commonly known as: IMDUR   30 mg, Oral, Every 24 Hours Scheduled      pantoprazole 40 MG EC tablet  Commonly known as: PROTONIX   40 mg, Oral, Every Early Morning             Changes to Medications        Instructions Start Date   ipratropium-albuterol 0.5-2.5 mg/3 ml nebulizer  Commonly known as: DUO-NEB  What changed: Another medication with the same name was removed. Continue taking this medication, and follow the directions you see here.   3 mL, Nebulization, Every 4 Hours PRN      metoprolol tartrate 50 MG tablet  Commonly known as: LOPRESSOR  What changed:   medication strength  how much to take   50 mg, Oral, 2 Times Daily             Continue These Medications        Instructions Start Date   clopidogrel 75 MG tablet  Commonly known as: PLAVIX   Take 1 tablet by mouth Daily.      fluticasone 50 MCG/ACT nasal spray  Commonly known as: FLONASE   2 sprays, Nasal, Daily      Fluticasone-Umeclidin-Vilant 200-62.5-25 MCG/ACT aerosol powder    1 puff, Inhalation, Daily      furosemide 20 MG tablet  Commonly known as: LASIX   20 mg, Oral, Daily       HYDROcodone-acetaminophen  MG per tablet  Commonly known as: NORCO   1 tablet, Oral, Every 6 Hours PRN      levothyroxine 25 MCG tablet  Commonly known as: SYNTHROID, LEVOTHROID   25 mcg, Oral, Every Early Morning      potassium chloride 10 MEQ CR capsule  Commonly known as: MICRO-K   10 mEq, Oral, Daily      Ventolin  (90 Base) MCG/ACT inhaler  Generic drug: albuterol sulfate HFA   2 puffs, Inhalation, Every 6 Hours PRN             Stop These Medications      ALPRAZolam 1 MG tablet  Commonly known as: XANAX            ASK your doctor about these medications        Instructions Start Date   predniSONE 5 MG tablet  Commonly known as: DELTASONE  Ask about: Should I take this medication?   Take 3 tablets by mouth Daily With Breakfast for 3 days, THEN 2 tablets Daily With Breakfast for 3 days, THEN 1 tablet Daily With Breakfast for 3 days.   Start Date: December 1, 2023              Diet Instructions    Resume diet as tolerated          Your Scheduled Appointments      Dec 18, 2023  2:00 PM  PFT with  COR PULM LAB ROOM  [facility] CARDIOPULMONARY ([place]) [address] [place] [id number] [phone]      Additional instructions:    You have a follow-up appointment scheduled with VANESSA Coates on 12-21-23 at 2:15, office can be reached at [phone]    You have a follow-up appointment scheduled with DR Fox on 12-28-23 at 10:00, Office can be reached at [phone]         Activity Instructions    Resume activity as tolerated          Additional Instructions for the Follow-ups that You Need to Schedule       Ambulatory Referral to Home Health   As directed      Face to Face Visit Date: 11/30/2023   Follow-up provider for Plan of Care?: I treated the patient in an acute care facility and will not continue treatment after discharge.   Follow-up provider: TRINA CAMACHO [[id number]]   Reason/Clinical Findings: Severe COPD, debility   Describe mobility limitations that make leaving  home difficult: Severe COPD, debility   Nursing/Therapeutic Services Requested: Skilled Nursing Physical Therapy   Skilled nursing orders: Medication education   PT orders: Home safety assessment Strengthening   Frequency: 1 Week 1               Follow-up Information       Benito Fox MD Follow up in 2 week(s).    Specialty: Pulmonary Disease  Contact information:  [address] [place] [id number] [phone]               Tabitha Ron APRN Follow up in 1 week(s).    Specialty: Nurse Practitioner  Why: December 7th at 1:30  Contact information:  [address]  [facility] [id number] [phone]                             TEST  RESULTS PENDING AT DISCHARGE       Alexx Clemente DO  12/13/23  17:19 EST    Please note that this discharge summary required more than 30 minutes to complete.    Please send a copy of this dictation to the following providers:  Tabitha Ron APRN    Electronically signed by Alexx Clemente DO at 12/13/23 1734       Discharge Order (From admission, onward)       Start     Ordered    12/13/23 1427  Discharge patient  Once        Expected Discharge Date: 12/13/23   Expected Discharge Time: Afternoon   Discharge Disposition: Home or Self Care   Physician of Record for Attribution - Please select from Treatment Team: ALEXX CLEMENTE [[id number]]   Review needed by CMO to determine Physician of Record: No      Question Answer Comment   Physician of Record for Attribution - Please select from Treatment Team ALEXX CLEMENTE    Review needed by CMO to determine Physician of Record No        12/13/23 1432    11/30/23 0900  Discharge patient  Once,   Status:  Canceled        Expected Discharge Date: 11/30/23   Expected Discharge Time: Morning   Discharge Disposition: Home or Self Care   Physician of Record for Attribution - Please select from Treatment Team: MARIAH CALLAWAY [[id number]]   Review needed by CMO to determine Physician of Record: No       Question Answer Comment   Physician of Record for Attribution - Please select from Treatment Team MARIAH CALLAWAY    Review needed by CMO to determine Physician of Record No        11/30/23 3256

## 2023-12-14 NOTE — OUTREACH NOTE
Prep Survey      Flowsheet Row Responses   Orthodox facility patient discharged from? [facility]   Is LACE score < 7 ? No   Eligibility Readm Mgmt   Discharge diagnosis COPD exacerbation   Does the patient have one of the following disease processes/diagnoses(primary or secondary)? COPD   Does the patient have Home health ordered? Yes   What is the Home health agency?  [facility] [facility]   Is there a DME ordered? No   Prep survey completed? Yes            Zena SANDHU - Registered Nurse

## 2023-12-14 NOTE — TELEPHONE ENCOUNTER
SS notified by [facility] per Margaret that Pt has been accepted for home health services. SS received secure chat message for Palliative care who states son has called asking questions about Pt's oxygen. SS notified CM. CM followed up with son about concerns.

## 2023-12-19 ENCOUNTER — READMISSION MANAGEMENT (OUTPATIENT)
Dept: CALL CENTER | Facility: HOSPITAL | Age: 58
End: 2023-12-19
Payer: MEDICARE

## 2023-12-19 NOTE — OUTREACH NOTE
COPD/PN Week 1 Survey      Flowsheet Row Responses   Latter-day facility patient discharged from? [facility]   Does the patient have one of the following disease processes/diagnoses(primary or secondary)? COPD   Week 1 attempt successful? No   Unsuccessful attempts Attempt 1            Tamar Billingsley Registered Nurse

## 2023-12-21 ENCOUNTER — READMISSION MANAGEMENT (OUTPATIENT)
Dept: CALL CENTER | Facility: HOSPITAL | Age: 58
End: 2023-12-21
Payer: MEDICARE

## 2023-12-21 NOTE — OUTREACH NOTE
COPD/PN Week 1 Survey      Flowsheet Row Responses   Hindu facility patient discharged from? [facility]   Does the patient have one of the following disease processes/diagnoses(primary or secondary)? COPD   Week 1 attempt successful? No   Unsuccessful attempts Attempt 2   Revoke Patient             Zena BUTCHER - Registered Nurse

## 2024-01-24 NOTE — PROGRESS NOTES
[facility]  PROGRESS NOTE     Patient Identification:  Name:  Liz Jiang  Age:  57 y.o.  Sex:  female  :  1965  MRN:  [id number]  Visit Number:  [id number]  ROOM: 107[facility]     Primary Care Provider:  Paty Fischer APRN    Length of stay in inpatient status:  4    Subjective     Chief Compliant:  No chief complaint on file.      History of Presenting Illness: 57-year-old female with history of prolonged hospitalization secondary to respiratory failure.  Patient is debilitated from this.  Patient states that she has severe diarrhea which she attributes to the Daliresp.  Patient states she is not going to take this at this time abdomen has no other acute complaints.    Objective     Current Hospital Meds:aspirin, 81 mg, Oral, Daily  budesonide-formoterol, 2 puff, Inhalation, BID - RT  castor oil-balsam peru, 1 application , Topical, Q12H  clopidogrel, 75 mg, Oral, Daily  enoxaparin, 40 mg, Subcutaneous, Daily  furosemide, 20 mg, Oral, Daily  insulin lispro, 2-7 Units, Subcutaneous, TID With Meals  methylPREDNISolone, 4 mg, Oral, Tonight  [START ON 2023] methylPREDNISolone, 4 mg, Oral, Before Breakfast  metoprolol tartrate, 25 mg, Oral, Q12H  nystatin, 5 mL, Swish & Swallow, 4x Daily  roflumilast, 250 mcg, Oral, Daily  tiotropium bromide monohydrate, 2 puff, Inhalation, Daily - RT       ----------------------------------------------------------------------------------------------------------------------  Vital Signs:  Temp:  [97.9 °F (36.6 °C)-98 °F (36.7 °C)] 97.9 °F (36.6 °C)  Heart Rate:  [] 95  Resp:  [18-24] 18  BP: (124-130)/(85-87) 124/85  SpO2:  [92 %-93 %] 92 %  on  Flow (L/min):  [3.5-4] 3.5;   Device (Oxygen Therapy): nasal cannula  Body mass index is 38.45 kg/m².    Wt Readings from Last 3 Encounters:   23 102 kg (224 lb)   22 77.1 kg (170 lb)   20 76.8 kg (169 lb 6.4 oz)     Intake & Output (last 3 days)         09/15 0701   0700    Consultation - Vascular Surgery   Shilo Olmos 57 y.o. male MRN: [id number]  Unit/Bed#: ED 23 Encounter: [id number]      Assessment:  Shilo Olmos is a 57 y.o. male with increased sweeling and drainage form incision site.  Vascular consulted for Evaluation from office.     PMHx significant for  HTN, Psoriasis, B/L Knee surgery      Vascular Hx:   1/19 incisional washout and closure.   1/17 c/b surgical site hematoma s/p washout and partial closure   1/16: Left SFA to BK Pop tunneled rGSV bypass c Pop aneurysm exclusion      Plan:  Patient seen in vascular office and advised to present to ED for evaluation of proximal incision drainage and swelling. Patient with palpable Left Dp, Dop PT, S/M intact, bloody drainage coming for proximal incision site without active bleeding.   Old hematoma was expressed from small opening created at incision site. Labs drawn and no signs of anemia or leukocytosis.   Will plan to hold eliquis x 48hrs and patient can be discharged with follow up with vascular office within 1 week.     _______________________________________________________________  Physician Requesting Consult: Brendan Coreas MD    HPI: Shilo Olmos is a 57 y.o. year old male who was admitted on 1/24/2024 with complaints of left lower extremity pain and drainage coming from the proximal incision site.  Patient is status post a left SFA to below-knee pop bypass with reversed GSV which was performed on January 16, 2024.  This was complicated by surgical site hematoma which was evacuated and washed out with the partial closure on January 17.  Patient had a final closure and washout on January 19 and subsequently discharged.  Patient was seen in the office today where he was noted to have bloody drainage coming from his proximal incision site.  Patient was also noted to have swelling of his anterior thigh and upon further evaluation was advised to follow-up with the ED for further management.  Patient  0701 09/17 0700 09/17 0701 09/18 0700 09/18 0701 09/19 0700    P.O. 1200 2160 2280     Total Intake(mL/kg) 1200 (11.8) 2160 (21.2) 2280 (22.4)     Net +1200 +2160 +2280             Urine Unmeasured Occurrence 6 x 5 x 8 x     Stool Unmeasured Occurrence 2 x 4 x 6 x           Diet: Regular/House Diet, Diabetic Diets, Renal Diets; Consistent Carbohydrate; Low Sodium (2-3g), Low Potassium; Texture: Regular Texture (IDDSI 7); Fluid Consistency: Thin (IDDSI 0)  ----------------------------------------------------------------------------------------------------------------------  Physical exam:  Constitutional: Chronically ill-appearing female in no distress  HEENT: Normocephalic atraumatic  Neck:   Supple  Cardiovascular: Regular rate and rhythm  Pulmonary/Chest: Decreased breath sounds bilaterally but clear  Abdominal:  .  Positive bowel sounds soft  Musculoskeletal: No arthropathy  Neurological: Generalized weakness  Skin: No rash  Peripheral vascular: Trace edema  Genitourinary:  ----------------------------------------------------------------------------------------------------------------------    Last echocardiogram:  Results for orders placed during the hospital encounter of 08/24/21    Adult Transthoracic Echo Complete W/ Cont if Necessary Per Protocol    Interpretation Summary  · Left ventricular ejection fraction appears to be 66 - 70%. Left ventricular systolic function is normal.  · Left ventricular diastolic function is consistent with (grade I) impaired relaxation.    ----------------------------------------------------------------------------------------------------------------------  Results from last 7 days   Lab Units 09/18/23  0223 09/15/23  0458   WBC 10*3/mm3 18.35* 21.30*   HEMOGLOBIN g/dL 14.6 14.7   HEMATOCRIT % 51.5* 49.8*   MCV fL 95.7 95.2   MCHC g/dL 28.3* 29.5*   PLATELETS 10*3/mm3 208 205         Results from last 7 days   Lab Units 09/18/23  0223 09/16/23  0818 09/15/23  0458   SODIUM  states he has been taking his medication accordingly.  Patient states that he has been getting up and moving a lot and reports only 1 instance of increased drainage occurring the previous day.  Patient denies any fevers chills nausea vomiting or shortness of breath.  Patient does endorse some left lower extremity pain which is to be expected postop.    Historical Information   Past Medical History:   Diagnosis Date    Hypertension     Psoriasis      Past Surgical History:   Procedure Laterality Date    CLOSURE WOUND Left 1/19/2024    Procedure: CLOSURE LEFT LOWER EXTREMITY WOUND, WASHOUT;  Surgeon: Parker Valdez MD;  Location: [facility];  Service: Vascular    EVACUATION OF HEMATOMA Left 1/17/2024    Procedure: EVACUATION/ DRAINAGE HEMATOMA, EXPLORATION ON LEFT LEG BYPASS, CONTROLL OF BLEEDING;  Surgeon: Mariana Vargas DO;  Location: [facility];  Service: Vascular    KNEE SURGERY Bilateral     NV BYPASS W/VEIN FEMORAL-POPLITEAL Left 1/16/2024    Procedure: BYPASS FEMORAL-POPLITEAL -Left above knee popliteal to below knee popliteal artery bypass WITH REVERSE SAPHENOUS VEIN GRAFT; EXCLUSION OF POPLITEAL ANEURYSM;  Surgeon: Parker Valdez MD;  Location: [facility];  Service: Vascular     Social History   Social History     Substance and Sexual Activity   Alcohol Use Yes    Comment: rare social use one time per month     Social History     Substance and Sexual Activity   Drug Use Never     Social History     Tobacco Use   Smoking Status Every Day    Current packs/day: 0.25    Average packs/day: 0.3 packs/day for 32.0 years (8.0 ttl pk-yrs)    Types: Cigarettes   Smokeless Tobacco Never   Tobacco Comments    1 PPD x 15 years - As per [place]      Family History: non-contributory}    Meds/Allergies     Home meds:   Prior to Admission medications    Medication Sig Start Date End Date Taking? Authorizing Provider   acetaminophen (TYLENOL) 325 mg tablet Take 3 tablets (975 mg total) by mouth  every 8 (eight) hours 1/21/24   Dayan Johnson PA-C   ascorbic acid (VITAMIN C) 250 MG CHEW Chew 250 mg daily Taking 2 daily  Patient not taking: Reported on 11/20/2023    Historical Provider, MD   aspirin 81 mg chewable tablet Chew 1 tablet (81 mg total) daily 11/20/23 3/19/24  Parker Valdez MD   calcium carbonate (TUMS) 500 mg chewable tablet Chew 1 tablet (500 mg total) daily as needed for indigestion or heartburn 1/21/24   Dayan Johnson PA-C   Cholecalciferol (Vitamin D) 50 MCG (2000 UT) tablet Take 2,000 Units by mouth daily  Patient not taking: Reported on 10/30/2023    Historical Provider, MD   Eliquis 5 MG TAKE 1 TABLET BY MOUTH TWICE A DAY 1/22/24   Parker Valdez MD   losartan (COZAAR) 100 MG tablet TAKE 1 TABLET BY MOUTH EVERY DAY 11/13/23   Jef Leblanc MD   nicotine (NICODERM CQ) 21 mg/24 hr TD 24 hr patch Place 1 patch on the skin every 24 hours  Patient not taking: Reported on 1/24/2024 12/29/22   Carlos Kay DO   nicotine (NICODERM CQ) 21 mg/24 hr TD 24 hr patch Place 1 patch on the skin over 24 hours daily Apply a new patch every 24 hours to a clean, dry, hairless site on the upper arm or hip.  Patient not taking: Reported on 1/24/2024 1/21/24   Dayan Johnson PA-C   oxyCODONE (ROXICODONE) 5 immediate release tablet Take 1 tablet (5 mg total) by mouth every 4 (four) hours as needed for moderate pain for up to 10 days Max Daily Amount: 30 mg 1/21/24 1/31/24  Dayan Johnson PA-C   polyethylene glycol (MIRALAX) 17 g packet Take 17 g by mouth daily for 14 days 1/22/24 2/5/24  Dayan Johnson PA-C   rosuvastatin (CRESTOR) 10 MG tablet TAKE 1 TABLET BY MOUTH EVERY DAY 7/15/23   Carlos Kay DO   senna-docusate sodium (SENOKOT S) 8.6-50 mg per tablet Take 1 tablet by mouth daily at bedtime 1/21/24 2/20/24  Dayan Johnson PA-C     Scheduled Meds:  Continuous Infusions:No current facility-administered medications for this encounter.    PRN  mmol/L 141 136 139   POTASSIUM mmol/L 4.3 5.0 5.3*   CHLORIDE mmol/L 98 94* 94*   CO2 mmol/L 34.5* 31.3* 37.7*   BUN mg/dL 30* 25* 30*   CREATININE mg/dL 0.38* 0.45* 0.41*   CALCIUM mg/dL 9.0 9.1 9.3   GLUCOSE mg/dL 150* 198* 202*   ALBUMIN g/dL  --   --  3.8   BILIRUBIN mg/dL  --   --  0.4   ALK PHOS U/L  --   --  48   AST (SGOT) U/L  --   --  15   ALT (SGPT) U/L  --   --  24   Estimated Creatinine Clearance: 187.5 mL/min (A) (by C-G formula based on SCr of 0.38 mg/dL (L)).  No results found for: AMMONIA              Hemoglobin A1C   Date/Time Value Ref Range Status   09/16/2023 0818 6.80 (H) 4.80 - 5.60 % Final     Glucose   Date/Time Value Ref Range Status   09/18/2023 0557 146 (H) 70 - 130 mg/dL Final   09/17/2023 2019 258 (H) 70 - 130 mg/dL Final   09/17/2023 1620 157 (H) 70 - 130 mg/dL Final   09/17/2023 1054 155 (H) 70 - 130 mg/dL Final   09/17/2023 0610 139 (H) 70 - 130 mg/dL Final   09/16/2023 2006 262 (H) 70 - 130 mg/dL Final   09/16/2023 1611 168 (H) 70 - 130 mg/dL Final   09/16/2023 1110 131 (H) 70 - 130 mg/dL Final     Lab Results   Component Value Date    TSH 5.090 (H) 04/29/2020    FREET4 1.10 02/11/2014     No results found for: PREGTESTUR, PREGSERUM, HCG, HCGQUANT  Pain Management Panel          Latest Ref Rng & Units 2/23/2022   Pain Management Panel   Amphetamine, Urine Qual Negative Negative    Barbiturates Screen, Urine Negative Negative    Benzodiazepine Screen, Urine Negative Positive    Buprenorphine, Screen, Urine Negative Negative    Cocaine Screen, Urine Negative Negative    Methadone Screen , Urine Negative Negative    Methamphetamine, Ur Negative Negative      Brief Urine Lab Results       None          No results found for: BLOODCX      No results found for: URINECX  No results found for: WOUNDCX  No results found for: STOOLCX        I have personally looked at the labs and they are summarized  Meds:      ALLERGIES: No Known Allergies    Review of Systems:  General: negative  Cardiovascular: no chest pain or dyspnea on exertion  Respiratory: no cough, shortness of breath, or wheezing  Gastrointestinal: no abdominal pain, change in bowel habits, or black or bloody stools  Genitourinary: no dysuria, trouble voiding, or hematuria  Musculoskeletal: negative  Neurological: no TIA or stroke symptoms  Hematological and Lymphatic: negative  Dermatological : negative  Psychological: negative  Ophthalmic: negative  ENT: negative      Objective   Vitals:  Blood pressure 101/59, pulse 77, temperature 97.6 °F (36.4 °C), temperature source Temporal, resp. rate 16, SpO2 98%.  There is no height or weight on file to calculate BMI.    SpO2: SpO2: 99 %, SpO2 Activity: SpO2 Activity: At Rest, SpO2 Device: O2 Device: None (Room air)    I/Os:   I/O       None           No intake or output data in the 24 hours ending 01/24/24 1707     Invasive Lines/Tubes:  Invasive Devices       Peripheral Intravenous Line  Duration             Peripheral IV 01/24/24 Left;Upper;Ventral (anterior) Arm <1 day                    Physical Exam  General appearance: alert, appears stated age, and cooperative  Head: Normocephalic, without obvious abnormality, atraumatic  Eyes: conjunctivae/corneas clear, EOM's intact.  Throat: lips, mucosa, and tongue normal; teeth and gums normal  Neck: supple, symmetrical, trachea midline  Lungs: clear to auscultation bilaterally, Non-labored breathing   Chest wall: no tenderness  Heart: regular rate and rhythm  Abdomen: soft, non-tender; bowel sounds normal; no masses,  no organomegaly  Extremities: extremities normal, atraumatic, no cyanosis or edema  Skin: Skin color, texture, turgor normal. No rashes or lesions  Neurologic: Grossly normal    Vascular Exam:    Focused evaluation of left lower extremity revealed no obvious deformity on observation.  Some swelling was noted on examination however no signs of  above.  ----------------------------------------------------------------------------------------------------------------------  Detailed radiology reports for the last 24 hours:    Imaging Results (Last 24 Hours)       ** No results found for the last 24 hours. **          Final impressions for the last 30 days of radiology reports:    X-ray chest PA and lateral    Result Date: 9/13/2023  Mild right basilar atelectasis. Images reviewed, interpreted, and dictated by Dr. Abiel Siu. Transcribed by Yaz Quick PA-C.    XR chest AP portable    Result Date: 9/9/2023  There has been no significant interval change  .  Continued follow up recommended . Images reviewed, interpreted, and dictated by Dr. DIANE oWmack. Transcribed by Cristhian Neff PA-C.    US abdomen limited    Result Date: 9/7/2023  Fatty liver.. Images reviewed, interpreted, and dictated by Sharlene Norwood MD    XR chest AP portable    Result Date: 9/7/2023  Low lung volumes with worsening right basilar opacity which may represent atelectasis or pneumonia. Images reviewed, interpreted, and dictated by Sharlene Norwood MD   I have personally looked at the radiology images and read the final radiology report.    Assessment & Plan    Debility after prolonged hospitalization for treatment of respiratory failure--requiring contact-guard to standby assist for transfers.  Ambulated 5 feet x 2, 20 feet x 2, 35 feet x 1 with front wheel walker and contact-guard last week.  Continues to work to improve motor skills at this time.  Requires minimum assist for toileting.    COPD--patient is continuing to inhalants.  We will hold Daliresp secondary to diarrhea.  Patient is also on steroid taper for    CHF preserved EF compensated, continue Lasix    Leukocytosis likely secondary to steroid taper.    Obesity with BMI of 38.45 kg per metered square.    VTE Prophylaxis:   Mechanical Order History:       None          Pharmalogical Order History:        Ordered      "malperfusion was noted on examination along the left lower extremity.  Extremity was noted to be warm to touch with some tautness of the skin in the posterior aspect of the left thigh.  Sensation was noted to be intact with patient able to discern multiple points of light touch.  Motor examination revealed adequate motor strength with a patient able to dorsiflex and plantarflex left ankle flex the left knee and left left leg out of the bed.  Capillary refill is less than 3 seconds bilaterally.    Pulse exam:  Right: Palpable femoral palpable DP dopplerable PT    Left: Palpable femoral palpable DP dopplerable PT    Invasive Lines/Tubes:  Invasive Devices       Peripheral Intravenous Line  Duration             Peripheral IV 01/24/24 Left;Upper;Ventral (anterior) Arm <1 day                    Lab Results and Cultures:   COVID:   Last COVID19 Screening Values       None           CBC:   Results from last 7 days   Lab Units 01/21/24  0526 01/20/24 0437 01/19/24 0425 01/18/24  0935 01/18/24  0445   WBC Thousand/uL 6.32 6.63 8.74 9.42 9.04   HEMOGLOBIN g/dL 7.5* 7.4* 8.5* 9.3* 9.5*   HEMATOCRIT % 23.6* 22.3* 25.4* 27.9* 28.4*   PLATELETS Thousands/uL 199 146* 147* 156 136*     BMP/CMP:  Results from last 7 days   Lab Units 01/21/24  0526 01/20/24 0437 01/19/24  0425 01/18/24  0445   POTASSIUM mmol/L 4.2 4.5 3.9 4.0   CHLORIDE mmol/L 109* 106 107 108   CO2 mmol/L 27 27 25 24   BUN mg/dL 16 17 21 19   CREATININE mg/dL 0.80 0.72 0.77 0.80   CALCIUM mg/dL 8.2* 8.1* 7.6* 8.1*     Coags:   Results from last 7 days   Lab Units 01/19/24  0425 01/18/24  2205 01/18/24  1609 01/18/24  0935   INR   --   --   --  1.10   PTT seconds 76* 83* 83* 24     Lipid panel:     HgbA1c: No results found for: \"HGBA1C\"    Urinalysis: No results found for: \"COLORU\", \"CLARITYU\", \"SPECGRAV\", \"PHUR\", \"LEUKOCYTESUR\", \"NITRITE\", \"PROTEINUA\", \"GLUCOSEU\", \"KETONESU\", \"BILIRUBINUR\", \"BLOODU\",   Urine Culture: No results found for: \"URINECX\"  Wound Culure: " Dose Route Frequency Stop    09/14/23 1829  Enoxaparin Sodium (LOVENOX) syringe 40 mg         40 mg SC Daily --                        Otoniel Mclaughlin MD  [facility]  09/18/23  09:04 EDT   "No results found for: \"WOUNDCULT\"  Blood Culture: No results found for: \"BLOODCX\"      Imaging Studies: I have personally reviewed pertinent reports.    EKG, Pathology, and Other Studies: I have personally reviewed pertinent reports.    VTE Prophylaxis: Will hold Eliquis for 1 week, resume after office visit.  CTA abdominal w run off w wo contrast    Result Date: 11/9/2023  Impression 1. Infrarenal abdominal aortic aneurysm measures up to 4.4 cm. See details above. 2. RIGHT: Distal SFA and popliteal occlusion. Two thrombosed popliteal aneurysms measure 2.5 cm proximally and 2.9 cm distally. Patent three-vessel runoff as demonstrated on delayed images. 3. LEFT: Partially thrombosed popliteal artery aneurysms. A proximal aneurysm measures up to 2.6 cm, a more distal aneurysm measures 1.8 cm. An area of relative stenosis is demonstrated on series 301 image 332. Patent three-vessel runoff as demonstrated on delayed images. 4. Indeterminate left lateral renal midpole hypodense lesion, may represent a hemorrhagic cyst. Follow-up with ultrasound or multiphase renal mass protocol can be obtained for further evaluation. 4. Marked colonic diverticulosis without evidence of active inflammation. 5. Large hiatal hernia. 6. Cholelithiasis without evidence of active inflammation. This study was marked in EPIC for significant notification. Workstation performed: [id number]      No CTA results available for this patient.       Code Status: Prior  Advance Directive and Living Will:      Power of :    POLST:      Counseling / Coordination of Care  Counseling/Coordination of Care: Total floor / unit time spent today 25 minutes. Greater than 50% of total time was spent with the patient and / or family counseling and / or coordination of care. A description of the counseling / coordination of care: Treatment plan and diagnosis        Ej Shaver PA-C  1/24/2024      "

## 2024-03-08 NOTE — SIGNIFICANT NOTE
09/22/23 1327   OTHER   Discipline physical therapy assistant   Rehab Time/Intention   Session Not Performed patient/family declined, not feeling well  (Pt had episodes of soa the previous night.  Pt c/o soa this AM and not feeling well.  She declined PT on this date. Will f/u at later date.)        Ambulance EMS

## 2025-07-21 NOTE — PROGRESS NOTES
"Palliative Care Daily Progress Note     S: Medical record reviewed, followed up with Primary RN Selene and Dr Mejia regarding patient's condition. When palliative care entered the room Liz was awake, she was alert to person and hospital, however not to dime or situation. When I asked her if she wore her mask and she stated she had, when I asked for how long she was unsure. She seemed like she really believed that she wore it all night. She was not in pain, she was not nauseous or short of breath at this time.       O:   Palliative Performance Scale Score:     /61 (BP Location: Right arm, Patient Position: Lying)   Pulse 104   Temp 98.2 °F (36.8 °C) (Oral)   Resp 18   Ht 162.6 cm (64\")   Wt 66.9 kg (147 lb 8 oz)   SpO2 97%   BMI 25.32 kg/m²     Intake/Output Summary (Last 24 hours) at 12/8/2023 1733  Last data filed at 12/8/2023 1728  Gross per 24 hour   Intake 1080 ml   Output 2200 ml   Net -1120 ml       PE:  General Appearance:    Chronically ill appearing, alert, anxious and cooperative   HEENT:    NC/AT, without obvious abnormality, EOMI, anicteric    Neck:   supple, trachea midline, no JVD   Lungs:     Mildly labored respirations, diminished bases, had CPAP on at this time    Heart:    RRR, normal S1 and S2, no M/R/G   Abdomen:     Soft, NT, ND, NABS    Extremities:   Moves all extremities, no edema   Pulses:   Pulses palpable and equal bilaterally   Skin:   Warm, dry   Neurologic:   Alert to person, place, not time or situation   Psych:   Anxious, frustrated         Meds: Reviewed and changes noted    Labs:   Results from last 7 days   Lab Units 12/08/23  0756   WBC 10*3/mm3 13.83*   HEMOGLOBIN g/dL 13.3   HEMATOCRIT % 45.3   PLATELETS 10*3/mm3 147     Results from last 7 days   Lab Units 12/08/23  0756   SODIUM mmol/L 141   POTASSIUM mmol/L 3.7   CHLORIDE mmol/L 94*   CO2 mmol/L 42.7*   BUN mg/dL 25*   CREATININE mg/dL 0.41*   GLUCOSE mg/dL 133*   CALCIUM mg/dL 9.1     Results from last 7 " days   Lab Units 12/08/23  0756 12/05/23  0052 12/04/23  0051   SODIUM mmol/L 141   < > 140   POTASSIUM mmol/L 3.7   < > 4.0   CHLORIDE mmol/L 94*   < > 88*   CO2 mmol/L 42.7*   < > 45.9*   BUN mg/dL 25*   < > 24*   CREATININE mg/dL 0.41*   < > 0.37*   CALCIUM mg/dL 9.1   < > 9.6   BILIRUBIN mg/dL  --   --  0.8   ALK PHOS U/L  --   --  48   ALT (SGPT) U/L  --   --  19   AST (SGOT) U/L  --   --  12   GLUCOSE mg/dL 133*   < > 170*    < > = values in this interval not displayed.     Imaging Results (Last 72 Hours)       Procedure Component Value Units Date/Time    XR Chest 1 View [[id number]] Resulted: 12/08/23 1224     Updated: 12/08/23 1245              Diagnostics: Reviewed    A: Liz Jiang is a 57 y.o. female  admitted on 11/18/2023 due to shortness of breath. 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  Palliative was consulted to discuss GOC/ACP and support for pt and family.          P:  I was able to set tup a family meeting with family and Dr Mejia. Dr Mejia, myself and Ruby from  were able to have a discussion with Patients son Pradeep and one other son as well as her two sisters. We discussed pts condition and her struggle with wearing her mask for the length of time she needs to keep her CO2 levels down. We also discussed that we feel, and Pradeep agrees that Liz really believes she is wearing the mask and that it does not work. We also discussed GOC and her quality of life as she has not been out of bed and is  weak, her sister mentioned she does not have a good quality of life. Since Psych has stated that they do not feel theodora has decision making capacity I mention to Pradeep, his brother and her two sisters about her code status of full code, Dr Mejia discussed that there was no guarantee that we would be able to get her off the ventilator and even if we were it would be back to her current medical situation of non compliance with the Cpap. Ruby in  gave Pradeep the statement from Psych so he could get emergency guardianship. We did discuss hospice at home and they are going to discuss this and her code status amongst her children. Pradeep stated he would get back with Dr Mejia about her code status. Ruby and I will look into her waiver program on Monday and see if it can be utilized with hospice.    We will continue to follow along. Please do not hesitate to contact us regarding further sx mgmt or GOC needs, including after hours or on weekends via our on call provider at [phone].     Melissa Thibodeaux, APRN    12/8/2023   No

## (undated) DEVICE — ENCORE® LATEX MICRO SIZE 7, STERILE LATEX POWDER-FREE SURGICAL GLOVE: Brand: ENCORE

## (undated) DEVICE — RADIFOCUS OPTITORQUE ANGIOGRAPHIC CATHETER: Brand: OPTITORQUE

## (undated) DEVICE — A2000 MULTI-USE SYRINGE KIT, P/N 701277-003KIT CONTENTS: 100ML CONTRAST RESERVOIR AND TUBING WITH CONTRAST SPIKE AND CLAMP: Brand: A2000 MULTI-USE SYRINGE KIT

## (undated) DEVICE — SYR LUERLOK 5CC

## (undated) DEVICE — DECANT BG O JET

## (undated) DEVICE — ST EXT IV SMRTSTE 2VLV FIX M LL 6ML 41

## (undated) DEVICE — SUT VIC 3/0 SH 27IN J416H

## (undated) DEVICE — SUT PROLN 3/0 8832H

## (undated) DEVICE — ADULT DISPOSABLE SINGLE-PATIENT USE PULSE OXIMETER SENSOR: Brand: NONIN

## (undated) DEVICE — CATH F5 INF JR 4 100CM: Brand: INFINITI

## (undated) DEVICE — DRP C/ARM W/BAND W/CLIPS 41X74IN

## (undated) DEVICE — GLIDESHEATH SLENDER STAINLESS STEEL KIT: Brand: GLIDESHEATH SLENDER

## (undated) DEVICE — PK LAP GEN 70

## (undated) DEVICE — DRAPE,T,LAPARO,TRANS,STERILE: Brand: MEDLINE

## (undated) DEVICE — PENCL E/S HNDSWCH PUSHBTN HOLSTR 10FT

## (undated) DEVICE — TROCAR: Brand: KII SLEEVE

## (undated) DEVICE — APPL CHLORAPREP W/TINT 26ML ORNG

## (undated) DEVICE — ST INF PRI SMRTSTE 20DRP 2VLV 24ML 117

## (undated) DEVICE — ENDOPOUCH RETRIEVER SPECIMEN RETRIEVAL BAGS: Brand: ENDOPOUCH RETRIEVER

## (undated) DEVICE — PK CATH CARD 70

## (undated) DEVICE — PK BASIC 70

## (undated) DEVICE — RUNWAY RADL W/TOP PAD

## (undated) DEVICE — DEV COMPR RADL PRELUDESYNCEZ 30ML 32CM

## (undated) DEVICE — HOLDER: Brand: DEROYAL

## (undated) DEVICE — ENDOPATH XCEL BLADELESS TROCARS WITH STABILITY SLEEVES: Brand: ENDOPATH XCEL

## (undated) DEVICE — ENDOCUT SCISSOR TIP, DISPOSABLE: Brand: RENEW

## (undated) DEVICE — AMD ANTIMICROBIAL NON-ADHERENT ISLAND DRESSING,0.2% POLYHEXAMETHYLENE BIGUANIDE HCI (PHMB): Brand: TELFA

## (undated) DEVICE — SYR LUERLOK 30CC

## (undated) DEVICE — UNDYED BRAIDED (POLYGLACTIN 910), SYNTHETIC ABSORBABLE SUTURE: Brand: COATED VICRYL

## (undated) DEVICE — CATH VENT MIV RADL PIG LNG TIP 5F 110CM

## (undated) DEVICE — GLV SURG PREMIERPRO MIC LTX PF SZ7 BRN

## (undated) DEVICE — 2, DISPOSABLE SUCTION/IRRIGATOR WITH DISPOSABLE TIP: Brand: STRYKEFLOW

## (undated) DEVICE — DRSNG SURESITE WNDW 4X4.5

## (undated) DEVICE — GW INQW FIX/CORE PTFE J/3MM .035 260CM

## (undated) DEVICE — BNDG ADHS CURAD PLSTC SPOT 7/8IN STRL LF

## (undated) DEVICE — PAD, DEFIB, ADULT, RADIOTRANS, ZOLL: Brand: MEDLINE

## (undated) DEVICE — LN FLTR ORL/NASL MICROSTREAM NONINTUB A/LNG

## (undated) DEVICE — ELECTRD BLD EDGE/INSUL1P SFTY SLV 2.75IN

## (undated) DEVICE — SUT VIC 4/0 P3 18IN J494G

## (undated) DEVICE — DRAPE,UTILTY,TAPE,15X26, 4EA/PK: Brand: MEDLINE

## (undated) DEVICE — TROCAR: Brand: KII FIOS FIRST ENTRY

## (undated) DEVICE — MODEL AT P65, P/N 701554-001KIT CONTENTS: HAND CONTROLLER, 3-WAY HIGH-PRESSURE STOPCOCK WITH ROTATING END AND PREMIUM HIGH-PRESSURE TUBING: Brand: ANGIOTOUCH® KIT

## (undated) DEVICE — ST CATH CHOLANG WKARLAN/BALN 2LUM 4F 1.25

## (undated) DEVICE — INSUFFLATION NEEDLE TO ESTABLISH PNEUMOPERITONEUM.: Brand: INSUFFLATION NEEDLE

## (undated) DEVICE — DRAPE, RADIAL, STERILE: Brand: MEDLINE